# Patient Record
Sex: MALE | Race: BLACK OR AFRICAN AMERICAN | NOT HISPANIC OR LATINO | URBAN - METROPOLITAN AREA
[De-identification: names, ages, dates, MRNs, and addresses within clinical notes are randomized per-mention and may not be internally consistent; named-entity substitution may affect disease eponyms.]

---

## 2021-07-18 ENCOUNTER — INPATIENT (INPATIENT)
Facility: HOSPITAL | Age: 86
LOS: 35 days | Discharge: HOME HEALTH | DRG: 56 | End: 2021-08-23
Attending: PSYCHIATRY & NEUROLOGY | Admitting: INTERNAL MEDICINE
Payer: COMMERCIAL

## 2021-07-18 VITALS
TEMPERATURE: 99 F | HEART RATE: 87 BPM | WEIGHT: 132.5 LBS | SYSTOLIC BLOOD PRESSURE: 178 MMHG | RESPIRATION RATE: 16 BRPM | OXYGEN SATURATION: 99 % | DIASTOLIC BLOOD PRESSURE: 83 MMHG

## 2021-07-18 LAB
A1C WITH ESTIMATED AVERAGE GLUCOSE RESULT: 5.6 % — SIGNIFICANT CHANGE UP (ref 4–5.6)
ALBUMIN SERPL ELPH-MCNC: 4.5 G/DL — SIGNIFICANT CHANGE UP (ref 3.3–5)
ALP SERPL-CCNC: 100 U/L — SIGNIFICANT CHANGE UP (ref 40–120)
ALT FLD-CCNC: 13 U/L — SIGNIFICANT CHANGE UP (ref 10–45)
ANION GAP SERPL CALC-SCNC: 10 MMOL/L — SIGNIFICANT CHANGE UP (ref 5–17)
ANISOCYTOSIS BLD QL: SLIGHT — SIGNIFICANT CHANGE UP
APTT BLD: 32.7 SEC — SIGNIFICANT CHANGE UP (ref 27.5–35.5)
AST SERPL-CCNC: 18 U/L — SIGNIFICANT CHANGE UP (ref 10–40)
BASOPHILS # BLD AUTO: 0.29 K/UL — HIGH (ref 0–0.2)
BASOPHILS NFR BLD AUTO: 0.9 % — SIGNIFICANT CHANGE UP (ref 0–2)
BILIRUB SERPL-MCNC: 0.2 MG/DL — SIGNIFICANT CHANGE UP (ref 0.2–1.2)
BUN SERPL-MCNC: 39 MG/DL — HIGH (ref 7–23)
BURR CELLS BLD QL SMEAR: SLIGHT — SIGNIFICANT CHANGE UP
CALCIUM SERPL-MCNC: 9.6 MG/DL — SIGNIFICANT CHANGE UP (ref 8.4–10.5)
CHLORIDE SERPL-SCNC: 108 MMOL/L — SIGNIFICANT CHANGE UP (ref 96–108)
CHOLEST SERPL-MCNC: 213 MG/DL — HIGH
CO2 SERPL-SCNC: 23 MMOL/L — SIGNIFICANT CHANGE UP (ref 22–31)
CREAT SERPL-MCNC: 1.76 MG/DL — HIGH (ref 0.5–1.3)
EOSINOPHIL # BLD AUTO: 5.15 K/UL — HIGH (ref 0–0.5)
EOSINOPHIL NFR BLD AUTO: 15.9 % — HIGH (ref 0–6)
ESTIMATED AVERAGE GLUCOSE: 114 MG/DL — SIGNIFICANT CHANGE UP (ref 68–114)
GIANT PLATELETS BLD QL SMEAR: PRESENT — SIGNIFICANT CHANGE UP
GLUCOSE SERPL-MCNC: 141 MG/DL — HIGH (ref 70–99)
HCT VFR BLD CALC: 32.6 % — LOW (ref 39–50)
HDLC SERPL-MCNC: 56 MG/DL — SIGNIFICANT CHANGE UP
HGB BLD-MCNC: 10.7 G/DL — LOW (ref 13–17)
INR BLD: 1.03 — SIGNIFICANT CHANGE UP (ref 0.88–1.16)
LACTATE SERPL-SCNC: 1.1 MMOL/L — SIGNIFICANT CHANGE UP (ref 0.5–2)
LIPID PNL WITH DIRECT LDL SERPL: 137 MG/DL — HIGH
LYMPHOCYTES # BLD AUTO: 2.59 K/UL — SIGNIFICANT CHANGE UP (ref 1–3.3)
LYMPHOCYTES # BLD AUTO: 8 % — LOW (ref 13–44)
MANUAL SMEAR VERIFICATION: SIGNIFICANT CHANGE UP
MCHC RBC-ENTMCNC: 30.3 PG — SIGNIFICANT CHANGE UP (ref 27–34)
MCHC RBC-ENTMCNC: 32.8 GM/DL — SIGNIFICANT CHANGE UP (ref 32–36)
MCV RBC AUTO: 92.4 FL — SIGNIFICANT CHANGE UP (ref 80–100)
MONOCYTES # BLD AUTO: 3.72 K/UL — HIGH (ref 0–0.9)
MONOCYTES NFR BLD AUTO: 11.5 % — SIGNIFICANT CHANGE UP (ref 2–14)
MYELOCYTES NFR BLD: 0.9 % — HIGH (ref 0–0)
NEUTROPHILS # BLD AUTO: 20.34 K/UL — HIGH (ref 1.8–7.4)
NEUTROPHILS NFR BLD AUTO: 61.9 % — SIGNIFICANT CHANGE UP (ref 43–77)
NEUTS BAND # BLD: 0.9 % — SIGNIFICANT CHANGE UP (ref 0–8)
NON HDL CHOLESTEROL: 157 MG/DL — HIGH
NRBC # BLD: 1 /100 — HIGH (ref 0–0)
NRBC # BLD: SIGNIFICANT CHANGE UP /100 WBCS (ref 0–0)
PHENYTOIN FREE SERPL-MCNC: 4.1 UG/ML — LOW (ref 10–20)
PLAT MORPH BLD: NORMAL — SIGNIFICANT CHANGE UP
PLATELET # BLD AUTO: 413 K/UL — HIGH (ref 150–400)
POIKILOCYTOSIS BLD QL AUTO: SLIGHT — SIGNIFICANT CHANGE UP
POTASSIUM SERPL-MCNC: 4.7 MMOL/L — SIGNIFICANT CHANGE UP (ref 3.5–5.3)
POTASSIUM SERPL-SCNC: 4.7 MMOL/L — SIGNIFICANT CHANGE UP (ref 3.5–5.3)
PROT SERPL-MCNC: 7.6 G/DL — SIGNIFICANT CHANGE UP (ref 6–8.3)
PROTHROM AB SERPL-ACNC: 12.3 SEC — SIGNIFICANT CHANGE UP (ref 10.6–13.6)
RBC # BLD: 3.53 M/UL — LOW (ref 4.2–5.8)
RBC # FLD: 14 % — SIGNIFICANT CHANGE UP (ref 10.3–14.5)
RBC BLD AUTO: ABNORMAL
SARS-COV-2 RNA SPEC QL NAA+PROBE: SIGNIFICANT CHANGE UP
SODIUM SERPL-SCNC: 141 MMOL/L — SIGNIFICANT CHANGE UP (ref 135–145)
SPHEROCYTES BLD QL SMEAR: SLIGHT — SIGNIFICANT CHANGE UP
TRIGL SERPL-MCNC: 101 MG/DL — SIGNIFICANT CHANGE UP
TSH SERPL-MCNC: 5.56 UIU/ML — HIGH (ref 0.27–4.2)
WBC # BLD: 32.39 K/UL — HIGH (ref 3.8–10.5)
WBC # FLD AUTO: 32.39 K/UL — HIGH (ref 3.8–10.5)

## 2021-07-18 PROCEDURE — 70498 CT ANGIOGRAPHY NECK: CPT | Mod: 26,MA

## 2021-07-18 PROCEDURE — 70496 CT ANGIOGRAPHY HEAD: CPT | Mod: 26,MA

## 2021-07-18 PROCEDURE — 62270 DX LMBR SPI PNXR: CPT

## 2021-07-18 PROCEDURE — 99291 CRITICAL CARE FIRST HOUR: CPT | Mod: 25

## 2021-07-18 PROCEDURE — 70450 CT HEAD/BRAIN W/O DYE: CPT | Mod: 26,MA,59

## 2021-07-18 PROCEDURE — 0042T: CPT

## 2021-07-18 PROCEDURE — 93010 ELECTROCARDIOGRAM REPORT: CPT | Mod: 59

## 2021-07-18 PROCEDURE — 71045 X-RAY EXAM CHEST 1 VIEW: CPT | Mod: 26,59

## 2021-07-18 RX ORDER — ASPIRIN/CALCIUM CARB/MAGNESIUM 324 MG
81 TABLET ORAL DAILY
Refills: 0 | Status: DISCONTINUED | OUTPATIENT
Start: 2021-07-19 | End: 2021-07-23

## 2021-07-18 RX ORDER — ALTEPLASE 100 MG
48.7 KIT INTRAVENOUS ONCE
Refills: 0 | Status: COMPLETED | OUTPATIENT
Start: 2021-07-18 | End: 2021-07-18

## 2021-07-18 RX ORDER — ATORVASTATIN CALCIUM 80 MG/1
80 TABLET, FILM COATED ORAL AT BEDTIME
Refills: 0 | Status: DISCONTINUED | OUTPATIENT
Start: 2021-07-18 | End: 2021-07-20

## 2021-07-18 RX ORDER — ENOXAPARIN SODIUM 100 MG/ML
40 INJECTION SUBCUTANEOUS DAILY
Refills: 0 | Status: DISCONTINUED | OUTPATIENT
Start: 2021-07-18 | End: 2021-07-18

## 2021-07-18 RX ORDER — VANCOMYCIN HCL 1 G
1000 VIAL (EA) INTRAVENOUS ONCE
Refills: 0 | Status: COMPLETED | OUTPATIENT
Start: 2021-07-18 | End: 2021-07-18

## 2021-07-18 RX ORDER — HEPARIN SODIUM 5000 [USP'U]/ML
5000 INJECTION INTRAVENOUS; SUBCUTANEOUS EVERY 8 HOURS
Refills: 0 | Status: DISCONTINUED | OUTPATIENT
Start: 2021-07-18 | End: 2021-07-27

## 2021-07-18 RX ORDER — ALTEPLASE 100 MG
5.4 KIT INTRAVENOUS ONCE
Refills: 0 | Status: COMPLETED | OUTPATIENT
Start: 2021-07-18 | End: 2021-07-18

## 2021-07-18 RX ORDER — CLOPIDOGREL BISULFATE 75 MG/1
75 TABLET, FILM COATED ORAL DAILY
Refills: 0 | Status: DISCONTINUED | OUTPATIENT
Start: 2021-07-19 | End: 2021-07-20

## 2021-07-18 RX ORDER — CLOPIDOGREL BISULFATE 75 MG/1
300 TABLET, FILM COATED ORAL ONCE
Refills: 0 | Status: COMPLETED | OUTPATIENT
Start: 2021-07-18 | End: 2021-07-18

## 2021-07-18 RX ORDER — ASPIRIN/CALCIUM CARB/MAGNESIUM 324 MG
300 TABLET ORAL ONCE
Refills: 0 | Status: COMPLETED | OUTPATIENT
Start: 2021-07-18 | End: 2021-07-18

## 2021-07-18 RX ORDER — PIPERACILLIN AND TAZOBACTAM 4; .5 G/20ML; G/20ML
3.38 INJECTION, POWDER, LYOPHILIZED, FOR SOLUTION INTRAVENOUS ONCE
Refills: 0 | Status: COMPLETED | OUTPATIENT
Start: 2021-07-18 | End: 2021-07-18

## 2021-07-18 RX ADMIN — ATORVASTATIN CALCIUM 80 MILLIGRAM(S): 80 TABLET, FILM COATED ORAL at 23:21

## 2021-07-18 RX ADMIN — PIPERACILLIN AND TAZOBACTAM 200 GRAM(S): 4; .5 INJECTION, POWDER, LYOPHILIZED, FOR SOLUTION INTRAVENOUS at 20:33

## 2021-07-18 RX ADMIN — Medication 300 MILLIGRAM(S): at 21:25

## 2021-07-18 RX ADMIN — Medication 100 MILLIGRAM(S): at 23:24

## 2021-07-18 RX ADMIN — PIPERACILLIN AND TAZOBACTAM 3.38 GRAM(S): 4; .5 INJECTION, POWDER, LYOPHILIZED, FOR SOLUTION INTRAVENOUS at 21:03

## 2021-07-18 RX ADMIN — CLOPIDOGREL BISULFATE 300 MILLIGRAM(S): 75 TABLET, FILM COATED ORAL at 23:21

## 2021-07-18 RX ADMIN — Medication 250 MILLIGRAM(S): at 21:05

## 2021-07-18 NOTE — ED PROVIDER NOTE - CARE PLAN
Principal Discharge DX:	Facial droop  Secondary Diagnosis:	Slurred speech  Secondary Diagnosis:	Leukocytosis, unspecified type

## 2021-07-18 NOTE — ED ADULT NURSE NOTE - OBJECTIVE STATEMENT
Stroke Code activated by Triage RN.  As per pt's nephew Pt CO slurred speech, left sided facial droop, and drooling since 1700 today.  PT directed to CT.  #18 PIV placed to Left AC.  Stroke Team at bedside.  Plan for TPA at this time, will continue to monitor.

## 2021-07-18 NOTE — CONSULT NOTE ADULT - ASSESSMENT
89y Male with PMHx of HTN and seizure on phenytoin presents with left facial droop and slurred speech, LKW 5pm 7/18/21, NIHSS 3. CTH negative for acute hemorrhage. CTP without any perfusion defect. CTA without LVO.  Patient was within the window for tpa, but pt and family (wife and great-nephew) declined as they felt his current deficits are non-disabling to his daily life vs risk of bleeding. Pt to be admitted to stroke service for stroke w/u.    - patient failed dysphagia screening, will load with  VT  - start Atorvastatin 80mg PO daily once cleared for PO  - stroke risk factors: A1c, , HTN  - obtain MRI brain without short stroke protocol  - obtain TTE with bubble   - recommend SBP goal <180  - recommend q4hr stroke neuro checks  - recommend oral care QID  - provide stroke education    DVT prophylaxis   -Lovenox SQ and SCDs    Discussed with Neurology Attending 89y Male with PMHx of HTN and seizure on phenytoin presents with left facial droop and slurred speech, LKW 5pm 7/18/21, NIHSS 3. CTH negative for acute hemorrhage. CTP without any perfusion defect. CTA without LVO.  Patient was within the window for tpa, but pt and family (wife and great-nephew) declined as they felt his current deficits are non-disabling to his daily life vs risk of bleeding. Pt to be admitted to stroke service for stroke w/u.    - patient failed dysphagia screening, loaded with ASA 325mg MI. Pt passed second dysphagia screening and loaded plavix 300mg PO. Then, start ASA 81 mg daily and plavix 75mg daily tomorrow  - start Atorvastatin 80mg PO daily  - stroke risk factors: A1c, , HTN  - obtain MRI brain without short stroke protocol  - obtain TTE with bubble   - recommend SBP goal <180  - recommend q4hr stroke neuro checks  - recommend oral care QID  - provide stroke education    DVT prophylaxis   -Lovenox SQ and SCDs    Discussed with Neurology Attending 89y Male with PMHx of HTN and seizure on phenytoin presents with left facial droop and slurred speech, LKW 5pm 7/18/21, NIHSS 3. CTH negative for acute hemorrhage. CTP without any perfusion defect. CTA without LVO.  Patient was within the window for tpa, but pt and family (wife and great-nephew) declined as they felt his current deficits are non-disabling to his daily life vs risk of bleeding. Pt to be admitted to stroke service for stroke w/u.    - patient failed dysphagia screening, loaded with ASA 325mg NV. Pt passed second dysphagia screening and loaded plavix 300mg PO. Then, start ASA 81 mg daily and plavix 75mg daily tomorrow  - start Atorvastatin 80mg PO daily  - stroke risk factors: A1c, , HTN  - obtain MRI brain without short stroke protocol  - obtain TTE with bubble   - recommend SBP goal <180  - recommend q4hr stroke neuro checks  - recommend oral care QID  - provide stroke education    DVT prophylaxis   -Heparin SQ and SCDs    Discussed with Neurology Attending no

## 2021-07-18 NOTE — ED PROVIDER NOTE - WR INTERPRETATION 1
CXR negative - No pneumothorax, No opacities, No free airCXR negative - No infiltrates, No consolidation, No atelectasis seenCXR negative - No CHF, No cardiomegaly, No pleural effusions

## 2021-07-18 NOTE — CONSULT NOTE ADULT - SUBJECTIVE AND OBJECTIVE BOX
**STROKE CODE CONSULT NOTE**    Last known well time/Time of onset of symptoms: 5pm 7/18/2021    HPI: 89y Male with PMHx of HTN and seizure on phenytoin presents with left facial droop and slurred speech. Pt was with family member (great-nephew Mesh [phone]) who noticed at 5pm, pt had acute onset of slurred speech, increased saliva production and a left facial droop. Pt was driven to the ED immediately. On presentation, /94, NIHSS 3. Great-nephew at bedside denies hx of stroke, use of blood thinners, recent trauma/bleeding event. CTH negative for acute hemorrhage. CTP without any perfusion defect. CTA without LVO. In conjunction with patient, family and stroke attending, decision was made to not give tpa since family felt his current deficits are non-disabling to his daily life vs risk of bleeding. Pt and family was made aware of the possibility of worsening symptoms and the opportunity to give tpa was time limited. They expressed understanding and declined tpa administration.     Pt denies CP, SOB, extremity weakness, changes in vision, HA. He endorses a cough that has been occurring for the past few days with sputum production, but has been afebrile.     At baseline, pt is fully independent of all ADLs and iADLs, ambulates without assistance. It is unclear if pt sees a neurologist for seizure management. Per wife (Ross [phone], [phone]), his most recent seizure was in April 2020 where he had full body shaking lasting for 5 minutes. He has been on phenytoin 100mg TID and has not had another seizure since.     T(C): 36.9 (07-18-21 @ 21:00), Max: 37.1 (07-18-21 @ 19:32)  HR: 91 (07-18-21 @ 21:00) (84 - 91)  BP: 166/79 (07-18-21 @ 21:00) (160/79 - 184/75)  RR: 18 (07-18-21 @ 21:00) (16 - 18)  SpO2: 96% (07-18-21 @ 21:00) (96% - 99%)    PAST MEDICAL & SURGICAL HISTORY:      FAMILY HISTORY:      SOCIAL HISTORY:   Patient lives with wife in apartment  Smoking status: denies  Drinking: denies  Drug Use: denies     ROS:   Constitutional: No fever, weight loss or fatigue  Eyes: No eye pain, visual disturbances, or discharge  ENMT:  No difficulty hearing, tinnitus; No sinus or throat pain  Neck: No pain or stiffness  Respiratory: cough with sputum production  Cardiovascular: No chest pain, palpitations, shortness of breath, or leg swelling  Gastrointestinal: No abdominal pain. No nausea, vomiting or hematemesis; No diarrhea or constipation. Nohematochezia.  Neurological: As per HPI    MEDICATIONS  (STANDING):    MEDICATIONS  (PRN):    Allergies    hay fever (Unknown)  No Known Drug Allergies    Intolerances      Vital Signs Last 24 Hrs  T(C): 36.9 (18 Jul 2021 21:00), Max: 37.1 (18 Jul 2021 19:32)  T(F): 98.5 (18 Jul 2021 21:00), Max: 98.8 (18 Jul 2021 19:51)  HR: 91 (18 Jul 2021 21:00) (84 - 91)  BP: 166/79 (18 Jul 2021 21:00) (160/79 - 184/75)  BP(mean): --  RR: 18 (18 Jul 2021 21:00) (16 - 18)  SpO2: 96% (18 Jul 2021 21:00) (96% - 99%)    Physical exam:  Constitutional: No acute distress, conversant  Eyes: Anicteric sclerae, moist conjunctivae, see below for CNs  Neck: trachea midline, FROM, supple, no thyromegaly or lymphadenopathy  Cardiovascular: Regular rate and rhythm  Pulmonary: Transmitted sounds from upper airway. No use of accessory muscles  GI: Abdomen soft, non-distended, non-tender  Extremities: no edema    Neurologic:  -Mental status: Awake, alert, oriented to person, place, and time. Speech is fluent with intact naming (able to name hammock, chair, key, pen, finger phone, scissor) , repetition, and comprehension, mild dysarthria. Recent and remote memory intact. Follows commands. Attention/concentration intact.   -Cranial nerves:   II: Visual fields are full to confrontation.  III, IV, VI: Extraocular movements are intact without nystagmus. Pupils equally round and reactive to light  V:  Facial sensation V1-V3 equal and intact   VII: Left facial droop on activation   VIII: Hearing is grossly intact bilaterally  Motor: Able to maintain AG along all extremities without pronator drift. Strength RUE 3/5, LUE 5/5, RLE 3/5, LUE 5/5.   Sensation: Intact to light touch bilaterally. Extinguishes on RLE double simultaneous testing.  Coordination: No dysmetria on finger-to-nose and heel-to-shin bilaterally  Gait: Narrow gait, slow, with leaning to the left    NIHSS: 3 ASPECT Score: 9    Fingerstick Blood Glucose: CAPILLARY BLOOD GLUCOSE  161 (18 Jul 2021 20:36)      POCT Blood Glucose.: 161 mg/dL (18 Jul 2021 19:27)    LABS:                        10.7   32.39 )-----------( 413      ( 18 Jul 2021 20:01 )             32.6     07-18    141  |  108  |  39<H>  ----------------------------<  141<H>  4.7   |  23  |  1.76<H>    Ca    9.6      18 Jul 2021 20:01    TPro  7.6  /  Alb  4.5  /  TBili  0.2  /  DBili  x   /  AST  18  /  ALT  13  /  AlkPhos  100  07-18    PT/INR - ( 18 Jul 2021 20:01 )   PT: 12.3 sec;   INR: 1.03          PTT - ( 18 Jul 2021 20:01 )  PTT:32.7 sec          RADIOLOGY & ADDITIONAL STUDIES:  < from: CT Brain Stroke Protocol (07.18.21 @ 19:44) >  IMPRESSION:  No acute intracranial hemorrhage or transcortical infarct.      < from: CT Perfusion w/ Maps w/ IV Cont (07.18.21 @ 19:45) >  IMPRESSION:  Normal CT perfusion study.      < from: CT Angio Neck w/ IV Cont (07.18.21 @ 19:45) >  IMPRESSION:  1.  No large vessel occlusion or high-grade stenosis within the head and neck.  2.  Incidentally noted 1.8 cm right thyroid nodule. Consider nonemergent dedicated thyroid ultrasound is for further characterization.          -----------------------------------------------------------------------------------------------------------------  IV-tPA (Y/N):    N  Reason IV-tPA not given: pt and family declines tpa as deficits judged to be non-disabling in pt's daily life    **STROKE CODE CONSULT NOTE**    Last known well time/Time of onset of symptoms: 5pm 7/18/2021    HPI: 89y Male with PMHx of HTN and seizure on phenytoin presents with left facial droop and slurred speech. Pt was with family member (great-nephew Mesh [phone]) who noticed at 5pm, pt had acute onset of slurred speech, increased saliva production and a left facial droop. Pt was driven to the ED immediately. On presentation, /94, NIHSS 3. Great-nephew at bedside denies hx of stroke, use of blood thinners, recent trauma/bleeding event. CTH negative for acute hemorrhage. CTP without any perfusion defect. CTA without LVO. In conjunction with patient, family and stroke attending, decision was made to not give tpa since family felt his current deficits are non-disabling to his daily life vs risk of bleeding. Pt and family was made aware of the possibility of worsening symptoms and the opportunity to give tpa was time limited. They expressed understanding and declined tpa administration.     Pt denies CP, SOB, extremity weakness, changes in vision, HA. He endorses a cough that has been occurring for the past few days with sputum production, but has been afebrile.     At baseline, pt is fully independent of all ADLs and iADLs, ambulates without assistance. It is unclear if pt sees a neurologist for seizure management. Per wife (Ross [phone], [phone]), his most recent seizure was in April 2020 where he had full body shaking lasting for 5 minutes. He has been on phenytoin 100mg TID and has not had another seizure since.     T(C): 36.9 (07-18-21 @ 21:00), Max: 37.1 (07-18-21 @ 19:32)  HR: 91 (07-18-21 @ 21:00) (84 - 91)  BP: 166/79 (07-18-21 @ 21:00) (160/79 - 184/75)  RR: 18 (07-18-21 @ 21:00) (16 - 18)  SpO2: 96% (07-18-21 @ 21:00) (96% - 99%)    PAST MEDICAL & SURGICAL HISTORY:      FAMILY HISTORY:      SOCIAL HISTORY:   Patient lives with wife in apartment  Smoking status: denies  Drinking: denies  Drug Use: denies     ROS:   Constitutional: No fever, weight loss or fatigue  Eyes: No eye pain, visual disturbances, or discharge  ENMT:  bilateral hearing loss  Neck: No pain or stiffness  Respiratory: cough with sputum production  Cardiovascular: No chest pain, palpitations, shortness of breath, or leg swelling  Gastrointestinal: No abdominal pain. No nausea, vomiting or hematemesis; No diarrhea or constipation. Nohematochezia.  Neurological: As per HPI    MEDICATIONS  (STANDING):    MEDICATIONS  (PRN):    Allergies    hay fever (Unknown)  No Known Drug Allergies    Intolerances      Vital Signs Last 24 Hrs  T(C): 36.9 (18 Jul 2021 21:00), Max: 37.1 (18 Jul 2021 19:32)  T(F): 98.5 (18 Jul 2021 21:00), Max: 98.8 (18 Jul 2021 19:51)  HR: 91 (18 Jul 2021 21:00) (84 - 91)  BP: 166/79 (18 Jul 2021 21:00) (160/79 - 184/75)  BP(mean): --  RR: 18 (18 Jul 2021 21:00) (16 - 18)  SpO2: 96% (18 Jul 2021 21:00) (96% - 99%)    Physical exam:  Constitutional: No acute distress, conversant  Eyes: Anicteric sclerae, moist conjunctivae, see below for CNs  Neck: trachea midline, FROM, supple, no thyromegaly or lymphadenopathy  Cardiovascular: Regular rate and rhythm, ?murmur  Pulmonary: Transmitted sounds from upper airway. No use of accessory muscles  GI: Abdomen soft, non-distended, non-tender  Extremities: no edema    Neurologic:  -Mental status: Awake, alert, oriented to person, place, and time. Speech is fluent with intact naming (able to name hammock, chair, key, pen, finger phone, scissor) , repetition, and comprehension, mild dysarthria. Recent and remote memory intact. Follows commands. Attention/concentration intact.   -Cranial nerves:   II: Visual fields are full to confrontation.  III, IV, VI: Extraocular movements are intact without nystagmus. Pupils equally round and reactive to light  V:  Facial sensation V1-V3 equal and intact   VII: Left facial droop on activation   VIII: Hearing is grossly intact bilaterally  Motor: Able to maintain AG along all extremities without pronator drift. Strength RUE 3/5, LUE 5/5, RLE 3/5, LUE 5/5.   Sensation: Intact to light touch bilaterally. Extinguishes on RLE double simultaneous testing.  Coordination: No dysmetria on finger-to-nose and heel-to-shin bilaterally  Gait: Narrow gait, slow, with leaning to the left    NIHSS: 3 ASPECT Score: 9    Fingerstick Blood Glucose: CAPILLARY BLOOD GLUCOSE  161 (18 Jul 2021 20:36)      POCT Blood Glucose.: 161 mg/dL (18 Jul 2021 19:27)    LABS:                        10.7   32.39 )-----------( 413      ( 18 Jul 2021 20:01 )             32.6     07-18    141  |  108  |  39<H>  ----------------------------<  141<H>  4.7   |  23  |  1.76<H>    Ca    9.6      18 Jul 2021 20:01    TPro  7.6  /  Alb  4.5  /  TBili  0.2  /  DBili  x   /  AST  18  /  ALT  13  /  AlkPhos  100  07-18    PT/INR - ( 18 Jul 2021 20:01 )   PT: 12.3 sec;   INR: 1.03          PTT - ( 18 Jul 2021 20:01 )  PTT:32.7 sec          RADIOLOGY & ADDITIONAL STUDIES:  < from: CT Brain Stroke Protocol (07.18.21 @ 19:44) >  IMPRESSION:  No acute intracranial hemorrhage or transcortical infarct.      < from: CT Perfusion w/ Maps w/ IV Cont (07.18.21 @ 19:45) >  IMPRESSION:  Normal CT perfusion study.      < from: CT Angio Neck w/ IV Cont (07.18.21 @ 19:45) >  IMPRESSION:  1.  No large vessel occlusion or high-grade stenosis within the head and neck.  2.  Incidentally noted 1.8 cm right thyroid nodule. Consider nonemergent dedicated thyroid ultrasound is for further characterization.          -----------------------------------------------------------------------------------------------------------------  IV-tPA (Y/N):    N  Reason IV-tPA not given: pt and family declines tpa as deficits judged to be non-disabling in pt's daily life

## 2021-07-18 NOTE — ED PROVIDER NOTE - PROGRESS NOTE DETAILS
NIHSS 2. Risk and benefits of TPA d/w pt by stroke team and w/ family. Opted for no TPA. Admit to Neuro, tele. WBC 32 of unclear etiology. No hx malignancy. Afebrile rectally. Will add bx / ucx / broad spectrum abx for now NIHSS 3. Risk and benefits of TPA d/w pt by stroke team and w/ family. Opted for no TPA. Admit to Neuro, tele. WBC 32 of unclear etiology. No hx malignancy. Afebrile rectally. Will add bx / ucx / broad spectrum abx for now

## 2021-07-18 NOTE — ED ADULT NURSE NOTE - NSIMPLEMENTINTERV_GEN_ALL_ED
Implemented All Fall with Harm Risk Interventions:  Kenneth to call system. Call bell, personal items and telephone within reach. Instruct patient to call for assistance. Room bathroom lighting operational. Non-slip footwear when patient is off stretcher. Physically safe environment: no spills, clutter or unnecessary equipment. Stretcher in lowest position, wheels locked, appropriate side rails in place. Provide visual cue, wrist band, yellow gown, etc. Monitor gait and stability. Monitor for mental status changes and reorient to person, place, and time. Review medications for side effects contributing to fall risk. Reinforce activity limits and safety measures with patient and family. Provide visual clues: red socks.

## 2021-07-18 NOTE — ED PROVIDER NOTE - PHYSICAL EXAMINATION
Constitutional: Awake, alert, oriented to person, place, time/situation and in no apparent distress.  ENMT: Airway patent. Normal MM  Eyes: Clear bilaterally, PERRL, EOMI. NO nystagmus  Cardiac: Normal rate, regular rhythm.  Heart sounds S1, S2.  No murmurs, rubs or gallops.  Respiratory: + mild rhonchi at bases that clears w/ coughing. No wheezing or rales. No increased WOB, tachypnea, hypoxia, or accessory mm use.   Gastrointestinal: Abd soft, NT, ND, NABS. No guarding, rebound, or rigidity. No pulsatile abdominal masses. No organomegaly appreciated.   Musculoskeletal: Range of motion is not limited  Neuro: + L facial droop, some drooling from L side of mouth, mildly slurred speech, see NIHSS  Skin: Skin normal color for race, warm, dry and intact. No evidence of rash.  Psych: Alert and oriented to person, place, time/situation. normal mood and affect. no apparent risk to self or others.

## 2021-07-18 NOTE — ED PROVIDER NOTE - NS ED ROS FT
Constitutional: No fever or chills.   Eyes: No pain, blurry vision, or discharge.  ENMT: No hearing changes, pain, discharge or infections. No neck pain or stiffness.  Cardiac: No chest pain, SOB or edema. No chest pain with exertion.  Respiratory: No respiratory distress. No hemoptysis. No history of asthma or RAD.  GI: No nausea, vomiting, diarrhea or abdominal pain.  : No dysuria, frequency or burning.  MS: No myalgia, muscle weakness, joint pain or back pain.  Neuro: See HPI  Skin: No skin rash.   Endocrine: No history of thyroid disease or diabetes.  Except as documented in the HPI, all other systems are negative.

## 2021-07-18 NOTE — ED PROVIDER NOTE - CLINICAL SUMMARY MEDICAL DECISION MAKING FREE TEXT BOX
Pt p/w new onset slurred speech, L facial droop. Stroke code activated from triage. DDx includes CVA, seizure, less likely other pathology. Stroke imaging and stroke w/u in progress. Dispo pending w/u, clinical status, stroke recommendations. Anticipate admission

## 2021-07-18 NOTE — ED ADULT TRIAGE NOTE - CHIEF COMPLAINT QUOTE
As per pt's nephew pt has slurred speech starting around 5pm with left sided facial droop. Pt ambulating with assistance.

## 2021-07-19 LAB
ANION GAP SERPL CALC-SCNC: 14 MMOL/L — SIGNIFICANT CHANGE UP (ref 5–17)
ANISOCYTOSIS BLD QL: SLIGHT — SIGNIFICANT CHANGE UP
BASOPHILS # BLD AUTO: 0 K/UL — SIGNIFICANT CHANGE UP (ref 0–0.2)
BASOPHILS NFR BLD AUTO: 0 % — SIGNIFICANT CHANGE UP (ref 0–2)
BUN SERPL-MCNC: 35 MG/DL — HIGH (ref 7–23)
BURR CELLS BLD QL SMEAR: PRESENT — SIGNIFICANT CHANGE UP
CALCIUM SERPL-MCNC: 9.2 MG/DL — SIGNIFICANT CHANGE UP (ref 8.4–10.5)
CHLORIDE SERPL-SCNC: 108 MMOL/L — SIGNIFICANT CHANGE UP (ref 96–108)
CO2 SERPL-SCNC: 19 MMOL/L — LOW (ref 22–31)
COVID-19 SPIKE DOMAIN AB INTERP: NEGATIVE — SIGNIFICANT CHANGE UP
COVID-19 SPIKE DOMAIN ANTIBODY RESULT: 0.4 U/ML — SIGNIFICANT CHANGE UP
CREAT SERPL-MCNC: 1.62 MG/DL — HIGH (ref 0.5–1.3)
CULTURE RESULTS: SIGNIFICANT CHANGE UP
DACRYOCYTES BLD QL SMEAR: SLIGHT — SIGNIFICANT CHANGE UP
EOSINOPHIL # BLD AUTO: 1.57 K/UL — HIGH (ref 0–0.5)
EOSINOPHIL NFR BLD AUTO: 5.4 % — SIGNIFICANT CHANGE UP (ref 0–6)
GLUCOSE BLDC GLUCOMTR-MCNC: 143 MG/DL — HIGH (ref 70–99)
GLUCOSE SERPL-MCNC: 132 MG/DL — HIGH (ref 70–99)
GRAM STN FLD: SIGNIFICANT CHANGE UP
HCT VFR BLD CALC: 32.8 % — LOW (ref 39–50)
HGB BLD-MCNC: 10.7 G/DL — LOW (ref 13–17)
LYMPHOCYTES # BLD AUTO: 1.57 K/UL — SIGNIFICANT CHANGE UP (ref 1–3.3)
LYMPHOCYTES # BLD AUTO: 5.4 % — LOW (ref 13–44)
MACROCYTES BLD QL: SLIGHT — SIGNIFICANT CHANGE UP
MAGNESIUM SERPL-MCNC: 2.5 MG/DL — SIGNIFICANT CHANGE UP (ref 1.6–2.6)
MANUAL SMEAR VERIFICATION: SIGNIFICANT CHANGE UP
MCHC RBC-ENTMCNC: 30.4 PG — SIGNIFICANT CHANGE UP (ref 27–34)
MCHC RBC-ENTMCNC: 32.6 GM/DL — SIGNIFICANT CHANGE UP (ref 32–36)
MCV RBC AUTO: 93.2 FL — SIGNIFICANT CHANGE UP (ref 80–100)
METAMYELOCYTES # FLD: 0.9 % — HIGH (ref 0–0)
MONOCYTES # BLD AUTO: 3.94 K/UL — HIGH (ref 0–0.9)
MONOCYTES NFR BLD AUTO: 13.5 % — SIGNIFICANT CHANGE UP (ref 2–14)
MYELOCYTES NFR BLD: 1.8 % — HIGH (ref 0–0)
NEUTROPHILS # BLD AUTO: 21.29 K/UL — HIGH (ref 1.8–7.4)
NEUTROPHILS NFR BLD AUTO: 73 % — SIGNIFICANT CHANGE UP (ref 43–77)
PHOSPHATE SERPL-MCNC: 3.6 MG/DL — SIGNIFICANT CHANGE UP (ref 2.5–4.5)
PLAT MORPH BLD: NORMAL — SIGNIFICANT CHANGE UP
PLATELET # BLD AUTO: 384 K/UL — SIGNIFICANT CHANGE UP (ref 150–400)
POIKILOCYTOSIS BLD QL AUTO: SLIGHT — SIGNIFICANT CHANGE UP
POTASSIUM SERPL-MCNC: 4 MMOL/L — SIGNIFICANT CHANGE UP (ref 3.5–5.3)
POTASSIUM SERPL-SCNC: 4 MMOL/L — SIGNIFICANT CHANGE UP (ref 3.5–5.3)
RBC # BLD: 3.52 M/UL — LOW (ref 4.2–5.8)
RBC # FLD: 13.9 % — SIGNIFICANT CHANGE UP (ref 10.3–14.5)
RBC BLD AUTO: ABNORMAL
SARS-COV-2 IGG+IGM SERPL QL IA: 0.4 U/ML — SIGNIFICANT CHANGE UP
SARS-COV-2 IGG+IGM SERPL QL IA: NEGATIVE — SIGNIFICANT CHANGE UP
SODIUM SERPL-SCNC: 141 MMOL/L — SIGNIFICANT CHANGE UP (ref 135–145)
SPECIMEN SOURCE: SIGNIFICANT CHANGE UP
T4 FREE SERPL-MCNC: 1.02 NG/DL — SIGNIFICANT CHANGE UP (ref 0.93–1.7)
WBC # BLD: 29.16 K/UL — HIGH (ref 3.8–10.5)
WBC # FLD AUTO: 29.16 K/UL — HIGH (ref 3.8–10.5)

## 2021-07-19 PROCEDURE — 70551 MRI BRAIN STEM W/O DYE: CPT | Mod: 26

## 2021-07-19 PROCEDURE — 93306 TTE W/DOPPLER COMPLETE: CPT | Mod: 26

## 2021-07-19 PROCEDURE — 99233 SBSQ HOSP IP/OBS HIGH 50: CPT

## 2021-07-19 PROCEDURE — 99223 1ST HOSP IP/OBS HIGH 75: CPT

## 2021-07-19 RX ORDER — LEVETIRACETAM 250 MG/1
500 TABLET, FILM COATED ORAL
Refills: 0 | Status: DISCONTINUED | OUTPATIENT
Start: 2021-07-19 | End: 2021-07-20

## 2021-07-19 RX ORDER — SODIUM CHLORIDE 9 MG/ML
4 INJECTION INTRAMUSCULAR; INTRAVENOUS; SUBCUTANEOUS EVERY 6 HOURS
Refills: 0 | Status: DISCONTINUED | OUTPATIENT
Start: 2021-07-19 | End: 2021-07-28

## 2021-07-19 RX ADMIN — ATORVASTATIN CALCIUM 80 MILLIGRAM(S): 80 TABLET, FILM COATED ORAL at 21:14

## 2021-07-19 RX ADMIN — Medication 200 MILLIGRAM(S): at 17:29

## 2021-07-19 RX ADMIN — HEPARIN SODIUM 5000 UNIT(S): 5000 INJECTION INTRAVENOUS; SUBCUTANEOUS at 05:06

## 2021-07-19 RX ADMIN — Medication 200 MILLIGRAM(S): at 11:49

## 2021-07-19 RX ADMIN — Medication 200 MILLIGRAM(S): at 06:14

## 2021-07-19 RX ADMIN — HEPARIN SODIUM 5000 UNIT(S): 5000 INJECTION INTRAVENOUS; SUBCUTANEOUS at 21:14

## 2021-07-19 RX ADMIN — CLOPIDOGREL BISULFATE 75 MILLIGRAM(S): 75 TABLET, FILM COATED ORAL at 11:49

## 2021-07-19 RX ADMIN — Medication 100 MILLIGRAM(S): at 06:14

## 2021-07-19 RX ADMIN — Medication 81 MILLIGRAM(S): at 11:49

## 2021-07-19 RX ADMIN — LEVETIRACETAM 500 MILLIGRAM(S): 250 TABLET, FILM COATED ORAL at 17:28

## 2021-07-19 NOTE — PHYSICAL THERAPY INITIAL EVALUATION ADULT - IMPAIRMENTS CONTRIBUTING TO GAIT DEVIATIONS, PT EVAL
ataxic/impaired balance/impaired coordination/impaired motor control/narrow base of support/decreased strength

## 2021-07-19 NOTE — OCCUPATIONAL THERAPY INITIAL EVALUATION ADULT - VISUAL ASSESSMENT: VISUAL NEGLECT
primarily observed during ambulation as pt walking on L side of RW, pt was able to correctly turn head to L and read clock/left

## 2021-07-19 NOTE — H&P ADULT - HISTORY OF PRESENT ILLNESS
**STROKE HPI***    HPI: 89y Male with PMHx of HTN and seizure on phenytoin presents with left facial droop and slurred speech. Pt was with family member (great-nephew Mesh [phone]) who noticed at 5pm, pt had acute onset of slurred speech, increased saliva production and a left facial droop. Pt was driven to the ED immediately. On presentation, /94, NIHSS 3. Great-nephew at bedside denies hx of stroke, use of blood thinners, recent trauma/bleeding event. CTH negative for acute hemorrhage. CTP without any perfusion defect. CTA without LVO. In conjunction with patient, family and stroke attending, decision was made to not give tpa since family felt his current deficits are non-disabling to his daily life vs risk of bleeding. Pt and family was made aware of the possibility of worsening symptoms and the opportunity to give tpa was time limited. They expressed understanding and declined tpa administration.     Pt denies CP, SOB, extremity weakness, changes in vision, HA. He endorses a cough that has been occurring for the past few days with sputum production, but has been afebrile.     At baseline, pt is fully independent of all ADLs and iADLs, ambulates without assistance, with hearing loss b/l. It is unclear if pt sees a neurologist for seizure management. Per wife (Ross [phone], [phone]), his most recent seizure was in April 2020 where he had full body shaking lasting for 5 minutes. He has been on phenytoin 100mg TID and has not had another seizure since.       PAST MEDICAL & SURGICAL HISTORY:      FAMILY HISTORY:      SOCIAL HISTORY:   Patient lives with wife in apartment.  Smoking status: denies  Drinking: denies  Drug Use: denies    ROS:   Constitutional: No fever, weight loss or fatigue  Eyes: No eye pain, visual disturbances, or discharge  ENMT:  hearing loss bilaterally  Neck: No pain or stiffness  Respiratory: cough with sputum production x days  Cardiovascular: No chest pain, palpitations, shortness of breath, dizziness or leg swelling  Gastrointestinal: No abdominal pain. No nausea, vomiting or hematemesis; No diarrhea or constipation. Nohematochezia.  Neurological: As per HPI    T(C): 36.9 (07-18-21 @ 23:53), Max: 37.1 (07-18-21 @ 19:32)  HR: 92 (07-18-21 @ 23:53) (84 - 92)  BP: 142/78 (07-18-21 @ 23:53) (123/63 - 184/75)  RR: 18 (07-18-21 @ 23:53) (16 - 18)  SpO2: 97% (07-18-21 @ 23:53) (96% - 99%)    MEDICATION RECONCILIATION   MEDICATIONS  (STANDING):  aspirin enteric coated 81 milliGRAM(s) Oral daily  atorvastatin 80 milliGRAM(s) Oral at bedtime  clopidogrel Tablet 75 milliGRAM(s) Oral daily  heparin   Injectable 5000 Unit(s) SubCutaneous every 8 hours  phenytoin   Capsule 100 milliGRAM(s) Oral three times a day    MEDICATIONS  (PRN):    Allergies    hay fever (Unknown)  No Known Drug Allergies    Intolerances      Vital Signs Last 24 Hrs  T(C): 36.9 (18 Jul 2021 23:53), Max: 37.1 (18 Jul 2021 19:32)  T(F): 98.4 (18 Jul 2021 23:53), Max: 98.8 (18 Jul 2021 19:51)  HR: 92 (18 Jul 2021 23:53) (84 - 92)  BP: 142/78 (18 Jul 2021 23:53) (123/63 - 184/75)  BP(mean): --  RR: 18 (18 Jul 2021 23:53) (16 - 18)  SpO2: 97% (18 Jul 2021 23:53) (96% - 99%)    Physical exam:  General: No acute distress, awake and alert  Cardiovascular: Regular rate and rhythm, ?murmur  Pulmonary: Transmitted sounds from upper airway. No use of accessory muscles  GI: Abdomen soft, non-tender, non-distended    Neurologic:  -Mental status: Awake, alert, oriented to person, place, and time. Speech is fluent with intact naming, repetition, and comprehension, no dysarthria. Recent and remote memory intact. Follows commands. Attention/concentration intact. Fund of knowledge appropriate.  -Cranial nerves:   II: Visual fields are full to confrontation.  III, IV, VI: Extraocular movements are intact without nystagmus. Pupils equally round and reactive to light  V:  Facial sensation V1-V3 equal and intact   VII: Face is symmetric with normal eye closure and smile  VIII: Hearing is bilaterally intact to finger rub  IX, X: Uvula is midline and soft palate rises symmetrically  XI: Head turning and shoulder shrug are intact.  XII: Tongue protrudes midline  Motor: Normal bulk and tone. No pronator drift. Strength bilateral upper extremity 5/5, bilateral lower extremities 5/5.  Rapid alternating movements intact and symmetric  Sensation: Intact to light touch bilaterally. No neglect or extinction on double simultaneous testing.  Coordination: No dysmetria on finger-to-nose and heel-to-shin bilaterally  Reflexes: Downgoing toes bilaterally   Gait: Narrow gait and steady    NIHSS: **** ASPECT Score: *** ICH Score: *** (GCS)    Fingerstick Blood Glucose: CAPILLARY BLOOD GLUCOSE  161 (18 Jul 2021 20:36)      POCT Blood Glucose.: 161 mg/dL (18 Jul 2021 19:27)    LABS:                        10.7   32.39 )-----------( 413      ( 18 Jul 2021 20:01 )             32.6     07-18    141  |  108  |  39<H>  ----------------------------<  141<H>  4.7   |  23  |  1.76<H>    Ca    9.6      18 Jul 2021 20:01    TPro  7.6  /  Alb  4.5  /  TBili  0.2  /  DBili  x   /  AST  18  /  ALT  13  /  AlkPhos  100  07-18    PT/INR - ( 18 Jul 2021 20:01 )   PT: 12.3 sec;   INR: 1.03          PTT - ( 18 Jul 2021 20:01 )  PTT:32.7 sec          RADIOLOGY & ADDITIONAL STUDIES:    HCT:     CTA:    -----------------------------------------------------------------------------------------    ASSESSMENT/PLAN:    89y Male w/ PMH ***. HCT showed ***. CTA showed ***. Given *** tPA was ***. Patient was admitted to **** for further workup    **STROKE HPI***    HPI: 89y Male with PMHx of HTN and seizure on phenytoin presents with left facial droop and slurred speech. Pt was with family member (great-nephew Mesh [phone]) who noticed at 5pm, pt had acute onset of slurred speech, increased saliva production and a left facial droop. Pt was driven to the ED immediately. On presentation, /94, NIHSS 3. Great-nephew at bedside denies hx of stroke, use of blood thinners, recent trauma/bleeding event. CTH negative for acute hemorrhage. CTP without any perfusion defect. CTA without LVO. In conjunction with patient, family and stroke attending, decision was made to not give tpa since family felt his current deficits are non-disabling to his daily life vs risk of bleeding. Pt and family was made aware of the possibility of worsening symptoms and the opportunity to give tpa was time limited. They expressed understanding and declined tpa administration.     Pt denies CP, SOB, extremity weakness, changes in vision, HA. He endorses a cough that has been occurring for the past few days with sputum production, but has been afebrile. Pt given vanc and zosyn x1 in the ED. CXR pending official read. Patient initially failed dysphagia screen and was given  [place], but passed second dysphagia screen so was able to to loaded with plavix 300 PO.     At baseline, pt is fully independent of all ADLs and iADLs, ambulates without assistance, with hearing loss b/l. It is unclear if pt sees a neurologist for seizure management. Per wife (Madeot [phone], [phone]), his most recent seizure was in April 2020 where he had full body shaking lasting for 5 minutes. He has been on phenytoin 100mg TID and has not had another seizure since.       PAST MEDICAL & SURGICAL HISTORY:      FAMILY HISTORY:      SOCIAL HISTORY:   Patient lives with wife in apartment.  Smoking status: denies  Drinking: denies  Drug Use: denies    ROS:   Constitutional: No fever, weight loss or fatigue  Eyes: No eye pain, visual disturbances, or discharge  ENMT:  hearing loss bilaterally  Neck: No pain or stiffness  Respiratory: cough with sputum production x days  Cardiovascular: No chest pain, palpitations, shortness of breath, dizziness or leg swelling  Gastrointestinal: No abdominal pain. No nausea, vomiting or hematemesis; No diarrhea or constipation. Nohematochezia.  Neurological: As per HPI    T(C): 36.9 (07-18-21 @ 23:53), Max: 37.1 (07-18-21 @ 19:32)  HR: 92 (07-18-21 @ 23:53) (84 - 92)  BP: 142/78 (07-18-21 @ 23:53) (123/63 - 184/75)  RR: 18 (07-18-21 @ 23:53) (16 - 18)  SpO2: 97% (07-18-21 @ 23:53) (96% - 99%)    MEDICATION RECONCILIATION   MEDICATIONS  (STANDING):  aspirin enteric coated 81 milliGRAM(s) Oral daily  atorvastatin 80 milliGRAM(s) Oral at bedtime  clopidogrel Tablet 75 milliGRAM(s) Oral daily  heparin   Injectable 5000 Unit(s) SubCutaneous every 8 hours  phenytoin   Capsule 100 milliGRAM(s) Oral three times a day    MEDICATIONS  (PRN):    Allergies    hay fever (Unknown)  No Known Drug Allergies    Intolerances      Vital Signs Last 24 Hrs  T(C): 36.9 (18 Jul 2021 23:53), Max: 37.1 (18 Jul 2021 19:32)  T(F): 98.4 (18 Jul 2021 23:53), Max: 98.8 (18 Jul 2021 19:51)  HR: 92 (18 Jul 2021 23:53) (84 - 92)  BP: 142/78 (18 Jul 2021 23:53) (123/63 - 184/75)  BP(mean): --  RR: 18 (18 Jul 2021 23:53) (16 - 18)  SpO2: 97% (18 Jul 2021 23:53) (96% - 99%)    Physical exam:  General: No acute distress, awake and alert  Cardiovascular: Regular rate and rhythm, ?murmur  Pulmonary: Transmitted sounds from upper airway. No use of accessory muscles  GI: Abdomen soft, non-tender, non-distended    Neurologic:  -Mental status: Awake, alert, oriented to person, place, and time. Speech is fluent with intact naming (able to name hammock, chair, key, pen, finger phone, scissor) , repetition, and comprehension, mild dysarthria. Recent and remote memory intact. Follows commands. Attention/concentration intact.   -Cranial nerves:   II: Visual fields are full to confrontation.  III, IV, VI: Extraocular movements are intact without nystagmus. Pupils equally round and reactive to light  V:  Facial sensation V1-V3 equal and intact   VII: Left facial droop on activation   VIII: Hearing is grossly intact bilaterally  Motor: Able to maintain AG along all extremities without pronator drift. Strength RUE 3/5, LUE 5/5, RLE 3/5, LUE 5/5.   Sensation: Intact to light touch bilaterally. Extinguishes on RLE double simultaneous testing.  Coordination: No dysmetria on finger-to-nose and heel-to-shin bilaterally  Gait: Narrow gait, slow, with leaning to the left    NIHSS: 3 ASPECT Score: 9      Fingerstick Blood Glucose: CAPILLARY BLOOD GLUCOSE  161 (18 Jul 2021 20:36)      POCT Blood Glucose.: 161 mg/dL (18 Jul 2021 19:27)    LABS:                        10.7   32.39 )-----------( 413      ( 18 Jul 2021 20:01 )             32.6     07-18    141  |  108  |  39<H>  ----------------------------<  141<H>  4.7   |  23  |  1.76<H>    Ca    9.6      18 Jul 2021 20:01    TPro  7.6  /  Alb  4.5  /  TBili  0.2  /  DBili  x   /  AST  18  /  ALT  13  /  AlkPhos  100  07-18    PT/INR - ( 18 Jul 2021 20:01 )   PT: 12.3 sec;   INR: 1.03          PTT - ( 18 Jul 2021 20:01 )  PTT:32.7 sec          RADIOLOGY & ADDITIONAL STUDIES:    HCT: No acute intracranial hemorrhage or transcortical infarct.    CTA:1.  No large vessel occlusion or high-grade stenosis within the head and neck.  2.  Incidentally noted 1.8 cm right thyroid nodule. Consider nonemergent dedicated thyroid ultrasound is for further characterization.      **STROKE HPI***    HPI: 89y Male with PMHx of HTN and seizure on phenytoin presents with left facial droop and slurred speech. Pt was with family member (great-nephew Mesh [phone]) who noticed at 5pm, pt had acute onset of slurred speech, increased saliva production and a left facial droop. Pt was driven to the ED immediately. On presentation, /94, NIHSS 3. Great-nephew at bedside denies hx of stroke, use of blood thinners, recent trauma/bleeding event. CTH negative for acute hemorrhage. CTP without any perfusion defect. CTA without LVO. In conjunction with patient, family and stroke attending, decision was made to not give tpa since family felt his current deficits are non-disabling to his daily life vs risk of bleeding. Pt and family was made aware of the possibility of worsening symptoms and the opportunity to give tpa was time limited. They expressed understanding and declined tpa administration.     Pt denies CP, SOB, extremity weakness, changes in vision, HA. He endorses a cough that has been occurring for the past few days with sputum production, but has been afebrile. Pt given vanc and zosyn x1 in the ED. CXR pending official read. Patient initially failed dysphagia screen and was given  [place], but passed second dysphagia screen so was able to to loaded with plavix 300 PO.     At baseline, pt is fully independent of all ADLs and iADLs, ambulates without assistance, with hearing loss b/l, baseline right arm tremor. It is unclear if pt sees a neurologist for seizure management. Per wife (Ross [phone], [phone]), his most recent seizure was in April 2020 where he had full body shaking lasting for 5 minutes. He has been on phenytoin 100mg TID and has not had another seizure since.       PAST MEDICAL & SURGICAL HISTORY:      FAMILY HISTORY:      SOCIAL HISTORY:   Patient lives with wife in apartment.  Smoking status: denies  Drinking: denies  Drug Use: denies    ROS:   Constitutional: No fever, weight loss or fatigue  Eyes: No eye pain, visual disturbances, or discharge  ENMT:  hearing loss bilaterally  Neck: No pain or stiffness  Respiratory: cough with sputum production x days  Cardiovascular: No chest pain, palpitations, shortness of breath, dizziness or leg swelling  Gastrointestinal: No abdominal pain. No nausea, vomiting or hematemesis; No diarrhea or constipation. Nohematochezia.  Neurological: As per HPI    T(C): 36.9 (07-18-21 @ 23:53), Max: 37.1 (07-18-21 @ 19:32)  HR: 92 (07-18-21 @ 23:53) (84 - 92)  BP: 142/78 (07-18-21 @ 23:53) (123/63 - 184/75)  RR: 18 (07-18-21 @ 23:53) (16 - 18)  SpO2: 97% (07-18-21 @ 23:53) (96% - 99%)    MEDICATION RECONCILIATION   MEDICATIONS  (STANDING):  aspirin enteric coated 81 milliGRAM(s) Oral daily  atorvastatin 80 milliGRAM(s) Oral at bedtime  clopidogrel Tablet 75 milliGRAM(s) Oral daily  heparin   Injectable 5000 Unit(s) SubCutaneous every 8 hours  phenytoin   Capsule 100 milliGRAM(s) Oral three times a day    MEDICATIONS  (PRN):    Allergies    hay fever (Unknown)  No Known Drug Allergies    Intolerances      Vital Signs Last 24 Hrs  T(C): 36.9 (18 Jul 2021 23:53), Max: 37.1 (18 Jul 2021 19:32)  T(F): 98.4 (18 Jul 2021 23:53), Max: 98.8 (18 Jul 2021 19:51)  HR: 92 (18 Jul 2021 23:53) (84 - 92)  BP: 142/78 (18 Jul 2021 23:53) (123/63 - 184/75)  BP(mean): --  RR: 18 (18 Jul 2021 23:53) (16 - 18)  SpO2: 97% (18 Jul 2021 23:53) (96% - 99%)    Physical exam:  General: No acute distress, awake and alert  Cardiovascular: Regular rate and rhythm, ?murmur  Pulmonary: Transmitted sounds from upper airway. No use of accessory muscles  GI: Abdomen soft, non-tender, non-distended    Neurologic:  -Mental status: Awake, alert, oriented to person, place, and time. Speech is fluent with intact naming (able to name hammock, chair, key, pen, finger phone, scissor) , repetition, and comprehension, mild dysarthria. Recent and remote memory intact. Follows commands. Attention/concentration intact.   -Cranial nerves:   II: Visual fields are full to confrontation.  III, IV, VI: Extraocular movements are intact without nystagmus. Pupils equally round and reactive to light  V:  Facial sensation V1-V3 equal and intact   VII: Left facial droop on activation   VIII: Hearing is grossly intact bilaterally  Motor: Able to maintain AG along all extremities without pronator drift. Strength RUE 3/5, LUE 5/5, RLE 3/5, LUE 5/5. Baseline right arm tremor.  Sensation: Intact to light touch bilaterally. Extinguishes on RLE double simultaneous testing.  Coordination: No dysmetria on finger-to-nose and heel-to-shin bilaterally  Gait: Narrow gait, slow, with leaning to the left    NIHSS: 3 ASPECT Score: 9      Fingerstick Blood Glucose: CAPILLARY BLOOD GLUCOSE  161 (18 Jul 2021 20:36)      POCT Blood Glucose.: 161 mg/dL (18 Jul 2021 19:27)    LABS:                        10.7   32.39 )-----------( 413      ( 18 Jul 2021 20:01 )             32.6     07-18    141  |  108  |  39<H>  ----------------------------<  141<H>  4.7   |  23  |  1.76<H>    Ca    9.6      18 Jul 2021 20:01    TPro  7.6  /  Alb  4.5  /  TBili  0.2  /  DBili  x   /  AST  18  /  ALT  13  /  AlkPhos  100  07-18    PT/INR - ( 18 Jul 2021 20:01 )   PT: 12.3 sec;   INR: 1.03          PTT - ( 18 Jul 2021 20:01 )  PTT:32.7 sec          RADIOLOGY & ADDITIONAL STUDIES:    HCT: No acute intracranial hemorrhage or transcortical infarct.    CTA:1.  No large vessel occlusion or high-grade stenosis within the head and neck.  2.  Incidentally noted 1.8 cm right thyroid nodule. Consider nonemergent dedicated thyroid ultrasound is for further characterization.

## 2021-07-19 NOTE — OCCUPATIONAL THERAPY INITIAL EVALUATION ADULT - MODALITIES TREATMENT COMMENTS
Mod-Min A with RW to amb  ~75'x2, cues to stay midline within RW and to widen ERNA, mild LOB during turning. Occasional assist to re-position RW. Cranial Nerves II - XII: II: PERRLA; visual fields are full to confrontation. III, IV, VI: EOMI, no nystagmus appreciated V: facial sensation intact to light touch V1-V3 b/l VII: L facial droop noted, symmetric eyebrow raise and closure. VIII: hearing intact to finger rub b/l. XI: head turning and shoulder shrug intact b/l. XII: tongue protrusion midline

## 2021-07-19 NOTE — PROGRESS NOTE ADULT - ATTENDING COMMENTS
Juan Antonio Varma: 89 yr h/o HTN, seizures-diagnosed at age 70-last break through seizure was last year, chronic sinusitis; admitted 7/18 with left facial weakness/dysarthria.   Possible small right hemispheric stroke Blood & urine cultures for elevated white count/cough, MRI, TTE/BECKY, asprin/Plavix, pancultures & PCP notes for elevated WBC-medicine co-mgt following, CT chest, keppra 500 bid

## 2021-07-19 NOTE — PROGRESS NOTE ADULT - SUBJECTIVE AND OBJECTIVE BOX
Neurology Stroke Progress Note    INTERVAL HPI/OVERNIGHT EVENTS:  Patient seen and examined,   MEDICATIONS  (STANDING):  aspirin enteric coated 81 milliGRAM(s) Oral daily  atorvastatin 80 milliGRAM(s) Oral at bedtime  clopidogrel Tablet 75 milliGRAM(s) Oral daily  guaiFENesin Oral Liquid (Sugar-Free) 200 milliGRAM(s) Oral every 6 hours  heparin   Injectable 5000 Unit(s) SubCutaneous every 8 hours  phenytoin   Capsule 100 milliGRAM(s) Oral three times a day    MEDICATIONS  (PRN):      Allergies    hay fever (Unknown)  No Known Drug Allergies    Intolerances        Vital Signs Last 24 Hrs  T(C): 36.6 (19 Jul 2021 05:37), Max: 37.1 (18 Jul 2021 19:32)  T(F): 97.8 (19 Jul 2021 05:37), Max: 98.8 (18 Jul 2021 19:51)  HR: 86 (19 Jul 2021 04:04) (84 - 106)  BP: 142/67 (19 Jul 2021 04:04) (123/63 - 184/75)  BP(mean): 96 (19 Jul 2021 04:04) (96 - 118)  RR: 16 (19 Jul 2021 04:04) (16 - 18)  SpO2: 96% (19 Jul 2021 04:04) (96% - 99%)    Physical exam:  General: No acute distress, awake and alert  Eyes: Anicteric sclerae, moist conjunctivae, see below for CNs  Neck: trachea midline, FROM, supple, no thyromegaly or lymphadenopathy  Cardiovascular: Regular rate and rhythm, no murmurs, rubs, or gallops. No carotid bruits.   Pulmonary: Anterior breath sounds clear bilaterally, no crackles or wheezing. No use of accessory muscles  GI: Abdomen soft, non-distended, non-tender  Extremities: Radial and DP pulses +2, no edema    Neurologic:  -Mental status: Awake, alert, oriented to person, place, and time. Speech is fluent with intact naming, repetition, and comprehension, no dysarthria. Recent and remote memory intact. Follows commands. Attention/concentration intact. Fund of knowledge appropriate.  -Cranial nerves:   II: Visual fields are full to confrontation.  III, IV, VI: Extraocular movements are intact without nystagmus. Pupils equally round and reactive to light  V:  Facial sensation V1-V3 equal and intact   VII: Face is symmetric with normal eye closure and smile  VIII: Hearing is bilaterally intact to finger rub  IX, X: Uvula is midline and soft palate rises symmetrically  XI: Head turning and shoulder shrug are intact.  XII: Tongue protrudes midline  Motor: Normal bulk and tone. No pronator drift. Strength bilateral upper extremity 5/5, bilateral lower extremities 5/5.  Rapid alternating movements intact and symmetric  Sensation: Intact to light touch bilaterally. No neglect or extinction on double simultaneous testing.  Coordination: No dysmetria on finger-to-nose and heel-to-shin bilaterally  Reflexes: Downgoing toes bilaterally   Gait: Narrow gait and steady    LABS:                        10.7   29.16 )-----------( 384      ( 19 Jul 2021 07:11 )             32.8     07-19    141  |  108  |  35<H>  ----------------------------<  132<H>  4.0   |  19<L>  |  1.62<H>    Ca    9.2      19 Jul 2021 07:11  Phos  3.6     07-19  Mg     2.5     07-19    TPro  7.6  /  Alb  4.5  /  TBili  0.2  /  DBili  x   /  AST  18  /  ALT  13  /  AlkPhos  100  07-18    PT/INR - ( 18 Jul 2021 20:01 )   PT: 12.3 sec;   INR: 1.03          PTT - ( 18 Jul 2021 20:01 )  PTT:32.7 sec      RADIOLOGY & ADDITIONAL TESTS:     Neurology Stroke Progress Note    INTERVAL HPI/OVERNIGHT EVENTS:  Patient seen and examined today, had an episode of dropped beat suspicious of 2nd degree block on telemetry. EKG showing 1st degree block. He still complains of cough, with productive sputum.  His neuro exam remains stable,  with dysarthria, left facial droop, and RLE extinguishes    MEDICATIONS  (STANDING):  aspirin enteric coated 81 milliGRAM(s) Oral daily  atorvastatin 80 milliGRAM(s) Oral at bedtime  clopidogrel Tablet 75 milliGRAM(s) Oral daily  guaiFENesin Oral Liquid (Sugar-Free) 200 milliGRAM(s) Oral every 6 hours  heparin   Injectable 5000 Unit(s) SubCutaneous every 8 hours  phenytoin   Capsule 100 milliGRAM(s) Oral three times a day    MEDICATIONS  (PRN):      Allergies    hay fever (Unknown)  No Known Drug Allergies    Intolerances        Vital Signs Last 24 Hrs  T(C): 36.6 (19 Jul 2021 05:37), Max: 37.1 (18 Jul 2021 19:32)  T(F): 97.8 (19 Jul 2021 05:37), Max: 98.8 (18 Jul 2021 19:51)  HR: 86 (19 Jul 2021 04:04) (84 - 106)  BP: 142/67 (19 Jul 2021 04:04) (123/63 - 184/75)  BP(mean): 96 (19 Jul 2021 04:04) (96 - 118)  RR: 16 (19 Jul 2021 04:04) (16 - 18)  SpO2: 96% (19 Jul 2021 04:04) (96% - 99%)    Physical exam:  -Mental status: Awake, alert, oriented to person, place, and time. Speech is fluent with intact naming (able to name hammock, chair, key, pen, finger phone, scissor) , repetition, and comprehension, mild dysarthria. Recent and remote memory intact. Follows commands. Attention/concentration intact.   -Cranial nerves:   II: Visual fields are full to confrontation.  III, IV, VI: Extraocular movements are intact without nystagmus. Pupils equally round and reactive to light  V:  Facial sensation V1-V3 equal and intact   VII: Left facial droop on activation   VIII: Hearing is grossly intact bilaterally  Motor: Able to maintain AG along all extremities without pronator drift. Strength RUE 3/5, LUE 5/5, RLE 3/5, LUE 5/5. Baseline right arm tremor.  Sensation: Intact to light touch bilaterally. Extinguishes on RLE double simultaneous testing.  Coordination: No dysmetria on finger-to-nose and heel-to-shin bilaterally  Gait: Narrow gait, slow, with leaning to the left    NIHSS: 3    General: No acute distress, awake and alert, hard hearing   Eyes: Anicteric sclerae, moist conjunctivae, see below for CNs  Neck: trachea midline, FROM, supple, no thyromegaly or lymphadenopathy  Cardiovascular: Significant systolic murmur  Pulmonary: Anterior breath sounds clear bilaterally, no crackles or wheezing. No use of accessory muscles  GI: Abdomen soft, non-distended, non-tender  Extremities: Radial and DP pulses +2, no edema    Neurologic:    LABS:                        10.7   29.16 )-----------( 384      ( 19 Jul 2021 07:11 )             32.8     07-19    141  |  108  |  35<H>  ----------------------------<  132<H>  4.0   |  19<L>  |  1.62<H>    Ca    9.2      19 Jul 2021 07:11  Phos  3.6     07-19  Mg     2.5     07-19    TPro  7.6  /  Alb  4.5  /  TBili  0.2  /  DBili  x   /  AST  18  /  ALT  13  /  AlkPhos  100  07-18    PT/INR - ( 18 Jul 2021 20:01 )   PT: 12.3 sec;   INR: 1.03          PTT - ( 18 Jul 2021 20:01 )  PTT:32.7 sec      RADIOLOGY & ADDITIONAL TESTS:     Neurology Stroke Progress Note    INTERVAL HPI/OVERNIGHT EVENTS:  Patient seen and examined today, had an episode of dropped beat suspicious of 2nd degree block on telemetry. EKG showing 1st degree block. He still complains of cough, with productive sputum.  His neuro exam remains stable,  with dysarthria, left facial droop.    MEDICATIONS  (STANDING):  aspirin enteric coated 81 milliGRAM(s) Oral daily  atorvastatin 80 milliGRAM(s) Oral at bedtime  clopidogrel Tablet 75 milliGRAM(s) Oral daily  guaiFENesin Oral Liquid (Sugar-Free) 200 milliGRAM(s) Oral every 6 hours  heparin   Injectable 5000 Unit(s) SubCutaneous every 8 hours  phenytoin   Capsule 100 milliGRAM(s) Oral three times a day    MEDICATIONS  (PRN):      Allergies    hay fever (Unknown)  No Known Drug Allergies    Intolerances        Vital Signs Last 24 Hrs  T(C): 36.6 (19 Jul 2021 05:37), Max: 37.1 (18 Jul 2021 19:32)  T(F): 97.8 (19 Jul 2021 05:37), Max: 98.8 (18 Jul 2021 19:51)  HR: 86 (19 Jul 2021 04:04) (84 - 106)  BP: 142/67 (19 Jul 2021 04:04) (123/63 - 184/75)  BP(mean): 96 (19 Jul 2021 04:04) (96 - 118)  RR: 16 (19 Jul 2021 04:04) (16 - 18)  SpO2: 96% (19 Jul 2021 04:04) (96% - 99%)    Physical exam:  -Mental status: Awake, alert, oriented to person, place, and time. Speech is fluent with intact naming (able to name hammock, chair, key, pen, finger phone, scissor) , repetition, and comprehension, mild dysarthria. Recent and remote memory intact. Follows commands. Attention/concentration intact.   -Cranial nerves:   II: Visual fields are full to confrontation.  III, IV, VI: Extraocular movements are intact without nystagmus. Pupils equally round and reactive to light  V:  Facial sensation V1-V3 equal and intact   VII: Left facial droop on activation   VIII: Hearing is grossly intact bilaterally  Motor: Able to maintain AG along all extremities without pronator drift. Strength RUE 4/5, LUE 5/5, RLE 4/5, LUE 5/5. Baseline right arm tremor.  Sensation: Intact to light touch bilaterally. Extinguishes on RLE double simultaneous testing.  Coordination: No dysmetria on finger-to-nose and heel-to-shin bilaterally  Gait: Narrow gait, slow, with leaning to the left    NIHSS: 3    General: No acute distress, awake and alert, hard hearing   Eyes: Anicteric sclerae, moist conjunctivae, see below for CNs  Neck: trachea midline, FROM, supple, no thyromegaly or lymphadenopathy  Cardiovascular: Significant systolic murmur  Pulmonary: Anterior breath sounds clear bilaterally, no crackles or wheezing. No use of accessory muscles  GI: Abdomen soft, non-distended, non-tender  Extremities: Radial and DP pulses +2, no edema    Neurologic:    LABS:                        10.7   29.16 )-----------( 384      ( 19 Jul 2021 07:11 )             32.8     07-19    141  |  108  |  35<H>  ----------------------------<  132<H>  4.0   |  19<L>  |  1.62<H>    Ca    9.2      19 Jul 2021 07:11  Phos  3.6     07-19  Mg     2.5     07-19    TPro  7.6  /  Alb  4.5  /  TBili  0.2  /  DBili  x   /  AST  18  /  ALT  13  /  AlkPhos  100  07-18    PT/INR - ( 18 Jul 2021 20:01 )   PT: 12.3 sec;   INR: 1.03          PTT - ( 18 Jul 2021 20:01 )  PTT:32.7 sec      RADIOLOGY & ADDITIONAL TESTS:

## 2021-07-19 NOTE — OCCUPATIONAL THERAPY INITIAL EVALUATION ADULT - ADDITIONAL COMMENTS
Pt lives with spouse and son in an apartment building with elevator access, ~2STE. Prior to admit, pt was independent in ADLs and mobility without AD. Pt is [place] and has RUE tremor at baseline.

## 2021-07-19 NOTE — OCCUPATIONAL THERAPY INITIAL EVALUATION ADULT - GENERAL OBSERVATIONS, REHAB EVAL
Cleared by MD Awada and RN Rachel to see. Pt received semi-supine with wife at bedside, +tele, +heplock, +bed alarm, agreeable and in NAD. Pt left seated in chair with call bell within reach and RN Rachel aware that chair alarm not activated as unable to locate battery pack on unit.

## 2021-07-19 NOTE — OCCUPATIONAL THERAPY INITIAL EVALUATION ADULT - TRANSFER SAFETY CONCERNS NOTED: BED/CHAIR, REHAB EVAL
L side neglect, cues to re-position self/decreased balance during turns/decreased safety awareness/losing balance/decreased proprioception/decreased step length

## 2021-07-19 NOTE — CONSULT NOTE ADULT - SUBJECTIVE AND OBJECTIVE BOX
Patient is a 89y old  Male who presents with a chief complaint of slurred speech, L facial droop (19 Jul 2021 08:20)      INTERVAL HPI/OVERNIGHT EVENTS:    HPI: 89y Male with PMHx of HTN and seizure on phenytoin presents with left facial droop and slurred speech, onset yeterday around 5pm. CTH negative for acute hemorrhage. CTP without any perfusion defect. Decision was made to not give tpa since family felt his current deficits are non-disabling to his daily life vs risk of bleeding. Pt and family was made aware of the possibility of worsening symptoms and the opportunity to give tpa was time limited. They expressed understanding and declined tpa administration.     Patient given vancomycin and zosyn x 1 in ED.    At baseline, pt is fully independent of all ADLs and iADLs, ambulates without assistance, with hearing loss b/l, baseline right arm tremor.     Patient examined at bedside today - endorses cough with slight mucous but no fever/chills. Also endorses that he has been having urinary frequency increase - no burning - but has been going more often lately.     No chest pain/sob or any other concerns.    Review of Systems: 12 point review of systems otherwise negative    MEDICATIONS  (STANDING):  aspirin enteric coated 81 milliGRAM(s) Oral daily  atorvastatin 80 milliGRAM(s) Oral at bedtime  clopidogrel Tablet 75 milliGRAM(s) Oral daily  guaiFENesin Oral Liquid (Sugar-Free) 200 milliGRAM(s) Oral every 6 hours  heparin   Injectable 5000 Unit(s) SubCutaneous every 8 hours  levETIRAcetam 500 milliGRAM(s) Oral two times a day    MEDICATIONS  (PRN):      Allergies    hay fever (Unknown)  No Known Drug Allergies    Intolerances          Vital Signs Last 24 Hrs  T(C): 36.7 (19 Jul 2021 10:26), Max: 37.1 (18 Jul 2021 19:32)  T(F): 98 (19 Jul 2021 10:26), Max: 98.8 (18 Jul 2021 19:51)  HR: 70 (19 Jul 2021 12:10) (66 - 106)  BP: 123/58 (19 Jul 2021 12:10) (123/58 - 184/75)  BP(mean): 84 (19 Jul 2021 12:10) (84 - 118)  RR: 18 (19 Jul 2021 12:10) (16 - 18)  SpO2: 98% (19 Jul 2021 12:10) (96% - 99%)  CAPILLARY BLOOD GLUCOSE  161 (18 Jul 2021 20:36)      POCT Blood Glucose.: 143 mg/dL (19 Jul 2021 11:45)  POCT Blood Glucose.: 161 mg/dL (18 Jul 2021 19:27)      07-18 @ 07:01  -  07-19 @ 07:00  --------------------------------------------------------  IN: 150 mL / OUT: 325 mL / NET: -175 mL        Physical Exam:    Daily     Daily   General:  Well appearing, NAD, not cachetic  HEENT:  Nonicteric, PERRLA  CV:  RRR, no murmur, no JVD  Lungs:  CTA B/L, no wheezes, rales, rhonchi  Abdomen:  Soft, non-tender, no distended, positive BS, no hepatosplenomegaly  Extremities:  2+ pulses, no c/c, no edema; RLE 4/5; LLE 5/5; +mild dysarthria  Skin:  Warm and dry, no rashes  :  No lin  Neuro:  AAOx3, non-focal, CN II-XII grossly intact  No Restraints    LABS:                        10.7   29.16 )-----------( 384      ( 19 Jul 2021 07:11 )             32.8     07-19    141  |  108  |  35<H>  ----------------------------<  132<H>  4.0   |  19<L>  |  1.62<H>    Ca    9.2      19 Jul 2021 07:11  Phos  3.6     07-19  Mg     2.5     07-19    TPro  7.6  /  Alb  4.5  /  TBili  0.2  /  DBili  x   /  AST  18  /  ALT  13  /  AlkPhos  100  07-18    PT/INR - ( 18 Jul 2021 20:01 )   PT: 12.3 sec;   INR: 1.03          PTT - ( 18 Jul 2021 20:01 )  PTT:32.7 sec        RADIOLOGY & ADDITIONAL TESTS:    CTH:   IMPRESSION: No acute intracranial hemorrhage or transcortical infarct.      CXR:   Findings/  impression: Heart size upper normal limits, thoracic aortic calcification. No acute focal opacity. Right diaphragmatic hernia. Bilateral shoulder degenerative changes

## 2021-07-19 NOTE — OCCUPATIONAL THERAPY INITIAL EVALUATION ADULT - DIAGNOSIS, OT EVAL
MRS 4. Pt is AxOx3, [place] and has RUE tremor at baseline. Pt presents with kyphotic posture, L facial droop, decreased activity tolerance and generalized weakness impacting performance in ADLs and fx mobility. Pt also with L side neglect primarily when ambulating as pt required mod VC to center self in RW.

## 2021-07-19 NOTE — PHYSICAL THERAPY INITIAL EVALUATION ADULT - PERTINENT HX OF CURRENT PROBLEM, REHAB EVAL
89M who noticed at 5pm, pt had acute onset of slurred speech, increased saliva production and a left facial droop. Pt was driven to the ED immediately. On presentation, /94, NIHSS 3. Great-nephew at bedside denies hx of stroke, use of blood thinners, recent trauma/bleeding event. CTH negative for acute hemorrhage. CTP without any perfusion defect. CTA without LVO.

## 2021-07-19 NOTE — PHYSICAL THERAPY INITIAL EVALUATION ADULT - GAIT DEVIATIONS NOTED, PT EVAL
stays to L within rolling walker, +slight L neglect, dec L step length, narrow ERNA, shuffling gait, leans to L/decreased cirilo/increased time in double stance/decreased step length/decreased weight-shifting ability

## 2021-07-19 NOTE — H&P ADULT - ASSESSMENT
89y Male with PMHx of HTN and seizure on phenytoin presents with left facial droop and slurred speech, LKW 5pm 7/18/21, NIHSS 3. CTH negative for acute hemorrhage. CTP without any perfusion defect. CTA without LVO.  Patient was within the window for tpa, but pt and family (wife and great-nephew) declined as they felt his current deficits are non-disabling to his daily life vs risk of bleeding. Pt to be admitted to stroke service for stroke w/u.    Neuro  #CVA workup  - s/p ASA and plavix load in ED  - continue aspirin 81mg and plavix 75mg daily  - continue atorvastatin 80mg daily  - q4hr stroke neuro checks and vitals  - obtain MRI Brain without contrast  - Stroke Code HCT Results: No acute intracranial hemorrhage or transcortical infarct.  - Stroke Code CTA Results: No LVO nor high grade stenosis. Incidental 1.8cm thyroid nodule.  - Stroke education    #Seizure  - phenytoin level 4.1 (7/18/21)  - c/w home phenytoin 100mg TID.    Cards  #HTN  - permissive hypertension, Goal -180  - hold home blood pressure medication for now (amlodipine 10mg daily)  - obtain TTE with bubble  - Stroke Code EKG Results: NSR, left deviation, 1st degree AV block    #HLD  - high dose statin as above in CVA  - LDL results: 137    Pulm  - call provider if SPO2 < 94%    GI  #Nutrition/Fluids/Electrolytes   - replete K<4 and Mg <2  - Diet: DASH  - IVF: none    Renal  #?EVARISTO vs new dx of CKD  - elevated BUN/Cre with GFR 33, will trend    Infectious Disease  - Stroke Code CXR results: pending official read  - s/p vanc and zosyn x1 in ED  - WBC 32.39, lactate 1.1 (7/18/21)    Endocrine  - A1C results: 5.6    #hypothyroidism   - TSH results: 5.56  - f/u FT4, T3    DVT Prophylaxis  - heparin sq for DVT prophylaxis   - SCDs for DVT prophylaxis     IDR Goals: Goals reviewed at interdisciplinary rounds with case management, social work, physical therapy, occupational therapy, and speech language pathology.   Please see specific therapy  notes for in depth goals.  Dispo: pending PT/OT assessment      Discussed daily hospital plans and goals with patient and family at bedside. (Called and updated family.)    Discussed with Neurology Attending Dr. Darnell 89y Male with PMHx of HTN and seizure on phenytoin presents with left facial droop and slurred speech, LKW 5pm 7/18/21, NIHSS 3. CTH negative for acute hemorrhage. CTP without any perfusion defect. CTA without LVO.  Patient was within the window for tpa, but pt and family (wife and great-nephew) declined as they felt his current deficits are non-disabling to his daily life vs risk of bleeding. Pt to be admitted to stroke service for stroke w/u.    Neuro  #CVA workup  - s/p ASA and plavix load in ED  - continue aspirin 81mg and plavix 75mg daily  - continue atorvastatin 80mg daily  - q4hr stroke neuro checks and vitals  - obtain MRI Brain without contrast  - Stroke Code HCT Results: No acute intracranial hemorrhage or transcortical infarct.  - Stroke Code CTA Results: No LVO nor high grade stenosis. Incidental 1.8cm thyroid nodule.  - Stroke education    #Seizure  - phenytoin level 4.1 (7/18/21)  - c/w home phenytoin 100mg TID.    Cards  #HTN  - permissive hypertension, Goal -180  - hold home blood pressure medication for now (amlodipine 10mg daily)  - obtain TTE with bubble  - Stroke Code EKG Results: NSR, left deviation, 1st degree AV block    #HLD  - high dose statin as above in CVA  - LDL results: 137    Pulm  - call provider if SPO2 < 94%    GI  #Nutrition/Fluids/Electrolytes   - replete K<4 and Mg <2  - Diet: DASH  - IVF: none    Renal  #?EVARISTO vs new dx of CKD  - elevated BUN/Cre with GFR 33  - trend    Infectious Disease  - Stroke Code CXR results: pending official read  - s/p vanc and zosyn x1 in ED  - WBC 32.39, lactate 1.1 (7/18/21)  - f/u BCx, UCx    Endocrine  - A1C results: 5.6    #hypothyroidism   - TSH results: 5.56  - f/u FT4, T3    DVT Prophylaxis  - heparin sq for DVT prophylaxis   - SCDs for DVT prophylaxis     IDR Goals: Goals reviewed at interdisciplinary rounds with case management, social work, physical therapy, occupational therapy, and speech language pathology.   Please see specific therapy  notes for in depth goals.  Dispo: pending PT/OT assessment      Discussed daily hospital plans and goals with patient and family at bedside. (Called and updated family.)    Discussed with Neurology Attending Dr. Darnell 89y Male with PMHx of HTN and seizure on phenytoin presents with left facial droop and slurred speech, LKW 5pm 7/18/21, NIHSS 3. CTH negative for acute hemorrhage. CTP without any perfusion defect. CTA without LVO.  Patient was within the window for tpa, but pt and family (wife and great-nephew) declined as they felt his current deficits are non-disabling to his daily life vs risk of bleeding. Pt to be admitted to stroke service for stroke w/u.    Neuro  #CVA workup  - s/p ASA and plavix load in ED  - continue aspirin 81mg and plavix 75mg daily  - continue atorvastatin 80mg daily  - q4hr stroke neuro checks and vitals  - obtain MRI Brain without contrast  - Stroke Code HCT Results: No acute intracranial hemorrhage or transcortical infarct.  - Stroke Code CTA Results: No LVO nor high grade stenosis. Incidental 1.8cm thyroid nodule.  - Stroke education    #Seizure  - phenytoin level 4.1 (7/18/21)  - c/w home phenytoin 100mg TID.    Cards  #HTN  - permissive hypertension, Goal -180  - hold home blood pressure medication for now (amlodipine 10mg daily)  - obtain TTE with bubble  - Stroke Code EKG Results: NSR, left deviation, 1st degree AV block    #HLD  - high dose statin as above in CVA  - LDL results: 137    Pulm  - call provider if SPO2 < 94%    GI  #Nutrition/Fluids/Electrolytes   - replete K<4 and Mg <2  - Diet: DASH  - IVF: none    Renal  #?EVARISTO vs new dx of CKD  - elevated BUN/Cre with GFR 33  - trend    Infectious Disease  - Stroke Code CXR results: pending official read  - s/p vanc and zosyn x1 in ED  - WBC 32.39, lactate 1.1 (7/18/21)  - f/u BCx, UCx    Endocrine  - A1C results: 5.6    #hypothyroidism   - TSH results: 5.56  - f/u FT4, T3    Heme  #anemia  -f/u iron panel     DVT Prophylaxis  - heparin sq for DVT prophylaxis   - SCDs for DVT prophylaxis     IDR Goals: Goals reviewed at interdisciplinary rounds with case management, social work, physical therapy, occupational therapy, and speech language pathology.   Please see specific therapy  notes for in depth goals.  Dispo: pending PT/OT assessment      Discussed daily hospital plans and goals with patient and family at bedside. (Called and updated family.)    Discussed with Neurology Attending Dr. Darnell

## 2021-07-19 NOTE — PHYSICAL THERAPY INITIAL EVALUATION ADULT - IMPAIRMENTS FOUND, PT EVAL
aerobic capacity/endurance/decreased midline orientation/gait, locomotion, and balance/muscle strength/poor safety awareness

## 2021-07-19 NOTE — CONSULT NOTE ADULT - ASSESSMENT
per Neurology    89 yo Male with PMHx of HTN and seizure on phenytoin presents with left facial droop and slurred speech, LKW 5pm 7/18/21, NIHSS 3. CTH negative for acute hemorrhage. CTP without any perfusion defect. CTA without LVO.  Patient was within the window for tpa, but pt and family (wife and great-nephew) declined as they felt his current deficits are non-disabling to his daily life vs risk of bleeding. Pt to be admitted to stroke service for stroke w/u.    - patient failed dysphagia screening, loaded with ASA 325mg MA. Pt passed second dysphagia screening and loaded plavix 300mg PO. Then, start ASA 81 mg daily and plavix 75mg daily tomorrow  - start Atorvastatin 80mg PO daily  - stroke risk factors: A1c, , HTN  - obtain MRI brain without short stroke protocol  - obtain TTE with bubble   - recommend SBP goal <180  - recommend q4hr stroke neuro checks  - recommend oral care QID  - provide stroke education    DVT prophylaxis   -Heparin SQ and SCDs

## 2021-07-19 NOTE — CONSULT NOTE ADULT - ASSESSMENT
89y Male with PMHx of HTN and seizure on phenytoin presents with left facial droop and slurred speech, LKW 5pm 7/18/21, NIHSS 3. CTH negative for acute hemorrhage. CTP without any perfusion defect. CTA without LVO.  Patient was within the window for tpa, but pt and family (wife and great-nephew) declined as they felt his current deficits are non-disabling to his daily life vs risk of bleeding, now c/b leukocytosis and pending further stroke work up.    #Leukocytosis  -patient s/p v/z   -blood cx sent  -f/u manual diff  -pleaser order UA as patient with urinary sx   -f/u BECKY to r/o any vegetations  -if both negative, consider CT chest with sx of cough - although CXR not showing any acute infiltrates   -f/u blood cx     #Stroke R/O  -pending MRI brain, TTE, BECKY   -ASA, plavix per primary team  -Statin    #HTN  -holding home lisinopril for permissive htn   -resume as needed    #CKD  -appears to have baseline 1.3  -monitor I&O  -avoid nephrotoxins    #Seizure hx  -continue home phenytoin 89y Male with PMHx of HTN and seizure on phenytoin presents with left facial droop and slurred speech, LKW 5pm 7/18/21, NIHSS 3. CTH negative for acute hemorrhage. CTP without any perfusion defect. CTA without LVO.  Patient was within the window for tpa, but pt and family (wife and great-nephew) declined as they felt his current deficits are non-disabling to his daily life vs risk of bleeding, now c/b leukocytosis and pending further stroke work up.    #Leukocytosis  -patient s/p v/z   -blood cx sent  -f/u manual diff  -pleaser order UA as patient with urinary sx   -f/u BECKY to r/o any vegetations  -if both negative, consider CT chest with sx of cough - although CXR not showing any acute infiltrates   -f/u blood cx     #Stroke R/O  -pending MRI brain, TTE, BECKY   -ASA, plavix per primary team  -Statin    #HTN  -holding home lisinopril for permissive htn   -resume as needed    #EVARISTO on ?CKD  -creatinine improving  -monitor I&O  -avoid nephrotoxins    #Seizure hx  -continue home phenytoin 89y Male with PMHx of HTN and seizure on phenytoin presents with left facial droop and slurred speech, LKW 5pm 7/18/21, NIHSS 3. CTH negative for acute hemorrhage. CTP without any perfusion defect. CTA without LVO.  Patient was within the window for tpa, but pt and family (wife and great-nephew) declined as they felt his current deficits are non-disabling to his daily life vs risk of bleeding, now c/b leukocytosis and pending further stroke work up.    #Leukocytosis  -patient s/p v/z   -blood cx sent  -f/u manual diff  -pleaser order UA as patient with urinary sx   -f/u BECKY to r/o any vegetations  -if both negative, consider CT chest with sx of cough - although CXR not showing any acute infiltrates   -f/u blood cx     #Stroke R/O  -pending MRI brain, TTE, BECKY   -ASA, plavix per primary team  -Statin    #Anemia  -h/h stable  -f/u iron studies     #HTN  -holding home lisinopril for permissive htn   -resume as needed    #EVARISTO on ?CKD  -creatinine improving  -monitor I&O  -avoid nephrotoxins    #Seizure hx  -continue home phenytoin

## 2021-07-19 NOTE — OCCUPATIONAL THERAPY INITIAL EVALUATION ADULT - PERTINENT HX OF CURRENT PROBLEM, REHAB EVAL
89y Male with PMHx of HTN and seizure on phenytoin presents with left facial droop and slurred speech, LKW 5pm 7/18/21, NIHSS 3. CTH negative for acute hemorrhage. CTP without any perfusion defect. CTA without LVO.  Patient was within the window for tpa, but pt and family (wife and great-nephew) declined as they felt his current deficits are non-disabling to his daily life vs risk of bleeding. Pt to be admitted to stroke service for stroke w/u.

## 2021-07-19 NOTE — PROGRESS NOTE ADULT - ASSESSMENT
89y Male with PMHx of HTN and seizure on phenytoin presents with left facial droop and slurred speech, LKW 5pm 7/18/21, NIHSS 3. CTH negative for acute hemorrhage. CTP without any perfusion defect. CTA without LVO.  Patient was within the window for tpa, but pt and family (wife and great-nephew) declined as they felt his current deficits are non-disabling to his daily life vs risk of bleeding. Pt to be admitted to stroke service for stroke w/u.    Neuro  #CVA workup  - s/p ASA and plavix load in ED  - continue aspirin 81mg and plavix 75mg daily  - continue atorvastatin 80mg daily  - q4hr stroke neuro checks and vitals  - obtain MRI Brain without contrast  - Stroke Code HCT Results: No acute intracranial hemorrhage or transcortical infarct.  - Stroke Code CTA Results: No LVO nor high grade stenosis. Incidental 1.8cm thyroid nodule.  - Stroke education  - BECKY    #Seizure  - phenytoin level 4.1 (7/18/21)  - D/c home phenytoin 100mg TID  - Start Keppra 500 mg q 12h    Cards  #HTN  - permissive hypertension, Goal -180  - Keep amlodipine on hold  - obtain TTE with bubble  - EKG 1st degree AVB  - Systolic murmur on ausculation BECKY result    #HLD  - high dose statin as above in CVA  - LDL results: 137    Renal  #EVARISTO on top of CKD  - elevated BUN/Cre with GFR 33  - trend creatinine  - aVOID NEPHROTOXIC DRUGS    Infectious Disease  - Leukocytosis on admission 32k-->29k, lactate 1.1 (7/18/21)  - Focus unclear UTI vs PNA   - s/p vanc and zosyn x1 in ED  - f/u BCx, UCx, Sputum cx, BECKY for possible vegitation  - Keep Off antibiotics for now  - If no clear source then might need CT chest given cough    Endocrine  - A1C results: 5.6    #hypothyroidism   - TSH results: 5.56  - f/u FT4, T3    Pulm  - call provider if SPO2 < 94%    GI  #Nutrition/Fluids/Electrolytes   - replete K<4 and Mg <2  - Diet: DASH  - IVF: none      Heme  #anemia  -f/u iron panel     DVT Prophylaxis  - heparin sq for DVT prophylaxis   - SCDs for DVT prophylaxis     IDR Goals: Goals reviewed at interdisciplinary rounds with case management, social work, physical therapy, occupational therapy, and speech language pathology.   Please see specific therapy  notes for in depth goals.  Dispo: pending PT/OT assessment      Discussed daily hospital plans and goals with patient and wife at bedside

## 2021-07-19 NOTE — CONSULT NOTE ADULT - SUBJECTIVE AND OBJECTIVE BOX
Patient is a 89y old  Male who presents with a chief complaint of slurred speech, L facial droop (19 Jul 2021 00:25)       HPI:   **STROKE HPI***    HPI: 89y Male with PMHx of HTN and seizure on phenytoin presents with left facial droop and slurred speech. Pt was with family member (great-nephew Mesh [phone]) who noticed at 5pm, pt had acute onset of slurred speech, increased saliva production and a left facial droop. Pt was driven to the ED immediately. On presentation, /94, NIHSS 3. Great-nephew at bedside denies hx of stroke, use of blood thinners, recent trauma/bleeding event. CTH negative for acute hemorrhage. CTP without any perfusion defect. CTA without LVO. In conjunction with patient, family and stroke attending, decision was made to not give tpa since family felt his current deficits are non-disabling to his daily life vs risk of bleeding. Pt and family was made aware of the possibility of worsening symptoms and the opportunity to give tpa was time limited. They expressed understanding and declined tpa administration.     Pt denies CP, SOB, extremity weakness, changes in vision, HA. He endorses a cough that has been occurring for the past few days with sputum production, but has been afebrile. Pt given vanc and zosyn x1 in the ED. CXR pending official read. Patient initially failed dysphagia screen and was given  [place], but passed second dysphagia screen so was able to to loaded with plavix 300 PO.     At baseline, pt is fully independent of all ADLs and iADLs, ambulates without assistance, with hearing loss b/l, baseline right arm tremor. It is unclear if pt sees a neurologist for seizure management. Per wife (Ross [phone], [phone]), his most recent seizure was in April 2020 where he had full body shaking lasting for 5 minutes. He has been on phenytoin 100mg TID and has not had another seizure since.       PAST MEDICAL & SURGICAL HISTORY:      FAMILY HISTORY:      SOCIAL HISTORY:   Patient lives with wife in apartment.  Smoking status: denies  Drinking: denies  Drug Use: denies    ROS:   Constitutional: No fever, weight loss or fatigue  Eyes: No eye pain, visual disturbances, or discharge  ENMT:  hearing loss bilaterally  Neck: No pain or stiffness  Respiratory: cough with sputum production x days  Cardiovascular: No chest pain, palpitations, shortness of breath, dizziness or leg swelling  Gastrointestinal: No abdominal pain. No nausea, vomiting or hematemesis; No diarrhea or constipation. Nohematochezia.  Neurological: As per HPI    T(C): 36.9 (07-18-21 @ 23:53), Max: 37.1 (07-18-21 @ 19:32)  HR: 92 (07-18-21 @ 23:53) (84 - 92)  BP: 142/78 (07-18-21 @ 23:53) (123/63 - 184/75)  RR: 18 (07-18-21 @ 23:53) (16 - 18)  SpO2: 97% (07-18-21 @ 23:53) (96% - 99%)    MEDICATION RECONCILIATION   MEDICATIONS  (STANDING):  aspirin enteric coated 81 milliGRAM(s) Oral daily  atorvastatin 80 milliGRAM(s) Oral at bedtime  clopidogrel Tablet 75 milliGRAM(s) Oral daily  heparin   Injectable 5000 Unit(s) SubCutaneous every 8 hours  phenytoin   Capsule 100 milliGRAM(s) Oral three times a day    MEDICATIONS  (PRN):    Allergies    hay fever (Unknown)  No Known Drug Allergies    Intolerances      Vital Signs Last 24 Hrs  T(C): 36.9 (18 Jul 2021 23:53), Max: 37.1 (18 Jul 2021 19:32)  T(F): 98.4 (18 Jul 2021 23:53), Max: 98.8 (18 Jul 2021 19:51)  HR: 92 (18 Jul 2021 23:53) (84 - 92)  BP: 142/78 (18 Jul 2021 23:53) (123/63 - 184/75)  BP(mean): --  RR: 18 (18 Jul 2021 23:53) (16 - 18)  SpO2: 97% (18 Jul 2021 23:53) (96% - 99%)    Fingerstick Blood Glucose: CAPILLARY BLOOD GLUCOSE  161 (18 Jul 2021 20:36)      POCT Blood Glucose.: 161 mg/dL (18 Jul 2021 19:27)    LABS:                        10.7   32.39 )-----------( 413      ( 18 Jul 2021 20:01 )             32.6     07-18    141  |  108  |  39<H>  ----------------------------<  141<H>  4.7   |  23  |  1.76<H>    Ca    9.6      18 Jul 2021 20:01    TPro  7.6  /  Alb  4.5  /  TBili  0.2  /  DBili  x   /  AST  18  /  ALT  13  /  AlkPhos  100  07-18    PT/INR - ( 18 Jul 2021 20:01 )   PT: 12.3 sec;   INR: 1.03          PTT - ( 18 Jul 2021 20:01 )  PTT:32.7 sec    PAST MEDICAL & SURGICAL HISTORY:  Hypertension    Seizure        MEDICATIONS  (STANDING):  aspirin enteric coated 81 milliGRAM(s) Oral daily  atorvastatin 80 milliGRAM(s) Oral at bedtime  clopidogrel Tablet 75 milliGRAM(s) Oral daily  guaiFENesin Oral Liquid (Sugar-Free) 200 milliGRAM(s) Oral every 6 hours  heparin   Injectable 5000 Unit(s) SubCutaneous every 8 hours  phenytoin   Capsule 100 milliGRAM(s) Oral three times a day    MEDICATIONS  (PRN):      FAMILY HISTORY:      CBC Full  -  ( 19 Jul 2021 07:11 )  WBC Count : 29.16 K/uL  RBC Count : 3.52 M/uL  Hemoglobin : 10.7 g/dL  Hematocrit : 32.8 %  Platelet Count - Automated : 384 K/uL  Mean Cell Volume : 93.2 fl  Mean Cell Hemoglobin : 30.4 pg  Mean Cell Hemoglobin Concentration : 32.6 gm/dL  Auto Neutrophil # : x  Auto Lymphocyte # : x  Auto Monocyte # : x  Auto Eosinophil # : x  Auto Basophil # : x  Auto Neutrophil % : x  Auto Lymphocyte % : x  Auto Monocyte % : x  Auto Eosinophil % : x  Auto Basophil % : x      07-19    141  |  108  |  35<H>  ----------------------------<  132<H>  4.0   |  19<L>  |  1.62<H>    Ca    9.2      19 Jul 2021 07:11  Phos  3.6     07-19  Mg     2.5     07-19    TPro  7.6  /  Alb  4.5  /  TBili  0.2  /  DBili  x   /  AST  18  /  ALT  13  /  AlkPhos  100  07-18            Radiology:    < from: CT Brain Stroke Protocol (07.18.21 @ 19:44) >  EXAM:  CT BRAIN STROKE PROTOCOL                          PROCEDURE DATE:  07/18/2021          INTERPRETATION:  PROCEDURE: CT head without intravenous contrast    INDICATIONS: Slurred speech, left facial droop. Stroke Code.    TECHNIQUE:  Serial axial images were obtained from the skull base to the vertex without the use of intravenous contrast. Coronal and sagittal reformatted images were obtained. Rapid AI software was used for the detection of intracranial hemorrhage.    COMPARISON EXAMINATION: None.    FINDINGS:  VENTRICLES AND SULCI:  Parenchymal volume and sulci are appropriate for the patient's age.  INTRA-AXIAL: No acute intracranial hemorrhage or midline shift is present. Chronic left thalamic lacunar infarct. Mild patchy hypodensity within the periventricular white matter, consistent with sequelae of chronic small vessel ischemic disease. The gray-white matter differentiation is preserved.  EXTRA-AXIAL: No extra-axial fluid collection is present.  VISUALIZED SINUSES: The visualized paranasal sinuses are clear.  VISUALIZED MASTOIDS:  Clear.  CALVARIUM:  Normal.      < from: CT Perfusion w/ Maps w/ IV Cont (07.18.21 @ 19:45) >    EXAM:  CT PERFUSION W MAPS IC                          PROCEDURE DATE:  07/18/2021          INTERPRETATION:  CT Perfusion    INDICATION: Stroke code. Slurred speech and left facial droop.    TECHNIQUE: After the bolus administration of 40 mL of Optiray-350, serial axial images were obtained through the brain. The CT perfusion data set was post processed per [facility] protocol generating color maps of CBF, CBV, MTT, and Tmax.    COMPARISON: None.    Following measurements were obtained onperfusion maps:  CBF <  30%: 0 mL  Tmax >6s: 0 mL  Mismatched volume: 0 mL  Mismatched ratio: None.    IMPRESSION:  Normal CT perfusion study.      < from: CT Angio Head w/ IV Cont (07.18.21 @ 19:45) >    EXAM:  CT ANGIO NECK (W)AW IC                          EXAM:  CT ANGIO BRAIN (W)AW IC                          PROCEDURE DATE:  07/18/2021          INTERPRETATION:  CT ANGIOGRAM OF THE HEAD AND NECK WITH CONTRAST    INDICATION: Stroke code. Slurredspeech and left facial droop.    TECHNIQUE: Axial contrast enhanced CT angiogram of the head and neck was performed during timed bolus infusion of 80 cc of Optiray 350. Multiple 3-D reformatted images and MIP series were generated from the axial images.    COMPARISON: None.    FINDINGS:    The internal carotid arteries are patent at the skull base and intracranial compartment without occlusion or high grade stenosis. Moderate calcification of the bilateral distal internal carotid arteries is noted. The anterior and middle cerebral arteries are patent at their 1st and 2nd order segments, and appear symmetric caliber. The posterior circulation shows no high grade stenosis or occlusion. The intracranial vertebral arteries, the basilar artery andboth posterior cerebral arteries are patent. There is no site of aneurysm within the resolution limitations of CTA.    The aortic arch is normal size and shows patency, with normal 3 vessel configuration.    The bilateral common carotid and internal carotid arteries are tortuous but normal caliber. There is no hemodynamically significant stenosis. The external carotid arteries are also patent.    The cervical vertebral arteries are patent throughout, without hemodynamically significant stenosis or occlusion. There is however moderate stenosis at the right vertebral ostium due to atherosclerotic plaque.    Multiple hypodense thyroid nodules, largest in the right lobe measuring 1.8 cm. Degenerative changes of the spine. Complete opacification of the left maxillary sinus, anterior left ethmoid sinus, and frontal sinus with associated wall thickening of the left maxillary and anterior ethmoid sinuses is consistent with chronic disease.    IMPRESSION:  No large vessel occlusion or high-grade stenosis within the head and neck.                  Vital Signs Last 24 Hrs  T(C): 36.6 (19 Jul 2021 05:37), Max: 37.1 (18 Jul 2021 19:32)  T(F): 97.8 (19 Jul 2021 05:37), Max: 98.8 (18 Jul 2021 19:51)  HR: 86 (19 Jul 2021 04:04) (84 - 106)  BP: 142/67 (19 Jul 2021 04:04) (123/63 - 184/75)  BP(mean): 96 (19 Jul 2021 04:04) (96 - 118)  RR: 16 (19 Jul 2021 04:04) (16 - 18)  SpO2: 96% (19 Jul 2021 04:04) (96% - 99%)        REVIEW OF SYSTEMS:    CONSTITUTIONAL: No fever, weight loss, or fatigue  EYES: No eye pain, visual disturbances, or discharge  ENMT:  No difficulty hearing, tinnitus, vertigo; No sinus or throat pain  NECK: No pain or stiffness  BREASTS: No pain, masses, or nipple discharge  RESPIRATORY: No cough, wheezing, chills or hemoptysis; No shortness of breath  CARDIOVASCULAR: No chest pain, palpitations, dizziness, or leg swelling  GASTROINTESTINAL: No abdominal or epigastric pain. No nausea, vomiting, or hematemesis; No diarrhea or constipation. No melena or hematochezia.  GENITOURINARY: No dysuria, frequency, hematuria, or incontinence  NEUROLOGICAL:  slurred speech, Left facial droop  SKIN: No itching, burning, rashes, or lesions   LYMPH NODES: No enlarged glands  ENDOCRINE: No heat or cold intolerance; No hair loss  MUSCULOSKELETAL: No joint pain or swelling; No muscle, back, or extremity pain  PSYCHIATRIC: No depression, anxiety, mood swings, or difficulty sleeping  HEME/LYMPH: No easy bruising, or bleeding gums  ALLERGY AND IMMUNOLOGIC: No hives or eczema  VASCULAR: no swelling, erythema,   : no dysuria, hematuria, frequency        Physical Exam: frail 89 yo AA gentleman lying in semi Jasmine's position, awake/alert, c/o r arm weakness    Head: normocephalic, atraumatic    Eyes: PERRLA, EOMI, no nystagmus, sclera anicteric    ENT: nasal discharge, uvula midline, no oropharyngeal erythema/exudate    Neck: supple, negative JVD, negative carotid bruits, no thyromegaly    Chest: bilateral rhonchi    Cardiovascular: regular rate and rhythm, neg murmurs/rubs/gallops    Abdomen: soft, non distended, non tender to palpation in all 4 quadrants, negative rebound/guarding, normal bowel sounds    Extremities: WWP, neg cyanosis/clubbing/edema, negative calf tenderness to palpation, negative King's sign    Musculoskeletal:    Skin:      :     Neurologic Exam:    Alert and oriented to person, place, date/year, speech fluent w/ mild dysarthria, follows commands, recent and remote memory intact, repetition intact, comprehension intact,  attention/concentration intact, fund of knowledge appropriate    Cranial Nerves:     II:                         pupils equal, round and reactive to light, visual fields intact   III/ IV/VI:             extraocular movements intact, neg nystagmus, neg ptosis  V:                        facial sensation intact, V1-3 normal  VII:                      left droop, normal eye closure   VIII:                     hard of hearing bilaterally  IX and X:             no hoarseness, gag intact, palate/ uvula rise symmetrically  XI:                       SCM/ trapezius strength intact bilateral  XII:                      no tongue deviation    Motor Exam:    Right UE:            3+/5    Left UE:              4+/5      Right LE:            3 to 3+/5    Left LE:              4/5               Sensation:         LLE extinguishes to DSS                     DTR:                  biceps/brachioradialis: equal bilaterally                                                      patella/ankle: equal bilaterally                                                     neg clonus                           neg Babinski                        Coordination:                            Finger to Nose:  RUE tremor                      Rapid Alternating movements:  neg dysdiadochokinesia bilaterally        Joint Position Sense: wnl bilaterally           Romberg:  not tested    Tandem Walking:  not tested    Gait:  not tested        PM&R Impression:    1) r/o acute stroke/ CT brain imaging neg for acute pathology  2) MRI brain pending    Recommendations/ Plan :    1) Physical therapy focusing on therapeutic exercises, bed mobility/transfer out of bed evaluation, progressive ambulation with assistive devices prn.    2) Anticipated Disposition Plan/Recs:    subacute rehab placement

## 2021-07-20 LAB
ANION GAP SERPL CALC-SCNC: 14 MMOL/L — SIGNIFICANT CHANGE UP (ref 5–17)
ANISOCYTOSIS BLD QL: SLIGHT — SIGNIFICANT CHANGE UP
APPEARANCE CSF: CLEAR — SIGNIFICANT CHANGE UP
APPEARANCE SPUN FLD: COLORLESS — SIGNIFICANT CHANGE UP
APPEARANCE UR: CLEAR — SIGNIFICANT CHANGE UP
BASOPHILS # BLD AUTO: 0.3 K/UL — HIGH (ref 0–0.2)
BASOPHILS NFR BLD AUTO: 0.9 % — SIGNIFICANT CHANGE UP (ref 0–2)
BILIRUB UR-MCNC: NEGATIVE — SIGNIFICANT CHANGE UP
BUN SERPL-MCNC: 33 MG/DL — HIGH (ref 7–23)
CALCIUM SERPL-MCNC: 9 MG/DL — SIGNIFICANT CHANGE UP (ref 8.4–10.5)
CHLORIDE SERPL-SCNC: 109 MMOL/L — HIGH (ref 96–108)
CO2 SERPL-SCNC: 21 MMOL/L — LOW (ref 22–31)
COLOR CSF: SIGNIFICANT CHANGE UP
COLOR SPEC: YELLOW — SIGNIFICANT CHANGE UP
CREAT SERPL-MCNC: 1.65 MG/DL — HIGH (ref 0.5–1.3)
CSF COMMENTS: SIGNIFICANT CHANGE UP
CSF PCR RESULT: SIGNIFICANT CHANGE UP
CULTURE RESULTS: SIGNIFICANT CHANGE UP
DIFF PNL FLD: NEGATIVE — SIGNIFICANT CHANGE UP
EOSINOPHIL # BLD AUTO: 1.48 K/UL — HIGH (ref 0–0.5)
EOSINOPHIL NFR BLD AUTO: 4.4 % — SIGNIFICANT CHANGE UP (ref 0–6)
FERRITIN SERPL-MCNC: 170 NG/ML — SIGNIFICANT CHANGE UP (ref 30–400)
GLUCOSE CSF-MCNC: 61 MG/DL — SIGNIFICANT CHANGE UP (ref 40–70)
GLUCOSE SERPL-MCNC: 85 MG/DL — SIGNIFICANT CHANGE UP (ref 70–99)
GLUCOSE UR QL: NEGATIVE — SIGNIFICANT CHANGE UP
GRAM STN FLD: SIGNIFICANT CHANGE UP
HCT VFR BLD CALC: 32.3 % — LOW (ref 39–50)
HGB BLD-MCNC: 10.4 G/DL — LOW (ref 13–17)
HIV 1+2 AB+HIV1 P24 AG SERPL QL IA: SIGNIFICANT CHANGE UP
HYPOCHROMIA BLD QL: SLIGHT — SIGNIFICANT CHANGE UP
IRON SATN MFR SERPL: 13 % — LOW (ref 16–55)
IRON SATN MFR SERPL: 20 UG/DL — LOW (ref 45–165)
KETONES UR-MCNC: NEGATIVE — SIGNIFICANT CHANGE UP
LDH SERPL L TO P-CCNC: 224 U/L — SIGNIFICANT CHANGE UP (ref 50–242)
LEUKOCYTE ESTERASE UR-ACNC: NEGATIVE — SIGNIFICANT CHANGE UP
LYMPHOCYTES # BLD AUTO: 1.48 K/UL — SIGNIFICANT CHANGE UP (ref 1–3.3)
LYMPHOCYTES # BLD AUTO: 4.4 % — LOW (ref 13–44)
LYMPHOCYTES # CSF: 1 % — LOW (ref 40–80)
MAGNESIUM SERPL-MCNC: 2.4 MG/DL — SIGNIFICANT CHANGE UP (ref 1.6–2.6)
MANUAL SMEAR VERIFICATION: SIGNIFICANT CHANGE UP
MCHC RBC-ENTMCNC: 30.5 PG — SIGNIFICANT CHANGE UP (ref 27–34)
MCHC RBC-ENTMCNC: 32.2 GM/DL — SIGNIFICANT CHANGE UP (ref 32–36)
MCV RBC AUTO: 94.7 FL — SIGNIFICANT CHANGE UP (ref 80–100)
METAMYELOCYTES # FLD: 0.9 % — HIGH (ref 0–0)
MONOCYTES # BLD AUTO: 2.06 K/UL — HIGH (ref 0–0.9)
MONOCYTES NFR BLD AUTO: 6.1 % — SIGNIFICANT CHANGE UP (ref 2–14)
MYELOCYTES NFR BLD: 0.9 % — HIGH (ref 0–0)
NEUTROPHILS # BLD AUTO: 27.78 K/UL — HIGH (ref 1.8–7.4)
NEUTROPHILS # CSF: 0 % — SIGNIFICANT CHANGE UP (ref 0–6)
NEUTROPHILS NFR BLD AUTO: 78 % — HIGH (ref 43–77)
NEUTS BAND # BLD: 4.4 % — SIGNIFICANT CHANGE UP (ref 0–8)
NITRITE UR-MCNC: NEGATIVE — SIGNIFICANT CHANGE UP
NRBC # BLD: 0 /100 WBCS — SIGNIFICANT CHANGE UP (ref 0–0)
NRBC NFR CSF: 1 /UL — SIGNIFICANT CHANGE UP (ref 0–5)
OVALOCYTES BLD QL SMEAR: SLIGHT — SIGNIFICANT CHANGE UP
PH UR: 5.5 — SIGNIFICANT CHANGE UP (ref 5–8)
PHOSPHATE SERPL-MCNC: 4.6 MG/DL — HIGH (ref 2.5–4.5)
PLAT MORPH BLD: ABNORMAL
PLATELET # BLD AUTO: 397 K/UL — SIGNIFICANT CHANGE UP (ref 150–400)
POIKILOCYTOSIS BLD QL AUTO: SIGNIFICANT CHANGE UP
POTASSIUM SERPL-MCNC: 4.5 MMOL/L — SIGNIFICANT CHANGE UP (ref 3.5–5.3)
POTASSIUM SERPL-SCNC: 4.5 MMOL/L — SIGNIFICANT CHANGE UP (ref 3.5–5.3)
PROCALCITONIN SERPL-MCNC: 0.14 NG/ML — HIGH (ref 0.02–0.1)
PROT CSF-MCNC: 52 MG/DL — HIGH (ref 15–45)
PROT UR-MCNC: ABNORMAL MG/DL
RBC # BLD: 3.41 M/UL — LOW (ref 4.2–5.8)
RBC # CSF: 0 /UL — SIGNIFICANT CHANGE UP (ref 0–0)
RBC # FLD: 14.2 % — SIGNIFICANT CHANGE UP (ref 10.3–14.5)
RBC BLD AUTO: ABNORMAL
SCHISTOCYTES BLD QL AUTO: SLIGHT — SIGNIFICANT CHANGE UP
SODIUM SERPL-SCNC: 144 MMOL/L — SIGNIFICANT CHANGE UP (ref 135–145)
SP GR SPEC: 1.02 — SIGNIFICANT CHANGE UP (ref 1–1.03)
SPECIMEN SOURCE: SIGNIFICANT CHANGE UP
SPECIMEN SOURCE: SIGNIFICANT CHANGE UP
T3FREE SERPL-MCNC: 2.55 PG/ML — SIGNIFICANT CHANGE UP (ref 1.8–4.6)
TIBC SERPL-MCNC: 151 UG/DL — LOW (ref 220–430)
TUBE TYPE: SIGNIFICANT CHANGE UP
UIBC SERPL-MCNC: 131 UG/DL — SIGNIFICANT CHANGE UP (ref 110–370)
UROBILINOGEN FLD QL: 0.2 E.U./DL — SIGNIFICANT CHANGE UP
WBC # BLD: 33.71 K/UL — HIGH (ref 3.8–10.5)
WBC # FLD AUTO: 33.71 K/UL — HIGH (ref 3.8–10.5)

## 2021-07-20 PROCEDURE — 99233 SBSQ HOSP IP/OBS HIGH 50: CPT | Mod: GC

## 2021-07-20 PROCEDURE — 99221 1ST HOSP IP/OBS SF/LOW 40: CPT

## 2021-07-20 PROCEDURE — 71045 X-RAY EXAM CHEST 1 VIEW: CPT | Mod: 26

## 2021-07-20 PROCEDURE — 99223 1ST HOSP IP/OBS HIGH 75: CPT

## 2021-07-20 PROCEDURE — 99232 SBSQ HOSP IP/OBS MODERATE 35: CPT

## 2021-07-20 RX ORDER — ATORVASTATIN CALCIUM 80 MG/1
40 TABLET, FILM COATED ORAL AT BEDTIME
Refills: 0 | Status: DISCONTINUED | OUTPATIENT
Start: 2021-07-20 | End: 2021-07-23

## 2021-07-20 RX ORDER — LEVETIRACETAM 250 MG/1
500 TABLET, FILM COATED ORAL EVERY 12 HOURS
Refills: 0 | Status: DISCONTINUED | OUTPATIENT
Start: 2021-07-20 | End: 2021-07-26

## 2021-07-20 RX ORDER — AMPICILLIN SODIUM AND SULBACTAM SODIUM 250; 125 MG/ML; MG/ML
1.5 INJECTION, POWDER, FOR SUSPENSION INTRAMUSCULAR; INTRAVENOUS EVERY 12 HOURS
Refills: 0 | Status: DISCONTINUED | OUTPATIENT
Start: 2021-07-20 | End: 2021-07-23

## 2021-07-20 RX ORDER — SODIUM CHLORIDE 9 MG/ML
1000 INJECTION, SOLUTION INTRAVENOUS
Refills: 0 | Status: DISCONTINUED | OUTPATIENT
Start: 2021-07-20 | End: 2021-07-21

## 2021-07-20 RX ADMIN — HEPARIN SODIUM 5000 UNIT(S): 5000 INJECTION INTRAVENOUS; SUBCUTANEOUS at 15:41

## 2021-07-20 RX ADMIN — LEVETIRACETAM 500 MILLIGRAM(S): 250 TABLET, FILM COATED ORAL at 06:42

## 2021-07-20 RX ADMIN — LEVETIRACETAM 400 MILLIGRAM(S): 250 TABLET, FILM COATED ORAL at 17:45

## 2021-07-20 RX ADMIN — AMPICILLIN SODIUM AND SULBACTAM SODIUM 100 GRAM(S): 250; 125 INJECTION, POWDER, FOR SUSPENSION INTRAMUSCULAR; INTRAVENOUS at 15:41

## 2021-07-20 RX ADMIN — SODIUM CHLORIDE 30 MILLILITER(S): 9 INJECTION, SOLUTION INTRAVENOUS at 16:17

## 2021-07-20 RX ADMIN — HEPARIN SODIUM 5000 UNIT(S): 5000 INJECTION INTRAVENOUS; SUBCUTANEOUS at 06:42

## 2021-07-20 RX ADMIN — ATORVASTATIN CALCIUM 40 MILLIGRAM(S): 80 TABLET, FILM COATED ORAL at 21:07

## 2021-07-20 RX ADMIN — Medication 200 MILLIGRAM(S): at 06:42

## 2021-07-20 RX ADMIN — HEPARIN SODIUM 5000 UNIT(S): 5000 INJECTION INTRAVENOUS; SUBCUTANEOUS at 21:07

## 2021-07-20 RX ADMIN — SODIUM CHLORIDE 4 MILLILITER(S): 9 INJECTION INTRAMUSCULAR; INTRAVENOUS; SUBCUTANEOUS at 01:05

## 2021-07-20 NOTE — CONSULT NOTE ADULT - ASSESSMENT
89y Male with PMHx of HTN and seizure on phenytoin presents with left facial droop and slurred speech. Pt was with family member and noted to have acute onset slurred speech, increased saliva production and a left facial droop. CTH negative for acute hemorrhage. CTP without any perfusion defect. CTA without LVO. Decision was made to not give tpa since family felt his current deficits are non-disabling to his daily life vs risk of bleeding. as part of stroke workup the patient underwent an echocardiogram which showed mod-sev AS/AR prompting a cardiology consult.    #mod-sev AS/AR  - consult structural heart disease team in AM to evaluate for candidacy for TAVR vs BAV  - cont ASA, plavix, lipitor 89y Male with PMHx of HTN and seizure on phenytoin presents with left facial droop and slurred speech. Pt was with family member and noted to have acute onset slurred speech, increased saliva production and a left facial droop. CTH negative for acute hemorrhage. CTP without any perfusion defect. CTA without LVO. Decision was made to not give tpa since family felt his current deficits are non-disabling to his daily life vs risk of bleeding. as part of stroke workup the patient underwent an echocardiogram which showed mod-sev AS/AR prompting a cardiology consult.    #mod-sev AS/AR  - consult structural heart disease team in AM to evaluate for candidacy for TAVR vs BAV  - cont ASA, plavix, lipitor      Addendum:   Pt has asymptomatic mod-severe AS with normal EF  Family refusing any intervention  Cardiology to sign off. Please reconsult with questions  89y Male with PMHx of HTN and seizure on phenytoin presents with left facial droop and slurred speech. Pt was with family member and noted to have acute onset slurred speech, increased saliva production and a left facial droop. CTH negative for acute hemorrhage. CTP without any perfusion defect. CTA without LVO. Decision was made to not give tpa since family felt his current deficits are non-disabling to his daily life vs risk of bleeding. as part of stroke workup the patient underwent an echocardiogram which showed mod-sev AS/AR prompting a cardiology consult.    #mod-sev AS/AR  - consult structural heart disease team in AM to evaluate for candidacy for TAVR vs BAV  - cont ASA, plavix, lipitor  - Pt can followup at [facility]       Addendum:   Pt has asymptomatic mod-severe AS with normal EF  Family refusing any intervention  Cardiology to sign off. Please reconsult with questions

## 2021-07-20 NOTE — SWALLOW BEDSIDE ASSESSMENT ADULT - SLP PERTINENT HISTORY OF CURRENT PROBLEM
PMHx of HTN and seizure on phenytoin presents with left facial droop and slurred speech, head imaging was unremarkable. Echocardiogram showed mod-sev AS/AR

## 2021-07-20 NOTE — SWALLOW BEDSIDE ASSESSMENT ADULT - COMMENTS
Pt's wife assisted in providing pertinent case hx information. According to her, pt has experienced intermittent episodes of dysarthria and dysphagia over the last 2 months. Dysphagia symptoms include excessive secretions and coughing with liquids and solids. Pt went on to further describe liquid as "coming back up" occasionally.

## 2021-07-20 NOTE — CONSULT NOTE ADULT - SUBJECTIVE AND OBJECTIVE BOX
**Incomplete Note**    Majority of HPI obtained from Wife Ross d/t pt's confusion     HPI: 89y Male with PMHx of HTN and Epilepsy on phenytoin (last seizure April 2020) presents with left facial droop and slurred speech. Pt was with family member (great-nephew Mesh [phone]) who noticed at 5pm, pt had acute onset of slurred speech, increased saliva production and a left facial droop. Pt was driven to the ED immediately, CTH/CT perfusion unremarkable with family opting not to give tpa given risk/benefit. Subsquent MR of the head revealed no     At baseline, pt is fully independent of all ADLs and iADLs, ambulates without assistance, with hearing loss b/l, baseline right arm tremor.       PAST MEDICAL & SURGICAL HISTORY: HTN, Epilepsy       FAMILY HISTORY: no significant cardiac family hx       SOCIAL HISTORY:   Patient lives with wife in apartment.  Smoking status: denies  Drinking: denies  Drug Use: denies    ROS: Pt hard of hearing and confused, cannot obtain ROS for said reason       MEDICATIONS:  amoxicillin  500 milliGRAM(s)/clavulanate 1 Tablet(s) Oral two times a day  aspirin enteric coated 81 milliGRAM(s) Oral daily  atorvastatin 40 milliGRAM(s) Oral at bedtime  guaiFENesin Oral Liquid (Sugar-Free) 200 milliGRAM(s) Oral every 6 hours  heparin   Injectable 5000 Unit(s) SubCutaneous every 8 hours  levETIRAcetam 500 milliGRAM(s) Oral two times a day  sodium chloride 3%  Inhalation 4 milliLiter(s) Inhalation every 6 hours PRN      PHYSICAL EXAM:  T(C): 36.6 (07-20-21 @ 09:54), Max: 37.2 (07-20-21 @ 05:01)  HR: 74 (07-20-21 @ 08:17) (66 - 86)  BP: 131/62 (07-20-21 @ 08:17) (123/58 - 176/74)  RR: 16 (07-20-21 @ 08:17) (16 - 20)  SpO2: 95% (07-20-21 @ 08:17) (94% - 98%)  Wt(kg): --    GEN: Elderly gentleman, Awake, comfortable. NAD.   HEENT: NCAT, PERRL, EOMI. Mucosa moist. No JVD.   RESP: CTA b/l  CV: RRR, normal s1/s2. No m/r/g.  ABD: Soft, NTND. BS+  EXT: Warm. No edema, clubbing, or cyanosis.   NEURO: AAOx2 (self and place), confused and fixated on xray results,  left facial droop No focal deficits.    I&O's Summary    19 Jul 2021 07:01  -  20 Jul 2021 07:00  --------------------------------------------------------  IN: 0 mL / OUT: 600 mL / NET: -600 mL        LABS:	 	                        10.4   33.71 )-----------( 397      ( 20 Jul 2021 05:50 )             32.3     07-20    144  |  109<H>  |  33<H>  ----------------------------<  85  4.5   |  21<L>  |  1.65<H>    Ca    9.0      20 Jul 2021 05:50  Phos  4.6     07-20  Mg     2.4     07-20    TPro  7.6  /  Alb  4.5  /  TBili  0.2  /  DBili  x   /  AST  18  /  ALT  13  /  AlkPhos  100  07-18        proBNP:   Lipid Profile:   HgA1c:   TSH:     TELEMETRY: 	    ECG:  	  RADIOLOGY:   ECHO:  STRESS:  CATH:   **Incomplete Note**    Majority of HPI obtained from Wife Madeot d/t pt's confusion     HPI: 89y Male with PMHx of HTN and Epilepsy on phenytoin (last seizure April 2020) presents with left facial droop and slurred speech. Pt was with family member (great-nephew Mesh [phone]) who noticed at 5pm, pt had acute onset of slurred speech, increased saliva production and a left facial droop. Pt was driven to the ED immediately, CTH/CT perfusion unremarkable with family opting not to give tpa given risk/benefit. Subsquent MR of the head revealed no evidence of stroke. Per the wife, the pt has not had any cardiac history nor familial cardiac hx and denies historical/recent chest pain/discomfort, orthopnea, PND, or extremity swelling. Pt is fully independent of all ADLs and iADLs, ambulates without assistance, with hearing loss b/l, baseline right arm tremor.     Interval History/Events: Pt seen and examined at bedside, hard of hearing without hearing aid, appears confused and fixated on xray results, A&Ox2 (perosn, place). Cannot obtain ROS d/t confusion       PAST MEDICAL & SURGICAL HISTORY: HTN, Epilepsy       FAMILY HISTORY: no significant cardiac family hx       SOCIAL HISTORY:   Patient lives with wife in apartment.  Smoking status: denies  Drinking: denies  Drug Use: denies    ROS: Pt hard of hearing and confused, cannot obtain ROS for said reason       MEDICATIONS:  amoxicillin  500 milliGRAM(s)/clavulanate 1 Tablet(s) Oral two times a day  aspirin enteric coated 81 milliGRAM(s) Oral daily  atorvastatin 40 milliGRAM(s) Oral at bedtime  guaiFENesin Oral Liquid (Sugar-Free) 200 milliGRAM(s) Oral every 6 hours  heparin   Injectable 5000 Unit(s) SubCutaneous every 8 hours  levETIRAcetam 500 milliGRAM(s) Oral two times a day  sodium chloride 3%  Inhalation 4 milliLiter(s) Inhalation every 6 hours PRN      PHYSICAL EXAM:  T(C): 36.6 (07-20-21 @ 09:54), Max: 37.2 (07-20-21 @ 05:01)  HR: 74 (07-20-21 @ 08:17) (66 - 86)  BP: 131/62 (07-20-21 @ 08:17) (123/58 - 176/74)  RR: 16 (07-20-21 @ 08:17) (16 - 20)  SpO2: 95% (07-20-21 @ 08:17) (94% - 98%)  Wt(kg): --    GEN: Elderly gentleman, Awake, comfortable. NAD.   HEENT: NCAT, PERRL, EOMI. Mucosa moist. No JVD.   RESP: no WOB, right basilar coarse crackles, left lower lung field rhonchi   CV: RRR, Normal s1/s2, 4/6 systolic ejection murmur w/thrill heard loudest @right 2nd ICS aortic site, no ventricular heave or rubs   ABD: Soft, NTND. BS+  EXT: Warm. No edema, clubbing, or cyanosis.   NEURO: AAOx2 (self and place), confused and fixated on xray results,  left facial droop and dysarthria, No motor focal deficits.    I&O's Summary    19 Jul 2021 07:01  -  20 Jul 2021 07:00  --------------------------------------------------------  IN: 0 mL / OUT: 600 mL / NET: -600 mL        LABS:	 	                        10.4   33.71 )-----------( 397      ( 20 Jul 2021 05:50 )             32.3     07-20    144  |  109<H>  |  33<H>  ----------------------------<  85  4.5   |  21<L>  |  1.65<H>    Ca    9.0      20 Jul 2021 05:50  Phos  4.6     07-20  Mg     2.4     07-20    TPro  7.6  /  Alb  4.5  /  TBili  0.2  /  DBili  x   /  AST  18  /  ALT  13  /  AlkPhos  100  07-18      TELEMETRY: NSR w/PACs	    ECG: NSR w/PAC  RADIOLOGY: CXR unremarkable, CTH/CTP/MR head unremarkable for ischemic/hemorrhagic stroke  ECHO: Normal LV fx w/EF 75%, LVH, mild MR  Aortic studies: PV 3.5 m/s, Peak transaortic gradient 49mmhg, MARCUS 0.9cm^2, **Incomplete Note**    Majority of HPI obtained from Wife Madeot d/t pt's confusion     HPI: 89y Male with PMHx of HTN and Epilepsy on phenytoin (last seizure April 2020) presents with left facial droop and slurred speech. Pt was with family member (great-nephew Mesh [phone]) who noticed at 5pm, pt had acute onset of slurred speech, increased saliva production and a left facial droop. Pt was driven to the ED immediately, CTH/CT perfusion unremarkable with family opting not to give tpa given risk/benefit. Subsquent MR of the head revealed no evidence of stroke. Per the wife, the pt has not had any cardiac history nor familial cardiac hx and denies historical/recent chest pain/discomfort, SOB, CHAVEZ, orthopnea, PND, or extremity swelling. Pt is fully independent of all ADLs and iADLs, ambulates without assistance, with hearing loss b/l, baseline right arm tremor. He is able to walk several blocks and multiple flights of stairs without CHAVEZ. On ROS per wife, patient denies: fevers, chills, myalgias, dizziness, weakness, HA, dysphagia, dysarthria, changes in vision, CP/chest discomfort, palpitations, SOB, cough, PND, orthopnea, N/V/D/C, abdominal pain, dysuria, urinary urgency/increased frequency, changes in bowel movements, melena, hematochezia, LE edema, joint pain, or unintentional weightloss. ROS otherwise negative.    Interval History/Events: Pt seen and examined at bedside, hard of hearing without hearing aid, appears confused and fixated on xray results, A&Ox2 (perosn, place). Cannot obtain ROS d/t confusion       PAST MEDICAL & SURGICAL HISTORY: HTN, Epilepsy       FAMILY HISTORY: no significant cardiac family hx       SOCIAL HISTORY:   Patient lives with wife in apartment.  Smoking status: denies  Drinking: denies  Drug Use: denies    ROS: Pt hard of hearing and confused, cannot obtain ROS for said reason       MEDICATIONS:  amoxicillin  500 milliGRAM(s)/clavulanate 1 Tablet(s) Oral two times a day  aspirin enteric coated 81 milliGRAM(s) Oral daily  atorvastatin 40 milliGRAM(s) Oral at bedtime  guaiFENesin Oral Liquid (Sugar-Free) 200 milliGRAM(s) Oral every 6 hours  heparin   Injectable 5000 Unit(s) SubCutaneous every 8 hours  levETIRAcetam 500 milliGRAM(s) Oral two times a day  sodium chloride 3%  Inhalation 4 milliLiter(s) Inhalation every 6 hours PRN      PHYSICAL EXAM:  T(C): 36.6 (07-20-21 @ 09:54), Max: 37.2 (07-20-21 @ 05:01)  HR: 74 (07-20-21 @ 08:17) (66 - 86)  BP: 131/62 (07-20-21 @ 08:17) (123/58 - 176/74)  RR: 16 (07-20-21 @ 08:17) (16 - 20)  SpO2: 95% (07-20-21 @ 08:17) (94% - 98%)  Wt(kg): --    GEN: Elderly gentleman, Awake, comfortable. NAD.   HEENT: NCAT, PERRL, EOMI. Mucosa moist. No JVD.   RESP: no WOB, right basilar coarse crackles, left lower lung field rhonchi   CV: RRR, Normal s1/s2, 4/6 systolic ejection murmur w/thrill heard loudest @right 2nd ICS aortic site, no ventricular heave or rubs   ABD: Soft, NTND. BS+  EXT: Warm. No edema, clubbing, or cyanosis.   NEURO: AAOx2 (self and place), confused and fixated on xray results,  left facial droop and dysarthria, No motor focal deficits.    I&O's Summary    19 Jul 2021 07:01  -  20 Jul 2021 07:00  --------------------------------------------------------  IN: 0 mL / OUT: 600 mL / NET: -600 mL        LABS:	 	                        10.4   33.71 )-----------( 397      ( 20 Jul 2021 05:50 )             32.3     07-20    144  |  109<H>  |  33<H>  ----------------------------<  85  4.5   |  21<L>  |  1.65<H>    Ca    9.0      20 Jul 2021 05:50  Phos  4.6     07-20  Mg     2.4     07-20    TPro  7.6  /  Alb  4.5  /  TBili  0.2  /  DBili  x   /  AST  18  /  ALT  13  /  AlkPhos  100  07-18      TELEMETRY: NSR w/PACs	    ECG: NSR w/PAC  RADIOLOGY: CXR unremarkable, CTH/CTP/MR head unremarkable for ischemic/hemorrhagic stroke  ECHO: Normal LV fx w/EF 75%, LVH, mild MR  Aortic studies: PV 3.5 m/s, Peak transaortic gradient 49mmhg, MARCUS 0.9cm^2, History obtained from Wife Madeot d/t pt's confusion     HPI: 89y Male with PMHx of HTN and Epilepsy on phenytoin (last seizure April 2020) presents with left facial droop and slurred speech. Pt was with family member (great-nephew Mesh [phone]) who noticed at 5pm, pt had acute onset of slurred speech, increased saliva production and a left facial droop. Pt was driven to the ED immediately, CTH/CT perfusion unremarkable with family opting not to give tpa given risk/benefit. Subsquent MR of the head revealed no evidence of stroke. Per the wife, the pt has not had any cardiac history nor familial cardiac hx and denies historical/recent chest pain/discomfort, SOB, CHAVEZ, orthopnea, PND, or extremity swelling. Pt is fully independent of all ADLs and iADLs, ambulates without assistance, with hearing loss b/l, baseline right arm tremor. He is able to walk several blocks and multiple flights of stairs without CHAVEZ. On ROS per wife, patient denies: fevers, chills, myalgias, dizziness, weakness, HA, dysphagia, dysarthria, changes in vision, CP/chest discomfort, palpitations, SOB, cough, PND, orthopnea, N/V/D/C, abdominal pain, dysuria, urinary urgency/increased frequency, changes in bowel movements, melena, hematochezia, LE edema, joint pain, or unintentional weightloss. ROS otherwise negative.    Interval History/Events: Pt seen and examined at bedside, hard of hearing without hearing aid, appears confused and fixated on xray results, A&Ox2 (perosn, place). Cannot obtain ROS d/t confusion       PAST MEDICAL & SURGICAL HISTORY: HTN, Epilepsy       FAMILY HISTORY: no significant cardiac family hx       SOCIAL HISTORY:   Patient lives with wife in apartment.  Smoking status: denies  Drinking: denies  Drug Use: denies    PHYSICAL EXAM:  T(C): 36.6 (07-20-21 @ 09:54), Max: 37.2 (07-20-21 @ 05:01)  HR: 74 (07-20-21 @ 08:17) (66 - 86)  BP: 131/62 (07-20-21 @ 08:17) (123/58 - 176/74)  RR: 16 (07-20-21 @ 08:17) (16 - 20)  SpO2: 95% (07-20-21 @ 08:17) (94% - 98%)    GEN: Elderly gentleman, Awake, comfortable. NAD.   HEENT: NCAT, PERRL, EOMI. Mucosa moist. No JVD.   RESP: no WOB, right basilar coarse crackles, left lower lung field rhonchi   CV: RRR, Normal s1/s2, 4/6 systolic ejection murmur w/thrill heard loudest @right 2nd ICS aortic site, no ventricular heave or rubs   ABD: Soft, NTND. BS+  EXT: Warm. No edema, clubbing, or cyanosis.   NEURO: AAOx2 (self and place), confused and fixated on xray results,  left facial droop and dysarthria, No motor focal deficits.    I&O's Summary    19 Jul 2021 07:01  -  20 Jul 2021 07:00  --------------------------------------------------------  IN: 0 mL / OUT: 600 mL / NET: -600 mL        LABS:	 	                        10.4   33.71 )-----------( 397      ( 20 Jul 2021 05:50 )             32.3     07-20    144  |  109<H>  |  33<H>  ----------------------------<  85  4.5   |  21<L>  |  1.65<H>    Ca    9.0      20 Jul 2021 05:50  Phos  4.6     07-20  Mg     2.4     07-20    TPro  7.6  /  Alb  4.5  /  TBili  0.2  /  DBili  x   /  AST  18  /  ALT  13  /  AlkPhos  100  07-18      TELEMETRY: NSR w/PACs	    ECG: NSR w/PAC  RADIOLOGY: CXR unremarkable, CTH/CTP/MR head unremarkable for ischemic/hemorrhagic stroke  ECHO: Normal LV fx w/EF 75%, LVH, mild MR  Aortic studies: PV 3.5 m/s, Mean gradient 29mmHg MARCUS 0.9cm^2,

## 2021-07-20 NOTE — SWALLOW FEES ASSESSMENT ADULT - COMMENTS
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 Unable to clear as it was difficult to elicit a cough/throat clear/re-swallow. Risks, benefits, and alternatives to this study were reviewed with pt and his wife. Verbal consent provided. Flexible endoscope passed transnasally to further assess swallow anatomy and physiology. Pt tolerated the passing of the scope and its presence.

## 2021-07-20 NOTE — CONSULT NOTE ADULT - SUBJECTIVE AND OBJECTIVE BOX
HPI: 89y Male with PMHx of HTN and seizure on phenytoin presents with left facial droop and slurred speech. Pt was with family member (great-nephew Mesh [phone]) who noticed at 5pm, pt had acute onset of slurred speech, increased saliva production and a left facial droop. CTH negative for acute hemorrhage. CTP without any perfusion defect. CTA without LVO. Decision was made to not give tpa since family felt his current deficits are non-disabling to his daily life vs risk of bleeding. as part of stroke workup the patient underwent an echocardiogram which showed mod-sev AS/AR prompting a cardiology consult. At bedside patient and family report that he ambulates mostly without assistance and denies chest pain or syncope but does occasionally have dyspnea on exertion. we discussed the implications of AS/AR and potential options for improving symptoms.       PAST MEDICAL & SURGICAL HISTORY:      FAMILY HISTORY:      SOCIAL HISTORY:   Patient lives with wife in apartment.  Smoking status: denies  Drinking: denies  Drug Use: denies    ROS:   Constitutional: No fever, weight loss or fatigue  Eyes: No eye pain, visual disturbances, or discharge  ENMT:  hearing loss bilaterally  Neck: No pain or stiffness  Respiratory: cough with sputum production x days  Cardiovascular: No chest pain, palpitations, shortness of breath, dizziness or leg swelling  Gastrointestinal: No abdominal pain. No nausea, vomiting or hematemesis; No diarrhea or constipation. Nohematochezia.  Neurological: As per HPI    T(C): 36.9 (07-18-21 @ 23:53), Max: 37.1 (07-18-21 @ 19:32)  HR: 92 (07-18-21 @ 23:53) (84 - 92)  BP: 142/78 (07-18-21 @ 23:53) (123/63 - 184/75)  RR: 18 (07-18-21 @ 23:53) (16 - 18)  SpO2: 97% (07-18-21 @ 23:53) (96% - 99%)    MEDICATION RECONCILIATION   MEDICATIONS  (STANDING):  aspirin enteric coated 81 milliGRAM(s) Oral daily  atorvastatin 80 milliGRAM(s) Oral at bedtime  clopidogrel Tablet 75 milliGRAM(s) Oral daily  heparin   Injectable 5000 Unit(s) SubCutaneous every 8 hours  phenytoin   Capsule 100 milliGRAM(s) Oral three times a day    MEDICATIONS  (PRN):    Allergies    hay fever (Unknown)  No Known Drug Allergies    Intolerances      Vital Signs Last 24 Hrs  T(C): 36.9 (18 Jul 2021 23:53), Max: 37.1 (18 Jul 2021 19:32)  T(F): 98.4 (18 Jul 2021 23:53), Max: 98.8 (18 Jul 2021 19:51)  HR: 92 (18 Jul 2021 23:53) (84 - 92)  BP: 142/78 (18 Jul 2021 23:53) (123/63 - 184/75)  BP(mean): --  RR: 18 (18 Jul 2021 23:53) (16 - 18)  SpO2: 97% (18 Jul 2021 23:53) (96% - 99%)    Physical exam:  NAD, sitting upright  facial drpp noted  Neck supple, no JVD  heart s1+ 3/6 systolic cresc-decresc murmur with diminished S2  Lungs CTAB  ext wwp no edema          Fingerstick Blood Glucose: CAPILLARY BLOOD GLUCOSE  161 (18 Jul 2021 20:36)      POCT Blood Glucose.: 161 mg/dL (18 Jul 2021 19:27)    LABS:                        10.7   32.39 )-----------( 413      ( 18 Jul 2021 20:01 )             32.6     07-18    141  |  108  |  39<H>  ----------------------------<  141<H>  4.7   |  23  |  1.76<H>    Ca    9.6      18 Jul 2021 20:01    TPro  7.6  /  Alb  4.5  /  TBili  0.2  /  DBili  x   /  AST  18  /  ALT  13  /  AlkPhos  100  07-18    PT/INR - ( 18 Jul 2021 20:01 )   PT: 12.3 sec;   INR: 1.03          PTT - ( 18 Jul 2021 20:01 )  PTT:32.7 sec          RADIOLOGY & ADDITIONAL STUDIES:    HCT: No acute intracranial hemorrhage or transcortical infarct.    CTA:1.  No large vessel occlusion or high-grade stenosis within the head and neck.  2.  Incidentally noted 1.8 cm right thyroid nodule. Consider nonemergent dedicated thyroid ultrasound is for further characterization.     (19 Jul 2021 00:25)      ROS: A 10-point review of systems was otherwise negative.    PAST MEDICAL & SURGICAL HISTORY:  Hypertension    Seizure      SOCIAL HISTORY:  FAMILY HISTORY:    ALLERGIES: 	  hay fever (Unknown)  No Known Drug Allergies          MEDICATIONS:  aspirin enteric coated 81 milliGRAM(s) Oral daily  atorvastatin 80 milliGRAM(s) Oral at bedtime  clopidogrel Tablet 75 milliGRAM(s) Oral daily  guaiFENesin Oral Liquid (Sugar-Free) 200 milliGRAM(s) Oral every 6 hours  heparin   Injectable 5000 Unit(s) SubCutaneous every 8 hours  levETIRAcetam 500 milliGRAM(s) Oral two times a day  sodium chloride 3%  Inhalation 4 milliLiter(s) Inhalation every 6 hours PRN      PHYSICAL EXAM:  T(C): 36.8 (07-19-21 @ 21:30), Max: 37.1 (07-19-21 @ 13:45)  HR: 86 (07-19-21 @ 23:30) (66 - 106)  BP: 151/69 (07-19-21 @ 23:30) (123/58 - 176/74)  RR: 18 (07-19-21 @ 23:30) (16 - 20)  SpO2: 94% (07-19-21 @ 23:30) (94% - 98%)  Wt(kg): --    GEN: Awake, comfortable. NAD.   HEENT: NCAT, PERRL, EOMI. Mucosa moist. No JVD.   RESP: CTA b/l  CV: RRR, normal s1/s2. No m/r/g.  ABD: Soft, NTND. BS+  EXT: Warm. No edema, clubbing, or cyanosis.   NEURO: AAOx3. No focal deficits.    I&O's Summary    18 Jul 2021 07:01  -  19 Jul 2021 07:00  --------------------------------------------------------  IN: 150 mL / OUT: 325 mL / NET: -175 mL    19 Jul 2021 07:01  -  20 Jul 2021 00:09  --------------------------------------------------------  IN: 0 mL / OUT: 150 mL / NET: -150 mL        LABS:	 	                        10.7   29.16 )-----------( 384      ( 19 Jul 2021 07:11 )             32.8     07-19    141  |  108  |  35<H>  ----------------------------<  132<H>  4.0   |  19<L>  |  1.62<H>    Ca    9.2      19 Jul 2021 07:11  Phos  3.6     07-19  Mg     2.5     07-19    TPro  7.6  /  Alb  4.5  /  TBili  0.2  /  DBili  x   /  AST  18  /  ALT  13  /  AlkPhos  100  07-18    < from: Echocardiogram w/ Bubble and Doppler (07.19.21 @ 14:06) >   1. The aortic valve is tricuspid and moderately calcified. There is moderate-to-severe aortic stenosis. The peak transvalvular velocity is 3.50 m/s, the mean transvalvular gradient is 29.00 mmHg, and the LVOT/AV velocity integral ratio is 0.29. The peak transaortic gradient is 49.00 mmHg. The aortic valve area (estimated via the continuity method) is 0.90 cm². There is mild-to-moderate aortic regurgitation.   2. Mild mitral regurgitation.   3. No other significant valvular disease.   4. The right ventricle is normal in size. Right ventricular systolic function is normal.   5. Left ventricular hypertrophy present. The left ventricle is normal in size and systolic function with a calculated ejection fraction of 70-75%.   6. No pericardial effusion.    < end of copied text >      ECG - sinus rhythm 1st degree AVB (GA 300ms) HPI: 89y Male with PMHx of HTN and seizure on phenytoin presents with left facial droop and slurred speech. Pt was with family member (great-nephew Mesh [phone]) who noticed at 5pm, pt had acute onset of slurred speech, increased saliva production and a left facial droop. CTH negative for acute hemorrhage. CTP without any perfusion defect. CTA without LVO. Decision was made to not give tpa since family felt his current deficits are non-disabling to his daily life vs risk of bleeding. as part of stroke workup the patient underwent an echocardiogram which showed mod-sev AS/AR prompting a cardiology consult. At bedside patient and family report that he ambulates mostly without assistance and denies chest pain or syncope but does occasionally have dyspnea on exertion. we discussed the implications of AS/AR and potential options for improving symptoms.       SOCIAL HISTORY:   Patient lives with wife in apartment.  Smoking status: denies  Drinking: denies  Drug Use: denies    ROS:   Constitutional: No fever, weight loss or fatigue  Eyes: No eye pain, visual disturbances, or discharge  ENMT:  hearing loss bilaterally  Neck: No pain or stiffness  Respiratory: cough with sputum production x days  Cardiovascular: No chest pain, palpitations, shortness of breath, dizziness or leg swelling  Gastrointestinal: No abdominal pain. No nausea, vomiting or hematemesis; No diarrhea or constipation. Nohematochezia.  Neurological: As per HPI    T(C): 36.9 (07-18-21 @ 23:53), Max: 37.1 (07-18-21 @ 19:32)  HR: 92 (07-18-21 @ 23:53) (84 - 92)  BP: 142/78 (07-18-21 @ 23:53) (123/63 - 184/75)  RR: 18 (07-18-21 @ 23:53) (16 - 18)  SpO2: 97% (07-18-21 @ 23:53) (96% - 99%)    MEDICATION RECONCILIATION   MEDICATIONS  (STANDING):  aspirin enteric coated 81 milliGRAM(s) Oral daily  atorvastatin 80 milliGRAM(s) Oral at bedtime  clopidogrel Tablet 75 milliGRAM(s) Oral daily  heparin   Injectable 5000 Unit(s) SubCutaneous every 8 hours  phenytoin   Capsule 100 milliGRAM(s) Oral three times a day    MEDICATIONS  (PRN):    Allergies    hay fever (Unknown)  No Known Drug Allergies    Intolerances      Vital Signs Last 24 Hrs  T(C): 36.9 (18 Jul 2021 23:53), Max: 37.1 (18 Jul 2021 19:32)  T(F): 98.4 (18 Jul 2021 23:53), Max: 98.8 (18 Jul 2021 19:51)  HR: 92 (18 Jul 2021 23:53) (84 - 92)  BP: 142/78 (18 Jul 2021 23:53) (123/63 - 184/75)  BP(mean): --  RR: 18 (18 Jul 2021 23:53) (16 - 18)  SpO2: 97% (18 Jul 2021 23:53) (96% - 99%)    Physical exam:  NAD, sitting upright  facial drpp noted  Neck supple, no JVD  heart s1+ 3/6 systolic cresc-decresc murmur with diminished S2  Lungs CTAB  ext wwp no edema          Fingerstick Blood Glucose: CAPILLARY BLOOD GLUCOSE  161 (18 Jul 2021 20:36)      POCT Blood Glucose.: 161 mg/dL (18 Jul 2021 19:27)    LABS:                        10.7   32.39 )-----------( 413      ( 18 Jul 2021 20:01 )             32.6     07-18    141  |  108  |  39<H>  ----------------------------<  141<H>  4.7   |  23  |  1.76<H>    Ca    9.6      18 Jul 2021 20:01    TPro  7.6  /  Alb  4.5  /  TBili  0.2  /  DBili  x   /  AST  18  /  ALT  13  /  AlkPhos  100  07-18    PT/INR - ( 18 Jul 2021 20:01 )   PT: 12.3 sec;   INR: 1.03          PTT - ( 18 Jul 2021 20:01 )  PTT:32.7 sec          RADIOLOGY & ADDITIONAL STUDIES:    HCT: No acute intracranial hemorrhage or transcortical infarct.    CTA:1.  No large vessel occlusion or high-grade stenosis within the head and neck.  2.  Incidentally noted 1.8 cm right thyroid nodule. Consider nonemergent dedicated thyroid ultrasound is for further characterization.     (19 Jul 2021 00:25)      ROS: A 10-point review of systems was otherwise negative.    PAST MEDICAL & SURGICAL HISTORY:  Hypertension    Seizure      SOCIAL HISTORY:  FAMILY HISTORY:    ALLERGIES: 	  hay fever (Unknown)  No Known Drug Allergies          MEDICATIONS:  aspirin enteric coated 81 milliGRAM(s) Oral daily  atorvastatin 80 milliGRAM(s) Oral at bedtime  clopidogrel Tablet 75 milliGRAM(s) Oral daily  guaiFENesin Oral Liquid (Sugar-Free) 200 milliGRAM(s) Oral every 6 hours  heparin   Injectable 5000 Unit(s) SubCutaneous every 8 hours  levETIRAcetam 500 milliGRAM(s) Oral two times a day  sodium chloride 3%  Inhalation 4 milliLiter(s) Inhalation every 6 hours PRN      PHYSICAL EXAM:  T(C): 36.8 (07-19-21 @ 21:30), Max: 37.1 (07-19-21 @ 13:45)  HR: 86 (07-19-21 @ 23:30) (66 - 106)  BP: 151/69 (07-19-21 @ 23:30) (123/58 - 176/74)  RR: 18 (07-19-21 @ 23:30) (16 - 20)  SpO2: 94% (07-19-21 @ 23:30) (94% - 98%)  Wt(kg): --    GEN: Awake, comfortable. NAD.   HEENT: NCAT, PERRL, EOMI. Mucosa moist. No JVD.   RESP: CTA b/l  CV: RRR, normal s1/s2. No m/r/g.  ABD: Soft, NTND. BS+  EXT: Warm. No edema, clubbing, or cyanosis.   NEURO: AAOx3. No focal deficits.    I&O's Summary    18 Jul 2021 07:01  -  19 Jul 2021 07:00  --------------------------------------------------------  IN: 150 mL / OUT: 325 mL / NET: -175 mL    19 Jul 2021 07:01  -  20 Jul 2021 00:09  --------------------------------------------------------  IN: 0 mL / OUT: 150 mL / NET: -150 mL        LABS:	 	                        10.7   29.16 )-----------( 384      ( 19 Jul 2021 07:11 )             32.8     07-19    141  |  108  |  35<H>  ----------------------------<  132<H>  4.0   |  19<L>  |  1.62<H>    Ca    9.2      19 Jul 2021 07:11  Phos  3.6     07-19  Mg     2.5     07-19    TPro  7.6  /  Alb  4.5  /  TBili  0.2  /  DBili  x   /  AST  18  /  ALT  13  /  AlkPhos  100  07-18    < from: Echocardiogram w/ Bubble and Doppler (07.19.21 @ 14:06) >   1. The aortic valve is tricuspid and moderately calcified. There is moderate-to-severe aortic stenosis. The peak transvalvular velocity is 3.50 m/s, the mean transvalvular gradient is 29.00 mmHg, and the LVOT/AV velocity integral ratio is 0.29. The peak transaortic gradient is 49.00 mmHg. The aortic valve area (estimated via the continuity method) is 0.90 cm². There is mild-to-moderate aortic regurgitation.   2. Mild mitral regurgitation.   3. No other significant valvular disease.   4. The right ventricle is normal in size. Right ventricular systolic function is normal.   5. Left ventricular hypertrophy present. The left ventricle is normal in size and systolic function with a calculated ejection fraction of 70-75%.   6. No pericardial effusion.    < end of copied text >      ECG - sinus rhythm 1st degree AVB (PA 300ms)

## 2021-07-20 NOTE — PROGRESS NOTE ADULT - SUBJECTIVE AND OBJECTIVE BOX
Neurology Stroke Progress Note    INTERVAL HPI/OVERNIGHT EVENTS:  No events overnight. Patient with excessive secretion s/p inhaled saline. Remains neurologically stable. VS stable.   Patient seen and examine today at bedside He still complains of cough, with productive sputum.  His neuro exam remains stable,  with dysarthria, mild left facial droop.    MEDICATIONS  (STANDING):  aspirin enteric coated 81 milliGRAM(s) Oral daily  atorvastatin 80 milliGRAM(s) Oral at bedtime  clopidogrel Tablet 75 milliGRAM(s) Oral daily  guaiFENesin Oral Liquid (Sugar-Free) 200 milliGRAM(s) Oral every 6 hours  heparin   Injectable 5000 Unit(s) SubCutaneous every 8 hours  levETIRAcetam 500 milliGRAM(s) Oral two times a day    MEDICATIONS  (PRN):  sodium chloride 3%  Inhalation 4 milliLiter(s) Inhalation every 6 hours PRN productive cough      Allergies    hay fever (Unknown)  No Known Drug Allergies    Intolerances        Vital Signs Last 24 Hrs  T(C): 37.2 (2021 05:01), Max: 37.2 (2021 05:01)  T(F): 98.9 (2021 05:01), Max: 98.9 (2021 05:01)  HR: 76 (2021 04:02) (66 - 86)  BP: 134/61 (2021 04:02) (123/58 - 176/74)  BP(mean): 88 (2021 04:02) (84 - 117)  RR: 18 (2021 04:02) (16 - 20)  SpO2: 96% (2021 04:02) (94% - 98%)    Physical exam:    -Mental status: Awake, alert, oriented to person, place, and time. Speech is fluent with intact naming (able to name hammock, chair, key, pen, finger phone, scissor) , repetition, and comprehension, mild dysarthria. Recent and remote memory intact. Follows commands. Attention/concentration intact.   -Cranial nerves:   II: Visual fields are full to confrontation.  III, IV, VI: Extraocular movements are intact without nystagmus. Pupils equally round and reactive to light  V:  Facial sensation V1-V3 equal and intact   VII: Left facial droop on activation   VIII: Hearing is grossly intact bilaterally  Motor: Able to maintain AG along all extremities without pronator drift. Strength RUE 4/5, LUE 5/5, RLE 4/5, LUE 5/5. Baseline right arm tremor.  Sensation: Intact to light touch bilaterally.   Coordination: No dysmetria on finger-to-nose and heel-to-shin bilaterally  Gait: Narrow gait, slow, with leaning to the left  NIHSS 2    General: No acute distress, awake and alert, hard hearing   Eyes: Anicteric sclerae, moist conjunctivae, see below for CNs  Neck: trachea midline, FROM, supple, no thyromegaly or lymphadenopathy  Cardiovascular: Significant systolic murmur  Pulmonary: Anterior breath sounds clear bilaterally, no crackles or wheezing. No use of accessory muscles  GI: Abdomen soft, non-distended, non-tender  Extremities: Radial and DP pulses +2, no edema    LABS:                        10.4   33.71 )-----------( 397      ( 2021 05:50 )             32.3     07-20    144  |  109<H>  |  33<H>  ----------------------------<  85  4.5   |  21<L>  |  1.65<H>    Ca    9.0      2021 05:50  Phos  4.6     07-20  Mg     2.4     07-20    TPro  7.6  /  Alb  4.5  /  TBili  0.2  /  DBili  x   /  AST  18  /  ALT  13  /  AlkPhos  100  07-18    PT/INR - ( 2021 20:01 )   PT: 12.3 sec;   INR: 1.03          PTT - ( 2021 20:01 )  PTT:32.7 sec  Urinalysis Basic - ( 2021 01:05 )    Color: Yellow / Appearance: Clear / S.025 / pH: x  Gluc: x / Ketone: NEGATIVE  / Bili: Negative / Urobili: 0.2 E.U./dL   Blood: x / Protein: Trace mg/dL / Nitrite: NEGATIVE   Leuk Esterase: NEGATIVE / RBC: < 5 /HPF / WBC < 5 /HPF   Sq Epi: x / Non Sq Epi: 0-5 /HPF / Bacteria: Present /HPF        RADIOLOGY & ADDITIONAL TESTS:     Neurology Stroke Progress Note    INTERVAL HPI/OVERNIGHT EVENTS:  No events overnight. Patient with excessive secretion s/p inhaled saline. Remains neurologically stable. VS stable.   Patient seen and examine today at bedside He still complains of cough, with productive sputum.  His neuro exam remains stable,  with dysarthria, mild left facial droop.    MEDICATIONS  (STANDING):  aspirin enteric coated 81 milliGRAM(s) Oral daily  atorvastatin 80 milliGRAM(s) Oral at bedtime  clopidogrel Tablet 75 milliGRAM(s) Oral daily  guaiFENesin Oral Liquid (Sugar-Free) 200 milliGRAM(s) Oral every 6 hours  heparin   Injectable 5000 Unit(s) SubCutaneous every 8 hours  levETIRAcetam 500 milliGRAM(s) Oral two times a day    MEDICATIONS  (PRN):  sodium chloride 3%  Inhalation 4 milliLiter(s) Inhalation every 6 hours PRN productive cough      Allergies    hay fever (Unknown)  No Known Drug Allergies    Intolerances        Vital Signs Last 24 Hrs  T(C): 37.2 (2021 05:01), Max: 37.2 (2021 05:01)  T(F): 98.9 (2021 05:01), Max: 98.9 (2021 05:01)  HR: 76 (2021 04:02) (66 - 86)  BP: 134/61 (2021 04:02) (123/58 - 176/74)  BP(mean): 88 (2021 04:02) (84 - 117)  RR: 18 (2021 04:02) (16 - 20)  SpO2: 96% (2021 04:02) (94% - 98%)    Physical exam:    -Mental status: Awake, alert, oriented to person, place, and time. Speech is fluent with intact naming (able to name hammock, chair, key, pen, finger phone, scissor) , repetition, and comprehension, mild dysarthria. Recent and remote memory intact. Follows commands. Attention/concentration intact.   -Cranial nerves:   II: Visual fields are full to confrontation.  III, IV, VI: Extraocular movements are intact without nystagmus. Pupils equally round and reactive to light  V:  Facial sensation V1-V3 equal and intact   VII: Left facial droop on activation   VIII: Hearing is grossly intact bilaterally  Motor: Able to maintain AG along all extremities without pronator drift. Strength RUE 4/5, LUE 5/5, RLE 4/5, LUE 5/5. Baseline right arm tremor.  Sensation: Intact to light touch bilaterally.   Coordination: No dysmetria on finger-to-nose and heel-to-shin bilaterally  Gait: Narrow gait, slow, with leaning to the left  NIHSS 2    General: No acute distress, awake and alert, hard hearing   Eyes: Anicteric sclerae, moist conjunctivae, see below for CNs  Neck: trachea midline, FROM, supple, no thyromegaly or lymphadenopathy  Cardiovascular: Significant systolic murmur  Pulmonary: Anterior breath sounds clear bilaterally, no crackles or wheezing. No use of accessory muscles  GI: Abdomen soft, non-distended, non-tender  Extremities: Radial and DP pulses +2, no edema    LABS:                        10.4   33.71 )-----------( 397      ( 2021 05:50 )             32.3     07-20    144  |  109<H>  |  33<H>  ----------------------------<  85  4.5   |  21<L>  |  1.65<H>    Ca    9.0      2021 05:50  Phos  4.6     07-20  Mg     2.4     07-20    TPro  7.6  /  Alb  4.5  /  TBili  0.2  /  DBili  x   /  AST  18  /  ALT  13  /  AlkPhos  100  07-18    PT/INR - ( 2021 20:01 )   PT: 12.3 sec;   INR: 1.03          PTT - ( 2021 20:01 )  PTT:32.7 sec  Urinalysis Basic - ( 2021 01:05 )    Color: Yellow / Appearance: Clear / S.025 / pH: x  Gluc: x / Ketone: NEGATIVE  / Bili: Negative / Urobili: 0.2 E.U./dL   Blood: x / Protein: Trace mg/dL / Nitrite: NEGATIVE   Leuk Esterase: NEGATIVE / RBC: < 5 /HPF / WBC < 5 /HPF   Sq Epi: x / Non Sq Epi: 0-5 /HPF / Bacteria: Present /HPF        RADIOLOGY & ADDITIONAL TESTS:    MR Head No Cont (21 @ 23:05) >  No acute intracranial abnormality. No evidence of acute infarct.    MR Head No Cont (21 @ 23:05) >  Near complete opacification of the bilateral frontal sinuses, left anterior ethmoid air cells, and left maxillary sinus. Minimal opacification of the left mastoid air cells.

## 2021-07-20 NOTE — PROGRESS NOTE ADULT - ASSESSMENT
89y Male with PMHx of HTN and seizure on phenytoin presents with left facial droop and slurred speech, LKW 5pm 7/18/21, NIHSS 3. CTH negative for acute hemorrhage. CTP without any perfusion defect. CTA without LVO.  Patient was within the window for tpa, but pt and family (wife and great-nephew) declined as they felt his current deficits are non-disabling to his daily life vs risk of bleeding. Pt to be admitted to stroke service for stroke w/u.    Neuro  #CVA workup  - s/p ASA and plavix load in ED  - continue aspirin 81mg and plavix 75mg daily  - continue atorvastatin 80mg daily  - q4hr stroke neuro checks and vitals  - obtain MRI Brain without contrast pending read  - Stroke Code HCT Results: No acute intracranial hemorrhage or transcortical infarct.  - Stroke Code CTA Results: No LVO nor high grade stenosis. Incidental 1.8cm thyroid nodule.  - Stroke education  - BECKY withheld for now for high aspiration risk  - TTE mod-severe AS, EF 70%, grade 1 diastolic dysfunction      #Seizure  - phenytoin level 4.1 (7/18/21)  - D/c home phenytoin 100mg TID  - Start Keppra 500 mg q 12h    Cards  #HTN  - permissive hypertension, Goal -180  - Keep amlodipine on hold  - TTE  mod-severe AS, EF 70%, grade 1 diastolic dysfunction  - EKG 1st degree AVB  - Cardio c/s-->structural heart disease for AS management      #HLD  - high dose statin as above in CVA  - LDL results: 137    Renal  #EVARISTO on top of CKD  - elevated BUN/Cre with GFR 33  - trend creatinine  - avoid nephrotoxic drugs    Infectious Disease  - Leukocytosis on admission 32k-->29k-->33K lactate 1.1 (7/18/21)  - Focus unclear UTI vs PNA likely aspiration/ sinusitis  - s/p vanc and zosyn x1 in ED  - Blood cx, urine cx negative. u/s neg  - Might need to be started on antibiotics for sinusitis/ aspiration PNA  - If no clear source then might need CT chest given cough    Endocrine  - A1C results: 5.6    #hypothyroidism   - TSH results: 5.56  - f/u FT4 2    Pulm  - call provider if SPO2 < 94%    GI  #Nutrition/Fluids/Electrolytes   - replete K<4 and Mg <2  - Diet: DASH  - IVF: none      Heme  #anemia  -f/u iron panel     DVT Prophylaxis  - heparin sq for DVT prophylaxis   - SCDs for DVT prophylaxis     IDR Goals: Goals reviewed at interdisciplinary rounds with case management, social work, physical therapy, occupational therapy, and speech language pathology.   Please see specific therapy  notes for in depth goals.  Dispo: pending PT/OT assessment      Discussed daily hospital plans and goals with patient and wife at bedside 89y Male with PMHx of HTN and seizure on phenytoin presents with left facial droop and slurred speech, LKW 5pm 7/18/21, NIHSS 3. CTH negative for acute hemorrhage. CTP without any perfusion defect. CTA without LVO.  Patient was within the window for tpa, but pt and family (wife and great-nephew) declined as they felt his current deficits are non-disabling to his daily life vs risk of bleeding. Pt to be admitted to stroke service for stroke w/u.    Neuro  #CVA workup  - s/p ASA and plavix load in ED  - continue aspirin 81mg and plavix 75mg daily  - continue atorvastatin 80mg daily  - q4hr stroke neuro checks and vitals  - MRI negative for acute ischemic changes  - Stroke Code HCT Results: No acute intracranial hemorrhage or transcortical infarct.  - Stroke Code CTA Results: No LVO nor high grade stenosis. Incidental 1.8cm thyroid nodule.  - Stroke education  - BECKY withheld for now for high aspiration risk  - TTE mod-severe AS, EF 70%, grade 1 diastolic dysfunction      #Seizure  - phenytoin level 4.1 (7/18/21)  - D/c home phenytoin 100mg TID  - Start Keppra 500 mg q 12h    Cards  #HTN  - permissive hypertension, Goal -180  - Keep amlodipine on hold  - TTE  mod-severe AS, EF 70%, grade 1 diastolic dysfunction  - EKG 1st degree AVB  - Cardio c/s-->structural heart disease for AS management      #HLD  - high dose statin as above in CVA  - LDL results: 137    Renal  #EVARISTO on top of CKD  - elevated BUN/Cre with GFR 33  - trend creatinine  - avoid nephrotoxic drugs    Infectious Disease  - Leukocytosis on admission 32k-->29k-->33K lactate 1.1 (7/18/21)  - Focus unclear UTI vs PNA likely aspiration/ sinusitis  - s/p vanc and zosyn x1 in ED  - Blood cx, urine cx negative. u/s neg  - Might need to be started on antibiotics for sinusitis/ aspiration PNA  - MRI complete opacification  of frontal sinuses, left maxillary/ ethmoid  - If no clear source then might need CT chest given cough    Endocrine  - A1C results: 5.6    #hypothyroidism   - TSH results: 5.56  - f/u FT4 2    Pulm  - call provider if SPO2 < 94%    GI  #Nutrition/Fluids/Electrolytes   - replete K<4 and Mg <2  - Diet: DASH  - IVF: none      Heme  #anemia  -f/u iron panel     DVT Prophylaxis  - heparin sq for DVT prophylaxis   - SCDs for DVT prophylaxis     IDR Goals: Goals reviewed at interdisciplinary rounds with case management, social work, physical therapy, occupational therapy, and speech language pathology.   Please see specific therapy  notes for in depth goals.  Dispo: pending PT/OT assessment      Discussed daily hospital plans and goals with patient and wife at bedside 89y Male with PMHx of HTN and seizure on phenytoin presents with left facial droop and slurred speech, LKW 5pm 7/18/21, NIHSS 3. CTH negative for acute hemorrhage. CTP without any perfusion defect. CTA without LVO.  Patient was within the window for tpa, but pt and family (wife and great-nephew) declined as they felt his current deficits are non-disabling to his daily life vs risk of bleeding. Pt to be admitted to stroke service for stroke w/u.    Neuro  #CVA workup  - s/p ASA and plavix load in ED  - continue aspirin 81mg   - d/c plavix 75mg daily  - atorvastatin 40 mg daily  - d/c q4hr stroke neuro checks  - MRI negative for acute ischemic changes  - Stroke Code HCT Results: No acute intracranial hemorrhage or transcortical infarct.  - Stroke Code CTA Results: No LVO nor high grade stenosis. Incidental 1.8cm thyroid nodule.  - Stroke education  - BECKY withheld for now for high aspiration risk  - TTE mod-severe AS, EF 70%, grade 1 diastolic dysfunction      #Seizure  - phenytoin level 4.1 (7/18/21)  - D/c home phenytoin 100mg TID  - Start Keppra 500 mg q 12h    Cards  #HTN  - permissive hypertension, Goal -180  - Keep amlodipine on hold  - TTE  mod-severe AS, EF 70%, grade 1 diastolic dysfunction  - EKG 1st degree AVB  - Cardio c/s-->structural heart disease for AS management (informed)      #HLD  - high dose statin as above in CVA  - LDL results: 137    Renal  #EVARISTO on top of CKD  - elevated BUN/Cre with GFR 33  - trend creatinine  - avoid nephrotoxic drugs    Infectious Disease  - Leukocytosis on admission 32k-->29k-->33K lactate 1.1 (7/18/21)  - Focus unclear UTI vs PNA likely aspiration/ sinusitis  - s/p vanc and zosyn x1 in ED  - Blood cx, urine cx negative. u/s neg  - Might need to be started on antibiotics for sinusitis/ aspiration PNA  - MRI complete opacification  of frontal sinuses, left maxillary/ ethmoid  - If no clear source then might need CT chest given cough    Endocrine  - A1C results: 5.6    #hypothyroidism   - TSH results: 5.56  - f/u FT4 2    Pulm  - call provider if SPO2 < 94%    GI  #Nutrition/Fluids/Electrolytes   - replete K<4 and Mg <2  - Diet: DASH  - IVF: none      Heme  #anemia  -f/u iron panel     DVT Prophylaxis  - heparin sq for DVT prophylaxis   - SCDs for DVT prophylaxis     IDR Goals: Goals reviewed at interdisciplinary rounds with case management, social work, physical therapy, occupational therapy, and speech language pathology.   Please see specific therapy  notes for in depth goals.  Dispo: Acute rehab  Transfer patient to medicine service     Discussed daily hospital plans and goals with patient and wife at bedside 89y Male with PMHx of HTN and seizure on phenytoin presents with left facial droop and slurred speech, LKW 5pm 7/18/21, NIHSS 3. CTH negative for acute hemorrhage. CTP without any perfusion defect. CTA without LVO.  Patient was within the window for tpa, but pt and family (wife and great-nephew) declined as they felt his current deficits are non-disabling to his daily life vs risk of bleeding. Pt to be admitted to stroke service for stroke w/u.   CTH /CTA/ CTP negative, MRI negative for acute ischemic stroke, CSF done given his weakness, ruling out GBS or MF. Patient with high aspiration risk, persistent leukocytosis, oncology on board. Will be transferred to [facility] for further workup.    Neuro  #CVA workup-Negative for acute stroke  - s/p ASA and Plavix load in ED  - continue aspirin 81mg   - d/c plavix 75mg daily  - atorvastatin 40 mg daily  - d/c q4hr stroke neuro checks  - MRI negative for acute ischemic changes  - Stroke Code HCT Results: No acute intracranial hemorrhage or transcortical infarct.  - Stroke Code CTA Results: No LVO nor high grade stenosis. Incidental 1.8cm thyroid nodule.  - BECKY withheld for now for high aspiration risk  - TTE mod-severe AS, EF 70%, grade 1 diastolic dysfunction  - CSF done today ruling out GBS, MF    #Seizure  - phenytoin level 4.1 (7/18/21)  - D/c home phenytoin 100mg TID  - Start Keppra 500 mg q 12h    Cards  #HTN  - permissive hypertension, Goal -180  - Keep amlodipine on hold  - TTE  mod-severe AS, EF 70%, grade 1 diastolic dysfunction  - EKG 1st degree AVB  - Cardio c/s-->structural heart disease for AS management (informed)      #HLD  - high dose statin as above in CVA  - LDL results: 137    Renal  #EVARISTO on top of CKD  - elevated BUN/Cre with GFR 33  - trend creatinine  - avoid nephrotoxic drugs    Infectious Disease  - Leukocytosis on admission 32k-->29k-->33K lactate 1.1 (7/18/21)  - Focus unclear UTI vs PNA likely aspiration/ sinusitis  - s/p vanc and zosyn x1 in ED  - Blood cx, urine cx negative. u/s neg  - MRI complete opacification  of frontal sinuses, left maxillary/ ethmoid  - started on Unasyn 7/20    Hematology  -Persistent leukocytosis: reactive vs secondary to underlying malignancy  -Oncology c/s: iron studies, folic acid, b12, LDH, Peripheral smear, free light chain, SPEP, immunofixation, PCR BCR-ABL, JK 2 mutation    Endocrine  - A1C results: 5.6    #hypothyroidism   - TSH results: 5.56  - f/u FT4 2    Pulm  - call provider if SPO2 < 94%    GI  #Nutrition/Fluids/Electrolytes   - replete K<4 and Mg <2  - Diet: DASH  - IVF: none    DVT Prophylaxis  - heparin sq for DVT prophylaxis   - SCDs for DVT prophylaxis     IDR Goals: Goals reviewed at interdisciplinary rounds with case management, social work, physical therapy, occupational therapy, and speech language pathology.   Please see specific therapy  notes for in depth goals.  Dispo: Acute fo now  Transfer patient to medicine service     Discussed daily hospital plans and goals with patient and wife at bedside 89y Male with PMHx of HTN and seizure on phenytoin presents with left facial droop and slurred speech, LKW 5pm 7/18/21, NIHSS 3. CTH negative for acute hemorrhage. CTP without any perfusion defect. CTA without LVO.  Patient was within the window for tpa, but pt and family (wife and great-nephew) declined as they felt his current deficits are non-disabling to his daily life vs risk of bleeding. Pt to be admitted to stroke service for stroke w/u.   CTH /CTA/ CTP negative, MRI negative for acute ischemic stroke, CSF done given his weakness, ruling out GBS or MF. Patient with high aspiration risk, persistent leukocytosis, oncology on board. Will be transferred to [facility] for further workup.    Neuro  #CVA workup-Negative for acute stroke  - s/p ASA and Plavix load in ED  - continue aspirin 81mg   - d/c plavix 75mg daily  - atorvastatin 40 mg daily  - d/c q4hr stroke neuro checks  - MRI negative for acute ischemic changes  - Stroke Code HCT Results: No acute intracranial hemorrhage or transcortical infarct.  - Stroke Code CTA Results: No LVO nor high grade stenosis. Incidental 1.8cm thyroid nodule.  - BECKY withheld for now for high aspiration risk  - TTE mod-severe AS, EF 70%, grade 1 diastolic dysfunction  - CSF done today ruling out GBS, MF    #Seizure  - phenytoin level 4.1 (7/18/21)  - D/c home phenytoin 100mg TID  - Start Keppra 500 mg q 12h    Cards  #HTN  - permissive hypertension, Goal -180  - Keep amlodipine on hold  - TTE  mod-severe AS, EF 70%, grade 1 diastolic dysfunction  - EKG 1st degree AVB  - Cardio c/s-->structural heart disease for AS management (informed)      #HLD  - high dose statin as above in CVA  - LDL results: 137    Renal  #EVARISTO on top of CKD  - elevated BUN/Cre with GFR 33  - trend creatinine  - avoid nephrotoxic drugs    Infectious Disease  - Leukocytosis on admission 32k-->29k-->33K lactate 1.1 (7/18/21)  - Focus unclear UTI vs PNA likely aspiration/ sinusitis  - s/p vanc and zosyn x1 in ED  - Blood cx, urine cx negative. u/s neg  - MRI complete opacification  of frontal sinuses, left maxillary/ ethmoid  - started on Unasyn 7/20    Hematology  -Persistent leukocytosis: reactive vs secondary to underlying malignancy  -Oncology c/s: iron studies, folic acid, b12, LDH, Peripheral smear, free light chain, SPEP, immunofixation, PCR BCR-ABL, JK 2 mutation, HIV    Endocrine  - A1C results: 5.6    #hypothyroidism   - TSH results: 5.56  - f/u FT4 2    Pulm  - call provider if SPO2 < 94%    GI  #Nutrition/Fluids/Electrolytes   - replete K<4 and Mg <2  - Diet: DASH  - IVF: none    DVT Prophylaxis  - heparin sq for DVT prophylaxis   - SCDs for DVT prophylaxis     IDR Goals: Goals reviewed at interdisciplinary rounds with case management, social work, physical therapy, occupational therapy, and speech language pathology.   Please see specific therapy  notes for in depth goals.  Dispo: Acute fo now  Transfer patient to medicine service (General Neurology service will evaluate the patient for dysphagia-discussed with Dr. Beach)     Discussed daily hospital plans and goals with patient and wife at bedside 89y Male with PMHx of HTN and seizure on phenytoin presents with left facial droop and slurred speech, LKW 5pm 7/18/21, NIHSS 3. CTH negative for acute hemorrhage. CTP without any perfusion defect. CTA without LVO.  Patient was within the window for tpa, but pt and family (wife and great-nephew) declined as they felt his current deficits are non-disabling to his daily life vs risk of bleeding. Pt to be admitted to stroke service for stroke w/u.   CTH /CTA/ CTP negative, MRI negative for acute ischemic stroke, CSF done given his weakness, ruling out GBS or MF. Patient with high aspiration risk, persistent leukocytosis, oncology on board. Will be transferred to [facility] for further workup.    Neuro  #CVA workup-Negative for acute stroke  - s/p ASA and Plavix load in ED  - continue aspirin 81mg   - d/c plavix 75mg daily  - atorvastatin 40 mg daily  - d/c q4hr stroke neuro checks  - MRI negative for acute ischemic changes  - Stroke Code HCT Results: No acute intracranial hemorrhage or transcortical infarct.  - Stroke Code CTA Results: No LVO nor high grade stenosis. Incidental 1.8cm thyroid nodule.  - BECKY withheld for now for high aspiration risk  - TTE mod-severe AS, EF 70%, grade 1 diastolic dysfunction  - CSF done today ruling out GBS, MF  - Follow CSF studies  - Follow up Myasthenia panel    #Seizure  - phenytoin level 4.1 (7/18/21)  - D/c home phenytoin 100mg TID  - Start Keppra 500 mg q 12h    Cards  #HTN  -goal -140  - Start amlodipine 5 md if BP >140  - TTE  mod-severe AS, EF 70%, grade 1 diastolic dysfunction  - EKG 1st degree AVB  - Cardio c/s-->structural heart disease for AS management (informed)      #HLD  - high dose statin as above in CVA  - LDL results: 137    Renal  #EVARISTO on top of CKD  - elevated BUN/Cre with GFR 33  - trend creatinine  - avoid nephrotoxic drugs    Infectious Disease  - Leukocytosis on admission 32k-->29k-->33K lactate 1.1 (7/18/21)  - Focus unclear UTI vs PNA likely aspiration/ sinusitis  - s/p vanc and zosyn x1 in ED  - Blood cx, urine cx negative. u/s neg  - MRI complete opacification  of frontal sinuses, left maxillary/ ethmoid  - started on Unasyn 7/20    Hematology  -Persistent leukocytosis: reactive vs secondary to underlying malignancy  -Oncology c/s: iron studies, folic acid, b12, LDH, Peripheral smear, free light chain, SPEP, immunofixation, PCR BCR-ABL, JK 2 mutation, HIV    Endocrine  - A1C results: 5.6    #hypothyroidism   - TSH results: 5.56  - f/u FT4 2    Pulm  - call provider if SPO2 < 94%    GI  #Nutrition/Fluids/Electrolytes   - replete K<4 and Mg <2  - Diet: DASH  - IVF: none    DVT Prophylaxis  - heparin sq for DVT prophylaxis   - SCDs for DVT prophylaxis     IDR Goals: Goals reviewed at interdisciplinary rounds with case management, social work, physical therapy, occupational therapy, and speech language pathology.   Please see specific therapy  notes for in depth goals.  Dispo: Acute fo now  Transfer patient to medicine service (General Neurology service will evaluate the patient for dysphagia-discussed with Dr. Beach)     Discussed daily hospital plans and goals with patient and wife at bedside

## 2021-07-20 NOTE — CONSULT NOTE ADULT - ASSESSMENT
89y Male with PMHx of HTN and seizure on phenytoin presents with left facial droop and slurred speech. Pt was with family member and noted to have acute onset slurred speech, increased saliva production and a left facial droop. CTH negative for acute hemorrhage. CTP without any perfusion defect,CTA without LVO. Decision was made to not give tpa since family felt his current deficits are non-disabling to his daily life vs risk of bleeding as part of stroke workup the patient underwent an echocardiogram which showed mod-sev AS/AR prompting a cardiology consult      #Severe Aortic Stenosis-  -Stage C1 based on severe AS w/out sxs  -Wife would not like invasive procedures for him at this time  -Followup with:                  Pending discussion w/structural interventionalist Dr. Santoro   Thank you for the consultation, reconsult PRN  89y Male with PMHx of HTN and seizure on phenytoin presents with left facial droop and slurred speech. Pt was with family member and noted to have acute onset slurred speech, increased saliva production and a left facial droop. CTH negative for acute hemorrhage. CTP without any perfusion defect,CTA without LVO. Decision was made to not give tpa since family felt his current deficits are non-disabling to his daily life vs risk of bleeding as part of stroke workup the patient underwent an echocardiogram which showed severe AS with strcutural cardiology consulted. However, per HPI, the pt does not exhibit any symptoms from severe AS (class C1).      Recommendations:     #Severe Aortic Stenosis- No sxs from severe AS per wife, TTE s/f normal LV fx w/EF 75%, LVH, mild MR, Aortic studies: PV 3.5 m/s, Peak transaortic gradient 49mmhg, MARCUS 0.9cm^2,  -Stage C1 based on severe AS w/out sxs  -Wife would not like invasive procedures for him at this time  -Followup with:          Pending discussion w/structural interventionalist Dr. Santoro   Thank you for the consultation, reconsult PRN  89y Male with PMHx of HTN and seizure on phenytoin presents with left facial droop and slurred speech. Pt was with family member and noted to have acute onset slurred speech, increased saliva production and a left facial droop. CTH negative for acute hemorrhage. CTP without any perfusion defect,CTA without LVO. Decision was made to not give tpa since family felt his current deficits are non-disabling to his daily life vs risk of bleeding. As part of stroke workup, the patient underwent an echocardiogram which showed severe AS with structural cardiology consulted. However, per HPI, the pt does not exhibit any symptoms from severe AS  rendering him class C1.      Recommendations:     #Severe Aortic Stenosis- No sxs from severe AS per wife, TTE s/f normal LV fx w/EF 75%, LVH, mild MR, Aortic assessment: PV 3.5 m/s, Peak transaortic gradient 49mmhg, MARCUS 0.9cm^2,  -Stage C1 based on severe AS w/out sxs  -Wife would not like invasive procedures for pt at this time  -Followup with:        Pending discussion w/structural interventionalist Dr. Santoro   Thank you for the consultation, reconsult PRN  89y Male with PMHx of HTN and seizure on phenytoin presents with left facial droop and slurred speech. Pt was with family member and noted to have acute onset slurred speech, increased saliva production and a left facial droop. CTH negative for acute hemorrhage. CTP without any perfusion defect,CTA without LVO. Decision was made to not give tpa since family felt his current deficits are non-disabling to his daily life vs risk of bleeding. As part of stroke workup, the patient underwent an echocardiogram which showed severe AS with structural cardiology consulted. However, per HPI, the pt does not exhibit any symptoms from severe AS  rendering him class C1.      Recommendations:     #Severe Aortic Stenosis- No sxs from severe AS per wife, TTE s/f moderate to severe AS  -Stage C1 based on severe AS w/out sxs  -Wife would not like invasive procedures for pt at this time  -Continue with medical management   -Can follow up in our valve clinic in [address] [place] [address] with Dr Jesus Santoro

## 2021-07-20 NOTE — SWALLOW FEES ASSESSMENT ADULT - UNSUCCESSFUL STRATEGIES TRIALED DURING PROCEDURE
chin tuck/nonproductive volitional cough following clinician cue/productive volitional cough following clinician cue

## 2021-07-20 NOTE — PROGRESS NOTE ADULT - ASSESSMENT
89y Male with PMHx of HTN and seizure on phenytoin presents with left facial droop and slurred speech, LKW 5pm 7/18/21, NIHSS 3. CTH negative for acute hemorrhage. CTP without any perfusion defect. CTA without LVO.  Patient was within the window for tpa, but pt and family (wife and great-nephew) declined as they felt his current deficits are non-disabling to his daily life vs risk of bleeding, now c/b leukocytosis and pending further stroke work up.    #Leukocytosis  -Persistent leukocytosis: reactive vs secondary to underlying malignancy  -MRI complete opacification  of frontal sinuses, left maxillary/ ethmoid  - started on Unasyn 7/20  -Oncology c/s: iron studies, folic acid, b12, LDH, Peripheral smear, free light chain, SPEP, immunofixation, PCR BCR-ABL, JK 2 mutation, HIV    #dysphagia   Pt failed speech and swallow and FEES   Palliative consult to eval for GOC as patient may need long term nutriton   D5 gtt at 30 cc for 12 hours, cautious use in setting of AS  Pending GOC, will need GI eval for PEG  Gen neuro to continue to follow for further eval    #Stroke R/O  - s/p ASA and Plavix load in ED  - continue aspirin 81mg   - d/c plavix 75mg daily  - atorvastatin 40 mg daily  - MRI negative for acute ischemic changes  - Stroke Code HCT Results: No acute intracranial hemorrhage or transcortical infarct.  - Stroke Code CTA Results: No LVO nor high grade stenosis. Incidental 1.8cm thyroid nodule.  - BECKY withheld for now for high aspiration risk  - TTE mod-severe AS, EF 70%, grade 1 diastolic dysfunction  - CSF done today ruling out GBS, MF    #Severe Aortic Stenosis  No sxs from severe AS per wife, TTE s/f normal LV fx w/EF 75%, LVH, mild MR  - CTSx consulted, wife is not interested in invasive procedure at this time, will follow up     #Seizure  - phenytoin level 4.1 (7/18/21)  - D/c home phenytoin 100mg TID  - CW Keppra 500 mg q 12h    #Anemia  -h/h stable  -f/u iron studies     #HTN  -holding home norvasc- benazapril for permissive htn   -resume as needed    #EVARISTO on ?CKD  -creatinine improving  -monitor I&O  -avoid nephrotoxins    Dispo: Can transfer to [facility], neuro to follow   For acute rehab once DC summary 89y Male with PMHx of HTN and seizure on phenytoin presents with left facial droop and slurred speech, LKW 5pm 7/18/21, NIHSS 3. CTH negative for acute hemorrhage. CTP without any perfusion defect. CTA without LVO.  Patient was within the window for tpa, but pt and family (wife and great-nephew) declined as they felt his current deficits are non-disabling to his daily life vs risk of bleeding, now c/b leukocytosis and pending further stroke work up.    #Leukocytosis  -Persistent leukocytosis: reactive vs secondary to underlying malignancy  -MRI complete opacification  of frontal sinuses, left maxillary/ ethmoid, in setting of persistent leukocytosis, possible sinusitis and possible aspiration PNA will start Unasyn 7/20  -Oncology c/s: iron studies, folic acid, b12, LDH, Peripheral smear, free light chain, SPEP, immunofixation, PCR BCR-ABL, JK 2 mutation, HIV    #dysphagia   Pt failed speech and swallow and FEES   Palliative consult to eval for GOC as patient may need long term nutriton   D5 gtt at 30 cc for 12 hours, cautious use in setting of AS  Pending GOC, will need GI eval for PEG  Gen neuro to continue to follow for further eval    #Stroke R/O  - s/p ASA and Plavix load in ED  - continue aspirin 81mg   - d/c plavix 75mg daily  - atorvastatin 40 mg daily  - MRI negative for acute ischemic changes  - Stroke Code HCT Results: No acute intracranial hemorrhage or transcortical infarct.  - Stroke Code CTA Results: No LVO nor high grade stenosis. Incidental 1.8cm thyroid nodule.  - BECKY withheld for now for high aspiration risk  - TTE mod-severe AS, EF 70%, grade 1 diastolic dysfunction  - CSF done today ruling out GBS, MF    #Severe Aortic Stenosis  No sxs from severe AS per wife, TTE s/f normal LV fx w/EF 75%, LVH, mild MR  - CTSx consulted, wife is not interested in invasive procedure at this time, will follow up     #Seizure  - phenytoin level 4.1 (7/18/21)  - D/c home phenytoin 100mg TID  - CW Keppra 500 mg q 12h    #Anemia  -h/h stable  -f/u iron studies     #HTN  -holding home norvasc- benazapril for permissive htn   -resume as needed    #EVARISTO on ?CKD  -creatinine improving  -monitor I&O  -avoid nephrotoxins    Dispo: Can transfer to [facility], neuro to follow   For acute rehab once DC ready

## 2021-07-20 NOTE — PROGRESS NOTE ADULT - SUBJECTIVE AND OBJECTIVE BOX
Patient is a 89y old  Male who presents with a chief complaint of slurred speech, L facial droop (2021 10:14)      INTERVAL HPI/OVERNIGHT EVENTS:  Patient was seen and examined at bedside. As per nurse and patient, no o/n events, patient resting comfortably. No complaints at this time. Patient denies: fever, chills, dizziness, weakness, HA, Changes in vision, CP, palpitations, SOB, cough, N/V/D/C, dysuria, changes in bowel movements, LE edema.      PAST MEDICAL & SURGICAL HISTORY:  Hypertension    Seizure        SOCIAL HISTORY  Alcohol:  Tobacco:  Illicit substance use:      FAMILY HISTORY:    REVIEW OF SYSTEMS:  CONSTITUTIONAL: No fever, weight loss, or fatigue  EYES: No eye pain, visual disturbances, or discharge  ENMT:  No difficulty hearing, tinnitus, vertigo; No sinus or throat pain  NECK: No pain or stiffness  RESPIRATORY: No cough, wheezing, chills or hemoptysis; No shortness of breath  CARDIOVASCULAR: No chest pain, palpitations, dizziness, or leg swelling  GASTROINTESTINAL: No abdominal or epigastric pain. No nausea, vomiting, or hematemesis; No diarrhea or constipation. No melena or hematochezia.  GENITOURINARY: No dysuria, frequency, hematuria, or incontinence  NEUROLOGICAL: No headaches, memory loss, loss of strength, numbness, or tremors  SKIN: No itching, burning, rashes, or lesions   LYMPH NODES: No enlarged glands  ENDOCRINE: No heat or cold intolerance; No hair loss  MUSCULOSKELETAL: No joint pain or swelling; No muscle, back, or extremity pain  PSYCHIATRIC: No depression, anxiety, mood swings, or difficulty sleeping  HEME/LYMPH: No easy bruising, or bleeding gums  ALLERY AND IMMUNOLOGIC: No hives or eczema    T(C): 37.3 (21 @ 13:23), Max: 37.3 (21 @ 13:23)  HR: 64 (21 @ 16:18) (64 - 86)  BP: 103/51 (21 @ 16:18) (103/51 - 151/69)  RR: 16 (21 @ 16:18) (16 - 18)  SpO2: 96% (21 @ 16:18) (94% - 97%)  Wt(kg): --  I&O's Summary    2021 07:01  -  2021 07:00  --------------------------------------------------------  IN: 0 mL / OUT: 600 mL / NET: -600 mL        PHYSICAL EXAM:  GENERAL: NAD, laying comfortably in bed  HEAD:  Atraumatic, Normocephalic  EYES: EOMI, PERRLA, conjunctiva and sclera clear  ENMT: No tonsillar erythema, exudates, or enlargement; MMM  NECK: Supple, No JVD  NERVOUS SYSTEM:  Alert & Oriented X3,  RLE and RUE 4/5; LLE 5/5; +mild dysarthria, +left facial droop  CHEST/LUNG: Clear to percussion bilaterally; No rales, rhonchi, wheezing, or rubs  HEART: Regular rate and rhythm; +systolic murmur  ABDOMEN: Soft, Nontender, Nondistended; Bowel sounds present  EXTREMITIES:  2+ Peripheral Pulses, No clubbing, cyanosis, or edema  LYMPH: No lymphadenopathy noted  SKIN: No rashes or lesions        LABS:                        10.4   33.71 )-----------( 397      ( 2021 05:50 )             32.3     07-20    144  |  109<H>  |  33<H>  ----------------------------<  85  4.5   |  21<L>  |  1.65<H>    Ca    9.0      2021 05:50  Phos  4.6     07-20  Mg     2.4     07-20    TPro  7.6  /  Alb  4.5  /  TBili  0.2  /  DBili  x   /  AST  18  /  ALT  13  /  AlkPhos  100  07-18    PT/INR - ( 2021 20:01 )   PT: 12.3 sec;   INR: 1.03          PTT - ( 2021 20:01 )  PTT:32.7 sec  Urinalysis Basic - ( 2021 01:05 )    Color: Yellow / Appearance: Clear / S.025 / pH: x  Gluc: x / Ketone: NEGATIVE  / Bili: Negative / Urobili: 0.2 E.U./dL   Blood: x / Protein: Trace mg/dL / Nitrite: NEGATIVE   Leuk Esterase: NEGATIVE / RBC: < 5 /HPF / WBC < 5 /HPF   Sq Epi: x / Non Sq Epi: 0-5 /HPF / Bacteria: Present /HPF      CAPILLARY BLOOD GLUCOSE            Urinalysis Basic - ( 2021 01:05 )    Color: Yellow / Appearance: Clear / S.025 / pH: x  Gluc: x / Ketone: NEGATIVE  / Bili: Negative / Urobili: 0.2 E.U./dL   Blood: x / Protein: Trace mg/dL / Nitrite: NEGATIVE   Leuk Esterase: NEGATIVE / RBC: < 5 /HPF / WBC < 5 /HPF   Sq Epi: x / Non Sq Epi: 0-5 /HPF / Bacteria: Present /HPF        MEDICATIONS  (STANDING):  ampicillin/sulbactam  IVPB 1.5 Gram(s) IV Intermittent every 12 hours  aspirin enteric coated 81 milliGRAM(s) Oral daily  atorvastatin 40 milliGRAM(s) Oral at bedtime  dextrose 5%. 1000 milliLiter(s) (30 mL/Hr) IV Continuous <Continuous>  guaiFENesin Oral Liquid (Sugar-Free) 200 milliGRAM(s) Oral every 6 hours  heparin   Injectable 5000 Unit(s) SubCutaneous every 8 hours  levETIRAcetam  IVPB 500 milliGRAM(s) IV Intermittent every 12 hours    MEDICATIONS  (PRN):  sodium chloride 3%  Inhalation 4 milliLiter(s) Inhalation every 6 hours PRN productive cough      RADIOLOGY & ADDITIONAL TESTS:    Imaging Personally Reviewed:  [ ] YES  [ ] NO    Consultant(s) Notes Reviewed:  [ ] YES  [ ] NO    Care Discussed with Consultants/Other Providers [ ] YES  [ ] NO   Patient is a 89y old  Male who presents with a chief complaint of slurred speech, L facial droop (2021 10:14)      INTERVAL HPI/OVERNIGHT EVENTS:  Patient was seen and examined at bedside. As per nurse and patient, no o/n events, patient resting comfortably. Sleeping during time of interview, with wife at bedside. She notes that he wakens and answers questions appropriately. Is having increase secretions per her which is making him unable to eat. Patient denies: fever, chills, dizziness, weakness, HA, Changes in vision, CP, palpitations, SOB, cough, N/V/D/C, dysuria.      PAST MEDICAL & SURGICAL HISTORY:  Hypertension    Seizure    T(C): 37.3 (21 @ 13:23), Max: 37.3 (21 @ 13:23)  HR: 64 (21 @ 16:18) (64 - 86)  BP: 103/51 (21 @ 16:18) (103/51 - 151/69)  RR: 16 (21 @ 16:18) (16 - 18)  SpO2: 96% (21 @ 16:18) (94% - 97%)  Wt(kg): --  I&O's Summary    2021 07:01  -  2021 07:00  --------------------------------------------------------  IN: 0 mL / OUT: 600 mL / NET: -600 mL        PHYSICAL EXAM:  GENERAL: NAD, laying comfortably in bed  HEAD:  Atraumatic, Normocephalic  EYES: EOMI, PERRLA, conjunctiva and sclera clear  ENMT: No tonsillar erythema, exudates, or enlargement; MMM  NECK: Supple, No JVD  NERVOUS SYSTEM:  Alert & Oriented X3,  RLE and RUE 4/5; LLE 5/5; +mild dysarthria, +left facial droop  CHEST/LUNG: Clear to percussion bilaterally; No rales, rhonchi, wheezing, or rubs  HEART: Regular rate and rhythm; +systolic murmur  ABDOMEN: Soft, Nontender, Nondistended; Bowel sounds present  EXTREMITIES:  2+ Peripheral Pulses, No clubbing, cyanosis, or edema  LYMPH: No lymphadenopathy noted  SKIN: No rashes or lesions        LABS:                        10.4   33.71 )-----------( 397      ( 2021 05:50 )             32.3     07-20    144  |  109<H>  |  33<H>  ----------------------------<  85  4.5   |  21<L>  |  1.65<H>    Ca    9.0      2021 05:50  Phos  4.6     07-20  Mg     2.4     07-20    TPro  7.6  /  Alb  4.5  /  TBili  0.2  /  DBili  x   /  AST  18  /  ALT  13  /  AlkPhos  100  07-18    PT/INR - ( 2021 20:01 )   PT: 12.3 sec;   INR: 1.03          PTT - ( 2021 20:01 )  PTT:32.7 sec  Urinalysis Basic - ( 2021 01:05 )    Color: Yellow / Appearance: Clear / S.025 / pH: x  Gluc: x / Ketone: NEGATIVE  / Bili: Negative / Urobili: 0.2 E.U./dL   Blood: x / Protein: Trace mg/dL / Nitrite: NEGATIVE   Leuk Esterase: NEGATIVE / RBC: < 5 /HPF / WBC < 5 /HPF   Sq Epi: x / Non Sq Epi: 0-5 /HPF / Bacteria: Present /HPF      CAPILLARY BLOOD GLUCOSE            Urinalysis Basic - ( 2021 01:05 )    Color: Yellow / Appearance: Clear / S.025 / pH: x  Gluc: x / Ketone: NEGATIVE  / Bili: Negative / Urobili: 0.2 E.U./dL   Blood: x / Protein: Trace mg/dL / Nitrite: NEGATIVE   Leuk Esterase: NEGATIVE / RBC: < 5 /HPF / WBC < 5 /HPF   Sq Epi: x / Non Sq Epi: 0-5 /HPF / Bacteria: Present /HPF        MEDICATIONS  (STANDING):  ampicillin/sulbactam  IVPB 1.5 Gram(s) IV Intermittent every 12 hours  aspirin enteric coated 81 milliGRAM(s) Oral daily  atorvastatin 40 milliGRAM(s) Oral at bedtime  dextrose 5%. 1000 milliLiter(s) (30 mL/Hr) IV Continuous <Continuous>  guaiFENesin Oral Liquid (Sugar-Free) 200 milliGRAM(s) Oral every 6 hours  heparin   Injectable 5000 Unit(s) SubCutaneous every 8 hours  levETIRAcetam  IVPB 500 milliGRAM(s) IV Intermittent every 12 hours    MEDICATIONS  (PRN):  sodium chloride 3%  Inhalation 4 milliLiter(s) Inhalation every 6 hours PRN productive cough      RADIOLOGY & ADDITIONAL TESTS:    Imaging Personally Reviewed:  [ ] YES  [ ] NO    Consultant(s) Notes Reviewed:  [ ] YES  [ ] NO    Care Discussed with Consultants/Other Providers [ ] YES  [ ] NO

## 2021-07-20 NOTE — CONSULT NOTE ADULT - ATTENDING COMMENTS
Initial attending contact date  7/20/21    . See fellow note written above for details. I reviewed the fellow documentation. I have personally seen and examined this patient. I reviewed vitals, labs, medications, cardiac studies, and additional imaging. I agree with the above fellow's findings and plans as written above with the following additions/statements.      89y Male with PMHx of HTN and seizure on phenytoin presents with left facial droop and slurred speech. Pt was with family member and noted to have acute onset slurred speech, increased saliva production and a left facial droop. CTH negative for acute hemorrhage. CTP without any perfusion defect. CTA without LVO. Decision was made to not give tpa since family felt his current deficits are non-disabling to his daily life vs risk of bleeding. as part of stroke workup the patient underwent an echocardiogram which showed mod-sev AS/AR prompting a cardiology consult.  -ECHO with normal LVEF mod-severe AS by MARCUS/mean gradient and mild-mod AI  -Denies CP/sob/dizziness/hx syncope in past or recently  -CTS consulted per primary team  -On exam euvolemic without active complaints  -Cont DAPT, statin per neuro for possible TIA? imaging negative so far  -No need for further cardiac work up at this time  -Pls reconsult as needed

## 2021-07-21 ENCOUNTER — RESULT REVIEW (OUTPATIENT)
Age: 86
End: 2021-07-21

## 2021-07-21 DIAGNOSIS — R63.8 OTHER SYMPTOMS AND SIGNS CONCERNING FOOD AND FLUID INTAKE: ICD-10-CM

## 2021-07-21 DIAGNOSIS — R53.81 OTHER MALAISE: ICD-10-CM

## 2021-07-21 DIAGNOSIS — D72.829 ELEVATED WHITE BLOOD CELL COUNT, UNSPECIFIED: ICD-10-CM

## 2021-07-21 DIAGNOSIS — N17.9 ACUTE KIDNEY FAILURE, UNSPECIFIED: ICD-10-CM

## 2021-07-21 DIAGNOSIS — Z51.5 ENCOUNTER FOR PALLIATIVE CARE: ICD-10-CM

## 2021-07-21 DIAGNOSIS — D64.9 ANEMIA, UNSPECIFIED: ICD-10-CM

## 2021-07-21 DIAGNOSIS — R56.9 UNSPECIFIED CONVULSIONS: ICD-10-CM

## 2021-07-21 DIAGNOSIS — I10 ESSENTIAL (PRIMARY) HYPERTENSION: ICD-10-CM

## 2021-07-21 DIAGNOSIS — R13.10 DYSPHAGIA, UNSPECIFIED: ICD-10-CM

## 2021-07-21 DIAGNOSIS — I35.0 NONRHEUMATIC AORTIC (VALVE) STENOSIS: ICD-10-CM

## 2021-07-21 LAB
ALBUMIN SERPL ELPH-MCNC: 3.6 G/DL — SIGNIFICANT CHANGE UP (ref 3.3–5)
ALP SERPL-CCNC: 117 U/L — SIGNIFICANT CHANGE UP (ref 40–120)
ALT FLD-CCNC: 13 U/L — SIGNIFICANT CHANGE UP (ref 10–45)
ANION GAP SERPL CALC-SCNC: 14 MMOL/L — SIGNIFICANT CHANGE UP (ref 5–17)
AST SERPL-CCNC: 21 U/L — SIGNIFICANT CHANGE UP (ref 10–40)
BASOPHILS # BLD AUTO: 0 K/UL — SIGNIFICANT CHANGE UP (ref 0–0.2)
BASOPHILS NFR BLD AUTO: 0 % — SIGNIFICANT CHANGE UP (ref 0–2)
BILIRUB SERPL-MCNC: 0.3 MG/DL — SIGNIFICANT CHANGE UP (ref 0.2–1.2)
BUN SERPL-MCNC: 35 MG/DL — HIGH (ref 7–23)
CALCIUM SERPL-MCNC: 8.9 MG/DL — SIGNIFICANT CHANGE UP (ref 8.4–10.5)
CHLORIDE SERPL-SCNC: 109 MMOL/L — HIGH (ref 96–108)
CO2 SERPL-SCNC: 19 MMOL/L — LOW (ref 22–31)
CREAT SERPL-MCNC: 1.78 MG/DL — HIGH (ref 0.5–1.3)
EOSINOPHIL # BLD AUTO: 2.89 K/UL — HIGH (ref 0–0.5)
EOSINOPHIL NFR BLD AUTO: 8.9 % — HIGH (ref 0–6)
FOLATE SERPL-MCNC: >20 NG/ML — SIGNIFICANT CHANGE UP
GLUCOSE SERPL-MCNC: 81 MG/DL — SIGNIFICANT CHANGE UP (ref 70–99)
HAV IGM SER-ACNC: SIGNIFICANT CHANGE UP
HBV CORE AB SER-ACNC: REACTIVE
HBV CORE IGM SER-ACNC: SIGNIFICANT CHANGE UP
HBV SURFACE AB SER-ACNC: REACTIVE
HBV SURFACE AG SER-ACNC: SIGNIFICANT CHANGE UP
HCT VFR BLD CALC: 29.6 % — LOW (ref 39–50)
HCV AB S/CO SERPL IA: 0.04 S/CO — SIGNIFICANT CHANGE UP
HCV AB SERPL-IMP: SIGNIFICANT CHANGE UP
HGB BLD-MCNC: 9.3 G/DL — LOW (ref 13–17)
KAPPA LC SER QL IFE: 6.06 MG/DL — HIGH (ref 0.33–1.94)
KAPPA/LAMBDA FREE LIGHT CHAIN RATIO, SERUM: 1.59 RATIO — SIGNIFICANT CHANGE UP (ref 0.26–1.65)
LABORATORY COMMENT REPORT: SIGNIFICANT CHANGE UP
LAMBDA LC SER QL IFE: 3.81 MG/DL — HIGH (ref 0.57–2.63)
LDH CSF L TO P-CCNC: 22 U/L — SIGNIFICANT CHANGE UP
LDH FLD-CCNC: 22 U/L — SIGNIFICANT CHANGE UP
LYMPHOCYTES # BLD AUTO: 1.75 K/UL — SIGNIFICANT CHANGE UP (ref 1–3.3)
LYMPHOCYTES # BLD AUTO: 5.4 % — LOW (ref 13–44)
MAGNESIUM SERPL-MCNC: 2.6 MG/DL — SIGNIFICANT CHANGE UP (ref 1.6–2.6)
MCHC RBC-ENTMCNC: 29.7 PG — SIGNIFICANT CHANGE UP (ref 27–34)
MCHC RBC-ENTMCNC: 31.4 GM/DL — LOW (ref 32–36)
MCV RBC AUTO: 94.6 FL — SIGNIFICANT CHANGE UP (ref 80–100)
MONOCYTES # BLD AUTO: 2.01 K/UL — HIGH (ref 0–0.9)
MONOCYTES NFR BLD AUTO: 6.2 % — SIGNIFICANT CHANGE UP (ref 2–14)
NEUTROPHILS # BLD AUTO: 24.95 K/UL — HIGH (ref 1.8–7.4)
NEUTROPHILS NFR BLD AUTO: 76.8 % — SIGNIFICANT CHANGE UP (ref 43–77)
NIGHT BLUE STAIN TISS: SIGNIFICANT CHANGE UP
PHOSPHATE SERPL-MCNC: 5.2 MG/DL — HIGH (ref 2.5–4.5)
PLATELET # BLD AUTO: 379 K/UL — SIGNIFICANT CHANGE UP (ref 150–400)
POTASSIUM SERPL-MCNC: 4.4 MMOL/L — SIGNIFICANT CHANGE UP (ref 3.5–5.3)
POTASSIUM SERPL-SCNC: 4.4 MMOL/L — SIGNIFICANT CHANGE UP (ref 3.5–5.3)
PROT SERPL-MCNC: 6.3 G/DL — SIGNIFICANT CHANGE UP (ref 6–8.3)
PROT SERPL-MCNC: 6.3 G/DL — SIGNIFICANT CHANGE UP (ref 6–8.3)
PROT SERPL-MCNC: 6.4 G/DL — SIGNIFICANT CHANGE UP (ref 6–8.3)
RBC # BLD: 3.13 M/UL — LOW (ref 4.2–5.8)
RBC # FLD: 14.4 % — SIGNIFICANT CHANGE UP (ref 10.3–14.5)
SODIUM SERPL-SCNC: 142 MMOL/L — SIGNIFICANT CHANGE UP (ref 135–145)
SOURCE HSV 1/2: SIGNIFICANT CHANGE UP
SPECIMEN SOURCE: SIGNIFICANT CHANGE UP
VIT B12 SERPL-MCNC: 1691 PG/ML — HIGH (ref 232–1245)
WBC # BLD: 32.49 K/UL — HIGH (ref 3.8–10.5)
WBC # FLD AUTO: 32.49 K/UL — HIGH (ref 3.8–10.5)

## 2021-07-21 PROCEDURE — 99233 SBSQ HOSP IP/OBS HIGH 50: CPT

## 2021-07-21 PROCEDURE — 88108 CYTOPATH CONCENTRATE TECH: CPT | Mod: 26

## 2021-07-21 PROCEDURE — 71045 X-RAY EXAM CHEST 1 VIEW: CPT | Mod: 26

## 2021-07-21 PROCEDURE — 99233 SBSQ HOSP IP/OBS HIGH 50: CPT | Mod: GC

## 2021-07-21 PROCEDURE — 99358 PROLONG SERVICE W/O CONTACT: CPT

## 2021-07-21 PROCEDURE — 99223 1ST HOSP IP/OBS HIGH 75: CPT

## 2021-07-21 RX ORDER — ACETYLCYSTEINE 200 MG/ML
2 VIAL (ML) MISCELLANEOUS THREE TIMES A DAY
Refills: 0 | Status: COMPLETED | OUTPATIENT
Start: 2021-07-21 | End: 2021-07-22

## 2021-07-21 RX ORDER — IMMUNE GLOBULIN (HUMAN) 10 G/100ML
25 INJECTION INTRAVENOUS; SUBCUTANEOUS EVERY 24 HOURS
Refills: 0 | Status: COMPLETED | OUTPATIENT
Start: 2021-07-21 | End: 2021-07-25

## 2021-07-21 RX ORDER — DIPHENHYDRAMINE HCL 50 MG
25 CAPSULE ORAL ONCE
Refills: 0 | Status: COMPLETED | OUTPATIENT
Start: 2021-07-21 | End: 2021-07-21

## 2021-07-21 RX ORDER — ACETAMINOPHEN 500 MG
650 TABLET ORAL ONCE
Refills: 0 | Status: COMPLETED | OUTPATIENT
Start: 2021-07-21 | End: 2021-07-21

## 2021-07-21 RX ORDER — SODIUM CHLORIDE 9 MG/ML
1000 INJECTION, SOLUTION INTRAVENOUS
Refills: 0 | Status: DISCONTINUED | OUTPATIENT
Start: 2021-07-21 | End: 2021-07-22

## 2021-07-21 RX ADMIN — ATORVASTATIN CALCIUM 40 MILLIGRAM(S): 80 TABLET, FILM COATED ORAL at 23:00

## 2021-07-21 RX ADMIN — HEPARIN SODIUM 5000 UNIT(S): 5000 INJECTION INTRAVENOUS; SUBCUTANEOUS at 06:32

## 2021-07-21 RX ADMIN — AMPICILLIN SODIUM AND SULBACTAM SODIUM 100 GRAM(S): 250; 125 INJECTION, POWDER, FOR SUSPENSION INTRAMUSCULAR; INTRAVENOUS at 14:37

## 2021-07-21 RX ADMIN — HEPARIN SODIUM 5000 UNIT(S): 5000 INJECTION INTRAVENOUS; SUBCUTANEOUS at 13:34

## 2021-07-21 RX ADMIN — HEPARIN SODIUM 5000 UNIT(S): 5000 INJECTION INTRAVENOUS; SUBCUTANEOUS at 22:59

## 2021-07-21 RX ADMIN — LEVETIRACETAM 400 MILLIGRAM(S): 250 TABLET, FILM COATED ORAL at 16:51

## 2021-07-21 RX ADMIN — AMPICILLIN SODIUM AND SULBACTAM SODIUM 100 GRAM(S): 250; 125 INJECTION, POWDER, FOR SUSPENSION INTRAMUSCULAR; INTRAVENOUS at 03:41

## 2021-07-21 RX ADMIN — IMMUNE GLOBULIN (HUMAN) 31.25 GRAM(S): 10 INJECTION INTRAVENOUS; SUBCUTANEOUS at 18:13

## 2021-07-21 RX ADMIN — LEVETIRACETAM 400 MILLIGRAM(S): 250 TABLET, FILM COATED ORAL at 06:32

## 2021-07-21 RX ADMIN — Medication 81 MILLIGRAM(S): at 13:38

## 2021-07-21 RX ADMIN — SODIUM CHLORIDE 30 MILLILITER(S): 9 INJECTION, SOLUTION INTRAVENOUS at 13:33

## 2021-07-21 RX ADMIN — Medication 260 MILLIGRAM(S): at 17:22

## 2021-07-21 RX ADMIN — Medication 101 MILLIGRAM(S): at 17:35

## 2021-07-21 RX ADMIN — Medication 2 MILLILITER(S): at 22:59

## 2021-07-21 NOTE — CONSULT NOTE ADULT - ASSESSMENT
89M with PMHx of HTN and seizure on phenytoin presents with left facial droop and slurred speech. Stroke workup negative. Neurology consulted for persistent weakness.      Impression  - Pt with persistent weakness manifesting as dysarthria and decreased strength in proximal muscles. LP notable for mildly elevated protein at 52, no cells. 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 Differential also includes Myasthenia gravis.    Plan  - F/u Myasthenia Gravis antibodies  - Start IVIG empirically (400mg/kg/day) for now  - Possible repetitive nerve stimulation tomorrow 89M with PMHx of HTN and seizure on phenytoin presents with left facial droop and slurred speech. Stroke workup negative. Neurology consulted for persistent weakness.      Impression  - Pt with persistent weakness manifesting as dysarthria and decreased strength in proximal muscles. LP notable for mildly elevated protein at 52, no cells. 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 Differential also includes Myasthenia gravis.    Plan  - F/u Myasthenia Gravis antibodies  - Recommend starting IVIG empirically (400mg/kg/day) for now  - If no ability to swallow by tomorrow, recommend NGT for nutrition as patient has been without food for 4 days  - Possible repetitive nerve stimulation tomorrow 89M with PMHx of HTN and seizure on phenytoin presents with left facial droop and slurred speech. Stroke workup negative. Neurology consulted for persistent weakness.      Impression  - Pt with persistent weakness manifesting as dysarthria and decreased strength in proximal muscles. LP notable for mildly elevated protein at 52, no cells. 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 Differential also includes Myasthenia gravis.    Plan  - F/u Myasthenia Gravis antibodies  - Recommend starting IVIG empirically - (400mg/kg/day x 5 days   - If no ability to swallow by tomorrow, recommend NGT for nutrition as patient has been without food for 4 days  - Possible repetitive nerve stimulation tomorrow

## 2021-07-21 NOTE — SPEECH LANGUAGE PATHOLOGY EVALUATION - CONFRONTATIONAL NAMING
Pt accurately named 4/6 objects, presenting with difficulty naming low-frequency objects (e.g., band of watch, charm of bracelet)./impaired

## 2021-07-21 NOTE — CONSULT NOTE ADULT - SUBJECTIVE AND OBJECTIVE BOX
Hematology Consult Note    HPI: 89y Male with PMHx of HTN and seizure on phenytoin presents with left facial droop and slurred speech. Pt was with family member (great-nephew Mesh [phone]) who noticed at 5pm, pt had acute onset of slurred speech, increased saliva production and a left facial droop. Pt was driven to the ED immediately. On presentation, /94, NIHSS 3. Great-nephew at bedside denies hx of stroke, use of blood thinners, recent trauma/bleeding event. CTH negative for acute hemorrhage. CTP without any perfusion defect. CTA without LVO. In conjunction with patient, family and stroke attending, decision was made to not give tpa since family felt his current deficits are non-disabling to his daily life vs risk of bleeding. Pt and family was made aware of the possibility of worsening symptoms and the opportunity to give tpa was time limited. They expressed understanding and declined tpa administration.   Pt denies CP, SOB, extremity weakness, changes in vision, HA. He endorses a cough that has been occurring for the past few days with sputum production, but has been afebrile. Pt given vanc and zosyn x1 in the ED. CXR pending official read. Patient initially failed dysphagia screen and was given  CA, but passed second dysphagia screen so was able to to loaded with plavix 300 PO.   At baseline, pt is fully independent of all ADLs and iADLs, ambulates without assistance, with hearing loss b/l, baseline right arm tremor. It is unclear if pt sees a neurologist for seizure management. Per wife (Madeot [phone], [phone]), his most recent seizure was in April 2020 where he had full body shaking lasting for 5 minutes. He has been on phenytoin 100mg TID and has not had another seizure since.     89 yo M with history of seizures and HTN was admitted to neurology service for stroke symptoms. He was being managed for ischemic stroke and was transferred to Medicine. Hematology consulted for persistent leukocytosis. Patient denies any prior history of known leukocytosis. Denies any recent history of B symptoms (Fevers, fatigues, night sweats, unexplained weight loss, loss of appetite).       FAMILY HISTORY:  none    SOCIAL HISTORY:   Patient lives with wife in apartment.  Smoking status: denies  Drinking: denies  Drug Use: denies    Allergies  hay fever (Unknown)  No Known Drug Allergies    REVIEW OF SYSTEMS:    CONSTITUTIONAL: No fevers or chills  EYES/ENT: No visual changes;  No vertigo or throat pain   NECK: No pain or stiffness  RESPIRATORY: No cough, wheezing, hemoptysis; No shortness of breath  CARDIOVASCULAR: No chest pain or palpitations  GASTROINTESTINAL: No abdominal or epigastric pain. No nausea, vomiting, or hematemesis; No diarrhea or constipation. No melena or hematochezia.  GENITOURINARY: No dysuria, frequency or hematuria  NEUROLOGICAL: No numbness or weakness  SKIN: No itching, burning, rashes, or lesions   All other review of systems is negative unless indicated above.      T(F): 97.5 (07-21-21 @ 09:45), Max: 99.3 (07-20-21 @ 17:30)  HR: 66 (07-21-21 @ 09:45)  BP: 139/65 (07-21-21 @ 09:45)  RR: 16 (07-21-21 @ 09:45)  SpO2: 96% (07-21-21 @ 09:45)  Wt(kg): --    GENERAL: NAD, well-developed  HEAD:  Atraumatic, Normocephalic  EYES: EOMI,  conjunctiva and sclera clear  NECK: Supple, No JVD  CHEST/LUNG: Clear to auscultation bilaterally; No wheeze  HEART: Regular rate and rhythm; No murmurs, rubs, or gallops  ABDOMEN: Soft, Nontender, Nondistended; Bowel sounds present  EXTREMITIES:  2+ Peripheral Pulses, No clubbing, cyanosis, or edema  NEUROLOGY: facial droop +  SKIN: No rashes or lesions                          9.3    32.49 )-----------( 379      ( 21 Jul 2021 08:05 )             29.6       07-21    142  |  109<H>  |  35<H>  ----------------------------<  81  4.4   |  19<L>  |  1.78<H>    Ca    8.9      21 Jul 2021 08:05  Phos  5.2     07-21  Mg     2.6     07-21    TPro  6.4  /  Alb  3.6  /  TBili  0.3  /  DBili  x   /  AST  21  /  ALT  13  /  AlkPhos  117  07-21      Magnesium, Serum: 2.6 mg/dL (07-21 @ 08:05)  Phosphorus Level, Serum: 5.2 mg/dL (07-21 @ 08:05)  Lactate Dehydrogenase, Serum: 224 U/L (07-20 @ 18:33)

## 2021-07-21 NOTE — PROGRESS NOTE ADULT - PROBLEM SELECTOR PLAN 6
No sxs from severe AS per wife, TTE s/f normal LV fx w/EF 75%, LVH, mild MR  -per Dr. Ayala's note on 7/20, CTSx consulted, wife is not interested in invasive procedure at this time, will follow up

## 2021-07-21 NOTE — PROGRESS NOTE ADULT - PROBLEM SELECTOR PLAN 8
-h/h stable  -f/u iron studies -likely iron deficiency anemia  - H&H decreased this AM  -will continue to monitor daily CBCs

## 2021-07-21 NOTE — SPEECH LANGUAGE PATHOLOGY EVALUATION - COMMENTS
Per wife, pt's speech has been "slurred due to congestion" and at times is better than others. Additionally, wife denies any changes within the way pt produces and understands language. Pt presents with grossly functional cognitive-linguistic skills for his age. Pt presents with grossly functional auditory comprehension skills for his age. Pt presents with grossly functional verbal expression skills. Pt presents with mild dysphonia characterized by rough vocal quality. Per wife, pt's speech has been "slurred due to congestion", on-going for 3 months, and at times is better than others. Additionally, wife denies any acute changes within the way pt produces and understands language. Pt's OSME remarkable for mild L side facial droop, reduced mandibular strength, reduced labial ROM on protrusion and retraction and weakness on protrusion, reduced lingual ROM and weakness on protrusion and lateralization, reduced bilateral velum elevation on phonation. Pt additionally presents with mild dysarthria, characterized by imprecise articulation at the single word and simple conversational level. 89y Male with PMHx of HTN and seizure on phenytoin presented with left facial droop and slurred speech, LKW 5pm 7/18/21, NIHSS 3. CTH negative for acute hemorrhage. MRI negative for acute ischemic stroke. Pt presents with grossly functional cognitive-linguistic skills, c/w wife's reports Pt presents with grossly functional auditory comprehension skills, c/w family reports of baseline receptive language skills. Per wife, pt's first language was Persian. However, pt is fluent in English. 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 Wife stated that his hearing loss impacts communication efficiency, but his receptive/expressive language skills are at baseline in both languages. Wife and pt elected to have this evaluation completed in English. Pt's OSME remarkable for mild L side facial droop, reduced mandibular strength, reduced labial ROM, reduced lingual ROM and weakness on protrusion and lateralization. Per wife, pt's first language was Korean. However, pt is fluent in English. 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 Wife stated that his hearing loss impacts communication efficiency, but his receptive/expressive language skills are at baseline in both languages. Per wife, family was in the process if getting a hearing aid prior to this hospitalization. Wife and pt elected to have this evaluation completed in English. Per wife, pt's first language was Japanese. However, pt is fluent in English. 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 Wife stated that his hearing loss impacts communication efficiency, but his receptive/expressive language skills are at baseline in both languages. Per wife, family was in the process if getting a hearing aid prior to this hospitalization. Wife and pt elected to have this evaluation completed in English.    Per wife, pt's articulation is not currently at baseline, but intelligibility tends to fluctuate. She denied any changes in language/cognitive skills.

## 2021-07-21 NOTE — PROGRESS NOTE ADULT - PROBLEM SELECTOR PLAN 1
Persistent leukocytosis: reactive vs secondary to underlying malignancy. MRI complete opacification  of frontal sinuses, left maxillary/ ethmoid, in setting of persistent leukocytosis, possible sinusitis and possible aspiration PNA  -continue with unasyn 1.5 g IV for asp pna vs sinusitis    -if febrile broaden to zosyn  -Oncology c/s: iron studies, folic acid, b12, LDH, Peripheral smear, free light chain, SPEP, immunofixation, PCR BCR-ABL, JK 2 mutation, HIV Persistent leukocytosis: reactive vs secondary to underlying malignancy. MRI complete opacification  of frontal sinuses, left maxillary/ ethmoid, in setting of persistent leukocytosis, possible sinusitis and possible aspiration PNA. Urine culture and blood culture negative.   -continue with unasyn 1.5 g IV for asp pna vs sinusitis    -if febrile broaden to zosyn  -Oncology c/s: iron studies, folic acid, b12, LDH, Peripheral smear, free light chain, SPEP, immunofixation, PCR BCR-ABL, JK 2 mutation, HIV Persistent leukocytosis on admission 32k-->29k-->33K lactate 1.1 (7/18/21). Focus unclear UTI vs PNA likely aspiration vs sinusitis. Could also be reactive vs secondary to underlying malignancy. MRI complete opacification  of frontal sinuses, left maxillary/ ethmoid, in setting of persistent leukocytosis, possible sinusitis and possible aspiration PNA. Urine culture and blood culture negative.   -continue with unasyn 1.5 g IV for asp pna vs sinusitis    -if febrile broaden to zosyn  -Oncology c/s: iron studies, folic acid, b12, LDH, Peripheral smear, free light chain, SPEP, immunofixation, PCR BCR-ABL, JK 2 mutation, HIV

## 2021-07-21 NOTE — PROGRESS NOTE ADULT - PROBLEM SELECTOR PLAN 5
-creatinine improving  -monitor I&O  -avoid nephrotoxins -creatinine improving  -monitor I&O  -avoid nephrotoxins  -d5 30cc/hr for 12hrs

## 2021-07-21 NOTE — SPEECH LANGUAGE PATHOLOGY EVALUATION - SLP COMPREHENSION
Pt presented with [facility] comprehension for written single words, phrases, and sentences./intact/single words/phrase length/sentence length intact/single words/phrase length/sentence length

## 2021-07-21 NOTE — CHART NOTE - NSCHARTNOTEFT_GEN_A_CORE
PALLIATIVE MEDICINE COORDINATION OF CARE NOTE FOR JAMA CANTRELL  [  ] ED Trigger   [  ] MICU Trigger     [  X] Consult    [  ] AI Comanagement    Patient never seen by palliative medicine.     _30_____ Minutes; Start: __0700am___  End: _0730am___, of non-face-to-face prolonged service provided that relates to (face-to-face) care that has or will occur and ongoing patient management, including one or more of the following:   - Reviewed records from other physicians or other health care professional services, including one or more of the following: other medical records and diagnostic / radiology study results     HPI:   **STROKE HPI***    HPI: 89y Male with PMHx of HTN and seizure on phenytoin presents with left facial droop and slurred speech. Pt was with family member (great-nephew Mesh [phone]) who noticed at 5pm, pt had acute onset of slurred speech, increased saliva production and a left facial droop. Pt was driven to the ED immediately. On presentation, /94, NIHSS 3. Great-nephew at bedside denies hx of stroke, use of blood thinners, recent trauma/bleeding event. CTH negative for acute hemorrhage. CTP without any perfusion defect. CTA without LVO. In conjunction with patient, family and stroke attending, decision was made to not give tpa since family felt his current deficits are non-disabling to his daily life vs risk of bleeding. Pt and family was made aware of the possibility of worsening symptoms and the opportunity to give tpa was time limited. They expressed understanding and declined tpa administration.     Pt denies CP, SOB, extremity weakness, changes in vision, HA. He endorses a cough that has been occurring for the past few days with sputum production, but has been afebrile. Pt given vanc and zosyn x1 in the ED. CXR pending official read. Patient initially failed dysphagia screen and was given  NE, but passed second dysphagia screen so was able to to loaded with plavix 300 PO.     At baseline, pt is fully independent of all ADLs and iADLs, ambulates without assistance, with hearing loss b/l, baseline right arm tremor. It is unclear if pt sees a neurologist for seizure management. Per wife (Madeot [phone], [phone]), his most recent seizure was in April 2020 where he had full body shaking lasting for 5 minutes. He has been on phenytoin 100mg TID and has not had another seizure since.       PAST MEDICAL & SURGICAL HISTORY:      FAMILY HISTORY:      SOCIAL HISTORY:   Patient lives with wife in apartment.  Smoking status: denies  Drinking: denies  Drug Use: denies    ROS:   Constitutional: No fever, weight loss or fatigue  Eyes: No eye pain, visual disturbances, or discharge  ENMT:  hearing loss bilaterally  Neck: No pain or stiffness  Respiratory: cough with sputum production x days  Cardiovascular: No chest pain, palpitations, shortness of breath, dizziness or leg swelling  Gastrointestinal: No abdominal pain. No nausea, vomiting or hematemesis; No diarrhea or constipation. Nohematochezia.  Neurological: As per HPI    T(C): 36.9 (07-18-21 @ 23:53), Max: 37.1 (07-18-21 @ 19:32)  HR: 92 (07-18-21 @ 23:53) (84 - 92)  BP: 142/78 (07-18-21 @ 23:53) (123/63 - 184/75)  RR: 18 (07-18-21 @ 23:53) (16 - 18)  SpO2: 97% (07-18-21 @ 23:53) (96% - 99%)    MEDICATION RECONCILIATION   MEDICATIONS  (STANDING):  aspirin enteric coated 81 milliGRAM(s) Oral daily  atorvastatin 80 milliGRAM(s) Oral at bedtime  clopidogrel Tablet 75 milliGRAM(s) Oral daily  heparin   Injectable 5000 Unit(s) SubCutaneous every 8 hours  phenytoin   Capsule 100 milliGRAM(s) Oral three times a day    MEDICATIONS  (PRN):    Allergies    hay fever (Unknown)  No Known Drug Allergies    Intolerances      Vital Signs Last 24 Hrs  T(C): 36.9 (18 Jul 2021 23:53), Max: 37.1 (18 Jul 2021 19:32)  T(F): 98.4 (18 Jul 2021 23:53), Max: 98.8 (18 Jul 2021 19:51)  HR: 92 (18 Jul 2021 23:53) (84 - 92)  BP: 142/78 (18 Jul 2021 23:53) (123/63 - 184/75)  BP(mean): --  RR: 18 (18 Jul 2021 23:53) (16 - 18)  SpO2: 97% (18 Jul 2021 23:53) (96% - 99%)    Physical exam:  General: No acute distress, awake and alert  Cardiovascular: Regular rate and rhythm, ?murmur  Pulmonary: Transmitted sounds from upper airway. No use of accessory muscles  GI: Abdomen soft, non-tender, non-distended    Neurologic:  -Mental status: Awake, alert, oriented to person, place, and time. Speech is fluent with intact naming (able to name hammock, chair, key, pen, finger phone, scissor) , repetition, and comprehension, mild dysarthria. Recent and remote memory intact. Follows commands. Attention/concentration intact.   -Cranial nerves:   II: Visual fields are full to confrontation.  III, IV, VI: Extraocular movements are intact without nystagmus. Pupils equally round and reactive to light  V:  Facial sensation V1-V3 equal and intact   VII: Left facial droop on activation   VIII: Hearing is grossly intact bilaterally  Motor: Able to maintain AG along all extremities without pronator drift. Strength RUE 3/5, LUE 5/5, RLE 3/5, LUE 5/5. Baseline right arm tremor.  Sensation: Intact to light touch bilaterally. Extinguishes on RLE double simultaneous testing.  Coordination: No dysmetria on finger-to-nose and heel-to-shin bilaterally  Gait: Narrow gait, slow, with leaning to the left    NIHSS: 3 ASPECT Score: 9      Fingerstick Blood Glucose: CAPILLARY BLOOD GLUCOSE  161 (18 Jul 2021 20:36)      POCT Blood Glucose.: 161 mg/dL (18 Jul 2021 19:27)    LABS:                        10.7   32.39 )-----------( 413      ( 18 Jul 2021 20:01 )             32.6     07-18    141  |  108  |  39<H>  ----------------------------<  141<H>  4.7   |  23  |  1.76<H>    Ca    9.6      18 Jul 2021 20:01    TPro  7.6  /  Alb  4.5  /  TBili  0.2  /  DBili  x   /  AST  18  /  ALT  13  /  AlkPhos  100  07-18    PT/INR - ( 18 Jul 2021 20:01 )   PT: 12.3 sec;   INR: 1.03          PTT - ( 18 Jul 2021 20:01 )  PTT:32.7 sec          RADIOLOGY & ADDITIONAL STUDIES:    HCT: No acute intracranial hemorrhage or transcortical infarct.    CTA:1.  No large vessel occlusion or high-grade stenosis within the head and neck.  2.  Incidentally noted 1.8 cm right thyroid nodule. Consider nonemergent dedicated thyroid ultrasound is for further characterization.     (19 Jul 2021 00:25)    - Other: [facility] reviewed.    Rx found on [facility] review. Ref #: [id number]    - Other: Medication reviewed.    The patient HAS NOT used PRN's in the last 24h.    MEDICATIONS  (STANDING):  ampicillin/sulbactam  IVPB 1.5 Gram(s) IV Intermittent every 12 hours  aspirin enteric coated 81 milliGRAM(s) Oral daily  atorvastatin 40 milliGRAM(s) Oral at bedtime  dextrose 5%. 1000 milliLiter(s) (30 mL/Hr) IV Continuous <Continuous>  guaiFENesin Oral Liquid (Sugar-Free) 200 milliGRAM(s) Oral every 6 hours  heparin   Injectable 5000 Unit(s) SubCutaneous every 8 hours  levETIRAcetam  IVPB 500 milliGRAM(s) IV Intermittent every 12 hours    MEDICATIONS  (PRN):  sodium chloride 3%  Inhalation 4 milliLiter(s) Inhalation every 6 hours PRN productive cough      - Other: Advanced directives     Full Code     No documented MOLST form found on [facility]     No documented HCP form found on [facility]     No Living will / POA / Advance directives found on [place] / [facility].     No documented GOC notes on [facility]    - Other: Coordination/Plan of care     ___1_ admissions in 1 year     Current admission LOS: _3__ days     LACE score: _7___ NOT AN ADVANCE ILLNESS PATIENT.     Patient NOT previously seen by palliative medicine consult service.      Consult request for: "  Patient failed speech and swallow and with FEES, 89 yo here for stroke rule out, work up negative for neurological finding of dysphagia. Consult for further discussion and support for supplemental feeding. Wife wants to discuss with her family- if contiues to fail may need PEG tube  "    Full consult to follow within 24h.

## 2021-07-21 NOTE — PROGRESS NOTE ADULT - SUBJECTIVE AND OBJECTIVE BOX
**INCOMPLETE NOTE    OVERNIGHT EVENTS: NAEO    SUBJECTIVE:  Patient seen and examined at bedside. Complains of phlegm in his throat. Denies cough, SOB, chest pain.    Vital Signs Last 12 Hrs  T(F): 97.3 (21 @ 13:41), Max: 98 (21 @ 05:55)  HR: 75 (21 @ 13:41) (66 - 75)  BP: 148/78 (21 @ 13:41) (136/65 - 148/78)  BP(mean): --  RR: 18 (21 @ 13:41) (16 - 18)  SpO2: 97% (21 @ 13:41) (94% - 97%)  I&O's Summary    2021 07:01  -  2021 07:00  --------------------------------------------------------  IN: 500 mL / OUT: 200 mL / NET: 300 mL    2021 07:01  -  2021 13:50  --------------------------------------------------------  IN: 0 mL / OUT: 275 mL / NET: -275 mL        PHYSICAL EXAM:  General: thin elderly gentleman lying in bed in no acute distress, hard of hearing but able to carry out a conversation  HEENT: (+) hearing loss, NC/AT; EOMI, PERRLA, anicteric sclera; moist mucosal membranes. sinuses non tender to palpation   Neck: supple, trachea midline  Cardiovascular: (+) holostyolic murmur loudest at right 2nd ICS , RRR, +S1/S2  Respiratory: Mild transmitted upper airway sounds with good air movement, some ronchi in lower bases  Gastrointestinal: soft, NT/ND; +BSx4  Extremities: WWP; no edema or cyanosis  Vascular: 2+ radial, DP/PT pulses B/L  Neurological: AAOx3 (alert to name, place, and year); mild dysarthria; some left facial droop when smiling; no other focal deficits, sensation intact in all 4 extremities, strength 4/5 b/l AGNIESZKA, [facility] 2        LABS:                        9.3    32.49 )-----------( 379      ( 2021 08:05 )             29.6     07-21    142  |  109<H>  |  35<H>  ----------------------------<  81  4.4   |  19<L>  |  1.78<H>    Ca    8.9      2021 08:05  Phos  5.2     07-  Mg     2.6     -    TPro  6.4  /  Alb  3.6  /  TBili  0.3  /  DBili  x   /  AST  21  /  ALT  13  /  AlkPhos  117  -      Urinalysis Basic - ( 2021 01:05 )    Color: Yellow / Appearance: Clear / S.025 / pH: x  Gluc: x / Ketone: NEGATIVE  / Bili: Negative / Urobili: 0.2 E.U./dL   Blood: x / Protein: Trace mg/dL / Nitrite: NEGATIVE   Leuk Esterase: NEGATIVE / RBC: < 5 /HPF / WBC < 5 /HPF   Sq Epi: x / Non Sq Epi: 0-5 /HPF / Bacteria: Present /HPF          RADIOLOGY & ADDITIONAL TESTS:    MEDICATIONS  (STANDING):  acetylcysteine 10%  Inhalation 2 milliLiter(s) Inhalation three times a day  ampicillin/sulbactam  IVPB 1.5 Gram(s) IV Intermittent every 12 hours  aspirin enteric coated 81 milliGRAM(s) Oral daily  atorvastatin 40 milliGRAM(s) Oral at bedtime  dextrose 5%. 1000 milliLiter(s) (30 mL/Hr) IV Continuous <Continuous>  guaiFENesin Oral Liquid (Sugar-Free) 200 milliGRAM(s) Oral every 6 hours  heparin   Injectable 5000 Unit(s) SubCutaneous every 8 hours  levETIRAcetam  IVPB 500 milliGRAM(s) IV Intermittent every 12 hours    MEDICATIONS  (PRN):  sodium chloride 3%  Inhalation 4 milliLiter(s) Inhalation every 6 hours PRN productive cough

## 2021-07-21 NOTE — PROGRESS NOTE ADULT - PROBLEM SELECTOR PLAN 3
Failed FEES and speech and swallow   -strict NPO   -pending GOC with palliative as patient may need long term nutrition  -Gen neuro to continue to follow for further eval

## 2021-07-21 NOTE — PROGRESS NOTE ADULT - PROBLEM SELECTOR PLAN 1
Persistent leukocytosis on admission 32k-->29k-->33K lactate 1.1 (7/18/21). Afebrile throughout admission. Focus unclear UTI vs PNA likely aspiration vs sinusitis. Could also be reactive vs secondary to underlying malignancy. MRI complete opacification  of frontal sinuses, left maxillary/ ethmoid, in setting of persistent leukocytosis, possible sinusitis and possible aspiration PNA. Urine culture and blood culture negative.   -continue with unasyn 1.5 g IV for asp pna vs sinusitis    -if febrile broaden to zosyn  -Oncology c/s: iron studies, folic acid, b12, LDH, Peripheral smear, free light chain, SPEP, immunofixation, PCR BCR-ABL, JK 2 mutation, HIV Persistent leukocytosis on admission 32k-->29k-->33K lactate 1.1 (7/18/21). Afebrile throughout admission.   - Per heme: DD include reactive vs clonal causes infection/ stressed marrow from recent stroke/ CML/ CMML  - f/u Peripheral flow cytometry, BCR-ABL PCR, JAK2.   - Can set up outpatient hematology follow up with Dr. Nguyen on a Thursday (after discharge)     - On unasyn 1.5g IV for possible infectious etiology  -Unlikely this is due to infection as WBC count continues to rise in spite of abx treatment  - Will complete course of Unasyn to cover infectious etiology although low-likelihood  -    - DD include reactive vs clonal causes infection/ stressed marrow from recent stroke/ CML/ CMML  - f/u Peripheral flow cytometry, BCR-ABL PCR, JAK2.   - Can set up outpatient hematology follow up with Dr. Nguyen on a Thursday (after discharge)     -continue with unasyn 1.5 g IV for asp pna vs sinusitis    -if febrile broaden to zosyn  -Oncology c/s: iron studies, folic acid, b12, LDH, Peripheral smear, free light chain, SPEP, immunofixation, PCR BCR-ABL, JK 2 mutation, HIV Persistent leukocytosis on admission 32k-->29k-->33K lactate 1.1 (7/18/21). Afebrile throughout admission.   - Per heme: DD include reactive vs clonal causes infection/ stressed marrow from recent stroke/ CML/ CMML  - f/u Peripheral flow cytometry, BCR-ABL PCR, JAK2.   - Can set up outpatient hematology follow up with Dr. Nguyen on a Thursday (after discharge)     - On unasyn 1.5g IV for possible infectious etiology  -Unlikely this is due to infection as WBC count continues to rise in spite of abx treatment  - Will complete course of Unasyn to cover infectious etiology although low-likelihood

## 2021-07-21 NOTE — SPEECH LANGUAGE PATHOLOGY EVALUATION - SLP CONVERSATIONAL SPEECH
Pt presented with WFL discourse at the simple conversational level. Able to engage in conversation appropriately at the simple conversational level

## 2021-07-21 NOTE — CONSULT NOTE ADULT - SUBJECTIVE AND OBJECTIVE BOX
Neurology Consult    89M with PMHx of HTN and seizure on phenytoin presents with left facial droop and slurred speech. Pt was with family member (great-nephew Mesh [phone]) who noticed that pt had acute onset of slurred speech, increased saliva production and a left facial droop. Pt was driven to the ED immediately. On presentation, /94, NIHSS 3. Great-nephew at bedside denied hx of stroke, use of blood thinners, recent trauma/bleeding event. CTH negative for acute hemorrhage. CTP without any perfusion defect. CTA without LVO. In conjunction with patient, family and stroke attending, decision was made to not give tpa since family felt his current deficits are non-disabling to his daily life vs risk of bleeding.     At baseline, pt is fully independent of all ADLs and iADLs, ambulates without assistance, with hearing loss b/l, baseline right arm tremor. Neurology consulted for persistent weakness. Stroke workup negative. LP performed given persistent weakness to rule out GBS vs Gatica-Lindsey.        PAST MEDICAL & SURGICAL HISTORY:  Hypertension    Seizure        FAMILY HISTORY:      Social History: (-) x 3    Allergies    hay fever (Unknown)  No Known Drug Allergies    Intolerances        MEDICATIONS  (STANDING):  acetylcysteine 10%  Inhalation 2 milliLiter(s) Inhalation three times a day  ampicillin/sulbactam  IVPB 1.5 Gram(s) IV Intermittent every 12 hours  aspirin enteric coated 81 milliGRAM(s) Oral daily  atorvastatin 40 milliGRAM(s) Oral at bedtime  dextrose 5%. 1000 milliLiter(s) (30 mL/Hr) IV Continuous <Continuous>  guaiFENesin Oral Liquid (Sugar-Free) 200 milliGRAM(s) Oral every 6 hours  heparin   Injectable 5000 Unit(s) SubCutaneous every 8 hours  levETIRAcetam  IVPB 500 milliGRAM(s) IV Intermittent every 12 hours    MEDICATIONS  (PRN):  sodium chloride 3%  Inhalation 4 milliLiter(s) Inhalation every 6 hours PRN productive cough      Review of systems:    Constitutional: as per HPI  Eyes: No eye pain or discharge  ENMT:  No difficulty hearing; No sinus or throat pain  Neck: No pain or stiffness  Respiratory: No cough, wheezing, chills or hemoptysis  Cardiovascular: No chest pain, palpitations, shortness of breath, dyspnea on exertion  Gastrointestinal: No abdominal pain, nausea, vomiting or hematemesis; No diarrhea or constipation.   Genitourinary: No dysuria, frequency, hematuria or incontinence  Neurological: As per HPI  Skin: No rashes or lesions   Endocrine: No heat or cold intolerance; No hair loss  Musculoskeletal: No joint pain or swelling  Psychiatric: No depression, anxiety, mood swings  Heme/Lymph: No easy bruising or bleeding gums    Vital Signs Last 24 Hrs  T(C): 36.4 (2021 09:45), Max: 37.4 (2021 17:30)  T(F): 97.5 (2021 09:45), Max: 99.3 (2021 17:30)  HR: 66 (2021 09:45) (64 - 74)  BP: 139/65 (2021 09:45) (103/51 - 141/65)  BP(mean): 94 (2021 22:00) (74 - 94)  RR: 16 (2021 09:45) (15 - 18)  SpO2: 96% (2021 09:45) (93% - 97%)      Neurological Examination:  General:  Appearance is consistent with chronologic age.  No abnormal facies.  Gross skin survey within normal limits.    Cognitive/Language:  Awake, alert, and oriented to person, place, time and date.  Recent and remote memory intact.  Fund of knowledge is appropriate.  Mild dysarthria characterized by imprecise articulation secondary oral motor weakness. Mild dysphonia, characterized by rough vocal quality.  Cranial Nerves  - Eyes: Visual acuity intact, Visual fields full.  EOMI w/o nystagmus, skew or reported double vision.  PERRL.  No ptosis/weakness of eyelid closure.    - Face:  Facial sensation normal V1 - 3, mild facial droop  - Ears/Nose/Throat:  Hearing grossly intact b/l to finger rub.  Palate elevates midline.  Tongue and uvula midline.   Motor examination:  4-/5 proximal muscle weakness (deltoids, hip flexors) Otherwise 5/5 elsewhere.  No observable drift. Normal tone and bulk. No tenderness, twitching, tremors or involuntary movements.  Sensory examination:   Intact to light touch and pinprick, pain, temperature and proprioception and vibration in all extremities.  Cerebellum:   FTN/HKS intact.  No dysmetria or dysdiadokinesia.        Labs:   CBC Full  -  ( 2021 08:05 )  WBC Count : 32.49 K/uL  RBC Count : 3.13 M/uL  Hemoglobin : 9.3 g/dL  Hematocrit : 29.6 %  Platelet Count - Automated : 379 K/uL  Mean Cell Volume : 94.6 fl  Mean Cell Hemoglobin : 29.7 pg  Mean Cell Hemoglobin Concentration : 31.4 gm/dL  Auto Neutrophil # : 24.95 K/uL  Auto Lymphocyte # : 1.75 K/uL  Auto Monocyte # : 2.01 K/uL  Auto Eosinophil # : 2.89 K/uL  Auto Basophil # : 0.00 K/uL  Auto Neutrophil % : 76.8 %  Auto Lymphocyte % : 5.4 %  Auto Monocyte % : 6.2 %  Auto Eosinophil % : 8.9 %  Auto Basophil % : 0.0 %        142  |  109<H>  |  35<H>  ----------------------------<  81  4.4   |  19<L>  |  1.78<H>    Ca    8.9      2021 08:05  Phos  5.2       Mg     2.6         TPro  6.4  /  Alb  3.6  /  TBili  0.3  /  DBili  x   /  AST  21  /  ALT  13  /  AlkPhos  117      LIVER FUNCTIONS - ( 2021 08:05 )  Alb: 3.6 g/dL / Pro: 6.4 g/dL / ALK PHOS: 117 U/L / ALT: 13 U/L / AST: 21 U/L / GGT: x             Urinalysis Basic - ( 2021 01:05 )    Color: Yellow / Appearance: Clear / S.025 / pH: x  Gluc: x / Ketone: NEGATIVE  / Bili: Negative / Urobili: 0.2 E.U./dL   Blood: x / Protein: Trace mg/dL / Nitrite: NEGATIVE   Leuk Esterase: NEGATIVE / RBC: < 5 /HPF / WBC < 5 /HPF   Sq Epi: x / Non Sq Epi: 0-5 /HPF / Bacteria: Present /HPF      Lactate Dehydrogenase, CSF: 22 U/L (21 @ 02:33)  Glucose, CSF: 61 mg/dL (21 @ 13:43)  Protein, CSF: 52 mg/dL (21 @ 13:43)  CSF Appearance: Clear (21 @ 13:43)  CSF Color: No Color (21 @ 13:43)  CSF Segmented Neutrophils: 0 % (21 @ 13:43)  CSF Lymphocytes: 1 % (21 @ 13:43)  CSF Comments: Manual differential is based on 1 wbc counted after cytocentrifugation. (21 @ 13:43)  CSF PCR Result: NotDetec (21 @ 13:38)      Neuroimaging:  NCHCT:     21 @ 13:31       Neurology Consult    89M with PMHx of HTN and seizure on phenytoin presents with left facial droop and slurred speech. Pt was with family member (great-nephew Mesh [phone]) who noticed that pt had slurred speech, increased saliva production and a left facial droop. Pt was driven to the ED immediately. On presentation, /94, NIHSS 3. Great-nephew at bedside denied hx of stroke, use of blood thinners, recent trauma/bleeding event. CTH negative for acute hemorrhage. CTP without any perfusion defect. CTA without LVO. In conjunction with patient, family and stroke attending, decision was made to not give tpa since family felt his current deficits are non-disabling to his daily life vs risk of bleeding.     At baseline, pt is fully independent of all ADLs and iADLs, ambulates without assistance, with hearing loss b/l, baseline right arm tremor. Neurology consulted for persistent weakness. Stroke workup negative. LP performed given persistent weakness to rule out GBS vs Gatica-Lindsey.        PAST MEDICAL & SURGICAL HISTORY:  Hypertension    Seizure        FAMILY HISTORY:      Social History: (-) x 3    Allergies    hay fever (Unknown)  No Known Drug Allergies    Intolerances        MEDICATIONS  (STANDING):  acetylcysteine 10%  Inhalation 2 milliLiter(s) Inhalation three times a day  ampicillin/sulbactam  IVPB 1.5 Gram(s) IV Intermittent every 12 hours  aspirin enteric coated 81 milliGRAM(s) Oral daily  atorvastatin 40 milliGRAM(s) Oral at bedtime  dextrose 5%. 1000 milliLiter(s) (30 mL/Hr) IV Continuous <Continuous>  guaiFENesin Oral Liquid (Sugar-Free) 200 milliGRAM(s) Oral every 6 hours  heparin   Injectable 5000 Unit(s) SubCutaneous every 8 hours  levETIRAcetam  IVPB 500 milliGRAM(s) IV Intermittent every 12 hours    MEDICATIONS  (PRN):  sodium chloride 3%  Inhalation 4 milliLiter(s) Inhalation every 6 hours PRN productive cough      Review of systems:    Constitutional: as per HPI  Eyes: No eye pain or discharge  ENMT:  No difficulty hearing; No sinus or throat pain  Neck: No pain or stiffness  Respiratory: No cough, wheezing, chills or hemoptysis  Cardiovascular: No chest pain, palpitations, shortness of breath, dyspnea on exertion  Gastrointestinal: No abdominal pain, nausea, vomiting or hematemesis; No diarrhea or constipation.   Genitourinary: No dysuria, frequency, hematuria or incontinence  Neurological: As per HPI  Skin: No rashes or lesions   Endocrine: No heat or cold intolerance; No hair loss  Musculoskeletal: No joint pain or swelling  Psychiatric: No depression, anxiety, mood swings  Heme/Lymph: No easy bruising or bleeding gums    Vital Signs Last 24 Hrs  T(C): 36.4 (2021 09:45), Max: 37.4 (2021 17:30)  T(F): 97.5 (2021 09:45), Max: 99.3 (2021 17:30)  HR: 66 (2021 09:45) (64 - 74)  BP: 139/65 (2021 09:45) (103/51 - 141/65)  BP(mean): 94 (2021 22:00) (74 - 94)  RR: 16 (2021 09:45) (15 - 18)  SpO2: 96% (2021 09:45) (93% - 97%)      Neurological Examination:  General:  Appearance is consistent with chronologic age.  No abnormal facies.  Gross skin survey within normal limits.    Cognitive/Language:  Awake, alert, and oriented to person, place, time and date.  Recent and remote memory intact.  Fund of knowledge is appropriate.  Mild dysarthria characterized by imprecise articulation secondary oral motor weakness. Mild dysphonia, characterized by rough vocal quality.  Cranial Nerves  - Eyes: Visual acuity intact, Visual fields full.  EOMI w/o nystagmus, skew or reported double vision.  PERRL.  Mild b/l ptosis/weakness of eyelid closure.    - Face:  Facial sensation normal V1 - 3, mild facial droop  - Ears/Nose/Throat:  Hearing grossly intact b/l to finger rub.  Palate elevates midline.  Tongue and uvula midline.   Motor examination:  4-/5 proximal muscle weakness (deltoids, hip flexors), 5/5 distally.  No observable drift. Normal tone and bulk. No tenderness, twitching, tremors or involuntary movements.  Sensory examination:   Intact to light touch and pinprick, pain, temperature and proprioception and vibration in all extremities.  Cerebellum:   FTN/HKS intact.  No dysmetria or dysdiadokinesia.        Labs:   CBC Full  -  ( 2021 08:05 )  WBC Count : 32.49 K/uL  RBC Count : 3.13 M/uL  Hemoglobin : 9.3 g/dL  Hematocrit : 29.6 %  Platelet Count - Automated : 379 K/uL  Mean Cell Volume : 94.6 fl  Mean Cell Hemoglobin : 29.7 pg  Mean Cell Hemoglobin Concentration : 31.4 gm/dL  Auto Neutrophil # : 24.95 K/uL  Auto Lymphocyte # : 1.75 K/uL  Auto Monocyte # : 2.01 K/uL  Auto Eosinophil # : 2.89 K/uL  Auto Basophil # : 0.00 K/uL  Auto Neutrophil % : 76.8 %  Auto Lymphocyte % : 5.4 %  Auto Monocyte % : 6.2 %  Auto Eosinophil % : 8.9 %  Auto Basophil % : 0.0 %        142  |  109<H>  |  35<H>  ----------------------------<  81  4.4   |  19<L>  |  1.78<H>    Ca    8.9      2021 08:05  Phos  5.2       Mg     2.6         TPro  6.4  /  Alb  3.6  /  TBili  0.3  /  DBili  x   /  AST  21  /  ALT  13  /  AlkPhos  117      LIVER FUNCTIONS - ( 2021 08:05 )  Alb: 3.6 g/dL / Pro: 6.4 g/dL / ALK PHOS: 117 U/L / ALT: 13 U/L / AST: 21 U/L / GGT: x             Urinalysis Basic - ( 2021 01:05 )    Color: Yellow / Appearance: Clear / S.025 / pH: x  Gluc: x / Ketone: NEGATIVE  / Bili: Negative / Urobili: 0.2 E.U./dL   Blood: x / Protein: Trace mg/dL / Nitrite: NEGATIVE   Leuk Esterase: NEGATIVE / RBC: < 5 /HPF / WBC < 5 /HPF   Sq Epi: x / Non Sq Epi: 0-5 /HPF / Bacteria: Present /HPF      Lactate Dehydrogenase, CSF: 22 U/L (21 @ 02:33)  Glucose, CSF: 61 mg/dL (21 @ 13:43)  Protein, CSF: 52 mg/dL (21 @ 13:43)  CSF Appearance: Clear (21 @ 13:43)  CSF Color: No Color (21 @ 13:43)  CSF Segmented Neutrophils: 0 % (21 @ 13:43)  CSF Lymphocytes: 1 % (21 @ 13:43)  CSF Comments: Manual differential is based on 1 wbc counted after cytocentrifugation. (21 @ 13:43)  CSF PCR Result: NotDetec (21 @ 13:38)      Neuroimaging:  NCHCT:     21 @ 13:31

## 2021-07-21 NOTE — CONSULT NOTE ADULT - PROBLEM SELECTOR RECOMMENDATION 3
Patient has had persistent Leukocytosis, can possibly be from his Aspiration PNA, but Hematology is working up for reactive vs clonal causes infection/ stressed marrow from recent stroke/ CML/ CMML. They will await results from  Peripheral flow cytometry, BCR-ABL PCR, JAK2.   Patient is currently on Unasyn 1.5Grams q12h. Appreciate Heme onc involvment. Patient has had persistent Leukocytosis, can possibly be from his Aspiration PNA, but Hematology is working up for reactive vs clonal causes infection/ stressed marrow from recent stroke/ CML/ CMML. They will await results from  Peripheral flow cytometry, BCR-ABL PCR, JAK2.   Patient is currently on Unasyn 1.5Grams q12h. Appreciate Heme onc involvement.

## 2021-07-21 NOTE — PROGRESS NOTE ADULT - PROBLEM SELECTOR PLAN 4
- phenytoin level 4.1 (7/18/21)  - D/c home phenytoin 100mg TID  - CW Keppra 500 mg q 12h CTH negative for acute hemorrhage. CTP without any perfusion defect. CTA without LVO. MRI negative for acute ischemic stroke. Pt s/p ASA and Plavix load in ED  - continue aspirin 81mg   - hold plavix 75mg daily  - atorvastatin 40 mg daily  - BECKY withheld for now for high aspiration risk  - TTE mod-severe AS, EF 70%, grade 1 diastolic dysfunction

## 2021-07-21 NOTE — SPEECH LANGUAGE PATHOLOGY EVALUATION - SLP DIAGNOSIS
Pt presents with mild dysarthria characterized by imprecise articulation 2/2 oral motor weakness. Pt additionally presents with mild dysphonia, characterized by rough vocal quality, additionally confounding speech intelligibility. Pt presents with mild dysarthria characterized by imprecise articulation 2/2 oral motor weakness. Pt also with mild dysphonia, characterized by rough vocal quality, which further impacted speech intelligibility. Pt presents with mild dysarthria characterized by imprecise articulation 2/2 oral motor weakness. Pt also with mild dysphonia, characterized by rough vocal quality, which further impacted speech intelligibility. Will continue to follow for swallowing and speech/voice as pt continues to undergo work-up/tx, and devise goals based on presentation.

## 2021-07-21 NOTE — PROGRESS NOTE ADULT - PROBLEM SELECTOR PLAN 2
CTH negative for acute hemorrhage. CTP without any perfusion defect. CTA without LVO. MRI negative for acute ischemic stroke. Pt s/p ASA and Plavix load in ED  - continue aspirin 81mg   - d/c plavix 75mg daily  - atorvastatin 40 mg daily  - BECKY withheld for now for high aspiration risk  - TTE mod-severe AS, EF 70%, grade 1 diastolic dysfunction  - CSF done today ruling out GBS, MF

## 2021-07-21 NOTE — CONSULT NOTE ADULT - PROBLEM SELECTOR RECOMMENDATION 4
Patient currently full code. Palliative care was introduced to the wife and patient. Will continue to follow for GOC. Emotional support was provided.   As discussed during the palliative IDT meeting, the patients PSSA screening did not identify any current psychosocial need or spiritual support deficits.

## 2021-07-21 NOTE — SPEECH LANGUAGE PATHOLOGY EVALUATION - SLP PERTINENT HISTORY OF CURRENT PROBLEM
89y Male with PMHx of HTN and seizure on phenytoin presented with left facial droop and slurred speech, LKW 5pm 7/18/21, NIHSS 3. CTH negative for acute hemorrhage. CTP without any perfusion defect. CTA without LVO. MRI negative for acute ischemic stroke, CSF done given his weakness, ruling out GBS or MF.

## 2021-07-21 NOTE — PROGRESS NOTE ADULT - SUBJECTIVE AND OBJECTIVE BOX
Transfer from neurology to [address] [place] [address] course:   89y Male with PMHx of HTN and seizure on phenytoin presented with left facial droop and slurred speech, LKW 5pm 21, NIHSS 3. CTH negative for acute hemorrhage. CTP without any perfusion defect. CTA without LVO. MRI negative for acute ischemic stroke, CSF done given his weakness, ruling out GBS or MF. Patient was within the window for tpa, but pt and family (wife and great-nephew) declined as they felt his current deficits are non-disabling to his daily life vs risk of bleeding, now c/b leukocytosis. Oncology on board. Transferred to [facility] for further workup.      JAMA CANTRELL, 89y, Male  [id number]  Patient is a 89y old  Male who presents with a chief complaint of slurred speech, L facial droop (2021 17:23)      OVERNIGHT EVENTS: naeo    SUBJECTIVE: This morning the patient reported productive cough but was unable to describe the sputum color or quality. Denies fevers, chills, HA, chest pain, sob, nausea, vomiting, abdominal pain, diarrhea, constipation, dysuria.      12 Point ROS Negative unless noted otherwise above.  -------------------------------------------------------------------------------  VITAL SIGNS:  Vital Signs Last 24 Hrs  T(C): 36.8 (2021 22:26), Max: 37.4 (2021 17:30)  T(F): 98.3 (2021 22:26), Max: 99.3 (2021 17:30)  HR: 68 (:) (64 - 84)  BP: 129/64 (:26) (103/51 - 141/65)  BP(mean): 94 (2021 22:00) (74 - 94)  RR: 18 (:) (15 - 18)  SpO2: 93% (:26) (93% - 97%)  I&O's Summary    2021 07:01  -  2021 07:00  --------------------------------------------------------  IN: 0 mL / OUT: 600 mL / NET: -600 mL    2021 07:01  -  2021 05:48  --------------------------------------------------------  IN: 310 mL / OUT: 200 mL / NET: 110 mL        PHYSICAL EXAM:    General: thin elderly gentleman lying in bed in no acute distress   HEENT: (+) hearing loss, NC/AT; EOMI, PERRLA, anicteric sclera; moist mucosal membranes.  Neck: supple, trachea midline  Cardiovascular: (+) holostyolic murmur loudest at right 2nd ICS , RRR, +S1/S2  Respiratory: some ronchi in lower bases  Gastrointestinal: soft, NT/ND; +BSx4  Extremities: WWP; no edema or cyanosis  Vascular: 2+ radial, DP/PT pulses B/L  Neurological: AAOx3 (alert to name, place, and year); mild dysarthria; some left facial droop when smiling; no other focal deficits     ALLERGIES:  Allergies    hay fever (Unknown)  No Known Drug Allergies    Intolerances        MEDICATIONS:  MEDICATIONS  (STANDING):  ampicillin/sulbactam  IVPB 1.5 Gram(s) IV Intermittent every 12 hours  aspirin enteric coated 81 milliGRAM(s) Oral daily  atorvastatin 40 milliGRAM(s) Oral at bedtime  dextrose 5%. 1000 milliLiter(s) (30 mL/Hr) IV Continuous <Continuous>  guaiFENesin Oral Liquid (Sugar-Free) 200 milliGRAM(s) Oral every 6 hours  heparin   Injectable 5000 Unit(s) SubCutaneous every 8 hours  levETIRAcetam  IVPB 500 milliGRAM(s) IV Intermittent every 12 hours    MEDICATIONS  (PRN):  sodium chloride 3%  Inhalation 4 milliLiter(s) Inhalation every 6 hours PRN productive cough      -------------------------------------------------------------------------------  LABS:                        10.4   33.71 )-----------( 397      ( 2021 05:50 )             32.3     07-20    144  |  109<H>  |  33<H>  ----------------------------<  85  4.5   |  21<L>  |  1.65<H>    Ca    9.0      2021 05:50  Phos  4.6     07-20  Mg     2.4     07-20          Urinalysis Basic - ( 2021 01:05 )    Color: Yellow / Appearance: Clear / S.025 / pH: x  Gluc: x / Ketone: NEGATIVE  / Bili: Negative / Urobili: 0.2 E.U./dL   Blood: x / Protein: Trace mg/dL / Nitrite: NEGATIVE   Leuk Esterase: NEGATIVE / RBC: < 5 /HPF / WBC < 5 /HPF   Sq Epi: x / Non Sq Epi: 0-5 /HPF / Bacteria: Present /HPF      CAPILLARY BLOOD GLUCOSE      POCT Blood Glucose.: 143 mg/dL (2021 11:45)      Culture - CSF with Gram Stain (collected 2021 13:46)  Source: .CSF CSF  Gram Stain (2021 14:51):    No organisms seen    No WBC's seen.    Culture - Sputum (collected 2021 11:51)  Source: .Sputum Sputum  Gram Stain (2021 17:04):    Few epithelial cells    Rare White blood cells    Few Gram positive cocci in pairs, chains and clusters    Few Gram Negative Rods    Rare Gram Negative Diplococci  Final Report (2021 17:04):    Sputum specimen rejected.  Microscopic examination indicates    oropharyngeal contamination.  Please repeat.    Culture - Urine (collected 2021 21:53)  Source: .Urine Clean Catch (Midstream)  Final Report (2021 13:02):    Normal Urogenital guille present    Culture - Blood (collected 2021 20:52)  Source: .Blood Blood-Peripheral  Preliminary Report (2021 21:01):    No growth at 2 days.    Culture - Blood (collected 2021 20:52)  Source: .Blood Blood-Peripheral  Preliminary Report (2021 21:01):    No growth at 2 days.      COVID-19 PCR: NotDetec (2021 20:09)      RADIOLOGY & ADDITIONAL TESTS: Reviewed.     Transfer from stroke service to [address] [place] [address] course:   89y Male with PMHx of HTN and seizure on phenytoin presented with left facial droop and slurred speech, LKW 5pm 21, NIHSS 3. CTH negative for acute hemorrhage. CTP without any perfusion defect. CTA without LVO. MRI negative for acute ischemic stroke, CSF done given his weakness, ruling out GBS or MF. Patient was within the window for tpa, but pt and family (wife and great-nephew) declined as they felt his current deficits are non-disabling to his daily life vs risk of bleeding, now c/b leukocytosis. Oncology on board. Transferred to [facility] for further workup.      JAMA CANTRELL, 89y, Male  [id number]  Patient is a 89y old  Male who presents with a chief complaint of slurred speech, L facial droop (2021 17:23)      OVERNIGHT EVENTS: naeo    SUBJECTIVE: This morning the patient reported productive cough but was unable to describe the sputum color or quality. Denies fevers, chills, HA, chest pain, sob, nausea, vomiting, abdominal pain, diarrhea, constipation, dysuria.      12 Point ROS Negative unless noted otherwise above.  -------------------------------------------------------------------------------  VITAL SIGNS:  Vital Signs Last 24 Hrs  T(C): 36.8 (2021 22:26), Max: 37.4 (2021 17:30)  T(F): 98.3 (2021 22:26), Max: 99.3 (2021 17:30)  HR: 68 (:) (64 - 84)  BP: 129/64 (:26) (103/51 - 141/65)  BP(mean): 94 (2021 22:00) (74 - 94)  RR: 18 (:) (15 - 18)  SpO2: 93% (:26) (93% - 97%)  I&O's Summary    2021 07:01  -  2021 07:00  --------------------------------------------------------  IN: 0 mL / OUT: 600 mL / NET: -600 mL    2021 07:01  -  2021 05:48  --------------------------------------------------------  IN: 310 mL / OUT: 200 mL / NET: 110 mL        PHYSICAL EXAM:    General: thin elderly gentleman lying in bed in no acute distress   HEENT: (+) hearing loss, NC/AT; EOMI, PERRLA, anicteric sclera; moist mucosal membranes.  Neck: supple, trachea midline  Cardiovascular: (+) holostyolic murmur loudest at right 2nd ICS , RRR, +S1/S2  Respiratory: some ronchi in lower bases  Gastrointestinal: soft, NT/ND; +BSx4  Extremities: WWP; no edema or cyanosis  Vascular: 2+ radial, DP/PT pulses B/L  Neurological: AAOx3 (alert to name, place, and year); mild dysarthria; some left facial droop when smiling; no other focal deficits     ALLERGIES:  Allergies    hay fever (Unknown)  No Known Drug Allergies    Intolerances        MEDICATIONS:  MEDICATIONS  (STANDING):  ampicillin/sulbactam  IVPB 1.5 Gram(s) IV Intermittent every 12 hours  aspirin enteric coated 81 milliGRAM(s) Oral daily  atorvastatin 40 milliGRAM(s) Oral at bedtime  dextrose 5%. 1000 milliLiter(s) (30 mL/Hr) IV Continuous <Continuous>  guaiFENesin Oral Liquid (Sugar-Free) 200 milliGRAM(s) Oral every 6 hours  heparin   Injectable 5000 Unit(s) SubCutaneous every 8 hours  levETIRAcetam  IVPB 500 milliGRAM(s) IV Intermittent every 12 hours    MEDICATIONS  (PRN):  sodium chloride 3%  Inhalation 4 milliLiter(s) Inhalation every 6 hours PRN productive cough      -------------------------------------------------------------------------------  LABS:                        10.4   33.71 )-----------( 397      ( 2021 05:50 )             32.3     07-20    144  |  109<H>  |  33<H>  ----------------------------<  85  4.5   |  21<L>  |  1.65<H>    Ca    9.0      2021 05:50  Phos  4.6     07-20  Mg     2.4     07-20          Urinalysis Basic - ( 2021 01:05 )    Color: Yellow / Appearance: Clear / S.025 / pH: x  Gluc: x / Ketone: NEGATIVE  / Bili: Negative / Urobili: 0.2 E.U./dL   Blood: x / Protein: Trace mg/dL / Nitrite: NEGATIVE   Leuk Esterase: NEGATIVE / RBC: < 5 /HPF / WBC < 5 /HPF   Sq Epi: x / Non Sq Epi: 0-5 /HPF / Bacteria: Present /HPF      CAPILLARY BLOOD GLUCOSE      POCT Blood Glucose.: 143 mg/dL (2021 11:45)      Culture - CSF with Gram Stain (collected 2021 13:46)  Source: .CSF CSF  Gram Stain (2021 14:51):    No organisms seen    No WBC's seen.    Culture - Sputum (collected 2021 11:51)  Source: .Sputum Sputum  Gram Stain (2021 17:04):    Few epithelial cells    Rare White blood cells    Few Gram positive cocci in pairs, chains and clusters    Few Gram Negative Rods    Rare Gram Negative Diplococci  Final Report (2021 17:04):    Sputum specimen rejected.  Microscopic examination indicates    oropharyngeal contamination.  Please repeat.    Culture - Urine (collected 2021 21:53)  Source: .Urine Clean Catch (Midstream)  Final Report (2021 13:02):    Normal Urogenital guille present    Culture - Blood (collected 2021 20:52)  Source: .Blood Blood-Peripheral  Preliminary Report (2021 21:01):    No growth at 2 days.    Culture - Blood (collected 2021 20:52)  Source: .Blood Blood-Peripheral  Preliminary Report (2021 21:01):    No growth at 2 days.      COVID-19 PCR: NotDetec (2021 20:09)      RADIOLOGY & ADDITIONAL TESTS: Reviewed.

## 2021-07-21 NOTE — PROGRESS NOTE ADULT - PROBLEM SELECTOR PLAN 2
CTH negative for acute hemorrhage. CTP without any perfusion defect. CTA without LVO. MRI negative for acute ischemic stroke. Pt s/p ASA and Plavix load in ED  - continue aspirin 81mg   - d/c plavix 75mg daily  - atorvastatin 40 mg daily  - BECKY withheld for now for high aspiration risk  - TTE mod-severe AS, EF 70%, grade 1 diastolic dysfunction  - CSF done today ruling out GBS, MF -BUN, Cr increased today  -D5% 1L 30cc/hr started  for 12hrs  -monitor I&O  -avoid nephrotoxins

## 2021-07-21 NOTE — SPEECH LANGUAGE PATHOLOGY EVALUATION - SLP GENERAL OBSERVATIONS
Pt received awake, alert, in bed accompanied by wife. Pt received awake, alert, in bed, w wife at bedside

## 2021-07-21 NOTE — PROGRESS NOTE ADULT - ASSESSMENT
89 M with a PMHx of seizures (on phenytoin at home) and HTN presented with new facial droop and slurring of speech. Admitted to stroke service where stroke work up was found to be negative. Transferred to [facility] for work up of leukocytosis and possible sinusitis vs aspiration pneumonia.

## 2021-07-21 NOTE — PROGRESS NOTE ADULT - PROBLEM SELECTOR PLAN 7
-holding home norvasc- benazapril for permissive htn   -resume as needed -holding home norvasc- benazapril for permissive htn   -resume as needed  -per neuro goal -140

## 2021-07-21 NOTE — PROGRESS NOTE ADULT - PROBLEM SELECTOR PLAN 9
F: d5 30cc/hr for 12hrs  E: replete K<4 and Mg <2  N: DASH  D: heparin sq and SCDs for DVT prophylaxis

## 2021-07-21 NOTE — PROGRESS NOTE ADULT - PROBLEM SELECTOR PLAN 3
Failed FEES and speech and swallow   -strict NPO   -pending GOC with palliative as patient may need long term nutrition  -Gen neuro to continue to follow for further eval -Possibly 2/2 myasthenia gravis vs GBS  -Will begin IVIG 400mg/kg/day per neuro  -Failed FEES and speech and swallow   -strict NPO   -pending GOC with palliative as patient may need long term nutrition  -Gen neuro to continue to follow for further eval

## 2021-07-21 NOTE — CONSULT NOTE ADULT - PROBLEM SELECTOR RECOMMENDATION 2
Per patients FEES, he appears to have had Silent aspiration. Appreciate Speech and swallow involvement. Patients wife does not want feeding tube, when asked about it.

## 2021-07-21 NOTE — SPEECH LANGUAGE PATHOLOGY EVALUATION - SLP BEHAVIORAL OBSERVATIONS
Monitor medication use for appropriate amount. Check blood sugars 4 times a day. Eat snacks throughout the day. Hypoglycemia: Care Instructions  Your Care Instructions  Hypoglycemia means that your blood sugar is low and your body is not getting enough fuel. Some people get low blood sugar from not eating often enough. Some medicines to treat diabetes can cause low blood sugar. People who have had surgery on their stomachs or intestines may get hypoglycemia. Problems with the pancreas, kidneys, or liver also can cause low blood sugar. A snack or drink with sugar in it will raise your blood sugar and should ease your symptoms right away. Your doctor may recommend that you change or stop your medicines until you can get your blood sugar levels under control. In the long run, you may need to change your diet and eating habits so that you get enough fuel for your body throughout the day. Follow-up care is a key part of your treatment and safety. Be sure to make and go to all appointments, and call your doctor if you are having problems. It's also a good idea to know your test results and keep a list of the medicines you take. How can you care for yourself at home? · Learn to recognize the early signs of low blood sugar. Signs include:  ¨ Nausea. ¨ Hunger. ¨ Feeling nervous, irritable, or shaky. ¨ Cold, clammy, wet skin. ¨ Sweating (when you are not exercising). ¨ A fast heartbeat. ¨ Numbness or tingling of the fingertips or lips. · If you feel an episode of low blood sugar coming on, drink fruit juice or sugared (not diet) soda, or eat sugar in the form of candy, cubes, or tablets. [employer] are another [employer]. · Eat small, frequent meals so that you do not get too hungry between meals. · Balance extra exercise with eating more.   · Keep a written record of your low blood sugar episodes, including when you last ate and what you ate, so that you can learn what causes your blood sugar to drop.  · Make sure your family, friends, and coworkers know the symptoms of low blood sugar and know what to do to get your sugar level up. · Wear medical alert jewelry that lists your condition. You can buy this at most drugstores. When should you call for help? Call 911 anytime you think you may need emergency care. For example, call if:  · You have a seizure. · You passed out (lost consciousness), or you suddenly become very sleepy or confused. (You may have very low blood sugar.)  Call your doctor now or seek immediate medical care if:  · You have symptoms of low blood sugar, such as:  ¨ Sweating. ¨ Feeling nervous, shaky, and weak. ¨ Extreme hunger and slight nausea. ¨ Dizziness and headache. ¨ Blurred vision. ¨ Confusion. Watch closely for changes in your health, and be sure to contact your doctor if:  · Your symptoms continue or return. Where can you learn more? Go to http://jethro-enzo.info/. Enter G830 in the search box to learn more about \"Hypoglycemia: Care Instructions. \"  Current as of: May 23, 2016  Content Version: 11.1  © 1812-5791 [employer]. Care instructions adapted under license by [employer] (which disclaims liability or warranty for this information). If you have questions about a medical condition or this instruction, always ask your healthcare professional. [address] any warranty or liability for your use of this information. WFL with moments of lethargy WFL

## 2021-07-21 NOTE — CONSULT NOTE ADULT - ATTENDING COMMENTS
Although he was initially said to have acute facial droop and dysarthria, family at bedside describe a more gradually progressive dysarthria and dysphagia, as well as generalized weakness, worse over the past 3 months  On exam he has mild b/l ptosis and facial weakness, flaccid dysarthria, and proximal muscle weakness - concerning for myasthenia gravis  f/u AchR antibodies  Possible rep stim tomorrow  If no medical contraindication, start IVIG at 0.4gm/kg/day x 5 days   Will follow Although he was initially said to have acute facial droop and dysarthria, family at bedside describe a more gradually progressive dysarthria and dysphagia, as well as generalized weakness, worse over the past 3 months  On exam he has mild b/l ptosis and facial weakness, flaccid dysarthria, and proximal muscle weakness - concerning for myasthenia gravis  MRI brain - no acute stroke    Plan:  f/u AchR antibodies  Possible rep stim tomorrow  If no medical contraindication, start IVIG at 0.4gm/kg/day x 5 days   Will follow

## 2021-07-21 NOTE — CONSULT NOTE ADULT - SUBJECTIVE AND OBJECTIVE BOX
JAMA CANTRELL          MRN-[id number]            ([id number])    HPI:   **STROKE HPI***    HPI: 89y Male with PMHx of HTN and seizure on phenytoin presents with left facial droop and slurred speech. Pt was with family member (great-nephew Mesh [phone]) who noticed at 5pm, pt had acute onset of slurred speech, increased saliva production and a left facial droop. Pt was driven to the ED immediately. On presentation, /94, NIHSS 3. Great-nephew at bedside denies hx of stroke, use of blood thinners, recent trauma/bleeding event. CTH negative for acute hemorrhage. CTP without any perfusion defect. CTA without LVO. In conjunction with patient, family and stroke attending, decision was made to not give tpa since family felt his current deficits are non-disabling to his daily life vs risk of bleeding. Pt and family was made aware of the possibility of worsening symptoms and the opportunity to give tpa was time limited. They expressed understanding and declined tpa administration.     Pt denies CP, SOB, extremity weakness, changes in vision, HA. He endorses a cough that has been occurring for the past few days with sputum production, but has been afebrile. Pt given vanc and zosyn x1 in the ED. CXR pending official read. Patient initially failed dysphagia screen and was given  SD, but passed second dysphagia screen so was able to to loaded with plavix 300 PO.     At baseline, pt is fully independent of all ADLs and iADLs, ambulates without assistance, with hearing loss b/l, baseline right arm tremor. It is unclear if pt sees a neurologist for seizure management. Per wife (Ross [phone], [phone]), his most recent seizure was in 2020 where he had full body shaking lasting for 5 minutes. He has been on phenytoin 100mg TID and has not had another seizure since.       FAMILY HISTORY:   Reviewed and found non contributory in mother or father    SOCIAL HISTORY: Patient lives with wife in apartment.  Smoking status: denies  Drinking: denies  Drug Use: denies    ROS:    Unable to attain due to:  n/a                     Dyspnea ([place] 0-10):  0                      N/V (Y/N):            N                  Secretions (Y/N) :N                Agitation(Y/N): N  Pain (Y/N):     N  -Provocation/Palliation: n/a   -Quality/Quantity: n/a   -Radiating: n/a   -Severity: n/a   -Timing/Frequency: n/a   -Impact on ADLs: n/a     General:  + weakness   HEENT:    Denied  Neck:  Denied  CVS:  Denied  Resp:  Denied  GI:  Denied  :  Denied  Musc:  Denied  Neuro:  Denied  Psych:  Denied  Skin:  Denied  Lymph:  Denied    Allergies    hay fever (Unknown)  No Known Drug Allergies    Intolerances      Opiate Naive (Y/N): Y  -iStop reviewed (Y/N): Y  (Ref#:  : [id number]         )    Medications:      MEDICATIONS  (STANDING):  ampicillin/sulbactam  IVPB 1.5 Gram(s) IV Intermittent every 12 hours  aspirin enteric coated 81 milliGRAM(s) Oral daily  atorvastatin 40 milliGRAM(s) Oral at bedtime  dextrose 5%. 1000 milliLiter(s) (30 mL/Hr) IV Continuous <Continuous>  guaiFENesin Oral Liquid (Sugar-Free) 200 milliGRAM(s) Oral every 6 hours  heparin   Injectable 5000 Unit(s) SubCutaneous every 8 hours  levETIRAcetam  IVPB 500 milliGRAM(s) IV Intermittent every 12 hours    MEDICATIONS  (PRN):  sodium chloride 3%  Inhalation 4 milliLiter(s) Inhalation every 6 hours PRN productive cough      Labs:    CBC:                        9.3    32.49 )-----------( 379      ( 2021 08:05 )             29.6     CMP:        142  |  109<H>  |  35<H>  ----------------------------<  81  4.4   |  19<L>  |  1.78<H>    Ca    8.9      2021 08:05  Phos  5.2       Mg     2.6         TPro  6.4  /  Alb  3.6  /  TBili  0.3  /  DBili  x   /  AST  21  /  ALT  13  /  AlkPhos  117        Urinalysis Basic - ( 2021 01:05 )    Color: Yellow / Appearance: Clear / S.025 / pH: x  Gluc: x / Ketone: NEGATIVE  / Bili: Negative / Urobili: 0.2 E.U./dL   Blood: x / Protein: Trace mg/dL / Nitrite: NEGATIVE   Leuk Esterase: NEGATIVE / RBC: < 5 /HPF / WBC < 5 /HPF   Sq Epi: x / Non Sq Epi: 0-5 /HPF / Bacteria: Present /HPF    Imaging:   < from: Xray Chest 1 View- PORTABLE-Routine (Xray Chest 1 View- PORTABLE-Routine in AM.) (21 @ 06:37) >  EXAM:  XR CHEST PORTABLE ROUTINE 1V                          PROCEDURE DATE:  2021    Findings/  impression: Heart size within normal limits, thoracic aortic calcification. No acute focal opacity. Right diaphragmatic hernia. Stable bony structures.    < end of copied text >    < from: MR Head No Cont (21 @ 23:05) >  EXAM:  MR BRAIN                          PROCEDURE DATE:  2021  IMPRESSION:  No acute intracranial abnormality. No evidence of acute infarct.    < end of copied text >    < from: CT Brain Stroke Protocol (21 @ 19:44) >    EXAM:  CT BRAIN STROKE PROTOCOL                          PROCEDURE DATE:  2021    IMPRESSION:  No acute intracranial hemorrhage or transcortical infarct.    < end of copied text >    < from: CT Angio Head w/ IV Cont (21 @ 19:45) >  EXAM:  CT ANGIO NECK (W)AW IC                          EXAM:  CT ANGIO BRAIN (W)AW IC                          PROCEDURE DATE:  2021  IMPRESSION:  No large vessel occlusion or high-grade stenosis within the head and neck.    < end of copied text >      PEx:  T(C): 36.4 (21 @ 09:45), Max: 37.4 (21 @ 17:30)  HR: 66 (21 @ 09:45) (64 - 84)  BP: 139/65 (21 @ 09:45) (103/51 - 141/65)  RR: 16 (21 @ 09:45) (15 - 18)  SpO2: 60.1kg  Daily     Daily   CAPILLARY BLOOD GLUCOSE    POCT Blood Glucose.: 143 mg/dL (2021 11:45)    I&O's Summary    2021 07:01  -  2021 07:00  --------------------------------------------------------  IN: 500 mL / OUT: 200 mL / NET: 300 mL    2021 07:01  -  2021 11:34  --------------------------------------------------------  IN: 0 mL / OUT: 275 mL / NET: -275 mL    GENERAL:  [ x]Alert  [x ]Oriented x  4 [ ]Lethargic  [ ]Cachexia  [ ]Unarousable  [ ]Verbal  [ ]Non-Verbal  Behavioral:   [ ] Anxiety  [ ] Delirium [ ] Agitation [x ] Other - calm   HEENT:  [x ]Normal   [ ]Dry mouth   [ ]ET Tube/Trach  [ ]Oral lesions  PULMONARY:   [ ]Clear  [ ]Tachypnea  [ ]Audible excessive secretions   [x ]Rhonchi      [ ]Right [ ]Left [ ]Bilateral  [ ]Crackles        [ ]Right [ ]Left [ ]Bilateral  [ ]Wheezing     [ ]Right [ ]Left [ ]Bilateral  CARDIOVASCULAR:    [x ]Regular [ ]Irregular [ ]Tachy  [ ]Anatoliy [ x]Murmur [ ]Other  GASTROINTESTINAL:  [x ]Soft  [ ]Distended   [ ]+BS  [ x]Non tender [ ]Tender  [ ]PEG [ ]OGT/ NGT  Last BM:   GENITOURINARY:  [ x]Normal [ ] Incontinent   [ ]Oliguria/Anuria   [ ]Cruz  MUSCULOSKELETAL:   [ x]Normal   [ ]Weakness  [ ]Bed/Wheelchair bound [ ]Edema  NEUROLOGIC:   [x ]No focal deficits  [ ] Cognitive impairment  [ ] Dysphagia [ ]Dysarthria [ ] Paresis [ ]Other   SKIN:   [x ]Normal   [ ]Pressure ulcer(s)  [ ]Rash      Preadmit Karnofsky:  80%           Current Karnofsky:     70 %  Cachexia (Y/N): N  BMI: 21.4kg    Advanced Directives:     Full Code     DNR/DNI     MOLST     HCP     DPOA     Living Will     DECISION MAKER: Patient is able to make his own decisions   LEGAL SURROGATE:  Ross (wife): [phone], [phone]    GOALS OF CARE DISCUSSION       Palliative care info/counseling provided	           Documentation of GOC: 	Full code           REFERRALS	        Palliative Med        Unit SW/Case Mgmt       Speech/Swallow       PT/OT

## 2021-07-21 NOTE — PROGRESS NOTE ADULT - PROBLEM SELECTOR PLAN 9
F: none  E: replete K<4 and Mg <2  N: DASH  D: heparin sq and SCDs for DVT prophylaxis F: d5 30cc/hr for 12hrs  E: replete K<4 and Mg <2  N: DASH  D: heparin sq and SCDs for DVT prophylaxis

## 2021-07-21 NOTE — PROGRESS NOTE ADULT - PROBLEM SELECTOR PLAN 5
-creatinine improving  -monitor I&O  -avoid nephrotoxins  -d5 30cc/hr for 12hrs - phenytoin level 4.1 (7/18/21)  - continue to hold home phenytoin 100mg TID  -  Keppra 500 mg q 12h

## 2021-07-21 NOTE — CONSULT NOTE ADULT - ASSESSMENT
89 yo M with history of seizures and HTN was admitted to neurology service for stroke symptoms. He was being managed for ischemic stroke and was transferred to Medicine. Hematology consulted for persistent leukocytosis. Patient denies any prior history of known leukocytosis. Denies any recent history of B symptoms (Fevers, fatigues, night sweats, unexplained weight loss, loss of appetite).     #Leukocytosis  - WBC count of 33k with Neutrophilia (24k), Monocytosis (2k) , eosinophilia (2.89k) and with promyelocytes and myelocytes noted on smear  - DD include reactive vs clonal causes infection/ stressed marrow from recent stroke/ CML/ CMML  - Will check Peripheral flow cytometry, BCR-ABL PCR, JAK2. In addition infectious workup per primary team  - Will follow up with patient with results  - Can set up outpatient hematology follow up with Dr. Nguyen on a Thursday (after discharge)     #Anemia  - Normocytic anemia, with iron panel consistent with anemia of chronic disease/ renal disease  - B12/folate wnl. No evidence of hemolysis  - Given CKD and Anemia, a monoclonal gammopathy workup was ordered with SPEP, IFX, FLC ratio  - Will follow up results    D/w Dr. Soni

## 2021-07-21 NOTE — SPEECH LANGUAGE PATHOLOGY EVALUATION - SLP ORAL READING ABILITY
Pt presented with [facility] oral reading ability with words and sentences./intact/words/sentences Pt presented with [facility] oral reading skills with words and sentences./intact/words/sentences intact/words/sentences

## 2021-07-22 DIAGNOSIS — E87.2 ACIDOSIS: ICD-10-CM

## 2021-07-22 DIAGNOSIS — E83.39 OTHER DISORDERS OF PHOSPHORUS METABOLISM: ICD-10-CM

## 2021-07-22 DIAGNOSIS — Z71.89 OTHER SPECIFIED COUNSELING: ICD-10-CM

## 2021-07-22 LAB
ANION GAP SERPL CALC-SCNC: 19 MMOL/L — HIGH (ref 5–17)
ANISOCYTOSIS BLD QL: SLIGHT — SIGNIFICANT CHANGE UP
BASOPHILS # BLD AUTO: 0.23 K/UL — HIGH (ref 0–0.2)
BASOPHILS NFR BLD AUTO: 0.9 % — SIGNIFICANT CHANGE UP (ref 0–2)
BUN SERPL-MCNC: 42 MG/DL — HIGH (ref 7–23)
BURR CELLS BLD QL SMEAR: PRESENT — SIGNIFICANT CHANGE UP
CALCIUM SERPL-MCNC: 8.8 MG/DL — SIGNIFICANT CHANGE UP (ref 8.4–10.5)
CHLORIDE SERPL-SCNC: 108 MMOL/L — SIGNIFICANT CHANGE UP (ref 96–108)
CO2 SERPL-SCNC: 15 MMOL/L — LOW (ref 22–31)
CREAT SERPL-MCNC: 1.9 MG/DL — HIGH (ref 0.5–1.3)
EOSINOPHIL # BLD AUTO: 3.39 K/UL — HIGH (ref 0–0.5)
EOSINOPHIL NFR BLD AUTO: 13.3 % — HIGH (ref 0–6)
GLUCOSE BLDC GLUCOMTR-MCNC: 71 MG/DL — SIGNIFICANT CHANGE UP (ref 70–99)
GLUCOSE BLDC GLUCOMTR-MCNC: 75 MG/DL — SIGNIFICANT CHANGE UP (ref 70–99)
GLUCOSE BLDC GLUCOMTR-MCNC: 78 MG/DL — SIGNIFICANT CHANGE UP (ref 70–99)
GLUCOSE BLDC GLUCOMTR-MCNC: 82 MG/DL — SIGNIFICANT CHANGE UP (ref 70–99)
GLUCOSE SERPL-MCNC: 65 MG/DL — LOW (ref 70–99)
HCT VFR BLD CALC: 30.6 % — LOW (ref 39–50)
HGB BLD-MCNC: 9.6 G/DL — LOW (ref 13–17)
IGG SERPL-MCNC: 1319 MG/DL — SIGNIFICANT CHANGE UP (ref 603–1613)
IGG1 SER-MCNC: 864 MG/DL — HIGH (ref 248–810)
IGG2 SER-MCNC: 282 MG/DL — SIGNIFICANT CHANGE UP (ref 130–555)
IGG3 SER-MCNC: 74 MG/DL — SIGNIFICANT CHANGE UP (ref 15–102)
IGG4 SER-MCNC: 10 MG/DL — SIGNIFICANT CHANGE UP (ref 2–96)
LYMPHOCYTES # BLD AUTO: 2.01 K/UL — SIGNIFICANT CHANGE UP (ref 1–3.3)
LYMPHOCYTES # BLD AUTO: 7.9 % — LOW (ref 13–44)
MACROCYTES BLD QL: SLIGHT — SIGNIFICANT CHANGE UP
MAGNESIUM SERPL-MCNC: 2.5 MG/DL — SIGNIFICANT CHANGE UP (ref 1.6–2.6)
MANUAL SMEAR VERIFICATION: SIGNIFICANT CHANGE UP
MCHC RBC-ENTMCNC: 30.4 PG — SIGNIFICANT CHANGE UP (ref 27–34)
MCHC RBC-ENTMCNC: 31.4 GM/DL — LOW (ref 32–36)
MCV RBC AUTO: 96.8 FL — SIGNIFICANT CHANGE UP (ref 80–100)
METAMYELOCYTES # FLD: 0.9 % — HIGH (ref 0–0)
MICROCYTES BLD QL: SLIGHT — SIGNIFICANT CHANGE UP
MONOCYTES # BLD AUTO: 1.12 K/UL — HIGH (ref 0–0.9)
MONOCYTES NFR BLD AUTO: 4.4 % — SIGNIFICANT CHANGE UP (ref 2–14)
MYELOCYTES NFR BLD: 6.2 % — HIGH (ref 0–0)
NEUTROPHILS # BLD AUTO: 16.91 K/UL — HIGH (ref 1.8–7.4)
NEUTROPHILS NFR BLD AUTO: 66.4 % — SIGNIFICANT CHANGE UP (ref 43–77)
OVALOCYTES BLD QL SMEAR: SLIGHT — SIGNIFICANT CHANGE UP
PHOSPHATE SERPL-MCNC: 6 MG/DL — HIGH (ref 2.5–4.5)
PLAT MORPH BLD: ABNORMAL
PLATELET # BLD AUTO: 373 K/UL — SIGNIFICANT CHANGE UP (ref 150–400)
POLYCHROMASIA BLD QL SMEAR: SLIGHT — SIGNIFICANT CHANGE UP
POTASSIUM SERPL-MCNC: 4.2 MMOL/L — SIGNIFICANT CHANGE UP (ref 3.5–5.3)
POTASSIUM SERPL-SCNC: 4.2 MMOL/L — SIGNIFICANT CHANGE UP (ref 3.5–5.3)
RBC # BLD: 3.16 M/UL — LOW (ref 4.2–5.8)
RBC # FLD: 14 % — SIGNIFICANT CHANGE UP (ref 10.3–14.5)
RBC BLD AUTO: ABNORMAL
SODIUM SERPL-SCNC: 142 MMOL/L — SIGNIFICANT CHANGE UP (ref 135–145)
SPHEROCYTES BLD QL SMEAR: SLIGHT — SIGNIFICANT CHANGE UP
WBC # BLD: 25.47 K/UL — HIGH (ref 3.8–10.5)
WBC # FLD AUTO: 25.47 K/UL — HIGH (ref 3.8–10.5)

## 2021-07-22 PROCEDURE — 99233 SBSQ HOSP IP/OBS HIGH 50: CPT | Mod: 25

## 2021-07-22 PROCEDURE — 99233 SBSQ HOSP IP/OBS HIGH 50: CPT | Mod: GC

## 2021-07-22 PROCEDURE — 99233 SBSQ HOSP IP/OBS HIGH 50: CPT

## 2021-07-22 PROCEDURE — 95907 NVR CNDJ TST 1-2 STUDIES: CPT | Mod: 26

## 2021-07-22 PROCEDURE — 99358 PROLONG SERVICE W/O CONTACT: CPT

## 2021-07-22 PROCEDURE — 95937 NEUROMUSCULAR JUNCTION TEST: CPT | Mod: 26

## 2021-07-22 RX ORDER — ACETAMINOPHEN 500 MG
650 TABLET ORAL ONCE
Refills: 0 | Status: COMPLETED | OUTPATIENT
Start: 2021-07-22 | End: 2021-07-22

## 2021-07-22 RX ORDER — SODIUM CHLORIDE 9 MG/ML
1000 INJECTION, SOLUTION INTRAVENOUS
Refills: 0 | Status: DISCONTINUED | OUTPATIENT
Start: 2021-07-22 | End: 2021-07-22

## 2021-07-22 RX ORDER — DIPHENHYDRAMINE HCL 50 MG
25 CAPSULE ORAL ONCE
Refills: 0 | Status: COMPLETED | OUTPATIENT
Start: 2021-07-22 | End: 2021-07-22

## 2021-07-22 RX ORDER — SODIUM CHLORIDE 9 MG/ML
1000 INJECTION, SOLUTION INTRAVENOUS
Refills: 0 | Status: DISCONTINUED | OUTPATIENT
Start: 2021-07-22 | End: 2021-07-23

## 2021-07-22 RX ORDER — AMLODIPINE BESYLATE 2.5 MG/1
1 TABLET ORAL
Qty: 0 | Refills: 0 | DISCHARGE

## 2021-07-22 RX ADMIN — LEVETIRACETAM 400 MILLIGRAM(S): 250 TABLET, FILM COATED ORAL at 17:08

## 2021-07-22 RX ADMIN — AMPICILLIN SODIUM AND SULBACTAM SODIUM 100 GRAM(S): 250; 125 INJECTION, POWDER, FOR SUSPENSION INTRAMUSCULAR; INTRAVENOUS at 16:03

## 2021-07-22 RX ADMIN — SODIUM CHLORIDE 30 MILLILITER(S): 9 INJECTION, SOLUTION INTRAVENOUS at 09:30

## 2021-07-22 RX ADMIN — IMMUNE GLOBULIN (HUMAN) 31.25 GRAM(S): 10 INJECTION INTRAVENOUS; SUBCUTANEOUS at 19:07

## 2021-07-22 RX ADMIN — HEPARIN SODIUM 5000 UNIT(S): 5000 INJECTION INTRAVENOUS; SUBCUTANEOUS at 21:05

## 2021-07-22 RX ADMIN — LEVETIRACETAM 400 MILLIGRAM(S): 250 TABLET, FILM COATED ORAL at 06:32

## 2021-07-22 RX ADMIN — AMPICILLIN SODIUM AND SULBACTAM SODIUM 100 GRAM(S): 250; 125 INJECTION, POWDER, FOR SUSPENSION INTRAMUSCULAR; INTRAVENOUS at 03:48

## 2021-07-22 RX ADMIN — Medication 260 MILLIGRAM(S): at 17:36

## 2021-07-22 RX ADMIN — Medication 101 MILLIGRAM(S): at 18:02

## 2021-07-22 RX ADMIN — HEPARIN SODIUM 5000 UNIT(S): 5000 INJECTION INTRAVENOUS; SUBCUTANEOUS at 06:32

## 2021-07-22 RX ADMIN — HEPARIN SODIUM 5000 UNIT(S): 5000 INJECTION INTRAVENOUS; SUBCUTANEOUS at 15:51

## 2021-07-22 RX ADMIN — Medication 2 MILLILITER(S): at 16:05

## 2021-07-22 RX ADMIN — Medication 2 MILLILITER(S): at 06:32

## 2021-07-22 NOTE — PROGRESS NOTE ADULT - PROBLEM SELECTOR PLAN 3
Patient has had persistent Leukocytosis, can possibly be from his Aspiration PNA, but Hematology is working up for reactive vs clonal causes infection/ stressed marrow from recent stroke/ CML/ CMML. They will await results from  Peripheral flow cytometry, BCR-ABL PCR, JAK2.

## 2021-07-22 NOTE — PROGRESS NOTE ADULT - PROBLEM SELECTOR PLAN 2
- f/u work up for CML/CMML: Peripheral flow cytometry, BCR-ABL PCR, JAK2.   - Continue Unasyn 1.5g IV (day 2/7)  for possible infectious etiology. Stop if patient begins to have diarrhea

## 2021-07-22 NOTE — PROGRESS NOTE ADULT - SUBJECTIVE AND OBJECTIVE BOX
JAMA CANTRELL             [id number]    CC: slurred speech, L facial droop    HPI:   **STROKE HPI***    HPI: 89y Male with PMHx of HTN and seizure on phenytoin presents with left facial droop and slurred speech. Pt was with family member (great-nephew Mesh [phone]) who noticed at 5pm, pt had acute onset of slurred speech, increased saliva production and a left facial droop. Pt was driven to the ED immediately. On presentation, /94, NIHSS 3. Great-nephew at bedside denies hx of stroke, use of blood thinners, recent trauma/bleeding event. CTH negative for acute hemorrhage. CTP without any perfusion defect. CTA without LVO. In conjunction with patient, family and stroke attending, decision was made to not give tpa since family felt his current deficits are non-disabling to his daily life vs risk of bleeding. Pt and family was made aware of the possibility of worsening symptoms and the opportunity to give tpa was time limited. They expressed understanding and declined tpa administration.     Pt denies CP, SOB, extremity weakness, changes in vision, HA. He endorses a cough that has been occurring for the past few days with sputum production, but has been afebrile. Pt given vanc and zosyn x1 in the ED. CXR pending official read. Patient initially failed dysphagia screen and was given  ME, but passed second dysphagia screen so was able to to loaded with plavix 300 PO.     At baseline, pt is fully independent of all ADLs and iADLs, ambulates without assistance, with hearing loss b/l, baseline right arm tremor. It is unclear if pt sees a neurologist for seizure management. Per wife (Ross [phone], [phone]), his most recent seizure was in April 2020 where he had full body shaking lasting for 5 minutes. He has been on phenytoin 100mg TID and has not had another seizure since.       SUBJECTIVE: Patient resting in bed, continues to not be able to swallow.     ROS:  DYSPNEA ([place]): 0	  NAUS/VOM: N  SECRETIONS: Y	  AGITATION: N  Pain (Y/N):      n/a   -Provocation/Palliation:  n/a   -Quality/Quantity:  n/a   -Radiating:  n/a   -Severity:  n/a   -Timing/Frequency:  n/a   -Impact on ADLs: n/a     OTHER REVIEW OF SYSTEMS: + weakness  UNABLE TO OBTAIN  due to: n/a     PEx:  T(C): 36.7 (07-22-21 @ 10:41), Max: 37.1 (07-21-21 @ 16:54)  HR: 85 (07-22-21 @ 10:41) (67 - 85)  BP: 119/66 (07-22-21 @ 10:41) (119/66 - 149/70)  RR: 18 (07-22-21 @ 10:41) (16 - 18)  SpO2: 98% (07-22-21 @ 10:41) (95% - 99%)  Wt(kg): 60.1kg      GENERAL:  [ x]Alert  [x ]Oriented x  3 [ ]Lethargic  [ ]Cachexia  [ ]Unarousable  [X ]Verbal  [ ]Non-Verbal  Behavioral:   [ ] Anxiety  [ ] Delirium [ ] Agitation [x ] Other - calm   HEENT:  [x ]Normal   [ ]Dry mouth   [ ]ET Tube/Trach  [ ]Oral lesions  PULMONARY:   [ ]Clear  [ ]Tachypnea  [ ]Audible excessive secretions   [x ]Rhonchi      [ ]Right [ ]Left [ ]Bilateral  [ ]Crackles        [ ]Right [ ]Left [ ]Bilateral  [ ]Wheezing     [ ]Right [ ]Left [ ]Bilateral  CARDIOVASCULAR:    [x ]Regular [ ]Irregular [ ]Tachy  [ ]Anatoliy [ x]Murmur [ ]Other  GASTROINTESTINAL:  [x ]Soft  [ ]Distended   [ ]+BS  [ x]Non tender [ ]Tender  [ ]PEG [ ]OGT/ NGT  Last BM: 7/19  GENITOURINARY:  [ ]Normal [x ] Incontinent   [ ]Oliguria/Anuria   [ ]Cruz  MUSCULOSKELETAL:   [ x]Normal   [ ]Weakness  [ ]Bed/Wheelchair bound [ ]Edema  NEUROLOGIC:   [x ]No focal deficits  [ ] Cognitive impairment  [ ] Dysphagia [ ]Dysarthria [ ] Paresis [ ]Other   SKIN:   [x ]Normal   [ ]Pressure ulcer(s)  [ ]Rash    ALLERGIES: hay fever (Unknown)  No Known Drug Allergies      OPIATE NAÏVE (Y/N): Y    MEDICATIONS: REVIEWED  MEDICATIONS  (STANDING):  acetaminophen  IVPB .. 650 milliGRAM(s) IV Intermittent once  acetylcysteine 10%  Inhalation 2 milliLiter(s) Inhalation three times a day  ampicillin/sulbactam  IVPB 1.5 Gram(s) IV Intermittent every 12 hours  aspirin enteric coated 81 milliGRAM(s) Oral daily  atorvastatin 40 milliGRAM(s) Oral at bedtime  dextrose 5%. 1000 milliLiter(s) (30 mL/Hr) IV Continuous <Continuous>  diphenhydrAMINE  IVPB 25 milliGRAM(s) IV Intermittent once  guaiFENesin Oral Liquid (Sugar-Free) 200 milliGRAM(s) Oral every 6 hours  heparin   Injectable 5000 Unit(s) SubCutaneous every 8 hours  immune   globulin 10% (GAMMAGARD) IVPB 25 Gram(s) IV Intermittent every 24 hours  levETIRAcetam  IVPB 500 milliGRAM(s) IV Intermittent every 12 hours    MEDICATIONS  (PRN):  sodium chloride 3%  Inhalation 4 milliLiter(s) Inhalation every 6 hours PRN productive cough      LABS: REVIEWED  CBC:                        9.6    25.47 )-----------( 373      ( 22 Jul 2021 08:04 )             30.6     CMP:    07-22    142  |  108  |  42<H>  ----------------------------<  65<L>  4.2   |  15<L>  |  1.90<H>    Ca    8.8      22 Jul 2021 08:04  Phos  6.0     07-22  Mg     2.5     07-22    TPro  x   /  Alb  3129<L>  /  TBili  x   /  DBili  x   /  AST  x   /  ALT  x   /  AlkPhos  x   07-21    IMAGING: REVIEWED    ADVANCED DIRECTIVES:            FULL CODE         DECISION MAKER: Patient is able to make his own decisions   LEGAL SURROGATE:  Ross (wife): [phone], [phone]    PSYCHOSOCIAL-SPIRITUAL ASSESSMENT:       Reviewed       Care plan unchanged    GOALS OF CARE DISCUSSION       Palliative care info/counseling provided	            Documentation of GOC: Full code   	      AGENCY CHOICE DISCUSSED:         None    REFERRALS	        Palliative Med        Unit SW/Case Mgmt      PT/OT

## 2021-07-22 NOTE — PROGRESS NOTE ADULT - ASSESSMENT
89 M with a PMHx of seizures (on phenytoin at home) and HTN presented with new facial droop and slurring of speech. Admitted to stroke service where stroke work up was found to be negative. Transferred to [facility] for work up of dysphagia 2/2 to myasthenia gravis vs Guillan-Skamokawa syndrome and leukocytosis as well.

## 2021-07-22 NOTE — PROGRESS NOTE ADULT - PROBLEM SELECTOR PLAN 4
Patient remains full code. MOLST was reviewed with the wife, but she will take the blank one to review with her family. Emotional support was provided.

## 2021-07-22 NOTE — PROGRESS NOTE ADULT - ASSESSMENT
per Neurology    89 yo M with PMHx of HTN and seizure on phenytoin presents with left facial droop and slurred speech. Stroke workup negative. Neurology consulted for persistent weakness.      Impression  - Pt with persistent weakness manifesting as dysarthria and decreased strength in proximal muscles. LP notable for mildly elevated protein at 52, no cells. 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 Differential also includes Myasthenia gravis.    Plan  - F/u Myasthenia Gravis antibodies  - Recommend starting IVIG empirically - (400mg/kg/day x 5 days   - If no ability to swallow by tomorrow, recommend NGT for nutrition as patient has been without food for 4 days  - Possible repetitive nerve stimulation tomorrow

## 2021-07-22 NOTE — PROGRESS NOTE ADULT - SUBJECTIVE AND OBJECTIVE BOX
Physical Medicine and Rehabilitation Progress Note:    Patient is a 89y old  Male who presents with a chief complaint of slurred speech, L facial droop (22 Jul 2021 12:41)      HPI:   **STROKE HPI***    HPI: 89y Male with PMHx of HTN and seizure on phenytoin presents with left facial droop and slurred speech. Pt was with family member (great-nephew Mesh [phone]) who noticed at 5pm, pt had acute onset of slurred speech, increased saliva production and a left facial droop. Pt was driven to the ED immediately. On presentation, /94, NIHSS 3. Great-nephew at bedside denies hx of stroke, use of blood thinners, recent trauma/bleeding event. CTH negative for acute hemorrhage. CTP without any perfusion defect. CTA without LVO. In conjunction with patient, family and stroke attending, decision was made to not give tpa since family felt his current deficits are non-disabling to his daily life vs risk of bleeding. Pt and family was made aware of the possibility of worsening symptoms and the opportunity to give tpa was time limited. They expressed understanding and declined tpa administration.     Pt denies CP, SOB, extremity weakness, changes in vision, HA. He endorses a cough that has been occurring for the past few days with sputum production, but has been afebrile. Pt given vanc and zosyn x1 in the ED. CXR pending official read. Patient initially failed dysphagia screen and was given  OH, but passed second dysphagia screen so was able to to loaded with plavix 300 PO.     At baseline, pt is fully independent of all ADLs and iADLs, ambulates without assistance, with hearing loss b/l, baseline right arm tremor. It is unclear if pt sees a neurologist for seizure management. Per wife (Ross [phone], [phone]), his most recent seizure was in April 2020 where he had full body shaking lasting for 5 minutes. He has been on phenytoin 100mg TID and has not had another seizure since.       PAST MEDICAL & SURGICAL HISTORY:      FAMILY HISTORY:      SOCIAL HISTORY:   Patient lives with wife in apartment.  Smoking status: denies  Drinking: denies  Drug Use: denies    ROS:   Constitutional: No fever, weight loss or fatigue  Eyes: No eye pain, visual disturbances, or discharge  ENMT:  hearing loss bilaterally  Neck: No pain or stiffness  Respiratory: cough with sputum production x days  Cardiovascular: No chest pain, palpitations, shortness of breath, dizziness or leg swelling  Gastrointestinal: No abdominal pain. No nausea, vomiting or hematemesis; No diarrhea or constipation. Nohematochezia.  Neurological: As per HPI    T(C): 36.9 (07-18-21 @ 23:53), Max: 37.1 (07-18-21 @ 19:32)  HR: 92 (07-18-21 @ 23:53) (84 - 92)  BP: 142/78 (07-18-21 @ 23:53) (123/63 - 184/75)  RR: 18 (07-18-21 @ 23:53) (16 - 18)  SpO2: 97% (07-18-21 @ 23:53) (96% - 99%)    MEDICATION RECONCILIATION   MEDICATIONS  (STANDING):  aspirin enteric coated 81 milliGRAM(s) Oral daily  atorvastatin 80 milliGRAM(s) Oral at bedtime  clopidogrel Tablet 75 milliGRAM(s) Oral daily  heparin   Injectable 5000 Unit(s) SubCutaneous every 8 hours  phenytoin   Capsule 100 milliGRAM(s) Oral three times a day    MEDICATIONS  (PRN):    Allergies    hay fever (Unknown)  No Known Drug Allergies    Intolerances      Vital Signs Last 24 Hrs  T(C): 36.9 (18 Jul 2021 23:53), Max: 37.1 (18 Jul 2021 19:32)  T(F): 98.4 (18 Jul 2021 23:53), Max: 98.8 (18 Jul 2021 19:51)  HR: 92 (18 Jul 2021 23:53) (84 - 92)  BP: 142/78 (18 Jul 2021 23:53) (123/63 - 184/75)  BP(mean): --  RR: 18 (18 Jul 2021 23:53) (16 - 18)  SpO2: 97% (18 Jul 2021 23:53) (96% - 99%)    Physical exam:  General: No acute distress, awake and alert  Cardiovascular: Regular rate and rhythm, ?murmur  Pulmonary: Transmitted sounds from upper airway. No use of accessory muscles  GI: Abdomen soft, non-tender, non-distended    Neurologic:  -Mental status: Awake, alert, oriented to person, place, and time. Speech is fluent with intact naming (able to name hammock, chair, key, pen, finger phone, scissor) , repetition, and comprehension, mild dysarthria. Recent and remote memory intact. Follows commands. Attention/concentration intact.   -Cranial nerves:   II: Visual fields are full to confrontation.  III, IV, VI: Extraocular movements are intact without nystagmus. Pupils equally round and reactive to light  V:  Facial sensation V1-V3 equal and intact   VII: Left facial droop on activation   VIII: Hearing is grossly intact bilaterally  Motor: Able to maintain AG along all extremities without pronator drift. Strength RUE 3/5, LUE 5/5, RLE 3/5, LUE 5/5. Baseline right arm tremor.  Sensation: Intact to light touch bilaterally. Extinguishes on RLE double simultaneous testing.  Coordination: No dysmetria on finger-to-nose and heel-to-shin bilaterally  Gait: Narrow gait, slow, with leaning to the left    NIHSS: 3 ASPECT Score: 9      Fingerstick Blood Glucose: CAPILLARY BLOOD GLUCOSE  161 (18 Jul 2021 20:36)      POCT Blood Glucose.: 161 mg/dL (18 Jul 2021 19:27)    LABS:                        10.7   32.39 )-----------( 413      ( 18 Jul 2021 20:01 )             32.6     07-18    141  |  108  |  39<H>  ----------------------------<  141<H>  4.7   |  23  |  1.76<H>    Ca    9.6      18 Jul 2021 20:01    TPro  7.6  /  Alb  4.5  /  TBili  0.2  /  DBili  x   /  AST  18  /  ALT  13  /  AlkPhos  100  07-18    PT/INR - ( 18 Jul 2021 20:01 )   PT: 12.3 sec;   INR: 1.03          PTT - ( 18 Jul 2021 20:01 )  PTT:32.7 sec          RADIOLOGY & ADDITIONAL STUDIES:    HCT: No acute intracranial hemorrhage or transcortical infarct.    CTA:1.  No large vessel occlusion or high-grade stenosis within the head and neck.  2.  Incidentally noted 1.8 cm right thyroid nodule. Consider nonemergent dedicated thyroid ultrasound is for further characterization.     (19 Jul 2021 00:25)                            9.6    25.47 )-----------( 373      ( 22 Jul 2021 08:04 )             30.6       07-22    142  |  108  |  42<H>  ----------------------------<  65<L>  4.2   |  15<L>  |  1.90<H>    Ca    8.8      22 Jul 2021 08:04  Phos  6.0     07-22  Mg     2.5     07-22    TPro  x   /  Alb  3129<L>  /  TBili  x   /  DBili  x   /  AST  x   /  ALT  x   /  AlkPhos  x   07-21    Vital Signs Last 24 Hrs  T(C): 36.7 (22 Jul 2021 10:41), Max: 37.1 (21 Jul 2021 16:54)  T(F): 98 (22 Jul 2021 10:41), Max: 98.7 (21 Jul 2021 16:54)  HR: 85 (22 Jul 2021 10:41) (67 - 85)  BP: 119/66 (22 Jul 2021 10:41) (119/66 - 149/70)  BP(mean): --  RR: 18 (22 Jul 2021 10:41) (16 - 18)  SpO2: 98% (22 Jul 2021 10:41) (95% - 99%)    MEDICATIONS  (STANDING):  acetaminophen  IVPB .. 650 milliGRAM(s) IV Intermittent once  acetylcysteine 10%  Inhalation 2 milliLiter(s) Inhalation three times a day  ampicillin/sulbactam  IVPB 1.5 Gram(s) IV Intermittent every 12 hours  aspirin enteric coated 81 milliGRAM(s) Oral daily  atorvastatin 40 milliGRAM(s) Oral at bedtime  dextrose 5%. 1000 milliLiter(s) (30 mL/Hr) IV Continuous <Continuous>  diphenhydrAMINE  IVPB 25 milliGRAM(s) IV Intermittent once  guaiFENesin Oral Liquid (Sugar-Free) 200 milliGRAM(s) Oral every 6 hours  heparin   Injectable 5000 Unit(s) SubCutaneous every 8 hours  immune   globulin 10% (GAMMAGARD) IVPB 25 Gram(s) IV Intermittent every 24 hours  levETIRAcetam  IVPB 500 milliGRAM(s) IV Intermittent every 12 hours    MEDICATIONS  (PRN):  sodium chloride 3%  Inhalation 4 milliLiter(s) Inhalation every 6 hours PRN productive cough    Currently Undergoing Physical/ Occupational Therapy at bedside.    Functional Status Assessment:   7/21/2021      Cognitive/Perceptual/Neuro  Level of Consciousness: alert;  pt is [place]  Arousal Level: arouses to voice;  arouses to touch/gentle shaking  Orientation: oriented x 4  Speech: clear;  well paced;  spontaneous;  logical  Mood/Behavior: calm;  cooperative;  behavior appropriate to situation    Language Assistance  Preferred Language to Address Healthcare Preferred Language to Address Healthcare: English    Therapeutic Interventions      Bed Mobility  Bed Mobility Training Scooting: supervsion;  minimum assist (75% patient effort);  set-up required;  verbal cues;  1 person assist;  Pt able to scoot with supervision initially, but required Pierre to assist with scooting in order to perform STS  Bed Mobility Training Bridging: stand-by assist  Bed Mobility Training Sit-to-Supine: minimum assist (75% patient effort);  1 person assist;  set-up required;  verbal cues  Bed Mobility Training Supine-to-Sit: contact guard;  1 person assist;  set-up required;  verbal cues  Bed Mobility Training Limitations: impaired coordination;  decreased strength;  impaired balance    Sit-Stand Transfer Training  Transfer Training Sit-to-Stand Transfer: tyron walker;  set-up required;  verbal cues;  nonverbal cues (demo/gestures);  1 person assist;  contact guard  Transfer Training Stand-to-Sit Transfer: contact guard;  tyron walker;  verbal cues;  1 person assist;  set-up required  Sit-to-Stand Transfer Training Transfer Safety Analysis: decreased balance;  decreased sequencing ability;  pt unsafe with reaching for hemiwalker in sitting;  decreased strength;  impaired balance;  impaired coordination;  impaired postural control;  tyron walker          PM&R Impression: as above    Current Disposition Plan Recommendations:    acute rehab placement

## 2021-07-22 NOTE — PROGRESS NOTE ADULT - SUBJECTIVE AND OBJECTIVE BOX
**INCOMPLETE NOTE    OVERNIGHT EVENTS: NAEO    SUBJECTIVE:  Patient seen and examined at bedside. States the secretions have improved.    Vital Signs Last 12 Hrs  T(F): 98 (07-22-21 @ 10:41), Max: 98.4 (07-22-21 @ 05:42)  HR: 85 (07-22-21 @ 10:41) (79 - 85)  BP: 119/66 (07-22-21 @ 10:41) (119/66 - 126/65)  BP(mean): --  RR: 18 (07-22-21 @ 10:41) (16 - 18)  SpO2: 98% (07-22-21 @ 10:41) (98% - 98%)  I&O's Summary    21 Jul 2021 07:01  -  22 Jul 2021 07:00  --------------------------------------------------------  IN: 270 mL / OUT: 575 mL / NET: -305 mL    22 Jul 2021 07:01  -  22 Jul 2021 13:45  --------------------------------------------------------  IN: 60 mL / OUT: 0 mL / NET: 60 mL      PHYSICAL EXAM:  General: thin elderly gentleman lying in bed in no acute distress, hard of hearing but able to carry out a conversation  HEENT: (+) hearing loss, EOMI, PERRLA, anicteric sclera; moist mucosal membranes. sinuses non tender to palpation   Neck: supple, trachea midline  Cardiovascular: (+) holostyolic murmur loudest at right 2nd ICS , RRR, +S1/S2  Respiratory: no upper airway sounds, good air movement, some ronchi in lower bases  Gastrointestinal: soft, NT/ND; +BSx4  : condom catheter in place draining yellow urine  Extremities: WWP; no edema or cyanosis, sensation intact in all 4 extremities, strength 5/5 upper and lower b/l  Vascular: 2+ radial, DP/PT pulses B/L  Neurological: AAOx3 (alert to name, place, and year); mild dysarthria; some left facial droop when smiling which has improved; no other focal deficits.        LABS:                        9.6    25.47 )-----------( 373      ( 22 Jul 2021 08:04 )             30.6     07-22    142  |  108  |  42<H>  ----------------------------<  65<L>  4.2   |  15<L>  |  1.90<H>    Ca    8.8      22 Jul 2021 08:04  Phos  6.0     07-22  Mg     2.5     07-22    TPro  x   /  Alb  3129<L>  /  TBili  x   /  DBili  x   /  AST  x   /  ALT  x   /  AlkPhos  x   07-21            RADIOLOGY & ADDITIONAL TESTS:    MEDICATIONS  (STANDING):  acetaminophen  IVPB .. 650 milliGRAM(s) IV Intermittent once  acetylcysteine 10%  Inhalation 2 milliLiter(s) Inhalation three times a day  ampicillin/sulbactam  IVPB 1.5 Gram(s) IV Intermittent every 12 hours  aspirin enteric coated 81 milliGRAM(s) Oral daily  atorvastatin 40 milliGRAM(s) Oral at bedtime  dextrose 5%. 1000 milliLiter(s) (30 mL/Hr) IV Continuous <Continuous>  diphenhydrAMINE  IVPB 25 milliGRAM(s) IV Intermittent once  guaiFENesin Oral Liquid (Sugar-Free) 200 milliGRAM(s) Oral every 6 hours  heparin   Injectable 5000 Unit(s) SubCutaneous every 8 hours  immune   globulin 10% (GAMMAGARD) IVPB 25 Gram(s) IV Intermittent every 24 hours  levETIRAcetam  IVPB 500 milliGRAM(s) IV Intermittent every 12 hours    MEDICATIONS  (PRN):  sodium chloride 3%  Inhalation 4 milliLiter(s) Inhalation every 6 hours PRN productive cough

## 2021-07-22 NOTE — PROGRESS NOTE ADULT - PROBLEM SELECTOR PLAN 5
Patient last assessed: 7/21/2021  to manage: GOC/AD, symptoms, and support.  30 Minutes; Start: 0700am End: 0730am , of non-face-to-face prolonged service provided that relates to (face-to-face) care that has or will occur and ongoing patient management, including one or more of the following:   - Reviewed records from other physicians or other health care professional services, including one or more of the following: other medical records and diagnostic / radiology study results

## 2021-07-22 NOTE — PROGRESS NOTE ADULT - ATTENDING COMMENTS
89 year old [place] M p/w facial droop, initially admitted to neuro for stroke r/o w/ negative w/u, then transferred to medicine service for severe AS and leukocytosis.  - leukocytosis- improvement in WBC today, unclear if infx(however nonfocal exam)- will monitor and likely complete 7 day course for possible sinusitis vs aspiration PNA. Onc consulted, appreciate further recs. Will F/U flow cytometry   - Dysphagia- neuro performed Stim test, with possible MG. Awaiting antibody results. Neuro consulted, started on IVIG, continue at this time. Need further GOC conversation as unsure if family wants aggressive measures. Failed repeat speech and swallow today. Currently CW d5 gtt will need NGT with possible PEG. Will continue to discuss with family. Pall consulted as well.   - starvation ketosis- restart d5 gtt, trend bmp, cw d5 gtt until NGT placed and can start tube feeds   - mod-severe AS- cautious use with fluids, will need frequent lung checks

## 2021-07-22 NOTE — PROGRESS NOTE ADULT - PROBLEM SELECTOR PLAN 1
-Myasthenia Gravis vs GBS  -Continue IVIG 400mg/kg/day per neuro (premedication with 1g Tylenol IV and 25mg Benadryl IV)  -Failed swallow eval this AM  -NPO except Meds  -Nerve stimulation test suggestive of myasthenia gravis  -Wife amenable to NGT. Further discussion in PM about long term feeds with PEG tube.  -Gen neuro to continue to follow for further eval

## 2021-07-22 NOTE — PROGRESS NOTE ADULT - PROBLEM SELECTOR PLAN 10
F: d5 30cc/hr for 12hrs  E: replete K<4 and Mg <2  N: NPO except meds  D: heparin sq and SCDs for DVT prophylaxis

## 2021-07-22 NOTE — PROGRESS NOTE ADULT - PROBLEM SELECTOR PLAN 2
Per patients FEES, he appears to have had Silent aspiration. FEES was done again today, still pending note, but patient was unable to swallow. Appreciate Speech and swallow involvement.

## 2021-07-22 NOTE — PROGRESS NOTE ADULT - ATTENDING COMMENTS
Repetitive nerve stimulation consistent with myasthenia gravis  continue IVIG  monitor creatinine and hydrate as necessary  consider NG tube for nutrition  hold mestinon due to secretions  will d/w heme/onc whether rituximab may be helpful for CLL as well as myasthenia

## 2021-07-22 NOTE — PROGRESS NOTE ADULT - ASSESSMENT
Assessment   89M with PMHx of HTN and seizure on phenytoin presents with left facial droop and slurred speech. Stroke workup negative. Neurology consulted for persistent weakness.      Impression  - Pt with persistent weakness manifesting as dysarthria and decreased strength in proximal muscles. LP notable for mildly elevated protein at 52, no cells. 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 Differential also includes Myasthenia gravis.    Plan  - F/u Myasthenia Gravis antibodies - bedside repetitive nerve stimulation indicative of MG  - C/w IVIG - (400mg/kg/day x 5 days - started 7/21)  - Patient failed swallow study this morning 7/22 - recommend NGT for nutrition as patient has been without food for 5 days  - Recommend D5NS for nutrition management  - f/u with oncology for outpatient medication management of MG and ?CLL   Assessment   89M with PMHx of HTN and seizure on phenytoin presents with left facial droop and slurred speech. Stroke workup negative. Neurology consulted for persistent weakness.      Impression  - Pt with persistent weakness manifesting as dysarthria and decreased strength in proximal muscles. LP notable for mildly elevated protein at 52, no cells. 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 Differential also includes Myasthenia gravis.    Plan  - F/u Myasthenia Gravis antibodies - bedside repetitive nerve stimulation indicative of MG  - C/w IVIG - (400mg/kg/day x 5 days - started 7/21)  - Patient failed swallow study this morning 7/22 - recommend NGT for nutrition as patient has been without food for 5 days  - Recommend D5NS for nutrition management  - F/u with oncology for outpatient medication management of MG and ?CLL   Assessment   89M with PMHx of HTN and seizure on phenytoin presents with left facial droop and slurred speech. Stroke workup negative. Neurology consulted for persistent weakness.      Impression  - Pt with persistent weakness manifesting as dysarthria and decreased strength in proximal muscles. LP notable for mildly elevated protein at 52, no cells. 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 Bedside repetitive nerve stimulation performed 7/22 indicative of myasthenia gravis      Plan  - F/u Myasthenia Gravis antibodies - bedside repetitive nerve stimulation indicative of MG  - C/w IVIG - (400mg/kg/day x 5 days - started 7/21)  - Patient failed swallow study this morning 7/22 - recommend NGT for nutrition as patient has been without food for 5 days  - Recommend switching fluids to D5NS  - If truly Myasthenia Gravis, patient may benefit from Rituximab. Recommend consulting Heme-Onc to see if this may also be beneficial for patient's CLL.      Plan discussed with Attending Dr. Beach  General Neurology Consult team will continue to follow

## 2021-07-22 NOTE — PROGRESS NOTE ADULT - SUBJECTIVE AND OBJECTIVE BOX
Neurology Progress Note    Interval History:  No acute overnight events or complaints since starting IVIG yesterday. Notes that his cough has improved, but now endorses increased secretions and dry mouth. Wife reports mild improvement in strength and decreased left facial droop. Bedside repetitive nerve stimulation performed and indicative of myasthenia gravis    HPI:  89M with PMHx of HTN and seizure on phenytoin presents with left facial droop and slurred speech. Pt was with family member (great-nephew Mesh [phone]) who noticed that pt had slurred speech, increased saliva production and a left facial droop. Pt was driven to the ED immediately. On presentation, /94, NIHSS 3. Great-nephew at bedside denied hx of stroke, use of blood thinners, recent trauma/bleeding event. CTH negative for acute hemorrhage. CTP without any perfusion defect. CTA without LVO. In conjunction with patient, family and stroke attending, decision was made to not give tpa since family felt his current deficits are non-disabling to his daily life vs risk of bleeding.     At baseline, pt is fully independent of all ADLs and iADLs, ambulates without assistance, with hearing loss b/l, baseline right arm tremor. Neurology consulted for persistent weakness. Stroke workup negative. LP performed given persistent weakness to rule out GBS vs Gatica-Lindsey.    PAST MEDICAL & SURGICAL HISTORY:  Hypertension    Seizure      FAMILY HISTORY:      SOCIAL HISTORY:   Patient lives with wife in apartment.  Smoking status: denies  Drinking: denies  Drug Use: denies    ROS:   Constitutional: as per HPI  Eyes: No eye pain or discharge  ENMT:  No sinus or throat pain  Neck: No pain or stiffness  Respiratory: No cough, wheezing, chills or hemoptysis  Cardiovascular: No chest pain, palpitations, shortness of breath, dyspnea on exertion  Gastrointestinal: No abdominal pain, nausea, vomiting or hematemesis; No diarrhea or constipation.   Genitourinary: No dysuria, frequency, hematuria or incontinence  Neurological: As per HPI  Skin: No rashes or lesions   Endocrine: No heat or cold intolerance; No hair loss  Musculoskeletal: No joint pain or swelling  Psychiatric: No depression, anxiety, mood swings  Heme/Lymph: No easy bruising or bleeding gums      MEDICATIONS  (STANDING):  acetaminophen  IVPB .. 650 milliGRAM(s) IV Intermittent once  acetylcysteine 10%  Inhalation 2 milliLiter(s) Inhalation three times a day  ampicillin/sulbactam  IVPB 1.5 Gram(s) IV Intermittent every 12 hours  aspirin enteric coated 81 milliGRAM(s) Oral daily  atorvastatin 40 milliGRAM(s) Oral at bedtime  dextrose 5%. 1000 milliLiter(s) (30 mL/Hr) IV Continuous <Continuous>  diphenhydrAMINE  IVPB 25 milliGRAM(s) IV Intermittent once  guaiFENesin Oral Liquid (Sugar-Free) 200 milliGRAM(s) Oral every 6 hours  heparin   Injectable 5000 Unit(s) SubCutaneous every 8 hours  immune   globulin 10% (GAMMAGARD) IVPB 25 Gram(s) IV Intermittent every 24 hours  levETIRAcetam  IVPB 500 milliGRAM(s) IV Intermittent every 12 hours    MEDICATIONS  (PRN):  sodium chloride 3%  Inhalation 4 milliLiter(s) Inhalation every 6 hours PRN productive cough    Allergies    hay fever (Unknown)  No Known Drug Allergies    Intolerances    Vital Signs Last 24 Hrs  T(C): 36.7 (22 Jul 2021 10:41), Max: 37.1 (21 Jul 2021 16:54)  T(F): 98 (22 Jul 2021 10:41), Max: 98.7 (21 Jul 2021 16:54)  HR: 85 (22 Jul 2021 10:41) (67 - 85)  BP: 119/66 (22 Jul 2021 10:41) (119/66 - 149/70)  BP(mean): --  RR: 18 (22 Jul 2021 10:41) (16 - 18)  SpO2: 98% (22 Jul 2021 10:41) (95% - 99%)    Physical exam:  General: No acute distress, awake and alert  Cardiovascular: Regular rate and rhythm, ?murmur  Pulmonary: Transmitted sounds from upper airway. No use of accessory muscles  GI: Abdomen soft, non-tender, non-distended    Neurologic Examination:  General:  Appearance is consistent with chronologic age.  No abnormal facies.  Gross skin survey within normal limits.    Cognitive/Language:  Awake, alert, and oriented to person, place, time and date.  Recent and remote memory intact.  Fund of knowledge is appropriate.  Mild dysarthria characterized by imprecise articulation secondary oral motor weakness, but improving compared to prior. Mild dysphonia, characterized by rough vocal quality.  Cranial Nerves  - Eyes: Visual acuity intact, Visual fields full.  EOMI w/o nystagmus, skew or reported double vision.  PERRL.  No ptosis b/l.  - Face:  Facial sensation normal V1 - 3, no facial droop  - Ears/Nose/Throat:  Hearing grossly intact b/l to finger rub.  Palate elevates midline.  Tongue and uvula midline.   Motor examination:  4-/5 proximal muscle weakness (deltoids, hip flexors), 5/5 distally.  No observable drift. Normal tone and bulk. No tenderness, twitching, tremors or involuntary movements.  Sensory examination:   Intact to light touch and pinprick, pain, temperature and proprioception and vibration in all extremities.  Cerebellum:   FTN/HKS intact.  No dysmetria or dysdiadokinesia.     NIHSS: 3 ASPECT Score: 9    POCT Blood Glucose.: 71 mg/dL (22 Jul 2021 10:24)    LABS:  CBC Full  -  ( 22 Jul 2021 08:04 )  WBC Count : 25.47 K/uL  RBC Count : 3.16 M/uL  Hemoglobin : 9.6 g/dL  Hematocrit : 30.6 %  Platelet Count - Automated : 373 K/uL  Mean Cell Volume : 96.8 fl  Mean Cell Hemoglobin : 30.4 pg  Mean Cell Hemoglobin Concentration : 31.4 gm/dL  Auto Neutrophil # : 16.91 K/uL  Auto Lymphocyte # : 2.01 K/uL  Auto Monocyte # : 1.12 K/uL  Auto Eosinophil # : 3.39 K/uL  Auto Basophil # : 0.23 K/uL  Auto Neutrophil % : 66.4 %  Auto Lymphocyte % : 7.9 %  Auto Monocyte % : 4.4 %  Auto Eosinophil % : 13.3 %  Auto Basophil % : 0.9 %    07-22    142  |  108  |  42<H>  ----------------------------<  65<L>  4.2   |  15<L>  |  1.90<H>    Ca    8.8      22 Jul 2021 08:04  Phos  6.0     07-22  Mg     2.5     07-22    TPro  x   /  Alb  3129<L>  /  TBili  x   /  DBili  x   /  AST  x   /  ALT  x   /  AlkPhos  x   07-21    LIVER FUNCTIONS - ( 21 Jul 2021 12:55 )  Alb: 3129 mg/dL / Pro: x     / ALK PHOS: x     / ALT: x     / AST: x     / GGT: x           PT/INR - ( 18 Jul 2021 20:01 )   PT: 12.3 sec;   INR: 1.03     PTT - ( 18 Jul 2021 20:01 )  PTT:32.7 sec    Lactate Dehydrogenase, CSF: 22 U/L (07-21-21 @ 02:33)  Glucose, CSF: 61 mg/dL (07-20-21 @ 13:43)  Protein, CSF: 52 mg/dL (07-20-21 @ 13:43)  CSF Appearance: Clear (07-20-21 @ 13:43)  CSF Color: No Color (07-20-21 @ 13:43)  CSF Segmented Neutrophils: 0 % (07-20-21 @ 13:43)  CSF Lymphocytes: 1 % (07-20-21 @ 13:43)  CSF Comments: Manual differential is based on 1 wbc counted after cytocentrifugation. (07-20-21 @ 13:43)  CSF PCR Result: NotDetec (07-20-21 @ 13:38)    RADIOLOGY & ADDITIONAL STUDIES:      HCT: No acute intracranial hemorrhage or transcortical infarct.    CTA:1.  No large vessel occlusion or high-grade stenosis within the head and neck.  2.  Incidentally noted 1.8 cm right thyroid nodule. Consider nonemergent dedicated thyroid ultrasound is for further characterization.     (19 Jul 2021 00:25)           Neurology Progress Note    Interval History:  No acute overnight events or complaints since starting IVIG yesterday. Notes that his cough has improved, but now endorses increased secretions and dry mouth. Wife reports mild improvement in strength and decreased left facial droop. Bedside repetitive nerve stimulation performed and indicative of myasthenia gravis.    HPI:  89M with PMHx of HTN and seizure on phenytoin presents with left facial droop and slurred speech. Pt was with family member (great-nephew Mesh [phone]) who noticed that pt had slurred speech, increased saliva production and a left facial droop. Pt was driven to the ED immediately. On presentation, /94, NIHSS 3. Great-nephew at bedside denied hx of stroke, use of blood thinners, recent trauma/bleeding event. CTH negative for acute hemorrhage. CTP without any perfusion defect. CTA without LVO. In conjunction with patient, family and stroke attending, decision was made to not give tpa since family felt his current deficits are non-disabling to his daily life vs risk of bleeding.     At baseline, pt is fully independent of all ADLs and iADLs, ambulates without assistance, with hearing loss b/l, baseline right arm tremor. Neurology consulted for persistent weakness. Stroke workup negative. LP performed given persistent weakness to rule out GBS vs Gatica-Lindsey.    PAST MEDICAL & SURGICAL HISTORY:  Hypertension    Seizure      FAMILY HISTORY:      SOCIAL HISTORY:   Patient lives with wife in apartment.  Smoking status: denies  Drinking: denies  Drug Use: denies    ROS:   Constitutional: as per HPI  Eyes: No eye pain or discharge  ENMT:  No sinus or throat pain  Neck: No pain or stiffness  Respiratory: No cough, wheezing, chills or hemoptysis  Cardiovascular: No chest pain, palpitations, shortness of breath, dyspnea on exertion  Gastrointestinal: No abdominal pain, nausea, vomiting or hematemesis; No diarrhea or constipation.   Genitourinary: No dysuria, frequency, hematuria or incontinence  Neurological: As per HPI  Skin: No rashes or lesions   Endocrine: No heat or cold intolerance; No hair loss  Musculoskeletal: No joint pain or swelling  Psychiatric: No depression, anxiety, mood swings  Heme/Lymph: No easy bruising or bleeding gums      MEDICATIONS  (STANDING):  acetaminophen  IVPB .. 650 milliGRAM(s) IV Intermittent once  acetylcysteine 10%  Inhalation 2 milliLiter(s) Inhalation three times a day  ampicillin/sulbactam  IVPB 1.5 Gram(s) IV Intermittent every 12 hours  aspirin enteric coated 81 milliGRAM(s) Oral daily  atorvastatin 40 milliGRAM(s) Oral at bedtime  dextrose 5%. 1000 milliLiter(s) (30 mL/Hr) IV Continuous <Continuous>  diphenhydrAMINE  IVPB 25 milliGRAM(s) IV Intermittent once  guaiFENesin Oral Liquid (Sugar-Free) 200 milliGRAM(s) Oral every 6 hours  heparin   Injectable 5000 Unit(s) SubCutaneous every 8 hours  immune   globulin 10% (GAMMAGARD) IVPB 25 Gram(s) IV Intermittent every 24 hours  levETIRAcetam  IVPB 500 milliGRAM(s) IV Intermittent every 12 hours    MEDICATIONS  (PRN):  sodium chloride 3%  Inhalation 4 milliLiter(s) Inhalation every 6 hours PRN productive cough    Allergies    hay fever (Unknown)  No Known Drug Allergies    Intolerances    Vital Signs Last 24 Hrs  T(C): 36.7 (22 Jul 2021 10:41), Max: 37.1 (21 Jul 2021 16:54)  T(F): 98 (22 Jul 2021 10:41), Max: 98.7 (21 Jul 2021 16:54)  HR: 85 (22 Jul 2021 10:41) (67 - 85)  BP: 119/66 (22 Jul 2021 10:41) (119/66 - 149/70)  BP(mean): --  RR: 18 (22 Jul 2021 10:41) (16 - 18)  SpO2: 98% (22 Jul 2021 10:41) (95% - 99%)    Physical exam:  General: No acute distress, awake and alert  Cardiovascular: Regular rate and rhythm, ?murmur  Pulmonary: Transmitted sounds from upper airway. No use of accessory muscles  GI: Abdomen soft, non-tender, non-distended    Neurologic Examination:  General:  Appearance is consistent with chronologic age.  No abnormal facies.  Gross skin survey within normal limits.    Cognitive/Language:  Awake, alert, and oriented to person, place, time and date.  Recent and remote memory intact.  Fund of knowledge is appropriate.  Mild dysarthria characterized by imprecise articulation secondary oral motor weakness, but improving compared to prior. Mild dysphonia, characterized by rough vocal quality.  Cranial Nerves  - Eyes: Visual acuity intact, Visual fields full.  EOMI w/o nystagmus, skew or reported double vision.  PERRL.  No ptosis.  - Face:  Facial sensation normal V1 - 3. No facial droop  - Ears/Nose/Throat:  Hearing grossly intact b/l to finger rub.  Palate elevates midline.  Tongue and uvula midline.   Motor examination:  4-/5 proximal muscle weakness (deltoids, hip flexors), 5/5 distally.  No observable drift. Normal tone and bulk. No tenderness, twitching, tremors or involuntary movements.  Sensory examination:   Intact to light touch and pinprick, pain, temperature and proprioception and vibration in all extremities.  Cerebellum:   FTN/HKS intact.  No dysmetria or dysdiadokinesia.     NIHSS: 3 ASPECT Score: 9    POCT Blood Glucose.: 71 mg/dL (22 Jul 2021 10:24)    LABS:  CBC Full  -  ( 22 Jul 2021 08:04 )  WBC Count : 25.47 K/uL  RBC Count : 3.16 M/uL  Hemoglobin : 9.6 g/dL  Hematocrit : 30.6 %  Platelet Count - Automated : 373 K/uL  Mean Cell Volume : 96.8 fl  Mean Cell Hemoglobin : 30.4 pg  Mean Cell Hemoglobin Concentration : 31.4 gm/dL  Auto Neutrophil # : 16.91 K/uL  Auto Lymphocyte # : 2.01 K/uL  Auto Monocyte # : 1.12 K/uL  Auto Eosinophil # : 3.39 K/uL  Auto Basophil # : 0.23 K/uL  Auto Neutrophil % : 66.4 %  Auto Lymphocyte % : 7.9 %  Auto Monocyte % : 4.4 %  Auto Eosinophil % : 13.3 %  Auto Basophil % : 0.9 %    07-22    142  |  108  |  42<H>  ----------------------------<  65<L>  4.2   |  15<L>  |  1.90<H>    Ca    8.8      22 Jul 2021 08:04  Phos  6.0     07-22  Mg     2.5     07-22    TPro  x   /  Alb  3129<L>  /  TBili  x   /  DBili  x   /  AST  x   /  ALT  x   /  AlkPhos  x   07-21    LIVER FUNCTIONS - ( 21 Jul 2021 12:55 )  Alb: 3129 mg/dL / Pro: x     / ALK PHOS: x     / ALT: x     / AST: x     / GGT: x           PT/INR - ( 18 Jul 2021 20:01 )   PT: 12.3 sec;   INR: 1.03     PTT - ( 18 Jul 2021 20:01 )  PTT:32.7 sec    Lactate Dehydrogenase, CSF: 22 U/L (07-21-21 @ 02:33)  Glucose, CSF: 61 mg/dL (07-20-21 @ 13:43)  Protein, CSF: 52 mg/dL (07-20-21 @ 13:43)  CSF Appearance: Clear (07-20-21 @ 13:43)  CSF Color: No Color (07-20-21 @ 13:43)  CSF Segmented Neutrophils: 0 % (07-20-21 @ 13:43)  CSF Lymphocytes: 1 % (07-20-21 @ 13:43)  CSF Comments: Manual differential is based on 1 wbc counted after cytocentrifugation. (07-20-21 @ 13:43)  CSF PCR Result: NotDetec (07-20-21 @ 13:38)    RADIOLOGY & ADDITIONAL STUDIES:      HCT: No acute intracranial hemorrhage or transcortical infarct.    CTA:1.  No large vessel occlusion or high-grade stenosis within the head and neck.  2.  Incidentally noted 1.8 cm right thyroid nodule. Consider nonemergent dedicated thyroid ultrasound is for further characterization.     (19 Jul 2021 00:25)           Neurology Progress Note    Interval History:  No acute overnight events or complaints since starting IVIG yesterday. Notes that his cough and speech have improved, but now endorses increased secretions and dry mouth. Wife reports mild improvement in strength and decreased left facial droop. Bedside repetitive nerve stimulation performed and indicative of myasthenia gravis.    HPI:  89M with PMHx of HTN and seizure on phenytoin presents with left facial droop and slurred speech. Pt was with family member (great-nephew Mesh [phone]) who noticed that pt had slurred speech, increased saliva production and a left facial droop. Pt was driven to the ED immediately. On presentation, /94, NIHSS 3. Great-nephew at bedside denied hx of stroke, use of blood thinners, recent trauma/bleeding event. CTH negative for acute hemorrhage. CTP without any perfusion defect. CTA without LVO. In conjunction with patient, family and stroke attending, decision was made to not give tpa since family felt his current deficits are non-disabling to his daily life vs risk of bleeding.     At baseline, pt is fully independent of all ADLs and iADLs, ambulates without assistance, with hearing loss b/l, baseline right arm tremor. Neurology consulted for persistent weakness. Stroke workup negative. LP performed given persistent weakness to rule out GBS vs Gatica-Lindsey.    PAST MEDICAL & SURGICAL HISTORY:  Hypertension    Seizure      FAMILY HISTORY:      SOCIAL HISTORY:   Patient lives with wife in apartment.  Smoking status: denies  Drinking: denies  Drug Use: denies    ROS:   Constitutional: as per HPI  Eyes: No eye pain or discharge  ENMT:  No sinus or throat pain  Neck: No pain or stiffness  Respiratory: No cough, wheezing, chills or hemoptysis  Cardiovascular: No chest pain, palpitations, shortness of breath, dyspnea on exertion  Gastrointestinal: No abdominal pain, nausea, vomiting or hematemesis; No diarrhea or constipation.   Genitourinary: No dysuria, frequency, hematuria or incontinence  Neurological: As per HPI  Skin: No rashes or lesions   Endocrine: No heat or cold intolerance; No hair loss  Musculoskeletal: No joint pain or swelling  Psychiatric: No depression, anxiety, mood swings  Heme/Lymph: No easy bruising or bleeding gums      MEDICATIONS  (STANDING):  acetaminophen  IVPB .. 650 milliGRAM(s) IV Intermittent once  acetylcysteine 10%  Inhalation 2 milliLiter(s) Inhalation three times a day  ampicillin/sulbactam  IVPB 1.5 Gram(s) IV Intermittent every 12 hours  aspirin enteric coated 81 milliGRAM(s) Oral daily  atorvastatin 40 milliGRAM(s) Oral at bedtime  dextrose 5%. 1000 milliLiter(s) (30 mL/Hr) IV Continuous <Continuous>  diphenhydrAMINE  IVPB 25 milliGRAM(s) IV Intermittent once  guaiFENesin Oral Liquid (Sugar-Free) 200 milliGRAM(s) Oral every 6 hours  heparin   Injectable 5000 Unit(s) SubCutaneous every 8 hours  immune   globulin 10% (GAMMAGARD) IVPB 25 Gram(s) IV Intermittent every 24 hours  levETIRAcetam  IVPB 500 milliGRAM(s) IV Intermittent every 12 hours    MEDICATIONS  (PRN):  sodium chloride 3%  Inhalation 4 milliLiter(s) Inhalation every 6 hours PRN productive cough    Allergies    hay fever (Unknown)  No Known Drug Allergies    Intolerances    Vital Signs Last 24 Hrs  T(C): 36.7 (22 Jul 2021 10:41), Max: 37.1 (21 Jul 2021 16:54)  T(F): 98 (22 Jul 2021 10:41), Max: 98.7 (21 Jul 2021 16:54)  HR: 85 (22 Jul 2021 10:41) (67 - 85)  BP: 119/66 (22 Jul 2021 10:41) (119/66 - 149/70)  BP(mean): --  RR: 18 (22 Jul 2021 10:41) (16 - 18)  SpO2: 98% (22 Jul 2021 10:41) (95% - 99%)    Physical exam:  General: No acute distress, awake and alert  Cardiovascular: Regular rate and rhythm, ?murmur  Pulmonary: Transmitted sounds from upper airway. No use of accessory muscles  GI: Abdomen soft, non-tender, non-distended    Neurologic Examination:  General:  Appearance is consistent with chronologic age.  No abnormal facies.  Gross skin survey within normal limits.    Cognitive/Language:  Awake, alert, and oriented to person, place, time and date.  Recent and remote memory intact.  Fund of knowledge is appropriate.  Mild dysarthria characterized by imprecise articulation secondary oral motor weakness, but improving compared to prior. Mild dysphonia, characterized by rough vocal quality.  Cranial Nerves  - Eyes: Visual acuity intact, Visual fields full.  EOMI w/o nystagmus, skew or reported double vision.  PERRL.  No ptosis.  - Face:  Facial sensation normal V1 - 3. No facial droop  - Ears/Nose/Throat:  Hearing grossly intact b/l to finger rub.  Palate elevates midline.  Tongue and uvula midline.   Motor examination:  4-/5 proximal muscle weakness (deltoids, hip flexors), 5/5 distally.  No observable drift. Normal tone and bulk. No tenderness, twitching, tremors or involuntary movements.  Sensory examination:   Intact to light touch and pinprick, pain, temperature and proprioception and vibration in all extremities.  Cerebellum:   FTN/HKS intact.  No dysmetria or dysdiadokinesia.     NIHSS: 3 ASPECT Score: 9    POCT Blood Glucose.: 71 mg/dL (22 Jul 2021 10:24)    LABS:  CBC Full  -  ( 22 Jul 2021 08:04 )  WBC Count : 25.47 K/uL  RBC Count : 3.16 M/uL  Hemoglobin : 9.6 g/dL  Hematocrit : 30.6 %  Platelet Count - Automated : 373 K/uL  Mean Cell Volume : 96.8 fl  Mean Cell Hemoglobin : 30.4 pg  Mean Cell Hemoglobin Concentration : 31.4 gm/dL  Auto Neutrophil # : 16.91 K/uL  Auto Lymphocyte # : 2.01 K/uL  Auto Monocyte # : 1.12 K/uL  Auto Eosinophil # : 3.39 K/uL  Auto Basophil # : 0.23 K/uL  Auto Neutrophil % : 66.4 %  Auto Lymphocyte % : 7.9 %  Auto Monocyte % : 4.4 %  Auto Eosinophil % : 13.3 %  Auto Basophil % : 0.9 %    07-22    142  |  108  |  42<H>  ----------------------------<  65<L>  4.2   |  15<L>  |  1.90<H>    Ca    8.8      22 Jul 2021 08:04  Phos  6.0     07-22  Mg     2.5     07-22    TPro  x   /  Alb  3129<L>  /  TBili  x   /  DBili  x   /  AST  x   /  ALT  x   /  AlkPhos  x   07-21    LIVER FUNCTIONS - ( 21 Jul 2021 12:55 )  Alb: 3129 mg/dL / Pro: x     / ALK PHOS: x     / ALT: x     / AST: x     / GGT: x           PT/INR - ( 18 Jul 2021 20:01 )   PT: 12.3 sec;   INR: 1.03     PTT - ( 18 Jul 2021 20:01 )  PTT:32.7 sec    Lactate Dehydrogenase, CSF: 22 U/L (07-21-21 @ 02:33)  Glucose, CSF: 61 mg/dL (07-20-21 @ 13:43)  Protein, CSF: 52 mg/dL (07-20-21 @ 13:43)  CSF Appearance: Clear (07-20-21 @ 13:43)  CSF Color: No Color (07-20-21 @ 13:43)  CSF Segmented Neutrophils: 0 % (07-20-21 @ 13:43)  CSF Lymphocytes: 1 % (07-20-21 @ 13:43)  CSF Comments: Manual differential is based on 1 wbc counted after cytocentrifugation. (07-20-21 @ 13:43)  CSF PCR Result: NotDetec (07-20-21 @ 13:38)    RADIOLOGY & ADDITIONAL STUDIES:      HCT: No acute intracranial hemorrhage or transcortical infarct.    CTA:1.  No large vessel occlusion or high-grade stenosis within the head and neck.  2.  Incidentally noted 1.8 cm right thyroid nodule. Consider nonemergent dedicated thyroid ultrasound is for further characterization.     (19 Jul 2021 00:25)           Neurology Progress Note    Interval History:  No acute overnight events or complaints since starting IVIG yesterday. Notes that his cough and speech have improved, but now endorses increased secretions. Wife reports mild improvement in strength and decreased left facial droop.    HPI:  89M with PMHx of HTN and seizure on phenytoin presents with left facial droop and slurred speech. Pt was with family member (great-nephew Mesh [phone]) who noticed that pt had slurred speech, increased saliva production and a left facial droop. Pt was driven to the ED immediately. On presentation, /94, NIHSS 3. Great-nephew at bedside denied hx of stroke, use of blood thinners, recent trauma/bleeding event. CTH negative for acute hemorrhage. CTP without any perfusion defect. CTA without LVO. In conjunction with patient, family and stroke attending, decision was made to not give tpa since family felt his current deficits are non-disabling to his daily life vs risk of bleeding.     At baseline, pt is fully independent of all ADLs and iADLs, ambulates without assistance, with hearing loss b/l, baseline right arm tremor. Neurology consulted for persistent weakness. Stroke workup negative. LP performed given persistent weakness to rule out GBS vs Gatica-Lindsey.    PAST MEDICAL & SURGICAL HISTORY:  Hypertension    Seizure      FAMILY HISTORY:      SOCIAL HISTORY:   Patient lives with wife in apartment.  Smoking status: denies  Drinking: denies  Drug Use: denies    ROS:   Constitutional: as per HPI  Eyes: No eye pain or discharge  ENMT:  No sinus or throat pain  Neck: No pain or stiffness  Respiratory: No cough, wheezing, chills or hemoptysis  Cardiovascular: No chest pain, palpitations, shortness of breath, dyspnea on exertion  Gastrointestinal: No abdominal pain, nausea, vomiting or hematemesis; No diarrhea or constipation.   Genitourinary: No dysuria, frequency, hematuria or incontinence  Neurological: As per HPI  Skin: No rashes or lesions   Endocrine: No heat or cold intolerance; No hair loss  Musculoskeletal: No joint pain or swelling  Psychiatric: No depression, anxiety, mood swings  Heme/Lymph: No easy bruising or bleeding gums      MEDICATIONS  (STANDING):  acetaminophen  IVPB .. 650 milliGRAM(s) IV Intermittent once  acetylcysteine 10%  Inhalation 2 milliLiter(s) Inhalation three times a day  ampicillin/sulbactam  IVPB 1.5 Gram(s) IV Intermittent every 12 hours  aspirin enteric coated 81 milliGRAM(s) Oral daily  atorvastatin 40 milliGRAM(s) Oral at bedtime  dextrose 5%. 1000 milliLiter(s) (30 mL/Hr) IV Continuous <Continuous>  diphenhydrAMINE  IVPB 25 milliGRAM(s) IV Intermittent once  guaiFENesin Oral Liquid (Sugar-Free) 200 milliGRAM(s) Oral every 6 hours  heparin   Injectable 5000 Unit(s) SubCutaneous every 8 hours  immune   globulin 10% (GAMMAGARD) IVPB 25 Gram(s) IV Intermittent every 24 hours  levETIRAcetam  IVPB 500 milliGRAM(s) IV Intermittent every 12 hours    MEDICATIONS  (PRN):  sodium chloride 3%  Inhalation 4 milliLiter(s) Inhalation every 6 hours PRN productive cough    Allergies    hay fever (Unknown)  No Known Drug Allergies    Intolerances    Vital Signs Last 24 Hrs  T(C): 36.7 (22 Jul 2021 10:41), Max: 37.1 (21 Jul 2021 16:54)  T(F): 98 (22 Jul 2021 10:41), Max: 98.7 (21 Jul 2021 16:54)  HR: 85 (22 Jul 2021 10:41) (67 - 85)  BP: 119/66 (22 Jul 2021 10:41) (119/66 - 149/70)  BP(mean): --  RR: 18 (22 Jul 2021 10:41) (16 - 18)  SpO2: 98% (22 Jul 2021 10:41) (95% - 99%)    Physical exam:  General: No acute distress, awake and alert  Cardiovascular: Regular rate and rhythm, ?murmur  Pulmonary: Transmitted sounds from upper airway. No use of accessory muscles  GI: Abdomen soft, non-tender, non-distended    Neurologic Examination:  General:  Appearance is consistent with chronologic age.  No abnormal facies.  Gross skin survey within normal limits.    Cognitive/Language:  Awake, alert, and oriented to person, place, time and date.  Recent and remote memory intact.  Fund of knowledge is appropriate.  Mild dysarthria characterized by imprecise articulation secondary oral motor weakness, but improving compared to prior. Mild dysphonia, characterized by rough vocal quality.  Cranial Nerves  - Eyes: Visual acuity intact, Visual fields full.  EOMI w/o nystagmus, skew or reported double vision.  PERRL.  No ptosis.  - Face:  Facial sensation normal V1 - 3. No facial droop  - Ears/Nose/Throat:  Hearing grossly intact b/l to finger rub.  Palate elevates midline.  Tongue and uvula midline.   Motor examination:  4-/5 proximal muscle weakness (deltoids, hip flexors), 5/5 distally.  No observable drift. Normal tone and bulk. No tenderness, twitching, tremors or involuntary movements.  Sensory examination:   Intact to light touch and pinprick, pain, temperature and proprioception and vibration in all extremities.  Cerebellum:   FTN/HKS intact.  No dysmetria or dysdiadokinesia.     NIHSS: 3 ASPECT Score: 9    POCT Blood Glucose.: 71 mg/dL (22 Jul 2021 10:24)    LABS:  CBC Full  -  ( 22 Jul 2021 08:04 )  WBC Count : 25.47 K/uL  RBC Count : 3.16 M/uL  Hemoglobin : 9.6 g/dL  Hematocrit : 30.6 %  Platelet Count - Automated : 373 K/uL  Mean Cell Volume : 96.8 fl  Mean Cell Hemoglobin : 30.4 pg  Mean Cell Hemoglobin Concentration : 31.4 gm/dL  Auto Neutrophil # : 16.91 K/uL  Auto Lymphocyte # : 2.01 K/uL  Auto Monocyte # : 1.12 K/uL  Auto Eosinophil # : 3.39 K/uL  Auto Basophil # : 0.23 K/uL  Auto Neutrophil % : 66.4 %  Auto Lymphocyte % : 7.9 %  Auto Monocyte % : 4.4 %  Auto Eosinophil % : 13.3 %  Auto Basophil % : 0.9 %    07-22    142  |  108  |  42<H>  ----------------------------<  65<L>  4.2   |  15<L>  |  1.90<H>    Ca    8.8      22 Jul 2021 08:04  Phos  6.0     07-22  Mg     2.5     07-22    TPro  x   /  Alb  3129<L>  /  TBili  x   /  DBili  x   /  AST  x   /  ALT  x   /  AlkPhos  x   07-21    LIVER FUNCTIONS - ( 21 Jul 2021 12:55 )  Alb: 3129 mg/dL / Pro: x     / ALK PHOS: x     / ALT: x     / AST: x     / GGT: x           PT/INR - ( 18 Jul 2021 20:01 )   PT: 12.3 sec;   INR: 1.03     PTT - ( 18 Jul 2021 20:01 )  PTT:32.7 sec    Lactate Dehydrogenase, CSF: 22 U/L (07-21-21 @ 02:33)  Glucose, CSF: 61 mg/dL (07-20-21 @ 13:43)  Protein, CSF: 52 mg/dL (07-20-21 @ 13:43)  CSF Appearance: Clear (07-20-21 @ 13:43)  CSF Color: No Color (07-20-21 @ 13:43)  CSF Segmented Neutrophils: 0 % (07-20-21 @ 13:43)  CSF Lymphocytes: 1 % (07-20-21 @ 13:43)  CSF Comments: Manual differential is based on 1 wbc counted after cytocentrifugation. (07-20-21 @ 13:43)  CSF PCR Result: NotDetec (07-20-21 @ 13:38)    RADIOLOGY & ADDITIONAL STUDIES:      HCT: No acute intracranial hemorrhage or transcortical infarct.    CTA:1.  No large vessel occlusion or high-grade stenosis within the head and neck.  2.  Incidentally noted 1.8 cm right thyroid nodule. Consider nonemergent dedicated thyroid ultrasound is for further characterization.     (19 Jul 2021 00:25)

## 2021-07-22 NOTE — PROGRESS NOTE ADULT - PROBLEM SELECTOR PLAN 4
-likely 2/2 to poor PO intake and declining renal function (BUN and Cr increasing this AM)  - Started d5% 1L 30cc/hr over 12 hours  -Monitor BMPs daily

## 2021-07-22 NOTE — PROGRESS NOTE ADULT - PROBLEM SELECTOR PLAN 3
-likely 2/2 to poor PO intake and declining renal function (BUN and Cr continuing to increase this AM)  - Started d5% 1L 30cc/hr over 12 hours  - Monitor I&Os  - Avoid nephrotoxic drugs

## 2021-07-22 NOTE — PROGRESS NOTE ADULT - PROBLEM SELECTOR PLAN 6
- phenytoin level 4.1 (7/18/21)  - continue to hold home phenytoin 100mg TID  -  Keppra 500 mg q 12h

## 2021-07-23 DIAGNOSIS — G70.00 MYASTHENIA GRAVIS WITHOUT (ACUTE) EXACERBATION: ICD-10-CM

## 2021-07-23 LAB
ACANTHOCYTES BLD QL SMEAR: SLIGHT — SIGNIFICANT CHANGE UP
ANION GAP SERPL CALC-SCNC: 17 MMOL/L — SIGNIFICANT CHANGE UP (ref 5–17)
ANISOCYTOSIS BLD QL: SLIGHT — SIGNIFICANT CHANGE UP
BASOPHILS # BLD AUTO: 0.4 K/UL — HIGH (ref 0–0.2)
BASOPHILS NFR BLD AUTO: 1.8 % — SIGNIFICANT CHANGE UP (ref 0–2)
BUN SERPL-MCNC: 40 MG/DL — HIGH (ref 7–23)
BURR CELLS BLD QL SMEAR: PRESENT — SIGNIFICANT CHANGE UP
CALCIUM SERPL-MCNC: 8.4 MG/DL — SIGNIFICANT CHANGE UP (ref 8.4–10.5)
CHLORIDE SERPL-SCNC: 107 MMOL/L — SIGNIFICANT CHANGE UP (ref 96–108)
CO2 SERPL-SCNC: 17 MMOL/L — LOW (ref 22–31)
CREAT SERPL-MCNC: 1.77 MG/DL — HIGH (ref 0.5–1.3)
CULTURE RESULTS: SIGNIFICANT CHANGE UP
CULTURE RESULTS: SIGNIFICANT CHANGE UP
EOSINOPHIL # BLD AUTO: 1.97 K/UL — HIGH (ref 0–0.5)
EOSINOPHIL NFR BLD AUTO: 8.9 % — HIGH (ref 0–6)
GLUCOSE BLDC GLUCOMTR-MCNC: 108 MG/DL — HIGH (ref 70–99)
GLUCOSE BLDC GLUCOMTR-MCNC: 73 MG/DL — SIGNIFICANT CHANGE UP (ref 70–99)
GLUCOSE BLDC GLUCOMTR-MCNC: 79 MG/DL — SIGNIFICANT CHANGE UP (ref 70–99)
GLUCOSE BLDC GLUCOMTR-MCNC: 83 MG/DL — SIGNIFICANT CHANGE UP (ref 70–99)
GLUCOSE BLDC GLUCOMTR-MCNC: 84 MG/DL — SIGNIFICANT CHANGE UP (ref 70–99)
GLUCOSE SERPL-MCNC: 70 MG/DL — SIGNIFICANT CHANGE UP (ref 70–99)
HCT VFR BLD CALC: 29.1 % — LOW (ref 39–50)
HGB BLD-MCNC: 8.9 G/DL — LOW (ref 13–17)
LYMPHOCYTES # BLD AUTO: 1.39 K/UL — SIGNIFICANT CHANGE UP (ref 1–3.3)
LYMPHOCYTES # BLD AUTO: 6.3 % — LOW (ref 13–44)
MACROCYTES BLD QL: SLIGHT — SIGNIFICANT CHANGE UP
MAGNESIUM SERPL-MCNC: 2.4 MG/DL — SIGNIFICANT CHANGE UP (ref 1.6–2.6)
MANUAL SMEAR VERIFICATION: SIGNIFICANT CHANGE UP
MCHC RBC-ENTMCNC: 29.8 PG — SIGNIFICANT CHANGE UP (ref 27–34)
MCHC RBC-ENTMCNC: 30.6 GM/DL — LOW (ref 32–36)
MCV RBC AUTO: 97.3 FL — SIGNIFICANT CHANGE UP (ref 80–100)
MONOCYTES # BLD AUTO: 1.57 K/UL — HIGH (ref 0–0.9)
MONOCYTES NFR BLD AUTO: 7.1 % — SIGNIFICANT CHANGE UP (ref 2–14)
MYELOCYTES NFR BLD: 3.6 % — HIGH (ref 0–0)
NEUTROPHILS # BLD AUTO: 16 K/UL — HIGH (ref 1.8–7.4)
NEUTROPHILS NFR BLD AUTO: 72.3 % — SIGNIFICANT CHANGE UP (ref 43–77)
OVALOCYTES BLD QL SMEAR: SLIGHT — SIGNIFICANT CHANGE UP
PHOSPHATE SERPL-MCNC: 5.2 MG/DL — HIGH (ref 2.5–4.5)
PLAT MORPH BLD: NORMAL — SIGNIFICANT CHANGE UP
PLATELET # BLD AUTO: 354 K/UL — SIGNIFICANT CHANGE UP (ref 150–400)
POIKILOCYTOSIS BLD QL AUTO: SIGNIFICANT CHANGE UP
POLYCHROMASIA BLD QL SMEAR: SLIGHT — SIGNIFICANT CHANGE UP
POTASSIUM SERPL-MCNC: 4.3 MMOL/L — SIGNIFICANT CHANGE UP (ref 3.5–5.3)
POTASSIUM SERPL-SCNC: 4.3 MMOL/L — SIGNIFICANT CHANGE UP (ref 3.5–5.3)
RBC # BLD: 2.99 M/UL — LOW (ref 4.2–5.8)
RBC # FLD: 14.2 % — SIGNIFICANT CHANGE UP (ref 10.3–14.5)
RBC BLD AUTO: ABNORMAL
SCHISTOCYTES BLD QL AUTO: SLIGHT — SIGNIFICANT CHANGE UP
SODIUM SERPL-SCNC: 141 MMOL/L — SIGNIFICANT CHANGE UP (ref 135–145)
SPECIMEN SOURCE: SIGNIFICANT CHANGE UP
SPECIMEN SOURCE: SIGNIFICANT CHANGE UP
WBC # BLD: 22.13 K/UL — HIGH (ref 3.8–10.5)
WBC # FLD AUTO: 22.13 K/UL — HIGH (ref 3.8–10.5)

## 2021-07-23 PROCEDURE — 71045 X-RAY EXAM CHEST 1 VIEW: CPT | Mod: 26

## 2021-07-23 PROCEDURE — 99233 SBSQ HOSP IP/OBS HIGH 50: CPT

## 2021-07-23 PROCEDURE — 99233 SBSQ HOSP IP/OBS HIGH 50: CPT | Mod: GC

## 2021-07-23 PROCEDURE — 99232 SBSQ HOSP IP/OBS MODERATE 35: CPT

## 2021-07-23 PROCEDURE — 99358 PROLONG SERVICE W/O CONTACT: CPT

## 2021-07-23 RX ORDER — ATORVASTATIN CALCIUM 80 MG/1
40 TABLET, FILM COATED ORAL AT BEDTIME
Refills: 0 | Status: DISCONTINUED | OUTPATIENT
Start: 2021-07-23 | End: 2021-07-26

## 2021-07-23 RX ORDER — ACETAMINOPHEN 500 MG
650 TABLET ORAL
Refills: 0 | Status: COMPLETED | OUTPATIENT
Start: 2021-07-23 | End: 2021-07-25

## 2021-07-23 RX ORDER — AMPICILLIN SODIUM AND SULBACTAM SODIUM 250; 125 MG/ML; MG/ML
1.5 INJECTION, POWDER, FOR SUSPENSION INTRAMUSCULAR; INTRAVENOUS EVERY 12 HOURS
Refills: 0 | Status: COMPLETED | OUTPATIENT
Start: 2021-07-24 | End: 2021-07-27

## 2021-07-23 RX ORDER — DIPHENHYDRAMINE HCL 50 MG
25 CAPSULE ORAL EVERY 24 HOURS
Refills: 0 | Status: COMPLETED | OUTPATIENT
Start: 2021-07-23 | End: 2021-07-25

## 2021-07-23 RX ORDER — ASPIRIN/CALCIUM CARB/MAGNESIUM 324 MG
81 TABLET ORAL DAILY
Refills: 0 | Status: DISCONTINUED | OUTPATIENT
Start: 2021-07-23 | End: 2021-07-26

## 2021-07-23 RX ORDER — SODIUM CHLORIDE 9 MG/ML
1000 INJECTION, SOLUTION INTRAVENOUS
Refills: 0 | Status: DISCONTINUED | OUTPATIENT
Start: 2021-07-23 | End: 2021-07-24

## 2021-07-23 RX ADMIN — AMPICILLIN SODIUM AND SULBACTAM SODIUM 100 GRAM(S): 250; 125 INJECTION, POWDER, FOR SUSPENSION INTRAMUSCULAR; INTRAVENOUS at 03:50

## 2021-07-23 RX ADMIN — IMMUNE GLOBULIN (HUMAN) 31.25 GRAM(S): 10 INJECTION INTRAVENOUS; SUBCUTANEOUS at 18:44

## 2021-07-23 RX ADMIN — Medication 260 MILLIGRAM(S): at 18:45

## 2021-07-23 RX ADMIN — Medication 81 MILLIGRAM(S): at 18:44

## 2021-07-23 RX ADMIN — HEPARIN SODIUM 5000 UNIT(S): 5000 INJECTION INTRAVENOUS; SUBCUTANEOUS at 06:21

## 2021-07-23 RX ADMIN — HEPARIN SODIUM 5000 UNIT(S): 5000 INJECTION INTRAVENOUS; SUBCUTANEOUS at 15:53

## 2021-07-23 RX ADMIN — ATORVASTATIN CALCIUM 40 MILLIGRAM(S): 80 TABLET, FILM COATED ORAL at 22:41

## 2021-07-23 RX ADMIN — HEPARIN SODIUM 5000 UNIT(S): 5000 INJECTION INTRAVENOUS; SUBCUTANEOUS at 22:41

## 2021-07-23 RX ADMIN — Medication 650 MILLIGRAM(S): at 19:48

## 2021-07-23 RX ADMIN — SODIUM CHLORIDE 30 MILLILITER(S): 9 INJECTION, SOLUTION INTRAVENOUS at 17:16

## 2021-07-23 RX ADMIN — LEVETIRACETAM 400 MILLIGRAM(S): 250 TABLET, FILM COATED ORAL at 18:45

## 2021-07-23 RX ADMIN — AMPICILLIN SODIUM AND SULBACTAM SODIUM 100 GRAM(S): 250; 125 INJECTION, POWDER, FOR SUSPENSION INTRAMUSCULAR; INTRAVENOUS at 15:54

## 2021-07-23 RX ADMIN — Medication 101 MILLIGRAM(S): at 18:44

## 2021-07-23 RX ADMIN — LEVETIRACETAM 400 MILLIGRAM(S): 250 TABLET, FILM COATED ORAL at 06:21

## 2021-07-23 NOTE — PROGRESS NOTE ADULT - PROBLEM SELECTOR PLAN 5
- continue aspirin 81mg (via NGT)  - Continue atorvastatin 40 mg daily (via NGT)  - Continue to hold plavix 75mg daily

## 2021-07-23 NOTE — PROGRESS NOTE ADULT - ASSESSMENT
89 M with a PMHx of seizures (on phenytoin at home) and HTN presented with new facial droop and slurring of speech. Admitted to stroke service where stroke work up was found to be negative. Transferred to [facility] for treatment of myasthenia gravis and work up of leukocytosis

## 2021-07-23 NOTE — PROGRESS NOTE ADULT - PROBLEM SELECTOR PLAN 4
Patient remains full code. MOLST was reviewed with the wife, again attempted to address with wife but she stated that she would like him to eat first before she discusses this with him. Will continue to follow for GOC.

## 2021-07-23 NOTE — SWALLOW BEDSIDE ASSESSMENT ADULT - COMMENTS
Received awake & alert. Voice noted to be breathy/hoarse/hypophonic impacting speech intelligibility. Pt is very [place].

## 2021-07-23 NOTE — PROGRESS NOTE ADULT - SUBJECTIVE AND OBJECTIVE BOX
**INCOMPLETE NOTE    OVERNIGHT EVENTS: NAEO    SUBJECTIVE:  Patient seen and examined at bedside. Denies any complaints this morning.    Vital Signs Last 12 Hrs  T(F): 98.3 (07-23-21 @ 09:57), Max: 98.3 (07-23-21 @ 09:57)  HR: 65 (07-23-21 @ 09:57) (65 - 68)  BP: 129/55 (07-23-21 @ 09:57) (129/55 - 135/53)  BP(mean): --  RR: 18 (07-23-21 @ 09:57) (18 - 18)  SpO2: 97% (07-23-21 @ 09:57) (97% - 97%)  I&O's Summary    22 Jul 2021 07:01  -  23 Jul 2021 07:00  --------------------------------------------------------  IN: 540 mL / OUT: 250 mL / NET: 290 mL    23 Jul 2021 07:01  -  23 Jul 2021 15:45  --------------------------------------------------------  IN: 0 mL / OUT: 400 mL / NET: -400 mL        PHYSICAL EXAM:  General: thin elderly gentleman lying in bed in no acute distress, hard of hearing but able to carry out a conversation  HEENT: (+) hearing loss, EOMI, PERRLA, anicteric sclera; moist mucosal membranes. sinuses non tender to palpation   Neck: supple, trachea midline  Cardiovascular: (+) holostyolic murmur loudest at right 2nd ICS , RRR, +S1/S2  Respiratory: no upper airway sounds, good air movement, some ronchi in lower bases  Gastrointestinal: soft, NT/ND; +BSx4  : condom catheter in place draining yellow urine  Extremities: WWP; no edema or cyanosis, sensation intact in all 4 extremities, strength 5/5 upper and lower b/l  Vascular: 2+ radial, DP/PT pulses B/L  Neurological: AAOx3 (alert to name, place, and year); mild dysarthria; left facial droop and eyelid droop appear to be improving; no other focal deficits.        LABS:                        8.9    22.13 )-----------( 354      ( 23 Jul 2021 08:01 )             29.1     07-23    141  |  107  |  40<H>  ----------------------------<  70  4.3   |  17<L>  |  1.77<H>    Ca    8.4      23 Jul 2021 08:01  Phos  5.2     07-23  Mg     2.4     07-23              RADIOLOGY & ADDITIONAL TESTS:    MEDICATIONS  (STANDING):  acetaminophen  IVPB .. 650 milliGRAM(s) IV Intermittent <User Schedule>  ampicillin/sulbactam  IVPB 1.5 Gram(s) IV Intermittent every 12 hours  aspirin enteric coated 81 milliGRAM(s) Oral daily  atorvastatin 40 milliGRAM(s) Oral at bedtime  dextrose 5%. 1000 milliLiter(s) (30 mL/Hr) IV Continuous <Continuous>  diphenhydrAMINE  IVPB 25 milliGRAM(s) IV Intermittent every 24 hours  guaiFENesin Oral Liquid (Sugar-Free) 200 milliGRAM(s) Oral every 6 hours  heparin   Injectable 5000 Unit(s) SubCutaneous every 8 hours  immune   globulin 10% (GAMMAGARD) IVPB 25 Gram(s) IV Intermittent every 24 hours  levETIRAcetam  IVPB 500 milliGRAM(s) IV Intermittent every 12 hours    MEDICATIONS  (PRN):  sodium chloride 3%  Inhalation 4 milliLiter(s) Inhalation every 6 hours PRN productive cough

## 2021-07-23 NOTE — PROGRESS NOTE ADULT - PROBLEM SELECTOR PLAN 2
Per patients FEES, he appears to have had Silent aspiration.  Appreciate Speech and swallow involvement. Patients wife does not want feeding tube for patient.

## 2021-07-23 NOTE — PROGRESS NOTE ADULT - ATTENDING COMMENTS
Limb strength and voice improved today  Will get NG tube for nutrition   Continue IVIG  consider rituximab after that  per hematology, elevated WBC not likely CML or CLL   can transfer to neurology service

## 2021-07-23 NOTE — PROGRESS NOTE ADULT - PROBLEM SELECTOR PLAN 1
-Continue IVIG 400mg/kg/day per neuro (premedication with 1g Tylenol IV and 25mg Benadryl IV) (day 3/5)  -Failed swallow eval again this AM  -NGT placed and feeds started with jevity 1.2  -Transfer to Dr. Beach/Neurology service  -F/U myasthenia gravis antibodies

## 2021-07-23 NOTE — DIETITIAN INITIAL EVALUATION ADULT. - ENTERAL
Initiate Jevity 1.2 @15ml/hr x24hrs and increase 15ml/hr q4hrs to goal rate of 55ml/hr x24hrs (provides 1320ml TV, 73gprotein, 1065ml free water)

## 2021-07-23 NOTE — PROGRESS NOTE ADULT - PROBLEM SELECTOR PLAN 10
F: d5 30cc/hr for 12hrs  E: replete K<4 and Mg <2  N: NPO Tubefeeds with Jevity 1.2  D: heparin sq and SCDs for DVT prophylaxis

## 2021-07-23 NOTE — DIETITIAN INITIAL EVALUATION ADULT. - OTHER INFO
89 M with a PMHx of seizures (on phenytoin at home) and HTN presented with new facial droop and slurring of speech. Admitted to stroke service where stroke work up was found to be negative. Transferred to [facility] for work up of dysphagia 2/2 to myasthenia gravis vs Guillan-Hanover syndrome and leukocytosis as well.    Pt seen in room, resting in bed. Pt is s/p FEES 7/20 with recs for "NPO + temporary alternate means of nutrition, hydration, medication (if c/w GOC)" Per IDRs, NGT within GOC. Plan for S+S re-eval today. Pending SLP recs-place NGT and initiate TF. Initiate Jevity 1.2 @15ml/hr x24hrs and increase 15ml/hr q4hrs to goal rate of 55ml/hr x24hrs (provides 1320ml TV, 73gprotein, 1065ml free water). Monitor tolerance closely. Please see full recs below. Will continue to align nutrition with GOC at all times.

## 2021-07-23 NOTE — DIETITIAN INITIAL EVALUATION ADULT. - ADD RECOMMEND
1. Pending S+S re-eval, place NGT and start TF regimen above. 2. Monitor tolerance closely 3. Trend wts 4. Monitor lytes and replete 5. Keep nutrition within GOC at all times

## 2021-07-23 NOTE — PROGRESS NOTE ADULT - ASSESSMENT
Assessment   89M with PMHx of HTN and seizure on phenytoin presents with left facial droop and slurred speech. Stroke workup negative. Neurology consulted for persistent weakness.      Impression  - Pt with persistent weakness manifesting as dysarthria and decreased strength in proximal muscles. LP notable for mildly elevated protein at 52, no cells. 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 Bedside repetitive nerve stimulation performed 7/22 indicative of myasthenia gravis      Plan  - F/u Myasthenia Gravis antibodies - bedside repetitive nerve stimulation on 7/22 indicative of MG  - C/w IVIG - (400mg/kg/day x 5 days - started 7/21)  - Patient failed swallow study 7/22 - awaiting NGT for nutrition as patient has been without food for 5 days  - C/w switching fluids to D5NS  - In light of patient's persistent leukocytosis, recommend tuberculosis screen in anticipation of starting Rituximab as outpatient     Plan discussed with Attending Dr. Beach  General Neurology Consult team will continue to follow   Assessment   89M with PMHx of HTN and seizure on phenytoin presents with left facial droop and slurred speech. Stroke workup negative. Neurology consulted for persistent weakness.      Impression  - Pt with persistent weakness manifesting as dysarthria and decreased strength in proximal muscles. LP notable for mildly elevated protein at 52, no cells. 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 Bedside repetitive nerve stimulation performed 7/22 indicative of myasthenia gravis      Plan  - F/u Myasthenia Gravis antibodies - bedside repetitive nerve stimulation on 7/22 indicative of MG  - C/w IVIG - (400mg/kg/day x 5 days - started 7/21)  - Patient failed swallow study 7/22 - awaiting NGT for nutrition today as patient has been without food for 5 days  - C/w D5NS fluids  - In light of patient's persistent leukocytosis, recommend tuberculosis screen in anticipation of starting Rituximab as outpatient     Plan discussed with Attending Dr. Beach  General Neurology Consult team will continue to follow   Assessment   89M with PMHx of HTN and seizure on phenytoin presents with left facial droop and slurred speech. Stroke workup negative. Neurology consulted for persistent weakness.      Impression  - Pt with persistent weakness manifesting as dysarthria and decreased strength in proximal muscles. LP notable for mildly elevated protein at 52, no cells. 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 Bedside repetitive nerve stimulation performed 7/22 indicative of myasthenia gravis      Plan  - F/u Myasthenia Gravis antibodies - bedside repetitive nerve stimulation on 7/22 indicative of MG  - C/w IVIG - (400mg/kg/day x 5 days - started 7/21)  - Patient failed swallow study 7/22 - recommend NGT for nutrition today as patient has been without food for 5 days  - C/w D5NS fluids  - In light of patient's persistent leukocytosis, recommend tuberculosis screen in anticipation of starting Rituximab as outpatient     Plan discussed with Attending Dr. Beach  General Neurology Consult team will continue to follow   Assessment   89M with PMHx of HTN and seizure on phenytoin presents with left facial droop and slurred speech. Stroke workup negative. Neurology consulted for persistent weakness.      Impression  - Pt with persistent weakness manifesting as dysarthria and decreased strength in proximal muscles. LP notable for mildly elevated protein at 52, no cells. 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 Bedside repetitive nerve stimulation performed 7/22 indicative of myasthenia gravis      Plan  - F/u Myasthenia Gravis antibodies - bedside repetitive nerve stimulation on 7/22 indicative of MG  - C/w IVIG - (400mg/kg/day x 5 days - started 7/21)  - Patient failed swallow study 7/22 - recommend NGT for nutrition today as patient has been without food for 5 days  - C/w D5NS fluids  - In light of patient's persistent leukocytosis, recommend tuberculosis screen in anticipation of starting Rituximab as outpatient.    Plan discussed with Attending Dr. Beach  General Neurology Consult team will continue to follow   Assessment   89M with PMHx of HTN and seizure on phenytoin presents with left facial droop and slurred speech. Stroke workup negative. Neurology consulted for persistent weakness.      Impression  Myasthenia gravis, new onset       Plan  - F/u Myasthenia Gravis antibodies - bedside repetitive nerve stimulation on 7/22 indicative of MG  - C/w IVIG - (400mg/kg/day x 5 days - started 7/21)  - Patient failed swallow study 7/22 - recommend NGT for nutrition   - C/w D5NS fluids  - recommend tuberculosis screen in anticipation of starting Rituximab (hepatitis negative)    Plan discussed with Attending Dr. Beach  General Neurology Consult team will continue to follow

## 2021-07-23 NOTE — PROGRESS NOTE ADULT - PROBLEM SELECTOR PLAN 2
- WBC count trending down  - f/u work up for CML/CMML: Peripheral flow cytometry, BCR-ABL PCR, JAK2.   - Continue Unasyn 1.5g IV (day 3/7)  for possible infectious etiology. Stop if patient begins to have diarrhea

## 2021-07-23 NOTE — PROGRESS NOTE ADULT - PROBLEM SELECTOR PLAN 5
Patient last assessed: 7/22/2021  to manage: GOC/AD, symptoms, and support.  30 Minutes; Start: 0700am End: 0730am , of non-face-to-face prolonged service provided that relates to (face-to-face) care that has or will occur and ongoing patient management, including one or more of the following:   - Reviewed records from other physicians or other health care professional services, including one or more of the following: other medical records and diagnostic / radiology study results

## 2021-07-23 NOTE — PROGRESS NOTE ADULT - PROBLEM SELECTOR PLAN 3
-likely 2/2 to poor PO intake and declining renal function  - BUN and Cr improved this AM  - Monitor I&Os, BNPs  - Avoid nephrotoxic drugs  - Started feeds today

## 2021-07-23 NOTE — PROGRESS NOTE ADULT - SUBJECTIVE AND OBJECTIVE BOX
JAMA CANTRELL             MRN-[id number]    CC: slurred speech, L facial droop    HPI:   **STROKE HPI***    HPI: 89y Male with PMHx of HTN and seizure on phenytoin presents with left facial droop and slurred speech. Pt was with family member (great-nephew Mesh [phone]) who noticed at 5pm, pt had acute onset of slurred speech, increased saliva production and a left facial droop. Pt was driven to the ED immediately. On presentation, /94, NIHSS 3. Great-nephew at bedside denies hx of stroke, use of blood thinners, recent trauma/bleeding event. CTH negative for acute hemorrhage. CTP without any perfusion defect. CTA without LVO. In conjunction with patient, family and stroke attending, decision was made to not give tpa since family felt his current deficits are non-disabling to his daily life vs risk of bleeding. Pt and family was made aware of the possibility of worsening symptoms and the opportunity to give tpa was time limited. They expressed understanding and declined tpa administration.     Pt denies CP, SOB, extremity weakness, changes in vision, HA. He endorses a cough that has been occurring for the past few days with sputum production, but has been afebrile. Pt given vanc and zosyn x1 in the ED. CXR pending official read. Patient initially failed dysphagia screen and was given  AR, but passed second dysphagia screen so was able to to loaded with plavix 300 PO.     At baseline, pt is fully independent of all ADLs and iADLs, ambulates without assistance, with hearing loss b/l, baseline right arm tremor. It is unclear if pt sees a neurologist for seizure management. Per wife (Ross [phone], [phone]), his most recent seizure was in April 2020 where he had full body shaking lasting for 5 minutes. He has been on phenytoin 100mg TID and has not had another seizure since.    (19 Jul 2021 00:25)    SUBJECTIVE: Patient laying in bed, stating he feels a bit better, but is still hungry. patient now has hearing aides and can hear slightly better     ROS:  DYSPNEA ([place]): 0  NAUS/VOM: N	  SECRETIONS: N	  AGITATION: N  Pain (Y/N):   N    -Provocation/Palliation: n/a   -Quality/Quantity: n/a   -Radiating: n/a   -Severity: n/a   -Timing/Frequency: n/a   -Impact on ADLs: n/a     OTHER REVIEW OF SYSTEMS: + waekness  UNABLE TO OBTAIN  due to: n/a     PEx:  T(C): 36.8 (07-23-21 @ 09:57), Max: 36.8 (07-22-21 @ 16:30)  HR: 65 (07-23-21 @ 09:57) (65 - 85)  BP: 129/55 (07-23-21 @ 09:57) (119/66 - 146/69)  RR: 18 (07-23-21 @ 09:57) (17 - 18)  SpO2: 97% (07-23-21 @ 09:57) (96% - 98%)  Wt(kg): 60.1kg      GENERAL:  [ x]Alert  [x ]Oriented x  3 [ ]Lethargic  [ ]Cachexia  [ ]Unarousable  [X ]Verbal  [ ]Non-Verbal  Behavioral:   [ ] Anxiety  [ ] Delirium [ ] Agitation [x ] Other - calm   HEENT:  [x ]Normal   [ ]Dry mouth   [ ]ET Tube/Trach  [ ]Oral lesions  PULMONARY:   [ ]Clear  [ ]Tachypnea  [ ]Audible excessive secretions   [x ]Rhonchi      [ ]Right [ ]Left [ ]Bilateral  [ ]Crackles        [ ]Right [ ]Left [ ]Bilateral  [ ]Wheezing     [ ]Right [ ]Left [ ]Bilateral  CARDIOVASCULAR:    [x ]Regular [ ]Irregular [ ]Tachy  [ ]Anatoliy [ x]Murmur [ ]Other  GASTROINTESTINAL:  [x ]Soft  [ ]Distended   [ ]+BS  [ x]Non tender [ ]Tender  [ ]PEG [ ]OGT/ NGT  Last BM:  GENITOURINARY:  [ ]Normal [x ] Incontinent   [ ]Oliguria/Anuria   [ ]Cruz  MUSCULOSKELETAL:   [ x]Normal   [ ]Weakness  [ ]Bed/Wheelchair bound [ ]Edema  NEUROLOGIC:   [x ]No focal deficits  [ ] Cognitive impairment  [ ] Dysphagia [ ]Dysarthria [ ] Paresis [ ]Other   SKIN:   [x ]Normal   [ ]Pressure ulcer(s)  [ ]Rash      ALLERGIES: hay fever (Unknown)  No Known Drug Allergies      OPIATE NAÏVE (Y/N): Y    MEDICATIONS: REVIEWED  MEDICATIONS  (STANDING):  acetaminophen  IVPB .. 650 milliGRAM(s) IV Intermittent <User Schedule>  ampicillin/sulbactam  IVPB 1.5 Gram(s) IV Intermittent every 12 hours  aspirin enteric coated 81 milliGRAM(s) Oral daily  atorvastatin 40 milliGRAM(s) Oral at bedtime  dextrose 5%. 1000 milliLiter(s) (30 mL/Hr) IV Continuous <Continuous>  diphenhydrAMINE  IVPB 25 milliGRAM(s) IV Intermittent every 24 hours  guaiFENesin Oral Liquid (Sugar-Free) 200 milliGRAM(s) Oral every 6 hours  heparin   Injectable 5000 Unit(s) SubCutaneous every 8 hours  immune   globulin 10% (GAMMAGARD) IVPB 25 Gram(s) IV Intermittent every 24 hours  levETIRAcetam  IVPB 500 milliGRAM(s) IV Intermittent every 12 hours    MEDICATIONS  (PRN):  sodium chloride 3%  Inhalation 4 milliLiter(s) Inhalation every 6 hours PRN productive cough      LABS: REVIEWED  CBC:                        8.9    22.13 )-----------( 354      ( 23 Jul 2021 08:01 )             29.1     CMP:    07-23    141  |  107  |  40<H>  ----------------------------<  70  4.3   |  17<L>  |  1.77<H>    Ca    8.4      23 Jul 2021 08:01  Phos  5.2     07-23  Mg     2.4     07-23    TPro  x   /  Alb  3129<L>  /  TBili  x   /  DBili  x   /  AST  x   /  ALT  x   /  AlkPhos  x   07-21      IMAGING: REVIEWED    ADVANCED DIRECTIVES:            FULL CODE         DECISION MAKER: Patient is able to make his own decisions   LEGAL SURROGATE:  Ross (wife): [phone], [phone]    PSYCHOSOCIAL-SPIRITUAL ASSESSMENT:       Reviewed       Care plan unchanged    GOALS OF CARE DISCUSSION       Palliative care info/counseling provided	            Documentation of GOC: Full code   	      AGENCY CHOICE DISCUSSED:         None    REFERRALS	        Palliative Med        Unit SW/Case Mgmt      PT/OT

## 2021-07-23 NOTE — PROGRESS NOTE ADULT - SUBJECTIVE AND OBJECTIVE BOX
Neurology Progress Note    Interval History:  No acute overnight events. Awaiting NG tube placement this AM - endorses some weakness 2/2 decreased nutrition since admission. Speech and strength have continued to improve. Denies increased secretions and cough.      HPI:  89M with PMHx of HTN and seizure on phenytoin presents with left facial droop and slurred speech. Pt was with family member (great-nephew Mesh [phone]) who noticed that pt had slurred speech, increased saliva production and a left facial droop. Pt was driven to the ED immediately. On presentation, /94, NIHSS 3. Great-nephew at bedside denied hx of stroke, use of blood thinners, recent trauma/bleeding event. CTH negative for acute hemorrhage. CTP without any perfusion defect. CTA without LVO. In conjunction with patient, family and stroke attending, decision was made to not give tpa since family felt his current deficits are non-disabling to his daily life vs risk of bleeding.     At baseline, pt is fully independent of all ADLs and iADLs, ambulates without assistance, with hearing loss b/l, baseline right arm tremor. Neurology consulted for persistent weakness. Stroke workup negative. LP performed given persistent weakness to rule out GBS vs Gatica-Lindsey.    PAST MEDICAL & SURGICAL HISTORY:  Hypertension    Seizure      FAMILY HISTORY:      SOCIAL HISTORY:   Patient lives with wife in apartment.  Smoking status: denies  Drinking: denies  Drug Use: denies    ROS:   Constitutional: as per HPI  Eyes: No eye pain or discharge  ENMT:  No sinus or throat pain  Neck: No pain or stiffness  Respiratory: No cough, wheezing, chills or hemoptysis  Cardiovascular: No chest pain, palpitations, shortness of breath, dyspnea on exertion  Gastrointestinal: No abdominal pain, nausea, vomiting or hematemesis; No diarrhea or constipation.   Genitourinary: No dysuria, frequency, hematuria or incontinence  Neurological: As per HPI  Skin: No rashes or lesions   Endocrine: No heat or cold intolerance; No hair loss  Musculoskeletal: No joint pain or swelling  Psychiatric: No depression, anxiety, mood swings  Heme/Lymph: No easy bruising or bleeding gums      MEDICATIONS  (STANDING):  acetaminophen  IVPB .. 650 milliGRAM(s) IV Intermittent <User Schedule>  ampicillin/sulbactam  IVPB 1.5 Gram(s) IV Intermittent every 12 hours  aspirin enteric coated 81 milliGRAM(s) Oral daily  atorvastatin 40 milliGRAM(s) Oral at bedtime  dextrose 5%. 1000 milliLiter(s) (30 mL/Hr) IV Continuous <Continuous>  diphenhydrAMINE  IVPB 25 milliGRAM(s) IV Intermittent every 24 hours  guaiFENesin Oral Liquid (Sugar-Free) 200 milliGRAM(s) Oral every 6 hours  heparin   Injectable 5000 Unit(s) SubCutaneous every 8 hours  immune   globulin 10% (GAMMAGARD) IVPB 25 Gram(s) IV Intermittent every 24 hours  levETIRAcetam  IVPB 500 milliGRAM(s) IV Intermittent every 12 hours    MEDICATIONS  (PRN):  sodium chloride 3%  Inhalation 4 milliLiter(s) Inhalation every 6 hours PRN productive cough    Allergies    hay fever (Unknown)  No Known Drug Allergies    Intolerances    Vital Signs Last 24 Hrs  T(C): 36.8 (23 Jul 2021 09:57), Max: 36.8 (22 Jul 2021 16:30)  T(F): 98.3 (23 Jul 2021 09:57), Max: 98.3 (23 Jul 2021 09:57)  HR: 65 (23 Jul 2021 09:57) (65 - 72)  BP: 129/55 (23 Jul 2021 09:57) (124/60 - 146/69)  BP(mean): --  RR: 18 (23 Jul 2021 09:57) (17 - 18)  SpO2: 97% (23 Jul 2021 09:57) (96% - 97%)    Physical exam:  General: No acute distress, awake and alert  Cardiovascular: Regular rate and rhythm, ?murmur  Pulmonary: Transmitted sounds from upper airway. No use of accessory muscles  GI: Abdomen soft, non-tender, non-distended    Neurologic Examination:  General:  Appearance is consistent with chronologic age.  No abnormal facies.  Gross skin survey within normal limits.    Cognitive/Language:  Awake, alert, and oriented to person, place, time and date.  Recent and remote memory intact.  Fund of knowledge is appropriate.  Mild dysarthria characterized by imprecise articulation secondary oral motor weakness, but improving compared to prior. Mild dysphonia, characterized by rough vocal quality.  Cranial Nerves  - Eyes: Visual acuity intact, Visual fields full.  EOMI w/o nystagmus, skew or reported double vision.  PERRL.  Mild L eye ptosis.  - Face:  Facial sensation normal V1 - 3. No facial droop  - Ears/Nose/Throat:  Hearing grossly intact b/l to finger rub.  Palate elevates midline.  Tongue and uvula midline.   Motor examination:  4-/5 proximal muscle weakness (deltoids, hip flexors), 5/5 distally.  No observable drift. Normal tone and bulk. No tenderness, twitching, tremors or involuntary movements.  Sensory examination:   Intact to light touch and pinprick, pain, temperature and proprioception and vibration in all extremities.  Cerebellum:   FTN/HKS intact.  No dysmetria or dysdiadokinesia.     NIHSS: 3 ASPECT Score: 9    POCT Blood Glucose.: 71 mg/dL (22 Jul 2021 10:24)    LABS:  CBC Full  -  ( 23 Jul 2021 08:01 )  WBC Count : 22.13 K/uL  RBC Count : 2.99 M/uL  Hemoglobin : 8.9 g/dL  Hematocrit : 29.1 %  Platelet Count - Automated : 354 K/uL  Mean Cell Volume : 97.3 fl  Mean Cell Hemoglobin : 29.8 pg  Mean Cell Hemoglobin Concentration : 30.6 gm/dL  Auto Neutrophil # : 16.00 K/uL  Auto Lymphocyte # : 1.39 K/uL  Auto Monocyte # : 1.57 K/uL  Auto Eosinophil # : 1.97 K/uL  Auto Basophil # : 0.40 K/uL  Auto Neutrophil % : 72.3 %  Auto Lymphocyte % : 6.3 %  Auto Monocyte % : 7.1 %  Auto Eosinophil % : 8.9 %  Auto Basophil % : 1.8 %                          8.9    22.13 )-----------( 354      ( 23 Jul 2021 08:01 )             29.1       LIVER FUNCTIONS - ( 21 Jul 2021 12:55 )  Alb: 3129 mg/dL / Pro: x     / ALK PHOS: x     / ALT: x     / AST: x     / GGT: x             PT/INR - ( 18 Jul 2021 20:01 )   PT: 12.3 sec;   INR: 1.03     PTT - ( 18 Jul 2021 20:01 )  PTT:32.7 sec    Lactate Dehydrogenase, CSF: 22 U/L (07-21-21 @ 02:33)  Glucose, CSF: 61 mg/dL (07-20-21 @ 13:43)  Protein, CSF: 52 mg/dL (07-20-21 @ 13:43)  CSF Appearance: Clear (07-20-21 @ 13:43)  CSF Color: No Color (07-20-21 @ 13:43)  CSF Segmented Neutrophils: 0 % (07-20-21 @ 13:43)  CSF Lymphocytes: 1 % (07-20-21 @ 13:43)  CSF Comments: Manual differential is based on 1 wbc counted after cytocentrifugation. (07-20-21 @ 13:43)  CSF PCR Result: NotDetec (07-20-21 @ 13:38)    RADIOLOGY & ADDITIONAL STUDIES:      HCT: No acute intracranial hemorrhage or transcortical infarct.    CTA:1.  No large vessel occlusion or high-grade stenosis within the head and neck.  2.  Incidentally noted 1.8 cm right thyroid nodule. Consider nonemergent dedicated thyroid ultrasound is for further characterization.     (19 Jul 2021 00:25)           Neurology Progress Note    Interval History:  No acute overnight events. Awaiting NG tube placement this AM - endorses some weakness 2/2 decreased nutrition since admission, but wife continues to endorse improvement in overall strength and speech intelligibility. Denies increased secretions and cough.      HPI:  89M with PMHx of HTN and seizure on phenytoin presents with left facial droop and slurred speech. Pt was with family member (great-nephew Mesh [phone]) who noticed that pt had slurred speech, increased saliva production and a left facial droop. Pt was driven to the ED immediately. On presentation, /94, NIHSS 3. Great-nephew at bedside denied hx of stroke, use of blood thinners, recent trauma/bleeding event. CTH negative for acute hemorrhage. CTP without any perfusion defect. CTA without LVO. In conjunction with patient, family and stroke attending, decision was made to not give tpa since family felt his current deficits are non-disabling to his daily life vs risk of bleeding.     At baseline, pt is fully independent of all ADLs and iADLs, ambulates without assistance, with hearing loss b/l, baseline right arm tremor. Neurology consulted for persistent weakness. Stroke workup negative. LP performed given persistent weakness to rule out GBS vs Gatica-Lindsey.    PAST MEDICAL & SURGICAL HISTORY:  Hypertension    Seizure      FAMILY HISTORY:      SOCIAL HISTORY:   Patient lives with wife in apartment.  Smoking status: denies  Drinking: denies  Drug Use: denies    ROS:   Constitutional: as per HPI  Eyes: No eye pain or discharge  ENMT:  No sinus or throat pain  Neck: No pain or stiffness  Respiratory: No cough, wheezing, chills or hemoptysis  Cardiovascular: No chest pain, palpitations, shortness of breath, dyspnea on exertion  Gastrointestinal: No abdominal pain, nausea, vomiting or hematemesis; No diarrhea or constipation.   Genitourinary: No dysuria, frequency, hematuria or incontinence  Neurological: As per HPI  Skin: No rashes or lesions   Endocrine: No heat or cold intolerance; No hair loss  Musculoskeletal: No joint pain or swelling  Psychiatric: No depression, anxiety, mood swings  Heme/Lymph: No easy bruising or bleeding gums      MEDICATIONS  (STANDING):  acetaminophen  IVPB .. 650 milliGRAM(s) IV Intermittent <User Schedule>  ampicillin/sulbactam  IVPB 1.5 Gram(s) IV Intermittent every 12 hours  aspirin enteric coated 81 milliGRAM(s) Oral daily  atorvastatin 40 milliGRAM(s) Oral at bedtime  dextrose 5%. 1000 milliLiter(s) (30 mL/Hr) IV Continuous <Continuous>  diphenhydrAMINE  IVPB 25 milliGRAM(s) IV Intermittent every 24 hours  guaiFENesin Oral Liquid (Sugar-Free) 200 milliGRAM(s) Oral every 6 hours  heparin   Injectable 5000 Unit(s) SubCutaneous every 8 hours  immune   globulin 10% (GAMMAGARD) IVPB 25 Gram(s) IV Intermittent every 24 hours  levETIRAcetam  IVPB 500 milliGRAM(s) IV Intermittent every 12 hours    MEDICATIONS  (PRN):  sodium chloride 3%  Inhalation 4 milliLiter(s) Inhalation every 6 hours PRN productive cough    Allergies    hay fever (Unknown)  No Known Drug Allergies    Intolerances    Vital Signs Last 24 Hrs  T(C): 36.8 (23 Jul 2021 09:57), Max: 36.8 (22 Jul 2021 16:30)  T(F): 98.3 (23 Jul 2021 09:57), Max: 98.3 (23 Jul 2021 09:57)  HR: 65 (23 Jul 2021 09:57) (65 - 72)  BP: 129/55 (23 Jul 2021 09:57) (124/60 - 146/69)  BP(mean): --  RR: 18 (23 Jul 2021 09:57) (17 - 18)  SpO2: 97% (23 Jul 2021 09:57) (96% - 97%)    Physical exam:  General: No acute distress, awake and alert  Cardiovascular: Regular rate and rhythm, ?murmur  Pulmonary: Transmitted sounds from upper airway. No use of accessory muscles  GI: Abdomen soft, non-tender, non-distended    Neurologic Examination:  General:  Appearance is consistent with chronologic age.  No abnormal facies.  Gross skin survey within normal limits.    Cognitive/Language:  Awake, alert, and oriented to person, place, time and date.  Recent and remote memory intact.  Fund of knowledge is appropriate.  Mild dysarthria characterized by imprecise articulation secondary oral motor weakness, but improving compared to prior. Mild dysphonia, characterized by rough vocal quality.  Cranial Nerves  - Eyes: Visual acuity intact, Visual fields full.  EOMI w/o nystagmus, skew or reported double vision.  PERRL.  Mild L eye ptosis.  - Face:  Facial sensation normal V1 - 3. No facial droop  - Ears/Nose/Throat:  Hearing grossly intact b/l to finger rub.  Palate elevates midline.  Tongue and uvula midline.   Motor examination:  4-/5 proximal muscle weakness (deltoids, hip flexors), 5/5 distally.  No observable drift. Normal tone and bulk. No tenderness, twitching, tremors or involuntary movements.  Sensory examination:   Intact to light touch and pinprick, pain, temperature and proprioception and vibration in all extremities.  Cerebellum:   FTN/HKS intact.  No dysmetria or dysdiadokinesia.     NIHSS: 3 ASPECT Score: 9    POCT Blood Glucose.: 71 mg/dL (22 Jul 2021 10:24)    LABS:  CBC Full  -  ( 23 Jul 2021 08:01 )  WBC Count : 22.13 K/uL  RBC Count : 2.99 M/uL  Hemoglobin : 8.9 g/dL  Hematocrit : 29.1 %  Platelet Count - Automated : 354 K/uL  Mean Cell Volume : 97.3 fl  Mean Cell Hemoglobin : 29.8 pg  Mean Cell Hemoglobin Concentration : 30.6 gm/dL  Auto Neutrophil # : 16.00 K/uL  Auto Lymphocyte # : 1.39 K/uL  Auto Monocyte # : 1.57 K/uL  Auto Eosinophil # : 1.97 K/uL  Auto Basophil # : 0.40 K/uL  Auto Neutrophil % : 72.3 %  Auto Lymphocyte % : 6.3 %  Auto Monocyte % : 7.1 %  Auto Eosinophil % : 8.9 %  Auto Basophil % : 1.8 %                          8.9    22.13 )-----------( 354      ( 23 Jul 2021 08:01 )             29.1       LIVER FUNCTIONS - ( 21 Jul 2021 12:55 )  Alb: 3129 mg/dL / Pro: x     / ALK PHOS: x     / ALT: x     / AST: x     / GGT: x             PT/INR - ( 18 Jul 2021 20:01 )   PT: 12.3 sec;   INR: 1.03     PTT - ( 18 Jul 2021 20:01 )  PTT:32.7 sec    Lactate Dehydrogenase, CSF: 22 U/L (07-21-21 @ 02:33)  Glucose, CSF: 61 mg/dL (07-20-21 @ 13:43)  Protein, CSF: 52 mg/dL (07-20-21 @ 13:43)  CSF Appearance: Clear (07-20-21 @ 13:43)  CSF Color: No Color (07-20-21 @ 13:43)  CSF Segmented Neutrophils: 0 % (07-20-21 @ 13:43)  CSF Lymphocytes: 1 % (07-20-21 @ 13:43)  CSF Comments: Manual differential is based on 1 wbc counted after cytocentrifugation. (07-20-21 @ 13:43)  CSF PCR Result: NotDetec (07-20-21 @ 13:38)    RADIOLOGY & ADDITIONAL STUDIES:      HCT: No acute intracranial hemorrhage or transcortical infarct.    CTA:1.  No large vessel occlusion or high-grade stenosis within the head and neck.  2.  Incidentally noted 1.8 cm right thyroid nodule. Consider nonemergent dedicated thyroid ultrasound is for further characterization.     (19 Jul 2021 00:25)           Neurology Progress Note    Interval History:  No acute overnight events. Possible NG tube placement today after speech and swallow evaluation - endorses some weakness 2/2 decreased nutrition since admission, but wife continues to endorse improvement in overall strength and speech intelligibility. Denies increased secretions and cough.      HPI:  89M with PMHx of HTN and seizure on phenytoin presents with left facial droop and slurred speech. Pt was with family member (great-nephew Mesh [phone]) who noticed that pt had slurred speech, increased saliva production and a left facial droop. Pt was driven to the ED immediately. On presentation, /94, NIHSS 3. Great-nephew at bedside denied hx of stroke, use of blood thinners, recent trauma/bleeding event. CTH negative for acute hemorrhage. CTP without any perfusion defect. CTA without LVO. In conjunction with patient, family and stroke attending, decision was made to not give tpa since family felt his current deficits are non-disabling to his daily life vs risk of bleeding.     At baseline, pt is fully independent of all ADLs and iADLs, ambulates without assistance, with hearing loss b/l, baseline right arm tremor. Neurology consulted for persistent weakness. Stroke workup negative. LP performed given persistent weakness to rule out GBS vs Gatica-Lindsey.    PAST MEDICAL & SURGICAL HISTORY:  Hypertension    Seizure      FAMILY HISTORY:      SOCIAL HISTORY:   Patient lives with wife in apartment.  Smoking status: denies  Drinking: denies  Drug Use: denies    ROS:   Constitutional: as per HPI  Eyes: No eye pain or discharge  ENMT:  No sinus or throat pain  Neck: No pain or stiffness  Respiratory: No cough, wheezing, chills or hemoptysis  Cardiovascular: No chest pain, palpitations, shortness of breath, dyspnea on exertion  Gastrointestinal: No abdominal pain, nausea, vomiting or hematemesis; No diarrhea or constipation.   Genitourinary: No dysuria, frequency, hematuria or incontinence  Neurological: As per HPI  Skin: No rashes or lesions   Endocrine: No heat or cold intolerance; No hair loss  Musculoskeletal: No joint pain or swelling  Psychiatric: No depression, anxiety, mood swings  Heme/Lymph: No easy bruising or bleeding gums      MEDICATIONS  (STANDING):  acetaminophen  IVPB .. 650 milliGRAM(s) IV Intermittent <User Schedule>  ampicillin/sulbactam  IVPB 1.5 Gram(s) IV Intermittent every 12 hours  aspirin enteric coated 81 milliGRAM(s) Oral daily  atorvastatin 40 milliGRAM(s) Oral at bedtime  dextrose 5%. 1000 milliLiter(s) (30 mL/Hr) IV Continuous <Continuous>  diphenhydrAMINE  IVPB 25 milliGRAM(s) IV Intermittent every 24 hours  guaiFENesin Oral Liquid (Sugar-Free) 200 milliGRAM(s) Oral every 6 hours  heparin   Injectable 5000 Unit(s) SubCutaneous every 8 hours  immune   globulin 10% (GAMMAGARD) IVPB 25 Gram(s) IV Intermittent every 24 hours  levETIRAcetam  IVPB 500 milliGRAM(s) IV Intermittent every 12 hours    MEDICATIONS  (PRN):  sodium chloride 3%  Inhalation 4 milliLiter(s) Inhalation every 6 hours PRN productive cough    Allergies    hay fever (Unknown)  No Known Drug Allergies    Intolerances    Vital Signs Last 24 Hrs  T(C): 36.8 (23 Jul 2021 09:57), Max: 36.8 (22 Jul 2021 16:30)  T(F): 98.3 (23 Jul 2021 09:57), Max: 98.3 (23 Jul 2021 09:57)  HR: 65 (23 Jul 2021 09:57) (65 - 72)  BP: 129/55 (23 Jul 2021 09:57) (124/60 - 146/69)  BP(mean): --  RR: 18 (23 Jul 2021 09:57) (17 - 18)  SpO2: 97% (23 Jul 2021 09:57) (96% - 97%)    Physical exam:  General: No acute distress, awake and alert  Cardiovascular: Regular rate and rhythm, ?murmur  Pulmonary: Transmitted sounds from upper airway. No use of accessory muscles  GI: Abdomen soft, non-tender, non-distended    Neurologic Examination:  General:  Appearance is consistent with chronologic age.  No abnormal facies.  Gross skin survey within normal limits.    Cognitive/Language:  Awake, alert, and oriented to person, place, time and date.  Recent and remote memory intact.  Fund of knowledge is appropriate.  Mild dysarthria characterized by imprecise articulation secondary oral motor weakness, but improving compared to prior. Mild dysphonia, characterized by rough vocal quality.  Cranial Nerves  - Eyes: Visual acuity intact, Visual fields full.  EOMI w/o nystagmus, skew or reported double vision.  PERRL.  Mild L eye ptosis.  - Face:  Facial sensation normal V1 - 3. No facial droop  - Ears/Nose/Throat:  Hearing grossly intact b/l to finger rub.  Palate elevates midline.  Tongue and uvula midline.   Motor examination:  4-/5 proximal muscle weakness (deltoids, hip flexors), 5/5 distally.  No observable drift. Normal tone and bulk. No tenderness, twitching, tremors or involuntary movements.  Sensory examination:   Intact to light touch and pinprick, pain, temperature and proprioception and vibration in all extremities.  Cerebellum:   FTN/HKS intact.  No dysmetria or dysdiadokinesia.     NIHSS: 3 ASPECT Score: 9    POCT Blood Glucose.: 71 mg/dL (22 Jul 2021 10:24)    LABS:  CBC Full  -  ( 23 Jul 2021 08:01 )  WBC Count : 22.13 K/uL  RBC Count : 2.99 M/uL  Hemoglobin : 8.9 g/dL  Hematocrit : 29.1 %  Platelet Count - Automated : 354 K/uL  Mean Cell Volume : 97.3 fl  Mean Cell Hemoglobin : 29.8 pg  Mean Cell Hemoglobin Concentration : 30.6 gm/dL  Auto Neutrophil # : 16.00 K/uL  Auto Lymphocyte # : 1.39 K/uL  Auto Monocyte # : 1.57 K/uL  Auto Eosinophil # : 1.97 K/uL  Auto Basophil # : 0.40 K/uL  Auto Neutrophil % : 72.3 %  Auto Lymphocyte % : 6.3 %  Auto Monocyte % : 7.1 %  Auto Eosinophil % : 8.9 %  Auto Basophil % : 1.8 %                          8.9    22.13 )-----------( 354      ( 23 Jul 2021 08:01 )             29.1       LIVER FUNCTIONS - ( 21 Jul 2021 12:55 )  Alb: 3129 mg/dL / Pro: x     / ALK PHOS: x     / ALT: x     / AST: x     / GGT: x             PT/INR - ( 18 Jul 2021 20:01 )   PT: 12.3 sec;   INR: 1.03     PTT - ( 18 Jul 2021 20:01 )  PTT:32.7 sec    Lactate Dehydrogenase, CSF: 22 U/L (07-21-21 @ 02:33)  Glucose, CSF: 61 mg/dL (07-20-21 @ 13:43)  Protein, CSF: 52 mg/dL (07-20-21 @ 13:43)  CSF Appearance: Clear (07-20-21 @ 13:43)  CSF Color: No Color (07-20-21 @ 13:43)  CSF Segmented Neutrophils: 0 % (07-20-21 @ 13:43)  CSF Lymphocytes: 1 % (07-20-21 @ 13:43)  CSF Comments: Manual differential is based on 1 wbc counted after cytocentrifugation. (07-20-21 @ 13:43)  CSF PCR Result: NotDetec (07-20-21 @ 13:38)    RADIOLOGY & ADDITIONAL STUDIES:      HCT: No acute intracranial hemorrhage or transcortical infarct.    CTA:1.  No large vessel occlusion or high-grade stenosis within the head and neck.  2.  Incidentally noted 1.8 cm right thyroid nodule. Consider nonemergent dedicated thyroid ultrasound is for further characterization.     (19 Jul 2021 00:25)           Neurology Progress Note    Interval History:  No acute overnight events. Possible NG tube placement today after speech and swallow evaluation - endorses some weakness 2/2 decreased nutrition since admission, but wife continues to endorse improvement in overall strength and speech intelligibility. Denies increased secretions and cough.      PAST MEDICAL & SURGICAL HISTORY:  Hypertension    Seizure      FAMILY HISTORY:      SOCIAL HISTORY:   Patient lives with wife in apartment.  Smoking status: denies  Drinking: denies  Drug Use: denies    ROS:   Constitutional: as per HPI  Eyes: No eye pain or discharge  ENMT:  No sinus or throat pain  Neck: No pain or stiffness  Respiratory: No cough, wheezing, chills or hemoptysis  Cardiovascular: No chest pain, palpitations, shortness of breath, dyspnea on exertion  Gastrointestinal: No abdominal pain, nausea, vomiting or hematemesis; No diarrhea or constipation.   Genitourinary: No dysuria, frequency, hematuria or incontinence  Neurological: As per HPI  Skin: No rashes or lesions   Endocrine: No heat or cold intolerance; No hair loss  Musculoskeletal: No joint pain or swelling  Psychiatric: No depression, anxiety, mood swings  Heme/Lymph: No easy bruising or bleeding gums      MEDICATIONS  (STANDING):  acetaminophen  IVPB .. 650 milliGRAM(s) IV Intermittent <User Schedule>  ampicillin/sulbactam  IVPB 1.5 Gram(s) IV Intermittent every 12 hours  aspirin enteric coated 81 milliGRAM(s) Oral daily  atorvastatin 40 milliGRAM(s) Oral at bedtime  dextrose 5%. 1000 milliLiter(s) (30 mL/Hr) IV Continuous <Continuous>  diphenhydrAMINE  IVPB 25 milliGRAM(s) IV Intermittent every 24 hours  guaiFENesin Oral Liquid (Sugar-Free) 200 milliGRAM(s) Oral every 6 hours  heparin   Injectable 5000 Unit(s) SubCutaneous every 8 hours  immune   globulin 10% (GAMMAGARD) IVPB 25 Gram(s) IV Intermittent every 24 hours  levETIRAcetam  IVPB 500 milliGRAM(s) IV Intermittent every 12 hours    MEDICATIONS  (PRN):  sodium chloride 3%  Inhalation 4 milliLiter(s) Inhalation every 6 hours PRN productive cough    Allergies    hay fever (Unknown)  No Known Drug Allergies    Intolerances    Vital Signs Last 24 Hrs  T(C): 36.8 (23 Jul 2021 09:57), Max: 36.8 (22 Jul 2021 16:30)  T(F): 98.3 (23 Jul 2021 09:57), Max: 98.3 (23 Jul 2021 09:57)  HR: 65 (23 Jul 2021 09:57) (65 - 72)  BP: 129/55 (23 Jul 2021 09:57) (124/60 - 146/69)  BP(mean): --  RR: 18 (23 Jul 2021 09:57) (17 - 18)  SpO2: 97% (23 Jul 2021 09:57) (96% - 97%)    Physical exam:  General: No acute distress, awake and alert  Cardiovascular: Regular rate and rhythm, ?murmur  Pulmonary: Transmitted sounds from upper airway. No use of accessory muscles  GI: Abdomen soft, non-tender, non-distended    Neurologic Examination:  General:  Appearance is consistent with chronologic age.  No abnormal facies.  Gross skin survey within normal limits.    Cognitive/Language:  Awake, alert, and oriented to person, place, time and date.  Recent and remote memory intact.  Fund of knowledge is appropriate.  Mild dysarthria characterized by imprecise articulation secondary oral motor weakness, but improving compared to prior. Mild dysphonia, characterized by rough vocal quality.  Cranial Nerves  - Eyes: Visual acuity intact, Visual fields full.  EOMI w/o nystagmus, skew or reported double vision.  PERRL.  Mild L eye ptosis.  - Face:  Facial sensation normal V1 - 3. No facial droop  - Ears/Nose/Throat:  Hearing grossly intact b/l to finger rub.  Palate elevates midline.  Tongue and uvula midline.   Motor examination:  4/5 proximal muscle weakness (deltoids, hip flexors), 5/5 distally.  No observable drift. Normal tone and bulk. No tenderness, twitching, tremors or involuntary movements.  Sensory examination:   Intact to light touch and pinprick, pain, temperature and proprioception and vibration in all extremities.  Cerebellum:   FTN/HKS intact.  No dysmetria or dysdiadokinesia.     NIHSS: 3 ASPECT Score: 9    POCT Blood Glucose.: 71 mg/dL (22 Jul 2021 10:24)    LABS:  CBC Full  -  ( 23 Jul 2021 08:01 )  WBC Count : 22.13 K/uL  RBC Count : 2.99 M/uL  Hemoglobin : 8.9 g/dL  Hematocrit : 29.1 %  Platelet Count - Automated : 354 K/uL  Mean Cell Volume : 97.3 fl  Mean Cell Hemoglobin : 29.8 pg  Mean Cell Hemoglobin Concentration : 30.6 gm/dL  Auto Neutrophil # : 16.00 K/uL  Auto Lymphocyte # : 1.39 K/uL  Auto Monocyte # : 1.57 K/uL  Auto Eosinophil # : 1.97 K/uL  Auto Basophil # : 0.40 K/uL  Auto Neutrophil % : 72.3 %  Auto Lymphocyte % : 6.3 %  Auto Monocyte % : 7.1 %  Auto Eosinophil % : 8.9 %  Auto Basophil % : 1.8 %                          8.9    22.13 )-----------( 354      ( 23 Jul 2021 08:01 )             29.1       LIVER FUNCTIONS - ( 21 Jul 2021 12:55 )  Alb: 3129 mg/dL / Pro: x     / ALK PHOS: x     / ALT: x     / AST: x     / GGT: x             PT/INR - ( 18 Jul 2021 20:01 )   PT: 12.3 sec;   INR: 1.03     PTT - ( 18 Jul 2021 20:01 )  PTT:32.7 sec    Lactate Dehydrogenase, CSF: 22 U/L (07-21-21 @ 02:33)  Glucose, CSF: 61 mg/dL (07-20-21 @ 13:43)  Protein, CSF: 52 mg/dL (07-20-21 @ 13:43)  CSF Appearance: Clear (07-20-21 @ 13:43)  CSF Color: No Color (07-20-21 @ 13:43)  CSF Segmented Neutrophils: 0 % (07-20-21 @ 13:43)  CSF Lymphocytes: 1 % (07-20-21 @ 13:43)  CSF Comments: Manual differential is based on 1 wbc counted after cytocentrifugation. (07-20-21 @ 13:43)  CSF PCR Result: NotDetec (07-20-21 @ 13:38)    RADIOLOGY & ADDITIONAL STUDIES:      HCT: No acute intracranial hemorrhage or transcortical infarct.    CTA:1.  No large vessel occlusion or high-grade stenosis within the head and neck.  2.  Incidentally noted 1.8 cm right thyroid nodule. Consider nonemergent dedicated thyroid ultrasound is for further characterization.     (19 Jul 2021 00:25)

## 2021-07-24 LAB
ANION GAP SERPL CALC-SCNC: 12 MMOL/L — SIGNIFICANT CHANGE UP (ref 5–17)
BASOPHILS # BLD AUTO: 0.17 K/UL — SIGNIFICANT CHANGE UP (ref 0–0.2)
BASOPHILS NFR BLD AUTO: 0.9 % — SIGNIFICANT CHANGE UP (ref 0–2)
BUN SERPL-MCNC: 36 MG/DL — HIGH (ref 7–23)
CALCIUM SERPL-MCNC: 8.8 MG/DL — SIGNIFICANT CHANGE UP (ref 8.4–10.5)
CHLORIDE SERPL-SCNC: 109 MMOL/L — HIGH (ref 96–108)
CO2 SERPL-SCNC: 20 MMOL/L — LOW (ref 22–31)
CREAT SERPL-MCNC: 1.59 MG/DL — HIGH (ref 0.5–1.3)
EOSINOPHIL # BLD AUTO: 2.6 K/UL — HIGH (ref 0–0.5)
EOSINOPHIL NFR BLD AUTO: 13.8 % — HIGH (ref 0–6)
GIANT PLATELETS BLD QL SMEAR: PRESENT — SIGNIFICANT CHANGE UP
GLUCOSE BLDC GLUCOMTR-MCNC: 111 MG/DL — HIGH (ref 70–99)
GLUCOSE BLDC GLUCOMTR-MCNC: 127 MG/DL — HIGH (ref 70–99)
GLUCOSE BLDC GLUCOMTR-MCNC: 82 MG/DL — SIGNIFICANT CHANGE UP (ref 70–99)
GLUCOSE BLDC GLUCOMTR-MCNC: 88 MG/DL — SIGNIFICANT CHANGE UP (ref 70–99)
GLUCOSE BLDC GLUCOMTR-MCNC: 92 MG/DL — SIGNIFICANT CHANGE UP (ref 70–99)
GLUCOSE BLDC GLUCOMTR-MCNC: 99 MG/DL — SIGNIFICANT CHANGE UP (ref 70–99)
GLUCOSE SERPL-MCNC: 90 MG/DL — SIGNIFICANT CHANGE UP (ref 70–99)
HCT VFR BLD CALC: 28.1 % — LOW (ref 39–50)
HGB BLD-MCNC: 8.6 G/DL — LOW (ref 13–17)
HYPOCHROMIA BLD QL: SLIGHT — SIGNIFICANT CHANGE UP
LYMPHOCYTES # BLD AUTO: 1.45 K/UL — SIGNIFICANT CHANGE UP (ref 1–3.3)
LYMPHOCYTES # BLD AUTO: 7.7 % — LOW (ref 13–44)
MAGNESIUM SERPL-MCNC: 2.3 MG/DL — SIGNIFICANT CHANGE UP (ref 1.6–2.6)
MANUAL SMEAR VERIFICATION: SIGNIFICANT CHANGE UP
MCHC RBC-ENTMCNC: 29.1 PG — SIGNIFICANT CHANGE UP (ref 27–34)
MCHC RBC-ENTMCNC: 30.6 GM/DL — LOW (ref 32–36)
MCV RBC AUTO: 94.9 FL — SIGNIFICANT CHANGE UP (ref 80–100)
MONOCYTES # BLD AUTO: 1.3 K/UL — HIGH (ref 0–0.9)
MONOCYTES NFR BLD AUTO: 6.9 % — SIGNIFICANT CHANGE UP (ref 2–14)
MYELOCYTES NFR BLD: 7.8 % — HIGH (ref 0–0)
NEUTROPHILS # BLD AUTO: 11.83 K/UL — HIGH (ref 1.8–7.4)
NEUTROPHILS NFR BLD AUTO: 62.9 % — SIGNIFICANT CHANGE UP (ref 43–77)
PHOSPHATE SERPL-MCNC: 4.7 MG/DL — HIGH (ref 2.5–4.5)
PLAT MORPH BLD: NORMAL — SIGNIFICANT CHANGE UP
PLATELET # BLD AUTO: 329 K/UL — SIGNIFICANT CHANGE UP (ref 150–400)
POLYCHROMASIA BLD QL SMEAR: SLIGHT — SIGNIFICANT CHANGE UP
POTASSIUM SERPL-MCNC: 4.3 MMOL/L — SIGNIFICANT CHANGE UP (ref 3.5–5.3)
POTASSIUM SERPL-SCNC: 4.3 MMOL/L — SIGNIFICANT CHANGE UP (ref 3.5–5.3)
RBC # BLD: 2.96 M/UL — LOW (ref 4.2–5.8)
RBC # FLD: 13.9 % — SIGNIFICANT CHANGE UP (ref 10.3–14.5)
RBC BLD AUTO: ABNORMAL
SCHISTOCYTES BLD QL AUTO: SLIGHT — SIGNIFICANT CHANGE UP
SMUDGE CELLS # BLD: PRESENT — SIGNIFICANT CHANGE UP
SODIUM SERPL-SCNC: 141 MMOL/L — SIGNIFICANT CHANGE UP (ref 135–145)
WBC # BLD: 18.81 K/UL — HIGH (ref 3.8–10.5)
WBC # FLD AUTO: 18.81 K/UL — HIGH (ref 3.8–10.5)

## 2021-07-24 PROCEDURE — 99233 SBSQ HOSP IP/OBS HIGH 50: CPT | Mod: GC

## 2021-07-24 PROCEDURE — 71045 X-RAY EXAM CHEST 1 VIEW: CPT | Mod: 26,76

## 2021-07-24 RX ORDER — SODIUM CHLORIDE 9 MG/ML
1000 INJECTION, SOLUTION INTRAVENOUS
Refills: 0 | Status: DISCONTINUED | OUTPATIENT
Start: 2021-07-24 | End: 2021-07-26

## 2021-07-24 RX ADMIN — SODIUM CHLORIDE 30 MILLILITER(S): 9 INJECTION, SOLUTION INTRAVENOUS at 09:42

## 2021-07-24 RX ADMIN — AMPICILLIN SODIUM AND SULBACTAM SODIUM 100 GRAM(S): 250; 125 INJECTION, POWDER, FOR SUSPENSION INTRAMUSCULAR; INTRAVENOUS at 14:56

## 2021-07-24 RX ADMIN — AMPICILLIN SODIUM AND SULBACTAM SODIUM 100 GRAM(S): 250; 125 INJECTION, POWDER, FOR SUSPENSION INTRAMUSCULAR; INTRAVENOUS at 03:00

## 2021-07-24 RX ADMIN — SODIUM CHLORIDE 4 MILLILITER(S): 9 INJECTION INTRAMUSCULAR; INTRAVENOUS; SUBCUTANEOUS at 23:43

## 2021-07-24 RX ADMIN — HEPARIN SODIUM 5000 UNIT(S): 5000 INJECTION INTRAVENOUS; SUBCUTANEOUS at 06:36

## 2021-07-24 RX ADMIN — LEVETIRACETAM 400 MILLIGRAM(S): 250 TABLET, FILM COATED ORAL at 06:36

## 2021-07-24 RX ADMIN — ATORVASTATIN CALCIUM 40 MILLIGRAM(S): 80 TABLET, FILM COATED ORAL at 22:19

## 2021-07-24 RX ADMIN — Medication 101 MILLIGRAM(S): at 18:04

## 2021-07-24 RX ADMIN — Medication 81 MILLIGRAM(S): at 13:48

## 2021-07-24 RX ADMIN — SODIUM CHLORIDE 4 MILLILITER(S): 9 INJECTION INTRAMUSCULAR; INTRAVENOUS; SUBCUTANEOUS at 16:42

## 2021-07-24 RX ADMIN — SODIUM CHLORIDE 30 MILLILITER(S): 9 INJECTION, SOLUTION INTRAVENOUS at 22:19

## 2021-07-24 RX ADMIN — Medication 650 MILLIGRAM(S): at 18:58

## 2021-07-24 RX ADMIN — Medication 260 MILLIGRAM(S): at 18:04

## 2021-07-24 RX ADMIN — IMMUNE GLOBULIN (HUMAN) 31.25 GRAM(S): 10 INJECTION INTRAVENOUS; SUBCUTANEOUS at 18:50

## 2021-07-24 RX ADMIN — HEPARIN SODIUM 5000 UNIT(S): 5000 INJECTION INTRAVENOUS; SUBCUTANEOUS at 13:48

## 2021-07-24 RX ADMIN — LEVETIRACETAM 400 MILLIGRAM(S): 250 TABLET, FILM COATED ORAL at 17:07

## 2021-07-24 RX ADMIN — HEPARIN SODIUM 5000 UNIT(S): 5000 INJECTION INTRAVENOUS; SUBCUTANEOUS at 22:19

## 2021-07-24 NOTE — PROGRESS NOTE ADULT - SUBJECTIVE AND OBJECTIVE BOX
CC: Patient is a 89y old  Male who presents with a chief complaint of slurred speech, L facial droop (24 Jul 2021 09:22)      INTERVAL EVENTS: NG tube clogged overnight. Replaced this AM.    SUBJECTIVE / INTERVAL HPI: Patient seen and examined at bedside.     ROS: negative unless otherwise stated above.    VITAL SIGNS:  Vital Signs Last 24 Hrs  T(C): 36.4 (24 Jul 2021 08:55), Max: 36.8 (24 Jul 2021 05:19)  T(F): 97.5 (24 Jul 2021 08:55), Max: 98.3 (24 Jul 2021 05:19)  HR: 65 (24 Jul 2021 08:55) (64 - 75)  BP: 150/72 (24 Jul 2021 08:55) (124/62 - 150/72)  BP(mean): --  RR: 18 (24 Jul 2021 08:55) (18 - 18)  SpO2: 99% (24 Jul 2021 08:55) (96% - 99%)      07-23-21 @ 07:01  -  07-24-21 @ 07:00  --------------------------------------------------------  IN: 250 mL / OUT: 875 mL / NET: -625 mL        PHYSICAL EXAM:    General: NAD, lying comfortably in bed  HEENT: MMM  Neck: supple  Cardiovascular: +S1/S2; RRR  Respiratory: CTA B/L; no W/R/R  Gastrointestinal: soft, NT/ND  Extremities: WWP; no edema, clubbing or cyanosis  Vascular: 2+ radial, DP/PT pulses B/L  Neurological: AAOx3; no focal deficits, dysarthria now improved    MEDICATIONS:  MEDICATIONS  (STANDING):  acetaminophen  IVPB .. 650 milliGRAM(s) IV Intermittent <User Schedule>  ampicillin/sulbactam  IVPB 1.5 Gram(s) IV Intermittent every 12 hours  aspirin  chewable 81 milliGRAM(s) Oral daily  atorvastatin 40 milliGRAM(s) Oral at bedtime  dextrose 5%. 1000 milliLiter(s) (30 mL/Hr) IV Continuous <Continuous>  diphenhydrAMINE  IVPB 25 milliGRAM(s) IV Intermittent every 24 hours  heparin   Injectable 5000 Unit(s) SubCutaneous every 8 hours  immune   globulin 10% (GAMMAGARD) IVPB 25 Gram(s) IV Intermittent every 24 hours  levETIRAcetam  IVPB 500 milliGRAM(s) IV Intermittent every 12 hours    MEDICATIONS  (PRN):  sodium chloride 3%  Inhalation 4 milliLiter(s) Inhalation every 6 hours PRN productive cough      ALLERGIES:  Allergies    hay fever (Unknown)  No Known Drug Allergies    Intolerances        LABS:                        8.6    18.81 )-----------( 329      ( 24 Jul 2021 07:43 )             28.1     07-24    141  |  109<H>  |  36<H>  ----------------------------<  90  4.3   |  20<L>  |  1.59<H>    Ca    8.8      24 Jul 2021 07:43  Phos  4.7     07-24  Mg     2.3     07-24          CAPILLARY BLOOD GLUCOSE      POCT Blood Glucose.: 88 mg/dL (24 Jul 2021 06:22)      RADIOLOGY & ADDITIONAL TESTS: Reviewed.

## 2021-07-24 NOTE — PROGRESS NOTE ADULT - PROBLEM SELECTOR PLAN 2
WBC count trending down. WBC 18 today  - f/u work up for CML/CMML: Peripheral flow cytometry, BCR-ABL PCR, JAK2.   - Continue Unasyn 1.5g IV (day 3/7)  for possible infectious etiology. Stop if patient begins to have diarrhea

## 2021-07-24 NOTE — PROGRESS NOTE ADULT - ASSESSMENT
89 M with a PMHx of seizures (on phenytoin at home) and HTN presented with new facial droop and slurring of speech. Admitted to stroke service where stroke work up was found to be negative. Transferred to [facility] for treatment of myasthenia gravis and work up of leukocytosis. Now transferred to neurology service for management of myasthenia gravis.

## 2021-07-24 NOTE — PROGRESS NOTE ADULT - PROBLEM SELECTOR PLAN 7
holding home norvasc- benazapril for permissive htn   -per neuro goal -140  -if pressure persistently >140 would initiate norvasc first given renal fx

## 2021-07-24 NOTE — PROGRESS NOTE ADULT - ATTENDING COMMENTS
89M w/hx of seizures (on phenytoin at home) and HTN adm on 7/18 as a stroke code. His workup throughout his admission has led to a diagnosis of new onset Myasthenia Gravis. Medicine is following as a consult service while the patient is under the care of Neurology.  - c/w tube feeds until tolerating PO.   - monitor FS; c/w D5 as above  - c/w Unasyn to complete 7 day course for possible aspiration PNA.   - monitor BP. Primary team allowing for permissive HTN. Would initiate antihypertensive therapy when ok with primary team with home amlodipine initially. can reintroduce home ACE once Cr stabilizes.    Rest of plan as above.

## 2021-07-24 NOTE — PROGRESS NOTE ADULT - PROBLEM SELECTOR PLAN 9
F: d5 30cc/hr for 10 hrs  E: replete K<4 and Mg <2  N: NPO Tubefeeds with Jevity 1.2  D: heparin sq and SCDs for DVT prophylaxis

## 2021-07-24 NOTE — PROGRESS NOTE ADULT - PROBLEM SELECTOR PLAN 1
-Continue IVIG 400mg/kg/day per neuro (premedication with 1g Tylenol IV and 25mg Benadryl IV) (day 3/5)  -pending repeat speech and swallow   -may require rituximab after IVIG (plan per neuro)  -NGT placed and feeds started with jevity 1.2. Patient with low sugars while on feeds will give d5 at 30cc for 10 hrs   -F/U myasthenia gravis antibodies  -c/w hypertonic saline inhalation with suctioning

## 2021-07-24 NOTE — PROGRESS NOTE ADULT - ASSESSMENT
89 M with a PMHx of seizures (on phenytoin at home) and HTN presented with new facial droop and slurring of speech. Admitted to stroke service where stroke work up was found to be negative. Transferred to [facility] for treatment of myasthenia gravis and work up of leukocytosis

## 2021-07-24 NOTE — PROGRESS NOTE ADULT - PROBLEM SELECTOR PLAN 1
-Continue IVIG 400mg/kg/day per neuro (premedication with 1g Tylenol IV and 25mg Benadryl IV) (day 3/5)  -Failed swallow eval again this AM  -NGT replaced today, CXR ordered, will restart feeds and meds when CXR confirmed and feeds w/ jevity 1.2  -F/U myasthenia gravis antibodies

## 2021-07-24 NOTE — PROGRESS NOTE ADULT - SUBJECTIVE AND OBJECTIVE BOX
Patient seen and examined at the bedside in no acute distress. Patient states his cough has improved, and denies any other ROS.    T(C): 36.4 (07-24-21 @ 08:55), Max: 36.8 (07-23-21 @ 09:57)  HR: 65 (07-24-21 @ 08:55) (64 - 75)  BP: 150/72 (07-24-21 @ 08:55) (124/62 - 150/72)  RR: 18 (07-24-21 @ 08:55) (18 - 18)  SpO2: 99% (07-24-21 @ 08:55) (96% - 99%)  Wt(kg): --Vital Signs Last 24 Hrs    -All other ROS negative, except those noted in HPI    PHYSICAL EXAM:  GENERAL: NAD, laying in bed  HEAD:  Atraumatic, Normocephalic  EYES: EOMI, PERRLA, conjunctiva and sclera clear  ENMT: dry mucous membranes, poor denition, NG tube in place   NECK: Supple, No JVD  NERVOUS SYSTEM:  Alert & Oriented X3, Good concentration; no slurred speech  CHEST/LUNG: rhonchourous B/L  HEART: RUSB murmur appreciated, RRR  ABDOMEN: Soft, Nontender, Nondistended; Bowel sounds present  EXTREMITIES:  2+ Peripheral Pulses, No clubbing, cyanosis, or edema  LYMPH: No lymphadenopathy noted  SKIN: No rashes or lesions    acetaminophen  IVPB .. 650 milliGRAM(s) IV Intermittent <User Schedule>  ampicillin/sulbactam  IVPB 1.5 Gram(s) IV Intermittent every 12 hours  aspirin  chewable 81 milliGRAM(s) Oral daily  atorvastatin 40 milliGRAM(s) Oral at bedtime  diphenhydrAMINE  IVPB 25 milliGRAM(s) IV Intermittent every 24 hours  heparin   Injectable 5000 Unit(s) SubCutaneous every 8 hours  immune   globulin 10% (GAMMAGARD) IVPB 25 Gram(s) IV Intermittent every 24 hours  levETIRAcetam  IVPB 500 milliGRAM(s) IV Intermittent every 12 hours  sodium chloride 3%  Inhalation 4 milliLiter(s) Inhalation every 6 hours PRN      LABS:                        8.6    18.81 )-----------( 329      ( 24 Jul 2021 07:43 )             28.1     07-24    141  |  109<H>  |  36<H>  ----------------------------<  90  4.3   |  20<L>  |  1.59<H>    Ca    8.8      24 Jul 2021 07:43  Phos  4.7     07-24  Mg     2.3     07-24          CAPILLARY BLOOD GLUCOSE      POCT Blood Glucose.: 88 mg/dL (24 Jul 2021 06:22)  POCT Blood Glucose.: 82 mg/dL (24 Jul 2021 02:17)  POCT Blood Glucose.: 108 mg/dL (23 Jul 2021 22:49)  POCT Blood Glucose.: 79 mg/dL (23 Jul 2021 18:54)  POCT Blood Glucose.: 83 mg/dL (23 Jul 2021 14:56)  POCT Blood Glucose.: 84 mg/dL (23 Jul 2021 10:50)

## 2021-07-25 LAB
ALBUMIN SERPL ELPH-MCNC: 2.7 G/DL — LOW (ref 3.3–5)
ALP SERPL-CCNC: 66 U/L — SIGNIFICANT CHANGE UP (ref 40–120)
ALT FLD-CCNC: 30 U/L — SIGNIFICANT CHANGE UP (ref 10–45)
ANION GAP SERPL CALC-SCNC: 5 MMOL/L — SIGNIFICANT CHANGE UP (ref 5–17)
AST SERPL-CCNC: 41 U/L — HIGH (ref 10–40)
BASOPHILS # BLD AUTO: 0.18 K/UL — SIGNIFICANT CHANGE UP (ref 0–0.2)
BASOPHILS NFR BLD AUTO: 0.9 % — SIGNIFICANT CHANGE UP (ref 0–2)
BILIRUB SERPL-MCNC: 0.2 MG/DL — SIGNIFICANT CHANGE UP (ref 0.2–1.2)
BUN SERPL-MCNC: 33 MG/DL — HIGH (ref 7–23)
CALCIUM SERPL-MCNC: 8.6 MG/DL — SIGNIFICANT CHANGE UP (ref 8.4–10.5)
CHLORIDE SERPL-SCNC: 107 MMOL/L — SIGNIFICANT CHANGE UP (ref 96–108)
CO2 SERPL-SCNC: 24 MMOL/L — SIGNIFICANT CHANGE UP (ref 22–31)
CREAT SERPL-MCNC: 1.54 MG/DL — HIGH (ref 0.5–1.3)
DACRYOCYTES BLD QL SMEAR: SLIGHT — SIGNIFICANT CHANGE UP
EOSINOPHIL # BLD AUTO: 4.2 K/UL — HIGH (ref 0–0.5)
EOSINOPHIL NFR BLD AUTO: 20.9 % — HIGH (ref 0–6)
GAMMA INTERFERON BACKGROUND BLD IA-ACNC: 0.02 IU/ML — SIGNIFICANT CHANGE UP
GIANT PLATELETS BLD QL SMEAR: PRESENT — SIGNIFICANT CHANGE UP
GLUCOSE BLDC GLUCOMTR-MCNC: 158 MG/DL — HIGH (ref 70–99)
GLUCOSE BLDC GLUCOMTR-MCNC: 175 MG/DL — HIGH (ref 70–99)
GLUCOSE BLDC GLUCOMTR-MCNC: 184 MG/DL — HIGH (ref 70–99)
GLUCOSE BLDC GLUCOMTR-MCNC: 99 MG/DL — SIGNIFICANT CHANGE UP (ref 70–99)
GLUCOSE SERPL-MCNC: 159 MG/DL — HIGH (ref 70–99)
HCT VFR BLD CALC: 25.1 % — LOW (ref 39–50)
HCT VFR BLD CALC: 26.9 % — LOW (ref 39–50)
HGB BLD-MCNC: 7.8 G/DL — LOW (ref 13–17)
HGB BLD-MCNC: 8.9 G/DL — LOW (ref 13–17)
HYPOCHROMIA BLD QL: SLIGHT — SIGNIFICANT CHANGE UP
LYMPHOCYTES # BLD AUTO: 1.04 K/UL — SIGNIFICANT CHANGE UP (ref 1–3.3)
LYMPHOCYTES # BLD AUTO: 5.2 % — LOW (ref 13–44)
M TB IFN-G BLD-IMP: NEGATIVE — SIGNIFICANT CHANGE UP
M TB IFN-G CD4+ BCKGRND COR BLD-ACNC: 0 IU/ML — SIGNIFICANT CHANGE UP
M TB IFN-G CD4+CD8+ BCKGRND COR BLD-ACNC: 0 IU/ML — SIGNIFICANT CHANGE UP
MAGNESIUM SERPL-MCNC: 2.1 MG/DL — SIGNIFICANT CHANGE UP (ref 1.6–2.6)
MANUAL SMEAR VERIFICATION: SIGNIFICANT CHANGE UP
MCHC RBC-ENTMCNC: 29 PG — SIGNIFICANT CHANGE UP (ref 27–34)
MCHC RBC-ENTMCNC: 30.5 PG — SIGNIFICANT CHANGE UP (ref 27–34)
MCHC RBC-ENTMCNC: 31.1 GM/DL — LOW (ref 32–36)
MCHC RBC-ENTMCNC: 33.1 GM/DL — SIGNIFICANT CHANGE UP (ref 32–36)
MCV RBC AUTO: 92.1 FL — SIGNIFICANT CHANGE UP (ref 80–100)
MCV RBC AUTO: 93.3 FL — SIGNIFICANT CHANGE UP (ref 80–100)
METAMYELOCYTES # FLD: 2.6 % — HIGH (ref 0–0)
MONOCYTES # BLD AUTO: 1.75 K/UL — HIGH (ref 0–0.9)
MONOCYTES NFR BLD AUTO: 8.7 % — SIGNIFICANT CHANGE UP (ref 2–14)
MYELOCYTES NFR BLD: 7.8 % — HIGH (ref 0–0)
NEUTROPHILS # BLD AUTO: 10.83 K/UL — HIGH (ref 1.8–7.4)
NEUTROPHILS NFR BLD AUTO: 53 % — SIGNIFICANT CHANGE UP (ref 43–77)
NEUTS BAND # BLD: 0.9 % — SIGNIFICANT CHANGE UP (ref 0–8)
NRBC # BLD: 0 /100 WBCS — SIGNIFICANT CHANGE UP (ref 0–0)
PHOSPHATE SERPL-MCNC: 4.1 MG/DL — SIGNIFICANT CHANGE UP (ref 2.5–4.5)
PLAT MORPH BLD: NORMAL — SIGNIFICANT CHANGE UP
PLATELET # BLD AUTO: 284 K/UL — SIGNIFICANT CHANGE UP (ref 150–400)
PLATELET # BLD AUTO: 314 K/UL — SIGNIFICANT CHANGE UP (ref 150–400)
POLYCHROMASIA BLD QL SMEAR: SLIGHT — SIGNIFICANT CHANGE UP
POTASSIUM SERPL-MCNC: 4.2 MMOL/L — SIGNIFICANT CHANGE UP (ref 3.5–5.3)
POTASSIUM SERPL-SCNC: 4.2 MMOL/L — SIGNIFICANT CHANGE UP (ref 3.5–5.3)
PROT SERPL-MCNC: 7.2 G/DL — SIGNIFICANT CHANGE UP (ref 6–8.3)
QUANT TB PLUS MITOGEN MINUS NIL: 1.1 IU/ML — SIGNIFICANT CHANGE UP
RBC # BLD: 2.69 M/UL — LOW (ref 4.2–5.8)
RBC # BLD: 2.92 M/UL — LOW (ref 4.2–5.8)
RBC # FLD: 13.7 % — SIGNIFICANT CHANGE UP (ref 10.3–14.5)
RBC # FLD: 14 % — SIGNIFICANT CHANGE UP (ref 10.3–14.5)
RBC BLD AUTO: ABNORMAL
SCHISTOCYTES BLD QL AUTO: SLIGHT — SIGNIFICANT CHANGE UP
SODIUM SERPL-SCNC: 136 MMOL/L — SIGNIFICANT CHANGE UP (ref 135–145)
WBC # BLD: 20.09 K/UL — HIGH (ref 3.8–10.5)
WBC # BLD: 25.42 K/UL — HIGH (ref 3.8–10.5)
WBC # FLD AUTO: 20.09 K/UL — HIGH (ref 3.8–10.5)
WBC # FLD AUTO: 25.42 K/UL — HIGH (ref 3.8–10.5)

## 2021-07-25 PROCEDURE — 99233 SBSQ HOSP IP/OBS HIGH 50: CPT

## 2021-07-25 RX ADMIN — SODIUM CHLORIDE 30 MILLILITER(S): 9 INJECTION, SOLUTION INTRAVENOUS at 05:49

## 2021-07-25 RX ADMIN — HEPARIN SODIUM 5000 UNIT(S): 5000 INJECTION INTRAVENOUS; SUBCUTANEOUS at 05:48

## 2021-07-25 RX ADMIN — Medication 81 MILLIGRAM(S): at 11:26

## 2021-07-25 RX ADMIN — LEVETIRACETAM 400 MILLIGRAM(S): 250 TABLET, FILM COATED ORAL at 05:48

## 2021-07-25 RX ADMIN — AMPICILLIN SODIUM AND SULBACTAM SODIUM 100 GRAM(S): 250; 125 INJECTION, POWDER, FOR SUSPENSION INTRAMUSCULAR; INTRAVENOUS at 14:02

## 2021-07-25 RX ADMIN — Medication 101 MILLIGRAM(S): at 18:27

## 2021-07-25 RX ADMIN — LEVETIRACETAM 400 MILLIGRAM(S): 250 TABLET, FILM COATED ORAL at 18:45

## 2021-07-25 RX ADMIN — IMMUNE GLOBULIN (HUMAN) 31.25 GRAM(S): 10 INJECTION INTRAVENOUS; SUBCUTANEOUS at 19:01

## 2021-07-25 RX ADMIN — HEPARIN SODIUM 5000 UNIT(S): 5000 INJECTION INTRAVENOUS; SUBCUTANEOUS at 14:02

## 2021-07-25 RX ADMIN — Medication 260 MILLIGRAM(S): at 18:26

## 2021-07-25 RX ADMIN — AMPICILLIN SODIUM AND SULBACTAM SODIUM 100 GRAM(S): 250; 125 INJECTION, POWDER, FOR SUSPENSION INTRAMUSCULAR; INTRAVENOUS at 03:40

## 2021-07-25 RX ADMIN — SODIUM CHLORIDE 4 MILLILITER(S): 9 INJECTION INTRAMUSCULAR; INTRAVENOUS; SUBCUTANEOUS at 15:12

## 2021-07-25 NOTE — PROGRESS NOTE ADULT - PROBLEM SELECTOR PLAN 2
WBC count trending down. WBC 18 today  - f/u work up for CML/CMML: Peripheral flow cytometry, BCR-ABL PCR, JAK2.   - Continue Unasyn 1.5g IV (day 4/7)  for possible infectious etiology. Stop if patient begins to have diarrhea

## 2021-07-25 NOTE — PROGRESS NOTE ADULT - ASSESSMENT
89 M with a PMHx of seizures (on phenytoin at home) and HTN presented with new facial droop and slurring of speech. Admitted to stroke service where stroke work up was found to be negative. Transferred to [facility] for treatment of myasthenia gravis and work up of leukocytosis

## 2021-07-25 NOTE — PROGRESS NOTE ADULT - SUBJECTIVE AND OBJECTIVE BOX
INTERVAL HPI/OVERNIGHT EVENTS: Patient seen and examined at bedside. No acute events overnight. No headache, dizziness, nausea, vomiting, sore throat, chest pain, palpitations, SOB, diarrhea, lower extremity swelling. Enquiring about when his next session with physical therapy will be.    VITAL SIGNS:  T(F): 98 (07-25-21 @ 08:56)  HR: 56 (07-25-21 @ 08:56)  BP: 120/58 (07-25-21 @ 08:56)  RR: 18 (07-25-21 @ 08:56)  SpO2: 98% (07-25-21 @ 08:56)  Wt(kg): --      PHYSICAL EXAM:    GEN: Awake, comfortable. NAD.   HEENT: NCAT, PERRL, EOMI. Mucosa dry. (+) NGT  NECK: Supple, no JVD.   RESP: b/l rhonchi  CV: RRR, normal s1/s2. No m/r/g.  ABD: Soft, NTND. BS+  EXT: Warm. No edema, clubbing, or cyanosis.   NEURO: AAOx3. No focal deficits.    MEDICATIONS  (STANDING):  acetaminophen  IVPB .. 650 milliGRAM(s) IV Intermittent <User Schedule>  ampicillin/sulbactam  IVPB 1.5 Gram(s) IV Intermittent every 12 hours  aspirin  chewable 81 milliGRAM(s) Oral daily  atorvastatin 40 milliGRAM(s) Oral at bedtime  dextrose 5%. 1000 milliLiter(s) (30 mL/Hr) IV Continuous <Continuous>  diphenhydrAMINE  IVPB 25 milliGRAM(s) IV Intermittent every 24 hours  heparin   Injectable 5000 Unit(s) SubCutaneous every 8 hours  immune   globulin 10% (GAMMAGARD) IVPB 25 Gram(s) IV Intermittent every 24 hours  levETIRAcetam  IVPB 500 milliGRAM(s) IV Intermittent every 12 hours    MEDICATIONS  (PRN):  sodium chloride 3%  Inhalation 4 milliLiter(s) Inhalation every 6 hours PRN productive cough      Allergies    hay fever (Unknown)  No Known Drug Allergies    Intolerances        LABS:                        7.8    20.09 )-----------( 284      ( 25 Jul 2021 06:50 )             25.1     07-25    136  |  107  |  33<H>  ----------------------------<  159<H>  4.2   |  24  |  1.54<H>    Ca    8.6      25 Jul 2021 06:50  Phos  4.1     07-25  Mg     2.1     07-25    TPro  7.2  /  Alb  2.7<L>  /  TBili  0.2  /  DBili  x   /  AST  41<H>  /  ALT  30  /  AlkPhos  66  07-25              EKG:    Echo:    Cath:    NST:    RADIOLOGY & ADDITIONAL TESTS:

## 2021-07-25 NOTE — PROGRESS NOTE ADULT - PROBLEM SELECTOR PLAN 1
-Continue IVIG 400mg/kg/day per neuro (premedication with 1g Tylenol IV and 25mg Benadryl IV) (day 4/5)  -pending repeat speech and swallow   -may require rituximab after IVIG (plan per neuro)  -NGT placed and feeds started with jevity 1.2. Patient with low sugars while on feeds will give d5 at 30cc for 10 hrs   -F/U myasthenia gravis antibodies  -c/w hypertonic saline inhalation with suctioning

## 2021-07-25 NOTE — PROGRESS NOTE ADULT - ASSESSMENT
89 M with a PMHx of seizures (on phenytoin at home) and HTN presented with new facial droop and slurring of speech. Admitted to stroke service where stroke work up was found to be negative. Transferred to [facility] for treatment of myasthenia gravis and work up of leukocytosis

## 2021-07-25 NOTE — PROGRESS NOTE ADULT - PROBLEM SELECTOR PLAN 9
F: stop D5  E: replete K<4 and Mg <2  N: NPO Tubefeeds with Jevity 1.2  D: heparin sq and SCDs for DVT prophylaxis

## 2021-07-25 NOTE — PROGRESS NOTE ADULT - PROBLEM SELECTOR PLAN 8
-likely iron deficiency anemia  - Hgb decreased by 1 unit today (7.8)  - Repeat CBC  -will continue to monitor daily CBCs

## 2021-07-25 NOTE — PROGRESS NOTE ADULT - PROBLEM SELECTOR PLAN 2
WBC count 20.09 today  - f/u work up for CML/CMML: Peripheral flow cytometry, BCR-ABL PCR, JAK2.   - Continue Unasyn 1.5g IV (day 4/7)  for possible infectious etiology. Stop if patient begins to have diarrhea

## 2021-07-25 NOTE — PROGRESS NOTE ADULT - PROBLEM SELECTOR PLAN 1
-Continue IVIG 400mg/kg/day per neuro (premedication with 1g Tylenol IV and 25mg Benadryl IV) (day 4/5)  -pending repeat speech and swallow   -may require rituximab after IVIG (plan per neuro)  -NGT placed and feeds started with jevity 1.2.   - would stop D5 today and monitor FS  -F/U myasthenia gravis antibodies  -c/w hypertonic saline inhalation with suctioning

## 2021-07-25 NOTE — PROGRESS NOTE ADULT - SUBJECTIVE AND OBJECTIVE BOX
**INCOMPLETE NOTE    OVERNIGHT EVENTS: NAEO    SUBJECTIVE:  Patient seen and examined at bedside. Denies any complaints    Vital Signs Last 12 Hrs  T(F): 98 (07-25-21 @ 08:56), Max: 98 (07-25-21 @ 08:56)  HR: 56 (07-25-21 @ 08:56) (54 - 56)  BP: 120/58 (07-25-21 @ 08:56) (120/58 - 131/58)  BP(mean): --  RR: 18 (07-25-21 @ 08:56) (18 - 18)  SpO2: 98% (07-25-21 @ 08:56) (96% - 98%)  I&O's Summary        PHYSICAL EXAM:  GENERAL: NAD, laying in bed  HEAD:  Atraumatic, Normocephalic  EYES: EOMI, PERRLA, conjunctiva and sclera clear  ENMT: dry mucous membranes, poor denition, NG tube in place   NECK: Supple, No JVD  NERVOUS SYSTEM:  Alert & Oriented X3, Good concentration; no slurred speech, facial droop improving  CHEST/LUNG: rhonchourous B/L  Abdomen: soft, NT,ND, 4+BS  Ext: WWP, 2+ DPs b/l        LABS:                        7.8    20.09 )-----------( 284      ( 25 Jul 2021 06:50 )             25.1     07-25    136  |  107  |  33<H>  ----------------------------<  159<H>  4.2   |  24  |  1.54<H>    Ca    8.6      25 Jul 2021 06:50  Phos  4.1     07-25  Mg     2.1     07-25    TPro  7.2  /  Alb  2.7<L>  /  TBili  0.2  /  DBili  x   /  AST  41<H>  /  ALT  30  /  AlkPhos  66  07-25            RADIOLOGY & ADDITIONAL TESTS:    MEDICATIONS  (STANDING):  acetaminophen  IVPB .. 650 milliGRAM(s) IV Intermittent <User Schedule>  ampicillin/sulbactam  IVPB 1.5 Gram(s) IV Intermittent every 12 hours  aspirin  chewable 81 milliGRAM(s) Oral daily  atorvastatin 40 milliGRAM(s) Oral at bedtime  dextrose 5%. 1000 milliLiter(s) (30 mL/Hr) IV Continuous <Continuous>  diphenhydrAMINE  IVPB 25 milliGRAM(s) IV Intermittent every 24 hours  heparin   Injectable 5000 Unit(s) SubCutaneous every 8 hours  immune   globulin 10% (GAMMAGARD) IVPB 25 Gram(s) IV Intermittent every 24 hours  levETIRAcetam  IVPB 500 milliGRAM(s) IV Intermittent every 12 hours    MEDICATIONS  (PRN):  sodium chloride 3%  Inhalation 4 milliLiter(s) Inhalation every 6 hours PRN productive cough

## 2021-07-26 DIAGNOSIS — I63.9 CEREBRAL INFARCTION, UNSPECIFIED: ICD-10-CM

## 2021-07-26 LAB
ACRM MODULATING ANTIBODY: 13.4 NMOL/L — HIGH (ref 0–0.24)
AMPA-R AB CBA, CSF: NEGATIVE — SIGNIFICANT CHANGE UP
AMPHIPHYSIN AB TITR CSF: NEGATIVE TITER — SIGNIFICANT CHANGE UP
ANION GAP SERPL CALC-SCNC: 8 MMOL/L — SIGNIFICANT CHANGE UP (ref 5–17)
ANISOCYTOSIS BLD QL: SLIGHT — SIGNIFICANT CHANGE UP
BASOPHILS # BLD AUTO: 0.22 K/UL — HIGH (ref 0–0.2)
BASOPHILS NFR BLD AUTO: 0.9 % — SIGNIFICANT CHANGE UP (ref 0–2)
BUN SERPL-MCNC: 31 MG/DL — HIGH (ref 7–23)
CALCIUM SERPL-MCNC: 8.5 MG/DL — SIGNIFICANT CHANGE UP (ref 8.4–10.5)
CASPR2-IGG CBA, CSF: NEGATIVE — SIGNIFICANT CHANGE UP
CHLORIDE SERPL-SCNC: 108 MMOL/L — SIGNIFICANT CHANGE UP (ref 96–108)
CO2 SERPL-SCNC: 23 MMOL/L — SIGNIFICANT CHANGE UP (ref 22–31)
CREAT SERPL-MCNC: 1.31 MG/DL — HIGH (ref 0.5–1.3)
CV2 IGG TITR CSF: NEGATIVE TITER — SIGNIFICANT CHANGE UP
DACRYOCYTES BLD QL SMEAR: SLIGHT — SIGNIFICANT CHANGE UP
DPPX ANTIBODY IFA, CSF: NEGATIVE — SIGNIFICANT CHANGE UP
EOSINOPHIL # BLD AUTO: 4.71 K/UL — HIGH (ref 0–0.5)
EOSINOPHIL NFR BLD AUTO: 19.5 % — HIGH (ref 0–6)
GABA-B-R AB CBA, CSF: NEGATIVE — SIGNIFICANT CHANGE UP
GAD65 AB CSF-SCNC: 0 NMOL/L — SIGNIFICANT CHANGE UP
GFAP IFA, CSF: NEGATIVE — SIGNIFICANT CHANGE UP
GLIAL NUC TYPE 1 AB TITR CSF: NEGATIVE TITER — SIGNIFICANT CHANGE UP
GLUCOSE BLDC GLUCOMTR-MCNC: 105 MG/DL — HIGH (ref 70–99)
GLUCOSE BLDC GLUCOMTR-MCNC: 110 MG/DL — HIGH (ref 70–99)
GLUCOSE BLDC GLUCOMTR-MCNC: 122 MG/DL — HIGH (ref 70–99)
GLUCOSE SERPL-MCNC: 102 MG/DL — HIGH (ref 70–99)
HCT VFR BLD CALC: 26.2 % — LOW (ref 39–50)
HGB BLD-MCNC: 8.6 G/DL — LOW (ref 13–17)
HU1 AB TITR CSF IF: NEGATIVE TITER — SIGNIFICANT CHANGE UP
HU2 AB TITR CSF IF: NEGATIVE TITER — SIGNIFICANT CHANGE UP
HU3 AB TITR CSF: NEGATIVE TITER — SIGNIFICANT CHANGE UP
HYPOCHROMIA BLD QL: SLIGHT — SIGNIFICANT CHANGE UP
IGLON5 IFA, CSF: NEGATIVE — SIGNIFICANT CHANGE UP
IMMUNOLOGIST REVIEW: SIGNIFICANT CHANGE UP
LGI1-IGG CBA, CSF: NEGATIVE — SIGNIFICANT CHANGE UP
LYMPHOCYTES # BLD AUTO: 0.84 K/UL — LOW (ref 1–3.3)
LYMPHOCYTES # BLD AUTO: 3.5 % — LOW (ref 13–44)
MACROCYTES BLD QL: SLIGHT — SIGNIFICANT CHANGE UP
MAGNESIUM SERPL-MCNC: 1.9 MG/DL — SIGNIFICANT CHANGE UP (ref 1.6–2.6)
MANUAL SMEAR VERIFICATION: SIGNIFICANT CHANGE UP
MCHC RBC-ENTMCNC: 30.3 PG — SIGNIFICANT CHANGE UP (ref 27–34)
MCHC RBC-ENTMCNC: 32.8 GM/DL — SIGNIFICANT CHANGE UP (ref 32–36)
MCV RBC AUTO: 92.3 FL — SIGNIFICANT CHANGE UP (ref 80–100)
METAMYELOCYTES # FLD: 1.8 % — HIGH (ref 0–0)
MGLUR1 AB IFA, CSF: NEGATIVE — SIGNIFICANT CHANGE UP
MONOCYTES # BLD AUTO: 1.28 K/UL — HIGH (ref 0–0.9)
MONOCYTES NFR BLD AUTO: 5.3 % — SIGNIFICANT CHANGE UP (ref 2–14)
MYELOCYTES NFR BLD: 7.1 % — HIGH (ref 0–0)
NEUTROPHILS # BLD AUTO: 14.94 K/UL — HIGH (ref 1.8–7.4)
NEUTROPHILS NFR BLD AUTO: 61.9 % — SIGNIFICANT CHANGE UP (ref 43–77)
NIF IFA, CSF: NEGATIVE — SIGNIFICANT CHANGE UP
NMDA-R AB CBA, CSF: NEGATIVE — SIGNIFICANT CHANGE UP
NON-GYNECOLOGICAL CYTOLOGY STUDY: SIGNIFICANT CHANGE UP
OVALOCYTES BLD QL SMEAR: SLIGHT — SIGNIFICANT CHANGE UP
PCA-TR AB TITR CSF: NEGATIVE TITER — SIGNIFICANT CHANGE UP
PHOSPHATE SERPL-MCNC: 3.5 MG/DL — SIGNIFICANT CHANGE UP (ref 2.5–4.5)
PLAT MORPH BLD: NORMAL — SIGNIFICANT CHANGE UP
PLATELET # BLD AUTO: 281 K/UL — SIGNIFICANT CHANGE UP (ref 150–400)
POIKILOCYTOSIS BLD QL AUTO: SLIGHT — SIGNIFICANT CHANGE UP
POLYCHROMASIA BLD QL SMEAR: SLIGHT — SIGNIFICANT CHANGE UP
POTASSIUM SERPL-MCNC: 4.1 MMOL/L — SIGNIFICANT CHANGE UP (ref 3.5–5.3)
POTASSIUM SERPL-SCNC: 4.1 MMOL/L — SIGNIFICANT CHANGE UP (ref 3.5–5.3)
PURKINJE CELL CYTOPLASMIC AB TYPE 2: NEGATIVE TITER — SIGNIFICANT CHANGE UP
PURKINJE CELLS AB TITR CSF IF: NEGATIVE TITER — SIGNIFICANT CHANGE UP
RBC # BLD: 2.84 M/UL — LOW (ref 4.2–5.8)
RBC # FLD: 13.7 % — SIGNIFICANT CHANGE UP (ref 10.3–14.5)
RBC BLD AUTO: ABNORMAL
REFLEX ADDED: SIGNIFICANT CHANGE UP
SCHISTOCYTES BLD QL AUTO: SLIGHT — SIGNIFICANT CHANGE UP
SODIUM SERPL-SCNC: 139 MMOL/L — SIGNIFICANT CHANGE UP (ref 135–145)
WBC # BLD: 24.14 K/UL — HIGH (ref 3.8–10.5)
WBC # FLD AUTO: 24.14 K/UL — HIGH (ref 3.8–10.5)

## 2021-07-26 PROCEDURE — 99233 SBSQ HOSP IP/OBS HIGH 50: CPT | Mod: GC

## 2021-07-26 PROCEDURE — 99233 SBSQ HOSP IP/OBS HIGH 50: CPT

## 2021-07-26 RX ORDER — LEVETIRACETAM 250 MG/1
500 TABLET, FILM COATED ORAL EVERY 12 HOURS
Refills: 0 | Status: DISCONTINUED | OUTPATIENT
Start: 2021-07-26 | End: 2021-07-28

## 2021-07-26 RX ORDER — ASPIRIN/CALCIUM CARB/MAGNESIUM 324 MG
81 TABLET ORAL EVERY 24 HOURS
Refills: 0 | Status: DISCONTINUED | OUTPATIENT
Start: 2021-07-26 | End: 2021-07-27

## 2021-07-26 RX ORDER — ATORVASTATIN CALCIUM 80 MG/1
40 TABLET, FILM COATED ORAL AT BEDTIME
Refills: 0 | Status: DISCONTINUED | OUTPATIENT
Start: 2021-07-26 | End: 2021-08-23

## 2021-07-26 RX ADMIN — Medication 81 MILLIGRAM(S): at 12:44

## 2021-07-26 RX ADMIN — AMPICILLIN SODIUM AND SULBACTAM SODIUM 100 GRAM(S): 250; 125 INJECTION, POWDER, FOR SUSPENSION INTRAMUSCULAR; INTRAVENOUS at 03:16

## 2021-07-26 RX ADMIN — AMPICILLIN SODIUM AND SULBACTAM SODIUM 100 GRAM(S): 250; 125 INJECTION, POWDER, FOR SUSPENSION INTRAMUSCULAR; INTRAVENOUS at 15:28

## 2021-07-26 RX ADMIN — HEPARIN SODIUM 5000 UNIT(S): 5000 INJECTION INTRAVENOUS; SUBCUTANEOUS at 15:28

## 2021-07-26 RX ADMIN — HEPARIN SODIUM 5000 UNIT(S): 5000 INJECTION INTRAVENOUS; SUBCUTANEOUS at 22:47

## 2021-07-26 RX ADMIN — LEVETIRACETAM 400 MILLIGRAM(S): 250 TABLET, FILM COATED ORAL at 06:31

## 2021-07-26 RX ADMIN — SODIUM CHLORIDE 4 MILLILITER(S): 9 INJECTION INTRAMUSCULAR; INTRAVENOUS; SUBCUTANEOUS at 12:43

## 2021-07-26 RX ADMIN — LEVETIRACETAM 500 MILLIGRAM(S): 250 TABLET, FILM COATED ORAL at 18:51

## 2021-07-26 RX ADMIN — SODIUM CHLORIDE 4 MILLILITER(S): 9 INJECTION INTRAMUSCULAR; INTRAVENOUS; SUBCUTANEOUS at 18:56

## 2021-07-26 RX ADMIN — ATORVASTATIN CALCIUM 40 MILLIGRAM(S): 80 TABLET, FILM COATED ORAL at 22:46

## 2021-07-26 NOTE — PROGRESS NOTE ADULT - PROBLEM SELECTOR PLAN 3
- Likely pre-renal in setting of poor PO intake  -BUN and Cr improved this AM  -Expect continued improvement as pt no longer NPO  -continue to monitor BMPs

## 2021-07-26 NOTE — PROGRESS NOTE ADULT - ASSESSMENT
89M with PMHx of HTN and seizure on phenytoin presents with left facial droop and slurred speech. Stroke workup negative. Neurology consulted for persistent weakness.      Impression  - Myasthenia gravis, new onset. Bedside repetitive nerve stimulation on 7/22 notable for decremental response indicative of MG, Strength improved after receiving full course of IVIG      Plan  - F/u Myasthenia Gravis antibodies - bedside repetitive nerve stimulation on 7/22 indicative of MG  - S/p IVIG x5 days  - Per Speech and swallow, patient passed swallow test. Ok to start mechanical soft diet. NGT removed  - Will consider starting Rituximab q6 months for long-term management of myasthenia (hepatitis negative)      Plan discussed with Attending Dr. Evie Mcclellan MD  Neurology, PGY-2  Pager: [phone]  [phone]   89M with PMHx of HTN and seizure on phenytoin presents with left facial droop and slurred speech. Stroke workup negative. Neurology consulted for persistent weakness.      Impression  - Myasthenia gravis, new onset. Bedside repetitive nerve stimulation on 7/22 notable for decremental response indicative of MG, Strength improved after receiving full course of IVIG      Plan  - F/u Myasthenia Gravis antibodies - bedside repetitive nerve stimulation on 7/22 indicative of MG  - S/p IVIG x5 days  - Per Speech and swallow, patient passed swallow test. Ok to start mechanical soft diet. NGT removed  - PT/OT  - Will consider starting Rituximab q6 months for long-term management of myasthenia (hepatitis negative)      Plan discussed with Attending Dr. Evie Mcclellan MD  Neurology, PGY-2  Pager: [phone]  [phone]

## 2021-07-26 NOTE — PROGRESS NOTE ADULT - PROBLEM SELECTOR PLAN 4
- phenytoin level 4.1 (7/18/21)  - continue to hold home phenytoin 100mg TID  - Keppra 500 mg q 12h changed to PO

## 2021-07-26 NOTE — PROGRESS NOTE ADULT - PROBLEM SELECTOR PLAN 9
F: None  E: replete K<4 and Mg <2  N: Mechanical soft and thin liquid  D: heparin sq and SCDs for DVT prophylaxis

## 2021-07-26 NOTE — PROGRESS NOTE ADULT - SUBJECTIVE AND OBJECTIVE BOX
**INCOMPLETE NOTE    OVERNIGHT EVENTS: NAEO    SUBJECTIVE:  Patient seen and examined at bedside. Denies any complaints    Vital Signs Last 12 Hrs  T(F): 97.8 (07-26-21 @ 15:55), Max: 98 (07-26-21 @ 09:02)  HR: 93 (07-26-21 @ 15:55) (65 - 93)  BP: 120/77 (07-26-21 @ 15:55) (120/77 - 126/72)  BP(mean): 91 (07-26-21 @ 15:55) (91 - 91)  RR: 18 (07-26-21 @ 15:55) (18 - 18)  SpO2: 96% (07-26-21 @ 15:55) (96% - 97%)  I&O's Summary      PHYSICAL EXAM:  GENERAL: NAD, laying in bed  HEAD:  Atraumatic, Normocephalic  EYES: EOMI, PERRLA, conjunctiva and sclera clear  ENMT: dry mucous membranes, poor denition  NECK: Supple, No JVD  NERVOUS SYSTEM:  Alert & Oriented X3, Good concentration; no slurred speech, facial droop improved  CHEST/LUNG: rhonchourous B/L  Abdomen: soft, NT,ND, 4+BS  Ext: WWP, 2+ DPs b/l        LABS:                        8.6    24.14 )-----------( 281      ( 26 Jul 2021 07:44 )             26.2     07-26    139  |  108  |  31<H>  ----------------------------<  102<H>  4.1   |  23  |  1.31<H>    Ca    8.5      26 Jul 2021 07:44  Phos  3.5     07-26  Mg     1.9     07-26    TPro  7.2  /  Alb  2.7<L>  /  TBili  0.2  /  DBili  x   /  AST  41<H>  /  ALT  30  /  AlkPhos  66  07-25            RADIOLOGY & ADDITIONAL TESTS:    MEDICATIONS  (STANDING):  ampicillin/sulbactam  IVPB 1.5 Gram(s) IV Intermittent every 12 hours  aspirin  chewable 81 milliGRAM(s) Oral every 24 hours  atorvastatin 40 milliGRAM(s) Oral at bedtime  heparin   Injectable 5000 Unit(s) SubCutaneous every 8 hours  levETIRAcetam 500 milliGRAM(s) Oral every 12 hours    MEDICATIONS  (PRN):  sodium chloride 3%  Inhalation 4 milliLiter(s) Inhalation every 6 hours PRN productive cough

## 2021-07-26 NOTE — SWALLOW BEDSIDE ASSESSMENT ADULT - ORAL PREPARATORY PHASE
although requires verbal prompt to strip bolus from spoon/Within functional limits
Within functional limits

## 2021-07-26 NOTE — PROGRESS NOTE ADULT - ATTENDING COMMENTS
Seen and examined by me this afternoon  Family at bedside, having lunch at time of visit  Complete 5-7 days of Abx for aspiration pneumonia (Unasyn or augmetin upon dc)  Leukocytosis is trending down  EVARISTO continues to improve  If SBP remains above 160 can start low dose amlodipine while Cr continues to improve  Moderate protein calorie malnutrition  Plan for acute rehab upon discharge  Rest as above

## 2021-07-26 NOTE — PROGRESS NOTE ADULT - PROBLEM SELECTOR PLAN 1
-Completed IVIG on 7/25  -Passed speech and swallow  -Restarted mechanical soft and thin liquid diet  -Meds converted to PO  -may require rituximab after IVIG for long term for MG  (plan per neuro)  -F/U myasthenia gravis antibodies

## 2021-07-26 NOTE — PROGRESS NOTE ADULT - ASSESSMENT
89 M with a PMHx of seizures (on phenytoin at home) and HTN presented with new facial droop and slurring of speech. Admitted to stroke service where stroke work up was found to be negative. Transferred to [facility] for treatment of myasthenia gravis and work up of leukocytosis    7/26: Passed speech and swallow. Restarted mech soft and thin liquids diet. wbc still high. EVARISTO improving. Converted all meds to PO

## 2021-07-26 NOTE — SWALLOW BEDSIDE ASSESSMENT ADULT - COMMENTS
Received awake & alert, sitting up in bed, no complaints. Wife at bedside. Language skills are intact at the conversational level, speech intelligibility is 100%, +accent but no dysarthria noted. Voice quality is hoarse/dry.

## 2021-07-26 NOTE — SWALLOW BEDSIDE ASSESSMENT ADULT - SLP PERTINENT HISTORY OF CURRENT PROBLEM
89 yo M being treated for MG now s/p 5/5 d of IVIG, well-known to this svc for multiple swallow evals including FEES on 7/20 and f/u.

## 2021-07-26 NOTE — PROGRESS NOTE ADULT - PROBLEM SELECTOR PLAN 1
-Continue IVIG 400mg/kg/day per neuro (premedication with 1g Tylenol IV and 25mg Benadryl IV) (day 4/5)  -passed speech and swallow today-->advance to mechanical soft per S&S  -may require rituximab after IVIG (plan per neuro)  -F/U myasthenia gravis antibodies  -c/w hypertonic saline inhalation with suctioning

## 2021-07-26 NOTE — PROGRESS NOTE ADULT - PROBLEM SELECTOR PLAN 2
WBC count continues to be elevated  - f/u work up for CML/CMML: Peripheral flow cytometry, BCR-ABL PCR, JAK2.   - Continue Unasyn 1.5g IV till 7/27  for possible infectious etiology. Stop if patient begins to have diarrhea

## 2021-07-26 NOTE — PROGRESS NOTE ADULT - SUBJECTIVE AND OBJECTIVE BOX
Patient seen and examined at the bedside in no acute distress. States his breathing is fine and he has no other complaints denies any other ROS.      T(C): 36.4 (07-26-21 @ 05:41), Max: 36.8 (07-25-21 @ 21:09)  HR: 59 (07-26-21 @ 05:41) (56 - 59)  BP: 134/63 (07-26-21 @ 05:41) (134/63 - 154/59)  RR: 19 (07-26-21 @ 05:41) (17 - 19)  SpO2: 98% (07-26-21 @ 05:41) (98% - 99%)  Wt(kg): --Vital Signs Last 24 Hrs    Review of Systems:  -All other ROS negative, except those noted in HPI    PHYSICAL EXAM:  GEN: Awake, comfortable. NAD.   HEENT: NCAT, PERRL, EOMI. Mucosa dry.  NECK: Supple, no JVD.   RESP: b/l rhonchi  CV: RRR, normal s1/s2. No m/r/g.  ABD: Soft, NTND. BS+  EXT: Warm. No edema, clubbing, or cyanosis.   NEURO: AAOx3. No focal deficits.    ampicillin/sulbactam  IVPB 1.5 Gram(s) IV Intermittent every 12 hours  aspirin  chewable 81 milliGRAM(s) Oral every 24 hours  atorvastatin 40 milliGRAM(s) Oral at bedtime  dextrose 5%. 1000 milliLiter(s) IV Continuous <Continuous>  heparin   Injectable 5000 Unit(s) SubCutaneous every 8 hours  levETIRAcetam 500 milliGRAM(s) Oral every 12 hours  sodium chloride 3%  Inhalation 4 milliLiter(s) Inhalation every 6 hours PRN      LABS:                        8.6    24.14 )-----------( 281      ( 26 Jul 2021 07:44 )             26.2     07-26    139  |  108  |  31<H>  ----------------------------<  102<H>  4.1   |  23  |  1.31<H>    Ca    8.5      26 Jul 2021 07:44  Phos  3.5     07-26  Mg     1.9     07-26    TPro  7.2  /  Alb  2.7<L>  /  TBili  0.2  /  DBili  x   /  AST  41<H>  /  ALT  30  /  AlkPhos  66  07-25        CAPILLARY BLOOD GLUCOSE      POCT Blood Glucose.: 110 mg/dL (26 Jul 2021 12:34)  POCT Blood Glucose.: 105 mg/dL (26 Jul 2021 06:48)  POCT Blood Glucose.: 99 mg/dL (25 Jul 2021 22:17)  POCT Blood Glucose.: 158 mg/dL (25 Jul 2021 17:54)

## 2021-07-26 NOTE — SWALLOW BEDSIDE ASSESSMENT ADULT - PHARYNGEAL PHASE
4-5 swallows per spoon sip with thin & NTL, subtle throat clear after 1st trial thin, increasing throat clear/cough with remainder of PO trials/Throat clear post oral intake/Delayed cough post oral intake/Multiple swallows
Hyolaryngeal excursion palpated during swallow trigger, appears brisk & timely and with normal excursion. Single swallow per bite/sip (improved from prior evals) & voice remains clear across trials. No throat clear or cough noted throughout.

## 2021-07-26 NOTE — PROGRESS NOTE ADULT - SUBJECTIVE AND OBJECTIVE BOX
Neurology Progress Note    Interval History:    No acute events overnight. Pt resting comfortably in bed this morning      HPI:  89y Male with PMHx of HTN and seizure on phenytoin presents with left facial droop and slurred speech. Pt was with family member (great-nephew Mesh [phone]) who noticed at 5pm, pt had acute onset of slurred speech, increased saliva production and a left facial droop. Pt was driven to the ED immediately. On presentation, /94, NIHSS 3. Great-nephew at bedside denies hx of stroke, use of blood thinners, recent trauma/bleeding event. CTH negative for acute hemorrhage. CTP without any perfusion defect. CTA without LVO. In conjunction with patient, family and stroke attending, decision was made to not give tpa since family felt his current deficits are non-disabling to his daily life vs risk of bleeding. Pt and family was made aware of the possibility of worsening symptoms and the opportunity to give tpa was time limited. They expressed understanding and declined tpa administration.     Pt denies CP, SOB, extremity weakness, changes in vision, HA. He endorses a cough that has been occurring for the past few days with sputum production, but has been afebrile. Pt given vanc and zosyn x1 in the ED. CXR pending official read. Patient initially failed dysphagia screen and was given  MI, but passed second dysphagia screen so was able to to loaded with plavix 300 PO.     At baseline, pt is fully independent of all ADLs and iADLs, ambulates without assistance, with hearing loss b/l, baseline right arm tremor. It is unclear if pt sees a neurologist for seizure management. Per wife (Madeot [phone], [phone]), his most recent seizure was in April 2020 where he had full body shaking lasting for 5 minutes. He has been on phenytoin 100mg TID and has not had another seizure since.       PAST MEDICAL & SURGICAL HISTORY:      FAMILY HISTORY:      SOCIAL HISTORY:   Patient lives with wife in apartment.  Smoking status: denies  Drinking: denies  Drug Use: denies      Medications:  ampicillin/sulbactam  IVPB 1.5 Gram(s) IV Intermittent every 12 hours  aspirin  chewable 81 milliGRAM(s) Oral every 24 hours  atorvastatin 40 milliGRAM(s) Oral at bedtime  dextrose 5%. 1000 milliLiter(s) IV Continuous <Continuous>  heparin   Injectable 5000 Unit(s) SubCutaneous every 8 hours  levETIRAcetam 500 milliGRAM(s) Oral every 12 hours  sodium chloride 3%  Inhalation 4 milliLiter(s) Inhalation every 6 hours PRN      Vital Signs Last 24 Hrs  T(C): 36.4 (26 Jul 2021 05:41), Max: 36.8 (25 Jul 2021 21:09)  T(F): 97.6 (26 Jul 2021 05:41), Max: 98.2 (25 Jul 2021 21:09)  HR: 59 (26 Jul 2021 05:41) (56 - 59)  BP: 134/63 (26 Jul 2021 05:41) (134/63 - 154/59)  BP(mean): --  RR: 19 (26 Jul 2021 05:41) (17 - 19)  SpO2: 98% (26 Jul 2021 05:41) (98% - 99%)    Neurological Examination:  General:  Appearance is consistent with chronologic age.  No abnormal facies.  Gross skin survey within normal limits.    Cognitive/Language:  Awake, alert, and oriented to person, place, time and date.  Recent and remote memory intact.  Fund of knowledge is appropriate.  Naming, repetition and comprehension intact.   Nondysarthric.    Cranial Nerves  - Eyes: Visual acuity intact, Visual fields full.  EOMI w/o nystagmus, skew or reported double vision.  PERRL.  No ptosis/weakness of eyelid closure.    - Face:  Facial sensation normal V1 - 3, no facial asymmetry.    - Ears/Nose/Throat:  Hearing grossly intact b/l to finger rub.  Palate elevates midline.  Tongue and uvula midline.   Motor examination:  4/5 proximal muscle weakness (deltoids, hip flexors), 5/5 distally.  No observable drift. Normal tone and bulk. No tenderness, twitching, tremors or involuntary movements.  Sensory examination:   Intact to light touch and pinprick, pain, temperature and proprioception and vibration in all extremities.  Reflexes:   2+ b/l biceps, triceps, brachioradialis, patella and achilles.  Plantar response downgoing b/l.  Jaw jerk, Howard, clonus absent.  Cerebellum:   FTN/HKS intact.  No dysmetria or dysdiadokinesia.     Labs:  CBC Full  -  ( 26 Jul 2021 07:44 )  WBC Count : 24.14 K/uL  RBC Count : 2.84 M/uL  Hemoglobin : 8.6 g/dL  Hematocrit : 26.2 %  Platelet Count - Automated : 281 K/uL  Mean Cell Volume : 92.3 fl  Mean Cell Hemoglobin : 30.3 pg  Mean Cell Hemoglobin Concentration : 32.8 gm/dL  Auto Neutrophil # : 14.94 K/uL  Auto Lymphocyte # : 0.84 K/uL  Auto Monocyte # : 1.28 K/uL  Auto Eosinophil # : 4.71 K/uL  Auto Basophil # : 0.22 K/uL  Auto Neutrophil % : 61.9 %  Auto Lymphocyte % : 3.5 %  Auto Monocyte % : 5.3 %  Auto Eosinophil % : 19.5 %  Auto Basophil % : 0.9 %    07-26    139  |  108  |  31<H>  ----------------------------<  102<H>  4.1   |  23  |  1.31<H>    Ca    8.5      26 Jul 2021 07:44  Phos  3.5     07-26  Mg     1.9     07-26    TPro  7.2  /  Alb  2.7<L>  /  TBili  0.2  /  DBili  x   /  AST  41<H>  /  ALT  30  /  AlkPhos  66  07-25    LIVER FUNCTIONS - ( 25 Jul 2021 06:50 )  Alb: 2.7 g/dL / Pro: 7.2 g/dL / ALK PHOS: 66 U/L / ALT: 30 U/L / AST: 41 U/L / GGT: x

## 2021-07-26 NOTE — PROGRESS NOTE ADULT - ATTENDING COMMENTS
I was physically present for the key portions of the evaluation and managemnent (E/M) service provided.  I agree with the above history, physical, and plan which I have reviewed and edited where appropriate, with the exceptions as per my note.    Pt reports much improvement after IVIG.    exam demonstrates at least 4+ throughout with at least 5- in neck flexion/extension.    AP: likely MG based on EMG findings.    - await mg antibodies  - passed S+S eval  - hemeonc and gen med recs  - dispo planning - need to discuss with Dr. Beach regarding rituximab prior to dc.

## 2021-07-26 NOTE — PROGRESS NOTE ADULT - ASSESSMENT
89 M with a PMHx of seizures (on phenytoin at home) and HTN presented with new facial droop and slurring of speech. Admitted to stroke service where stroke work up was found to be negative. Transferred to [facility] for treatment of myasthenia gravis and work up of leukocytosis

## 2021-07-27 ENCOUNTER — RESULT REVIEW (OUTPATIENT)
Age: 86
End: 2021-07-27

## 2021-07-27 DIAGNOSIS — R25.2 CRAMP AND SPASM: ICD-10-CM

## 2021-07-27 DIAGNOSIS — A41.9 SEPSIS, UNSPECIFIED ORGANISM: ICD-10-CM

## 2021-07-27 LAB
% ALBUMIN: 55.4 % — SIGNIFICANT CHANGE UP
% ALPHA 1: 4.8 % — SIGNIFICANT CHANGE UP
% ALPHA 2: 10.6 % — SIGNIFICANT CHANGE UP
% BETA: 9.8 % — SIGNIFICANT CHANGE UP
% GAMMA: 19.4 % — SIGNIFICANT CHANGE UP
ACHR BLOCK AB SER-ACNC: 48 % — HIGH (ref 0–25)
ACHR MOD AB SER-ACNC: 50 % — HIGH (ref 0–20)
ALBUMIN SERPL ELPH-MCNC: 3.1 G/DL — LOW (ref 3.3–5)
ALBUMIN SERPL ELPH-MCNC: 3.5 G/DL — LOW (ref 3.6–5.5)
ALBUMIN SERPL ELPH-MCNC: 3.5 G/DL — SIGNIFICANT CHANGE UP (ref 3.3–5)
ALBUMIN/GLOB SERPL ELPH: 1.2 RATIO — SIGNIFICANT CHANGE UP
ALP SERPL-CCNC: 61 U/L — SIGNIFICANT CHANGE UP (ref 40–120)
ALP SERPL-CCNC: 69 U/L — SIGNIFICANT CHANGE UP (ref 40–120)
ALPHA1 GLOB SERPL ELPH-MCNC: 0.3 G/DL — SIGNIFICANT CHANGE UP (ref 0.1–0.4)
ALPHA2 GLOB SERPL ELPH-MCNC: 0.7 G/DL — SIGNIFICANT CHANGE UP (ref 0.5–1)
ALT FLD-CCNC: 51 U/L — HIGH (ref 10–45)
ALT FLD-CCNC: 54 U/L — HIGH (ref 10–45)
ANION GAP SERPL CALC-SCNC: 11 MMOL/L — SIGNIFICANT CHANGE UP (ref 5–17)
ANION GAP SERPL CALC-SCNC: 12 MMOL/L — SIGNIFICANT CHANGE UP (ref 5–17)
ANION GAP SERPL CALC-SCNC: 18 MMOL/L — HIGH (ref 5–17)
ANISOCYTOSIS BLD QL: SLIGHT — SIGNIFICANT CHANGE UP
ANISOCYTOSIS BLD QL: SLIGHT — SIGNIFICANT CHANGE UP
APTT BLD: 52.4 SEC — HIGH (ref 27.5–35.5)
AST SERPL-CCNC: 71 U/L — HIGH (ref 10–40)
AST SERPL-CCNC: 85 U/L — HIGH (ref 10–40)
B-GLOBULIN SERPL ELPH-MCNC: 0.6 G/DL — SIGNIFICANT CHANGE UP (ref 0.5–1)
BASOPHILS # BLD AUTO: 0 K/UL — SIGNIFICANT CHANGE UP (ref 0–0.2)
BASOPHILS # BLD AUTO: 0.7 K/UL — HIGH (ref 0–0.2)
BASOPHILS NFR BLD AUTO: 0 % — SIGNIFICANT CHANGE UP (ref 0–2)
BASOPHILS NFR BLD AUTO: 1.7 % — SIGNIFICANT CHANGE UP (ref 0–2)
BILIRUB SERPL-MCNC: 0.3 MG/DL — SIGNIFICANT CHANGE UP (ref 0.2–1.2)
BILIRUB SERPL-MCNC: 0.3 MG/DL — SIGNIFICANT CHANGE UP (ref 0.2–1.2)
BLD GP AB SCN SERPL QL: NEGATIVE — SIGNIFICANT CHANGE UP
BLD GP AB SCN SERPL QL: NEGATIVE — SIGNIFICANT CHANGE UP
BUN SERPL-MCNC: 39 MG/DL — HIGH (ref 7–23)
BUN SERPL-MCNC: 43 MG/DL — HIGH (ref 7–23)
BUN SERPL-MCNC: 44 MG/DL — HIGH (ref 7–23)
BURR CELLS BLD QL SMEAR: PRESENT — SIGNIFICANT CHANGE UP
CALCIUM SERPL-MCNC: 8.6 MG/DL — SIGNIFICANT CHANGE UP (ref 8.4–10.5)
CALCIUM SERPL-MCNC: 8.7 MG/DL — SIGNIFICANT CHANGE UP (ref 8.4–10.5)
CALCIUM SERPL-MCNC: 9.2 MG/DL — SIGNIFICANT CHANGE UP (ref 8.4–10.5)
CHLORIDE SERPL-SCNC: 101 MMOL/L — SIGNIFICANT CHANGE UP (ref 96–108)
CHLORIDE SERPL-SCNC: 106 MMOL/L — SIGNIFICANT CHANGE UP (ref 96–108)
CHLORIDE SERPL-SCNC: 107 MMOL/L — SIGNIFICANT CHANGE UP (ref 96–108)
CO2 SERPL-SCNC: 16 MMOL/L — LOW (ref 22–31)
CO2 SERPL-SCNC: 18 MMOL/L — LOW (ref 22–31)
CO2 SERPL-SCNC: 21 MMOL/L — LOW (ref 22–31)
CREAT SERPL-MCNC: 1.82 MG/DL — HIGH (ref 0.5–1.3)
CREAT SERPL-MCNC: 1.99 MG/DL — HIGH (ref 0.5–1.3)
CREAT SERPL-MCNC: 2.28 MG/DL — HIGH (ref 0.5–1.3)
EOSINOPHIL # BLD AUTO: 0 K/UL — SIGNIFICANT CHANGE UP (ref 0–0.5)
EOSINOPHIL # BLD AUTO: 1.82 K/UL — HIGH (ref 0–0.5)
EOSINOPHIL NFR BLD AUTO: 0 % — SIGNIFICANT CHANGE UP (ref 0–6)
EOSINOPHIL NFR BLD AUTO: 4.4 % — SIGNIFICANT CHANGE UP (ref 0–6)
GAMMA GLOBULIN: 1.2 G/DL — SIGNIFICANT CHANGE UP (ref 0.6–1.6)
GLUCOSE BLDC GLUCOMTR-MCNC: 123 MG/DL — HIGH (ref 70–99)
GLUCOSE BLDC GLUCOMTR-MCNC: 223 MG/DL — HIGH (ref 70–99)
GLUCOSE BLDC GLUCOMTR-MCNC: 249 MG/DL — HIGH (ref 70–99)
GLUCOSE BLDC GLUCOMTR-MCNC: 309 MG/DL — HIGH (ref 70–99)
GLUCOSE SERPL-MCNC: 129 MG/DL — HIGH (ref 70–99)
GLUCOSE SERPL-MCNC: 251 MG/DL — HIGH (ref 70–99)
GLUCOSE SERPL-MCNC: 267 MG/DL — HIGH (ref 70–99)
HCT VFR BLD CALC: 16.6 % — CRITICAL LOW (ref 39–50)
HCT VFR BLD CALC: 17.1 % — CRITICAL LOW (ref 39–50)
HCT VFR BLD CALC: 27.1 % — LOW (ref 39–50)
HGB BLD-MCNC: 5.4 G/DL — CRITICAL LOW (ref 13–17)
HGB BLD-MCNC: 5.7 G/DL — CRITICAL LOW (ref 13–17)
HGB BLD-MCNC: 8.6 G/DL — LOW (ref 13–17)
INR BLD: 1.26 — HIGH (ref 0.88–1.16)
INTERPRETATION SERPL IFE-IMP: SIGNIFICANT CHANGE UP
LACTATE SERPL-SCNC: 10.6 MMOL/L — CRITICAL HIGH (ref 0.5–2)
LACTATE SERPL-SCNC: 6.4 MMOL/L — CRITICAL HIGH (ref 0.5–2)
LACTATE SERPL-SCNC: 7.5 MMOL/L — CRITICAL HIGH (ref 0.5–2)
LYMPHOCYTES # BLD AUTO: 1.07 K/UL — SIGNIFICANT CHANGE UP (ref 1–3.3)
LYMPHOCYTES # BLD AUTO: 2.6 % — LOW (ref 13–44)
LYMPHOCYTES # BLD AUTO: 4.34 K/UL — HIGH (ref 1–3.3)
LYMPHOCYTES # BLD AUTO: 5.3 % — LOW (ref 13–44)
MACROCYTES BLD QL: SLIGHT — SIGNIFICANT CHANGE UP
MAGNESIUM SERPL-MCNC: 2 MG/DL — SIGNIFICANT CHANGE UP (ref 1.6–2.6)
MAGNESIUM SERPL-MCNC: 2 MG/DL — SIGNIFICANT CHANGE UP (ref 1.6–2.6)
MAGNESIUM SERPL-MCNC: 2.2 MG/DL — SIGNIFICANT CHANGE UP (ref 1.6–2.6)
MANUAL SMEAR VERIFICATION: SIGNIFICANT CHANGE UP
MANUAL SMEAR VERIFICATION: SIGNIFICANT CHANGE UP
MCHC RBC-ENTMCNC: 30 PG — SIGNIFICANT CHANGE UP (ref 27–34)
MCHC RBC-ENTMCNC: 30.7 PG — SIGNIFICANT CHANGE UP (ref 27–34)
MCHC RBC-ENTMCNC: 31.7 GM/DL — LOW (ref 32–36)
MCHC RBC-ENTMCNC: 32 PG — SIGNIFICANT CHANGE UP (ref 27–34)
MCHC RBC-ENTMCNC: 32.5 GM/DL — SIGNIFICANT CHANGE UP (ref 32–36)
MCHC RBC-ENTMCNC: 33.3 GM/DL — SIGNIFICANT CHANGE UP (ref 32–36)
MCV RBC AUTO: 94.3 FL — SIGNIFICANT CHANGE UP (ref 80–100)
MCV RBC AUTO: 94.4 FL — SIGNIFICANT CHANGE UP (ref 80–100)
MCV RBC AUTO: 96.1 FL — SIGNIFICANT CHANGE UP (ref 80–100)
METAMYELOCYTES # FLD: 7.8 % — HIGH (ref 0–0)
MICROCYTES BLD QL: SLIGHT — SIGNIFICANT CHANGE UP
MONOCYTES # BLD AUTO: 1.82 K/UL — HIGH (ref 0–0.9)
MONOCYTES # BLD AUTO: 6.47 K/UL — HIGH (ref 0–0.9)
MONOCYTES NFR BLD AUTO: 4.4 % — SIGNIFICANT CHANGE UP (ref 2–14)
MONOCYTES NFR BLD AUTO: 7.9 % — SIGNIFICANT CHANGE UP (ref 2–14)
MYELOCYTES NFR BLD: 10.5 % — HIGH (ref 0–0)
NEUTROPHILS # BLD AUTO: 32.69 K/UL — HIGH (ref 1.8–7.4)
NEUTROPHILS # BLD AUTO: 62.47 K/UL — HIGH (ref 1.8–7.4)
NEUTROPHILS NFR BLD AUTO: 71.9 % — SIGNIFICANT CHANGE UP (ref 43–77)
NEUTROPHILS NFR BLD AUTO: 76.5 % — SIGNIFICANT CHANGE UP (ref 43–77)
NEUTS BAND # BLD: 2.6 % — SIGNIFICANT CHANGE UP (ref 0–8)
NEUTS BAND # BLD: 4.4 % — SIGNIFICANT CHANGE UP (ref 0–8)
NRBC # BLD: 0 /100 WBCS — SIGNIFICANT CHANGE UP (ref 0–0)
NRBC # BLD: 2 /100 — HIGH (ref 0–0)
NRBC # BLD: SIGNIFICANT CHANGE UP /100 WBCS (ref 0–0)
OVALOCYTES BLD QL SMEAR: SLIGHT — SIGNIFICANT CHANGE UP
PHOSPHATE SERPL-MCNC: 3.2 MG/DL — SIGNIFICANT CHANGE UP (ref 2.5–4.5)
PHOSPHATE SERPL-MCNC: 5.2 MG/DL — HIGH (ref 2.5–4.5)
PLAT MORPH BLD: ABNORMAL
PLAT MORPH BLD: ABNORMAL
PLATELET # BLD AUTO: 308 K/UL — SIGNIFICANT CHANGE UP (ref 150–400)
PLATELET # BLD AUTO: 341 K/UL — SIGNIFICANT CHANGE UP (ref 150–400)
PLATELET # BLD AUTO: 363 K/UL — SIGNIFICANT CHANGE UP (ref 150–400)
POIKILOCYTOSIS BLD QL AUTO: SLIGHT — SIGNIFICANT CHANGE UP
POLYCHROMASIA BLD QL SMEAR: SLIGHT — SIGNIFICANT CHANGE UP
POLYCHROMASIA BLD QL SMEAR: SLIGHT — SIGNIFICANT CHANGE UP
POTASSIUM SERPL-MCNC: 4.8 MMOL/L — SIGNIFICANT CHANGE UP (ref 3.5–5.3)
POTASSIUM SERPL-MCNC: 4.9 MMOL/L — SIGNIFICANT CHANGE UP (ref 3.5–5.3)
POTASSIUM SERPL-MCNC: 5.3 MMOL/L — SIGNIFICANT CHANGE UP (ref 3.5–5.3)
POTASSIUM SERPL-SCNC: 4.8 MMOL/L — SIGNIFICANT CHANGE UP (ref 3.5–5.3)
POTASSIUM SERPL-SCNC: 4.9 MMOL/L — SIGNIFICANT CHANGE UP (ref 3.5–5.3)
POTASSIUM SERPL-SCNC: 5.3 MMOL/L — SIGNIFICANT CHANGE UP (ref 3.5–5.3)
PROCALCITONIN SERPL-MCNC: 0.4 NG/ML — HIGH (ref 0.02–0.1)
PROT PATTERN SERPL ELPH-IMP: SIGNIFICANT CHANGE UP
PROT SERPL-MCNC: 7 G/DL — SIGNIFICANT CHANGE UP (ref 6–8.3)
PROT SERPL-MCNC: 8.5 G/DL — HIGH (ref 6–8.3)
PROTHROM AB SERPL-ACNC: 15 SEC — HIGH (ref 10.6–13.6)
RBC # BLD: 1.76 M/UL — LOW (ref 4.2–5.8)
RBC # BLD: 1.78 M/UL — LOW (ref 4.2–5.8)
RBC # BLD: 2.87 M/UL — LOW (ref 4.2–5.8)
RBC # FLD: 13.9 % — SIGNIFICANT CHANGE UP (ref 10.3–14.5)
RBC # FLD: 14 % — SIGNIFICANT CHANGE UP (ref 10.3–14.5)
RBC # FLD: 14 % — SIGNIFICANT CHANGE UP (ref 10.3–14.5)
RBC BLD AUTO: ABNORMAL
RBC BLD AUTO: SIGNIFICANT CHANGE UP
RH IG SCN BLD-IMP: POSITIVE — SIGNIFICANT CHANGE UP
RH IG SCN BLD-IMP: POSITIVE — SIGNIFICANT CHANGE UP
SCHISTOCYTES BLD QL AUTO: SLIGHT — SIGNIFICANT CHANGE UP
SODIUM SERPL-SCNC: 135 MMOL/L — SIGNIFICANT CHANGE UP (ref 135–145)
SODIUM SERPL-SCNC: 136 MMOL/L — SIGNIFICANT CHANGE UP (ref 135–145)
SODIUM SERPL-SCNC: 139 MMOL/L — SIGNIFICANT CHANGE UP (ref 135–145)
WBC # BLD: 41.33 K/UL — CRITICAL HIGH (ref 3.8–10.5)
WBC # BLD: 50.49 K/UL — CRITICAL HIGH (ref 3.8–10.5)
WBC # BLD: 81.87 K/UL — CRITICAL HIGH (ref 3.8–10.5)
WBC # FLD AUTO: 41.33 K/UL — CRITICAL HIGH (ref 3.8–10.5)
WBC # FLD AUTO: 50.49 K/UL — CRITICAL HIGH (ref 3.8–10.5)
WBC # FLD AUTO: 81.87 K/UL — CRITICAL HIGH (ref 3.8–10.5)

## 2021-07-27 PROCEDURE — 71045 X-RAY EXAM CHEST 1 VIEW: CPT | Mod: 26,77

## 2021-07-27 PROCEDURE — 71045 X-RAY EXAM CHEST 1 VIEW: CPT | Mod: 26

## 2021-07-27 PROCEDURE — 99233 SBSQ HOSP IP/OBS HIGH 50: CPT | Mod: GC

## 2021-07-27 PROCEDURE — 76775 US EXAM ABDO BACK WALL LIM: CPT | Mod: 26

## 2021-07-27 PROCEDURE — 88189 FLOWCYTOMETRY/READ 16 & >: CPT

## 2021-07-27 PROCEDURE — 99233 SBSQ HOSP IP/OBS HIGH 50: CPT

## 2021-07-27 PROCEDURE — 99291 CRITICAL CARE FIRST HOUR: CPT

## 2021-07-27 PROCEDURE — 99358 PROLONG SERVICE W/O CONTACT: CPT

## 2021-07-27 RX ORDER — PIPERACILLIN AND TAZOBACTAM 4; .5 G/20ML; G/20ML
4.5 INJECTION, POWDER, LYOPHILIZED, FOR SOLUTION INTRAVENOUS ONCE
Refills: 0 | Status: DISCONTINUED | OUTPATIENT
Start: 2021-07-27 | End: 2021-07-27

## 2021-07-27 RX ORDER — PIPERACILLIN AND TAZOBACTAM 4; .5 G/20ML; G/20ML
3.38 INJECTION, POWDER, LYOPHILIZED, FOR SOLUTION INTRAVENOUS ONCE
Refills: 0 | Status: COMPLETED | OUTPATIENT
Start: 2021-07-27 | End: 2021-07-27

## 2021-07-27 RX ORDER — DEXTROSE 50 % IN WATER 50 %
12.5 SYRINGE (ML) INTRAVENOUS ONCE
Refills: 0 | Status: DISCONTINUED | OUTPATIENT
Start: 2021-07-27 | End: 2021-08-23

## 2021-07-27 RX ORDER — PIPERACILLIN AND TAZOBACTAM 4; .5 G/20ML; G/20ML
3.38 INJECTION, POWDER, LYOPHILIZED, FOR SOLUTION INTRAVENOUS EVERY 8 HOURS
Refills: 0 | Status: DISCONTINUED | OUTPATIENT
Start: 2021-07-27 | End: 2021-07-27

## 2021-07-27 RX ORDER — DIAZEPAM 5 MG
1 TABLET ORAL ONCE
Refills: 0 | Status: DISCONTINUED | OUTPATIENT
Start: 2021-07-27 | End: 2021-07-27

## 2021-07-27 RX ORDER — SODIUM CHLORIDE 9 MG/ML
1000 INJECTION INTRAMUSCULAR; INTRAVENOUS; SUBCUTANEOUS ONCE
Refills: 0 | Status: DISCONTINUED | OUTPATIENT
Start: 2021-07-27 | End: 2021-07-27

## 2021-07-27 RX ORDER — DEXTROSE 50 % IN WATER 50 %
25 SYRINGE (ML) INTRAVENOUS ONCE
Refills: 0 | Status: DISCONTINUED | OUTPATIENT
Start: 2021-07-27 | End: 2021-08-23

## 2021-07-27 RX ORDER — PIPERACILLIN AND TAZOBACTAM 4; .5 G/20ML; G/20ML
4.5 INJECTION, POWDER, LYOPHILIZED, FOR SOLUTION INTRAVENOUS EVERY 12 HOURS
Refills: 0 | Status: DISCONTINUED | OUTPATIENT
Start: 2021-07-27 | End: 2021-07-27

## 2021-07-27 RX ORDER — SODIUM CHLORIDE 9 MG/ML
1000 INJECTION, SOLUTION INTRAVENOUS
Refills: 0 | Status: DISCONTINUED | OUTPATIENT
Start: 2021-07-27 | End: 2021-08-23

## 2021-07-27 RX ORDER — INSULIN LISPRO 100/ML
VIAL (ML) SUBCUTANEOUS EVERY 6 HOURS
Refills: 0 | Status: DISCONTINUED | OUTPATIENT
Start: 2021-07-27 | End: 2021-08-23

## 2021-07-27 RX ORDER — LIDOCAINE 4 G/100G
1 CREAM TOPICAL ONCE
Refills: 0 | Status: DISCONTINUED | OUTPATIENT
Start: 2021-07-27 | End: 2021-07-27

## 2021-07-27 RX ORDER — DEXTROSE 50 % IN WATER 50 %
15 SYRINGE (ML) INTRAVENOUS ONCE
Refills: 0 | Status: DISCONTINUED | OUTPATIENT
Start: 2021-07-27 | End: 2021-08-23

## 2021-07-27 RX ORDER — LIDOCAINE 4 G/100G
1 CREAM TOPICAL ONCE
Refills: 0 | Status: COMPLETED | OUTPATIENT
Start: 2021-07-27 | End: 2021-07-27

## 2021-07-27 RX ORDER — PIPERACILLIN AND TAZOBACTAM 4; .5 G/20ML; G/20ML
4.5 INJECTION, POWDER, LYOPHILIZED, FOR SOLUTION INTRAVENOUS EVERY 8 HOURS
Refills: 0 | Status: DISCONTINUED | OUTPATIENT
Start: 2021-07-27 | End: 2021-07-27

## 2021-07-27 RX ORDER — SODIUM CHLORIDE 9 MG/ML
1 INJECTION, SOLUTION INTRAVENOUS ONCE
Refills: 0 | Status: DISCONTINUED | OUTPATIENT
Start: 2021-07-27 | End: 2021-07-27

## 2021-07-27 RX ORDER — PIPERACILLIN AND TAZOBACTAM 4; .5 G/20ML; G/20ML
4.5 INJECTION, POWDER, LYOPHILIZED, FOR SOLUTION INTRAVENOUS EVERY 12 HOURS
Refills: 0 | Status: DISCONTINUED | OUTPATIENT
Start: 2021-07-28 | End: 2021-08-03

## 2021-07-27 RX ORDER — PANTOPRAZOLE SODIUM 20 MG/1
40 TABLET, DELAYED RELEASE ORAL EVERY 12 HOURS
Refills: 0 | Status: DISCONTINUED | OUTPATIENT
Start: 2021-07-27 | End: 2021-08-02

## 2021-07-27 RX ORDER — SODIUM CHLORIDE 9 MG/ML
500 INJECTION, SOLUTION INTRAVENOUS ONCE
Refills: 0 | Status: COMPLETED | OUTPATIENT
Start: 2021-07-27 | End: 2021-07-27

## 2021-07-27 RX ORDER — SODIUM CHLORIDE 9 MG/ML
1000 INJECTION, SOLUTION INTRAVENOUS ONCE
Refills: 0 | Status: COMPLETED | OUTPATIENT
Start: 2021-07-27 | End: 2021-07-27

## 2021-07-27 RX ORDER — GLUCAGON INJECTION, SOLUTION 0.5 MG/.1ML
1 INJECTION, SOLUTION SUBCUTANEOUS ONCE
Refills: 0 | Status: DISCONTINUED | OUTPATIENT
Start: 2021-07-27 | End: 2021-08-23

## 2021-07-27 RX ORDER — CYCLOBENZAPRINE HYDROCHLORIDE 10 MG/1
5 TABLET, FILM COATED ORAL
Refills: 0 | Status: DISCONTINUED | OUTPATIENT
Start: 2021-07-27 | End: 2021-07-27

## 2021-07-27 RX ORDER — VANCOMYCIN HCL 1 G
1000 VIAL (EA) INTRAVENOUS ONCE
Refills: 0 | Status: COMPLETED | OUTPATIENT
Start: 2021-07-27 | End: 2021-07-27

## 2021-07-27 RX ADMIN — SODIUM CHLORIDE 2000 MILLILITER(S): 9 INJECTION, SOLUTION INTRAVENOUS at 14:26

## 2021-07-27 RX ADMIN — SODIUM CHLORIDE 1000 MILLILITER(S): 9 INJECTION, SOLUTION INTRAVENOUS at 12:53

## 2021-07-27 RX ADMIN — LIDOCAINE 1 PATCH: 4 CREAM TOPICAL at 17:37

## 2021-07-27 RX ADMIN — Medication 250 MILLIGRAM(S): at 16:25

## 2021-07-27 RX ADMIN — HEPARIN SODIUM 5000 UNIT(S): 5000 INJECTION INTRAVENOUS; SUBCUTANEOUS at 15:43

## 2021-07-27 RX ADMIN — LEVETIRACETAM 500 MILLIGRAM(S): 250 TABLET, FILM COATED ORAL at 22:24

## 2021-07-27 RX ADMIN — LEVETIRACETAM 500 MILLIGRAM(S): 250 TABLET, FILM COATED ORAL at 06:19

## 2021-07-27 RX ADMIN — HEPARIN SODIUM 5000 UNIT(S): 5000 INJECTION INTRAVENOUS; SUBCUTANEOUS at 06:19

## 2021-07-27 RX ADMIN — Medication 4: at 18:48

## 2021-07-27 RX ADMIN — CYCLOBENZAPRINE HYDROCHLORIDE 5 MILLIGRAM(S): 10 TABLET, FILM COATED ORAL at 12:17

## 2021-07-27 RX ADMIN — LIDOCAINE 1 PATCH: 4 CREAM TOPICAL at 06:18

## 2021-07-27 RX ADMIN — Medication: at 22:00

## 2021-07-27 RX ADMIN — AMPICILLIN SODIUM AND SULBACTAM SODIUM 100 GRAM(S): 250; 125 INJECTION, POWDER, FOR SUSPENSION INTRAMUSCULAR; INTRAVENOUS at 03:43

## 2021-07-27 RX ADMIN — ATORVASTATIN CALCIUM 40 MILLIGRAM(S): 80 TABLET, FILM COATED ORAL at 22:24

## 2021-07-27 RX ADMIN — PANTOPRAZOLE SODIUM 40 MILLIGRAM(S): 20 TABLET, DELAYED RELEASE ORAL at 19:38

## 2021-07-27 RX ADMIN — PIPERACILLIN AND TAZOBACTAM 200 GRAM(S): 4; .5 INJECTION, POWDER, LYOPHILIZED, FOR SOLUTION INTRAVENOUS at 16:19

## 2021-07-27 RX ADMIN — SODIUM CHLORIDE 4 MILLILITER(S): 9 INJECTION INTRAMUSCULAR; INTRAVENOUS; SUBCUTANEOUS at 06:19

## 2021-07-27 RX ADMIN — SODIUM CHLORIDE 3000 MILLILITER(S): 9 INJECTION, SOLUTION INTRAVENOUS at 16:12

## 2021-07-27 NOTE — CONSULT NOTE ADULT - SUBJECTIVE AND OBJECTIVE BOX
Patient is a 89y old  Male who presents with a chief complaint of slurred speech, L facial droop (27 Jul 2021 16:45)    Consult reason: emesis, hypotension     HPI: " 89y Male with PMHx of HTN and seizure on phenytoin presents with left facial droop and slurred speech. Pt was with family member (great-nephew Mesh [phone]) who noticed at 5pm, pt had acute onset of slurred speech, increased saliva production and a left facial droop. Pt was driven to the ED immediately. On presentation, /94, NIHSS 3. Great-nephew at bedside denies hx of stroke, use of blood thinners, recent trauma/bleeding event. CTH negative for acute hemorrhage. CTP without any perfusion defect. CTA without LVO. In conjunction with patient, family and stroke attending, decision was made to not give tpa since family felt his current deficits are non-disabling to his daily life vs risk of bleeding. Pt and family was made aware of the possibility of worsening symptoms and the opportunity to give tpa was time limited. They expressed understanding and declined tpa administration.     Pt denies CP, SOB, extremity weakness, changes in vision, HA. He endorses a cough that has been occurring for the past few days with sputum production, but has been afebrile. Pt given vanc and zosyn x1 in the ED. CXR pending official read. Patient initially failed dysphagia screen and was given  MS, but passed second dysphagia screen so was able to to loaded with plavix 300 PO.     At baseline, pt is fully independent of all ADLs and iADLs, ambulates without assistance, with hearing loss b/l, baseline right arm tremor. It is unclear if pt sees a neurologist for seizure management. Per wife (Ross [phone], [phone]), his most recent seizure was in April 2020 where he had full body shaking lasting for 5 minutes. He has been on phenytoin 100mg TID and has not had another seizure since."    Today patient had episode of non bloody emesis and hypotension with BP 80s/50s with improvement after 2L NS. He denies chest pain, abdominal pain, shortness of breath, nausea, ha, visual changes. On interview pt was AAOx2 with previous reported baseline as AAOx3 per wife     T(C): 36.9 (07-18-21 @ 23:53), Max: 37.1 (07-18-21 @ 19:32)  HR: 92 (07-18-21 @ 23:53) (84 - 92)  BP: 142/78 (07-18-21 @ 23:53) (123/63 - 184/75)  RR: 18 (07-18-21 @ 23:53) (16 - 18)  SpO2: 97% (07-18-21 @ 23:53) (96% - 99%)    MEDICATION RECONCILIATION   MEDICATIONS  (STANDING):  aspirin enteric coated 81 milliGRAM(s) Oral daily  atorvastatin 80 milliGRAM(s) Oral at bedtime  clopidogrel Tablet 75 milliGRAM(s) Oral daily  heparin   Injectable 5000 Unit(s) SubCutaneous every 8 hours  phenytoin   Capsule 100 milliGRAM(s) Oral three times a day    MEDICATIONS  (PRN):    Allergies    hay fever (Unknown)  No Known Drug Allergies    Intolerances      Vital Signs Last 24 Hrs  T(C): 36.9 (18 Jul 2021 23:53), Max: 37.1 (18 Jul 2021 19:32)  T(F): 98.4 (18 Jul 2021 23:53), Max: 98.8 (18 Jul 2021 19:51)  HR: 92 (18 Jul 2021 23:53) (84 - 92)  BP: 142/78 (18 Jul 2021 23:53) (123/63 - 184/75)  BP(mean): --  RR: 18 (18 Jul 2021 23:53) (16 - 18)  SpO2: 97% (18 Jul 2021 23:53) (96% - 99%)      NIHSS: 3 ASPECT Score: 9      Fingerstick Blood Glucose: CAPILLARY BLOOD GLUCOSE  161 (18 Jul 2021 20:36)      POCT Blood Glucose.: 161 mg/dL (18 Jul 2021 19:27)    LABS:                        10.7   32.39 )-----------( 413      ( 18 Jul 2021 20:01 )             32.6     07-18    141  |  108  |  39<H>  ----------------------------<  141<H>  4.7   |  23  |  1.76<H>    Ca    9.6      18 Jul 2021 20:01    TPro  7.6  /  Alb  4.5  /  TBili  0.2  /  DBili  x   /  AST  18  /  ALT  13  /  AlkPhos  100  07-18    PT/INR - ( 18 Jul 2021 20:01 )   PT: 12.3 sec;   INR: 1.03          PTT - ( 18 Jul 2021 20:01 )  PTT:32.7 sec          RADIOLOGY & ADDITIONAL STUDIES:    HCT: No acute intracranial hemorrhage or transcortical infarct.    CTA:1.  No large vessel occlusion or high-grade stenosis within the head and neck.  2.  Incidentally noted 1.8 cm right thyroid nodule. Consider nonemergent dedicated thyroid ultrasound is for further characterization.     (19 Jul 2021 00:25)      Allergies    hay fever (Unknown)  No Known Drug Allergies    Intolerances      Home Medications:  amlodipine-benazepril 5 mg-10 mg oral capsule: 1 cap(s) orally once a day (22 Jul 2021 13:11)  phenytoin 100 mg oral capsule: 1 cap(s) orally 3 times a day (22 Jul 2021 13:11)      SOCIAL HX:     Smoking          ETOH/Illicit drugs          Occupation    PAST MEDICAL & SURGICAL HISTORY:  Hypertension    Seizure        FAMILY HISTORY:  :    No known cardiovascular or pulmonary family history     ROS:  See HPI     PHYSICAL EXAM    ICU Vital Signs Last 24 Hrs  T(C): 37.6 (27 Jul 2021 12:54), Max: 37.6 (27 Jul 2021 12:54)  T(F): 99.6 (27 Jul 2021 12:54), Max: 99.6 (27 Jul 2021 12:54)  HR: 108 (27 Jul 2021 17:20) (68 - 108)  BP: 118/58 (27 Jul 2021 15:05) (82/51 - 132/68)  BP(mean): --  ABP: --  ABP(mean): --  RR: 18 (27 Jul 2021 17:20) (15 - 18)  SpO2: 94% (27 Jul 2021 17:20) (94% - 100%)      General: Lethargic appearing, NAD  HEENT:  MMM, no JVD  Lungs: Crackles B/L, no tachypnea or increased wob   Cardiovascular: tachycardia, +murmur noted in all four auscultation sites and   Gastrointestinal: Soft, BS present, non tender, no rebound/guarding   Musculoskeletal: No clubbing.  Moves all extremities. strength 4/5 in b/l UE and LE    Skin: Cool, clammy  Neurological: AAOx2, No motor or sensory deficit   Vascular: Radial and DP pulses palpable B/L   POCUS: b/l lines     LABS:                          5.4    50.49 )-----------( 308      ( 27 Jul 2021 15:51 )             16.6                                               07-27    136  |  106  |  44<H>  ----------------------------<  251<H>  4.9   |  18<L>  |  1.99<H>    Ca    8.7      27 Jul 2021 15:51  Phos  3.2     07-27  Mg     2.0     07-27    TPro  8.5<H>  /  Alb  3.5  /  TBili  0.3  /  DBili  x   /  AST  71<H>  /  ALT  51<H>  /  AlkPhos  69  07-27                                                                                           LIVER FUNCTIONS - ( 27 Jul 2021 08:04 )  Alb: 3.5 g/dL / Pro: 8.5 g/dL / ALK PHOS: 69 U/L / ALT: 51 U/L / AST: 71 U/L / GGT: x                                                                           CXR: < from: Xray Chest 1 View- PORTABLE-Urgent (Xray Chest 1 View- PORTABLE-Urgent .) (07.24.21 @ 11:47) >  INTERPRETATION:  Clinical History: NG tube    Frontal examination of the chest demonstrates the heart to be within normal limits in transverse diameter. No acute infiltrates. Dobbhoff feeding tube noted with sidehole overlying esophagogastric junction and should be advanced visualized osseous structures are within normal limits.    IMPRESSION: No acute infiltrates. Nasoenteric tube noted as described    < end of copied text >      ECHO:< from: Echocardiogram w/ Bubble and Doppler (07.19.21 @ 14:06) >   1. The aortic valve is tricuspid and moderately calcified. There is moderate-to-severe aortic stenosis. The peak transvalvular velocity is 3.50 m/s, the mean transvalvular gradient is 29.00 mmHg, and the LVOT/AV velocity integral ratio is 0.29. The peak transaortic gradient is 49.00 mmHg. The aortic valve area (estimated via the continuity method) is 0.90 cm². There is mild-to-moderate aortic regurgitation.   2. Mild mitral regurgitation.   3. No other significant valvular disease.   4. The right ventricle is normal in size. Right ventricular systolic function is normal.   5. Left ventricular hypertrophy present. The left ventricle is normal in size and systolic function with a calculated ejection fraction of 70-75%.   6. No pericardial effusion.   7. No prior echo is available for comparison.    < end of copied text >      MEDICATIONS  (STANDING):  aspirin  chewable 81 milliGRAM(s) Oral every 24 hours  atorvastatin 40 milliGRAM(s) Oral at bedtime  heparin   Injectable 5000 Unit(s) SubCutaneous every 8 hours  levETIRAcetam 500 milliGRAM(s) Oral every 12 hours  piperacillin/tazobactam IVPB.. 4.5 Gram(s) IV Intermittent every 12 hours    MEDICATIONS  (PRN):  sodium chloride 3%  Inhalation 4 milliLiter(s) Inhalation every 6 hours PRN productive cough

## 2021-07-27 NOTE — PROGRESS NOTE ADULT - PROBLEM SELECTOR PLAN 2
WBC trending up to 41 today with increased neutrophils. Unclear etiology -- could be aspiration pneumonitis/pna in setting of recently starting diet vs inflammatory state from myasthenia gravis vs underlying myeloproliferative neoplasm.   - Would sent repeat CXR today (7/27) for work up of new aspiration pneumonitis/pna given recently restarted oral diet. Can send sputum culture as well if patient producing sputum.   - Please send peripheral flow cytometry to evaluate CML, BCR-ABL, JAK2  - If CXR without signs of aspiration PNA and no other signs of infection and WBC continues to climb, would consider heme/onc consult for underlying MPN WBC trending up to 41 today with increased neutrophils. Unclear etiology -- could be aspiration pneumonitis/pna in setting of recently starting diet vs inflammatory state from myasthenia gravis vs underlying myeloproliferative neoplasm.   - Would sent repeat CXR today (7/27) for work up of new aspiration pneumonitis/pna given recently restarted oral diet. Can send sputum culture as well if patient producing sputum.   - Please send peripheral flow cytometry to evaluate CML, BCR-ABL, JAK2  - If CXR without signs of aspiration PNA and no other signs of infection and WBC continues to climb, would highly consider LPN with heme/onc input appreciated

## 2021-07-27 NOTE — PROGRESS NOTE ADULT - ASSESSMENT
89 M with a PMHx of seizures (on phenytoin at home) and HTN presented with new facial droop and slurring of speech. Admitted to stroke service where stroke work up was found to be negative. Transferred to [facility] for treatment of myasthenia gravis and work up of leukocytosis. Hypotensive 80s/50s. Received 2.5L LR, started on zosyn 4.5mg IV q12hrs and transferred to ICU for monitoring.

## 2021-07-27 NOTE — PROGRESS NOTE ADULT - ATTENDING COMMENTS
Seen and examined by me this morning, family at bedside  Only c/o at that time was numbness on Left thigh-compressess provided to patient  IV unasyn completed yesterday for aspiration pneumonia  Later on found to be hypotensive not fully responsive to IVF's, unclear etiology  [facility] conversations held, Palliative is on board as well, now DNR/DNI but open to pressors/Abx/IVF's  MICU consultation called and patient was then transferred to  for further care  D/w primary team and wife at length  Rest as above Seen and examined by me this morning, family at bedside  Only c/o at that time was numbness on Left thigh-compressess provided to patient  IV unasyn completed yesterday for aspiration pneumonia  Later on found to be hypotensive not fully responsive to IVF's, unclear etiology  EVARISTO, also worsened today  [facility] conversations held, Palliative is on board as well, now DNR/DNI but open to pressors/Abx/IVF's  MICU consultation called and patient was then transferred to  for further care  D/w primary team and wife at length  Rest as above

## 2021-07-27 NOTE — CONSULT NOTE ADULT - SUBJECTIVE AND OBJECTIVE BOX
89M PMH HTN, seizures p/w FND, being treated for myasthenia gravis, found to be hypotensive and acute drop in hgb 8.6 to 5.4. BPs responsive to 3L IVF. Pt denies CP/SOB/lightheadedness.     Known mod-sev AS found on 7/19/21 TTE, evaluated by structural cardiology without plan for intervention per documentation.       HPI:   **STROKE HPI***    HPI: 89y Male with PMHx of HTN and seizure on phenytoin presents with left facial droop and slurred speech. Pt was with family member (great-nephew Mesh [phone]) who noticed at 5pm, pt had acute onset of slurred speech, increased saliva production and a left facial droop. Pt was driven to the ED immediately. On presentation, /94, NIHSS 3. Great-nephew at bedside denies hx of stroke, use of blood thinners, recent trauma/bleeding event. CTH negative for acute hemorrhage. CTP without any perfusion defect. CTA without LVO. In conjunction with patient, family and stroke attending, decision was made to not give tpa since family felt his current deficits are non-disabling to his daily life vs risk of bleeding. Pt and family was made aware of the possibility of worsening symptoms and the opportunity to give tpa was time limited. They expressed understanding and declined tpa administration.     Pt denies CP, SOB, extremity weakness, changes in vision, HA. He endorses a cough that has been occurring for the past few days with sputum production, but has been afebrile. Pt given vanc and zosyn x1 in the ED. CXR pending official read. Patient initially failed dysphagia screen and was given  NC, but passed second dysphagia screen so was able to to loaded with plavix 300 PO.     At baseline, pt is fully independent of all ADLs and iADLs, ambulates without assistance, with hearing loss b/l, baseline right arm tremor. It is unclear if pt sees a neurologist for seizure management. Per wife (Ross [phone], [phone]), his most recent seizure was in April 2020 where he had full body shaking lasting for 5 minutes. He has been on phenytoin 100mg TID and has not had another seizure since.       PAST MEDICAL & SURGICAL HISTORY:      FAMILY HISTORY:      SOCIAL HISTORY:   Patient lives with wife in apartment.  Smoking status: denies  Drinking: denies  Drug Use: denies    ROS:   Constitutional: No fever, weight loss or fatigue  Eyes: No eye pain, visual disturbances, or discharge  ENMT:  hearing loss bilaterally  Neck: No pain or stiffness  Respiratory: cough with sputum production x days  Cardiovascular: No chest pain, palpitations, shortness of breath, dizziness or leg swelling  Gastrointestinal: No abdominal pain. No nausea, vomiting or hematemesis; No diarrhea or constipation. Nohematochezia.  Neurological: As per HPI    T(C): 36.9 (07-18-21 @ 23:53), Max: 37.1 (07-18-21 @ 19:32)  HR: 92 (07-18-21 @ 23:53) (84 - 92)  BP: 142/78 (07-18-21 @ 23:53) (123/63 - 184/75)  RR: 18 (07-18-21 @ 23:53) (16 - 18)  SpO2: 97% (07-18-21 @ 23:53) (96% - 99%)    MEDICATION RECONCILIATION   MEDICATIONS  (STANDING):  aspirin enteric coated 81 milliGRAM(s) Oral daily  atorvastatin 80 milliGRAM(s) Oral at bedtime  clopidogrel Tablet 75 milliGRAM(s) Oral daily  heparin   Injectable 5000 Unit(s) SubCutaneous every 8 hours  phenytoin   Capsule 100 milliGRAM(s) Oral three times a day    MEDICATIONS  (PRN):    Allergies    hay fever (Unknown)  No Known Drug Allergies    Intolerances      Vital Signs Last 24 Hrs  T(C): 36.9 (18 Jul 2021 23:53), Max: 37.1 (18 Jul 2021 19:32)  T(F): 98.4 (18 Jul 2021 23:53), Max: 98.8 (18 Jul 2021 19:51)  HR: 92 (18 Jul 2021 23:53) (84 - 92)  BP: 142/78 (18 Jul 2021 23:53) (123/63 - 184/75)  BP(mean): --  RR: 18 (18 Jul 2021 23:53) (16 - 18)  SpO2: 97% (18 Jul 2021 23:53) (96% - 99%)      NIHSS: 3 ASPECT Score: 9      Fingerstick Blood Glucose: CAPILLARY BLOOD GLUCOSE  161 (18 Jul 2021 20:36)      POCT Blood Glucose.: 161 mg/dL (18 Jul 2021 19:27)    LABS:                        10.7   32.39 )-----------( 413      ( 18 Jul 2021 20:01 )             32.6     07-18    141  |  108  |  39<H>  ----------------------------<  141<H>  4.7   |  23  |  1.76<H>    Ca    9.6      18 Jul 2021 20:01    TPro  7.6  /  Alb  4.5  /  TBili  0.2  /  DBili  x   /  AST  18  /  ALT  13  /  AlkPhos  100  07-18    PT/INR - ( 18 Jul 2021 20:01 )   PT: 12.3 sec;   INR: 1.03          PTT - ( 18 Jul 2021 20:01 )  PTT:32.7 sec          RADIOLOGY & ADDITIONAL STUDIES:    HCT: No acute intracranial hemorrhage or transcortical infarct.    CTA:1.  No large vessel occlusion or high-grade stenosis within the head and neck.  2.  Incidentally noted 1.8 cm right thyroid nodule. Consider nonemergent dedicated thyroid ultrasound is for further characterization.     (19 Jul 2021 00:25)    PAST MEDICAL & SURGICAL HISTORY:  Hypertension    Seizure      [ ] Diabetes   [ ] Hypertension  [ ] Hyperlipidemia  [ ] CAD  [ ] PCI  [ ] CABG    PREVIOUS DIAGNOSTIC TESTING:    [ ] Echocardiogram:  [ ] Stress Test:  [ ] Catheterization: 	    FAMILY HISTORY:    SOCIAL HISTORY:    [ ] Non-smoker  [ ] Current Smoker  [ ] Former Smoker  [ ] Alcohol Use  [ ] Drug Use    ALLERGIES/INTOLERANCES:  hay fever (Unknown)  No Known Drug Allergies    HOME MEDICATIONS:    INPATIENT MEDICATIONS:    aspirin  chewable 81 milliGRAM(s) Oral every 24 hours  heparin   Injectable 5000 Unit(s) SubCutaneous every 8 hours    atorvastatin 40 milliGRAM(s) Oral at bedtime  dextrose 40% Gel 15 Gram(s) Oral once  dextrose 5%. 1000 milliLiter(s) IV Continuous <Continuous>  dextrose 5%. 1000 milliLiter(s) IV Continuous <Continuous>  dextrose 50% Injectable 25 Gram(s) IV Push once  dextrose 50% Injectable 25 Gram(s) IV Push once  dextrose 50% Injectable 12.5 Gram(s) IV Push once  glucagon  Injectable 1 milliGRAM(s) IntraMuscular once  insulin lispro (ADMELOG) corrective regimen sliding scale   SubCutaneous Before meals and at bedtime  levETIRAcetam 500 milliGRAM(s) Oral every 12 hours  pantoprazole  Injectable 40 milliGRAM(s) IV Push every 12 hours  sodium chloride 3%  Inhalation 4 milliLiter(s) Inhalation every 6 hours PRN      REVIEW OF SYSTEMS:    CONSTITUTIONAL: No weakness, F/C, wt loss/gain  EYES: No visual changes/disturbances  ENMT: No dry mouth, no vertigo  NECK: No pain or stiffness  RESPIRATORY: No cough, wheezing, hemoptysis; No shortness of breath  CARDIOVASCULAR: No chest pain, palpitations, lightheadedness/dizziness, LOC, or leg swelling  GASTROINTESTINAL: No abdominal or epigastric pain. No N/V/D/C. No melena, hematochezia, or hematemesis.  GENITOURINARY: No dysuria, increased frequency, hematuria, or incontinence  NEUROLOGICAL: No lightheadedness/dizziness, LOC, headaches, numbness or weakness  MUSCULOSKELETAL: No joint pain or swelling; No muscle, back, or extremity pain  SKIN: No itching, burning, rashes, or lesions   ENDOCRINE: No heat or cold intolerance; No hair loss  HEME/LYMPH: No easy bruising, or bleeding gums  PSYCHIATRIC: No depression, anxiety, mood swings, or difficulty sleeping    [ ] All other review of systems are negative unless indicated above.  [ ] Unable to obtain due to:    PHYSICAL EXAM:    T(C): 37.6 (07-27-21 @ 12:54), Max: 37.6 (07-27-21 @ 12:54)  HR: 108 (07-27-21 @ 17:20) (68 - 108)  BP: 118/58 (07-27-21 @ 15:05) (82/51 - 132/68)  RR: 18 (07-27-21 @ 17:20) (15 - 18)  SpO2: 94% (07-27-21 @ 17:20) (94% - 100%)  Wt(kg): --    NAD  Regular, tachycardic, 3/6 MICHELE  CTAB  WWP, No edema    TELEMETRY: 	      ECG:  	  	  LABS:                        5.4    50.49 )-----------( 308      ( 27 Jul 2021 15:51 )             16.6     07-27    136  |  106  |  44<H>  ----------------------------<  251<H>  4.9   |  18<L>  |  1.99<H>    Ca    8.7      27 Jul 2021 15:51  Phos  3.2     07-27  Mg     2.0     07-27    TPro  8.5<H>  /  Alb  3.5  /  TBili  0.3  /  DBili  x   /  AST  71<H>  /  ALT  51<H>  /  AlkPhos  69  07-27    TTE:   1. The aortic valve is tricuspid and moderately calcified. There is moderate-to-severe aortic stenosis. The peak transvalvular velocity is 3.50 m/s, the mean transvalvular gradient is 29.00 mmHg, and the LVOT/AV velocity integral ratio is 0.29. The peak transaortic gradient is 49.00 mmHg. The aortic valve area (estimated via the continuity method) is 0.90 cm². There is mild-to-moderate aortic regurgitation.   2. Mild mitral regurgitation.   3. No other significant valvular disease.   4. The right ventricle is normal in size. Right ventricular systolic function is normal.   5. Left ventricular hypertrophy present. The left ventricle is normal in size and systolic function with a calculated ejection fraction of 70-75%.   6. No pericardial effusion.   7. No prior echo is available for comparison.    ASSESSMENT/PLAN: 	  89M PMH HTN, seizures p/w FND, being treated for myasthenia gravis, found to be hypotensive (responsive to IVF) with acute drop in hgb 8.6 to 5.4.    #Hypotension: not c/w cardiogenic shock given robust LV on TTE and good BP responsive to fluid. Would consider hypovolemia given hgb drop and fluid response as etiology. Hemodynamic effects of hypovolemia can be exaggerated with AS (typically a feature of severe AS, although note it is possible for echo measurements to underestimate severity). AS can also be a risk factor for GIB (along with antiplatelet therapy).        89M PMH HTN, seizures p/w FND, being treated for myasthenia gravis, found to be hypotensive and acute drop in hgb 8.6 to 5.4. BPs responsive to 3L IVF. Pt denies CP/SOB/lightheadedness.     Known mod-sev AS found on 7/19/21 TTE, evaluated by structural cardiology without plan for intervention per documentation.       HPI:   **STROKE HPI***    HPI: 89y Male with PMHx of HTN and seizure on phenytoin presents with left facial droop and slurred speech. Pt was with family member (great-nephew Mesh [phone]) who noticed at 5pm, pt had acute onset of slurred speech, increased saliva production and a left facial droop. Pt was driven to the ED immediately. On presentation, /94, NIHSS 3. Great-nephew at bedside denies hx of stroke, use of blood thinners, recent trauma/bleeding event. CTH negative for acute hemorrhage. CTP without any perfusion defect. CTA without LVO. In conjunction with patient, family and stroke attending, decision was made to not give tpa since family felt his current deficits are non-disabling to his daily life vs risk of bleeding. Pt and family was made aware of the possibility of worsening symptoms and the opportunity to give tpa was time limited. They expressed understanding and declined tpa administration.     Pt denies CP, SOB, extremity weakness, changes in vision, HA. He endorses a cough that has been occurring for the past few days with sputum production, but has been afebrile. Pt given vanc and zosyn x1 in the ED. CXR pending official read. Patient initially failed dysphagia screen and was given  TX, but passed second dysphagia screen so was able to to loaded with plavix 300 PO.     At baseline, pt is fully independent of all ADLs and iADLs, ambulates without assistance, with hearing loss b/l, baseline right arm tremor. It is unclear if pt sees a neurologist for seizure management. Per wife (Ross [phone], [phone]), his most recent seizure was in April 2020 where he had full body shaking lasting for 5 minutes. He has been on phenytoin 100mg TID and has not had another seizure since.       PAST MEDICAL & SURGICAL HISTORY:      FAMILY HISTORY:      SOCIAL HISTORY:   Patient lives with wife in apartment.  Smoking status: denies  Drinking: denies  Drug Use: denies    ROS:   Constitutional: No fever, weight loss or fatigue  Eyes: No eye pain, visual disturbances, or discharge  ENMT:  hearing loss bilaterally  Neck: No pain or stiffness  Respiratory: cough with sputum production x days  Cardiovascular: No chest pain, palpitations, shortness of breath, dizziness or leg swelling  Gastrointestinal: No abdominal pain. No nausea, vomiting or hematemesis; No diarrhea or constipation. Nohematochezia.  Neurological: As per HPI    T(C): 36.9 (07-18-21 @ 23:53), Max: 37.1 (07-18-21 @ 19:32)  HR: 92 (07-18-21 @ 23:53) (84 - 92)  BP: 142/78 (07-18-21 @ 23:53) (123/63 - 184/75)  RR: 18 (07-18-21 @ 23:53) (16 - 18)  SpO2: 97% (07-18-21 @ 23:53) (96% - 99%)    MEDICATION RECONCILIATION   MEDICATIONS  (STANDING):  aspirin enteric coated 81 milliGRAM(s) Oral daily  atorvastatin 80 milliGRAM(s) Oral at bedtime  clopidogrel Tablet 75 milliGRAM(s) Oral daily  heparin   Injectable 5000 Unit(s) SubCutaneous every 8 hours  phenytoin   Capsule 100 milliGRAM(s) Oral three times a day    MEDICATIONS  (PRN):    Allergies    hay fever (Unknown)  No Known Drug Allergies    Intolerances      Vital Signs Last 24 Hrs  T(C): 36.9 (18 Jul 2021 23:53), Max: 37.1 (18 Jul 2021 19:32)  T(F): 98.4 (18 Jul 2021 23:53), Max: 98.8 (18 Jul 2021 19:51)  HR: 92 (18 Jul 2021 23:53) (84 - 92)  BP: 142/78 (18 Jul 2021 23:53) (123/63 - 184/75)  BP(mean): --  RR: 18 (18 Jul 2021 23:53) (16 - 18)  SpO2: 97% (18 Jul 2021 23:53) (96% - 99%)      NIHSS: 3 ASPECT Score: 9      Fingerstick Blood Glucose: CAPILLARY BLOOD GLUCOSE  161 (18 Jul 2021 20:36)      POCT Blood Glucose.: 161 mg/dL (18 Jul 2021 19:27)    LABS:                        10.7   32.39 )-----------( 413      ( 18 Jul 2021 20:01 )             32.6     07-18    141  |  108  |  39<H>  ----------------------------<  141<H>  4.7   |  23  |  1.76<H>    Ca    9.6      18 Jul 2021 20:01    TPro  7.6  /  Alb  4.5  /  TBili  0.2  /  DBili  x   /  AST  18  /  ALT  13  /  AlkPhos  100  07-18    PT/INR - ( 18 Jul 2021 20:01 )   PT: 12.3 sec;   INR: 1.03          PTT - ( 18 Jul 2021 20:01 )  PTT:32.7 sec          RADIOLOGY & ADDITIONAL STUDIES:    HCT: No acute intracranial hemorrhage or transcortical infarct.    CTA:1.  No large vessel occlusion or high-grade stenosis within the head and neck.  2.  Incidentally noted 1.8 cm right thyroid nodule. Consider nonemergent dedicated thyroid ultrasound is for further characterization.     (19 Jul 2021 00:25)    PAST MEDICAL & SURGICAL HISTORY:  Hypertension    Seizure      ALLERGIES/INTOLERANCES:  hay fever (Unknown)  No Known Drug Allergies    HOME MEDICATIONS:    INPATIENT MEDICATIONS:    aspirin  chewable 81 milliGRAM(s) Oral every 24 hours  heparin   Injectable 5000 Unit(s) SubCutaneous every 8 hours    atorvastatin 40 milliGRAM(s) Oral at bedtime  dextrose 40% Gel 15 Gram(s) Oral once  dextrose 5%. 1000 milliLiter(s) IV Continuous <Continuous>  dextrose 5%. 1000 milliLiter(s) IV Continuous <Continuous>  dextrose 50% Injectable 25 Gram(s) IV Push once  dextrose 50% Injectable 25 Gram(s) IV Push once  dextrose 50% Injectable 12.5 Gram(s) IV Push once  glucagon  Injectable 1 milliGRAM(s) IntraMuscular once  insulin lispro (ADMELOG) corrective regimen sliding scale   SubCutaneous Before meals and at bedtime  levETIRAcetam 500 milliGRAM(s) Oral every 12 hours  pantoprazole  Injectable 40 milliGRAM(s) IV Push every 12 hours  sodium chloride 3%  Inhalation 4 milliLiter(s) Inhalation every 6 hours PRN      REVIEW OF SYSTEMS:    CONSTITUTIONAL: No weakness, F/C, wt loss/gain  EYES: No visual changes/disturbances  ENMT: No dry mouth, no vertigo  NECK: No pain or stiffness  RESPIRATORY: No cough, wheezing, hemoptysis; No shortness of breath  CARDIOVASCULAR: No chest pain, palpitations, lightheadedness/dizziness, LOC, or leg swelling  GASTROINTESTINAL: No abdominal or epigastric pain. No N/V/D/C. No melena, hematochezia, or hematemesis.  GENITOURINARY: No dysuria, increased frequency, hematuria, or incontinence  NEUROLOGICAL: No lightheadedness/dizziness, LOC, headaches, numbness or weakness  MUSCULOSKELETAL: No joint pain or swelling; No muscle, back, or extremity pain  SKIN: No itching, burning, rashes, or lesions   ENDOCRINE: No heat or cold intolerance; No hair loss  HEME/LYMPH: No easy bruising, or bleeding gums  PSYCHIATRIC: No depression, anxiety, mood swings, or difficulty sleeping    [ ] All other review of systems are negative unless indicated above.  [ ] Unable to obtain due to:    PHYSICAL EXAM:    T(C): 37.6 (07-27-21 @ 12:54), Max: 37.6 (07-27-21 @ 12:54)  HR: 108 (07-27-21 @ 17:20) (68 - 108)  BP: 118/58 (07-27-21 @ 15:05) (82/51 - 132/68)  RR: 18 (07-27-21 @ 17:20) (15 - 18)  SpO2: 94% (07-27-21 @ 17:20) (94% - 100%)  Wt(kg): --    NAD  Regular, tachycardic, 3/6 MICHELE  CTAB  WWP, No edema    TELEMETRY: 	      ECG:  	  	  LABS:                        5.4    50.49 )-----------( 308      ( 27 Jul 2021 15:51 )             16.6     07-27    136  |  106  |  44<H>  ----------------------------<  251<H>  4.9   |  18<L>  |  1.99<H>    Ca    8.7      27 Jul 2021 15:51  Phos  3.2     07-27  Mg     2.0     07-27    TPro  8.5<H>  /  Alb  3.5  /  TBili  0.3  /  DBili  x   /  AST  71<H>  /  ALT  51<H>  /  AlkPhos  69  07-27    TTE:   1. The aortic valve is tricuspid and moderately calcified. There is moderate-to-severe aortic stenosis. The peak transvalvular velocity is 3.50 m/s, the mean transvalvular gradient is 29.00 mmHg, and the LVOT/AV velocity integral ratio is 0.29. The peak transaortic gradient is 49.00 mmHg. The aortic valve area (estimated via the continuity method) is 0.90 cm². There is mild-to-moderate aortic regurgitation.   2. Mild mitral regurgitation.   3. No other significant valvular disease.   4. The right ventricle is normal in size. Right ventricular systolic function is normal.   5. Left ventricular hypertrophy present. The left ventricle is normal in size and systolic function with a calculated ejection fraction of 70-75%.   6. No pericardial effusion.   7. No prior echo is available for comparison.    ASSESSMENT/PLAN: 	  89M PMH HTN, seizures p/w FND, being treated for myasthenia gravis, found to be hypotensive (responsive to IVF) with acute drop in hgb 8.6 to 5.4.    #Hypotension: not c/w cardiogenic shock given robust LV on TTE and good BP responsive to fluid. Would consider hypovolemia given hgb drop and fluid response as etiology. Hemodynamic effects of hypovolemia can be exaggerated with AS (typically a feature of severe AS, although note it is possible for echo measurements to underestimate severity). Not consistent with AS as primary etiology of hypotension given acute drop in BP and natural disease of AS is slowly progressive. AS can also be a risk factor for GIB (along with antiplatelet therapy).   - Fluid/prbc resuscitation  - Will inform structural cardiology of updates on pt status  - Workup of hypovolemic shock by primary team    Preliminary evaluation by on call cardiology fellow, final recs pending attending attestation  Primitivo Zapata MD PGY5

## 2021-07-27 NOTE — PROGRESS NOTE ADULT - PROBLEM SELECTOR PLAN 1
Recommended Valium 1mg IV x1 for muscle cramps, wife refused as she felt symptoms were improving  Recommend Flexeril 5mg PO BID PRN for muscle cramps

## 2021-07-27 NOTE — PROGRESS NOTE ADULT - PROBLEM SELECTOR PLAN 4
- continue aspirin 81mg PO  - Continue atorvastatin 40 mg daily PO  - Continue to hold plavix 75mg daily

## 2021-07-27 NOTE — PROGRESS NOTE ADULT - PROBLEM SELECTOR PLAN 2
-Completed 5 day course of IVIG  -may require rituximab after IVIG (plan per neuro)  -F/U myasthenia gravis antibodies

## 2021-07-27 NOTE — PROGRESS NOTE ADULT - PROBLEM SELECTOR PLAN 7
-likely iron deficiency anemia  - H&H Stable  -will continue to monitor daily CBCs holding home norvasc- benazapril for permissive htn   -per neuro goal -140  -if pressure persistently >160 would initiate norvasc first given renal fx

## 2021-07-27 NOTE — PROGRESS NOTE ADULT - ASSESSMENT
89M with PMHx of HTN and seizure on phenytoin presents with left facial droop and slurred speech. Stroke workup negative. Neurology consulted for persistent weakness.      Impression  - Myasthenia gravis, new onset. Bedside repetitive nerve stimulation on 7/22 notable for decremental response indicative of MG, Strength improved after receiving full course of IVIG      Plan  - F/u Myasthenia Gravis antibodies - bedside repetitive nerve stimulation on 7/22 indicative of MG  - S/p IVIG x5 days  - PT/OT  - Will consider starting Rituximab q6 months for long-term management of myasthenia, pending ID assessment of prior Hep B infection  - For now will start low dose Prednisone 10mg daily  - Heme-Onc following for persistent leukocytosis. Flow cytometry, BCR-ABL, JAK2 serologies sent. Can follow up outpatient with Dr. Nguyen      Plan discussed with Attending Dr. Evie Mcclellan MD  Neurology, PGY-2  Pager: [phone]  [phone]

## 2021-07-27 NOTE — PROGRESS NOTE ADULT - PROBLEM SELECTOR PLAN 3
- continue aspirin 81mg (via NGT)  - Continue atorvastatin 40 mg daily (via NGT)  - Continue to hold plavix 75mg daily Patient with Cr elevated to 1.8 from 1.3 yesterday, meeting criteria. Could be pre-renal given vomiting and decreased PO intake from NPO status.   - Can trial IVF 500cc-1L  - Please send urine Na and urine Cr  - Please obtain renal ultrasound to r/o hydronephrosis   - Continue to monitor renal function and avoid any nephrotoxic agents

## 2021-07-27 NOTE — PROGRESS NOTE ADULT - PROBLEM SELECTOR PLAN 4
- phenytoin level 4.1 (7/18/21)  - continue to hold home phenytoin 100mg TID  -  Keppra 500 mg q 12h - continue aspirin 81mg   - Continue atorvastatin 40 mg daily

## 2021-07-27 NOTE — PROGRESS NOTE ADULT - ASSESSMENT
89 yo M with history of seizures and HTN was admitted to neurology service for stroke symptoms. He was being managed for ischemic stroke and was transferred to Medicine. Hematology consulted for persistent leukocytosis. Patient denies any prior history of known leukocytosis. Denies any recent history of B symptoms (Fevers, fatigues, night sweats, unexplained weight loss, loss of appetite).     #Leukocytosis  - WBC count of 33k with Neutrophilia (24k), Monocytosis (2k) , eosinophilia (2.89k) and with promyelocytes and myelocytes noted on smear  - DD include reactive vs clonal causes infection/ stressed marrow from recent illness/ CML/ CMML  - Today WBC count trended up from 20s to 42k. No symptoms or signs suggestive of infectious process at this time  - Send Peripheral flow cytometry, BCR-ABL PCR, JAK2   - Will follow up with patient with results  - Can set up outpatient hematology follow up with Dr. Nguyen on a Thursday (after discharge)     #Anemia  - Normocytic anemia, with iron panel consistent with anemia of chronic disease/ renal disease  - B12/folate wnl. No evidence of hemolysis  - Given CKD and Anemia, a monoclonal gammopathy workup was and was negative      D/w Dr. Soni

## 2021-07-27 NOTE — PROGRESS NOTE ADULT - PROBLEM SELECTOR PLAN 5
.  Patient is Full Code at this time  -advised patient's wife that even though he has improved from MG standpoint, still recommend DNR/DNI given overall advanced illness .  Patient is Full Code at this time  -advised patient's wife that even though he has improved from MG standpoint, still recommend DNR/DNI given overall advanced illness    Patient assessed on 07-28-21 to manage: GOC/Advanced Directives, Symptoms, and Supportive Care.  31 Minutes; Start: 3:50PM,End: 4:21PM, Non-Face-to-Face prolonged service provided that relates to Face-to-Face care that has occurred and ongoing patient management, including one or more of the following: - Reviewed documentation from other physicians and other health care professional services - Reviewed other medical records and diagnostic / radiology study results - Coordination with patient's support system

## 2021-07-27 NOTE — PROGRESS NOTE ADULT - SUBJECTIVE AND OBJECTIVE BOX
Neurology Progress Note    Interval History:    No acute events overnight. Patient complains of left lower extremity weakness and numbness that began around midnight.      HPI:  89y Male with PMHx of HTN and seizure on phenytoin presents with left facial droop and slurred speech. Pt was with family member (great-nephew Mesh [phone]) who noticed at 5pm, pt had acute onset of slurred speech, increased saliva production and a left facial droop. Pt was driven to the ED immediately. On presentation, /94, NIHSS 3. Great-nephew at bedside denies hx of stroke, use of blood thinners, recent trauma/bleeding event. CTH negative for acute hemorrhage. CTP without any perfusion defect. CTA without LVO. In conjunction with patient, family and stroke attending, decision was made to not give tpa since family felt his current deficits are non-disabling to his daily life vs risk of bleeding. Pt and family was made aware of the possibility of worsening symptoms and the opportunity to give tpa was time limited. They expressed understanding and declined tpa administration.     Pt denies CP, SOB, extremity weakness, changes in vision, HA. He endorses a cough that has been occurring for the past few days with sputum production, but has been afebrile. Pt given vanc and zosyn x1 in the ED. CXR pending official read. Patient initially failed dysphagia screen and was given  MA, but passed second dysphagia screen so was able to to loaded with plavix 300 PO.     At baseline, pt is fully independent of all ADLs and iADLs, ambulates without assistance, with hearing loss b/l, baseline right arm tremor. It is unclear if pt sees a neurologist for seizure management. Per wife (oRss [phone], [phone]), his most recent seizure was in April 2020 where he had full body shaking lasting for 5 minutes. He has been on phenytoin 100mg TID and has not had another seizure since.             PAST MEDICAL & SURGICAL HISTORY:  Hypertension    Seizure            Medications:  aspirin  chewable 81 milliGRAM(s) Oral every 24 hours  atorvastatin 40 milliGRAM(s) Oral at bedtime  heparin   Injectable 5000 Unit(s) SubCutaneous every 8 hours  levETIRAcetam 500 milliGRAM(s) Oral every 12 hours  piperacillin/tazobactam IVPB.. 4.5 Gram(s) IV Intermittent every 12 hours  sodium chloride 3%  Inhalation 4 milliLiter(s) Inhalation every 6 hours PRN  vancomycin  IVPB 1000 milliGRAM(s) IV Intermittent once      Vital Signs Last 24 Hrs  T(C): 37.6 (27 Jul 2021 12:54), Max: 37.6 (27 Jul 2021 12:54)  T(F): 99.6 (27 Jul 2021 12:54), Max: 99.6 (27 Jul 2021 12:54)  HR: 100 (27 Jul 2021 15:05) (68 - 103)  BP: 118/58 (27 Jul 2021 15:05) (82/51 - 132/68)  BP(mean): 91 (26 Jul 2021 15:55) (91 - 91)  RR: 18 (27 Jul 2021 15:05) (15 - 18)  SpO2: 99% (27 Jul 2021 15:05) (95% - 100%)      Neurological Examination:  General:  Appearance is consistent with chronologic age.  No abnormal facies.  Gross skin survey within normal limits.    Cognitive/Language:  Awake, alert, and oriented to person, place, time and date.  Recent and remote memory intact.  Fund of knowledge is appropriate.  Naming, repetition and comprehension intact.   Nondysarthric.    Cranial Nerves  - Eyes: Visual acuity intact, Visual fields full.  EOMI w/o nystagmus, skew or reported double vision.  PERRL.  No ptosis/weakness of eyelid closure.    - Face:  Facial sensation normal V1 - 3, no facial asymmetry.    - Ears/Nose/Throat:  Hearing grossly intact b/l to finger rub.  Palate elevates midline.  Tongue and uvula midline.   Motor examination:  5/5 UE bilaterally, 4-/5 proximal left leg, 4-/5 distal left leg, 5/5 right lower extremity.  No observable drift. Normal tone and bulk. No tenderness, twitching, tremors or involuntary movements.  Sensory examination:   Decreased sensation to light touch, temperature, and vibration in left lower extremity compared to right.   Reflexes:   2+ b/l biceps, triceps, brachioradialis, patella and achilles.  Plantar response downgoing b/l.  clonus absent.  Cerebellum:   FTN/HKS intact.  No dysmetria or dysdiadokinesia.       Labs:  CBC Full  -  ( 27 Jul 2021 08:04 )  WBC Count : 41.33 K/uL  RBC Count : 2.87 M/uL  Hemoglobin : 8.6 g/dL  Hematocrit : 27.1 %  Platelet Count - Automated : 363 K/uL  Mean Cell Volume : 94.4 fl  Mean Cell Hemoglobin : 30.0 pg  Mean Cell Hemoglobin Concentration : 31.7 gm/dL  Auto Neutrophil # : 32.69 K/uL  Auto Lymphocyte # : 1.07 K/uL  Auto Monocyte # : 1.82 K/uL  Auto Eosinophil # : 1.82 K/uL  Auto Basophil # : 0.70 K/uL  Auto Neutrophil % : 76.5 %  Auto Lymphocyte % : 2.6 %  Auto Monocyte % : 4.4 %  Auto Eosinophil % : 4.4 %  Auto Basophil % : 1.7 %    07-27    139  |  107  |  39<H>  ----------------------------<  129<H>  4.8   |  21<L>  |  1.82<H>    Ca    9.2      27 Jul 2021 08:04  Phos  3.2     07-27  Mg     2.0     07-27    TPro  8.5<H>  /  Alb  3.5  /  TBili  0.3  /  DBili  x   /  AST  71<H>  /  ALT  51<H>  /  AlkPhos  69  07-27    LIVER FUNCTIONS - ( 27 Jul 2021 08:04 )  Alb: 3.5 g/dL / Pro: 8.5 g/dL / ALK PHOS: 69 U/L / ALT: 51 U/L / AST: 71 U/L / GGT: x

## 2021-07-27 NOTE — PROGRESS NOTE ADULT - SUBJECTIVE AND OBJECTIVE BOX
Patient is a 89y old  Male who presents with a chief complaint of slurred speech, L facial droop (27 Jul 2021 16:45)    Hospital Course  Pt is a 89 y/o M DNR/DNI with PMHx of HTN, seizures, severe AS (LVEF 75%) presented with left facial droop and slurring of speech. Admitted to stroke service where stroke work up found to be negative. Transferred to [facility] for work up of leukocytosis secondary to CML/CMLL vs infection vs myasthenia gravis. Received 5/7 days of 1.5g IV unasyn for suspected aspiration pneumonia and completed 5 day course of IVIG on 7/25/21 for myasthenia gravis. Pt was NPO after failing multiple swallow trials. On 7/26/21 pt passed swallow eval and was started on clear liquid and mechanical soft diet. On 7/27/21 at 230PM patient became hypotensive 80/50s in the setting of vomiting, possibly due to aspiration pneumonia with increasing WBC count, hyperglycemia and lactate of 6.4. ICU consulted, pt received total of 2.5L of LR and started on zosyn 4.5g IV Q12hrs. MAP>65 following fluid resuscitation. Hgb post fluid resuscitation 5.4. T&S ordered with plan for emergency transfusion of 1 unit pRBCs. Pt transported to ICU.    INTERVAL HPI/OVERNIGHT EVENTS:   No overnight events   Afebrile, hemodynamically stable     ICU Vital Signs Last 24 Hrs  T(C): 37.6 (27 Jul 2021 12:54), Max: 37.6 (27 Jul 2021 12:54)  T(F): 99.6 (27 Jul 2021 12:54), Max: 99.6 (27 Jul 2021 12:54)  HR: 108 (27 Jul 2021 17:20) (68 - 108)  BP: 118/58 (27 Jul 2021 15:05) (82/51 - 132/68)  BP(mean): --  ABP: --  ABP(mean): --  RR: 18 (27 Jul 2021 17:20) (15 - 18)  SpO2: 94% (27 Jul 2021 17:20) (94% - 100%)    I&O's Summary        LABS:                        5.4    50.49 )-----------( 308      ( 27 Jul 2021 15:51 )             16.6     07-27    136  |  106  |  44<H>  ----------------------------<  251<H>  4.9   |  18<L>  |  1.99<H>    Ca    8.7      27 Jul 2021 15:51  Phos  3.2     07-27  Mg     2.0     07-27    TPro  8.5<H>  /  Alb  3.5  /  TBili  0.3  /  DBili  x   /  AST  71<H>  /  ALT  51<H>  /  AlkPhos  69  07-27        CAPILLARY BLOOD GLUCOSE      POCT Blood Glucose.: 309 mg/dL (27 Jul 2021 17:52)  POCT Blood Glucose.: 249 mg/dL (27 Jul 2021 12:38)  POCT Blood Glucose.: 123 mg/dL (27 Jul 2021 06:40)        RADIOLOGY & ADDITIONAL TESTS:    Consultant(s) Notes Reviewed:  [x ] YES  [ ] NO    MEDICATIONS  (STANDING):  atorvastatin 40 milliGRAM(s) Oral at bedtime  dextrose 40% Gel 15 Gram(s) Oral once  dextrose 5%. 1000 milliLiter(s) (50 mL/Hr) IV Continuous <Continuous>  dextrose 5%. 1000 milliLiter(s) (100 mL/Hr) IV Continuous <Continuous>  dextrose 50% Injectable 12.5 Gram(s) IV Push once  dextrose 50% Injectable 25 Gram(s) IV Push once  dextrose 50% Injectable 25 Gram(s) IV Push once  glucagon  Injectable 1 milliGRAM(s) IntraMuscular once  insulin lispro (ADMELOG) corrective regimen sliding scale   SubCutaneous Before meals and at bedtime  levETIRAcetam 500 milliGRAM(s) Oral every 12 hours  pantoprazole  Injectable 40 milliGRAM(s) IV Push every 12 hours    MEDICATIONS  (PRN):  sodium chloride 3%  Inhalation 4 milliLiter(s) Inhalation every 6 hours PRN productive cough      PHYSICAL EXAM:  GENERAL:   HEAD:  Atraumatic, Normocephalic  EYES: EOMI, PERRLA, conjunctiva and sclera clear  NECK: Supple, No JVD, Normal thyroid, no enlarged nodes  NERVOUS SYSTEM:  Alert & Awake.   CHEST/LUNG: B/L good air entry; No rales, rhonchi, or wheezing  HEART: S1S2 normal, no S3, Regular rate and rhythm; No murmurs  ABDOMEN: Soft, Nontender, Nondistended; Bowel sounds present  EXTREMITIES:  2+ Peripheral Pulses, No clubbing, cyanosis, or edema  LYMPH: No lymphadenopathy noted  SKIN: No rashes or lesions    Care Discussed with Consultants/Other Providers [ x] YES  [ ] NO   Patient is a 89y old  Male who presents with a chief complaint of slurred speech, L facial droop (27 Jul 2021 16:45)    Hospital Course  Pt is a 89 y/o M DNR/DNI with PMHx of HTN, seizures, severe AS (LVEF 75%) presented with left facial droop and slurring of speech. Admitted to stroke service where stroke work up found to be negative. Transferred to [facility] for work up of leukocytosis secondary to CML/CMLL vs infection vs myasthenia gravis. Received 5/7 days of 1.5g IV unasyn for suspected aspiration pneumonia and completed 5 day course of IVIG on 7/25/21 for myasthenia gravis. Pt was NPO after failing multiple swallow trials. On 7/26/21 pt passed swallow eval and was started on clear liquid and mechanical soft diet. On 7/27/21 at 230PM patient became hypotensive 80/50s in the setting of vomiting, possibly due to aspiration pneumonia with increasing WBC count, hyperglycemia and lactate of 6.4. ICU consulted, pt received total of 2.5L of LR and started on zosyn 4.5g IV Q12hrs. MAP>65 following fluid resuscitation. Hgb post fluid resuscitation 5.4. T&S ordered with plan for emergency transfusion of 1 unit pRBCs. Pt transported to ICU.    INTERVAL HPI/OVERNIGHT EVENTS:   Patient seen at bedside, alert and awake but appears sleepy, oriented x2. Pt reports some shortness of breath and light headedness.  One episode of non-bloody vomit in the ICU.  At this time does not report fever, chills, chest pain, abdominal pain, dysuria.    ICU Vital Signs Last 24 Hrs  T(C): 37.6 (27 Jul 2021 12:54), Max: 37.6 (27 Jul 2021 12:54)  T(F): 99.6 (27 Jul 2021 12:54), Max: 99.6 (27 Jul 2021 12:54)  HR: 108 (27 Jul 2021 17:20) (68 - 108)  BP: 118/58 (27 Jul 2021 15:05) (82/51 - 132/68)  BP(mean): --  ABP: --  ABP(mean): --  RR: 18 (27 Jul 2021 17:20) (15 - 18)  SpO2: 94% (27 Jul 2021 17:20) (94% - 100%)    I&O's Summary        LABS:                        5.4    50.49 )-----------( 308      ( 27 Jul 2021 15:51 )             16.6     07-27    136  |  106  |  44<H>  ----------------------------<  251<H>  4.9   |  18<L>  |  1.99<H>    Ca    8.7      27 Jul 2021 15:51  Phos  3.2     07-27  Mg     2.0     07-27    TPro  8.5<H>  /  Alb  3.5  /  TBili  0.3  /  DBili  x   /  AST  71<H>  /  ALT  51<H>  /  AlkPhos  69  07-27        CAPILLARY BLOOD GLUCOSE      POCT Blood Glucose.: 309 mg/dL (27 Jul 2021 17:52)  POCT Blood Glucose.: 249 mg/dL (27 Jul 2021 12:38)  POCT Blood Glucose.: 123 mg/dL (27 Jul 2021 06:40)        RADIOLOGY & ADDITIONAL TESTS:    Consultant(s) Notes Reviewed:  [x ] YES  [ ] NO    MEDICATIONS  (STANDING):  atorvastatin 40 milliGRAM(s) Oral at bedtime  dextrose 40% Gel 15 Gram(s) Oral once  dextrose 5%. 1000 milliLiter(s) (50 mL/Hr) IV Continuous <Continuous>  dextrose 5%. 1000 milliLiter(s) (100 mL/Hr) IV Continuous <Continuous>  dextrose 50% Injectable 12.5 Gram(s) IV Push once  dextrose 50% Injectable 25 Gram(s) IV Push once  dextrose 50% Injectable 25 Gram(s) IV Push once  glucagon  Injectable 1 milliGRAM(s) IntraMuscular once  insulin lispro (ADMELOG) corrective regimen sliding scale   SubCutaneous Before meals and at bedtime  levETIRAcetam 500 milliGRAM(s) Oral every 12 hours  pantoprazole  Injectable 40 milliGRAM(s) IV Push every 12 hours    MEDICATIONS  (PRN):  sodium chloride 3%  Inhalation 4 milliLiter(s) Inhalation every 6 hours PRN productive cough      PHYSICAL EXAM:  GENERAL: lying in bed, elderly, cachectic, no acute distress  HEENT:  Atraumatic, Normocephalic, PERRL, conjunctiva and sclera clear, no JVD,   NERVOUS SYSTEM:  Alert & Awake, slight left facial droop, no other focal deficit  CHEST/LUNG: B/L good air entry; No rales, rhonchi, or wheezing  HEART: crescendo, decrescendo murmur, Regular rate and rhythm  ABDOMEN: Soft, Nontender, Nondistended; Bowel sounds present  EXTREMITIES:  2+ radial Pulses, no clubbing, cyanosis, or edema  LYMPH: No lymphadenopathy noted  SKIN: No rashes or lesions    Care Discussed with Consultants/Other Providers [ x] YES  [ ] NO

## 2021-07-27 NOTE — PROGRESS NOTE ADULT - SUBJECTIVE AND OBJECTIVE BOX
Hematology Progress Note    HPI: 89y Male with PMHx of HTN and seizure on phenytoin presents with left facial droop and slurred speech. Pt was with family member (great-nephew Mesh [phone]) who noticed at 5pm, pt had acute onset of slurred speech, increased saliva production and a left facial droop. Pt was driven to the ED immediately. On presentation, /94, NIHSS 3. Great-nephew at bedside denies hx of stroke, use of blood thinners, recent trauma/bleeding event. CTH negative for acute hemorrhage. CTP without any perfusion defect. CTA without LVO. In conjunction with patient, family and stroke attending, decision was made to not give tpa since family felt his current deficits are non-disabling to his daily life vs risk of bleeding. Pt and family was made aware of the possibility of worsening symptoms and the opportunity to give tpa was time limited. They expressed understanding and declined tpa administration.   Pt denies CP, SOB, extremity weakness, changes in vision, HA. He endorses a cough that has been occurring for the past few days with sputum production, but has been afebrile. Pt given vanc and zosyn x1 in the ED. CXR pending official read. Patient initially failed dysphagia screen and was given  ND, but passed second dysphagia screen so was able to to loaded with plavix 300 PO.   At baseline, pt is fully independent of all ADLs and iADLs, ambulates without assistance, with hearing loss b/l, baseline right arm tremor. It is unclear if pt sees a neurologist for seizure management. Per wife (Madeot [phone], [phone]), his most recent seizure was in April 2020 where he had full body shaking lasting for 5 minutes. He has been on phenytoin 100mg TID and has not had another seizure since.     89 yo M with history of seizures and HTN was admitted to neurology service for stroke symptoms. He was being managed for ischemic stroke and was transferred to Medicine. Hematology consulted for persistent leukocytosis. Patient denies any prior history of known leukocytosis. Denies any recent history of B symptoms (Fevers, fatigues, night sweats, unexplained weight loss, loss of appetite).     Interval Hx- Diagnosed with Myasthenia Gravis with improvement in swallowing and droop after IVIG.     FAMILY HISTORY:  none    SOCIAL HISTORY:   Patient lives with wife in apartment.  Smoking status: denies  Drinking: denies  Drug Use: denies    Allergies  hay fever (Unknown)  No Known Drug Allergies    REVIEW OF SYSTEMS:          GENERAL: NAD, well-developed  HEAD:  Atraumatic, Normocephalic  EYES: EOMI,  conjunctiva and sclera clear  NECK: Supple, No JVD  CHEST/LUNG: Clear to auscultation bilaterally; No wheeze  HEART: Regular rate and rhythm; No murmurs, rubs, or gallops  ABDOMEN: Soft, Nontender, Nondistended; Bowel sounds present  EXTREMITIES:  2+ Peripheral Pulses, No clubbing, cyanosis, or edema  NEUROLOGY: facial droop +  SKIN: No rashes or lesions                          8.6    41.33 )-----------( 363      ( 27 Jul 2021 08:04 )             27.1                           9.3    32.49 )-----------( 379      ( 21 Jul 2021 08:05 )             29.6       07-21    142  |  109<H>  |  35<H>  ----------------------------<  81  4.4   |  19<L>  |  1.78<H>    Ca    8.9      21 Jul 2021 08:05  Phos  5.2     07-21  Mg     2.6     07-21    TPro  6.4  /  Alb  3.6  /  TBili  0.3  /  DBili  x   /  AST  21  /  ALT  13  /  AlkPhos  117  07-21      Magnesium, Serum: 2.6 mg/dL (07-21 @ 08:05)  Phosphorus Level, Serum: 5.2 mg/dL (07-21 @ 08:05)  Lactate Dehydrogenase, Serum: 224 U/L (07-20 @ 18:33)

## 2021-07-27 NOTE — PROGRESS NOTE ADULT - ASSESSMENT
89 M with a PMHx of seizures (on phenytoin at home) and HTN presented with new facial droop and slurring of speech. Admitted to stroke service where stroke work up was found to be negative. Transferred to [facility] for treatment of myasthenia gravis and work up of leukocytosis. Medicine consulted leukocytosis work up.

## 2021-07-27 NOTE — CHART NOTE - NSCHARTNOTEFT_GEN_A_CORE
Patient hypotensive 80s/50s in the setting of vomiting, possible aspiration, status post 1L NS. Differential includes sepsis given worsening WBC and hyperglycemic state vs less likely hypovolemia given NPO status. Spoke with the wife at bedside with regards to further steps including ICU level care. She endorsed that patient would want to be DNR/DNI but would be open to pressors, abx, fluids. Wishes were communicated to primary team.

## 2021-07-27 NOTE — PROGRESS NOTE ADULT - ATTENDING COMMENTS
I was physically present for the key portions of the evaluation and managemnent (E/M) service provided.  I agree with the above history, physical, and plan which I have reviewed and edited where appropriate, with the exceptions as per my note.    Pt seen and examined.    LLE strength is at least 5- in all groups, rest of exam is 5/5. Sensory exam - complete loss of VBS, PP, and temp on LLE. normal elsewhere.    Pt had episode of hypotension of unclear etiology, refractory, now in ICU. Also had hgb drop, ?dilutional?      AP: management as per icu team, will follow. Unclear etiology of reported LLE weakness, sensory change. No further neurological work up at this time given acute hypotension. will follow.

## 2021-07-27 NOTE — PROGRESS NOTE ADULT - ASSESSMENT
89 year old man with Debility, Dysphagia, Leukocytosis and encounter for palliative care.    ·	recommended Valium 1mg IV x1 for muscle cramps, wife refused as she felt symptoms were improving  ·	recommend Flexeril 5mg PO BID PRN for muscle cramps  ·	began discussion re: code status with patient's wife, ongoing discussion

## 2021-07-27 NOTE — PROGRESS NOTE ADULT - SUBJECTIVE AND OBJECTIVE BOX
TRANSFER FROM [facility] TO ICU    Hospital Course  Pt is a 89 y/o M DNR/DNI with PMHx of HTN, seizures, severe AS (LVEF 75%) presented with left facial droop and slurring of speech. Admitted to stroke service where stroke work up found to be negative. Transferred to [facility] for work up of leukocytosis secondary to CML/CMLL vs infection vs myasthenia gravis. Received 5/7 days of 1.5g IV unasyn for suspected aspiration pneumonia and completed 5 day course of IVIG on 7/25/21 for myasthenia gravis. Pt was NPO after failing multiple swallow trials. On 7/26/21 pt passed swallow eval and was started on clear liquid and mechanical soft diet. On 7/27/21 at 230PM patient became hypotensive 80/50s in the setting of vomiting, possibly due to aspiration pneumonia with increasing WBC count, hyperglycemia and lactate of 6.4. ICU consulted, pt received total of 2.5L of LR and started on zosyn 4.5g IV Q12hrs. MAP>65 following fluid resuscitation. Hgb post fluid resuscitation 5.4. T&S ordered with plan for emergency transfusion of 1 unit pRBCs. Pt transported to ICU.     TRANSFER FROM [facility] TO ICU    Hospital Course  Pt is a 89 y/o M DNR/DNI with PMHx of HTN, seizures, severe AS (LVEF 75%) presented with left facial droop and slurring of speech. Admitted to stroke service where stroke work up found to be negative. Transferred to [facility] for work up of leukocytosis secondary to CML/CMLL vs infection vs myasthenia gravis. Received 5/7 days of 1.5g IV unasyn for suspected aspiration pneumonia and completed 5 day course of IVIG on 7/25/21 for myasthenia gravis. Pt was NPO after failing multiple swallow trials. On 7/26/21 pt passed swallow eval and was started on clear liquid and mechanical soft diet. On 7/27/21 at 230PM patient became hypotensive 80/50s in the setting of vomiting, possibly due to aspiration pneumonia with increasing WBC count, hyperglycemia and lactate of 6.4. ICU consulted, pt received total of 2.5L of LR and started on zosyn 4.5g IV Q12hrs. MAP>65 following fluid resuscitation. Hgb post fluid resuscitation 5.4. T&S ordered with plan for emergency transfusion of 1 unit pRBCs. Pt transported to ICU.    **INCOMPLETE NOTE    OVERNIGHT EVENTS: NAEO    SUBJECTIVE:  Patient seen and examined at bedside. Complains of left upper thigh pain. Pt states lidocaine patch that was placed is helping.    Vital Signs Last 12 Hrs  T(F): 99.6 (07-27-21 @ 12:54), Max: 99.6 (07-27-21 @ 12:54)  HR: 100 (07-27-21 @ 15:05) (82 - 103)  BP: 118/58 (07-27-21 @ 15:05) (82/51 - 132/68)  BP(mean): --  RR: 18 (07-27-21 @ 15:05) (15 - 18)  SpO2: 99% (07-27-21 @ 15:05) (97% - 100%)  I&O's Summary      PHYSICAL EXAM:  GEN: Awake, comfortable. NAD  HEENT: NCAT, PERRL, EOMI. Mucosa dry.  NECK: Supple, no JVD.   RESP: b/l rhonchi  CV: RRR, normal s1/s2. No m/r/g.  ABD: Soft, NTND. BS+  EXT: Warm. No edema, clubbing, or cyanosis.   NEURO: AAOx3. No focal deficits.        LABS:                        5.4    50.49 )-----------( 308      ( 27 Jul 2021 15:51 )             16.6     07-27    136  |  106  |  44<H>  ----------------------------<  251<H>  4.9   |  18<L>  |  1.99<H>    Ca    8.7      27 Jul 2021 15:51  Phos  3.2     07-27  Mg     2.0     07-27    TPro  8.5<H>  /  Alb  3.5  /  TBili  0.3  /  DBili  x   /  AST  71<H>  /  ALT  51<H>  /  AlkPhos  69  07-27            RADIOLOGY & ADDITIONAL TESTS:    MEDICATIONS  (STANDING):  aspirin  chewable 81 milliGRAM(s) Oral every 24 hours  atorvastatin 40 milliGRAM(s) Oral at bedtime  heparin   Injectable 5000 Unit(s) SubCutaneous every 8 hours  levETIRAcetam 500 milliGRAM(s) Oral every 12 hours  piperacillin/tazobactam IVPB.. 4.5 Gram(s) IV Intermittent every 12 hours    MEDICATIONS  (PRN):  sodium chloride 3%  Inhalation 4 milliLiter(s) Inhalation every 6 hours PRN productive cough   TRANSFER FROM [facility] TO ICU    Hospital Course  Pt is a 89 y/o M DNR/DNI with PMHx of HTN, seizures, severe AS (LVEF 75%) presented with left facial droop and slurring of speech. Admitted to stroke service where stroke work up found to be negative. Transferred to [facility] for work up of leukocytosis secondary to CML/CMLL vs infection vs myasthenia gravis. Received 5/7 days of 1.5g IV unasyn for suspected aspiration pneumonia and completed 5 day course of IVIG on 7/25/21 for myasthenia gravis. Pt was NPO after failing multiple swallow trials. On 7/26/21 pt passed swallow eval and was started on clear liquid and mechanical soft diet. On 7/27/21 at 230PM patient became hypotensive 80/50s in the setting of vomiting, possibly due to aspiration pneumonia with increasing WBC count, hyperglycemia and lactate of 6.4. ICU consulted, pt received total of 2.5L of LR and started on zosyn 4.5g IV Q12hrs. MAP>65 following fluid resuscitation. Hgb post fluid resuscitation 5.4. T&S ordered with plan for emergency transfusion of 1 unit pRBCs. Pt transported to ICU.    OVERNIGHT EVENTS: NAEO    SUBJECTIVE:  Patient seen and examined at bedside. Complains of left upper thigh pain. Pt states lidocaine patch that was placed is helping.    Vital Signs Last 12 Hrs  T(F): 99.6 (07-27-21 @ 12:54), Max: 99.6 (07-27-21 @ 12:54)  HR: 100 (07-27-21 @ 15:05) (82 - 103)  BP: 118/58 (07-27-21 @ 15:05) (82/51 - 132/68)  BP(mean): --  RR: 18 (07-27-21 @ 15:05) (15 - 18)  SpO2: 99% (07-27-21 @ 15:05) (97% - 100%)  I&O's Summary        PHYSICAL EXAM:  General: thin elderly gentleman lying in bed in no acute distress, hard of hearing but able to carry out a conversation  HEENT: (+) hearing loss, NC/AT; EOMI, PERRLA, anicteric sclera; moist mucosal membranes. sinuses non tender to palpation   Neck: supple, trachea midline  Cardiovascular: (+) holostyolic murmur loudest at right 2nd ICS , RRR, +S1/S2  Respiratory: Mild transmitted upper airway sounds with good air movement, some ronchi in lower bases  Gastrointestinal: soft, NT/ND; +BSx4  Extremities: WWP; no edema or cyanosis  Vascular: 2+ radial, DP/PT pulses B/L  Neurological: AAOx3 (alert to name, place, and year); mild dysarthria.      LABS:                        5.4    50.49 )-----------( 308      ( 27 Jul 2021 15:51 )             16.6     07-27    136  |  106  |  44<H>  ----------------------------<  251<H>  4.9   |  18<L>  |  1.99<H>    Ca    8.7      27 Jul 2021 15:51  Phos  3.2     07-27  Mg     2.0     07-27    TPro  8.5<H>  /  Alb  3.5  /  TBili  0.3  /  DBili  x   /  AST  71<H>  /  ALT  51<H>  /  AlkPhos  69  07-27            RADIOLOGY & ADDITIONAL TESTS:    MEDICATIONS  (STANDING):  aspirin  chewable 81 milliGRAM(s) Oral every 24 hours  atorvastatin 40 milliGRAM(s) Oral at bedtime  heparin   Injectable 5000 Unit(s) SubCutaneous every 8 hours  levETIRAcetam 500 milliGRAM(s) Oral every 12 hours  piperacillin/tazobactam IVPB.. 4.5 Gram(s) IV Intermittent every 12 hours    MEDICATIONS  (PRN):  sodium chloride 3%  Inhalation 4 milliLiter(s) Inhalation every 6 hours PRN productive cough

## 2021-07-27 NOTE — PROGRESS NOTE ADULT - PROBLEM SELECTOR PLAN 3
WBC count trending up  - f/u work up for CML/CMML: Peripheral flow cytometry, BCR-ABL PCR, JAK2.   - d/c'd Unasyn  -pt started on zosyn as above

## 2021-07-27 NOTE — PROGRESS NOTE ADULT - PROBLEM SELECTOR PLAN 1
-S/P 5 days of IVIG 400mg/kg/day per neuro (premedication with 1g Tylenol IV and 25mg Benadryl IV)   -passed speech and swallow yesterday, on diet   -may require rituximab after IVIG (plan per neuro)  -F/U myasthenia gravis antibodies  -c/w hypertonic saline inhalation with suctioning

## 2021-07-27 NOTE — PROGRESS NOTE ADULT - SUBJECTIVE AND OBJECTIVE BOX
SUBJECTIVE / INTERVAL HPI: Patient seen and examined at bedside. Patient was started on diet yesterday. Denies any subjective complaints of aspiration, SOB, dyspnea, abd pain, N/V/D, urinary symptoms, rash.     VITAL SIGNS:  Vital Signs Last 24 Hrs  T(C): 36.7 (27 Jul 2021 08:51), Max: 36.7 (27 Jul 2021 05:14)  T(F): 98.1 (27 Jul 2021 08:51), Max: 98.1 (27 Jul 2021 08:51)  HR: 82 (27 Jul 2021 08:51) (68 - 99)  BP: 132/68 (27 Jul 2021 08:51) (109/70 - 132/68)  BP(mean): 91 (26 Jul 2021 15:55) (91 - 91)  RR: 18 (27 Jul 2021 08:51) (18 - 18)  SpO2: 100% (27 Jul 2021 08:51) (95% - 100%)    PHYSICAL EXAM:    GEN: Awake, comfortable. NAD.   HEENT: NCAT, PERRL, EOMI. Mucosa dry.  NECK: Supple, no JVD.   RESP: b/l rhonchi  CV: RRR, normal s1/s2. No m/r/g.  ABD: Soft, NTND. BS+  EXT: Warm. No edema, clubbing, or cyanosis.   NEURO: AAOx3. No focal deficits. Mild numbness to sharp touch on right lateral thigh on RLE.     MEDICATIONS:  MEDICATIONS  (STANDING):  aspirin  chewable 81 milliGRAM(s) Oral every 24 hours  atorvastatin 40 milliGRAM(s) Oral at bedtime  heparin   Injectable 5000 Unit(s) SubCutaneous every 8 hours  levETIRAcetam 500 milliGRAM(s) Oral every 12 hours    MEDICATIONS  (PRN):  sodium chloride 3%  Inhalation 4 milliLiter(s) Inhalation every 6 hours PRN productive cough      ALLERGIES:  Allergies    hay fever (Unknown)  No Known Drug Allergies    Intolerances        LABS:                        8.6    41.33 )-----------( 363      ( 27 Jul 2021 08:04 )             27.1     07-27    139  |  107  |  39<H>  ----------------------------<  129<H>  4.8   |  21<L>  |  1.82<H>    Ca    9.2      27 Jul 2021 08:04  Phos  3.2     07-27  Mg     2.0     07-27    TPro  8.5<H>  /  Alb  3.5  /  TBili  0.3  /  DBili  x   /  AST  71<H>  /  ALT  51<H>  /  AlkPhos  69  07-27        CAPILLARY BLOOD GLUCOSE      POCT Blood Glucose.: 123 mg/dL (27 Jul 2021 06:40)      RADIOLOGY & ADDITIONAL TESTS: Reviewed.    ASSESSMENT:    PLAN:

## 2021-07-27 NOTE — PROGRESS NOTE ADULT - PROBLEM SELECTOR PLAN 1
Pt hypotensive to 80s/50s and tachycardic 100s in setting of possible aspiration pneumonia (pt recently passed speech and swallow eval) with elevated lactate of 6.4, and WBC count elevation  - Started on zosyn 4.5g IV Q12hrs  -MAP>65 s/p receiving 2.5L LR  -Transferred to ICU for further management

## 2021-07-27 NOTE — PROGRESS NOTE ADULT - SUBJECTIVE AND OBJECTIVE BOX
Full Note to Follow.    Complaining of L thigh muscle cramp for several hours.  Ordered Valium 1mg IV x1 and Flexeril 5mg PO BID PRN.   Will return this afternoon to address code status one more time with patient's wife in attendance. Full Note to Follow.    Complaining of L thigh muscle cramp for several hours.  Ordered Valium 1mg IV x1 and Flexeril 5mg PO BID PRN.   Will return this afternoon to address code status one more time with patient's wife in attendance.    [facility] Pall[facility]  Eliazar Tristan, Palliative Care Attending  Contact Info: Call [phone] (including Nights/Weekend) or message on Microsoft Teams (Eliazar Tristan)    SUBJECTIVE AND OBJECTIVE:  INTERVAL HPI/OVERNIGHT EVENTS: No acute events overnight. Interval events noted. Patient required PRNs of  in past 24hrs. No unexpected adverse effects of opiates noted: no sedation/dizziness/nausea.    ALLERGIES:  hay fever (Unknown)  No Known Drug Allergies    MEDICATIONS  (STANDING):  aspirin  chewable 81 milliGRAM(s) Oral every 24 hours  atorvastatin 40 milliGRAM(s) Oral at bedtime  heparin   Injectable 5000 Unit(s) SubCutaneous every 8 hours  levETIRAcetam 500 milliGRAM(s) Oral every 12 hours  vancomycin  IVPB 1000 milliGRAM(s) IV Intermittent once    MEDICATIONS  (PRN):  sodium chloride 3%  Inhalation 4 milliLiter(s) Inhalation every 6 hours PRN productive cough      ITEMS UNCHECKED ARE NOT PRESENT    PRESENT SYMPTOMS: []Unable to obtain due to poor mentation   Source if other than patient:  []Family   []Team         Vital Signs Last 24 Hrs  T(C): 37.6 (27 Jul 2021 12:54), Max: 37.6 (27 Jul 2021 12:54)  T(F): 99.6 (27 Jul 2021 12:54), Max: 99.6 (27 Jul 2021 12:54)  HR: 100 (27 Jul 2021 15:05) (68 - 103)  BP: 118/58 (27 Jul 2021 15:05) (82/51 - 132/68)  BP(mean): --  RR: 18 (27 Jul 2021 15:05) (15 - 18)  SpO2: 99% (27 Jul 2021 15:05) (95% - 100%) I&O's Summary      LABS:                         8.6    41.33 )-----------( 363      ( 27 Jul 2021 08:04 )             27.1   07-27    139  |  107  |  39<H>  ----------------------------<  129<H>  4.8   |  21<L>  |  1.82<H>    Ca    9.2      27 Jul 2021 08:04  Phos  3.2     07-27  Mg     2.0     07-27    TPro  8.5<H>  /  Alb  3.5  /  TBili  0.3  /  DBili  x   /  AST  71<H>  /  ALT  51<H>  /  AlkPhos  69  07-27      RADIOLOGY & ADDITIONAL STUDIES: None new    PROTEIN CALORIE MALNUTRITION PRESENT: [ ]mild [ ]moderate [ ]severe [ ]underweight [ ]morbid obesity  [] PPSV2 < or = 30% [] significant weight loss [] poor nutritional intake [] anasarca [] catabolic state    Artificial Nutrition []     REFERRALS:  [x]Social Work  []Case management []PT/OT []Chaplaincy  []Hospice  []Patient/Family Support    Goals of Care Document:   Care Coordination Document: Progress Notes - Care Coordination                                       Progress Notes    PROGRESS NOTE  Date & Time of Note   2021-07-27 01:02    Notes    Notes: As per medical team, patient anticipated to be stable for discharge this  week.  spoke with patient's wife Jamila (phone:[phone])  confirmed in agreement with [place] as first choice and then [facility]. Patient  medically accepted as per Rachael Amanda at [facility], pending auth.   in collaboarion  with [facility] to start authorization with [place].       Electronically signed by:  Rachel Luna  Electronically signed on:  2021-07-27  13:04       [facility] and Palliative [facility]  Eliazar Tristan, Palliative Care Attending  Contact Info: Call [phone] (including Nights/Weekend) or message on Microsoft Teams (Eliazar Tristan)    SUBJECTIVE AND OBJECTIVE:  INTERVAL HPI/OVERNIGHT EVENTS: Interval events noted. Complaining of L thigh muscle cramp for several hours. Will return this afternoon to address code status one more time with patient's wife in attendance. Patient required no additional PRNs in past 24hrs.    ALLERGIES:  hay fever (Unknown)  No Known Drug Allergies    MEDICATIONS  (STANDING):  aspirin  chewable 81 milliGRAM(s) Oral every 24 hours  atorvastatin 40 milliGRAM(s) Oral at bedtime  heparin   Injectable 5000 Unit(s) SubCutaneous every 8 hours  levETIRAcetam 500 milliGRAM(s) Oral every 12 hours  vancomycin  IVPB 1000 milliGRAM(s) IV Intermittent once    MEDICATIONS  (PRN):  sodium chloride 3%  Inhalation 4 milliLiter(s) Inhalation every 6 hours PRN productive cough      ITEMS UNCHECKED ARE NOT PRESENT    PRESENT SYMPTOMS: []Unable to obtain due to poor mentation   Source if other than patient:  []Family   []Team     Pain: [] yes [x] no  QOL impact -   Location -                    Aggravating factors -  Quality -  Radiation -  Timing -  Severity (0-10 scale) -  Minimal acceptable level (0-10 scale) -    PAIN AD Score:  http://[email or web address]/[email or web address] (press ctrl +  left click to view)    Dyspnea:                           []Mild  []Moderate []Severe  Anxiety:                             []Mild []Moderate []Severe  Fatigue:                             []Mild []Moderate []Severe  Nausea:                             []Mild []Moderate []Severe  Loss of appetite:              []Mild []Moderate []Severe  Constipation:                    []Mild []Moderate []Severe    Other Symptoms: +muscle cramps  [x]All other review of systems negative     Palliative Performance Status Version 2: 50%    Vital Signs Last 24 Hrs  T(C): 37.6 (27 Jul 2021 12:54), Max: 37.6 (27 Jul 2021 12:54)  T(F): 99.6 (27 Jul 2021 12:54), Max: 99.6 (27 Jul 2021 12:54)  HR: 100 (27 Jul 2021 15:05) (68 - 103)  BP: 118/58 (27 Jul 2021 15:05) (82/51 - 132/68)  BP(mean): --  RR: 18 (27 Jul 2021 15:05) (15 - 18)  SpO2: 99% (27 Jul 2021 15:05) (95% - 100%) I&O's Summary    GENERAL:  [x]Alert  [x]Oriented x3   []Lethargic  []Cachexia  []Unarousable  [x]Verbal  []Non-Verbal  Behavioral:   [] Anxiety  [] Delirium [] Agitation [] Other  HEENT:  [x]Normal   []Dry mouth   []ET Tube/Trach  []Oral lesions  PULMONARY:   [x]Clear []Tachypnea  []Audible excessive secretions   []Rhonchi        []Right []Left []Bilateral  []Crackles        []Right []Left []Bilateral  []Wheezing     []Right []Left []Bilateral  CARDIOVASCULAR:    [x]Regular []Irregular []Tachy  []Anatoliy []Murmur []Other  GASTROINTESTINAL:  [x]Soft  [x]Distended   []+BS  []Non tender []Tender  []PEG []OGT/ NGT  Last BM:  GENITOURINARY:  [x]Normal [] Incontinent   []Oliguria/Anuria   []Cruz  MUSCULOSKELETAL:   []Normal   [x]Weakness  []Bed/Wheelchair bound []Edema  NEUROLOGIC:   []No focal deficits  [] Cognitive impairment  [x] Dysphagia []Dysarthria [] Paresis []Other   SKIN:   [x]Normal   []Pressure ulcer(s)  []Rash    CRITICAL CARE:  [ ] Shock Present  [ ]Septic [ ]Cardiogenic [ ]Neurologic [ ]Hypovolemic  [ ]  Vasopressors [ ]  Inotropes   [ ] Respiratory failure present [ ] Mechanical Ventilation [ ] Non-invasive ventilatory support [ ] High-Flow  [ ] Acute  [ ] Chronic [ ] Hypoxic  [ ] Hypercarbic [ ] Other  [ ] Other organ failure      LABS:                         8.6    41.33 )-----------( 363      ( 27 Jul 2021 08:04 )             27.1   07-27    139  |  107  |  39<H>  ----------------------------<  129<H>  4.8   |  21<L>  |  1.82<H>    Ca    9.2      27 Jul 2021 08:04  Phos  3.2     07-27  Mg     2.0     07-27    TPro  8.5<H>  /  Alb  3.5  /  TBili  0.3  /  DBili  x   /  AST  71<H>  /  ALT  51<H>  /  AlkPhos  69  07-27      RADIOLOGY & ADDITIONAL STUDIES: None new    PROTEIN CALORIE MALNUTRITION PRESENT: [ ]mild [ ]moderate [ ]severe [ ]underweight [ ]morbid obesity  [] PPSV2 < or = 30% [] significant weight loss [] poor nutritional intake [] anasarca [] catabolic state    Artificial Nutrition []     REFERRALS:  [x]Social Work  []Case management []PT/OT []Chaplaincy  []Hospice  []Patient/Family Support    Goals of Care Document:   Care Coordination Document: Progress Notes - Care Coordination                                       Progress Notes    PROGRESS NOTE  Date & Time of Note   2021-07-27 01:02    Notes    Notes: As per medical team, patient anticipated to be stable for discharge this  week.  spoke with patient's wife Jamila (phone:[phone])  confirmed in agreement with [facility] as first choice and then [facility]. Patient  medically accepted as per Rachael Amanda at [facility], pending auth.   in collaboarion  with [facility] to start authorization with [place].       Electronically signed by:  Rachel Luna  Electronically signed on:  2021-07-27  13:04

## 2021-07-27 NOTE — PROGRESS NOTE ADULT - PROBLEM SELECTOR PLAN 6
holding home norvasc- benazapril for permissive htn   -per neuro goal -140  -if pressure persistently >140 would initiate norvasc first given renal fx Asymptomatic. RUSB murmur on exam   - No interest in invasive procedure (per wife)  -LV EF 75%

## 2021-07-27 NOTE — PROGRESS NOTE ADULT - ASSESSMENT
89 M with a PMHx of seizures (on phenytoin at home) and HTN presented with new facial droop and slurring of speech. Admitted to stroke service where stroke work up was found to be negative. Transferred to [facility] for treatment of myasthenia gravis and work up of leukocytosis. Hypotensive 80s/50s. Received 2.5L LR, started on zosyn 4.5mg IV q12hrs and transferred to ICU for monitoring.      {[id number]}CARDIOVASCULAR  #Shock   -septic vs hypovolemic vs cardiogenic   - cool clammy extremities, poor UOP, increasing Cr likely pt volume depleted however eitology is unclear at this time. Pt has a white count which is being worked up by heme/onc for possible CML however does not have a rectal temperature or any consolidation seen on CXR. Blood pressure improved from 80/50 s/p fluid resuscitation however pocus showed b/l b lines suggestive of pulmonary edema and given patient echo showing mod-severe aortic stenosis also possible that cardiogenic shock/critical aortic stenosis is possible. His hemoglobin has been around 8-9 this hospital stay and he has not had any S/S of bleeding, his hemoglobin downtrended to 5.4 after fluid resuscitation.   - 2 type and screens sent, emergency 1u prbcs ordered and blood bank aware  - would hold off on additional fluid at this time given pocus showing pulmonary edema   - frequent lung checks during blood transfusion  - f/u echo  - f/u  Blood culture, UA  - trend lactate to clear   - s/p Vanc + Zosyn, c/w epimeric dosing      {[id number]}NEURO  # Myasthenia gravis.    - completed 5 day course of IVIG  - may require rituximab after IVIG (plan per neuro)  - F/U myasthenia gravis antibodies.     ·  Problem: R/O Stroke.  Plan: - continue aspirin 81mg PO  - Continue atorvastatin 40 mg daily PO  - Continue to hold plavix 75mg daily.     RESP    {[id number]}HEME  Leukocytosis, unspecified type  WBC count trending up to 50K today  - f/u work up for CML/CMML: Peripheral flow cytometry, BCR-ABL PCR, JAK2.   - d/c'd Unasyn  -pt started on zosyn as above.     {[id number]} Problem/Plan - 4:      {[id number]} Problem/Plan - 5:  ·  Problem: Seizure.  Plan: - phenytoin level 4.1 (7/18/21)  - continue to hold home phenytoin 100mg TID  - CW Keppra 500 mg q 12h.     {[id number]} Problem/Plan - 6:  Problem: Severe aortic stenosis. Plan: Asymptomatic. RUSB murmur on exam   - No interest in invasive procedure (per wife)  -LV EF 75%.    {[id number]} Problem/Plan - 7:  ·  Problem: Hypertension.  Plan: holding home norvasc- benazapril for permissive htn   -per neuro goal -140  -if pressure persistently >140 would initiate norvasc first given renal fx.     {[id number]} Problem/Plan - 8:  ·  Problem: Anemia.  Plan: -Hgb of 5.4 s/p 2.5L LR  - transferred to ICU with plan to transfuse 1Unit PRBC    #iron deficieny anemia  -monitor daily CBCs.     {[id number]} Problem/Plan - 9:  ·  Problem: Nutrition, metabolism, and development symptoms.  Plan: F: none  E: replete K<4 and Mg <2  N: Dysphagia Diet  D: heparin sq and SCDs for DVT prophylaxis.      89 M with a PMHx of seizures (on phenytoin at home) and HTN presented with new facial droop and slurring of speech. Admitted to stroke service where stroke work up was found to be negative. Transferred to [facility] for treatment of myasthenia gravis and work up of leukocytosis. Hypotensive 80s/50s. Received 2.5L LR, started on zosyn 4.5mg IV q12hrs and transferred to ICU for monitoring.      NEURO  # Myasthenia gravis.    - completed 5 day course of IVIG  - may require rituximab after IVIG (plan per neuro)  - F/U myasthenia gravis antibodies.     #R/O Stroke  - continue aspirin 81mg PO  - Continue atorvastatin 40 mg daily PO  - Continue to hold plavix 75mg daily.     #Seizure.    - phenytoin level 4.1 (7/18/21)  - continue to hold home phenytoin 100mg TID  - CW Keppra 500 mg q 12h.     RESP      {[id number]}CARDIOVASCULAR  #Shock   -septic vs hypovolemic vs cardiogenic   - cool clammy extremities, poor UOP, increasing Cr likely pt volume depleted however eitology is unclear at this time. Pt has a white count which is being worked up by heme/onc for possible CML however does not have a rectal temperature or any consolidation seen on CXR. Blood pressure improved from 80/50 s/p fluid resuscitation however pocus showed b/l b lines suggestive of pulmonary edema and given patient echo showing mod-severe aortic stenosis also possible that cardiogenic shock/critical aortic stenosis is possible. His hemoglobin has been around 8-9 this hospital stay and he has not had any S/S of bleeding, his hemoglobin downtrended to 5.4 after fluid resuscitation.   - 2 type and screens sent, emergency 1u prbcs ordered and blood bank aware  - would hold off on additional fluid at this time given pocus showing pulmonary edema   - frequent lung checks during blood transfusion  - f/u echo  - f/u  Blood culture, UA  - trend lactate to clear   - s/p Vanc + Zosyn, c/w epimeric dosing  {[id number]}    # Severe aortic stenosis  Asymptomatic. RUSB murmur on exam   - No interest in invasive procedure (per wife)  -LV EF 75%.    {[id number]}HEME  Leukocytosis, unspecified type  WBC count trending up to 50K today  - f/u work up for CML/CMML: Peripheral flow cytometry, BCR-ABL PCR, JAK2.   - d/c'd Unasyn  -pt started on zosyn as above.     {[id number]}  {[id number]} Problem/Plan - 6:  Problem: Severe aortic stenosis. Plan: Asymptomatic. RUSB murmur on exam   - No interest in invasive procedure (per wife)  -LV EF 75%.    {[id number]} Problem/Plan - 7:  ·  Problem: Hypertension.  Plan: holding home norvasc- benazapril for permissive htn   -per neuro goal -140  -if pressure persistently >140 would initiate norvasc first given renal fx.     {[id number]} Problem/Plan - 8:  ·  Problem: Anemia.  Plan: -Hgb of 5.4 s/p 2.5L LR  - transferred to ICU with plan to transfuse 1Unit PRBC    #iron deficieny anemia  -monitor daily CBCs.     {[id number]} Problem/Plan - 9:  ·  Problem: Nutrition, metabolism, and development symptoms.  Plan: F: none  E: replete K<4 and Mg <2  N: Dysphagia Diet  D: heparin sq and SCDs for DVT prophylaxis.      89 M with a PMHx of seizures (on phenytoin at home) and HTN presented with new facial droop and slurring of speech. Admitted to stroke service where stroke work up was found to be negative. Transferred to [facility] for treatment of myasthenia gravis and work up of leukocytosis. Hypotensive 80s/50s. Received 2.5L LR, started on zosyn 4.5mg IV q12hrs and transferred to ICU for monitoring.      NEURO  # Myasthenia gravis.    - completed 5 day course of IVIG  - may require rituximab after IVIG (plan per neuro)  - F/U myasthenia gravis antibodies.     #R/O Stroke  - continue aspirin 81mg PO  - Continue atorvastatin 40 mg daily PO  - Continue to hold plavix 75mg daily.     #Seizure.    - phenytoin level 4.1 (7/18/21)  - continue to hold home phenytoin 100mg TID  - CW Keppra 500 mg q 12h.     RESP      {[id number]}CARDIOVASCULAR  #Shock   -septic vs hypovolemic vs cardiogenic   - cool clammy extremities, poor UOP, increasing Cr likely pt volume depleted however eitology is unclear at this time. Pt has a white count which is being worked up by heme/onc for possible CML however does not have a rectal temperature or any consolidation seen on CXR. Blood pressure improved from 80/50 s/p fluid resuscitation however pocus showed b/l b lines suggestive of pulmonary edema and given patient echo showing mod-severe aortic stenosis also possible that cardiogenic shock/critical aortic stenosis is possible. His hemoglobin has been around 8-9 this hospital stay and he has not had any S/S of bleeding, his hemoglobin downtrended to 5.4 after fluid resuscitation.   - 2 type and screens sent, emergency 1u prbcs ordered and blood bank aware  - would hold off on additional fluid at this time given pocus showing pulmonary edema   - frequent lung checks during blood transfusion  - f/u echo  - f/u  Blood culture, UA  - trend lactate to clear   - s/p Vanc + Zosyn, c/w epimeric dosing  {[id number]}    # Severe aortic stenosis  Asymptomatic. RUSB murmur on exam   - No interest in invasive procedure (per wife)  -LV EF 75%.     Problem/Plan - 7:  ·  Problem: Hypertension.  Plan: holding home norvasc- benazapril for permissive htn   -per neuro goal -140  -if pressure persistently >140 would initiate norvasc first given renal fx.     {[id number]}HEME  Leukocytosis, unspecified type  WBC count trending up to 50K today  - f/u work up for CML/CMML: Peripheral flow cytometry, BCR-ABL PCR, JAK2.   - d/c'd Unasyn  -pt started on zosyn as above. {[id number]}  {[id number]}    {[id number]}    {[id number]} Problem/Plan - 8:  ·  Problem: Anemia.  Plan: -Hgb of 5.4 s/p 2.5L LR  - transferred to ICU with plan to transfuse 1Unit PRBC    #iron deficieny anemia  -monitor daily CBCs.     {[id number]} Problem/Plan - 9:  ·  Problem: Nutrition, metabolism, and development symptoms.  Plan: F: none  E: replete K<4 and Mg <2  N: Dysphagia Diet  D: heparin sq and SCDs for DVT prophylaxis.      89 M with a PMHx of seizures (on phenytoin at home) and HTN presented with new facial droop and slurring of speech. Admitted to stroke service where stroke work up was found to be negative. Transferred to [facility] for treatment of myasthenia gravis and work up of leukocytosis. Hypotensive 80s/50s. Received 2.5L LR, started on zosyn 4.5mg IV q12hrs and transferred to ICU for monitoring.      NEURO  #Mental status  patient alert and oriented but appears sleepy    # Myasthenia gravis.    - completed 5 day course of IVIG  - may require rituximab after IVIG (plan per neuro)  - F/U myasthenia gravis antibodies.     #R/O Stroke  - continue aspirin 81mg PO  - Continue atorvastatin 40 mg daily PO  - Continue to hold plavix 75mg daily.     #Seizure.    - phenytoin level 4.1 (7/18/21)  - continue to hold home phenytoin 100mg TID  - CW Keppra 500 mg q 12h.     RESP  Saturating >90% on 2LNC, no increased work of breathing    {[id number]}CARDIOVASCULAR  #Shock   -septic vs hypovolemic vs cardiogenic   - cool clammy extremities, poor UOP, increasing Cr likely pt volume depleted however eitology is unclear at this time. Pt has a white count which is being worked up by heme/onc for possible CML however does not have a rectal temperature or any consolidation seen on CXR. Blood pressure improved from 80/50 s/p fluid resuscitation however pocus showed b/l b lines suggestive of pulmonary edema and given patient echo showing mod-severe aortic stenosis also possible that cardiogenic shock/critical aortic stenosis is possible. His hemoglobin has been around 8-9 this hospital stay and he has not had any S/S of bleeding, his hemoglobin downtrended to 5.4 after fluid resuscitation.   - 2 type and screens sent, emergency 1u prbcs ordered and blood bank aware  - would hold off on additional fluid at this time given pocus showing pulmonary edema   - frequent lung checks during blood transfusion  - f/u echo  - f/u  Blood culture, UA  - trend lactate to clear   - s/p Vanc + Zosyn, c/w epimeric dosing  {[id number]}    # Severe aortic stenosis  Asymptomatic. RUSB murmur on exam   - No interest in invasive procedure (per wife)  -LV EF 75%.    #Hypertension  Patient with hx of hypertension, holding home norvasc- benazapril in setting of shock and permissive hypertension  -per neuro goal -140  -if pressure persistently >140 would initiate norvasc first given renal fx.     {[id number]}HEME  #Leukocytosis, unspecified type  WBC count trending up to 50K today  - f/u work up for CML/CMML: Peripheral flow cytometry, BCR-ABL PCR, JAK2.   - d/c'd Unasyn  -pt started on zosyn as above. {[id number]}  {[id number]}  #Anemia.  Plan: -Hgb of 5.4 s/p 2.5L LR  - transferred to ICU with plan to transfuse 1Unit PRBC    #Iron deficieny anemia  -monitor daily CBCs.   {[id number]}  Plan: F: none  E: replete K<4 and Mg <2  N: NPO  D: heparin sq and SCDs for DVT prophylaxis.

## 2021-07-27 NOTE — PROGRESS NOTE ADULT - PROBLEM SELECTOR PLAN 6
Complex medical decision making / symptom management in the setting of advanced illness.    Will continue to follow for ongoing GOC discussion / titration of palliative regimen.   Emotional support provided, questions answered.  Active Psychosocial Referrals: SW    For new or uncontrolled symptoms, please call Palliative Care at [phone]-[facility]. The service is available 24/7 (including nights & weekends) to provide symptom management recommendations over the phone as appropriate

## 2021-07-27 NOTE — CONSULT NOTE ADULT - ATTENDING COMMENTS
Patient with hypotension and lactic acidosis with increasing WBC c/w septic shock but multiple B lines after 2liters of fluid and now severe anemia. Concern is he may not be able to handle the fluid with severe AS and developing ATN from hypoperfusion. He is DNR/DNI but family would like to give trial of pressors if needed and see if he responds to antibiotics which have been broadened as above. To transfer to MICU

## 2021-07-27 NOTE — CONSULT NOTE ADULT - ASSESSMENT
89M DNR/DNI w PMH of HTN, seizures, severe AS (LVEF 75%) admitted on7/18 to neurology service with left facial droop and slurring of speech. Stroke work up found to be negative and transferred to [facility] for work up of leukocytosis secondary to ?CML/CML vs infection vs MG. Received 5/7 days of 1.5g IV unasyn for suspected aspiration pneumonia and completed 5 day course of IVIG on 7/25/21 for myasthenia gravis. Today patient became hypotensive 80/50s in the setting of vomiting, possible aspiration event and with increasing WBC count, lactate of 6.4. ICU consulted, pt received total of 2.5L of LR and started on Vanc + Zosyn. Hgb post fluid resuscitation 5.4 with no overt S/S of bleeding, emergency transfusion of 1 unit to be given. Patient transferred to ICU for further care.     #Shock   -septic vs hypovolemic vs cardiogenic   - cool clammy extremities, poor UOP, increasing Cr likely pt volume depleted however eitology is unclear at this time. Pt has a white count which is being worked up by heme/onc for possible CML however does not have a rectal temperature or any consolidation seen on CXR. Blood pressure improved from 80/50 s/p fluid resuscitation however pocus showed b/l b lines suggestive of pulmonary edema and given patient echo showing mod-severe aortic stenosis also possible that cardiogenic shock/critical aortic stenosis is possible. His hemoglobin has been around 8-9 this hospital stay and he has not had any S/S of bleeding, his hemoglobin downtrended to 5.4 after fluid resuscitation.   - 2 type and screens sent, emergency 1u prbcs ordered and blood bank aware  - would hold off on additional fluid at this time given pocus showing pulmonary edema   - frequent lung checks during blood transfusion  - consult cardiology, obtain echo   - f/u  Blood culture, UA  - s/p Vanc + Zosyn, c/w epimeric dosing    - transfer to ICU under Dr Domingo, primary team aware     Code status: DNR/DNI, pressors and antibiotics allowed  DVT ppx: hold in setting of hgb 5.4   IVF: s/p 2L NS, hold in setting of pulmonary edema   Dispo: 7 ea    89M DNR/DNI w PMH of HTN, seizures, severe AS (LVEF 75%) admitted on7/18 to neurology service with left facial droop and slurring of speech. Stroke work up found to be negative and transferred to [facility] for work up of leukocytosis secondary to ?CML/CML vs infection vs MG. Received 5/7 days of 1.5g IV unasyn for suspected aspiration pneumonia and completed 5 day course of IVIG on 7/25/21 for myasthenia gravis. Today patient became hypotensive 80/50s in the setting of vomiting, possible aspiration event and with increasing WBC count, lactate of 6.4. ICU consulted, pt received total of 2.5L of LR and started on Vanc + Zosyn. Hgb post fluid resuscitation 5.4 with no overt S/S of bleeding, emergency transfusion of 1 unit to be given. Patient transferred to ICU for further care.     #Shock   -septic vs hypovolemic vs cardiogenic   - cool clammy extremities, poor UOP, increasing Cr likely pt volume depleted however eitology is unclear at this time. Pt has a white count which is being worked up by heme/onc for possible CML however does not have a rectal temperature or any consolidation seen on CXR. Blood pressure improved from 80/50 s/p fluid resuscitation however pocus showed b/l b lines suggestive of pulmonary edema and given patient echo showing mod-severe aortic stenosis also possible that cardiogenic shock/critical aortic stenosis is possible. His hemoglobin has been around 8-9 this hospital stay and he has not had any S/S of bleeding, his hemoglobin downtrended to 5.4 after fluid resuscitation.   - 2 type and screens sent, emergency 1u prbcs ordered and blood bank aware  - would hold off on additional fluid at this time given pocus showing pulmonary edema   - frequent lung checks during blood transfusion  - consult cardiology, obtain echo   - f/u  Blood culture, UA  - trend lactate to clear   - s/p Vanc + Zosyn, c/w epimeric dosing    - accepted ICU under Dr Domingo, primary team aware     Code status: DNR/DNI, pressors and antibiotics allowed  DVT ppx: hold in setting of hgb 5.4   IVF: s/p 2L NS, hold in setting of pulmonary edema   Dispo: 7 ea

## 2021-07-27 NOTE — PROGRESS NOTE ADULT - PROBLEM SELECTOR PLAN 5
Asymptomatic. RUSB murmur on exam   - No interest in invasive procedure (per wife)  -LV EF 75% - phenytoin level 4.1 (7/18/21)  - continue to hold home phenytoin 100mg TID  -  Keppra 500 mg q 12h

## 2021-07-27 NOTE — PROGRESS NOTE ADULT - PROBLEM SELECTOR PLAN 8
F: stop D5  E: replete K<4 and Mg <2  N: NPO Tubefeeds with Jevity 1.2  D: heparin sq and SCDs for DVT prophylaxis -likely iron deficiency anemia  - H&H Stable  -will continue to monitor daily CBCs

## 2021-07-28 LAB
ACANTHOCYTES BLD QL SMEAR: SLIGHT — SIGNIFICANT CHANGE UP
ALBUMIN SERPL ELPH-MCNC: 2.9 G/DL — LOW (ref 3.3–5)
ALBUMIN SERPL ELPH-MCNC: 3 G/DL — LOW (ref 3.3–5)
ALBUMIN SERPL ELPH-MCNC: 3.1 G/DL — LOW (ref 3.3–5)
ALP SERPL-CCNC: 52 U/L — SIGNIFICANT CHANGE UP (ref 40–120)
ALP SERPL-CCNC: 52 U/L — SIGNIFICANT CHANGE UP (ref 40–120)
ALP SERPL-CCNC: 54 U/L — SIGNIFICANT CHANGE UP (ref 40–120)
ALT FLD-CCNC: 50 U/L — HIGH (ref 10–45)
ALT FLD-CCNC: 68 U/L — HIGH (ref 10–45)
ALT FLD-CCNC: 70 U/L — HIGH (ref 10–45)
ANION GAP SERPL CALC-SCNC: 11 MMOL/L — SIGNIFICANT CHANGE UP (ref 5–17)
ANION GAP SERPL CALC-SCNC: 14 MMOL/L — SIGNIFICANT CHANGE UP (ref 5–17)
ANION GAP SERPL CALC-SCNC: 16 MMOL/L — SIGNIFICANT CHANGE UP (ref 5–17)
ANISOCYTOSIS BLD QL: SIGNIFICANT CHANGE UP
ANISOCYTOSIS BLD QL: SIGNIFICANT CHANGE UP
ANISOCYTOSIS BLD QL: SLIGHT — SIGNIFICANT CHANGE UP
APTT BLD: 37.3 SEC — HIGH (ref 27.5–35.5)
AST SERPL-CCNC: 107 U/L — HIGH (ref 10–40)
AST SERPL-CCNC: 123 U/L — HIGH (ref 10–40)
AST SERPL-CCNC: 94 U/L — HIGH (ref 10–40)
BASOPHILS # BLD AUTO: 0 K/UL — SIGNIFICANT CHANGE UP (ref 0–0.2)
BASOPHILS # BLD AUTO: 0 K/UL — SIGNIFICANT CHANGE UP (ref 0–0.2)
BASOPHILS # BLD AUTO: 2.15 K/UL — HIGH (ref 0–0.2)
BASOPHILS NFR BLD AUTO: 0 % — SIGNIFICANT CHANGE UP (ref 0–2)
BASOPHILS NFR BLD AUTO: 0 % — SIGNIFICANT CHANGE UP (ref 0–2)
BASOPHILS NFR BLD AUTO: 2.6 % — HIGH (ref 0–2)
BILIRUB SERPL-MCNC: 0.4 MG/DL — SIGNIFICANT CHANGE UP (ref 0.2–1.2)
BILIRUB SERPL-MCNC: 0.5 MG/DL — SIGNIFICANT CHANGE UP (ref 0.2–1.2)
BILIRUB SERPL-MCNC: 0.5 MG/DL — SIGNIFICANT CHANGE UP (ref 0.2–1.2)
BUN SERPL-MCNC: 51 MG/DL — HIGH (ref 7–23)
BUN SERPL-MCNC: 59 MG/DL — HIGH (ref 7–23)
BUN SERPL-MCNC: 62 MG/DL — HIGH (ref 7–23)
BURR CELLS BLD QL SMEAR: PRESENT — SIGNIFICANT CHANGE UP
CALCIUM SERPL-MCNC: 8.8 MG/DL — SIGNIFICANT CHANGE UP (ref 8.4–10.5)
CALCIUM SERPL-MCNC: 8.8 MG/DL — SIGNIFICANT CHANGE UP (ref 8.4–10.5)
CALCIUM SERPL-MCNC: 9 MG/DL — SIGNIFICANT CHANGE UP (ref 8.4–10.5)
CHLORIDE SERPL-SCNC: 102 MMOL/L — SIGNIFICANT CHANGE UP (ref 96–108)
CHLORIDE SERPL-SCNC: 102 MMOL/L — SIGNIFICANT CHANGE UP (ref 96–108)
CHLORIDE SERPL-SCNC: 106 MMOL/L — SIGNIFICANT CHANGE UP (ref 96–108)
CO2 SERPL-SCNC: 17 MMOL/L — LOW (ref 22–31)
CO2 SERPL-SCNC: 19 MMOL/L — LOW (ref 22–31)
CO2 SERPL-SCNC: 21 MMOL/L — LOW (ref 22–31)
CORTIS AM PEAK SERPL-MCNC: 41.77 UG/DL — HIGH (ref 6.02–18.4)
CREAT SERPL-MCNC: 2.61 MG/DL — HIGH (ref 0.5–1.3)
CREAT SERPL-MCNC: 3.5 MG/DL — HIGH (ref 0.5–1.3)
CREAT SERPL-MCNC: 3.91 MG/DL — HIGH (ref 0.5–1.3)
EOSINOPHIL # BLD AUTO: 0 K/UL — SIGNIFICANT CHANGE UP (ref 0–0.5)
EOSINOPHIL NFR BLD AUTO: 0 % — SIGNIFICANT CHANGE UP (ref 0–6)
GIANT PLATELETS BLD QL SMEAR: PRESENT — SIGNIFICANT CHANGE UP
GLUCOSE BLDC GLUCOMTR-MCNC: 120 MG/DL — HIGH (ref 70–99)
GLUCOSE BLDC GLUCOMTR-MCNC: 172 MG/DL — HIGH (ref 70–99)
GLUCOSE SERPL-MCNC: 104 MG/DL — HIGH (ref 70–99)
GLUCOSE SERPL-MCNC: 131 MG/DL — HIGH (ref 70–99)
GLUCOSE SERPL-MCNC: 178 MG/DL — HIGH (ref 70–99)
HCT VFR BLD CALC: 21.1 % — LOW (ref 39–50)
HCT VFR BLD CALC: 23.8 % — LOW (ref 39–50)
HCT VFR BLD CALC: 28.5 % — LOW (ref 39–50)
HGB BLD-MCNC: 7.1 G/DL — LOW (ref 13–17)
HGB BLD-MCNC: 8 G/DL — LOW (ref 13–17)
HGB BLD-MCNC: 9.5 G/DL — LOW (ref 13–17)
HYPOCHROMIA BLD QL: SLIGHT — SIGNIFICANT CHANGE UP
HYPOCHROMIA BLD QL: SLIGHT — SIGNIFICANT CHANGE UP
INR BLD: 1.2 — HIGH (ref 0.88–1.16)
LACTATE SERPL-SCNC: 2.3 MMOL/L — HIGH (ref 0.5–2)
LACTATE SERPL-SCNC: 3.5 MMOL/L — HIGH (ref 0.5–2)
LACTATE SERPL-SCNC: 5.8 MMOL/L — CRITICAL HIGH (ref 0.5–2)
LDH SERPL L TO P-CCNC: 397 U/L — HIGH (ref 50–242)
LYMPHOCYTES # BLD AUTO: 0.74 K/UL — LOW (ref 1–3.3)
LYMPHOCYTES # BLD AUTO: 0.9 % — LOW (ref 13–44)
LYMPHOCYTES # BLD AUTO: 2.71 K/UL — SIGNIFICANT CHANGE UP (ref 1–3.3)
LYMPHOCYTES # BLD AUTO: 3.41 K/UL — HIGH (ref 1–3.3)
LYMPHOCYTES # BLD AUTO: 3.5 % — LOW (ref 13–44)
LYMPHOCYTES # BLD AUTO: 4.2 % — LOW (ref 13–44)
MACROCYTES BLD QL: SIGNIFICANT CHANGE UP
MACROCYTES BLD QL: SLIGHT — SIGNIFICANT CHANGE UP
MACROCYTES BLD QL: SLIGHT — SIGNIFICANT CHANGE UP
MAGNESIUM SERPL-MCNC: 2 MG/DL — SIGNIFICANT CHANGE UP (ref 1.6–2.6)
MAGNESIUM SERPL-MCNC: 2.1 MG/DL — SIGNIFICANT CHANGE UP (ref 1.6–2.6)
MAGNESIUM SERPL-MCNC: 2.2 MG/DL — SIGNIFICANT CHANGE UP (ref 1.6–2.6)
MANUAL SMEAR VERIFICATION: SIGNIFICANT CHANGE UP
MCHC RBC-ENTMCNC: 29.5 PG — SIGNIFICANT CHANGE UP (ref 27–34)
MCHC RBC-ENTMCNC: 29.6 PG — SIGNIFICANT CHANGE UP (ref 27–34)
MCHC RBC-ENTMCNC: 29.9 PG — SIGNIFICANT CHANGE UP (ref 27–34)
MCHC RBC-ENTMCNC: 33.3 GM/DL — SIGNIFICANT CHANGE UP (ref 32–36)
MCHC RBC-ENTMCNC: 33.6 GM/DL — SIGNIFICANT CHANGE UP (ref 32–36)
MCHC RBC-ENTMCNC: 33.6 GM/DL — SIGNIFICANT CHANGE UP (ref 32–36)
MCV RBC AUTO: 87.8 FL — SIGNIFICANT CHANGE UP (ref 80–100)
MCV RBC AUTO: 87.9 FL — SIGNIFICANT CHANGE UP (ref 80–100)
MCV RBC AUTO: 89.6 FL — SIGNIFICANT CHANGE UP (ref 80–100)
METAMYELOCYTES # FLD: 0.8 % — HIGH (ref 0–0)
METAMYELOCYTES # FLD: 1.7 % — HIGH (ref 0–0)
MICROCYTES BLD QL: SLIGHT — SIGNIFICANT CHANGE UP
MICROCYTES BLD QL: SLIGHT — SIGNIFICANT CHANGE UP
MONOCYTES # BLD AUTO: 5.52 K/UL — HIGH (ref 0–0.9)
MONOCYTES # BLD AUTO: 5.79 K/UL — HIGH (ref 0–0.9)
MONOCYTES # BLD AUTO: 6.81 K/UL — HIGH (ref 0–0.9)
MONOCYTES NFR BLD AUTO: 6.8 % — SIGNIFICANT CHANGE UP (ref 2–14)
MONOCYTES NFR BLD AUTO: 7 % — SIGNIFICANT CHANGE UP (ref 2–14)
MONOCYTES NFR BLD AUTO: 8.8 % — SIGNIFICANT CHANGE UP (ref 2–14)
MRSA PCR RESULT.: NEGATIVE — SIGNIFICANT CHANGE UP
MYELOCYTES NFR BLD: 6.2 % — HIGH (ref 0–0)
MYELOCYTES NFR BLD: 8.8 % — HIGH (ref 0–0)
MYELOCYTES NFR BLD: 9.3 % — HIGH (ref 0–0)
NEUTROPHILS # BLD AUTO: 59.78 K/UL — HIGH (ref 1.8–7.4)
NEUTROPHILS # BLD AUTO: 62.63 K/UL — HIGH (ref 1.8–7.4)
NEUTROPHILS # BLD AUTO: 68.86 K/UL — HIGH (ref 1.8–7.4)
NEUTROPHILS NFR BLD AUTO: 77.1 % — HIGH (ref 43–77)
NEUTROPHILS NFR BLD AUTO: 77.2 % — HIGH (ref 43–77)
NEUTROPHILS NFR BLD AUTO: 83.3 % — HIGH (ref 43–77)
OVALOCYTES BLD QL SMEAR: SLIGHT — SIGNIFICANT CHANGE UP
PHOSPHATE SERPL-MCNC: 5 MG/DL — HIGH (ref 2.5–4.5)
PHOSPHATE SERPL-MCNC: 5.2 MG/DL — HIGH (ref 2.5–4.5)
PHOSPHATE SERPL-MCNC: 5.4 MG/DL — HIGH (ref 2.5–4.5)
PLAT MORPH BLD: ABNORMAL
PLAT MORPH BLD: NORMAL — SIGNIFICANT CHANGE UP
PLAT MORPH BLD: NORMAL — SIGNIFICANT CHANGE UP
PLATELET # BLD AUTO: 241 K/UL — SIGNIFICANT CHANGE UP (ref 150–400)
PLATELET # BLD AUTO: 308 K/UL — SIGNIFICANT CHANGE UP (ref 150–400)
PLATELET # BLD AUTO: 345 K/UL — SIGNIFICANT CHANGE UP (ref 150–400)
POIKILOCYTOSIS BLD QL AUTO: SLIGHT — SIGNIFICANT CHANGE UP
POLYCHROMASIA BLD QL SMEAR: SLIGHT — SIGNIFICANT CHANGE UP
POLYCHROMASIA BLD QL SMEAR: SLIGHT — SIGNIFICANT CHANGE UP
POTASSIUM SERPL-MCNC: 5.6 MMOL/L — HIGH (ref 3.5–5.3)
POTASSIUM SERPL-MCNC: 5.7 MMOL/L — HIGH (ref 3.5–5.3)
POTASSIUM SERPL-MCNC: 6.5 MMOL/L — CRITICAL HIGH (ref 3.5–5.3)
POTASSIUM SERPL-SCNC: 5.6 MMOL/L — HIGH (ref 3.5–5.3)
POTASSIUM SERPL-SCNC: 5.7 MMOL/L — HIGH (ref 3.5–5.3)
POTASSIUM SERPL-SCNC: 6.5 MMOL/L — CRITICAL HIGH (ref 3.5–5.3)
PROMYELOCYTES # FLD: 0.9 % — HIGH (ref 0–0)
PROT SERPL-MCNC: 6.8 G/DL — SIGNIFICANT CHANGE UP (ref 6–8.3)
PROT SERPL-MCNC: 7 G/DL — SIGNIFICANT CHANGE UP (ref 6–8.3)
PROT SERPL-MCNC: 7 G/DL — SIGNIFICANT CHANGE UP (ref 6–8.3)
PROTHROM AB SERPL-ACNC: 14.3 SEC — HIGH (ref 10.6–13.6)
RBC # BLD: 2.4 M/UL — LOW (ref 4.2–5.8)
RBC # BLD: 2.71 M/UL — LOW (ref 4.2–5.8)
RBC # BLD: 3.18 M/UL — LOW (ref 4.2–5.8)
RBC # FLD: 19.1 % — HIGH (ref 10.3–14.5)
RBC # FLD: 19.2 % — HIGH (ref 10.3–14.5)
RBC # FLD: 19.3 % — HIGH (ref 10.3–14.5)
RBC BLD AUTO: ABNORMAL
S AUREUS DNA NOSE QL NAA+PROBE: NEGATIVE — SIGNIFICANT CHANGE UP
SCHISTOCYTES BLD QL AUTO: SLIGHT — SIGNIFICANT CHANGE UP
SCHISTOCYTES BLD QL AUTO: SLIGHT — SIGNIFICANT CHANGE UP
SODIUM SERPL-SCNC: 134 MMOL/L — LOW (ref 135–145)
SODIUM SERPL-SCNC: 135 MMOL/L — SIGNIFICANT CHANGE UP (ref 135–145)
SODIUM SERPL-SCNC: 139 MMOL/L — SIGNIFICANT CHANGE UP (ref 135–145)
SPHEROCYTES BLD QL SMEAR: SLIGHT — SIGNIFICANT CHANGE UP
URATE SERPL-MCNC: 10 MG/DL — HIGH (ref 3.4–8.8)
VANCOMYCIN TROUGH SERPL-MCNC: 13.9 UG/ML — SIGNIFICANT CHANGE UP (ref 10–20)
VARIANT LYMPHS # BLD: 0.9 % — SIGNIFICANT CHANGE UP (ref 0–6)
WBC # BLD: 77.43 K/UL — CRITICAL HIGH (ref 3.8–10.5)
WBC # BLD: 81.23 K/UL — CRITICAL HIGH (ref 3.8–10.5)
WBC # BLD: 82.67 K/UL — CRITICAL HIGH (ref 3.8–10.5)
WBC # FLD AUTO: 77.43 K/UL — CRITICAL HIGH (ref 3.8–10.5)
WBC # FLD AUTO: 81.23 K/UL — CRITICAL HIGH (ref 3.8–10.5)
WBC # FLD AUTO: 82.67 K/UL — CRITICAL HIGH (ref 3.8–10.5)

## 2021-07-28 PROCEDURE — 99291 CRITICAL CARE FIRST HOUR: CPT

## 2021-07-28 PROCEDURE — 93010 ELECTROCARDIOGRAM REPORT: CPT

## 2021-07-28 PROCEDURE — 99233 SBSQ HOSP IP/OBS HIGH 50: CPT

## 2021-07-28 PROCEDURE — 71045 X-RAY EXAM CHEST 1 VIEW: CPT | Mod: 26

## 2021-07-28 PROCEDURE — 99233 SBSQ HOSP IP/OBS HIGH 50: CPT | Mod: GC

## 2021-07-28 PROCEDURE — 99233 SBSQ HOSP IP/OBS HIGH 50: CPT | Mod: 25

## 2021-07-28 RX ORDER — ONDANSETRON 8 MG/1
4 TABLET, FILM COATED ORAL ONCE
Refills: 0 | Status: DISCONTINUED | OUTPATIENT
Start: 2021-07-28 | End: 2021-07-29

## 2021-07-28 RX ORDER — PHENYLEPHRINE HYDROCHLORIDE 10 MG/ML
0.1 INJECTION INTRAVENOUS
Qty: 40 | Refills: 0 | Status: DISCONTINUED | OUTPATIENT
Start: 2021-07-28 | End: 2021-07-28

## 2021-07-28 RX ORDER — FUROSEMIDE 40 MG
40 TABLET ORAL ONCE
Refills: 0 | Status: COMPLETED | OUTPATIENT
Start: 2021-07-28 | End: 2021-07-28

## 2021-07-28 RX ORDER — SODIUM ZIRCONIUM CYCLOSILICATE 10 G/10G
10 POWDER, FOR SUSPENSION ORAL ONCE
Refills: 0 | Status: COMPLETED | OUTPATIENT
Start: 2021-07-28 | End: 2021-07-28

## 2021-07-28 RX ORDER — DEXTROSE 50 % IN WATER 50 %
50 SYRINGE (ML) INTRAVENOUS ONCE
Refills: 0 | Status: COMPLETED | OUTPATIENT
Start: 2021-07-28 | End: 2021-07-28

## 2021-07-28 RX ORDER — FUROSEMIDE 40 MG
20 TABLET ORAL ONCE
Refills: 0 | Status: COMPLETED | OUTPATIENT
Start: 2021-07-28 | End: 2021-07-28

## 2021-07-28 RX ORDER — POLYETHYLENE GLYCOL 3350 17 G/17G
17 POWDER, FOR SOLUTION ORAL ONCE
Refills: 0 | Status: COMPLETED | OUTPATIENT
Start: 2021-07-28 | End: 2021-07-28

## 2021-07-28 RX ORDER — LEVETIRACETAM 250 MG/1
500 TABLET, FILM COATED ORAL EVERY 12 HOURS
Refills: 0 | Status: DISCONTINUED | OUTPATIENT
Start: 2021-07-28 | End: 2021-07-29

## 2021-07-28 RX ORDER — NOREPINEPHRINE BITARTRATE/D5W 8 MG/250ML
0.05 PLASTIC BAG, INJECTION (ML) INTRAVENOUS
Qty: 8 | Refills: 0 | Status: DISCONTINUED | OUTPATIENT
Start: 2021-07-28 | End: 2021-07-29

## 2021-07-28 RX ORDER — SODIUM ZIRCONIUM CYCLOSILICATE 10 G/10G
5 POWDER, FOR SUSPENSION ORAL ONCE
Refills: 0 | Status: COMPLETED | OUTPATIENT
Start: 2021-07-28 | End: 2021-07-28

## 2021-07-28 RX ORDER — INSULIN HUMAN 100 [IU]/ML
6 INJECTION, SOLUTION SUBCUTANEOUS ONCE
Refills: 0 | Status: COMPLETED | OUTPATIENT
Start: 2021-07-28 | End: 2021-07-28

## 2021-07-28 RX ORDER — PHENYLEPHRINE HYDROCHLORIDE 10 MG/ML
0.1 INJECTION INTRAVENOUS
Qty: 40 | Refills: 0 | Status: DISCONTINUED | OUTPATIENT
Start: 2021-07-28 | End: 2021-07-30

## 2021-07-28 RX ADMIN — PANTOPRAZOLE SODIUM 40 MILLIGRAM(S): 20 TABLET, DELAYED RELEASE ORAL at 17:22

## 2021-07-28 RX ADMIN — LEVETIRACETAM 400 MILLIGRAM(S): 250 TABLET, FILM COATED ORAL at 17:22

## 2021-07-28 RX ADMIN — INSULIN HUMAN 6 UNIT(S): 100 INJECTION, SOLUTION SUBCUTANEOUS at 14:41

## 2021-07-28 RX ADMIN — Medication 20 MILLIGRAM(S): at 06:53

## 2021-07-28 RX ADMIN — Medication 40 MILLIGRAM(S): at 11:45

## 2021-07-28 RX ADMIN — Medication 1: at 17:21

## 2021-07-28 RX ADMIN — PHENYLEPHRINE HYDROCHLORIDE 2.25 MICROGRAM(S)/KG/MIN: 10 INJECTION INTRAVENOUS at 19:59

## 2021-07-28 RX ADMIN — SODIUM ZIRCONIUM CYCLOSILICATE 10 GRAM(S): 10 POWDER, FOR SUSPENSION ORAL at 14:42

## 2021-07-28 RX ADMIN — SODIUM ZIRCONIUM CYCLOSILICATE 5 GRAM(S): 10 POWDER, FOR SUSPENSION ORAL at 19:31

## 2021-07-28 RX ADMIN — PANTOPRAZOLE SODIUM 40 MILLIGRAM(S): 20 TABLET, DELAYED RELEASE ORAL at 06:53

## 2021-07-28 RX ADMIN — Medication 5.63 MICROGRAM(S)/KG/MIN: at 14:38

## 2021-07-28 RX ADMIN — LEVETIRACETAM 400 MILLIGRAM(S): 250 TABLET, FILM COATED ORAL at 06:54

## 2021-07-28 RX ADMIN — PIPERACILLIN AND TAZOBACTAM 200 GRAM(S): 4; .5 INJECTION, POWDER, LYOPHILIZED, FOR SOLUTION INTRAVENOUS at 03:00

## 2021-07-28 RX ADMIN — Medication 50 MILLILITER(S): at 14:38

## 2021-07-28 RX ADMIN — PIPERACILLIN AND TAZOBACTAM 200 GRAM(S): 4; .5 INJECTION, POWDER, LYOPHILIZED, FOR SOLUTION INTRAVENOUS at 16:36

## 2021-07-28 RX ADMIN — POLYETHYLENE GLYCOL 3350 17 GRAM(S): 17 POWDER, FOR SOLUTION ORAL at 17:28

## 2021-07-28 NOTE — PROGRESS NOTE ADULT - ATTENDING COMMENTS
Initial attending contact date  7/28/21    . See fellow note written above for details. I reviewed the fellow documentation. I have personally seen and examined this patient. I reviewed vitals, labs, medications, cardiac studies, and additional imaging. I agree with the above fellow's findings and plans as written above with the following additions/statements.        89M PMH HTN, moderate- severe AS, seizures p/w dizziness, ruled out for stroke, now being treated for myasthenia gravis. He was found to be hypotensive on 7/28 with acute drop in hgb 8.6 to 5.4. Cardiology consulted for possible Cardiogenic shock given hx of AS.     #Hypotension:   - His hypotension is not consistent with cardiogenic shock given normal LV on TTE and good BP response to fluids. His extremities are warm. Pt does have mod- severe AS with good LV function but his acute drop in BP is not explained by underlying AS. AS is a slowly progressive disease.   - His hypotension with elevated lactate was from sepsis vs. from underlying bleed given drop in Hb from 8.6--> 5.4  - Would rule out GIB  - CXR is clear this morning. On exam, he is not overloaded. No need for diuresis at this point   - Given underlying mod- severe AS, would be cautious with fluids     Cardiology to follow

## 2021-07-28 NOTE — PROGRESS NOTE ADULT - ASSESSMENT
89 M with a PMHx of seizures (on phenytoin at home) and HTN presented with new facial droop and slurring of speech. Admitted to stroke service where stroke work up was found to be negative. Transferred to [facility] for treatment of myasthenia gravis and work up of leukocytosis. Hypotensive 80s/50s. Received 2.5L LR, started on zosyn 4.5mg IV q12hrs and transferred to ICU for monitoring.      NEURO  #Mental status  - Patinet A&O x2    # Myasthenia gravis.    - positive anti-ach antibodies  - completed 5 day course of IVIG  - may require rituximab after IVIG (plan per neuro)      #R/O Stroke  - continue aspirin 81mg PO  - Continue atorvastatin 40 mg daily PO  - Continue to hold plavix 75mg daily.     #Seizure.    - continue to hold home phenytoin 100mg TID  - CW Keppra 500 mg q 12h.     RESP  Saturating >90% on 2LNC, no increased work of breathing    {[id number]}CARDIOVASCULAR  #Shock   -septic vs hypovolemic vs cardiogenic   - cool clammy extremities, poor UOP, increasing Cr likely pt volume depleted however eitology is unclear at this time. Pt has a white count which is being worked up by heme/onc for possible CML however does not have a rectal temperature or any consolidation seen on CXR. Blood pressure improved from 80/50 s/p fluid resuscitation however pocus showed b/l b lines suggestive of pulmonary edema and given patient echo showing mod-severe aortic stenosis also possible that cardiogenic shock/critical aortic stenosis is possible. His hemoglobin has been around 8-9 this hospital stay and he has not had any S/S of bleeding, his hemoglobin downtrended to 5.4 after fluid resuscitation.   - 2 type and screens sent, emergency 1u prbcs ordered and blood bank aware  - would hold off on additional fluid at this time given pocus showing pulmonary edema   - frequent lung checks during blood transfusion  - f/u echo  - f/u  Blood culture, UA  - trend lactate to clear   - s/p Vanc + Zosyn, c/w epimeric dosing  {[id number]}  - On phenylephrine and Levo with MAP goal of 80    # Severe aortic stenosis  Asymptomatic. RUSB murmur on exam   - No interest in invasive procedure (per wife)  -LV EF 75%.    #Hypertension  Patient with hx of hypertension, holding home norvasc- benazapril in setting of shock and permissive hypertension  -per neuro goal -140  -if pressure persistently >140 would initiate norvasc first given renal fx.     {[id number]}HEME  #Leukocytosis, unspecified type  WBC count trending up to 77K today  - f/u work up for CML/CMML: Peripheral flow cytometry, BCR-ABL PCR, JAK2.   - d/c'd Unasyn  -pt started on zosyn as above. {[id number]}  {[id number]}  #Anemia.  Plan: -Hgb of 5.4 s/p 2.5L LR  - hgb 8.0  - Continue to trend. transfuse if hgb <7     #Iron deficieny anemia  -monitor daily CBCs.   {[id number]}  Plan: F: none  E: replete K<4 and Mg <2  N: NPO  D: SCDs for DVT prophylaxis.

## 2021-07-28 NOTE — PROGRESS NOTE ADULT - SUBJECTIVE AND OBJECTIVE BOX
Neurology Progress Note    Interval History:    No acute events overnight. Yesterday patient had an episode of vomiting followed by hypotension 80s/50s. Patient was given a total of 3L IVF with some improvement in BP. STAT labs showed hgb 5.4, lactate 6.4, and WBC 50.5. Patient was afebrile but was empirically started on Vanc/Zosyn. He was transferred to ICU were he received 2 units PRBCs. Vitals remained stable overnight. This morning patient still complains of left leg weakness and pain, with some decreased sensation.      HPI:  89y Male with PMHx of HTN and seizure on phenytoin presents with left facial droop and slurred speech. Pt was with family member (great-nephew Mesh [phone]) who noticed at 5pm, pt had acute onset of slurred speech, increased saliva production and a left facial droop. Pt was driven to the ED immediately. On presentation, /94, NIHSS 3. Great-nephew at bedside denies hx of stroke, use of blood thinners, recent trauma/bleeding event. CTH negative for acute hemorrhage. CTP without any perfusion defect. CTA without LVO. In conjunction with patient, family and stroke attending, decision was made to not give tpa since family felt his current deficits are non-disabling to his daily life vs risk of bleeding. Pt and family was made aware of the possibility of worsening symptoms and the opportunity to give tpa was time limited. They expressed understanding and declined tpa administration.     Pt denies CP, SOB, extremity weakness, changes in vision, HA. He endorses a cough that has been occurring for the past few days with sputum production, but has been afebrile. Pt given vanc and zosyn x1 in the ED. CXR pending official read. Patient initially failed dysphagia screen and was given  AK, but passed second dysphagia screen so was able to to loaded with plavix 300 PO.     At baseline, pt is fully independent of all ADLs and iADLs, ambulates without assistance, with hearing loss b/l, baseline right arm tremor. It is unclear if pt sees a neurologist for seizure management. Per wife (Madeot [phone], [phone]), his most recent seizure was in April 2020 where he had full body shaking lasting for 5 minutes. He has been on phenytoin 100mg TID and has not had another seizure since.     PAST MEDICAL & SURGICAL HISTORY:  Hypertension    Seizure            Medications:  atorvastatin 40 milliGRAM(s) Oral at bedtime  dextrose 40% Gel 15 Gram(s) Oral once  dextrose 5%. 1000 milliLiter(s) IV Continuous <Continuous>  dextrose 5%. 1000 milliLiter(s) IV Continuous <Continuous>  dextrose 50% Injectable 25 Gram(s) IV Push once  dextrose 50% Injectable 12.5 Gram(s) IV Push once  dextrose 50% Injectable 25 Gram(s) IV Push once  glucagon  Injectable 1 milliGRAM(s) IntraMuscular once  insulin lispro (ADMELOG) corrective regimen sliding scale   SubCutaneous every 6 hours  levETIRAcetam  IVPB 500 milliGRAM(s) IV Intermittent every 12 hours  ondansetron Injectable 4 milliGRAM(s) IV Push once  pantoprazole  Injectable 40 milliGRAM(s) IV Push every 12 hours  piperacillin/tazobactam IVPB.. 4.5 Gram(s) IV Intermittent every 12 hours      Vital Signs Last 24 Hrs  T(C): 36.8 (28 Jul 2021 10:00), Max: 36.8 (28 Jul 2021 10:00)  T(F): 98.3 (28 Jul 2021 10:00), Max: 98.3 (28 Jul 2021 10:00)  HR: 90 (28 Jul 2021 12:00) (73 - 112)  BP: 134/64 (28 Jul 2021 12:00) (86/59 - 134/74)  BP(mean): 88 (28 Jul 2021 12:00) (66 - 98)  RR: 22 (28 Jul 2021 12:00) (12 - 31)  SpO2: 98% (28 Jul 2021 12:00) (93% - 100%)    Neurological Examination:  General:  Appearance is consistent with chronologic age.  No abnormal facies.  Gross skin survey within normal limits.    Cognitive/Language:  Awake, alert, and oriented to person, place, time and date.  Recent and remote memory intact.  Fund of knowledge is appropriate.  Naming, repetition and comprehension intact.   Nondysarthric.    Cranial Nerves  - Eyes: Visual acuity intact, Visual fields full.  EOMI w/o nystagmus, skew or reported double vision.  PERRL.  No ptosis/weakness of eyelid closure.    - Face:  Facial sensation normal V1 - 3, no facial asymmetry.    - Ears/Nose/Throat:  Hearing grossly intact b/l to finger rub.  Palate elevates midline.  Tongue and uvula midline.   Motor examination:  5/5 UE bilaterally, 4-/5 proximal left leg, 4-/5 distal left leg, 5/5 right lower extremity.  No observable drift. Normal tone and bulk. No tenderness, twitching, tremors or involuntary movements.  Sensory examination:   Decreased sensation to light touch, temperature, and vibration in left lower extremity compared to right.   Reflexes:   2+ b/l biceps, triceps, brachioradialis, patella and achilles.  Plantar response downgoing b/l.  clonus absent.  Cerebellum:   FTN/HKS intact.  No dysmetria or dysdiadokinesia.     Labs:  CBC Full  -  ( 28 Jul 2021 04:10 )  WBC Count : 81.23 K/uL  RBC Count : 3.18 M/uL  Hemoglobin : 9.5 g/dL  Hematocrit : 28.5 %  Platelet Count - Automated : 241 K/uL  Mean Cell Volume : 89.6 fl  Mean Cell Hemoglobin : 29.9 pg  Mean Cell Hemoglobin Concentration : 33.3 gm/dL  Auto Neutrophil # : 62.63 K/uL  Auto Lymphocyte # : 3.41 K/uL  Auto Monocyte # : 5.52 K/uL  Auto Eosinophil # : 0.00 K/uL  Auto Basophil # : 0.00 K/uL  Auto Neutrophil % : 77.1 %  Auto Lymphocyte % : 4.2 %  Auto Monocyte % : 6.8 %  Auto Eosinophil % : 0.0 %  Auto Basophil % : 0.0 %    07-28    139  |  106  |  51<H>  ----------------------------<  104<H>  5.7<H>   |  17<L>  |  2.61<H>    Ca    8.8      28 Jul 2021 04:10  Phos  5.0     07-28  Mg     2.0     07-28    TPro  7.0  /  Alb  3.0<L>  /  TBili  0.4  /  DBili  x   /  AST  94<H>  /  ALT  50<H>  /  AlkPhos  52  07-28    LIVER FUNCTIONS - ( 28 Jul 2021 04:10 )  Alb: 3.0 g/dL / Pro: 7.0 g/dL / ALK PHOS: 52 U/L / ALT: 50 U/L / AST: 94 U/L / GGT: x           PT/INR - ( 28 Jul 2021 04:10 )   PT: 14.3 sec;   INR: 1.20          PTT - ( 28 Jul 2021 04:10 )  PTT:37.3 sec

## 2021-07-28 NOTE — PROGRESS NOTE ADULT - SUBJECTIVE AND OBJECTIVE BOX
INTERVAL EVENTS:    PAST MEDICAL & SURGICAL HISTORY:  Hypertension    Seizure        MEDICATIONS  (STANDING):  atorvastatin 40 milliGRAM(s) Oral at bedtime  dextrose 40% Gel 15 Gram(s) Oral once  dextrose 5%. 1000 milliLiter(s) (50 mL/Hr) IV Continuous <Continuous>  dextrose 5%. 1000 milliLiter(s) (100 mL/Hr) IV Continuous <Continuous>  dextrose 50% Injectable 25 Gram(s) IV Push once  dextrose 50% Injectable 12.5 Gram(s) IV Push once  dextrose 50% Injectable 25 Gram(s) IV Push once  glucagon  Injectable 1 milliGRAM(s) IntraMuscular once  insulin lispro (ADMELOG) corrective regimen sliding scale   SubCutaneous every 6 hours  levETIRAcetam  IVPB 500 milliGRAM(s) IV Intermittent every 12 hours  pantoprazole  Injectable 40 milliGRAM(s) IV Push every 12 hours  piperacillin/tazobactam IVPB.. 4.5 Gram(s) IV Intermittent every 12 hours    MEDICATIONS  (PRN):  sodium chloride 3%  Inhalation 4 milliLiter(s) Inhalation every 6 hours PRN productive cough    Vital Signs Last 24 Hrs  T(C): 35.8 (28 Jul 2021 05:49), Max: 37.6 (27 Jul 2021 12:54)  T(F): 96.5 (28 Jul 2021 05:49), Max: 99.6 (27 Jul 2021 12:54)  HR: 85 (28 Jul 2021 11:00) (73 - 112)  BP: 134/74 (28 Jul 2021 11:00) (82/51 - 134/74)  BP(mean): 98 (28 Jul 2021 11:00) (66 - 98)  RR: 25 (28 Jul 2021 11:00) (12 - 31)  SpO2: 99% (28 Jul 2021 11:00) (93% - 100%)    PHYSICAL EXAM:  GEN: Awake, alert. NAD.   HEENT: NCAT, PERRL, EOMI. Mucosa moist. No JVD.  RESP: CTA b/l  CV: RRR. Normal S1/S2. No m/r/g.  ABD: Soft. NT/ND. BS+  EXT: Warm. No edema, clubbing, or cyanosis.   NEURO: AAOx3. No focal deficits.     LABS:                        9.5    81.23 )-----------( 241      ( 28 Jul 2021 04:10 )             28.5     07-28    139  |  106  |  51<H>  ----------------------------<  104<H>  5.7<H>   |  17<L>  |  2.61<H>    Ca    8.8      28 Jul 2021 04:10  Phos  5.0     07-28  Mg     2.0     07-28    TPro  7.0  /  Alb  3.0<L>  /  TBili  0.4  /  DBili  x   /  AST  94<H>  /  ALT  50<H>  /  AlkPhos  52  07-28        PT/INR - ( 28 Jul 2021 04:10 )   PT: 14.3 sec;   INR: 1.20          PTT - ( 28 Jul 2021 04:10 )  PTT:37.3 sec    I&O's Summary    27 Jul 2021 07:01  -  28 Jul 2021 07:00  --------------------------------------------------------  IN: 700 mL / OUT: 0 mL / NET: 700 mL        TTE:   1. The aortic valve is tricuspid and moderately calcified. There is moderate-to-severe aortic stenosis. The peak transvalvular velocity is 3.50 m/s, the mean transvalvular gradient is 29.00 mmHg, and the LVOT/AV velocity integral ratio is 0.29. The peak transaortic gradient is 49.00 mmHg. The aortic valve area (estimated via the continuity method) is 0.90 cm². There is mild-to-moderate aortic regurgitation.   2. Mild mitral regurgitation.   3. No other significant valvular disease.   4. The right ventricle is normal in size. Right ventricular systolic function is normal.   5. Left ventricular hypertrophy present. The left ventricle is normal in size and systolic function with a calculated ejection fraction of 70-75%.   6. No pericardial effusion.   7. No prior echo is available for comparison.         INTERVAL EVENTS: Pt was given 2 units of blood last night with lasix. Currently, feeling okay. Is a poor historian. Denies any chest pain/ pressure.  Speaking in full sentences.     PAST MEDICAL & SURGICAL HISTORY:  Hypertension    Seizure        MEDICATIONS  (STANDING):  atorvastatin 40 milliGRAM(s) Oral at bedtime  dextrose 40% Gel 15 Gram(s) Oral once  dextrose 5%. 1000 milliLiter(s) (50 mL/Hr) IV Continuous <Continuous>  dextrose 5%. 1000 milliLiter(s) (100 mL/Hr) IV Continuous <Continuous>  dextrose 50% Injectable 25 Gram(s) IV Push once  dextrose 50% Injectable 12.5 Gram(s) IV Push once  dextrose 50% Injectable 25 Gram(s) IV Push once  glucagon  Injectable 1 milliGRAM(s) IntraMuscular once  insulin lispro (ADMELOG) corrective regimen sliding scale   SubCutaneous every 6 hours  levETIRAcetam  IVPB 500 milliGRAM(s) IV Intermittent every 12 hours  pantoprazole  Injectable 40 milliGRAM(s) IV Push every 12 hours  piperacillin/tazobactam IVPB.. 4.5 Gram(s) IV Intermittent every 12 hours    MEDICATIONS  (PRN):  sodium chloride 3%  Inhalation 4 milliLiter(s) Inhalation every 6 hours PRN productive cough    Vital Signs Last 24 Hrs  T(C): 35.8 (28 Jul 2021 05:49), Max: 37.6 (27 Jul 2021 12:54)  T(F): 96.5 (28 Jul 2021 05:49), Max: 99.6 (27 Jul 2021 12:54)  HR: 85 (28 Jul 2021 11:00) (73 - 112)  BP: 134/74 (28 Jul 2021 11:00) (82/51 - 134/74)  BP(mean): 98 (28 Jul 2021 11:00) (66 - 98)  RR: 25 (28 Jul 2021 11:00) (12 - 31)  SpO2: 99% (28 Jul 2021 11:00) (93% - 100%)    PHYSICAL EXAM:  NAD, sitting in bed   facial drpp noted  Neck supple, no JVD  heart s1+ 3/6 systolic cresc-decresc murmur with diminished S2  Lungs CTAB. No crackles noted   ext wwp no edema  LABS:                        9.5    81.23 )-----------( 241      ( 28 Jul 2021 04:10 )             28.5     07-28    139  |  106  |  51<H>  ----------------------------<  104<H>  5.7<H>   |  17<L>  |  2.61<H>    Ca    8.8      28 Jul 2021 04:10  Phos  5.0     07-28  Mg     2.0     07-28    TPro  7.0  /  Alb  3.0<L>  /  TBili  0.4  /  DBili  x   /  AST  94<H>  /  ALT  50<H>  /  AlkPhos  52  07-28        PT/INR - ( 28 Jul 2021 04:10 )   PT: 14.3 sec;   INR: 1.20          PTT - ( 28 Jul 2021 04:10 )  PTT:37.3 sec    I&O's Summary    27 Jul 2021 07:01  -  28 Jul 2021 07:00  --------------------------------------------------------  IN: 700 mL / OUT: 0 mL / NET: 700 mL        TTE:   1. The aortic valve is tricuspid and moderately calcified. There is moderate-to-severe aortic stenosis. The peak transvalvular velocity is 3.50 m/s, the mean transvalvular gradient is 29.00 mmHg, and the LVOT/AV velocity integral ratio is 0.29. The peak transaortic gradient is 49.00 mmHg. The aortic valve area (estimated via the continuity method) is 0.90 cm². There is mild-to-moderate aortic regurgitation.   2. Mild mitral regurgitation.   3. No other significant valvular disease.   4. The right ventricle is normal in size. Right ventricular systolic function is normal.   5. Left ventricular hypertrophy present. The left ventricle is normal in size and systolic function with a calculated ejection fraction of 70-75%.   6. No pericardial effusion.   7. No prior echo is available for comparison.

## 2021-07-28 NOTE — PROGRESS NOTE ADULT - SUBJECTIVE AND OBJECTIVE BOX
[facility]  Eliazar Tristan, Palliative Care Attending  Contact Info: Call [phone] (including Nights/Weekend) or message on Microsoft Teams (Eliazar Tristan)    SUBJECTIVE AND OBJECTIVE:  INTERVAL HPI/OVERNIGHT EVENTS: Interval events noted. Patient seems to be trending towards mental baseline, answering questions and appears calm. Complaining of L leg pain. Wife spoke with Palliative SW, inquiring about transferring patient to [facility]. Patient required PRNs of  in past 24hrs. No unexpected adverse effects of opiates noted: no sedation/dizziness/nausea.    ALLERGIES:  hay fever (Unknown)  No Known Drug Allergies    MEDICATIONS  (STANDING):  atorvastatin 40 milliGRAM(s) Oral at bedtime  chlorhexidine 2% Cloths 1 Application(s) Topical <User Schedule>  dextrose 40% Gel 15 Gram(s) Oral once  dextrose 5%. 1000 milliLiter(s) (50 mL/Hr) IV Continuous <Continuous>  dextrose 5%. 1000 milliLiter(s) (100 mL/Hr) IV Continuous <Continuous>  dextrose 50% Injectable 25 Gram(s) IV Push once  dextrose 50% Injectable 12.5 Gram(s) IV Push once  dextrose 50% Injectable 25 Gram(s) IV Push once  glucagon  Injectable 1 milliGRAM(s) IntraMuscular once  insulin lispro (ADMELOG) corrective regimen sliding scale   SubCutaneous every 6 hours  pantoprazole  Injectable 40 milliGRAM(s) IV Push every 12 hours  phenylephrine    Infusion 0.1 MICROgram(s)/kG/Min (2.25 mL/Hr) IV Continuous <Continuous>  piperacillin/tazobactam IVPB.. 4.5 Gram(s) IV Intermittent every 12 hours  sodium zirconium cyclosilicate 10 Gram(s) Oral every 8 hours    MEDICATIONS  (PRN):      ITEMS UNCHECKED ARE NOT PRESENT    PRESENT SYMPTOMS: []Unable to obtain due to poor mentation   Source if other than patient:  []Family   []Team     Pain: [x] yes [] no  QOL impact - bothersome  Location - L leg                   Aggravating factors -  Quality - aching  Radiation - none  Timing - constant  Severity (0-10 scale) -  Minimal acceptable level (0-10 scale) -    PAIN AD Score:  http://[email or web address].[email or web address]/painad.pdf (press ctrl +  left click to view)    Dyspnea:                           []Mild  []Moderate []Severe  Anxiety:                             []Mild []Moderate []Severe  Fatigue:                             []Mild []Moderate []Severe  Nausea:                             []Mild []Moderate []Severe  Loss of appetite:              []Mild []Moderate []Severe  Constipation:                    []Mild []Moderate []Severe    Other Symptoms:  [x]All other review of systems negative     Palliative Performance Status Version 2: 40%    Vital Signs Last 24 Hrs  T(C): 36.8 (29 Jul 2021 09:37), Max: 37.3 (29 Jul 2021 05:49)  T(F): 98.2 (29 Jul 2021 09:37), Max: 99.2 (29 Jul 2021 05:49)  HR: 55 (29 Jul 2021 10:00) (55 - 115)  BP: 133/59 (29 Jul 2021 10:00) (78/36 - 147/61)  BP(mean): 85 (29 Jul 2021 10:00) (52 - 91)  RR: 23 (29 Jul 2021 10:00) (6 - 30)  SpO2: 97% (29 Jul 2021 10:00) (91% - 100%) I&O's Summary    28 Jul 2021 07:01  -  29 Jul 2021 07:00  --------------------------------------------------------  IN: 1056 mL / OUT: 100 mL / NET: 956 mL    29 Jul 2021 07:01  -  29 Jul 2021 11:09  --------------------------------------------------------  IN: 190 mL / OUT: 0 mL / NET: 190 mL    GENERAL:  [x]Alert  [x]Oriented x3   []Lethargic  []Cachexia  []Unarousable  [x]Verbal  []Non-Verbal  Behavioral:   [] Anxiety  [] Delirium [] Agitation [] Other  HEENT:  [x]Normal   []Dry mouth   []ET Tube/Trach  []Oral lesions  PULMONARY:   [x]Clear []Tachypnea  []Audible excessive secretions   []Rhonchi        []Right []Left []Bilateral  []Crackles        []Right []Left []Bilateral  []Wheezing     []Right []Left []Bilateral  CARDIOVASCULAR:    [x]Regular []Irregular []Tachy  []Anatoliy []Murmur []Other  GASTROINTESTINAL:  [x]Soft  [x]Distended   []+BS  []Non tender []Tender  []PEG []OGT/ NGT  Last BM:  GENITOURINARY:  [x]Normal [] Incontinent   []Oliguria/Anuria   []Cruz  MUSCULOSKELETAL:   []Normal   [x]Weakness  []Bed/Wheelchair bound []Edema  NEUROLOGIC:   []No focal deficits  [] Cognitive impairment  [x] Dysphagia []Dysarthria [] Paresis []Other   SKIN:   [x]Normal   []Pressure ulcer(s)  []Rash    CRITICAL CARE:  [ ] Shock Present  [ ]Septic [ ]Cardiogenic [ ]Neurologic [ ]Hypovolemic  [ ]  Vasopressors [ ]  Inotropes   [ ] Respiratory failure present [ ] Mechanical Ventilation [ ] Non-invasive ventilatory support [ ] High-Flow  [ ] Acute  [ ] Chronic [ ] Hypoxic  [ ] Hypercarbic [ ] Other  [ ] Other organ failure  LABS:                         5.0    85.76 )-----------( 342      ( 29 Jul 2021 08:53 )             14.5   07-29    138  |  106  |  76<H>  ----------------------------<  143<H>  5.6<H>   |  19<L>  |  4.88<H>    Ca    8.3<L>      29 Jul 2021 08:53  Phos  6.2     07-29  Mg     2.2     07-29    TPro  6.0  /  Alb  2.6<L>  /  TBili  0.4  /  DBili  x   /  AST  80<H>  /  ALT  59<H>  /  AlkPhos  45  07-29      RADIOLOGY & ADDITIONAL STUDIES: None new    PROTEIN CALORIE MALNUTRITION PRESENT: [ ]mild [ ]moderate [ ]severe [ ]underweight [ ]morbid obesity  [] PPSV2 < or = 30% [] significant weight loss [] poor nutritional intake [] anasarca [] catabolic state    Artificial Nutrition []     REFERRALS:  [x]Social Work  []Case management []PT/OT []Chaplaincy  []Hospice  []Patient/Family Support    Goals of Care Document:   Care Coordination Document: Progress Notes - Care Coordination                                       Progress Notes    PROGRESS NOTE  Date & Time of Note   2021-07-28 15:28    Notes    Notes: Chart reviewed and notes appreciated. CM met and spoke with patients  wife Jamila (pronounced Ma-Dot) and lilo Earl regarding dispo. Jamila  states that she would like for patient to be transferred to [facility]. CM  explained that an accepting MD at [facility] would be needed for [id number]  transfer. Jamila still adamant that CM call.      CM called and spoke with Viniana rosalakesha at [facility] (PHONE: [phone]); CM  informed Viniana rosalakesha that patient does not have a [facility] physician. Grzegorz explained  to CM that hospital transfer could still occur, if [facility] Attending MD contacted  [facility] Transfer center directly. A [facility] doctor wound be assigned for MD to MD  conversation.     CM contacted and spoke with  Dr. Domingo; Per Dr. Domingo, patient is not stable,  however Dr. Domingo in agreement with possible plan for hospital transfer. [facility]  Transfer center provided to Dr. Domingo. CM remains available.       Electronically signed by:  Alysia Galvin  Electronically signed on:  2021-07-28  15:28

## 2021-07-28 NOTE — PROGRESS NOTE ADULT - SUBJECTIVE AND OBJECTIVE BOX
Hospital Course:     Pt is a 89 y/o male with PMHx of HTN, seizures, severe AS (LVEF 75%) who presented with left facial droop and slurring of speech. Initially admitted to stroke service where work up was negative. 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 Also noted to have leukocytosis. Transferred to [facility] for work up of leukocytosis secondary to CML/CMLL vs infection vs myasthenia gravis. Developed dysphagia on [facility] believed to be related to MG.  Treated with 5/7 days of 1.5g IV unasyn for suspected aspiration contributing to leukocytosis. Additionally completed a 5 day course of IVIG on 7/25/21 for myasthenia gravis. On 7/27/21 at 230PM patient became hypotensive 80/50s in the setting of vomiting, possibly due to aspiration pneumonia with increasing WBC count, hyperglycemia and lactate of 6.4. ICU consulted, pt received total of 2.5L of LR and started on zosyn 4.5g IV Q12hrs. MAP>65 following fluid resuscitation. Hgb post fluid resuscitation 5.4. T&S ordered with plan for emergency transfusion of 1 unit pRBCs. Pt transported to ICU.    INTERVAL HPI/OVERNIGHT EVENTS:   Patient seen at bedside, alert and awake but appears sleepy, oriented x2. Pt reports some shortness of breath and light headedness.  One episode of non-bloody vomit in the ICU.  At this time does not report fever, chills, chest pain, abdominal pain, dysuria. Hospital Course:     Pt is a 89 y/o male with PMHx of HTN, seizures, severe AS (LVEF 75%) who presented with left facial droop and slurring of speech. Initially admitted to stroke service where work up was negative. 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 Also noted to have leukocytosis. Transferred to [facility] for work up of leukocytosis secondary to CML/CMLL vs infection vs myasthenia gravis. Developed dysphagia on [facility] believed to be related to MG.  Treated with 5/7 days of 1.5g IV unasyn for suspected aspiration contributing to leukocytosis. Additionally completed a 5 day course of IVIG on 7/25/21 for myasthenia gravis. On 7/27/21 at 230PM patient became hypotensive 80/50s in the setting of vomiting, possibly due to aspiration pneumonia with increasing WBC count, hyperglycemia and lactate of 6.4. ICU consulted, pt received total of 2.5L of LR and started on zosyn 4.5g IV Q12hrs. MAP>65 following fluid resuscitation. Hgb post fluid resuscitation 5.4. T&S ordered with plan for emergency transfusion of 1 unit pRBCs. Pt transported to ICU. Started on pressors fro a MAP goal of 80.      Overnight: GHeparin switched to IV. Given 20 lasix in AM     Subjective: Patient examined at bedside. Awake. A&O x2.     ICU Vital Signs Last 24 Hrs  T(C): 37 (28 Jul 2021 17:27), Max: 37 (28 Jul 2021 17:27)  T(F): 98.6 (28 Jul 2021 17:27), Max: 98.6 (28 Jul 2021 17:27)  HR: 105 (28 Jul 2021 19:00) (73 - 115)  BP: 112/63 (28 Jul 2021 19:00) (78/36 - 141/58)  BP(mean): 84 (28 Jul 2021 19:00) (52 - 98)  ABP: --  ABP(mean): --  RR: 19 (28 Jul 2021 19:00) (12 - 31)  SpO2: 97% (28 Jul 2021 19:00) (93% - 100%)    I&O's Summary    27 Jul 2021 07:01  -  28 Jul 2021 07:00  --------------------------------------------------------  IN: 700 mL / OUT: 0 mL / NET: 700 mL    28 Jul 2021 07:01  -  28 Jul 2021 19:23  --------------------------------------------------------  IN: 260 mL / OUT: 100 mL / NET: 160 mL      CAPILLARY BLOOD GLUCOSE      POCT Blood Glucose.: 172 mg/dL (28 Jul 2021 17:11)  POCT Blood Glucose.: 120 mg/dL (28 Jul 2021 11:31)  POCT Blood Glucose.: 223 mg/dL (27 Jul 2021 22:31)    MEDICATIONS  (STANDING):  atorvastatin 40 milliGRAM(s) Oral at bedtime  dextrose 40% Gel 15 Gram(s) Oral once  dextrose 5%. 1000 milliLiter(s) (50 mL/Hr) IV Continuous <Continuous>  dextrose 5%. 1000 milliLiter(s) (100 mL/Hr) IV Continuous <Continuous>  dextrose 50% Injectable 25 Gram(s) IV Push once  dextrose 50% Injectable 12.5 Gram(s) IV Push once  dextrose 50% Injectable 25 Gram(s) IV Push once  glucagon  Injectable 1 milliGRAM(s) IntraMuscular once  insulin lispro (ADMELOG) corrective regimen sliding scale   SubCutaneous every 6 hours  levETIRAcetam  IVPB 500 milliGRAM(s) IV Intermittent every 12 hours  norepinephrine Infusion 0.05 MICROgram(s)/kG/Min (5.63 mL/Hr) IV Continuous <Continuous>  ondansetron Injectable 4 milliGRAM(s) IV Push once  pantoprazole  Injectable 40 milliGRAM(s) IV Push every 12 hours  phenylephrine    Infusion 0.1 MICROgram(s)/kG/Min (2.25 mL/Hr) IV Continuous <Continuous>  piperacillin/tazobactam IVPB.. 4.5 Gram(s) IV Intermittent every 12 hours      PHYSICAL EXAM:  GENERAL: lying in bed, elderly, cachectic, no acute distress  HEENT:  Atraumatic, Normocephalic, PERRL, conjunctiva and sclera clear, no JVD,   NERVOUS SYSTEM:  Alert & Awake, slight left facial droop, no other focal deficit  CHEST/LUNG: B/L good air entry; No rales, rhonchi, or wheezing  HEART: crescendo, decrescendo murmur, Regular rate and rhythm  ABDOMEN: Soft, Nontender, Nondistended; Bowel sounds present  EXTREMITIES:  2+ radial Pulses, no clubbing, cyanosis, or edema  LYMPH: No lymphadenopathy noted  SKIN: No rashes or lesions

## 2021-07-28 NOTE — PROGRESS NOTE ADULT - ASSESSMENT
89 year old man with Debility, Dysphagia, Leukocytosis and encounter for palliative care.    ·	wife wants to explore transfer to [facility]  ·	recommend Tylenol PRN for Mild/Moderate Pain

## 2021-07-28 NOTE — PROGRESS NOTE ADULT - ATTENDING COMMENTS
I was physically present for the key portions of the evaluation and managemnent (E/M) service provided.  I agree with the above history, physical, and plan which I have reviewed and edited where appropriate, with the exceptions as per my note.    Pt seen and examined.    In ICU for hypotension, pulm edema.    Reports LLE discomfort/numbness and weakness.    Exam demonstrates as least 5- in L HF and 5/5 distally, with some effort limitation. Sensory exam appears intact for the most part outside of dec to temp on L thigh.     AP: MG is now confirmed by both rep stim test and +antibodies. LLE sensorimotor disturbance is of unclear etiology.     - Check MRI L spine w and wo when stable. no need for brain MRI w and wo as slurred speech/facial droop have improved with IVIG and MG has been confirmed with antibodies.  - appreciate hemonc recs.  - management as per ICU team

## 2021-07-28 NOTE — PROGRESS NOTE ADULT - PROBLEM SELECTOR PLAN 1
Holding off on muscle relaxers given hypotensive episode  -recommend Tylenol PRN for Mild/Moderate Pain

## 2021-07-28 NOTE — PROGRESS NOTE ADULT - ASSESSMENT
89M PMH HTN, seizures p/w FND, being treated for myasthenia gravis, found to be hypotensive on 7/28 with acute drop in hgb 8.6 to 5.4.    #Hypotension: not c/w cardiogenic shock given robust LV on TTE and good BP responsive to fluid. Would consider hypovolemia given hgb drop and fluid response as etiology. Hemodynamic effects of hypovolemia can be exaggerated with AS (typically a feature of severe AS, although note it is possible for echo measurements to underestimate severity). Not consistent with AS as primary etiology of hypotension given acute drop in BP and natural disease of AS is slowly progressive. AS can also be a risk factor for GIB (along with antiplatelet therapy).       INCOMPLETE  89M PMH HTN, moderate- severe AS, seizures p/w dizziness, ruled out for stroke, now being treated for myasthenia gravis. He was found to be hypotensive on 7/28 with acute drop in hgb 8.6 to 5.4. Cardiology consulted for possible Cardiogenic shock given hx of AS.     #Hypotension:   - His hypotension is not consistent with cardiogenic shock given normal LV on TTE and good BP response to fluids. His extremities are warm. Pt does have mod- severe AS with good LV function but his acute drop in BP is not explained by underlying AS. AS is a slowly progressive disease.   - His hypotension with elevated lactate was from sepsis vs. from underlying bleed given drop in Hb from 8.6--> 5.4  - Would rule out GIB  - CXR is clear this morning. On exam, he is not overloaded. No need for diuresis at this point   - Given underlying mod- severe AS, would be cautious with fluids     Cardiology to follow

## 2021-07-28 NOTE — PROGRESS NOTE ADULT - PROBLEM SELECTOR PLAN 6
Complex medical decision making / symptom management in the setting of advanced illness.    Will continue to follow for ongoing GOC discussion / titration of palliative regimen.   Emotional support provided, questions answered.  Active Psychosocial Referrals: SW    For new or uncontrolled symptoms, please call Palliative Care at [phone]-[facility]. The service is available 24/7 (including nights & weekends) to provide symptom management recommendations over the phone as appropriate

## 2021-07-28 NOTE — PROGRESS NOTE ADULT - ASSESSMENT
89M with PMHx of HTN and seizure on phenytoin presents with left facial droop and slurred speech. Stroke workup negative. Neurology consulted for persistent weakness.      Impression  - Myasthenia gravis, new onset. Bedside repetitive nerve stimulation on 7/22 notable for decremental response indicative of MG, Strength improved after receiving full course of IVIG.  - Patient now with left leg weakness, pain, and decreased sensation to light touch, temperature, and vibration. Exam somewhat limited by language barrier although patient's family was at the bedside to translate. Weakness is subjective and does not complete correlate with degree of weakness on exam.  - Weakness may be somewhat explained by Myasthenia, but sensory deficits suggest an alternative etiology. Pt's pain is not reproducible, may suggest bone marrow involvement given hx of persistent leukocytosis.      Plan  - F/u Myasthenia Gravis antibodies - bedside repetitive nerve stimulation on 7/22 indicative of MG  - S/p IVIG x5 days  - PT/OT  - Will consider starting Rituximab q6 months for long-term management of myasthenia, pending ID assessment of prior Hep B infection  - Alternatively, can treat with Prednisone 10mg daily, pending rule out of GIB in setting of acute anemia  - Heme-Onc following for persistent leukocytosis. Flow cytometry, BCR-ABL, JAK2 serologies sent. Can follow up outpatient with Dr. Nguyen      Plan discussed with Attending Dr. Evie Mcclellan MD  Neurology, PGY-2  Pager: [phone]  [phone] 89M with PMHx of HTN and seizure on phenytoin presents with left facial droop and slurred speech. Stroke workup negative. Neurology consulted for persistent weakness.      Impression  - Myasthenia gravis, new onset. Bedside repetitive nerve stimulation on 7/22 notable for decremental response indicative of MG, Strength improved after receiving full course of IVIG.  - Patient now with left leg weakness, pain, and decreased sensation to light touch, temperature, and vibration. Exam somewhat limited by language barrier although patient's family was at the bedside to translate. Weakness is subjective and does not complete correlate with degree of weakness on exam.  - Weakness may be somewhat explained by Myasthenia, but sensory deficits suggest an alternative etiology. Pt's pain is not reproducible, may suggest bone marrow involvement given hx of persistent leukocytosis.      Plan  - F/u Myasthenia Gravis antibodies - bedside repetitive nerve stimulation on 7/22 indicative of MG  - S/p IVIG x5 days  - Recommend MR brain with/without contrast as well as Lumbar spine with/without contrast to further evaluate patient's complaints of weakness, sensory deficits. Can be done when patient more stable from ICU perspective.  - PT/OT  - Will consider starting Rituximab q6 months for long-term management of myasthenia, pending ID assessment of prior Hep B infection  - Alternatively, can treat with Prednisone 10mg daily, pending rule out of GIB in setting of acute anemia  - Heme-Onc following for persistent leukocytosis. Flow cytometry, BCR-ABL, JAK2 serologies sent. Can follow up outpatient with Dr. Nguyen      Plan discussed with Attending Dr. Evie Mcclellan MD  Neurology, PGY-2  Pager: [phone]  [phone] 89M with PMHx of HTN and seizure on phenytoin presents with left facial droop and slurred speech. Stroke workup negative. Neurology consulted for persistent weakness.      Impression  - Myasthenia gravis, new onset. Bedside repetitive nerve stimulation on 7/22 notable for decremental response indicative of MG, Strength improved after receiving full course of IVIG.  - Patient now with left leg weakness, pain, and decreased sensation to light touch, temperature, and vibration. Exam somewhat limited by language barrier although patient's family was at the bedside to translate. Weakness is subjective and does not complete correlate with degree of weakness on exam.  - Weakness may be somewhat explained by Myasthenia, but sensory deficits suggest an alternative etiology. Pt's pain is not reproducible, may suggest bone marrow involvement given hx of persistent leukocytosis.      Plan  - F/u Myasthenia Gravis antibodies - bedside repetitive nerve stimulation on 7/22 indicative of MG  - S/p IVIG x5 days  - Recommend Lumbar spine with/without contrast to further evaluate patient's complaints of weakness, sensory deficits. Can be done when patient more stable from ICU perspective. Given +MG antibodies, no further need for repeat MRI brain.  - PT/OT  - Will consider starting Rituximab q6 months for long-term management of myasthenia, pending ID assessment of prior Hep B infection and improvement of current hypotension/pulmonary edema.  - Alternatively, can treat with Prednisone 10mg daily, pending rule out of GIB in setting of acute anemia  - Heme-Onc following for persistent leukocytosis. Flow cytometry, BCR-ABL, JAK2 serologies sent. Can follow up outpatient with Dr. Patrick Dunn discussed with Attending Dr. Evie Mcclellan MD  Neurology, PGY-2  Pager: [phone]  [phone]

## 2021-07-28 NOTE — PROGRESS NOTE ADULT - ATTENDING COMMENTS
Hypotension - resolved  Acute pulmonary edema 2/2 aortic stenosis  Moderate to severe AS  EVARISTO on CKD with hyperkalemia and oligouria  Drop in Hgb  ?tompkins syndrome, hemoglobin responded well to 1 unit of PRBC, no further evidence of bleeding     - trial of lasix, if no urine output and MAP <80, can try levophed to increase renal perfusion  - f/u with cardiology  Patient is DNR/DNI  case discussed with patient's wide, niece and cousin.

## 2021-07-29 LAB
ALBUMIN SERPL ELPH-MCNC: 2.6 G/DL — LOW (ref 3.3–5)
ALBUMIN SERPL ELPH-MCNC: 2.6 G/DL — LOW (ref 3.3–5)
ALP SERPL-CCNC: 45 U/L — SIGNIFICANT CHANGE UP (ref 40–120)
ALP SERPL-CCNC: 47 U/L — SIGNIFICANT CHANGE UP (ref 40–120)
ALT FLD-CCNC: 59 U/L — HIGH (ref 10–45)
ALT FLD-CCNC: 61 U/L — HIGH (ref 10–45)
ANION GAP SERPL CALC-SCNC: 13 MMOL/L — SIGNIFICANT CHANGE UP (ref 5–17)
ANION GAP SERPL CALC-SCNC: 14 MMOL/L — SIGNIFICANT CHANGE UP (ref 5–17)
AST SERPL-CCNC: 74 U/L — HIGH (ref 10–40)
AST SERPL-CCNC: 80 U/L — HIGH (ref 10–40)
BILIRUB SERPL-MCNC: 0.4 MG/DL — SIGNIFICANT CHANGE UP (ref 0.2–1.2)
BILIRUB SERPL-MCNC: 0.7 MG/DL — SIGNIFICANT CHANGE UP (ref 0.2–1.2)
BUN SERPL-MCNC: 76 MG/DL — HIGH (ref 7–23)
BUN SERPL-MCNC: 78 MG/DL — HIGH (ref 7–23)
CALCIUM SERPL-MCNC: 8.3 MG/DL — LOW (ref 8.4–10.5)
CALCIUM SERPL-MCNC: 8.4 MG/DL — SIGNIFICANT CHANGE UP (ref 8.4–10.5)
CHLORIDE SERPL-SCNC: 106 MMOL/L — SIGNIFICANT CHANGE UP (ref 96–108)
CHLORIDE SERPL-SCNC: 107 MMOL/L — SIGNIFICANT CHANGE UP (ref 96–108)
CO2 SERPL-SCNC: 19 MMOL/L — LOW (ref 22–31)
CO2 SERPL-SCNC: 19 MMOL/L — LOW (ref 22–31)
CREAT SERPL-MCNC: 4.62 MG/DL — HIGH (ref 0.5–1.3)
CREAT SERPL-MCNC: 4.88 MG/DL — HIGH (ref 0.5–1.3)
GLUCOSE BLDC GLUCOMTR-MCNC: 135 MG/DL — HIGH (ref 70–99)
GLUCOSE BLDC GLUCOMTR-MCNC: 137 MG/DL — HIGH (ref 70–99)
GLUCOSE BLDC GLUCOMTR-MCNC: 147 MG/DL — HIGH (ref 70–99)
GLUCOSE SERPL-MCNC: 137 MG/DL — HIGH (ref 70–99)
GLUCOSE SERPL-MCNC: 143 MG/DL — HIGH (ref 70–99)
HAPTOGLOB SERPL-MCNC: 68 MG/DL — SIGNIFICANT CHANGE UP (ref 34–200)
HCT VFR BLD CALC: 14.5 % — CRITICAL LOW (ref 39–50)
HCT VFR BLD CALC: 22.6 % — LOW (ref 39–50)
HGB BLD-MCNC: 5 G/DL — CRITICAL LOW (ref 13–17)
HGB BLD-MCNC: 7.7 G/DL — LOW (ref 13–17)
LACTATE SERPL-SCNC: 1 MMOL/L — SIGNIFICANT CHANGE UP (ref 0.5–2)
LDH SERPL L TO P-CCNC: 433 U/L — HIGH (ref 50–242)
MAGNESIUM SERPL-MCNC: 2.2 MG/DL — SIGNIFICANT CHANGE UP (ref 1.6–2.6)
MAGNESIUM SERPL-MCNC: 2.3 MG/DL — SIGNIFICANT CHANGE UP (ref 1.6–2.6)
MCHC RBC-ENTMCNC: 30 PG — SIGNIFICANT CHANGE UP (ref 27–34)
MCHC RBC-ENTMCNC: 30.5 PG — SIGNIFICANT CHANGE UP (ref 27–34)
MCHC RBC-ENTMCNC: 34.1 GM/DL — SIGNIFICANT CHANGE UP (ref 32–36)
MCHC RBC-ENTMCNC: 34.5 GM/DL — SIGNIFICANT CHANGE UP (ref 32–36)
MCV RBC AUTO: 87.9 FL — SIGNIFICANT CHANGE UP (ref 80–100)
MCV RBC AUTO: 88.4 FL — SIGNIFICANT CHANGE UP (ref 80–100)
MUSK IGG SER IA-MCNC: <1 U/ML — SIGNIFICANT CHANGE UP
NRBC # BLD: 0 /100 WBCS — SIGNIFICANT CHANGE UP (ref 0–0)
PHOSPHATE SERPL-MCNC: 6.2 MG/DL — HIGH (ref 2.5–4.5)
PHOSPHATE SERPL-MCNC: 6.5 MG/DL — HIGH (ref 2.5–4.5)
PLATELET # BLD AUTO: 321 K/UL — SIGNIFICANT CHANGE UP (ref 150–400)
PLATELET # BLD AUTO: 342 K/UL — SIGNIFICANT CHANGE UP (ref 150–400)
POTASSIUM SERPL-MCNC: 4.4 MMOL/L — SIGNIFICANT CHANGE UP (ref 3.5–5.3)
POTASSIUM SERPL-MCNC: 5.6 MMOL/L — HIGH (ref 3.5–5.3)
POTASSIUM SERPL-SCNC: 4.4 MMOL/L — SIGNIFICANT CHANGE UP (ref 3.5–5.3)
POTASSIUM SERPL-SCNC: 5.6 MMOL/L — HIGH (ref 3.5–5.3)
PROT SERPL-MCNC: 6 G/DL — SIGNIFICANT CHANGE UP (ref 6–8.3)
PROT SERPL-MCNC: 6.1 G/DL — SIGNIFICANT CHANGE UP (ref 6–8.3)
RBC # BLD: 1.64 M/UL — LOW (ref 4.2–5.8)
RBC # BLD: 2.57 M/UL — LOW (ref 4.2–5.8)
RBC # FLD: 16.4 % — HIGH (ref 10.3–14.5)
RBC # FLD: 19.2 % — HIGH (ref 10.3–14.5)
RETICS #: 58.9 K/UL — SIGNIFICANT CHANGE UP (ref 25–125)
RETICS/RBC NFR: 3.6 % — HIGH (ref 0.5–2.5)
SODIUM SERPL-SCNC: 138 MMOL/L — SIGNIFICANT CHANGE UP (ref 135–145)
SODIUM SERPL-SCNC: 140 MMOL/L — SIGNIFICANT CHANGE UP (ref 135–145)
URATE SERPL-MCNC: 10.4 MG/DL — HIGH (ref 3.4–8.8)
WBC # BLD: 75.18 K/UL — CRITICAL HIGH (ref 3.8–10.5)
WBC # BLD: 85.76 K/UL — CRITICAL HIGH (ref 3.8–10.5)
WBC # FLD AUTO: 75.18 K/UL — CRITICAL HIGH (ref 3.8–10.5)
WBC # FLD AUTO: 85.76 K/UL — CRITICAL HIGH (ref 3.8–10.5)

## 2021-07-29 PROCEDURE — 71045 X-RAY EXAM CHEST 1 VIEW: CPT | Mod: 26

## 2021-07-29 PROCEDURE — 71045 X-RAY EXAM CHEST 1 VIEW: CPT | Mod: 26,77

## 2021-07-29 PROCEDURE — 99233 SBSQ HOSP IP/OBS HIGH 50: CPT | Mod: GC,25

## 2021-07-29 PROCEDURE — 93306 TTE W/DOPPLER COMPLETE: CPT | Mod: 26

## 2021-07-29 PROCEDURE — 99291 CRITICAL CARE FIRST HOUR: CPT

## 2021-07-29 PROCEDURE — 99233 SBSQ HOSP IP/OBS HIGH 50: CPT | Mod: GC

## 2021-07-29 RX ORDER — LEVETIRACETAM 250 MG/1
500 TABLET, FILM COATED ORAL EVERY 24 HOURS
Refills: 0 | Status: DISCONTINUED | OUTPATIENT
Start: 2021-07-30 | End: 2021-08-02

## 2021-07-29 RX ORDER — SODIUM ZIRCONIUM CYCLOSILICATE 10 G/10G
10 POWDER, FOR SUSPENSION ORAL EVERY 8 HOURS
Refills: 0 | Status: DISCONTINUED | OUTPATIENT
Start: 2021-07-29 | End: 2021-07-29

## 2021-07-29 RX ORDER — SODIUM ZIRCONIUM CYCLOSILICATE 10 G/10G
10 POWDER, FOR SUSPENSION ORAL EVERY 8 HOURS
Refills: 0 | Status: DISCONTINUED | OUTPATIENT
Start: 2021-07-29 | End: 2021-07-30

## 2021-07-29 RX ORDER — CHLORHEXIDINE GLUCONATE 213 G/1000ML
1 SOLUTION TOPICAL
Refills: 0 | Status: DISCONTINUED | OUTPATIENT
Start: 2021-07-29 | End: 2021-08-03

## 2021-07-29 RX ADMIN — ATORVASTATIN CALCIUM 40 MILLIGRAM(S): 80 TABLET, FILM COATED ORAL at 21:27

## 2021-07-29 RX ADMIN — PHENYLEPHRINE HYDROCHLORIDE 2.25 MICROGRAM(S)/KG/MIN: 10 INJECTION INTRAVENOUS at 14:32

## 2021-07-29 RX ADMIN — SODIUM ZIRCONIUM CYCLOSILICATE 10 GRAM(S): 10 POWDER, FOR SUSPENSION ORAL at 21:27

## 2021-07-29 RX ADMIN — PANTOPRAZOLE SODIUM 40 MILLIGRAM(S): 20 TABLET, DELAYED RELEASE ORAL at 18:58

## 2021-07-29 RX ADMIN — PHENYLEPHRINE HYDROCHLORIDE 2.25 MICROGRAM(S)/KG/MIN: 10 INJECTION INTRAVENOUS at 09:40

## 2021-07-29 RX ADMIN — LEVETIRACETAM 400 MILLIGRAM(S): 250 TABLET, FILM COATED ORAL at 06:53

## 2021-07-29 RX ADMIN — PIPERACILLIN AND TAZOBACTAM 200 GRAM(S): 4; .5 INJECTION, POWDER, LYOPHILIZED, FOR SOLUTION INTRAVENOUS at 04:13

## 2021-07-29 RX ADMIN — PIPERACILLIN AND TAZOBACTAM 200 GRAM(S): 4; .5 INJECTION, POWDER, LYOPHILIZED, FOR SOLUTION INTRAVENOUS at 15:15

## 2021-07-29 RX ADMIN — PANTOPRAZOLE SODIUM 40 MILLIGRAM(S): 20 TABLET, DELAYED RELEASE ORAL at 06:26

## 2021-07-29 RX ADMIN — PHENYLEPHRINE HYDROCHLORIDE 2.25 MICROGRAM(S)/KG/MIN: 10 INJECTION INTRAVENOUS at 05:44

## 2021-07-29 RX ADMIN — SODIUM ZIRCONIUM CYCLOSILICATE 10 GRAM(S): 10 POWDER, FOR SUSPENSION ORAL at 14:32

## 2021-07-29 NOTE — PROGRESS NOTE ADULT - ASSESSMENT
89M with PMHx of HTN and seizure on phenytoin presents with left facial droop and slurred speech. Stroke workup negative. Neurology consulted for persistent weakness.      Impression  - Myasthenia gravis, new onset. Bedside repetitive nerve stimulation on 7/22 notable for decremental response indicative of MG, Strength improved after receiving full course of IVIG.  - Patient now with left leg weakness, pain, and decreased sensation to temperature on left thigh. Exam somewhat limited by language barrier although patient's family was at the bedside to translate. Weakness is subjective and does not complete correlate with degree of weakness on exam.  - Weakness may be somewhat explained by Myasthenia, but sensory deficits suggest an alternative etiology. Pt's pain is not reproducible, may suggest bone marrow involvement given hx of persistent leukocytosis.      Plan  - F/u Myasthenia Gravis antibodies - bedside repetitive nerve stimulation on 7/22 indicative of MG  - S/p IVIG x5 days  - Recommend Lumbar spine with/without contrast to further evaluate patient's complaints of weakness, sensory deficits. Can be done when patient more stable from ICU perspective. Given +MG antibodies, no further need for repeat MRI brain.  - PT/OT  - Will consider starting Rituximab q6 months for long-term management of myasthenia, pending ID assessment of prior Hep B infection and improvement of current hypotension/pulmonary edema.  - Alternatively, can treat with Prednisone 10mg daily, pending rule out of GIB in setting of acute anemia  - Heme-Onc following for persistent leukocytosis. Flow cytometry, BCR-ABL, JAK2 serologies sent. Can follow up outpatient with Dr. Patrick Dunn discussed with Attending Dr. Evie Mcclellan MD  Neurology, PGY-2  Pager: [phone]  [phone]

## 2021-07-29 NOTE — PROGRESS NOTE ADULT - SUBJECTIVE AND OBJECTIVE BOX
INTERVAL EVENTS:    PAST MEDICAL & SURGICAL HISTORY:  Hypertension    Seizure        MEDICATIONS  (STANDING):  atorvastatin 40 milliGRAM(s) Oral at bedtime  chlorhexidine 2% Cloths 1 Application(s) Topical <User Schedule>  dextrose 40% Gel 15 Gram(s) Oral once  dextrose 5%. 1000 milliLiter(s) (50 mL/Hr) IV Continuous <Continuous>  dextrose 5%. 1000 milliLiter(s) (100 mL/Hr) IV Continuous <Continuous>  dextrose 50% Injectable 25 Gram(s) IV Push once  dextrose 50% Injectable 12.5 Gram(s) IV Push once  dextrose 50% Injectable 25 Gram(s) IV Push once  glucagon  Injectable 1 milliGRAM(s) IntraMuscular once  insulin lispro (ADMELOG) corrective regimen sliding scale   SubCutaneous every 6 hours  pantoprazole  Injectable 40 milliGRAM(s) IV Push every 12 hours  phenylephrine    Infusion 0.1 MICROgram(s)/kG/Min (2.25 mL/Hr) IV Continuous <Continuous>  piperacillin/tazobactam IVPB.. 4.5 Gram(s) IV Intermittent every 12 hours  sodium zirconium cyclosilicate 10 Gram(s) Oral every 8 hours    MEDICATIONS  (PRN):    Vital Signs Last 24 Hrs  T(C): 36.8 (29 Jul 2021 09:37), Max: 37.3 (29 Jul 2021 05:49)  T(F): 98.2 (29 Jul 2021 09:37), Max: 99.2 (29 Jul 2021 05:49)  HR: 62 (29 Jul 2021 14:00) (55 - 112)  BP: 162/70 (29 Jul 2021 13:00) (98/45 - 162/70)  BP(mean): 100 (29 Jul 2021 13:00) (65 - 100)  RR: 28 (29 Jul 2021 14:00) (6 - 30)  SpO2: 99% (29 Jul 2021 14:00) (91% - 100%)    PHYSICAL EXAM:  GEN: Awake, alert. NAD.   HEENT: NCAT, PERRL, EOMI. Mucosa moist. No JVD.  RESP: CTA b/l  CV: RRR. Normal S1/S2. No m/r/g.  ABD: Soft. NT/ND. BS+  EXT: Warm. No edema, clubbing, or cyanosis.   NEURO: AAOx3. No focal deficits.     LABS:                        5.0    85.76 )-----------( 342      ( 29 Jul 2021 08:53 )             14.5     07-29    138  |  106  |  76<H>  ----------------------------<  143<H>  5.6<H>   |  19<L>  |  4.88<H>    Ca    8.3<L>      29 Jul 2021 08:53  Phos  6.2     07-29  Mg     2.2     07-29    TPro  6.0  /  Alb  2.6<L>  /  TBili  0.4  /  DBili  x   /  AST  80<H>  /  ALT  59<H>  /  AlkPhos  45  07-29        PT/INR - ( 28 Jul 2021 04:10 )   PT: 14.3 sec;   INR: 1.20          PTT - ( 28 Jul 2021 04:10 )  PTT:37.3 sec    I&O's Summary    28 Jul 2021 07:01  -  29 Jul 2021 07:00  --------------------------------------------------------  IN: 1056 mL / OUT: 100 mL / NET: 956 mL    29 Jul 2021 07:01  -  29 Jul 2021 14:28  --------------------------------------------------------  IN: 739.6 mL / OUT: 0 mL / NET: 739.6 mL      RADIOLOGY & ADDITIONAL STUDIES:  TELEMETRY:  EKG:         INTERVAL EVENTS: Pt was started on phenylephrine. He is uncomfortable on exam. Has NGT in place.     PAST MEDICAL & SURGICAL HISTORY:  Hypertension    Seizure        MEDICATIONS  (STANDING):  atorvastatin 40 milliGRAM(s) Oral at bedtime  chlorhexidine 2% Cloths 1 Application(s) Topical <User Schedule>  dextrose 40% Gel 15 Gram(s) Oral once  dextrose 5%. 1000 milliLiter(s) (50 mL/Hr) IV Continuous <Continuous>  dextrose 5%. 1000 milliLiter(s) (100 mL/Hr) IV Continuous <Continuous>  dextrose 50% Injectable 25 Gram(s) IV Push once  dextrose 50% Injectable 12.5 Gram(s) IV Push once  dextrose 50% Injectable 25 Gram(s) IV Push once  glucagon  Injectable 1 milliGRAM(s) IntraMuscular once  insulin lispro (ADMELOG) corrective regimen sliding scale   SubCutaneous every 6 hours  pantoprazole  Injectable 40 milliGRAM(s) IV Push every 12 hours  phenylephrine    Infusion 0.1 MICROgram(s)/kG/Min (2.25 mL/Hr) IV Continuous <Continuous>  piperacillin/tazobactam IVPB.. 4.5 Gram(s) IV Intermittent every 12 hours  sodium zirconium cyclosilicate 10 Gram(s) Oral every 8 hours    MEDICATIONS  (PRN):    Vital Signs Last 24 Hrs  T(C): 36.8 (29 Jul 2021 09:37), Max: 37.3 (29 Jul 2021 05:49)  T(F): 98.2 (29 Jul 2021 09:37), Max: 99.2 (29 Jul 2021 05:49)  HR: 62 (29 Jul 2021 14:00) (55 - 112)  BP: 162/70 (29 Jul 2021 13:00) (98/45 - 162/70)  BP(mean): 100 (29 Jul 2021 13:00) (65 - 100)  RR: 28 (29 Jul 2021 14:00) (6 - 30)  SpO2: 99% (29 Jul 2021 14:00) (91% - 100%)    PHYSICAL EXAM:  GEN: Awake, alert. In mild distress. Has NGT in place   HEENT: NCAT, PERRL, EOMI. Mucosa moist. No JVD.  RESP: Bibasilar crackles +  CV: RRR. Normal S1/S2. No m/r/g.  ABD: Soft. NT/ND. BS+  EXT: Warm. No edema, clubbing, or cyanosis.   NEURO: AAOx3. No focal deficits.       LABS:                        5.0    85.76 )-----------( 342      ( 29 Jul 2021 08:53 )             14.5     07-29    138  |  106  |  76<H>  ----------------------------<  143<H>  5.6<H>   |  19<L>  |  4.88<H>    Ca    8.3<L>      29 Jul 2021 08:53  Phos  6.2     07-29  Mg     2.2     07-29    TPro  6.0  /  Alb  2.6<L>  /  TBili  0.4  /  DBili  x   /  AST  80<H>  /  ALT  59<H>  /  AlkPhos  45  07-29        PT/INR - ( 28 Jul 2021 04:10 )   PT: 14.3 sec;   INR: 1.20          PTT - ( 28 Jul 2021 04:10 )  PTT:37.3 sec    I&O's Summary    28 Jul 2021 07:01  -  29 Jul 2021 07:00  --------------------------------------------------------  IN: 1056 mL / OUT: 100 mL / NET: 956 mL    29 Jul 2021 07:01  -  29 Jul 2021 14:28  --------------------------------------------------------  IN: 739.6 mL / OUT: 0 mL / NET: 739.6 mL      RADIOLOGY & ADDITIONAL STUDIES:  TELEMETRY:  EKG:

## 2021-07-29 NOTE — CHART NOTE - NSCHARTNOTEFT_GEN_A_CORE
Admitting Diagnosis:   Patient is a 89y old  Male who presents with a chief complaint of slurred speech, L facial droop (29 Jul 2021 14:27)      PAST MEDICAL & SURGICAL HISTORY:  Hypertension    Seizure        Current Nutrition Order:  NPO    PO Intake: Good (%) [   ]  Fair (50-75%) [   ] Poor (<25%) [   ]- NA NPO    GI Issues: No N/V/C/D reported at this time.   BM 7/29    Pain: Some discomfort from NG but no severe pain reported     Skin Integrity: Sheng 14, no edema  Intact pressure-wise     Labs:   07-29    138  |  106  |  76<H>  ----------------------------<  143<H>  5.6<H>   |  19<L>  |  4.88<H>    Ca    8.3<L>      29 Jul 2021 08:53  Phos  6.2     07-29  Mg     2.2     07-29    TPro  6.0  /  Alb  2.6<L>  /  TBili  0.4  /  DBili  x   /  AST  80<H>  /  ALT  59<H>  /  AlkPhos  45  07-29    CAPILLARY BLOOD GLUCOSE      POCT Blood Glucose.: 147 mg/dL (29 Jul 2021 11:40)  POCT Blood Glucose.: 137 mg/dL (29 Jul 2021 06:37)  POCT Blood Glucose.: 172 mg/dL (28 Jul 2021 17:11)      Medications:  MEDICATIONS  (STANDING):  atorvastatin 40 milliGRAM(s) Oral at bedtime  chlorhexidine 2% Cloths 1 Application(s) Topical <User Schedule>  dextrose 40% Gel 15 Gram(s) Oral once  dextrose 5%. 1000 milliLiter(s) (50 mL/Hr) IV Continuous <Continuous>  dextrose 5%. 1000 milliLiter(s) (100 mL/Hr) IV Continuous <Continuous>  dextrose 50% Injectable 25 Gram(s) IV Push once  dextrose 50% Injectable 12.5 Gram(s) IV Push once  dextrose 50% Injectable 25 Gram(s) IV Push once  glucagon  Injectable 1 milliGRAM(s) IntraMuscular once  insulin lispro (ADMELOG) corrective regimen sliding scale   SubCutaneous every 6 hours  pantoprazole  Injectable 40 milliGRAM(s) IV Push every 12 hours  phenylephrine    Infusion 0.1 MICROgram(s)/kG/Min (2.25 mL/Hr) IV Continuous <Continuous>  piperacillin/tazobactam IVPB.. 4.5 Gram(s) IV Intermittent every 12 hours  sodium zirconium cyclosilicate 10 Gram(s) Oral every 8 hours    MEDICATIONS  (PRN):      Weight:  Weight Change:     Nutrition Focused Physical Exam: Completed [   ]  Not Pertinent [  X ]    Estimated energy needs:     Subjective:     Previous Nutrition Diagnosis:    Active [   ]  Resolved [   ]    If resolved, new PES:     Goal:    Recommendations:    Education:     Risk Level: High [ X  ] Moderate [   ] Low [   ] Admitting Diagnosis:   Patient is a 89y old  Male who presents with a chief complaint of slurred speech, L facial droop (29 Jul 2021 14:27)      PAST MEDICAL & SURGICAL HISTORY:  Hypertension    Seizure        Current Nutrition Order:  NPO    PO Intake: Good (%) [   ]  Fair (50-75%) [   ] Poor (<25%) [   ]- NA NPO    GI Issues: No N/V/C/D reported at this time.   BM 7/29    Pain: Some discomfort from NG but no severe pain reported     Skin Integrity: Sheng 14, no edema  Intact pressure-wise     Labs:   07-29    138  |  106  |  76<H>  ----------------------------<  143<H>  5.6<H>   |  19<L>  |  4.88<H>    Ca    8.3<L>      29 Jul 2021 08:53  Phos  6.2     07-29  Mg     2.2     07-29    TPro  6.0  /  Alb  2.6<L>  /  TBili  0.4  /  DBili  x   /  AST  80<H>  /  ALT  59<H>  /  AlkPhos  45  07-29    CAPILLARY BLOOD GLUCOSE      POCT Blood Glucose.: 147 mg/dL (29 Jul 2021 11:40)  POCT Blood Glucose.: 137 mg/dL (29 Jul 2021 06:37)  POCT Blood Glucose.: 172 mg/dL (28 Jul 2021 17:11)      Medications:  MEDICATIONS  (STANDING):  atorvastatin 40 milliGRAM(s) Oral at bedtime  chlorhexidine 2% Cloths 1 Application(s) Topical <User Schedule>  dextrose 40% Gel 15 Gram(s) Oral once  dextrose 5%. 1000 milliLiter(s) (50 mL/Hr) IV Continuous <Continuous>  dextrose 5%. 1000 milliLiter(s) (100 mL/Hr) IV Continuous <Continuous>  dextrose 50% Injectable 25 Gram(s) IV Push once  dextrose 50% Injectable 12.5 Gram(s) IV Push once  dextrose 50% Injectable 25 Gram(s) IV Push once  glucagon  Injectable 1 milliGRAM(s) IntraMuscular once  insulin lispro (ADMELOG) corrective regimen sliding scale   SubCutaneous every 6 hours  pantoprazole  Injectable 40 milliGRAM(s) IV Push every 12 hours  phenylephrine    Infusion 0.1 MICROgram(s)/kG/Min (2.25 mL/Hr) IV Continuous <Continuous>  piperacillin/tazobactam IVPB.. 4.5 Gram(s) IV Intermittent every 12 hours  sodium zirconium cyclosilicate 10 Gram(s) Oral every 8 hours    MEDICATIONS  (PRN):      Weight: 60.3kg   Weight Change: No new wts recorded since admit     Nutrition Focused Physical Exam: Completed [   ]  Not Pertinent [  X ]    Estimated energy needs: Height 66"; ABW 60.3kg; IBW 64.4kg; 94%IBW; BMI 21.4  ActualBW used for calculations as pt between % of IBW. Needs estimated for age and slightly increased for inflammatory process. Moderate protein 2/2 EVARISTO  Calories: 25-30 kcal/kg = 7424-4323 kcal/day  Protein: 1.1-1.3 g/kg = 66-78g pro/day  Fluids per team 2/2 EVARISTO       Subjective: 89y Male with PMHx of HTN and seizure on phenytoin presents with left facial droop and slurred speech, Stroke w/u negative. Found to have myasthenia gravis. S/p IVIG x5 days. Admitted to ICU for cardiogenic shock vs obstructive shock in the setting of severe aortic stenosis and IV fluid administration. At that time pt was hypotensive w/leukocytosis. Also found to have aspiration pneumonia. S/p FEES on 7/20, and multiple bedside f/u over past week. Pt deemed inappropriate for PO intake and NGT placed this AM. Pt seen in room and discussed during MICU rounds. On phenylephrine for BP support (mean BP cuff reading 85). NPO at time of assessment and planning to start EN today. Pt w/o complaints of N/V or pain. Last BM 7/29. Course c/b worsening EVARISTO on CKD. K 5.6 (H), Phos 6.2 (H). BUN 76/Cr 4.88 (H). Also noted w/drop in hemoglobin this AM- no e/o GIB (H/H 5.0/14.5%). WBC 85.76 (H). Will continue to follow per RD protocol.     Previous Nutrition Diagnosis: Inadequate Oral Intake RT dysphagia AEB need for EN to meet estimated needs.     Active [X  ]  Resolved [   ]    If resolved, new PES:     Goal: Nutrition support will be started w/in 24hrs so that pt is able to meet % of daily EER via tolerated route     Recommendations:  1. Recommend starting Nepro @ 40ml/hr x 24hrs via NGT. Provides: 960ml TV, 1728kcal, 78g protein, 698ml free H2O, 102% RDI, 1.3g/kg ABW pro. Recommend starting at 10mL/hr and advancing by [id number] to goal as tolerated. Additional free H2O per team. Monitor for s/s intolerance; maintain aspiration precautions at all times.   2. F/u with SLP recs for diet advancement   3. Monitor lytes and replete prn. POC BG q6hrs   4. Pain and bowel regimens per team   5. Keep nutrition aligned with GOC at all times  *d/w team     Education: d/w family purpose of EN    Risk Level: High [ X  ] Moderate [   ] Low [   ]

## 2021-07-29 NOTE — PROGRESS NOTE ADULT - SUBJECTIVE AND OBJECTIVE BOX
Neurology Progress Note    Interval History:    No acute events overnight. Patient noted more lethargic this morning compared to yesterday. Hgb acutely dropped back to 5 this morning.    HPI:   89y Male with PMHx of HTN and seizure on phenytoin presents with left facial droop and slurred speech. Pt was with family member (dalia-nephew Mesh [phone]) who noticed at 5pm, pt had acute onset of slurred speech, increased saliva production and a left facial droop. Pt was driven to the ED immediately. On presentation, /94, NIHSS 3. Great-nephew at bedside denies hx of stroke, use of blood thinners, recent trauma/bleeding event. CTH negative for acute hemorrhage. CTP without any perfusion defect. CTA without LVO. In conjunction with patient, family and stroke attending, decision was made to not give tpa since family felt his current deficits are non-disabling to his daily life vs risk of bleeding. Pt and family was made aware of the possibility of worsening symptoms and the opportunity to give tpa was time limited. They expressed understanding and declined tpa administration.     Pt denies CP, SOB, extremity weakness, changes in vision, HA. He endorses a cough that has been occurring for the past few days with sputum production, but has been afebrile. Pt given vanc and zosyn x1 in the ED. CXR pending official read. Patient initially failed dysphagia screen and was given  KY, but passed second dysphagia screen so was able to to loaded with plavix 300 PO.     At baseline, pt is fully independent of all ADLs and iADLs, ambulates without assistance, with hearing loss b/l, baseline right arm tremor. It is unclear if pt sees a neurologist for seizure management. Per wife (Ross [phone], [phone]), his most recent seizure was in April 2020 where he had full body shaking lasting for 5 minutes. He has been on phenytoin 100mg TID and has not had another seizure since.       PAST MEDICAL & SURGICAL HISTORY:  Hypertension    Seizure            Medications:  atorvastatin 40 milliGRAM(s) Oral at bedtime  chlorhexidine 2% Cloths 1 Application(s) Topical <User Schedule>  dextrose 40% Gel 15 Gram(s) Oral once  dextrose 5%. 1000 milliLiter(s) IV Continuous <Continuous>  dextrose 5%. 1000 milliLiter(s) IV Continuous <Continuous>  dextrose 50% Injectable 25 Gram(s) IV Push once  dextrose 50% Injectable 12.5 Gram(s) IV Push once  dextrose 50% Injectable 25 Gram(s) IV Push once  glucagon  Injectable 1 milliGRAM(s) IntraMuscular once  insulin lispro (ADMELOG) corrective regimen sliding scale   SubCutaneous every 6 hours  pantoprazole  Injectable 40 milliGRAM(s) IV Push every 12 hours  phenylephrine    Infusion 0.1 MICROgram(s)/kG/Min IV Continuous <Continuous>  piperacillin/tazobactam IVPB.. 4.5 Gram(s) IV Intermittent every 12 hours  sodium zirconium cyclosilicate 10 Gram(s) Oral every 8 hours      Vital Signs Last 24 Hrs  T(C): 36.8 (29 Jul 2021 09:37), Max: 37.3 (29 Jul 2021 05:49)  T(F): 98.2 (29 Jul 2021 09:37), Max: 99.2 (29 Jul 2021 05:49)  HR: 63 (29 Jul 2021 11:00) (55 - 115)  BP: 131/59 (29 Jul 2021 11:00) (78/36 - 147/61)  BP(mean): 85 (29 Jul 2021 11:00) (52 - 91)  RR: 19 (29 Jul 2021 11:00) (6 - 30)  SpO2: 97% (29 Jul 2021 11:00) (91% - 100%)    Neurological Examination:  General:  Appearance is consistent with chronologic age.  No abnormal facies.  Gross skin survey within normal limits.    Cognitive/Language:  Awake, alert, and oriented to person, place, time and date.  Recent and remote memory intact.  Fund of knowledge is appropriate.  Naming, repetition and comprehension intact.   Nondysarthric.    Cranial Nerves  - Eyes: Visual acuity intact, Visual fields full.  EOMI w/o nystagmus, skew or reported double vision.  PERRL.  No ptosis/weakness of eyelid closure.    - Face:  Facial sensation normal V1 - 3, no facial asymmetry.    - Ears/Nose/Throat:  Hearing grossly intact b/l to finger rub.  Palate elevates midline.  Tongue and uvula midline.   Motor examination:  5/5 UE bilaterally, 4-/5 proximal left leg, 4-/5 distal left leg, 5/5 right lower extremity.  No observable drift. Normal tone and bulk. No tenderness, twitching, tremors or involuntary movements.  Sensory examination:   Decreased sensation to temperature on left thigh. Sensation otherwise intact  Reflexes:   2+ b/l biceps, triceps, brachioradialis, patella and achilles.  Plantar response downgoing b/l.  clonus absent.  Cerebellum:   FTN/HKS intact.  No dysmetria or dysdiadokinesia.     Labs:  CBC Full  -  ( 29 Jul 2021 08:53 )  WBC Count : 85.76 K/uL  RBC Count : 1.64 M/uL  Hemoglobin : 5.0 g/dL  Hematocrit : 14.5 %  Platelet Count - Automated : 342 K/uL  Mean Cell Volume : 88.4 fl  Mean Cell Hemoglobin : 30.5 pg  Mean Cell Hemoglobin Concentration : 34.5 gm/dL  Auto Neutrophil # : x  Auto Lymphocyte # : x  Auto Monocyte # : x  Auto Eosinophil # : x  Auto Basophil # : x  Auto Neutrophil % : x  Auto Lymphocyte % : x  Auto Monocyte % : x  Auto Eosinophil % : x  Auto Basophil % : x    07-29    138  |  106  |  76<H>  ----------------------------<  143<H>  5.6<H>   |  19<L>  |  4.88<H>    Ca    8.3<L>      29 Jul 2021 08:53  Phos  6.2     07-29  Mg     2.2     07-29    TPro  6.0  /  Alb  2.6<L>  /  TBili  0.4  /  DBili  x   /  AST  80<H>  /  ALT  59<H>  /  AlkPhos  45  07-29    LIVER FUNCTIONS - ( 29 Jul 2021 08:53 )  Alb: 2.6 g/dL / Pro: 6.0 g/dL / ALK PHOS: 45 U/L / ALT: 59 U/L / AST: 80 U/L / GGT: x           PT/INR - ( 28 Jul 2021 04:10 )   PT: 14.3 sec;   INR: 1.20          PTT - ( 28 Jul 2021 04:10 )  PTT:37.3 sec

## 2021-07-29 NOTE — PROGRESS NOTE ADULT - ATTENDING COMMENTS
I was physically present for the key portions of the evaluation and managemnent (E/M) service provided.  I agree with the above history, physical, and plan which I have reviewed and edited where appropriate, with the exceptions as per my note.    Pt seen and examined.    Slow to respond. however, eventually responds appropriately and tells me his name. he does not know where he is. his face is symmetric and he moves all 4 well. His LLE is at least 4+/5 with a very limited exam due to poor cooperation.    AP: hypotension and severe anemia, unclear etiology. LLE weakness - unclear etiology - would consider RP hematoma given hgb drop and hypotension, though US was unrevealing.    - will follow. If no other etiology of subjective LLE weakness/discomfort is found, would get MRI L spine when pt is stabilized.  - consider RP hematoma work up.  - management as per ICU team.

## 2021-07-29 NOTE — PROGRESS NOTE ADULT - ASSESSMENT
89M PMH HTN, moderate- severe AS, seizures p/w dizziness, ruled out for stroke, now being treated for myasthenia gravis. He was found to be hypotensive on 7/28 with acute drop in hgb 8.6 to 5.4. Cardiology consulted for possible Cardiogenic shock given hx of AS.     #Hypotension:   - His hypotension is not consistent with cardiogenic shock given normal LV on TTE and good BP response to fluids. His extremities are warm. Pt does have mod- severe AS with good LV function but his acute drop in BP is not explained by underlying AS. AS is a slowly progressive disease.   - His hypotension with elevated lactate was from sepsis vs. from underlying bleed given drop in Hb from 8.6--> 5.4.   - This morning, his Hb dropped again. Would rule out GIB as as possible cause of hypotension   - CXR with possible effusions. He has some crackles on exam. Can give PRN lasix.   - Given underlying mod- severe AS, would be cautious with fluids     Cardiology to follow

## 2021-07-29 NOTE — PROCEDURE NOTE - NSINDICATIONS_GEN_A_CORE
CSF sampling
vascular access, pt. on pressor therapy/other
Dysphagia/feeding difficulties/other
arterial puncture to obtain ABG's

## 2021-07-29 NOTE — PROGRESS NOTE ADULT - ATTENDING COMMENTS
Initial attending contact date  7/28/21    . See fellow note written above for details. I reviewed the fellow documentation. I have personally seen and examined this patient. I reviewed vitals, labs, medications, cardiac studies, and additional imaging. I agree with the above fellow's findings and plans as written above with the following additions/statements.    -89M PMH HTN, moderate- severe AS, seizures p/w dizziness, ruled out for stroke, now being treated for myasthenia gravis. He was found to be hypotensive on 7/28 with acute drop in hgb 8.6 to 5.4. Cardiology consulted for possible Cardiogenic shock given hx of AS.      -today appears more lethargic, not very communicative   - His hypotension is not consistent with cardiogenic shock given normal LV on TTE and good BP response to fluids. His extremities are warm. Pt does have mod- severe AS with good LV function but his acute drop in BP is not explained by underlying AS. AS is a slowly progressive disease.   - His hypotension with elevated lactate was from sepsis vs. from underlying bleed given drop in Hb from 8.6--> 5.4.   - This morning, his Hb dropped again. Would rule out GIB as as possible cause of hypotension   - CXR with possible effusions. He has some crackles on exam. Can give PRN lasix.   - Given underlying mod- severe AS, would be cautious with fluids   -Prognosis guarded

## 2021-07-29 NOTE — PROCEDURE NOTE - NSPROCDETAILS_GEN_ALL_CORE
ultrasound-guided/location identified, draped/prepped, sterile technique used/blood seen on insertion/flushes easily/secured in place/sterile technique, catheter placed
location identified, draped/prepped, sterile technique used, needle inserted/introduced/procedure aborted/area cleaned in sterile fashion
location identified, draped/prepped, sterile technique used, needle inserted/introduced/positive blood return obtained via catheter/connected to a pressurized flush line/sutured in place/Seldinger technique/all materials/supplies accounted for at end of procedure

## 2021-07-29 NOTE — PROCEDURE NOTE - ESTIMATED BLOOD LOSS
Niyah from [facility] called to report a critical lab result for patient's Creatin level which is at 5.68 - thanks Debora  
None
None
Minimal
None

## 2021-07-29 NOTE — PROGRESS NOTE ADULT - ATTENDING COMMENTS
Patient seen and examined with house-staff during bedside rounds.  Resident note read, including vitals, physical findings, laboratory data, and radiological reports.   Revisions included below.  Direct personal management at bed side and extensive interpretation of the data.  Plan was outlined and discussed in details with the housestaff.  Decision making of high complexity  Action taken for acute disease activity to reflect the level of care provided:  - medication reconciliation  - review laboratory data  I discussed the case in details with the family several times.  Hb decreased and hemolysis workup was negative and no evidence of GI bleeding.  As per hem it is due to his leukemia.  renal function is worse and might need HD.  IV fluids, transfusion and monitor renal function.  NGT inserted and started tube feed renal formula.  I discussed with [facility] and denied for transfer.

## 2021-07-29 NOTE — PROGRESS NOTE ADULT - SUBJECTIVE AND OBJECTIVE BOX
Patient is a 89y old  Male who presents with a chief complaint of slurred speech, L facial droop (28 Jul 2021 17:26)      INTERVAL HPI/OVERNIGHT EVENTS: No overnight events     Afebrile, hemodynamically stable     ICU Vital Signs Last 24 Hrs  T(C): 37.3 (29 Jul 2021 05:49), Max: 37.3 (29 Jul 2021 05:49)  T(F): 99.2 (29 Jul 2021 05:49), Max: 99.2 (29 Jul 2021 05:49)  HR: 67 (29 Jul 2021 05:00) (67 - 115)  BP: 129/60 (29 Jul 2021 05:00) (78/36 - 141/58)  BP(mean): 86 (29 Jul 2021 05:00) (52 - 98)  ABP: --  ABP(mean): --  RR: 27 (29 Jul 2021 05:00) (6 - 30)  SpO2: 100% (29 Jul 2021 05:00) (91% - 100%)    I&O's Summary    27 Jul 2021 07:01  -  28 Jul 2021 07:00  --------------------------------------------------------  IN: 700 mL / OUT: 0 mL / NET: 700 mL    28 Jul 2021 07:01  -  29 Jul 2021 06:17  --------------------------------------------------------  IN: 761 mL / OUT: 100 mL / NET: 661 mL          LABS:                        7.1    82.67 )-----------( 345      ( 28 Jul 2021 18:27 )             21.1     07-28    135  |  102  |  62<H>  ----------------------------<  178<H>  5.6<H>   |  19<L>  |  3.91<H>    Ca    8.8      28 Jul 2021 18:27  Phos  5.4     07-28  Mg     2.2     07-28    TPro  6.8  /  Alb  2.9<L>  /  TBili  0.5  /  DBili  x   /  AST  107<H>  /  ALT  68<H>  /  AlkPhos  52  07-28    PT/INR - ( 28 Jul 2021 04:10 )   PT: 14.3 sec;   INR: 1.20          PTT - ( 28 Jul 2021 04:10 )  PTT:37.3 sec    CAPILLARY BLOOD GLUCOSE      POCT Blood Glucose.: 172 mg/dL (28 Jul 2021 17:11)  POCT Blood Glucose.: 120 mg/dL (28 Jul 2021 11:31)        RADIOLOGY & ADDITIONAL TESTS:    Consultant(s) Notes Reviewed:  [x ] YES  [ ] NO    MEDICATIONS  (STANDING):  atorvastatin 40 milliGRAM(s) Oral at bedtime  dextrose 40% Gel 15 Gram(s) Oral once  dextrose 5%. 1000 milliLiter(s) (50 mL/Hr) IV Continuous <Continuous>  dextrose 5%. 1000 milliLiter(s) (100 mL/Hr) IV Continuous <Continuous>  dextrose 50% Injectable 25 Gram(s) IV Push once  dextrose 50% Injectable 12.5 Gram(s) IV Push once  dextrose 50% Injectable 25 Gram(s) IV Push once  glucagon  Injectable 1 milliGRAM(s) IntraMuscular once  insulin lispro (ADMELOG) corrective regimen sliding scale   SubCutaneous every 6 hours  levETIRAcetam  IVPB 500 milliGRAM(s) IV Intermittent every 12 hours  norepinephrine Infusion 0.05 MICROgram(s)/kG/Min (5.63 mL/Hr) IV Continuous <Continuous>  ondansetron Injectable 4 milliGRAM(s) IV Push once  pantoprazole  Injectable 40 milliGRAM(s) IV Push every 12 hours  phenylephrine    Infusion 0.1 MICROgram(s)/kG/Min (2.25 mL/Hr) IV Continuous <Continuous>  piperacillin/tazobactam IVPB.. 4.5 Gram(s) IV Intermittent every 12 hours    MEDICATIONS  (PRN):      PHYSICAL EXAM:  GENERAL:   HEAD:  Atraumatic, Normocephalic  EYES: EOMI, PERRLA, conjunctiva and sclera clear  NECK: Supple, No JVD, Normal thyroid, no enlarged nodes  NERVOUS SYSTEM:  Alert & Awake.   CHEST/LUNG: B/L good air entry; No rales, rhonchi, or wheezing  HEART: S1S2 normal, no S3, Regular rate and rhythm; No murmurs  ABDOMEN: Soft, Nontender, Nondistended; Bowel sounds present  EXTREMITIES:  2+ Peripheral Pulses, No clubbing, cyanosis, or edema  LYMPH: No lymphadenopathy noted  SKIN: No rashes or lesions    Care Discussed with Consultants/Other Providers [ x] YES  [ ] NO Patient is a 89y old  Male who presents with a chief complaint of slurred speech, L facial droop (28 Jul 2021 17:26)      INTERVAL HPI/OVERNIGHT EVENTS:     ICU Vital Signs Last 24 Hrs  T(C): 37.3 (29 Jul 2021 05:49), Max: 37.3 (29 Jul 2021 05:49)  T(F): 99.2 (29 Jul 2021 05:49), Max: 99.2 (29 Jul 2021 05:49)  HR: 67 (29 Jul 2021 05:00) (67 - 115)  BP: 129/60 (29 Jul 2021 05:00) (78/36 - 141/58)  BP(mean): 86 (29 Jul 2021 05:00) (52 - 98)  ABP: --  ABP(mean): --  RR: 27 (29 Jul 2021 05:00) (6 - 30)  SpO2: 100% (29 Jul 2021 05:00) (91% - 100%)    I&O's Summary    27 Jul 2021 07:01  -  28 Jul 2021 07:00  --------------------------------------------------------  IN: 700 mL / OUT: 0 mL / NET: 700 mL    28 Jul 2021 07:01  -  29 Jul 2021 06:17  --------------------------------------------------------  IN: 761 mL / OUT: 100 mL / NET: 661 mL          LABS:                        7.1    82.67 )-----------( 345      ( 28 Jul 2021 18:27 )             21.1     07-28    135  |  102  |  62<H>  ----------------------------<  178<H>  5.6<H>   |  19<L>  |  3.91<H>    Ca    8.8      28 Jul 2021 18:27  Phos  5.4     07-28  Mg     2.2     07-28    TPro  6.8  /  Alb  2.9<L>  /  TBili  0.5  /  DBili  x   /  AST  107<H>  /  ALT  68<H>  /  AlkPhos  52  07-28    PT/INR - ( 28 Jul 2021 04:10 )   PT: 14.3 sec;   INR: 1.20          PTT - ( 28 Jul 2021 04:10 )  PTT:37.3 sec    CAPILLARY BLOOD GLUCOSE      POCT Blood Glucose.: 172 mg/dL (28 Jul 2021 17:11)  POCT Blood Glucose.: 120 mg/dL (28 Jul 2021 11:31)        RADIOLOGY & ADDITIONAL TESTS:    Consultant(s) Notes Reviewed:  [x ] YES  [ ] NO    MEDICATIONS  (STANDING):  atorvastatin 40 milliGRAM(s) Oral at bedtime  dextrose 40% Gel 15 Gram(s) Oral once  dextrose 5%. 1000 milliLiter(s) (50 mL/Hr) IV Continuous <Continuous>  dextrose 5%. 1000 milliLiter(s) (100 mL/Hr) IV Continuous <Continuous>  dextrose 50% Injectable 25 Gram(s) IV Push once  dextrose 50% Injectable 12.5 Gram(s) IV Push once  dextrose 50% Injectable 25 Gram(s) IV Push once  glucagon  Injectable 1 milliGRAM(s) IntraMuscular once  insulin lispro (ADMELOG) corrective regimen sliding scale   SubCutaneous every 6 hours  levETIRAcetam  IVPB 500 milliGRAM(s) IV Intermittent every 12 hours  norepinephrine Infusion 0.05 MICROgram(s)/kG/Min (5.63 mL/Hr) IV Continuous <Continuous>  ondansetron Injectable 4 milliGRAM(s) IV Push once  pantoprazole  Injectable 40 milliGRAM(s) IV Push every 12 hours  phenylephrine    Infusion 0.1 MICROgram(s)/kG/Min (2.25 mL/Hr) IV Continuous <Continuous>  piperacillin/tazobactam IVPB.. 4.5 Gram(s) IV Intermittent every 12 hours    MEDICATIONS  (PRN):      PHYSICAL EXAM:  GENERAL:   HEAD:  Atraumatic, Normocephalic  EYES: EOMI, PERRLA, conjunctiva and sclera clear  NECK: Supple, No JVD, Normal thyroid, no enlarged nodes  NERVOUS SYSTEM:  Alert & Awake.   CHEST/LUNG: B/L good air entry; No rales, rhonchi, or wheezing  HEART: S1S2 normal, no S3, Regular rate and rhythm; No murmurs  ABDOMEN: Soft, Nontender, Nondistended; Bowel sounds present  EXTREMITIES:  2+ Peripheral Pulses, No clubbing, cyanosis, or edema  LYMPH: No lymphadenopathy noted  SKIN: No rashes or lesions    Care Discussed with Consultants/Other Providers [ x] YES  [ ] NO

## 2021-07-29 NOTE — PROGRESS NOTE ADULT - ASSESSMENT
CARDIOVASCULAR  #  PULMONARY  #  GASTROINTESTINAL  #  RENAL  #  Neurology  #  INFECTIOUS DISEASE  #  ENDOCRINE  #  HEME  #  FLUIDS/ELECTROLYTES/NUTRITION  -IVF  -Monitor, Replete to K>4 and Mg>2  -Diet  PROPHYLAXIS  -DVT  -GI    DISPO  CODE STATUS NEURO  - A&Ox2  # Seizure  - Holding home phenytoin  - Keppra modified from 500 q12 to 500qd due to renal function     CARDIOVASCULAR  #  PULMONARY  #  GASTROINTESTINAL  #  RENAL    #  INFECTIOUS DISEASE  #  ENDOCRINE  #  HEME  #  FLUIDS/ELECTROLYTES/NUTRITION  -IVF  -Monitor, Replete to K>4 and Mg>2  -Diet  PROPHYLAXIS  -DVT  -GI    DISPO  CODE STATUS NEURO  - A&Ox2  # Seizure  - Holding home phenytoin  - Keppra modified from 500 q12 to 500qd due to renal function     CARDIOVASCULAR  #Severe aortic stenosis  - No surgical intervention recommended as per structural heart   - Admitted to ICU for cardiogenic/ obstructive shock in the setting of IV fluid overload     # Hypotension  - Levophed switched to Phenylphrine. Currently on . 28 with a map goal of > 80  - MAP within 80s     PULMONARY  - Saturating well on RA  -  Low threshold to consider pulmonary edema given aortic stenosis and transfusion requirement.      GASTROINTESTINAL  #R/O GI Bleed     #Dysphagia   RENAL    #  INFECTIOUS DISEASE  #  ENDOCRINE  #  HEME  #  FLUIDS/ELECTROLYTES/NUTRITION  -IVF  -Monitor, Replete to K>4 and Mg>2  -Diet  PROPHYLAXIS  -DVT  -GI    DISPO  CODE STATUS NEURO  - A&Ox2  # Seizure  - Holding home phenytoin  - Keppra modified from 500 q12 to 500qd due to renal function     CARDIOVASCULAR  #Severe aortic stenosis  - No surgical intervention recommended as per structural heart   - Admitted to ICU for cardiogenic/ obstructive shock in the setting of IV fluid overload     # Hypotension  - Levophed switched to Phenylphrine. Currently on . 28 with a map goal of > 80  - MAP within 80s     PULMONARY  -  Saturating well on RA  -  Low threshold to consider pulmonary edema given aortic stenosis and transfusion requirement.      GASTROINTESTINAL  #R/O GI Bleed   - Hgb dropped from 7 -> 1  - No evidence of melena or blood stools  - LDH elevated to 433.  - F/u hemolysis labs   - Consider GI scope if hgb continue to fall     #Dysphagia   - PO intake limited by dysphagia   - s/p NGT placement     RENAL  #  - Consider hemodialysis     #  INFECTIOUS DISEASE  #  ENDOCRINE  #  HEME  #  FLUIDS/ELECTROLYTES/NUTRITION  -IVF  -Monitor, Replete to K>4 and Mg>2  -Diet  PROPHYLAXIS  -DVT  -GI    DISPO  CODE STATUS NEURO  - A&Ox2  # Seizure  - Holding home phenytoin  - Keppra modified from 500 q12 to 500qd due to renal function     CARDIOVASCULAR  #Severe aortic stenosis  - No surgical intervention recommended as per structural heart   - Admitted to ICU for cardiogenic/ obstructive shock in the setting of IV fluid overload     # Hypotension  - Levophed switched to Phenylphrine. Currently on . 28 with a map goal of > 80  - MAP within 80s     PULMONARY  -  Saturating well on RA  -  Low threshold to consider pulmonary edema given aortic stenosis and transfusion requirement.      #Pneumonia  - On antibiotics as below     GASTROINTESTINAL  #R/O GI Bleed   - Hgb dropped from 7 -> 1  - No evidence of melena or blood stools  - LDH elevated to 433.  - F/u hemolysis labs   - Consider GI scope if hgb continue to fall     #Dysphagia   - PO intake limited by dysphagia   - s/p NGT placement     RENAL    #AKO on CKD  - Creatinine consistently increasing   - Consider hemodialysis   - Renally dose medications     #Urinary retention   - Low urine output   - Consider diaeresis trial   - Bladder scans and straight cath as needed     INFECTIOUS DISEASE  #Pneumonia  - On zosyn for pseudomonas coverage     ENDOCRINE  #DM  - Insulin sliding scale     HEME  #  FLUIDS/ELECTROLYTES/NUTRITION  -IVF  -Monitor, Replete to K>4 and Mg>2  -Diet  PROPHYLAXIS  -DVT  -GI    DISPO  CODE STATUS 89y Male with PMHx of HTN and seizure on phenytoin presents with left facial droop and slurred speech, Stroke w/u negative. Found to have myasthenia gravis. Admitted to ICU for cardiogenic shock vs obstructive shock in the setting of severe aortic stenosis and IV fluid administration. Also found to have aspiration pneumonia.     NEURO  - A&Ox2  # Seizure  - Holding home phenytoin  - Keppra modified from 500 q12 to 500qd due to renal function     CARDIOVASCULAR  #Severe aortic stenosis  - No surgical intervention recommended as per structural heart   - Admitted to ICU for cardiogenic/ obstructive shock in the setting of IV fluid overload     # Hypotension  - Levophed switched to Phenylphrine. Currently on . 28 with a map goal of > 80  - MAP within 80s   -     PULMONARY  -  Saturating well on RA  -  Low threshold to consider pulmonary edema given aortic stenosis and transfusion requirement.      #Pneumonia  - On antibiotics as below     GASTROINTESTINAL  #R/O GI Bleed   - Hgb dropped from 7 -> 1  - No evidence of melena or blood stools  - LDH elevated to 433.  - F/u hemolysis labs   - Consider GI scope if hgb continue to fall     #Dysphagia   - PO intake limited by dysphagia   - s/p NGT placement     RENAL    #AKO on CKD  - Creatinine consistently increasing   - Consider hemodialysis   - Renally dose medications     #Urinary retention   - Low urine output   - Consider diaeresis trial   - Bladder scans and straight cath as needed     INFECTIOUS DISEASE  #Pneumonia  - On zosyn for pseudomonas coverage     ENDOCRINE  #DM  - Insulin sliding scale     HEME  # Anemia   -Hgb 5.0 in AM  s/p 1 unit PRBCs   - Hemolysis labs pending   - Low suspicion for GI bleed   - Transfuse if hgb < 7       FLUIDS/ELECTROLYTES/NUTRITION      -Monitor, Replete to K>4 and Mg>2  -Diet - NPO  except meds.     PROPHYLAXIS  -DVT: Holding for GI bleed   -GI: Protonix BID     CODE STATUS: DNR

## 2021-07-29 NOTE — PROCEDURE NOTE - NSPOSTCAREGUIDE_GEN_A_CORE
Verbal/written post procedure instructions were given to patient/caregiver/Instructed patient/caregiver to follow-up with primary care physician/Instructed patient/caregiver regarding signs and symptoms of infection/Keep the cast/splint/dressing clean and dry/Care for catheter as per unit/ICU protocols
Verbal/written post procedure instructions were given to patient/caregiver
Verbal/written post procedure instructions were given to patient/caregiver/Instructed patient/caregiver to follow-up with primary care physician/Instructed patient/caregiver regarding signs and symptoms of infection/Keep the cast/splint/dressing clean and dry/Care for catheter as per unit/ICU protocols

## 2021-07-29 NOTE — PROCEDURE NOTE - NSICDXPROCEDURE_GEN_ALL_CORE_FT
PROCEDURES:  Insertion of intravenous catheter with ultrasound guidance 29-Jul-2021 16:43:12  Arianna Wiseman

## 2021-07-30 LAB
ALBUMIN SERPL ELPH-MCNC: 2.4 G/DL — LOW (ref 3.3–5)
ALBUMIN SERPL ELPH-MCNC: 2.7 G/DL — LOW (ref 3.3–5)
ALP SERPL-CCNC: 46 U/L — SIGNIFICANT CHANGE UP (ref 40–120)
ALP SERPL-CCNC: 51 U/L — SIGNIFICANT CHANGE UP (ref 40–120)
ALT FLD-CCNC: 59 U/L — HIGH (ref 10–45)
ALT FLD-CCNC: 60 U/L — HIGH (ref 10–45)
ANION GAP SERPL CALC-SCNC: 10 MMOL/L — SIGNIFICANT CHANGE UP (ref 5–17)
ANION GAP SERPL CALC-SCNC: 14 MMOL/L — SIGNIFICANT CHANGE UP (ref 5–17)
ANISOCYTOSIS BLD QL: SIGNIFICANT CHANGE UP
ANISOCYTOSIS BLD QL: SLIGHT — SIGNIFICANT CHANGE UP
AST SERPL-CCNC: 47 U/L — HIGH (ref 10–40)
AST SERPL-CCNC: 66 U/L — HIGH (ref 10–40)
BASOPHILS # BLD AUTO: 0 K/UL — SIGNIFICANT CHANGE UP (ref 0–0.2)
BASOPHILS # BLD AUTO: 0 K/UL — SIGNIFICANT CHANGE UP (ref 0–0.2)
BASOPHILS NFR BLD AUTO: 0 % — SIGNIFICANT CHANGE UP (ref 0–2)
BASOPHILS NFR BLD AUTO: 0 % — SIGNIFICANT CHANGE UP (ref 0–2)
BILIRUB SERPL-MCNC: 0.6 MG/DL — SIGNIFICANT CHANGE UP (ref 0.2–1.2)
BILIRUB SERPL-MCNC: 0.6 MG/DL — SIGNIFICANT CHANGE UP (ref 0.2–1.2)
BLD GP AB SCN SERPL QL: NEGATIVE — SIGNIFICANT CHANGE UP
BLD GP AB SCN SERPL QL: NEGATIVE — SIGNIFICANT CHANGE UP
BUN SERPL-MCNC: 78 MG/DL — HIGH (ref 7–23)
BUN SERPL-MCNC: 79 MG/DL — HIGH (ref 7–23)
BURR CELLS BLD QL SMEAR: PRESENT — SIGNIFICANT CHANGE UP
CALCIUM SERPL-MCNC: 8.3 MG/DL — LOW (ref 8.4–10.5)
CALCIUM SERPL-MCNC: 8.4 MG/DL — SIGNIFICANT CHANGE UP (ref 8.4–10.5)
CHLORIDE SERPL-SCNC: 108 MMOL/L — SIGNIFICANT CHANGE UP (ref 96–108)
CHLORIDE SERPL-SCNC: 114 MMOL/L — HIGH (ref 96–108)
CO2 SERPL-SCNC: 19 MMOL/L — LOW (ref 22–31)
CO2 SERPL-SCNC: 21 MMOL/L — LOW (ref 22–31)
CREAT SERPL-MCNC: 3.93 MG/DL — HIGH (ref 0.5–1.3)
CREAT SERPL-MCNC: 4.51 MG/DL — HIGH (ref 0.5–1.3)
EOSINOPHIL # BLD AUTO: 0.6 K/UL — HIGH (ref 0–0.5)
EOSINOPHIL # BLD AUTO: 1.95 K/UL — HIGH (ref 0–0.5)
EOSINOPHIL NFR BLD AUTO: 0.9 % — SIGNIFICANT CHANGE UP (ref 0–6)
EOSINOPHIL NFR BLD AUTO: 2.6 % — SIGNIFICANT CHANGE UP (ref 0–6)
GLUCOSE BLDC GLUCOMTR-MCNC: 137 MG/DL — HIGH (ref 70–99)
GLUCOSE BLDC GLUCOMTR-MCNC: 143 MG/DL — HIGH (ref 70–99)
GLUCOSE BLDC GLUCOMTR-MCNC: 159 MG/DL — HIGH (ref 70–99)
GLUCOSE BLDC GLUCOMTR-MCNC: 172 MG/DL — HIGH (ref 70–99)
GLUCOSE BLDC GLUCOMTR-MCNC: 187 MG/DL — HIGH (ref 70–99)
GLUCOSE SERPL-MCNC: 141 MG/DL — HIGH (ref 70–99)
GLUCOSE SERPL-MCNC: 157 MG/DL — HIGH (ref 70–99)
HCT VFR BLD CALC: 21.8 % — LOW (ref 39–50)
HCT VFR BLD CALC: 22.2 % — LOW (ref 39–50)
HGB BLD-MCNC: 7.3 G/DL — LOW (ref 13–17)
HGB BLD-MCNC: 7.4 G/DL — LOW (ref 13–17)
HYPOCHROMIA BLD QL: SLIGHT — SIGNIFICANT CHANGE UP
HYPOCHROMIA BLD QL: SLIGHT — SIGNIFICANT CHANGE UP
JAK2 P.V617F BLD/T QL: SIGNIFICANT CHANGE UP
LACTATE SERPL-SCNC: 1 MMOL/L — SIGNIFICANT CHANGE UP (ref 0.5–2)
LYMPHOCYTES # BLD AUTO: 3.31 K/UL — HIGH (ref 1–3.3)
LYMPHOCYTES # BLD AUTO: 3.41 K/UL — HIGH (ref 1–3.3)
LYMPHOCYTES # BLD AUTO: 4.4 % — LOW (ref 13–44)
LYMPHOCYTES # BLD AUTO: 5.1 % — LOW (ref 13–44)
MACROCYTES BLD QL: SLIGHT — SIGNIFICANT CHANGE UP
MACROCYTES BLD QL: SLIGHT — SIGNIFICANT CHANGE UP
MAGNESIUM SERPL-MCNC: 2.2 MG/DL — SIGNIFICANT CHANGE UP (ref 1.6–2.6)
MAGNESIUM SERPL-MCNC: 2.3 MG/DL — SIGNIFICANT CHANGE UP (ref 1.6–2.6)
MANUAL SMEAR VERIFICATION: SIGNIFICANT CHANGE UP
MANUAL SMEAR VERIFICATION: SIGNIFICANT CHANGE UP
MCHC RBC-ENTMCNC: 29.7 PG — SIGNIFICANT CHANGE UP (ref 27–34)
MCHC RBC-ENTMCNC: 30.2 PG — SIGNIFICANT CHANGE UP (ref 27–34)
MCHC RBC-ENTMCNC: 33.3 GM/DL — SIGNIFICANT CHANGE UP (ref 32–36)
MCHC RBC-ENTMCNC: 33.5 GM/DL — SIGNIFICANT CHANGE UP (ref 32–36)
MCV RBC AUTO: 88.6 FL — SIGNIFICANT CHANGE UP (ref 80–100)
MCV RBC AUTO: 90.6 FL — SIGNIFICANT CHANGE UP (ref 80–100)
METAMYELOCYTES # FLD: 0.9 % — HIGH (ref 0–0)
METAMYELOCYTES # FLD: 1.7 % — HIGH (ref 0–0)
MONOCYTES # BLD AUTO: 5.15 K/UL — HIGH (ref 0–0.9)
MONOCYTES # BLD AUTO: 7.89 K/UL — HIGH (ref 0–0.9)
MONOCYTES NFR BLD AUTO: 10.5 % — SIGNIFICANT CHANGE UP (ref 2–14)
MONOCYTES NFR BLD AUTO: 7.7 % — SIGNIFICANT CHANGE UP (ref 2–14)
MYELOCYTES NFR BLD: 11.1 % — HIGH (ref 0–0)
MYELOCYTES NFR BLD: 14 % — HIGH (ref 0–0)
NEUTROPHILS # BLD AUTO: 49.13 K/UL — HIGH (ref 1.8–7.4)
NEUTROPHILS # BLD AUTO: 50.82 K/UL — HIGH (ref 1.8–7.4)
NEUTROPHILS NFR BLD AUTO: 67.6 % — SIGNIFICANT CHANGE UP (ref 43–77)
NEUTROPHILS NFR BLD AUTO: 71.8 % — SIGNIFICANT CHANGE UP (ref 43–77)
NEUTS BAND # BLD: 1.7 % — SIGNIFICANT CHANGE UP (ref 0–8)
NRBC # BLD: 1 /100 WBCS — HIGH (ref 0–0)
NRBC # BLD: 1 /100 — HIGH (ref 0–0)
NRBC # BLD: 3 /100 — HIGH (ref 0–0)
NRBC # BLD: SIGNIFICANT CHANGE UP /100 WBCS (ref 0–0)
NRBC # BLD: SIGNIFICANT CHANGE UP /100 WBCS (ref 0–0)
OVALOCYTES BLD QL SMEAR: SLIGHT — SIGNIFICANT CHANGE UP
PHOSPHATE SERPL-MCNC: 4.7 MG/DL — HIGH (ref 2.5–4.5)
PHOSPHATE SERPL-MCNC: 6 MG/DL — HIGH (ref 2.5–4.5)
PLAT MORPH BLD: ABNORMAL
PLAT MORPH BLD: ABNORMAL
PLATELET # BLD AUTO: 312 K/UL — SIGNIFICANT CHANGE UP (ref 150–400)
PLATELET # BLD AUTO: 314 K/UL — SIGNIFICANT CHANGE UP (ref 150–400)
POIKILOCYTOSIS BLD QL AUTO: SLIGHT — SIGNIFICANT CHANGE UP
POIKILOCYTOSIS BLD QL AUTO: SLIGHT — SIGNIFICANT CHANGE UP
POLYCHROMASIA BLD QL SMEAR: SIGNIFICANT CHANGE UP
POLYCHROMASIA BLD QL SMEAR: SLIGHT — SIGNIFICANT CHANGE UP
POTASSIUM SERPL-MCNC: 3.5 MMOL/L — SIGNIFICANT CHANGE UP (ref 3.5–5.3)
POTASSIUM SERPL-MCNC: 4.1 MMOL/L — SIGNIFICANT CHANGE UP (ref 3.5–5.3)
POTASSIUM SERPL-SCNC: 3.5 MMOL/L — SIGNIFICANT CHANGE UP (ref 3.5–5.3)
POTASSIUM SERPL-SCNC: 4.1 MMOL/L — SIGNIFICANT CHANGE UP (ref 3.5–5.3)
PROT SERPL-MCNC: 5.9 G/DL — LOW (ref 6–8.3)
PROT SERPL-MCNC: 6 G/DL — SIGNIFICANT CHANGE UP (ref 6–8.3)
RBC # BLD: 2.45 M/UL — LOW (ref 4.2–5.8)
RBC # BLD: 2.46 M/UL — LOW (ref 4.2–5.8)
RBC # FLD: 16.8 % — HIGH (ref 10.3–14.5)
RBC # FLD: 17.5 % — HIGH (ref 10.3–14.5)
RBC BLD AUTO: ABNORMAL
RBC BLD AUTO: ABNORMAL
RH IG SCN BLD-IMP: POSITIVE — SIGNIFICANT CHANGE UP
RH IG SCN BLD-IMP: POSITIVE — SIGNIFICANT CHANGE UP
SODIUM SERPL-SCNC: 141 MMOL/L — SIGNIFICANT CHANGE UP (ref 135–145)
SODIUM SERPL-SCNC: 145 MMOL/L — SIGNIFICANT CHANGE UP (ref 135–145)
SPHEROCYTES BLD QL SMEAR: SLIGHT — SIGNIFICANT CHANGE UP
WBC # BLD: 61.26 K/UL — CRITICAL HIGH (ref 3.8–10.5)
WBC # BLD: 66.84 K/UL — CRITICAL HIGH (ref 3.8–10.5)
WBC # FLD AUTO: 61.26 K/UL — CRITICAL HIGH (ref 3.8–10.5)
WBC # FLD AUTO: 66.84 K/UL — CRITICAL HIGH (ref 3.8–10.5)

## 2021-07-30 PROCEDURE — 99233 SBSQ HOSP IP/OBS HIGH 50: CPT

## 2021-07-30 PROCEDURE — 99233 SBSQ HOSP IP/OBS HIGH 50: CPT | Mod: GC

## 2021-07-30 PROCEDURE — 99291 CRITICAL CARE FIRST HOUR: CPT

## 2021-07-30 RX ORDER — ACETAMINOPHEN 500 MG
600 TABLET ORAL EVERY 6 HOURS
Refills: 0 | Status: DISCONTINUED | OUTPATIENT
Start: 2021-07-30 | End: 2021-08-23

## 2021-07-30 RX ORDER — NOREPINEPHRINE BITARTRATE/D5W 8 MG/250ML
0.05 PLASTIC BAG, INJECTION (ML) INTRAVENOUS
Qty: 8 | Refills: 0 | Status: DISCONTINUED | OUTPATIENT
Start: 2021-07-30 | End: 2021-07-31

## 2021-07-30 RX ADMIN — Medication 600 MILLIGRAM(S): at 23:00

## 2021-07-30 RX ADMIN — PANTOPRAZOLE SODIUM 40 MILLIGRAM(S): 20 TABLET, DELAYED RELEASE ORAL at 18:15

## 2021-07-30 RX ADMIN — Medication 1: at 23:56

## 2021-07-30 RX ADMIN — PIPERACILLIN AND TAZOBACTAM 200 GRAM(S): 4; .5 INJECTION, POWDER, LYOPHILIZED, FOR SOLUTION INTRAVENOUS at 05:10

## 2021-07-30 RX ADMIN — Medication 1: at 12:27

## 2021-07-30 RX ADMIN — PIPERACILLIN AND TAZOBACTAM 200 GRAM(S): 4; .5 INJECTION, POWDER, LYOPHILIZED, FOR SOLUTION INTRAVENOUS at 17:19

## 2021-07-30 RX ADMIN — LEVETIRACETAM 400 MILLIGRAM(S): 250 TABLET, FILM COATED ORAL at 05:22

## 2021-07-30 RX ADMIN — SODIUM ZIRCONIUM CYCLOSILICATE 10 GRAM(S): 10 POWDER, FOR SUSPENSION ORAL at 05:22

## 2021-07-30 RX ADMIN — CHLORHEXIDINE GLUCONATE 1 APPLICATION(S): 213 SOLUTION TOPICAL at 05:24

## 2021-07-30 RX ADMIN — ATORVASTATIN CALCIUM 40 MILLIGRAM(S): 80 TABLET, FILM COATED ORAL at 22:10

## 2021-07-30 RX ADMIN — PANTOPRAZOLE SODIUM 40 MILLIGRAM(S): 20 TABLET, DELAYED RELEASE ORAL at 05:22

## 2021-07-30 RX ADMIN — Medication 1: at 18:15

## 2021-07-30 RX ADMIN — Medication 5.63 MICROGRAM(S)/KG/MIN: at 12:02

## 2021-07-30 RX ADMIN — Medication 600 MILLIGRAM(S): at 22:32

## 2021-07-30 NOTE — PROGRESS NOTE ADULT - SUBJECTIVE AND OBJECTIVE BOX
Neurology Progress Note    Interval History:    No acute events overnight. Pt endorses improvement in his left leg weakness.     HPI:  89y Male with PMHx of HTN and seizure on phenytoin presents with left facial droop and slurred speech. Pt was with family member (great-nephew Mesh [phone]) who noticed at 5pm, pt had acute onset of slurred speech, increased saliva production and a left facial droop. Pt was driven to the ED immediately. On presentation, /94, NIHSS 3. Great-nephew at bedside denies hx of stroke, use of blood thinners, recent trauma/bleeding event. CTH negative for acute hemorrhage. CTP without any perfusion defect. CTA without LVO. In conjunction with patient, family and stroke attending, decision was made to not give tpa since family felt his current deficits are non-disabling to his daily life vs risk of bleeding. Pt and family was made aware of the possibility of worsening symptoms and the opportunity to give tpa was time limited. They expressed understanding and declined tpa administration.     Pt denies CP, SOB, extremity weakness, changes in vision, HA. He endorses a cough that has been occurring for the past few days with sputum production, but has been afebrile. Pt given vanc and zosyn x1 in the ED. CXR pending official read. Patient initially failed dysphagia screen and was given  AR, but passed second dysphagia screen so was able to to loaded with plavix 300 PO.     At baseline, pt is fully independent of all ADLs and iADLs, ambulates without assistance, with hearing loss b/l, baseline right arm tremor. It is unclear if pt sees a neurologist for seizure management. Per wife (Ross [phone], [phone]), his most recent seizure was in April 2020 where he had full body shaking lasting for 5 minutes. He has been on phenytoin 100mg TID and has not had another seizure since.         PAST MEDICAL & SURGICAL HISTORY:  Hypertension    Seizure            Medications:  atorvastatin 40 milliGRAM(s) Oral at bedtime  chlorhexidine 2% Cloths 1 Application(s) Topical <User Schedule>  dextrose 40% Gel 15 Gram(s) Oral once  dextrose 5%. 1000 milliLiter(s) IV Continuous <Continuous>  dextrose 5%. 1000 milliLiter(s) IV Continuous <Continuous>  dextrose 50% Injectable 25 Gram(s) IV Push once  dextrose 50% Injectable 12.5 Gram(s) IV Push once  dextrose 50% Injectable 25 Gram(s) IV Push once  glucagon  Injectable 1 milliGRAM(s) IntraMuscular once  insulin lispro (ADMELOG) corrective regimen sliding scale   SubCutaneous every 6 hours  levETIRAcetam  IVPB 500 milliGRAM(s) IV Intermittent every 24 hours  norepinephrine Infusion 0.05 MICROgram(s)/kG/Min IV Continuous <Continuous>  pantoprazole  Injectable 40 milliGRAM(s) IV Push every 12 hours  piperacillin/tazobactam IVPB.. 4.5 Gram(s) IV Intermittent every 12 hours      Vital Signs Last 24 Hrs  T(C): 37.6 (30 Jul 2021 09:20), Max: 37.9 (30 Jul 2021 05:21)  T(F): 99.7 (30 Jul 2021 09:20), Max: 100.2 (30 Jul 2021 05:21)  HR: 54 (30 Jul 2021 11:00) (49 - 63)  BP: 124/58 (30 Jul 2021 11:00) (110/56 - 165/76)  BP(mean): 83 (30 Jul 2021 11:00) (78 - 109)  RR: 25 (30 Jul 2021 11:00) (21 - 50)  SpO2: 98% (30 Jul 2021 11:00) (94% - 100%)    Neurological Examination:  General:  Appearance is consistent with chronologic age.  No abnormal facies.  Gross skin survey within normal limits.    Cognitive/Language:  Awake, alert, and oriented to person, place, time and date.  Recent and remote memory intact.  Fund of knowledge is appropriate.  Naming, repetition and comprehension intact.   Nondysarthric.    Cranial Nerves  - Eyes: Visual acuity intact, Visual fields full.  EOMI w/o nystagmus, skew or reported double vision.  PERRL.  No ptosis/weakness of eyelid closure.    - Face:  Facial sensation normal V1 - 3, no facial asymmetry.    - Ears/Nose/Throat:  Hearing grossly intact b/l to finger rub.  Palate elevates midline.  Tongue and uvula midline.   Motor examination:  5/5 UE bilaterally, 4/5 proximal left leg, 4/5 distal left leg, 5/5 right lower extremity.  No observable drift. Normal tone and bulk. No tenderness, twitching, tremors or involuntary movements.  Sensory examination:   Decreased sensation to temperature on left thigh. Sensation otherwise intact  Reflexes:   2+ b/l biceps, triceps, brachioradialis, patella and achilles.  Plantar response downgoing b/l.  clonus absent.  Cerebellum:   FTN/HKS intact.  No dysmetria or dysdiadokinesia.     Labs:  CBC Full  -  ( 30 Jul 2021 05:58 )  WBC Count : 66.84 K/uL  RBC Count : 2.46 M/uL  Hemoglobin : 7.3 g/dL  Hematocrit : 21.8 %  Platelet Count - Automated : 314 K/uL  Mean Cell Volume : 88.6 fl  Mean Cell Hemoglobin : 29.7 pg  Mean Cell Hemoglobin Concentration : 33.5 gm/dL  Auto Neutrophil # : 49.13 K/uL  Auto Lymphocyte # : 3.41 K/uL  Auto Monocyte # : 5.15 K/uL  Auto Eosinophil # : 0.60 K/uL  Auto Basophil # : 0.00 K/uL  Auto Neutrophil % : 71.8 %  Auto Lymphocyte % : 5.1 %  Auto Monocyte % : 7.7 %  Auto Eosinophil % : 0.9 %  Auto Basophil % : 0.0 %    07-30    141  |  108  |  79<H>  ----------------------------<  141<H>  4.1   |  19<L>  |  4.51<H>    Ca    8.3<L>      30 Jul 2021 05:58  Phos  6.0     07-30  Mg     2.2     07-30    TPro  6.0  /  Alb  2.7<L>  /  TBili  0.6  /  DBili  x   /  AST  66<H>  /  ALT  59<H>  /  AlkPhos  46  07-30    LIVER FUNCTIONS - ( 30 Jul 2021 05:58 )  Alb: 2.7 g/dL / Pro: 6.0 g/dL / ALK PHOS: 46 U/L / ALT: 59 U/L / AST: 66 U/L / GGT: x

## 2021-07-30 NOTE — PROGRESS NOTE ADULT - ATTENDING COMMENTS
Patient seen and examined with house-staff during bedside rounds.  Resident note read, including vitals, physical findings, laboratory data, and radiological reports.   Revisions included below.  Direct personal management at bed side and extensive interpretation of the data.  Plan was outlined and discussed in details with the housestaff.  Decision making of high complexity  Action taken for acute disease activity to reflect the level of care provided:  - medication reconciliation  - review laboratory data  Patient is stable compared to yesterday.  He is more awake and conversing.  Patient has a poor cough.  Discussed the case with hematology.  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  The renal function plateaued.  Potassium is stable.  Patient improved with the transfusion.  Urine output increased.  Hemoglobin is stable.  Had a long discussion with the family.  Prognosis is poor

## 2021-07-30 NOTE — PROGRESS NOTE ADULT - ASSESSMENT
89M with PMHx of HTN and seizure on phenytoin presents with left facial droop and slurred speech. Stroke workup negative. Neurology consulted for persistent weakness.      Impression  - Myasthenia gravis, new onset. Bedside repetitive nerve stimulation on 7/22 notable for decremental response indicative of MG, Strength improved after receiving full course of IVIG.  - Patient now with left leg weakness, pain, and decreased sensation to temperature on left thigh. Exam somewhat limited by language barrier although patient's family was at the bedside to translate. Weakness is subjective and does not complete correlate with degree of weakness on exam.  - Weakness may be somewhat explained by Myasthenia, but sensory deficits suggest an alternative etiology. Symptoms may be secondary to femoral nerve compression. Pt's pain is not reproducible, may suggest bone marrow involvement given hx of persistent leukocytosis.      Plan  - F/u Myasthenia Gravis antibodies - bedside repetitive nerve stimulation on 7/22 indicative of MG  - S/p IVIG x5 days  - Recommend Lumbar spine with/without contrast to further evaluate patient's complaints of weakness, sensory deficits. Can be done when patient more stable from ICU perspective. Given +MG antibodies, no further need for repeat MRI brain.  - PT/OT  - Will consider starting Rituximab q6 months for long-term management of myasthenia, pending ID assessment of prior Hep B infection and improvement of current hypotension/pulmonary edema.  - Alternatively, can treat with Prednisone 10mg daily, pending rule out of GIB in setting of acute anemia  - Heme-Onc following for persistent leukocytosis. Flow cytometry, BCR-ABL, JAK2 serologies sent. Can follow up outpatient with Dr. Nguyen    Plan discussed with Attending Dr. Evie Mcclellan MD  Neurology, PGY-2  Pager: [phone]  [phone]

## 2021-07-30 NOTE — PROGRESS NOTE ADULT - SUBJECTIVE AND OBJECTIVE BOX
INTERVAL EVENTS:    PAST MEDICAL & SURGICAL HISTORY:  Hypertension    Seizure        MEDICATIONS  (STANDING):  atorvastatin 40 milliGRAM(s) Oral at bedtime  chlorhexidine 2% Cloths 1 Application(s) Topical <User Schedule>  dextrose 40% Gel 15 Gram(s) Oral once  dextrose 5%. 1000 milliLiter(s) (50 mL/Hr) IV Continuous <Continuous>  dextrose 5%. 1000 milliLiter(s) (100 mL/Hr) IV Continuous <Continuous>  dextrose 50% Injectable 25 Gram(s) IV Push once  dextrose 50% Injectable 12.5 Gram(s) IV Push once  dextrose 50% Injectable 25 Gram(s) IV Push once  glucagon  Injectable 1 milliGRAM(s) IntraMuscular once  insulin lispro (ADMELOG) corrective regimen sliding scale   SubCutaneous every 6 hours  levETIRAcetam  IVPB 500 milliGRAM(s) IV Intermittent every 24 hours  pantoprazole  Injectable 40 milliGRAM(s) IV Push every 12 hours  phenylephrine    Infusion 0.1 MICROgram(s)/kG/Min (2.25 mL/Hr) IV Continuous <Continuous>  piperacillin/tazobactam IVPB.. 4.5 Gram(s) IV Intermittent every 12 hours    MEDICATIONS  (PRN):    Vital Signs Last 24 Hrs  T(C): 37.6 (30 Jul 2021 09:20), Max: 37.9 (30 Jul 2021 05:21)  T(F): 99.7 (30 Jul 2021 09:20), Max: 100.2 (30 Jul 2021 05:21)  HR: 51 (30 Jul 2021 09:00) (49 - 66)  BP: 118/57 (30 Jul 2021 09:00) (114/54 - 165/76)  BP(mean): 82 (30 Jul 2021 09:00) (78 - 109)  RR: 21 (30 Jul 2021 09:00) (19 - 50)  SpO2: 97% (30 Jul 2021 09:00) (96% - 100%)    PHYSICAL EXAM:  GEN: Awake, alert. NAD.   HEENT: NCAT, PERRL, EOMI. Mucosa moist. No JVD.  RESP: CTA b/l  CV: RRR. Normal S1/S2. No m/r/g.  ABD: Soft. NT/ND. BS+  EXT: Warm. No edema, clubbing, or cyanosis.   NEURO: AAOx3. No focal deficits.     LABS:                        7.3    66.84 )-----------( 314      ( 30 Jul 2021 05:58 )             21.8     07-30    141  |  108  |  79<H>  ----------------------------<  141<H>  4.1   |  19<L>  |  4.51<H>    Ca    8.3<L>      30 Jul 2021 05:58  Phos  6.0     07-30  Mg     2.2     07-30    TPro  6.0  /  Alb  2.7<L>  /  TBili  0.6  /  DBili  x   /  AST  66<H>  /  ALT  59<H>  /  AlkPhos  46  07-30            I&O's Summary    29 Jul 2021 07:01  -  30 Jul 2021 07:00  --------------------------------------------------------  IN: 1995 mL / OUT: 890 mL / NET: 1105 mL    30 Jul 2021 07:01  -  30 Jul 2021 10:26  --------------------------------------------------------  IN: 120 mL / OUT: 200 mL / NET: -80 mL      RADIOLOGY & ADDITIONAL STUDIES:  TELEMETRY:  EKG:         INTERVAL EVENTS: Pt is now on levophed. He feels better today. Denies any SOB or chest pain.     PAST MEDICAL & SURGICAL HISTORY:  Hypertension    Seizure        MEDICATIONS  (STANDING):  atorvastatin 40 milliGRAM(s) Oral at bedtime  chlorhexidine 2% Cloths 1 Application(s) Topical <User Schedule>  dextrose 40% Gel 15 Gram(s) Oral once  dextrose 5%. 1000 milliLiter(s) (50 mL/Hr) IV Continuous <Continuous>  dextrose 5%. 1000 milliLiter(s) (100 mL/Hr) IV Continuous <Continuous>  dextrose 50% Injectable 25 Gram(s) IV Push once  dextrose 50% Injectable 12.5 Gram(s) IV Push once  dextrose 50% Injectable 25 Gram(s) IV Push once  glucagon  Injectable 1 milliGRAM(s) IntraMuscular once  insulin lispro (ADMELOG) corrective regimen sliding scale   SubCutaneous every 6 hours  levETIRAcetam  IVPB 500 milliGRAM(s) IV Intermittent every 24 hours  pantoprazole  Injectable 40 milliGRAM(s) IV Push every 12 hours  phenylephrine    Infusion 0.1 MICROgram(s)/kG/Min (2.25 mL/Hr) IV Continuous <Continuous>  piperacillin/tazobactam IVPB.. 4.5 Gram(s) IV Intermittent every 12 hours    MEDICATIONS  (PRN):    Vital Signs Last 24 Hrs  T(C): 37.6 (30 Jul 2021 09:20), Max: 37.9 (30 Jul 2021 05:21)  T(F): 99.7 (30 Jul 2021 09:20), Max: 100.2 (30 Jul 2021 05:21)  HR: 51 (30 Jul 2021 09:00) (49 - 66)  BP: 118/57 (30 Jul 2021 09:00) (114/54 - 165/76)  BP(mean): 82 (30 Jul 2021 09:00) (78 - 109)  RR: 21 (30 Jul 2021 09:00) (19 - 50)  SpO2: 97% (30 Jul 2021 09:00) (96% - 100%)    PHYSICAL EXAM:  GEN: Awake, alert. In mild distress.   HEENT: NCAT, PERRL, EOMI. Mucosa moist. No JVD.  RESP: CTA bilaterally   CV: RRR. Normal S1/S2. No m/r/g.  ABD: Soft. NT/ND. BS+  EXT: Warm. No edema, clubbing, or cyanosis.   NEURO: AAOx3. No focal deficits.       LABS:                        7.3    66.84 )-----------( 314      ( 30 Jul 2021 05:58 )             21.8     07-30    141  |  108  |  79<H>  ----------------------------<  141<H>  4.1   |  19<L>  |  4.51<H>    Ca    8.3<L>      30 Jul 2021 05:58  Phos  6.0     07-30  Mg     2.2     07-30    TPro  6.0  /  Alb  2.7<L>  /  TBili  0.6  /  DBili  x   /  AST  66<H>  /  ALT  59<H>  /  AlkPhos  46  07-30            I&O's Summary    29 Jul 2021 07:01 - 30 Jul 2021 07:00  --------------------------------------------------------  IN: 1995 mL / OUT: 890 mL / NET: 1105 mL    30 Jul 2021 07:01 - 30 Jul 2021 10:26  --------------------------------------------------------  IN: 120 mL / OUT: 200 mL / NET: -80 mL      RADIOLOGY & ADDITIONAL STUDIES:  TELEMETRY:  EKG:

## 2021-07-30 NOTE — PROGRESS NOTE ADULT - ATTENDING COMMENTS
I was physically present for the key portions of the evaluation and managemnent (E/M) service provided.  I agree with the above history, physical, and plan which I have reviewed and edited where appropriate, with the exceptions as per my note.    pt seen and examined.    Pt reports LLE is improved.    limited exam but awake, conversant, moves all 4, including LLE at least 5- at the hip and 5/5 distally.    hypotension has improved, hgb is downtrending, unclear etiology.    will follow

## 2021-07-30 NOTE — PROGRESS NOTE ADULT - SUBJECTIVE AND OBJECTIVE BOX
Patient is a 89y old  Male who presents with a chief complaint of slurred speech, L facial droop (30 Jul 2021 12:35)    Hospital Course     89y Male with PMHx of HTN, severe aortic sinensis and seizure on phenytoin who presented with left facial droop and slurred speech, Stroke w/u negative. Found to have myasthenia gravis with positive anti-ach antibodies. Also found to have leukocytosis suspicious for malignancy. Admitted to [facility] for treatment of MG and w/u of potential CML/CLL. S/p IVIG treatment. Course complicated initially by dysphagia followed hypotension concerning for sepsis in the setting of aspiration pneumonia. Patient given IVF, resulting in cardiogenic vs. obstructive shock due to severe aortic stenosis.  Also noted to have elevated creatinine concerning for EVARISTO. Patient admitted to ICU for further management. Urine output low initially low but improving. However, creatinine remains elevated. Phenylephrine started in an attempt to increase renal perfusion. Patient also with dysphagia and poor PO intake. S/p NGT placement.       INTERVAL HPI/OVERNIGHT EVENTS: Patient bradycardic. Phenylephrine discontinued and started on Levophed.       ICU Vital Signs Last 24 Hrs  T(C): 37.6 (30 Jul 2021 14:52), Max: 37.9 (30 Jul 2021 05:21)  T(F): 99.7 (30 Jul 2021 14:52), Max: 100.2 (30 Jul 2021 05:21)  HR: 63 (30 Jul 2021 15:00) (49 - 63)  BP: 134/63 (30 Jul 2021 15:00) (108/52 - 165/76)  BP(mean): 90 (30 Jul 2021 15:00) (75 - 109)  ABP: 95/48 (30 Jul 2021 15:00) (88/57 - 163/51)  ABP(mean): 69 (30 Jul 2021 15:00) (63 - 89)  RR: 20 (30 Jul 2021 15:00) (20 - 50)  SpO2: 98% (30 Jul 2021 15:00) (94% - 100%)    I&O's Summary    29 Jul 2021 07:01  -  30 Jul 2021 07:00  --------------------------------------------------------  IN: 1995 mL / OUT: 890 mL / NET: 1105 mL    30 Jul 2021 07:01  -  30 Jul 2021 15:54  --------------------------------------------------------  IN: 311.8 mL / OUT: 200 mL / NET: 111.8 mL        LABS:                        7.3    66.84 )-----------( 314      ( 30 Jul 2021 05:58 )             21.8     07-30    141  |  108  |  79<H>  ----------------------------<  141<H>  4.1   |  19<L>  |  4.51<H>    Ca    8.3<L>      30 Jul 2021 05:58  Phos  6.0     07-30  Mg     2.2     07-30    TPro  6.0  /  Alb  2.7<L>  /  TBili  0.6  /  DBili  x   /  AST  66<H>  /  ALT  59<H>  /  AlkPhos  46  07-30        CAPILLARY BLOOD GLUCOSE      POCT Blood Glucose.: 187 mg/dL (30 Jul 2021 12:17)  POCT Blood Glucose.: 137 mg/dL (30 Jul 2021 05:06)  POCT Blood Glucose.: 143 mg/dL (30 Jul 2021 01:04)  POCT Blood Glucose.: 135 mg/dL (29 Jul 2021 18:12)        RADIOLOGY & ADDITIONAL TESTS:    Consultant(s) Notes Reviewed:  [x ] YES  [ ] NO    MEDICATIONS  (STANDING):  atorvastatin 40 milliGRAM(s) Oral at bedtime  chlorhexidine 2% Cloths 1 Application(s) Topical <User Schedule>  dextrose 40% Gel 15 Gram(s) Oral once  dextrose 5%. 1000 milliLiter(s) (50 mL/Hr) IV Continuous <Continuous>  dextrose 5%. 1000 milliLiter(s) (100 mL/Hr) IV Continuous <Continuous>  dextrose 50% Injectable 25 Gram(s) IV Push once  dextrose 50% Injectable 12.5 Gram(s) IV Push once  dextrose 50% Injectable 25 Gram(s) IV Push once  glucagon  Injectable 1 milliGRAM(s) IntraMuscular once  insulin lispro (ADMELOG) corrective regimen sliding scale   SubCutaneous every 6 hours  levETIRAcetam  IVPB 500 milliGRAM(s) IV Intermittent every 24 hours  norepinephrine Infusion 0.05 MICROgram(s)/kG/Min (5.63 mL/Hr) IV Continuous <Continuous>  pantoprazole  Injectable 40 milliGRAM(s) IV Push every 12 hours  piperacillin/tazobactam IVPB.. 4.5 Gram(s) IV Intermittent every 12 hours    MEDICATIONS  (PRN):      PHYSICAL EXAM:  GENERAL: NAD. Lying in bed. Appears somewhat lethargic.   EYES: EOMI, PERRLA, conjunctiva and sclera clear, NGT in place   NECK: Supple, No JVD  NERVOUS SYSTEM:  Alert & Awake. Oriented to person and place   CHEST/LUNG: B/L good air entry; No rales, rhonchi, or wheezing  HEART: S1S2 normal, no S3, Regular rate and rhythm; No murmurs  ABDOMEN: Soft, Nontender, Nondistended; Bowel sounds present  EXTREMITIES:  2+ Peripheral Pulses, No clubbing, cyanosis, or edema  SKIN: No rashes or lesions    Care Discussed with Consultants/Other Providers [ x] YES  [ ] NO

## 2021-07-30 NOTE — PROGRESS NOTE ADULT - ASSESSMENT
89M PMH HTN, moderate- severe AS, seizures p/w dizziness, ruled out for stroke, now being treated for myasthenia gravis. He was found to be hypotensive on 7/28 with acute drop in hgb 8.6 to 5.4. Cardiology consulted for possible Cardiogenic shock given hx of AS.     #Hypotension:   - His hypotension is not consistent with cardiogenic shock given normal LV on TTE and good BP response to fluids. His extremities are warm. Pt does have mod- severe AS with good LV function but his acute drop in BP is not explained by underlying AS. AS is a slowly progressive disease.   - His hypotension with elevated lactate was from sepsis vs. from underlying bleed given drop in Hb from 8.6--> 5.4.   - CXR from yesterday with possible effusions. Today, on exam he does not appear to be overloaded. Can give lasix PRN   - Given underlying mod- severe AS, would be cautious with fluids   - Pt is not a surgical candidate for severe AS given poor prognosis as well as family wishes to not pursue aggressive measures    For follow up, pt can follow up with Dr. Patino. Please call [phone] to make an appt    Cardiology to sign off. Please reconsult as needed

## 2021-07-30 NOTE — PROGRESS NOTE ADULT - ATTENDING COMMENTS
Initial attending contact date  7/28/21    . See fellow note written above for details. I reviewed the fellow documentation. I have personally seen and examined this patient. I reviewed vitals, labs, medications, cardiac studies, and additional imaging. I agree with the above fellow's findings and plans as written above with the following additions/statements.    -89M PMH HTN, moderate- severe AS, seizures p/w dizziness, ruled out for stroke, now being treated for myasthenia gravis. He was found to be hypotensive on 7/28 with acute drop in hgb 8.6 to 5.4. Cardiology consulted for possible Cardiogenic shock given hx of AS.      -Clinically improved today , more alert  - His hypotension is not consistent with cardiogenic shock given normal LV on TTE and good BP response to fluids. His extremities are warm. Pt does have mod- severe AS with good LV function but his acute drop in BP is not explained by underlying AS. AS is a slowly progressive disease.   - His hypotension with elevated lactate was from sepsis vs. 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

## 2021-07-30 NOTE — PROGRESS NOTE ADULT - ASSESSMENT
89y Male with PMHx of HTN, severe aortic sinensis and seizure on phenytoin who presented with left facial droop and slurred speech, Stroke w/u negative. Found to have myasthenia gravis with positive anti-ach antibodies. Also found to have leukocytosis suspicious for malignancy. Admitted to [facility] for treatment of MG and w/u of potential CML/CLL. S/p IVIG treatment. Course complicated by dysphagia and aspiration pneumonia. Admitted to MICU after developing cardiogenic/obstructive following IVF administration in the setting of severe aortic stenosis. Now stable on pressors. Creatinine persistently increasing with poor urine output.     NEURO     # Seizures   - On keppra 500 q12    CARDIOVASCULAR  #Hypotension   - On levophed     #Bradycardia  - Bradycardia to 50s in AM   - levophed as above     PULMONARY  - Saturating well on RA    #Pneumonia   - Antibiotics as below     GASTROINTESTINAL  #Dysphagia   - s/p NGT placement  - On tube feeds   - Protonix prophylaxis     RENAL  #EVARISTO/CKD  - Creatinine trending upward   - Not considering HD at this time     INFECTIOUS DISEASE  #Aspiration Pneumonia   - Zosyn 4.5 q12 for pseudomonas coverage      ENDOCRINE  - ISS    HEME  #Yamilka    FLUIDS/ELECTROLYTES/NUTRITION  -IVF: N/A  -Monitor, Replete to K>4 and Mg>2  -Diet: tube feeds     PROPHYLAXIS  -DVT: N/A  -GI: Protonix     DISPO  CODE STATUS: DNR

## 2021-07-31 LAB
ALBUMIN SERPL ELPH-MCNC: 2.5 G/DL — LOW (ref 3.3–5)
ALP SERPL-CCNC: 50 U/L — SIGNIFICANT CHANGE UP (ref 40–120)
ALT FLD-CCNC: 55 U/L — HIGH (ref 10–45)
ANION GAP SERPL CALC-SCNC: 12 MMOL/L — SIGNIFICANT CHANGE UP (ref 5–17)
AST SERPL-CCNC: 39 U/L — SIGNIFICANT CHANGE UP (ref 10–40)
BILIRUB SERPL-MCNC: 0.7 MG/DL — SIGNIFICANT CHANGE UP (ref 0.2–1.2)
BUN SERPL-MCNC: 75 MG/DL — HIGH (ref 7–23)
CALCIUM SERPL-MCNC: 8.2 MG/DL — LOW (ref 8.4–10.5)
CHLORIDE SERPL-SCNC: 113 MMOL/L — HIGH (ref 96–108)
CO2 SERPL-SCNC: 21 MMOL/L — LOW (ref 22–31)
CREAT SERPL-MCNC: 3.6 MG/DL — HIGH (ref 0.5–1.3)
GLUCOSE BLDC GLUCOMTR-MCNC: 162 MG/DL — HIGH (ref 70–99)
GLUCOSE BLDC GLUCOMTR-MCNC: 166 MG/DL — HIGH (ref 70–99)
GLUCOSE BLDC GLUCOMTR-MCNC: 192 MG/DL — HIGH (ref 70–99)
GLUCOSE SERPL-MCNC: 162 MG/DL — HIGH (ref 70–99)
HCT VFR BLD CALC: 21.9 % — LOW (ref 39–50)
HGB BLD-MCNC: 7.3 G/DL — LOW (ref 13–17)
MAGNESIUM SERPL-MCNC: 2.3 MG/DL — SIGNIFICANT CHANGE UP (ref 1.6–2.6)
MCHC RBC-ENTMCNC: 30.4 PG — SIGNIFICANT CHANGE UP (ref 27–34)
MCHC RBC-ENTMCNC: 33.3 GM/DL — SIGNIFICANT CHANGE UP (ref 32–36)
MCV RBC AUTO: 91.3 FL — SIGNIFICANT CHANGE UP (ref 80–100)
NRBC # BLD: 1 /100 WBCS — HIGH (ref 0–0)
PHOSPHATE SERPL-MCNC: 4.2 MG/DL — SIGNIFICANT CHANGE UP (ref 2.5–4.5)
PLATELET # BLD AUTO: 317 K/UL — SIGNIFICANT CHANGE UP (ref 150–400)
POTASSIUM SERPL-MCNC: 3.5 MMOL/L — SIGNIFICANT CHANGE UP (ref 3.5–5.3)
POTASSIUM SERPL-SCNC: 3.5 MMOL/L — SIGNIFICANT CHANGE UP (ref 3.5–5.3)
PROT SERPL-MCNC: 5.8 G/DL — LOW (ref 6–8.3)
RBC # BLD: 2.4 M/UL — LOW (ref 4.2–5.8)
RBC # FLD: 17.6 % — HIGH (ref 10.3–14.5)
SODIUM SERPL-SCNC: 146 MMOL/L — HIGH (ref 135–145)
WBC # BLD: 56.56 K/UL — CRITICAL HIGH (ref 3.8–10.5)
WBC # FLD AUTO: 56.56 K/UL — CRITICAL HIGH (ref 3.8–10.5)

## 2021-07-31 PROCEDURE — 99233 SBSQ HOSP IP/OBS HIGH 50: CPT

## 2021-07-31 PROCEDURE — 99291 CRITICAL CARE FIRST HOUR: CPT

## 2021-07-31 PROCEDURE — 71045 X-RAY EXAM CHEST 1 VIEW: CPT | Mod: 26

## 2021-07-31 RX ORDER — MIDODRINE HYDROCHLORIDE 2.5 MG/1
10 TABLET ORAL EVERY 8 HOURS
Refills: 0 | Status: DISCONTINUED | OUTPATIENT
Start: 2021-07-31 | End: 2021-07-31

## 2021-07-31 RX ADMIN — PANTOPRAZOLE SODIUM 40 MILLIGRAM(S): 20 TABLET, DELAYED RELEASE ORAL at 06:43

## 2021-07-31 RX ADMIN — Medication 1: at 17:35

## 2021-07-31 RX ADMIN — PIPERACILLIN AND TAZOBACTAM 200 GRAM(S): 4; .5 INJECTION, POWDER, LYOPHILIZED, FOR SOLUTION INTRAVENOUS at 04:00

## 2021-07-31 RX ADMIN — PANTOPRAZOLE SODIUM 40 MILLIGRAM(S): 20 TABLET, DELAYED RELEASE ORAL at 17:10

## 2021-07-31 RX ADMIN — CHLORHEXIDINE GLUCONATE 1 APPLICATION(S): 213 SOLUTION TOPICAL at 06:00

## 2021-07-31 RX ADMIN — ATORVASTATIN CALCIUM 40 MILLIGRAM(S): 80 TABLET, FILM COATED ORAL at 22:35

## 2021-07-31 RX ADMIN — LEVETIRACETAM 400 MILLIGRAM(S): 250 TABLET, FILM COATED ORAL at 06:44

## 2021-07-31 RX ADMIN — Medication 10 MILLIGRAM(S): at 11:21

## 2021-07-31 RX ADMIN — Medication 1: at 06:42

## 2021-07-31 RX ADMIN — PIPERACILLIN AND TAZOBACTAM 200 GRAM(S): 4; .5 INJECTION, POWDER, LYOPHILIZED, FOR SOLUTION INTRAVENOUS at 17:10

## 2021-07-31 RX ADMIN — Medication 1: at 11:21

## 2021-07-31 NOTE — PROGRESS NOTE ADULT - SUBJECTIVE AND OBJECTIVE BOX
Neurology Progress Note    INTERVAL HPI/ OVERNIGHT EVENTS:  Patient seen and examined.  No acute events overnight     MEDICATIONS  (STANDING):  atorvastatin 40 milliGRAM(s) Oral at bedtime  chlorhexidine 2% Cloths 1 Application(s) Topical <User Schedule>  dextrose 40% Gel 15 Gram(s) Oral once  dextrose 5%. 1000 milliLiter(s) (50 mL/Hr) IV Continuous <Continuous>  dextrose 5%. 1000 milliLiter(s) (100 mL/Hr) IV Continuous <Continuous>  dextrose 50% Injectable 25 Gram(s) IV Push once  dextrose 50% Injectable 12.5 Gram(s) IV Push once  dextrose 50% Injectable 25 Gram(s) IV Push once  glucagon  Injectable 1 milliGRAM(s) IntraMuscular once  insulin lispro (ADMELOG) corrective regimen sliding scale   SubCutaneous every 6 hours  levETIRAcetam  IVPB 500 milliGRAM(s) IV Intermittent every 24 hours  pantoprazole  Injectable 40 milliGRAM(s) IV Push every 12 hours  piperacillin/tazobactam IVPB.. 4.5 Gram(s) IV Intermittent every 12 hours  predniSONE   Tablet 10 milliGRAM(s) Oral every 24 hours    MEDICATIONS  (PRN):  acetaminophen    Suspension .. 600 milliGRAM(s) Enteral Tube every 6 hours PRN Moderate Pain (4 - 6)      Allergies    hay fever (Unknown)  No Known Drug Allergies    Intolerances        Vital Signs Last 24 Hrs  T(C): 37.2 (31 Jul 2021 14:36), Max: 37.6 (30 Jul 2021 18:10)  T(F): 99 (31 Jul 2021 14:36), Max: 99.7 (30 Jul 2021 18:10)  HR: 63 (31 Jul 2021 15:00) (51 - 63)  BP: 146/63 (31 Jul 2021 15:00) (96/52 - 146/67)  BP(mean): 96 (31 Jul 2021 15:00) (71 - 100)  RR: 20 (31 Jul 2021 15:00) (16 - 24)  SpO2: 96% (31 Jul 2021 15:00) (93% - 98%)    Physical exam:  General: No acute distress, awake and alert  Eyes: Anicteric sclerae, moist conjunctivae, see below for CNs  Neck: trachea midline, FROM, supple, no thyromegaly or lymphadenopathy  Cardiovascular: Regular rate and rhythm, no murmurs, rubs, or gallops. No carotid bruits.   Pulmonary: Anterior breath sounds clear bilaterally, no crackles or wheezing. No use of accessory muscles  GI: Abdomen soft, non-distended, non-tender  Extremities: Radial and DP pulses +2, no edema      Neurological Examination:  General:  Appearance is consistent with chronologic age.  No abnormal facies.  Gross skin survey within normal limits.    Cognitive/Language:  Awake, alert, and oriented to person, place, time and date.  Recent and remote memory intact.  Fund of knowledge is appropriate.  Naming, repetition and comprehension intact.   Nondysarthric.    Cranial Nerves  - Eyes: Visual acuity intact, Visual fields full.  EOMI w/o nystagmus, skew or reported double vision.  PERRL.  No ptosis/weakness of eyelid closure.    - Face:  Facial sensation normal V1 - 3, no facial asymmetry.    - Ears/Nose/Throat:  Hearing grossly intact b/l to finger rub.  Palate elevates midline.  Tongue and uvula midline.   Motor examination:  5/5 UE bilaterally, 4/5 proximal left leg, 4/5 distal left leg, 5/5 right lower extremity.  No observable drift. Normal tone and bulk. No tenderness, twitching, tremors or involuntary movements.  Sensory examination:   Decreased sensation to temperature on left thigh. Sensation otherwise intact  Reflexes:   2+ b/l biceps, triceps, brachioradialis, patella and achilles.  Plantar response downgoing b/l.  clonus absent.  Cerebellum:   FTN/HKS intact.  No dysmetria or dysdiadokinesia.       LABS:                        7.3    56.56 )-----------( 317      ( 31 Jul 2021 05:50 )             21.9     07-31    146<H>  |  113<H>  |  75<H>  ----------------------------<  162<H>  3.5   |  21<L>  |  3.60<H>    Ca    8.2<L>      31 Jul 2021 05:50  Phos  4.2     07-31  Mg     2.3     07-31    TPro  5.8<L>  /  Alb  2.5<L>  /  TBili  0.7  /  DBili  x   /  AST  39  /  ALT  55<H>  /  AlkPhos  50  07-31          RADIOLOGY & ADDITIONAL TESTS:     Neurology Progress Note    INTERVAL HPI/ OVERNIGHT EVENTS:  Patient seen and examined.  No acute events overnight Excessive secretions noted by ICU team. Hemoglobin stable. Off pressors.    MEDICATIONS  (STANDING):  atorvastatin 40 milliGRAM(s) Oral at bedtime  chlorhexidine 2% Cloths 1 Application(s) Topical <User Schedule>  dextrose 40% Gel 15 Gram(s) Oral once  dextrose 5%. 1000 milliLiter(s) (50 mL/Hr) IV Continuous <Continuous>  dextrose 5%. 1000 milliLiter(s) (100 mL/Hr) IV Continuous <Continuous>  dextrose 50% Injectable 25 Gram(s) IV Push once  dextrose 50% Injectable 12.5 Gram(s) IV Push once  dextrose 50% Injectable 25 Gram(s) IV Push once  glucagon  Injectable 1 milliGRAM(s) IntraMuscular once  insulin lispro (ADMELOG) corrective regimen sliding scale   SubCutaneous every 6 hours  levETIRAcetam  IVPB 500 milliGRAM(s) IV Intermittent every 24 hours  pantoprazole  Injectable 40 milliGRAM(s) IV Push every 12 hours  piperacillin/tazobactam IVPB.. 4.5 Gram(s) IV Intermittent every 12 hours  predniSONE   Tablet 10 milliGRAM(s) Oral every 24 hours    MEDICATIONS  (PRN):  acetaminophen    Suspension .. 600 milliGRAM(s) Enteral Tube every 6 hours PRN Moderate Pain (4 - 6)      Allergies    hay fever (Unknown)  No Known Drug Allergies    Intolerances      Vital Signs Last 24 Hrs  T(C): 37.2 (31 Jul 2021 14:36), Max: 37.6 (30 Jul 2021 18:10)  T(F): 99 (31 Jul 2021 14:36), Max: 99.7 (30 Jul 2021 18:10)  HR: 63 (31 Jul 2021 15:00) (51 - 63)  BP: 146/63 (31 Jul 2021 15:00) (96/52 - 146/67)  BP(mean): 96 (31 Jul 2021 15:00) (71 - 100)  RR: 20 (31 Jul 2021 15:00) (16 - 24)  SpO2: 96% (31 Jul 2021 15:00) (93% - 98%)    Physical exam:  General: No acute distress, awake and alert  Eyes: Anicteric sclerae, moist conjunctivae, see below for CNs  Neck: trachea midline, FROM, supple, no thyromegaly or lymphadenopathy  Cardiovascular: Regular rate and rhythm, no murmurs, rubs, or gallops. No carotid bruits.   Pulmonary: Anterior breath sounds clear bilaterally, no crackles or wheezing. No use of accessory muscles  GI: Abdomen soft, non-distended, non-tender  Extremities: Radial and DP pulses +2, no edema      Neurological Examination:  General:  Appearance is consistent with chronologic age.  No abnormal facies.  Gross skin survey within normal limits.    Cognitive/Language:  Awake, alert, and oriented to person, place, time and date.  Recent and remote memory intact.  Fund of knowledge is appropriate.  Naming, repetition and comprehension intact.   Nondysarthric.    Cranial Nerves  - Eyes: Visual acuity intact, Visual fields full.  EOMI w/o nystagmus, skew or reported double vision.  PERRL.  No ptosis/weakness of eyelid closure.    - Face:  Facial sensation normal V1 - 3, no facial asymmetry.    - Ears/Nose/Throat:  Hearing grossly intact b/l to finger rub.  Palate elevates midline.  Tongue and uvula midline.   Motor examination:  5/5 UE bilaterally, 4/5 proximal left leg, 4/5 distal left leg, 5/5 right lower extremity.  No observable drift. Normal tone and bulk. No tenderness, twitching, tremors or involuntary movements.  Sensory examination:   Decreased sensation to temperature on left thigh. Sensation otherwise intact  Reflexes:   2+ b/l biceps, triceps, brachioradialis, patella and achilles.  Plantar response downgoing b/l.  clonus absent.  Cerebellum:   FTN/HKS intact.  No dysmetria or dysdiadokinesia.       LABS:                        7.3    56.56 )-----------( 317      ( 31 Jul 2021 05:50 )             21.9     07-31    146<H>  |  113<H>  |  75<H>  ----------------------------<  162<H>  3.5   |  21<L>  |  3.60<H>    Ca    8.2<L>      31 Jul 2021 05:50  Phos  4.2     07-31  Mg     2.3     07-31    TPro  5.8<L>  /  Alb  2.5<L>  /  TBili  0.7  /  DBili  x   /  AST  39  /  ALT  55<H>  /  AlkPhos  50  07-31          RADIOLOGY & ADDITIONAL TESTS:     Neurology Progress Note    INTERVAL HPI/ OVERNIGHT EVENTS:  Patient seen and examined.  No acute events overnight. Excessive secretions noted by ICU team. Hemoglobin stable. Off pressors.     MEDICATIONS  (STANDING):  atorvastatin 40 milliGRAM(s) Oral at bedtime  chlorhexidine 2% Cloths 1 Application(s) Topical <User Schedule>  dextrose 40% Gel 15 Gram(s) Oral once  dextrose 5%. 1000 milliLiter(s) (50 mL/Hr) IV Continuous <Continuous>  dextrose 5%. 1000 milliLiter(s) (100 mL/Hr) IV Continuous <Continuous>  dextrose 50% Injectable 25 Gram(s) IV Push once  dextrose 50% Injectable 12.5 Gram(s) IV Push once  dextrose 50% Injectable 25 Gram(s) IV Push once  glucagon  Injectable 1 milliGRAM(s) IntraMuscular once  insulin lispro (ADMELOG) corrective regimen sliding scale   SubCutaneous every 6 hours  levETIRAcetam  IVPB 500 milliGRAM(s) IV Intermittent every 24 hours  pantoprazole  Injectable 40 milliGRAM(s) IV Push every 12 hours  piperacillin/tazobactam IVPB.. 4.5 Gram(s) IV Intermittent every 12 hours  predniSONE   Tablet 10 milliGRAM(s) Oral every 24 hours    MEDICATIONS  (PRN):  acetaminophen    Suspension .. 600 milliGRAM(s) Enteral Tube every 6 hours PRN Moderate Pain (4 - 6)      Allergies    hay fever (Unknown)  No Known Drug Allergies    Intolerances      Vital Signs Last 24 Hrs  T(C): 37.2 (31 Jul 2021 14:36), Max: 37.6 (30 Jul 2021 18:10)  T(F): 99 (31 Jul 2021 14:36), Max: 99.7 (30 Jul 2021 18:10)  HR: 63 (31 Jul 2021 15:00) (51 - 63)  BP: 146/63 (31 Jul 2021 15:00) (96/52 - 146/67)  BP(mean): 96 (31 Jul 2021 15:00) (71 - 100)  RR: 20 (31 Jul 2021 15:00) (16 - 24)  SpO2: 96% (31 Jul 2021 15:00) (93% - 98%)    Physical exam:  General: No acute distress, awake and alert  Eyes: Anicteric sclerae, moist conjunctivae, see below for CNs  Neck: trachea midline, FROM, supple, no thyromegaly or lymphadenopathy  Cardiovascular: Regular rate and rhythm, no murmurs, rubs, or gallops. No carotid bruits.   Pulmonary: Anterior breath sounds clear bilaterally, no crackles or wheezing. No use of accessory muscles  GI: Abdomen soft, non-distended, non-tender  Extremities: Radial and DP pulses +2, no edema      Neurological Examination:  General:  Appearance is consistent with chronologic age.  No abnormal facies.  Gross skin survey within normal limits.    Cognitive/Language:  Awake, alert, and oriented to person, place, time and date.  Recent and remote memory intact.  Fund of knowledge is appropriate.  Naming, repetition and comprehension intact.   Nondysarthric.    Cranial Nerves  - Eyes: Visual acuity intact, Visual fields full.  EOMI w/o nystagmus, skew or reported double vision.  PERRL.  No ptosis/weakness of eyelid closure.    - Face:  Facial sensation normal V1 - 3, no facial asymmetry.    - Ears/Nose/Throat:  Hearing grossly intact b/l to finger rub.  Palate elevates midline.  Tongue and uvula midline.   Motor examination:  5/5 UE bilaterally, 4/5 proximal left leg, 4/5 distal left leg, 5/5 right lower extremity.  No observable drift. Normal tone and bulk. No tenderness, twitching, tremors or involuntary movements.  Sensory examination:   Decreased sensation to temperature on left thigh. Sensation otherwise intact        LABS:                        7.3    56.56 )-----------( 317      ( 31 Jul 2021 05:50 )             21.9     07-31    146<H>  |  113<H>  |  75<H>  ----------------------------<  162<H>  3.5   |  21<L>  |  3.60<H>    Ca    8.2<L>      31 Jul 2021 05:50  Phos  4.2     07-31  Mg     2.3     07-31    TPro  5.8<L>  /  Alb  2.5<L>  /  TBili  0.7  /  DBili  x   /  AST  39  /  ALT  55<H>  /  AlkPhos  50  07-31          RADIOLOGY & ADDITIONAL TESTS:

## 2021-07-31 NOTE — PROGRESS NOTE ADULT - SUBJECTIVE AND OBJECTIVE BOX
Patient is a 89y old  Male who presents with a chief complaint of slurred speech, L facial droop (30 Jul 2021 12:35)      INTERVAL HPI/OVERNIGHT EVENTS:   No overnight events   Afebrile, hemodynamically stable     ICU Vital Signs Last 24 Hrs  T(C): 36.4 (31 Jul 2021 05:40), Max: 37.6 (30 Jul 2021 09:20)  T(F): 97.5 (31 Jul 2021 05:40), Max: 99.7 (30 Jul 2021 09:20)  HR: 51 (31 Jul 2021 05:00) (49 - 63)  BP: 107/57 (31 Jul 2021 05:00) (96/52 - 165/76)  BP(mean): 78 (31 Jul 2021 05:00) (71 - 109)  ABP: 95/48 (30 Jul 2021 15:00) (88/57 - 163/51)  ABP(mean): 69 (30 Jul 2021 15:00) (65 - 89)  RR: 23 (30 Jul 2021 23:00) (18 - 31)  SpO2: 97% (31 Jul 2021 05:00) (93% - 98%)    I&O's Summary    29 Jul 2021 07:01  -  30 Jul 2021 07:00  --------------------------------------------------------  IN: 1995 mL / OUT: 890 mL / NET: 1105 mL    30 Jul 2021 07:01  -  31 Jul 2021 06:31  --------------------------------------------------------  IN: 1117.5 mL / OUT: 1000 mL / NET: 117.5 mL          LABS:                        7.3    56.56 )-----------( 317      ( 31 Jul 2021 05:50 )             21.9     07-30    145  |  114<H>  |  78<H>  ----------------------------<  157<H>  3.5   |  21<L>  |  3.93<H>    Ca    8.4      30 Jul 2021 19:49  Phos  4.2     07-31  Mg     2.3     07-31    TPro  5.9<L>  /  Alb  2.4<L>  /  TBili  0.6  /  DBili  x   /  AST  47<H>  /  ALT  60<H>  /  AlkPhos  51  07-30        CAPILLARY BLOOD GLUCOSE      POCT Blood Glucose.: 162 mg/dL (31 Jul 2021 06:11)  POCT Blood Glucose.: 159 mg/dL (30 Jul 2021 23:50)  POCT Blood Glucose.: 172 mg/dL (30 Jul 2021 17:46)  POCT Blood Glucose.: 187 mg/dL (30 Jul 2021 12:17)        RADIOLOGY & ADDITIONAL TESTS:    Consultant(s) Notes Reviewed:  [x ] YES  [ ] NO    MEDICATIONS  (STANDING):  atorvastatin 40 milliGRAM(s) Oral at bedtime  chlorhexidine 2% Cloths 1 Application(s) Topical <User Schedule>  dextrose 40% Gel 15 Gram(s) Oral once  dextrose 5%. 1000 milliLiter(s) (50 mL/Hr) IV Continuous <Continuous>  dextrose 5%. 1000 milliLiter(s) (100 mL/Hr) IV Continuous <Continuous>  dextrose 50% Injectable 25 Gram(s) IV Push once  dextrose 50% Injectable 12.5 Gram(s) IV Push once  dextrose 50% Injectable 25 Gram(s) IV Push once  glucagon  Injectable 1 milliGRAM(s) IntraMuscular once  insulin lispro (ADMELOG) corrective regimen sliding scale   SubCutaneous every 6 hours  levETIRAcetam  IVPB 500 milliGRAM(s) IV Intermittent every 24 hours  norepinephrine Infusion 0.05 MICROgram(s)/kG/Min (5.63 mL/Hr) IV Continuous <Continuous>  pantoprazole  Injectable 40 milliGRAM(s) IV Push every 12 hours  piperacillin/tazobactam IVPB.. 4.5 Gram(s) IV Intermittent every 12 hours    MEDICATIONS  (PRN):  acetaminophen    Suspension .. 600 milliGRAM(s) Enteral Tube every 6 hours PRN Moderate Pain (4 - 6)      PHYSICAL EXAM:  GENERAL:   HEAD:  Atraumatic, Normocephalic  EYES: EOMI, PERRLA, conjunctiva and sclera clear  NECK: Supple, No JVD, Normal thyroid, no enlarged nodes  NERVOUS SYSTEM:  Alert & Awake.   CHEST/LUNG: B/L good air entry; No rales, rhonchi, or wheezing  HEART: S1S2 normal, no S3, Regular rate and rhythm; No murmurs  ABDOMEN: Soft, Nontender, Nondistended; Bowel sounds present  EXTREMITIES:  2+ Peripheral Pulses, No clubbing, cyanosis, or edema  LYMPH: No lymphadenopathy noted  SKIN: No rashes or lesions    Care Discussed with Consultants/Other Providers [ x] YES  [ ] NO Patient is a 89y old  Male who presents with a chief complaint of slurred speech, L facial droop (30 Jul 2021 12:35)      INTERVAL HPI/OVERNIGHT EVENTS: No overnight events. Afebrile, hemodynamically stable     ICU Vital Signs Last 24 Hrs  T(C): 36.4 (31 Jul 2021 05:40), Max: 37.6 (30 Jul 2021 09:20)  T(F): 97.5 (31 Jul 2021 05:40), Max: 99.7 (30 Jul 2021 09:20)  HR: 51 (31 Jul 2021 05:00) (49 - 63)  BP: 107/57 (31 Jul 2021 05:00) (96/52 - 165/76)  BP(mean): 78 (31 Jul 2021 05:00) (71 - 109)  ABP: 95/48 (30 Jul 2021 15:00) (88/57 - 163/51)  ABP(mean): 69 (30 Jul 2021 15:00) (65 - 89)  RR: 23 (30 Jul 2021 23:00) (18 - 31)  SpO2: 97% (31 Jul 2021 05:00) (93% - 98%)    I&O's Summary    29 Jul 2021 07:01  -  30 Jul 2021 07:00  --------------------------------------------------------  IN: 1995 mL / OUT: 890 mL / NET: 1105 mL    30 Jul 2021 07:01  -  31 Jul 2021 06:31  --------------------------------------------------------  IN: 1117.5 mL / OUT: 1000 mL / NET: 117.5 mL          LABS:                        7.3    56.56 )-----------( 317      ( 31 Jul 2021 05:50 )             21.9     07-30    145  |  114<H>  |  78<H>  ----------------------------<  157<H>  3.5   |  21<L>  |  3.93<H>    Ca    8.4      30 Jul 2021 19:49  Phos  4.2     07-31  Mg     2.3     07-31    TPro  5.9<L>  /  Alb  2.4<L>  /  TBili  0.6  /  DBili  x   /  AST  47<H>  /  ALT  60<H>  /  AlkPhos  51  07-30        CAPILLARY BLOOD GLUCOSE      POCT Blood Glucose.: 162 mg/dL (31 Jul 2021 06:11)  POCT Blood Glucose.: 159 mg/dL (30 Jul 2021 23:50)  POCT Blood Glucose.: 172 mg/dL (30 Jul 2021 17:46)  POCT Blood Glucose.: 187 mg/dL (30 Jul 2021 12:17)        RADIOLOGY & ADDITIONAL TESTS:    Consultant(s) Notes Reviewed:  [x ] YES  [ ] NO    MEDICATIONS  (STANDING):  atorvastatin 40 milliGRAM(s) Oral at bedtime  chlorhexidine 2% Cloths 1 Application(s) Topical <User Schedule>  dextrose 40% Gel 15 Gram(s) Oral once  dextrose 5%. 1000 milliLiter(s) (50 mL/Hr) IV Continuous <Continuous>  dextrose 5%. 1000 milliLiter(s) (100 mL/Hr) IV Continuous <Continuous>  dextrose 50% Injectable 25 Gram(s) IV Push once  dextrose 50% Injectable 12.5 Gram(s) IV Push once  dextrose 50% Injectable 25 Gram(s) IV Push once  glucagon  Injectable 1 milliGRAM(s) IntraMuscular once  insulin lispro (ADMELOG) corrective regimen sliding scale   SubCutaneous every 6 hours  levETIRAcetam  IVPB 500 milliGRAM(s) IV Intermittent every 24 hours  norepinephrine Infusion 0.05 MICROgram(s)/kG/Min (5.63 mL/Hr) IV Continuous <Continuous>  pantoprazole  Injectable 40 milliGRAM(s) IV Push every 12 hours  piperacillin/tazobactam IVPB.. 4.5 Gram(s) IV Intermittent every 12 hours    MEDICATIONS  (PRN):  acetaminophen    Suspension .. 600 milliGRAM(s) Enteral Tube every 6 hours PRN Moderate Pain (4 - 6)      PHYSICAL EXAM:  GENERAL: NAD  HEAD:  Atraumatic, Normocephalic  EYES: EOMI, PERRLA, conjunctiva and sclera clear  NECK: Supple, No JVD, hoarse phonation   NERVOUS SYSTEM:  Alert & Awake. A&O x 2   CHEST/LUNG: Crackles in left upper lobe posteriorly   HEART: S1S2 normal, no S3, Regular rate and rhythm; No murmurs  ABDOMEN: Soft, Nontender, Nondistended; Bowel sounds present, NGT in place   EXTREMITIES:  2+ Peripheral Pulses, No clubbing, cyanosis, or edema  SKIN: No rashes or lesions    Care Discussed with Consultants/Other Providers [ x] YES  [ ] NO

## 2021-07-31 NOTE — PROGRESS NOTE ADULT - ATTENDING COMMENTS
Seems to be requiring additional pulm toilet for secretion management. Prednisone added as outlined by Neuro.

## 2021-07-31 NOTE — PROGRESS NOTE ADULT - ATTENDING COMMENTS
myasthenia symptoms stable  Hb had dropped to about 5 2 days ago, now s/p transfusion is stable at about 7.   that plus left leg numbness/weakness suggests possible retroperitoneal hematoma  discussed with ICU team , will hold off further evaluation for now as it won't , but consider getting CT A/P if Hb drops again  Agree with starting prednisone if not contraindicated   No need for lumbar spine imaging at this time  will follow

## 2021-07-31 NOTE — PROGRESS NOTE ADULT - ASSESSMENT
89y Male with PMHx of HTN, severe aortic sinensis and seizure on phenytoin who presented with left facial droop and slurred speech, Stroke w/u negative. Found to have myasthenia gravis with positive anti-ach antibodies. Also found to have leukocytosis suspicious for malignancy. Admitted to [facility] for treatment of MG and w/u of potential CML/CLL. S/p IVIG treatment. Course complicated by dysphagia and aspiration pneumonia. Admitted to MICU after developing cardiogenic/obstructive following IVF administration in the setting of severe aortic stenosis. Now stable on pressors. Creatinine persistently increasing with poor urine output.     NEURO     # Seizures   - On keppra 500 q12    CARDIOVASCULAR  #Hypotension   - On levophed     #Bradycardia  - Bradycardia to 50s in AM   - levophed as above     PULMONARY  - Saturating well on RA    #Pneumonia   - Antibiotics as below     GASTROINTESTINAL  #Dysphagia   - s/p NGT placement  - On tube feeds   - Protonix prophylaxis     RENAL  #EVARISTO/CKD  - Creatinine trending upward   - Not considering HD at this time     INFECTIOUS DISEASE  #Aspiration Pneumonia   - Zosyn 4.5 q12 for pseudomonas coverage      ENDOCRINE  - ISS    HEME  #Yamilka    FLUIDS/ELECTROLYTES/NUTRITION  -IVF: N/A  -Monitor, Replete to K>4 and Mg>2  -Diet: tube feeds     PROPHYLAXIS  -DVT: N/A  -GI: Protonix     DISPO  CODE STATUS: DNR 89y Male with PMHx of HTN, severe aortic sinensis and seizure on phenytoin who presented with left facial droop and slurred speech, Stroke w/u negative. Found to have myasthenia gravis with positive anti-ach antibodies. Also found to have leukocytosis suspicious for malignancy. Admitted to [facility] for treatment of MG and w/u of potential CML/CLL. S/p IVIG treatment. Course complicated by dysphagia and aspiration pneumonia. Admitted to MICU after developing cardiogenic/obstructive following IVF administration in the setting of severe aortic stenosis. Now stable on pressors. Creatinine persistently increasing with poor urine output.     NEURO     # Seizures   - On keppra 500 q12    #Myasthenia Gravis  - s/p IvIG  - On prednisone 10mg qd     CARDIOVASCULAR  #Hypotension   - On levophed     #Bradycardia  - Bradycardic to 50s in AM   - Continue to monitor  - DNR  - levophed discontinued     PULMONARY  - Suction as needed     #Pneumonia   - Antibiotics as below     GASTROINTESTINAL    #Dysphagia   - s/p NGT placement  - On tube feeds   - Protonix prophylaxis     RENAL  #EVARISTO/CKD  - Creatinine improving: 3.93 -> 3.6   - Not considering HD at this time     INFECTIOUS DISEASE  #Aspiration Pneumonia   - Zosyn 4.5 q12 for pseudomonas coverage      ENDOCRINE  - ISS    HEME  #No active issues     FLUIDS/ELECTROLYTES/NUTRITION  -IVF: N/A  -Monitor, Replete to K>4 and Mg>2  -Diet: tube feeds     PROPHYLAXIS  -DVT: N/A  -GI: Protonix     DISPO  CODE STATUS: DNR

## 2021-07-31 NOTE — PROGRESS NOTE ADULT - ASSESSMENT
89M with PMHx of HTN and seizure on phenytoin presents with left facial droop and slurred speech. Stroke workup negative. Neurology consulted for persistent weakness.      Impression  - Myasthenia gravis, new onset. Bedside repetitive nerve stimulation on 7/22 notable for decremental response indicative of MG, Strength improved after receiving full course of IVIG.  - Patient now with left leg weakness, pain, and decreased sensation to temperature on left thigh. Exam somewhat limited by language barrier although patient's family was at the bedside to translate. Weakness is subjective and does not complete correlate with degree of weakness on exam.  - Weakness may be somewhat explained by Myasthenia, but sensory deficits suggest an alternative etiology. Symptoms may be secondary to femoral nerve compression. Pt's pain is not reproducible, may suggest bone marrow involvement given hx of persistent leukocytosis.      Plan  - F/u Myasthenia Gravis antibodies - bedside repetitive nerve stimulation on 7/22 indicative of MG  - S/p IVIG x5 days  - Recommend Lumbar spine with/without contrast to further evaluate patient's complaints of weakness, sensory deficits. Can be done when patient more stable from ICU perspective. Given +MG antibodies, no further need for repeat MRI brain.  - PT/OT  - Will consider starting Rituximab q6 months for long-term management of myasthenia, pending ID assessment of prior Hep B infection and improvement of current hypotension/pulmonary edema.  - Alternatively, can treat with Prednisone 10mg daily, pending rule out of GIB in setting of acute anemia  - Heme-Onc following for persistent leukocytosis. Flow cytometry, BCR-ABL, JAK2 serologies sent. Can follow up outpatient with Dr. Nguyen     89M with PMHx of HTN and seizure on phenytoin presents with left facial droop and slurred speech. Stroke workup negative. Neurology consulted for persistent weakness.      Impression  - Myasthenia gravis, new onset. Bedside repetitive nerve stimulation on 7/22 notable for decremental response indicative of MG, Strength improved after receiving full course of IVIG.  - Patient now with left leg weakness, pain, and decreased sensation to temperature on left thigh. Exam somewhat limited by language barrier although patient's family was at the bedside to translate. Weakness is subjective and does not complete correlate with degree of weakness on exam.  - Weakness may be somewhat explained by Myasthenia, but sensory deficits may be secondary to femoral nerve compression.    Plan  - F/u Myasthenia Gravis antibodies   - S/p IVIG x5 days  - Started on Prednisone 10 mg daily 7/31  - If patient develops further drop in hemoglobin, then consider CT abdomen Pelvis o rul out retroperitoneal hematoma that might cause secondary to femoral nerve compression.  - Lumbar spine with/without contrast to further evaluate patient's complaints of weakness, sensory deficits. Can be done when patient more stable from ICU perspective. Given +MG antibodies, no further need for repeat MRI brain.  - Will consider starting Rituximab q6 months for long-term management of myasthenia, pending ID assessment of prior Hep B infection and improvement of current hypotension/pulmonary edema.  - Heme-Onc following for persistent leukocytosis. Flow cytometry, BCR-ABL, JAK2 serologies sent. Can follow up outpatient with Dr. Nguyen     89M with PMHx of HTN and seizure on phenytoin presents with left facial droop and slurred speech. Stroke workup negative. Neurology consulted for persistent weakness.      Impression  - Myasthenia gravis, new onset. Bedside repetitive nerve stimulation on 7/22 notable for decremental response indicative of MG, Strength improved after receiving full course of IVIG.  - Patient now with left leg weakness, pain, and decreased sensation to temperature on left thigh. Exam somewhat limited by language barrier although patient's family was at the bedside to translate. Weakness is subjective and does not complete correlate with degree of weakness on exam.  - Weakness may be somewhat explained by Myasthenia, but sensory deficits may be secondary to femoral nerve compression.    Plan  - F/u Myasthenia Gravis antibodies   - S/p IVIG x5 days  - Started on Prednisone 10 mg daily 7/31  - If patient develops further drop in hemoglobin, then consider CT abdomen Pelvis to rule out retroperitoneal hematoma that might cause femoral nerve compression.  - Lumbar spine with/without contrast to further evaluate patient's complaints of weakness, sensory deficits. Can be done when patient more stable from ICU perspective. Given +MG antibodies, no further need for repeat MRI brain.  - Will consider starting Rituximab q6 months for long-term management of myasthenia, pending ID assessment of prior Hep B infection and improvement of current hypotension/pulmonary edema.  - Heme-Onc following for persistent leukocytosis. Flow cytometry, BCR-ABL, JAK2 serologies sent. Can follow up outpatient with Dr. Nguyen

## 2021-08-01 DIAGNOSIS — J69.0 PNEUMONITIS DUE TO INHALATION OF FOOD AND VOMIT: ICD-10-CM

## 2021-08-01 DIAGNOSIS — A41.9 SEPSIS, UNSPECIFIED ORGANISM: ICD-10-CM

## 2021-08-01 DIAGNOSIS — I35.0 NONRHEUMATIC AORTIC (VALVE) STENOSIS: ICD-10-CM

## 2021-08-01 LAB
ALBUMIN SERPL ELPH-MCNC: 2.5 G/DL — LOW (ref 3.3–5)
ALP SERPL-CCNC: 54 U/L — SIGNIFICANT CHANGE UP (ref 40–120)
ALT FLD-CCNC: 44 U/L — SIGNIFICANT CHANGE UP (ref 10–45)
ANION GAP SERPL CALC-SCNC: 10 MMOL/L — SIGNIFICANT CHANGE UP (ref 5–17)
AST SERPL-CCNC: 38 U/L — SIGNIFICANT CHANGE UP (ref 10–40)
BILIRUB SERPL-MCNC: 0.9 MG/DL — SIGNIFICANT CHANGE UP (ref 0.2–1.2)
BUN SERPL-MCNC: 73 MG/DL — HIGH (ref 7–23)
CALCIUM SERPL-MCNC: 8.7 MG/DL — SIGNIFICANT CHANGE UP (ref 8.4–10.5)
CHLORIDE SERPL-SCNC: 117 MMOL/L — HIGH (ref 96–108)
CO2 SERPL-SCNC: 23 MMOL/L — SIGNIFICANT CHANGE UP (ref 22–31)
CREAT SERPL-MCNC: 2.75 MG/DL — HIGH (ref 0.5–1.3)
CULTURE RESULTS: SIGNIFICANT CHANGE UP
GLUCOSE BLDC GLUCOMTR-MCNC: 156 MG/DL — HIGH (ref 70–99)
GLUCOSE BLDC GLUCOMTR-MCNC: 159 MG/DL — HIGH (ref 70–99)
GLUCOSE BLDC GLUCOMTR-MCNC: 177 MG/DL — HIGH (ref 70–99)
GLUCOSE BLDC GLUCOMTR-MCNC: 207 MG/DL — HIGH (ref 70–99)
GLUCOSE SERPL-MCNC: 165 MG/DL — HIGH (ref 70–99)
HCT VFR BLD CALC: 24.7 % — LOW (ref 39–50)
HGB BLD-MCNC: 8 G/DL — LOW (ref 13–17)
MAGNESIUM SERPL-MCNC: 2.2 MG/DL — SIGNIFICANT CHANGE UP (ref 1.6–2.6)
MCHC RBC-ENTMCNC: 30.3 PG — SIGNIFICANT CHANGE UP (ref 27–34)
MCHC RBC-ENTMCNC: 32.4 GM/DL — SIGNIFICANT CHANGE UP (ref 32–36)
MCV RBC AUTO: 93.6 FL — SIGNIFICANT CHANGE UP (ref 80–100)
NRBC # BLD: 0 /100 WBCS — SIGNIFICANT CHANGE UP (ref 0–0)
PHOSPHATE SERPL-MCNC: 3.1 MG/DL — SIGNIFICANT CHANGE UP (ref 2.5–4.5)
PLATELET # BLD AUTO: 356 K/UL — SIGNIFICANT CHANGE UP (ref 150–400)
POTASSIUM SERPL-MCNC: 3.7 MMOL/L — SIGNIFICANT CHANGE UP (ref 3.5–5.3)
POTASSIUM SERPL-SCNC: 3.7 MMOL/L — SIGNIFICANT CHANGE UP (ref 3.5–5.3)
PROT SERPL-MCNC: 6.2 G/DL — SIGNIFICANT CHANGE UP (ref 6–8.3)
RBC # BLD: 2.64 M/UL — LOW (ref 4.2–5.8)
RBC # FLD: 19.1 % — HIGH (ref 10.3–14.5)
SODIUM SERPL-SCNC: 150 MMOL/L — HIGH (ref 135–145)
SPECIMEN SOURCE: SIGNIFICANT CHANGE UP
WBC # BLD: 64.51 K/UL — CRITICAL HIGH (ref 3.8–10.5)
WBC # FLD AUTO: 64.51 K/UL — CRITICAL HIGH (ref 3.8–10.5)

## 2021-08-01 PROCEDURE — 99233 SBSQ HOSP IP/OBS HIGH 50: CPT

## 2021-08-01 PROCEDURE — 99233 SBSQ HOSP IP/OBS HIGH 50: CPT | Mod: GC

## 2021-08-01 PROCEDURE — 71045 X-RAY EXAM CHEST 1 VIEW: CPT | Mod: 26

## 2021-08-01 RX ORDER — POLYETHYLENE GLYCOL 3350 17 G/17G
17 POWDER, FOR SOLUTION ORAL ONCE
Refills: 0 | Status: COMPLETED | OUTPATIENT
Start: 2021-08-01 | End: 2021-08-01

## 2021-08-01 RX ORDER — SENNA PLUS 8.6 MG/1
2 TABLET ORAL AT BEDTIME
Refills: 0 | Status: DISCONTINUED | OUTPATIENT
Start: 2021-08-01 | End: 2021-08-23

## 2021-08-01 RX ORDER — IPRATROPIUM/ALBUTEROL SULFATE 18-103MCG
3 AEROSOL WITH ADAPTER (GRAM) INHALATION EVERY 6 HOURS
Refills: 0 | Status: DISCONTINUED | OUTPATIENT
Start: 2021-08-01 | End: 2021-08-02

## 2021-08-01 RX ADMIN — Medication 3 MILLILITER(S): at 22:18

## 2021-08-01 RX ADMIN — PIPERACILLIN AND TAZOBACTAM 200 GRAM(S): 4; .5 INJECTION, POWDER, LYOPHILIZED, FOR SOLUTION INTRAVENOUS at 18:08

## 2021-08-01 RX ADMIN — SENNA PLUS 2 TABLET(S): 8.6 TABLET ORAL at 22:18

## 2021-08-01 RX ADMIN — Medication 1: at 00:26

## 2021-08-01 RX ADMIN — PIPERACILLIN AND TAZOBACTAM 200 GRAM(S): 4; .5 INJECTION, POWDER, LYOPHILIZED, FOR SOLUTION INTRAVENOUS at 03:34

## 2021-08-01 RX ADMIN — Medication 3 MILLILITER(S): at 18:07

## 2021-08-01 RX ADMIN — Medication 1: at 11:39

## 2021-08-01 RX ADMIN — ATORVASTATIN CALCIUM 40 MILLIGRAM(S): 80 TABLET, FILM COATED ORAL at 22:18

## 2021-08-01 RX ADMIN — Medication 10 MILLIGRAM(S): at 11:35

## 2021-08-01 RX ADMIN — PANTOPRAZOLE SODIUM 40 MILLIGRAM(S): 20 TABLET, DELAYED RELEASE ORAL at 18:07

## 2021-08-01 RX ADMIN — POLYETHYLENE GLYCOL 3350 17 GRAM(S): 17 POWDER, FOR SOLUTION ORAL at 14:00

## 2021-08-01 RX ADMIN — Medication 1: at 07:15

## 2021-08-01 RX ADMIN — Medication 2: at 18:08

## 2021-08-01 RX ADMIN — LEVETIRACETAM 400 MILLIGRAM(S): 250 TABLET, FILM COATED ORAL at 05:52

## 2021-08-01 RX ADMIN — CHLORHEXIDINE GLUCONATE 1 APPLICATION(S): 213 SOLUTION TOPICAL at 05:52

## 2021-08-01 RX ADMIN — PANTOPRAZOLE SODIUM 40 MILLIGRAM(S): 20 TABLET, DELAYED RELEASE ORAL at 05:52

## 2021-08-01 NOTE — PROGRESS NOTE ADULT - PROBLEM SELECTOR PLAN 10
IVF: N/A  Diet: NPO w tube feeds   DVT: N/A  GI: Protonix   CODE STATUS: DNR  Monitor, Replete to K>4 and Mg>2

## 2021-08-01 NOTE — PROGRESS NOTE ADULT - PROBLEM SELECTOR PLAN 9
IVF: N/A  Diet: NPO w tube feeds   DVT: N/A  GI: Protonix   CODE STATUS: DNR  Monitor, Replete to K>4 and Mg>2 Normocytic anemia, with iron panel consistent with anemia of chronic disease/ renal disease. B12/folate wnl. No evidence of hemolysi. Given CKD and Anemia, a monoclonal gammopathy workup performed and was negative  - If patient develops further drop in hemoglobin, then consider CT abdomen Pelvis to rule out retroperitoneal hematoma that might cause femoral nerve compression.  - Transfuse if <7  - Keep active T&S

## 2021-08-01 NOTE — PROGRESS NOTE ADULT - PROBLEM SELECTOR PLAN 7
- Cont keppra 500 q12 Given underlying mod- severe AS, would be cautious with fluids. Pt is not a surgical candidate for severe AS given poor prognosis as well as family wishes to not pursue aggressive measures.

## 2021-08-01 NOTE — PROGRESS NOTE ADULT - SUBJECTIVE AND OBJECTIVE BOX
**INCOMPLETE NOTE    OVERNIGHT EVENTS:    SUBJECTIVE:  Patient seen and examined at bedside.    Vital Signs Last 12 Hrs  T(F): 97.4 (08-01-21 @ 02:00), Max: 97.9 (07-31-21 @ 22:03)  HR: 64 (08-01-21 @ 04:00) (61 - 83)  BP: 149/67 (08-01-21 @ 04:00) (134/62 - 161/69)  BP(mean): 96 (08-01-21 @ 04:00) (89 - 107)  RR: 18 (08-01-21 @ 04:00) (18 - 20)  SpO2: 97% (08-01-21 @ 04:00) (96% - 97%)  I&O's Summary    30 Jul 2021 07:01  -  31 Jul 2021 07:00  --------------------------------------------------------  IN: 1257.5 mL / OUT: 1150 mL / NET: 107.5 mL    31 Jul 2021 07:01  -  01 Aug 2021 06:10  --------------------------------------------------------  IN: 930 mL / OUT: 1385 mL / NET: -455 mL        PHYSICAL EXAM:  Constitutional: NAD, comfortable in bed.  HEENT: NC/AT, PERRLA, EOMI, no conjunctival pallor or scleral icterus, MMM  Neck: Supple, no JVD  Respiratory: CTA B/L. No w/r/r.   Cardiovascular: RRR, normal S1 and S2, no m/r/g.   Gastrointestinal: +BS, soft NTND, no guarding or rebound tenderness, no palpable masses   Extremities: wwp; no cyanosis, clubbing or edema.   Vascular: Pulses equal and strong throughout.   Neurological: AAOx3, no CN deficits, strength and sensation intact throughout.   Skin: No gross skin abnormalities or rashes        LABS:                        8.0    64.51 )-----------( 356      ( 01 Aug 2021 05:29 )             24.7     08-01    150<H>  |  117<H>  |  73<H>  ----------------------------<  165<H>  3.7   |  23  |  2.75<H>    Ca    8.7      01 Aug 2021 05:27  Phos  3.1     08-01  Mg     2.2     08-01    TPro  6.2  /  Alb  2.5<L>  /  TBili  0.9  /  DBili  x   /  AST  38  /  ALT  44  /  AlkPhos  54  08-01            RADIOLOGY & ADDITIONAL TESTS:    MEDICATIONS  (STANDING):  atorvastatin 40 milliGRAM(s) Oral at bedtime  chlorhexidine 2% Cloths 1 Application(s) Topical <User Schedule>  dextrose 40% Gel 15 Gram(s) Oral once  dextrose 5%. 1000 milliLiter(s) (50 mL/Hr) IV Continuous <Continuous>  dextrose 5%. 1000 milliLiter(s) (100 mL/Hr) IV Continuous <Continuous>  dextrose 50% Injectable 25 Gram(s) IV Push once  dextrose 50% Injectable 12.5 Gram(s) IV Push once  dextrose 50% Injectable 25 Gram(s) IV Push once  glucagon  Injectable 1 milliGRAM(s) IntraMuscular once  insulin lispro (ADMELOG) corrective regimen sliding scale   SubCutaneous every 6 hours  levETIRAcetam  IVPB 500 milliGRAM(s) IV Intermittent every 24 hours  pantoprazole  Injectable 40 milliGRAM(s) IV Push every 12 hours  piperacillin/tazobactam IVPB.. 4.5 Gram(s) IV Intermittent every 12 hours  predniSONE   Tablet 10 milliGRAM(s) Oral every 24 hours    MEDICATIONS  (PRN):  acetaminophen    Suspension .. 600 milliGRAM(s) Enteral Tube every 6 hours PRN Moderate Pain (4 - 6)   Hospital Course:  89y Male with PMHx of HTN, severe aortic stenosis and seizure on phenytoin who presented with left facial droop and slurred speech, Stroke w/u negative. Found to have myasthenia gravis with positive anti-ach antibodies. Also found to have leukocytosis suspicious for malignancy. Admitted to [facility] for treatment of MG and w/u of potential CML/CLL. S/p IVIG treatment. Course complicated initially by dysphagia followed hypotension concerning for sepsis in the setting of aspiration pneumonia. Patient given IVF, resulting in cardiogenic vs. obstructive shock due to severe aortic stenosis.  Also noted to have elevated creatinine concerning for EVARISTO. Patient admitted to ICU for further management. Urine output low initially low but improving. However, creatinine remains elevated. Phenylephrine started in an attempt to increase renal perfusion. Discontinued due to bradycardia. Patient also with dysphagia and poor PO intake. S/p NGT placement.     OVERNIGHT EVENTS: UOP adequate    SUBJECTIVE:  Patient seen and examined at bedside.    Vital Signs Last 12 Hrs  T(F): 97.4 (08-01-21 @ 02:00), Max: 97.9 (07-31-21 @ 22:03)  HR: 64 (08-01-21 @ 04:00) (61 - 83)  BP: 149/67 (08-01-21 @ 04:00) (134/62 - 161/69)  BP(mean): 96 (08-01-21 @ 04:00) (89 - 107)  RR: 18 (08-01-21 @ 04:00) (18 - 20)  SpO2: 97% (08-01-21 @ 04:00) (96% - 97%)  I&O's Summary    30 Jul 2021 07:01  -  31 Jul 2021 07:00  --------------------------------------------------------  IN: 1257.5 mL / OUT: 1150 mL / NET: 107.5 mL    31 Jul 2021 07:01  -  01 Aug 2021 06:10  --------------------------------------------------------  IN: 930 mL / OUT: 1385 mL / NET: -455 mL        PHYSICAL EXAM:  Constitutional: NAD, comfortable in bed.  HEENT: NC/AT, PERRLA, EOMI, no conjunctival pallor or scleral icterus, MMM  Neck: Supple, no JVD  Respiratory: CTA B/L. No w/r/r.   Cardiovascular: RRR, normal S1 and S2, no m/r/g.   Gastrointestinal: +BS, soft NTND, no guarding or rebound tenderness, no palpable masses   Extremities: wwp; no cyanosis, clubbing or edema.   Vascular: Pulses equal and strong throughout.   Neurological: AAOx3, no CN deficits, strength and sensation intact throughout.   Skin: No gross skin abnormalities or rashes        LABS:                        8.0    64.51 )-----------( 356      ( 01 Aug 2021 05:29 )             24.7     08-01    150<H>  |  117<H>  |  73<H>  ----------------------------<  165<H>  3.7   |  23  |  2.75<H>    Ca    8.7      01 Aug 2021 05:27  Phos  3.1     08-01  Mg     2.2     08-01    TPro  6.2  /  Alb  2.5<L>  /  TBili  0.9  /  DBili  x   /  AST  38  /  ALT  44  /  AlkPhos  54  08-01            RADIOLOGY & ADDITIONAL TESTS:    MEDICATIONS  (STANDING):  atorvastatin 40 milliGRAM(s) Oral at bedtime  chlorhexidine 2% Cloths 1 Application(s) Topical <User Schedule>  dextrose 40% Gel 15 Gram(s) Oral once  dextrose 5%. 1000 milliLiter(s) (50 mL/Hr) IV Continuous <Continuous>  dextrose 5%. 1000 milliLiter(s) (100 mL/Hr) IV Continuous <Continuous>  dextrose 50% Injectable 25 Gram(s) IV Push once  dextrose 50% Injectable 12.5 Gram(s) IV Push once  dextrose 50% Injectable 25 Gram(s) IV Push once  glucagon  Injectable 1 milliGRAM(s) IntraMuscular once  insulin lispro (ADMELOG) corrective regimen sliding scale   SubCutaneous every 6 hours  levETIRAcetam  IVPB 500 milliGRAM(s) IV Intermittent every 24 hours  pantoprazole  Injectable 40 milliGRAM(s) IV Push every 12 hours  piperacillin/tazobactam IVPB.. 4.5 Gram(s) IV Intermittent every 12 hours  predniSONE   Tablet 10 milliGRAM(s) Oral every 24 hours    MEDICATIONS  (PRN):  acetaminophen    Suspension .. 600 milliGRAM(s) Enteral Tube every 6 hours PRN Moderate Pain (4 - 6)   Hospital Course:  89y Male with PMHx of HTN, severe aortic stenosis and seizure on phenytoin who presented with left facial droop and slurred speech, Stroke w/u negative. Found to have myasthenia gravis with positive anti-ach antibodies. Also found to have leukocytosis suspicious for malignancy. Admitted to [facility] for treatment of MG and w/u of potential CML/CLL. S/p IVIG treatment. Course complicated initially by dysphagia followed hypotension concerning for sepsis in the setting of aspiration pneumonia. Patient given IVF, resulting in cardiogenic vs. obstructive shock due to severe aortic stenosis.  Also noted to have elevated creatinine concerning for EVARISTO. Patient admitted to ICU for further management. Urine output low initially low but improving. However, creatinine remains elevated. Phenylephrine started in an attempt to increase renal perfusion. Discontinued due to bradycardia. Patient also with dysphagia and poor PO intake. S/p NGT placement.     OVERNIGHT EVENTS: UOP adequate    SUBJECTIVE:  Patient seen and examined at bedside.  No C/o. Denies HA, CP, SOB, N/V, Abd pain.    Vital Signs Last 12 Hrs  T(F): 97.4 (08-01-21 @ 02:00), Max: 97.9 (07-31-21 @ 22:03)  HR: 64 (08-01-21 @ 04:00) (61 - 83)  BP: 149/67 (08-01-21 @ 04:00) (134/62 - 161/69)  BP(mean): 96 (08-01-21 @ 04:00) (89 - 107)  RR: 18 (08-01-21 @ 04:00) (18 - 20)  SpO2: 97% (08-01-21 @ 04:00) (96% - 97%)  I&O's Summary    30 Jul 2021 07:01  -  31 Jul 2021 07:00  --------------------------------------------------------  IN: 1257.5 mL / OUT: 1150 mL / NET: 107.5 mL    31 Jul 2021 07:01  -  01 Aug 2021 06:10  --------------------------------------------------------  IN: 930 mL / OUT: 1385 mL / NET: -455 mL    PHYSICAL EXAM:  Constitutional: NAD, comfortable in bed.  HEENT: NC/AT, PERRLA, EOMI, no conjunctival pallor or scleral icterus, MMM  Neck: Supple, no JVD  Respiratory: Diffuse rales  Cardiovascular: RRR, normal S1 and S2, 3/6 Systolic murmur on R superior sternal border  Gastrointestinal: +BS, soft NTND, no guarding or rebound tenderness, no palpable masses   Extremities: wwp; no cyanosis, clubbing or edema.   Vascular: Pulses equal and strong throughout.   Neurological: AAOx3, no CN deficits, strength and sensation intact throughout.   Skin: No gross skin abnormalities or rashes    LABS:                        8.0    64.51 )-----------( 356      ( 01 Aug 2021 05:29 )             24.7     08-01    150<H>  |  117<H>  |  73<H>  ----------------------------<  165<H>  3.7   |  23  |  2.75<H>    Ca    8.7      01 Aug 2021 05:27  Phos  3.1     08-01  Mg     2.2     08-01    TPro  6.2  /  Alb  2.5<L>  /  TBili  0.9  /  DBili  x   /  AST  38  /  ALT  44  /  AlkPhos  54  08-01    RADIOLOGY & ADDITIONAL TESTS:    MEDICATIONS  (STANDING):  atorvastatin 40 milliGRAM(s) Oral at bedtime  chlorhexidine 2% Cloths 1 Application(s) Topical <User Schedule>  dextrose 40% Gel 15 Gram(s) Oral once  dextrose 5%. 1000 milliLiter(s) (50 mL/Hr) IV Continuous <Continuous>  dextrose 5%. 1000 milliLiter(s) (100 mL/Hr) IV Continuous <Continuous>  dextrose 50% Injectable 25 Gram(s) IV Push once  dextrose 50% Injectable 12.5 Gram(s) IV Push once  dextrose 50% Injectable 25 Gram(s) IV Push once  glucagon  Injectable 1 milliGRAM(s) IntraMuscular once  insulin lispro (ADMELOG) corrective regimen sliding scale   SubCutaneous every 6 hours  levETIRAcetam  IVPB 500 milliGRAM(s) IV Intermittent every 24 hours  pantoprazole  Injectable 40 milliGRAM(s) IV Push every 12 hours  piperacillin/tazobactam IVPB.. 4.5 Gram(s) IV Intermittent every 12 hours  predniSONE   Tablet 10 milliGRAM(s) Oral every 24 hours    MEDICATIONS  (PRN):  acetaminophen    Suspension .. 600 milliGRAM(s) Enteral Tube every 6 hours PRN Moderate Pain (4 - 6)   Hospital Course:  89y Male with PMHx of HTN, severe aortic stenosis and seizure on phenytoin who presented with left facial droop and slurred speech, Stroke w/u negative. Found to have myasthenia gravis with positive anti-ach antibodies. Also found to have leukocytosis suspicious for malignancy. Admitted to [facility] for treatment of MG and w/u of potential CML/CLL. S/p IVIG treatment. Course complicated initially by dysphagia followed hypotension concerning for sepsis in the setting of aspiration pneumonia.  Patient given IVF, resulting in cardiogenic vs. obstructive shock due to severe aortic stenosis. Started on broad spectrum Zosyn/Vanc (MRSA neg d/mitra) when transferred to ICU for aspiration pneumonia. Also noted to have elevated creatinine concerning for EVARISTO (sepsis vs hypoperfusion, with improvement in renal function and urine output. Briefly requiring pressors on transfer including phenylephrine and levophed, successfully weaned off of both with improvement in blood pressures with systolics in 130s-140s.  Patient also with dysphagia and poor PO intake, now S/p NGT placement and tube feeds. Per neurology now continuing with prednisone 10mg daily given EMG study and MG antibodies. Will continue with additional pulm toilet for secretion management. WBC downtrending from peak, c/f CML and will continue trending. Hemoglobin dropped to 5.0 s/p 2 units of PRBCs with adequate correction to 7.7 without further drops in hemolgobin. Hemodynamically stable for further monitoring on medical telemetry.     OVERNIGHT EVENTS: UOP adequate    SUBJECTIVE:  Patient seen and examined at bedside.  No C/o. Denies HA, CP, SOB, N/V, Abd pain.    Vital Signs Last 12 Hrs  T(F): 97.4 (08-01-21 @ 02:00), Max: 97.9 (07-31-21 @ 22:03)  HR: 64 (08-01-21 @ 04:00) (61 - 83)  BP: 149/67 (08-01-21 @ 04:00) (134/62 - 161/69)  BP(mean): 96 (08-01-21 @ 04:00) (89 - 107)  RR: 18 (08-01-21 @ 04:00) (18 - 20)  SpO2: 97% (08-01-21 @ 04:00) (96% - 97%)  I&O's Summary    30 Jul 2021 07:01  -  31 Jul 2021 07:00  --------------------------------------------------------  IN: 1257.5 mL / OUT: 1150 mL / NET: 107.5 mL    31 Jul 2021 07:01  -  01 Aug 2021 06:10  --------------------------------------------------------  IN: 930 mL / OUT: 1385 mL / NET: -455 mL    PHYSICAL EXAM:  Constitutional: NAD, comfortable in bed.  HEENT: NC/AT, PERRLA, EOMI, no conjunctival pallor or scleral icterus, MMM  Neck: Supple, no JVD  Respiratory: Diffuse rales  Cardiovascular: RRR, normal S1 and S2, 3/6 Systolic murmur on R superior sternal border  Gastrointestinal: +BS, soft NTND, no guarding or rebound tenderness, no palpable masses   Extremities: wwp; no cyanosis, clubbing or edema.   Vascular: Pulses equal and strong throughout.   Neurological: AAOx3, no CN deficits, strength and sensation intact throughout.   Skin: No gross skin abnormalities or rashes    LABS:                        8.0    64.51 )-----------( 356      ( 01 Aug 2021 05:29 )             24.7     08-01    150<H>  |  117<H>  |  73<H>  ----------------------------<  165<H>  3.7   |  23  |  2.75<H>    Ca    8.7      01 Aug 2021 05:27  Phos  3.1     08-01  Mg     2.2     08-01    TPro  6.2  /  Alb  2.5<L>  /  TBili  0.9  /  DBili  x   /  AST  38  /  ALT  44  /  AlkPhos  54  08-01    RADIOLOGY & ADDITIONAL TESTS:    MEDICATIONS  (STANDING):  atorvastatin 40 milliGRAM(s) Oral at bedtime  chlorhexidine 2% Cloths 1 Application(s) Topical <User Schedule>  dextrose 40% Gel 15 Gram(s) Oral once  dextrose 5%. 1000 milliLiter(s) (50 mL/Hr) IV Continuous <Continuous>  dextrose 5%. 1000 milliLiter(s) (100 mL/Hr) IV Continuous <Continuous>  dextrose 50% Injectable 25 Gram(s) IV Push once  dextrose 50% Injectable 12.5 Gram(s) IV Push once  dextrose 50% Injectable 25 Gram(s) IV Push once  glucagon  Injectable 1 milliGRAM(s) IntraMuscular once  insulin lispro (ADMELOG) corrective regimen sliding scale   SubCutaneous every 6 hours  levETIRAcetam  IVPB 500 milliGRAM(s) IV Intermittent every 24 hours  pantoprazole  Injectable 40 milliGRAM(s) IV Push every 12 hours  piperacillin/tazobactam IVPB.. 4.5 Gram(s) IV Intermittent every 12 hours  predniSONE   Tablet 10 milliGRAM(s) Oral every 24 hours    MEDICATIONS  (PRN):  acetaminophen    Suspension .. 600 milliGRAM(s) Enteral Tube every 6 hours PRN Moderate Pain (4 - 6)   Transfer Note (MICU to Medical Telemetry)    Hospital Course:  89y Male with PMHx of HTN, severe aortic stenosis and seizure on phenytoin who presented with left facial droop and slurred speech, Stroke w/u negative. Found to have myasthenia gravis with positive anti-ach antibodies. Also found to have leukocytosis suspicious for malignancy. Admitted to [facility] for treatment of MG and w/u of potential CML/CLL. S/p IVIG treatment. Course complicated initially by dysphagia followed hypotension concerning for sepsis in the setting of aspiration pneumonia.  Patient given IVF, resulting in cardiogenic vs. obstructive shock due to severe aortic stenosis. Started on broad spectrum Zosyn/Vanc (MRSA neg d/mitra) when transferred to ICU for aspiration pneumonia. Also noted to have elevated creatinine concerning for EVARISTO (sepsis vs hypoperfusion, with improvement in renal function and urine output. Briefly requiring pressors on transfer including phenylephrine and levophed, successfully weaned off of both with improvement in blood pressures with systolics in 130s-140s.  Patient also with dysphagia and poor PO intake, now S/p NGT placement and tube feeds. Per neurology now continuing with prednisone 10mg daily given EMG study and MG antibodies. Will continue with additional pulm toilet for secretion management. WBC downtrending from peak, c/f CML and will continue trending. Hemoglobin dropped to 5.0 possibly in setting of CML vs frequent blood draws, now s/p 2 units of PRBCs with adequate correction to 7.7 without further drops in hemoglobin. Hemodynamically stable for further monitoring on medical telemetry.     OVERNIGHT EVENTS: UOP adequate    SUBJECTIVE:  Patient seen and examined at bedside.  No C/o. Denies HA, CP, SOB, N/V, Abd pain.    Vital Signs Last 12 Hrs  T(F): 97.4 (08-01-21 @ 02:00), Max: 97.9 (07-31-21 @ 22:03)  HR: 64 (08-01-21 @ 04:00) (61 - 83)  BP: 149/67 (08-01-21 @ 04:00) (134/62 - 161/69)  BP(mean): 96 (08-01-21 @ 04:00) (89 - 107)  RR: 18 (08-01-21 @ 04:00) (18 - 20)  SpO2: 97% (08-01-21 @ 04:00) (96% - 97%)  I&O's Summary    30 Jul 2021 07:01  -  31 Jul 2021 07:00  --------------------------------------------------------  IN: 1257.5 mL / OUT: 1150 mL / NET: 107.5 mL    31 Jul 2021 07:01  -  01 Aug 2021 06:10  --------------------------------------------------------  IN: 930 mL / OUT: 1385 mL / NET: -455 mL    PHYSICAL EXAM:  Constitutional: NAD, comfortable in bed.  HEENT: NC/AT, PERRLA, EOMI, no conjunctival pallor or scleral icterus, MMM  Neck: Supple, no JVD  Respiratory: Diffuse rales  Cardiovascular: RRR, normal S1 and S2, 3/6 Systolic murmur on R superior sternal border  Gastrointestinal: +BS, soft NTND, no guarding or rebound tenderness, no palpable masses   Extremities: wwp; no cyanosis, clubbing or edema.   Vascular: Pulses equal and strong throughout.   Neurological: AAOx3, no CN deficits, strength and sensation intact throughout.   Skin: No gross skin abnormalities or rashes    LABS:                        8.0    64.51 )-----------( 356      ( 01 Aug 2021 05:29 )             24.7     08-01    150<H>  |  117<H>  |  73<H>  ----------------------------<  165<H>  3.7   |  23  |  2.75<H>    Ca    8.7      01 Aug 2021 05:27  Phos  3.1     08-01  Mg     2.2     08-01    TPro  6.2  /  Alb  2.5<L>  /  TBili  0.9  /  DBili  x   /  AST  38  /  ALT  44  /  AlkPhos  54  08-01    RADIOLOGY & ADDITIONAL TESTS:    MEDICATIONS  (STANDING):  atorvastatin 40 milliGRAM(s) Oral at bedtime  chlorhexidine 2% Cloths 1 Application(s) Topical <User Schedule>  dextrose 40% Gel 15 Gram(s) Oral once  dextrose 5%. 1000 milliLiter(s) (50 mL/Hr) IV Continuous <Continuous>  dextrose 5%. 1000 milliLiter(s) (100 mL/Hr) IV Continuous <Continuous>  dextrose 50% Injectable 25 Gram(s) IV Push once  dextrose 50% Injectable 12.5 Gram(s) IV Push once  dextrose 50% Injectable 25 Gram(s) IV Push once  glucagon  Injectable 1 milliGRAM(s) IntraMuscular once  insulin lispro (ADMELOG) corrective regimen sliding scale   SubCutaneous every 6 hours  levETIRAcetam  IVPB 500 milliGRAM(s) IV Intermittent every 24 hours  pantoprazole  Injectable 40 milliGRAM(s) IV Push every 12 hours  piperacillin/tazobactam IVPB.. 4.5 Gram(s) IV Intermittent every 12 hours  predniSONE   Tablet 10 milliGRAM(s) Oral every 24 hours    MEDICATIONS  (PRN):  acetaminophen    Suspension .. 600 milliGRAM(s) Enteral Tube every 6 hours PRN Moderate Pain (4 - 6)

## 2021-08-01 NOTE — PROGRESS NOTE ADULT - PROBLEM SELECTOR PLAN 3
CXR 8/1: Frontal examination of fhe chest demonstrates improvement left effusion in comparison to prior examination of the chest 7/31/2021. Cardiomegaly. No interval change position remaining support devices.  Pt NPO, tube feeds.   - Cont Zosyn 4.5 q12 for pseudomonas coverage CXR 8/1: Frontal examination of fhe chest demonstrates improvement left effusion in comparison to prior examination of the chest 7/31/2021. Cardiomegaly. No interval change position remaining support devices.  Pt NPO, tube feeds.   - Cont Zosyn 4.5 q12 for pseudomonas coverage x7days  (started 7/27)

## 2021-08-01 NOTE — PROGRESS NOTE ADULT - ASSESSMENT
89y Male with PMHx of HTN, severe aortic sinensis and seizure on phenytoin who presented with left facial droop and slurred speech, Stroke w/u negative. Found to have myasthenia gravis with positive anti-ach antibodies. Also found to have leukocytosis suspicious for malignancy. Admitted to [facility] for treatment of MG and w/u of potential CML/CLL. S/p IVIG treatment. Course complicated by dysphagia and aspiration pneumonia. Admitted to MICU after developing cardiogenic/obstructive following IVF administration in the setting of severe aortic stenosis. Now stable on pressors. Creatinine persistently increasing with poor urine output.     NEURO     # Seizures   - On keppra 500 q12    #Myasthenia Gravis  - s/p IvIG  - On prednisone 10mg qd     CARDIOVASCULAR  #Hypotension   - On levophed     #Bradycardia  - Bradycardic to 50s in AM   - Continue to monitor  - DNR  - levophed discontinued     PULMONARY  - Suction as needed     #Pneumonia   - Antibiotics as below     GASTROINTESTINAL    #Dysphagia   - s/p NGT placement  - On tube feeds   - Protonix prophylaxis     RENAL  #EVARISTO/CKD  - Creatinine improving: 3.93 -> 3.6   - Not considering HD at this time     INFECTIOUS DISEASE  #Aspiration Pneumonia   - Zosyn 4.5 q12 for pseudomonas coverage      ENDOCRINE  - ISS    HEME  #No active issues     FLUIDS/ELECTROLYTES/NUTRITION  -IVF: N/A  -Monitor, Replete to K>4 and Mg>2  -Diet: tube feeds     PROPHYLAXIS  -DVT: N/A  -GI: Protonix     DISPO  CODE STATUS: DNR 89y Male with PMHx of HTN, severe aortic sinensis and seizure on phenytoin who presented with left facial droop and slurred speech, Stroke w/u negative. Found to have myasthenia gravis with positive anti-ach antibodies. Also found to have leukocytosis suspicious for malignancy. Admitted to [facility] for treatment of MG and w/u of potential CML/CLL. S/p IVIG treatment. Course complicated by dysphagia and aspiration pneumonia. Admitted to MICU after developing cardiogenic/obstructive following IVF administration in the setting of severe aortic stenosis. Now stable on pressors. Creatinine persistently increasing with poor urine output.     NEURO     # Seizures   - On keppra 500 q12    #Myasthenia Gravis  - s/p IvIG  - On prednisone 10mg Qd     CARDIOVASCULAR  #Hypotension   - Off levophed     #Bradycardia  - Bradycardic to 50s in AM   - Continue to monitor  - DNR    PULMONARY  - Suction as needed     #Pneumonia   - Antibiotics as below     GASTROINTESTINAL    #Dysphagia   - s/p NGT placement  - On tube feeds   - Protonix prophylaxis     RENAL  #EVARISTO/CKD  - Creatinine improving: 3.93 -> 3.6   - Not considering HD at this time     INFECTIOUS DISEASE  #Aspiration Pneumonia   - Zosyn 4.5 q12 for pseudomonas coverage      ENDOCRINE  - ISS    HEME  #No active issues     FLUIDS/ELECTROLYTES/NUTRITION  -IVF: N/A  -Monitor, Replete to K>4 and Mg>2  -Diet: tube feeds     PROPHYLAXIS  -DVT: N/A  -GI: Protonix     DISPO  CODE STATUS: DNR 89y Male with PMHx of HTN, severe aortic sinensis and seizure on phenytoin who presented with left facial droop and slurred speech, Stroke w/u negative. Found to have myasthenia gravis with positive anti-ach antibodies. Also found to have leukocytosis suspicious for malignancy. Admitted to [facility] for treatment of MG and w/u of potential CML/CLL. S/p IVIG treatment. Course complicated by dysphagia and aspiration pneumonia. Admitted to MICU after developing cardiogenic/obstructive following IVF administration in the setting of severe aortic stenosis. Now stable on pressors. Creatinine persistently increasing with poor urine output.     NEURO     # Seizures   - On keppra 500 q12    #Myasthenia Gravis  - s/p IvIG  - On prednisone 10mg Qd     CARDIOVASCULAR  #Hypotension   - Off levophed     #Bradycardia  - Bradycardic to 50s in AM   - Continue to monitor  - DNR    PULMONARY  - Suction as needed     #Pneumonia   - Antibiotics as below     GASTROINTESTINAL  #Dysphagia   - s/p NGT placement  - On tube feeds   - Protonix prophylaxis     RENAL  #EVARISTO/CKD  - Creatinine improving: 3.93 > 3.6  > 2.75  - Not considering HD at this time     INFECTIOUS DISEASE  #Aspiration Pneumonia   - Zosyn 4.5g q12 for pseudomonas coverage    ENDOCRINE  - ISS    HEME  #Leukocytosis  - WBC count of 64.5k with Neutrophilia (49k), Monocytosis (5k) , eosinophilia (2.89k) and with promyelocytes and myelocytes noted on smear  - DD include reactive vs clonal causes infection/ stressed marrow from recent illness/ CML/ CMML  - No symptoms or signs suggestive of infectious process at this time  - Sent Peripheral flow cytometry, BCR-ABL PCR, JAK2    - JAK2 negative for the [id number] activating mutation  -Very low viability, hypocellular specimen showing no phenotypic evidence of non-Hodgkin lymphoma or increase of blasts, see comment.   - Can set up outpatient hematology follow up with Dr. Nguyen on a Thursday (after discharge)     #Anemia  - Normocytic anemia, with iron panel consistent with anemia of chronic disease/ renal disease  - B12/folate wnl. No evidence of hemolysis  - Given CKD and Anemia, a monoclonal gammopathy workup was and was negative    FLUIDS/ELECTROLYTES/NUTRITION  -IVF: N/A  -Monitor, Replete to K>4 and Mg>2  -Diet: NPO w tube feeds     PROPHYLAXIS  -DVT: N/A  -GI: Protonix     DISPO  CODE STATUS: DNR 89y Male with PMHx of HTN, severe aortic sinensis and seizure on phenytoin who presented with left facial droop and slurred speech, Stroke w/u negative. Found to have myasthenia gravis with positive anti-ach antibodies. Also found to have leukocytosis suspicious for malignancy. Admitted to [facility] for treatment of MG and w/u of potential CML/CLL. S/p IVIG treatment. Course complicated by dysphagia and aspiration pneumonia. Admitted to MICU after developing cardiogenic/obstructive following IVF administration in the setting of severe aortic stenosis. Now stable on pressors. Creatinine persistently increasing with poor urine output.     NEURO     # Seizures   - On keppra 500 q12    #Myasthenia Gravis  - s/p IvIG  - On prednisone 10mg Qd     CARDIOVASCULAR  #Hypotension   - Off levophed     #Bradycardia  - Bradycardic to 50s in AM   - Continue to monitor  - DNR    PULMONARY  - Suction as needed     #Pneumonia   - Antibiotics as below     GASTROINTESTINAL  #Dysphagia   - s/p NGT placement  - On tube feeds   - Protonix prophylaxis     RENAL  #EVARISTO/CKD  - Creatinine improving: 3.93 > 3.6  > 2.75  - Not considering HD at this time     INFECTIOUS DISEASE  #Aspiration Pneumonia   - Zosyn 4.5g q12 for pseudomonas coverage    ENDOCRINE  - ISS    HEME  #Leukocytosis  - WBC count of 64.5k with Neutrophilia (49k), Monocytosis (5k) , eosinophilia (2.89k) and with promyelocytes and myelocytes noted on smear  - DD include reactive vs clonal causes infection/ stressed marrow from recent illness/ CML/ CMML  - No symptoms or signs suggestive of infectious process at this time  - Sent Peripheral flow cytometry, BCR-ABL PCR, JAK2    - JAK2 negative for the V617F activating mutation  -Very low viability, hypocellular specimen showing no phenotypic evidence of non-Hodgkin lymphoma or increase of blasts, see comment.   - Can set up outpatient hematology follow up with Dr. Nguyen on a Thursday (after discharge)     #Anemia  - Normocytic anemia, with iron panel consistent with anemia of chronic disease/ renal disease  - B12/folate wnl. No evidence of hemolysis  - Given CKD and Anemia, a monoclonal gammopathy workup was and was negative    FLUIDS/ELECTROLYTES/NUTRITION  -IVF: N/A  -Monitor, Replete to K>4 and Mg>2  -Diet: NPO w tube feeds     PROPHYLAXIS  -DVT: N/A  -GI: Protonix     DISPO  CODE STATUS: DNR 89y Male with PMHx of HTN, severe aortic sinensis and seizure on phenytoin who presented with left facial droop and slurred speech, Stroke w/u negative. Found to have myasthenia gravis with positive anti-ach antibodies. Also found to have leukocytosis suspicious for malignancy. Admitted to [facility] for treatment of MG and w/u of potential CML/CLL. S/p IVIG treatment. Course complicated by dysphagia and aspiration pneumonia. Admitted to MICU after developing cardiogenic/obstructive following IVF administration in the setting of severe aortic stenosis. Now stable on pressors. Creatinine persistently increasing with poor urine output.     NEURO     # Seizures   - On keppra 500 q12    #Myasthenia Gravis  - s/p IvIG  - On prednisone 10mg Qd     CARDIOVASCULAR  #Hypotension   - Off levophed     #Bradycardia  - Bradycardic to 50s in AM   - Continue to monitor  - DNR    PULMONARY  - Suction as needed     #Pneumonia   - Antibiotics as below     GASTROINTESTINAL  #Dysphagia   - s/p NGT placement  - On tube feeds   - Protonix prophylaxis     RENAL  #EVARISTO/CKD  - Creatinine improving: 3.93 > 3.6  > 2.75  - Not considering HD at this time     INFECTIOUS DISEASE  #Aspiration Pneumonia   - Zosyn 4.5g q12 for pseudomonas coverage    ENDOCRINE  - ISS    HEME  #Leukocytosis  - WBC count of 64.5k with Neutrophilia (49k), Monocytosis (5k) , eosinophilia (2.89k) and with promyelocytes and myelocytes noted on smear  - DD include reactive vs clonal causes infection/ stressed marrow from recent illness/ CML/ CMML  - No symptoms or signs suggestive of infectious process at this time  - Sent Peripheral flow cytometry, BCR-ABL PCR, JAK2    - JAK2 negative for the V617F activating mutation  -Very low viability, hypocellular specimen showing no phenotypic evidence of non-Hodgkin lymphoma or increase of blasts, see comment.   - Can set up outpatient hematology follow up with Dr. Nguyen on a Thursday (after discharge)  - f/u CML w/u    #Anemia  - Normocytic anemia, with iron panel consistent with anemia of chronic disease/ renal disease  - B12/folate wnl. No evidence of hemolysis  - Given CKD and Anemia, a monoclonal gammopathy workup was and was negative    FLUIDS/ELECTROLYTES/NUTRITION  -IVF: N/A  -Monitor, Replete to K>4 and Mg>2  -Diet: NPO w tube feeds     PROPHYLAXIS  -DVT: N/A  -GI: Protonix     DISPO  CODE STATUS: DNR 89y Male with PMHx of HTN, severe aortic sinensis and seizure on phenytoin who presented with left facial droop and slurred speech, Stroke w/u negative. Found to have myasthenia gravis with positive anti-ach antibodies. Also found to have leukocytosis suspicious for malignancy. Admitted to [facility] for treatment of MG and w/u of potential CML/CLL. S/p IVIG treatment. Course complicated by dysphagia and aspiration pneumonia. Admitted to MICU after developing cardiogenic/obstructive following IVF administration in the setting of severe aortic stenosis. Now stable on pressors. Creatinine persistently increasing with poor urine output.     NEURO   # Seizures   - On keppra 500 q12    #Myasthenia Gravis  - s/p IvIG  - On prednisone 10mg Qd     CARDIOVASCULAR  #Hypotension   hypotension and lactic acidosis with increasing WBC c/f septic shock 2/2 aspiration, but IVF also possible with obstructive vs cardiogenic shock 2/2 severe AS  - Off levophed     #Bradycardia  - Bradycardic to 50s in AM   - Continue to monitor  - DNR    PULMONARY  # Aspiration Pneumonia   - MRSA swab negative  - c/w Zosyn 4.5g q12 for pseudomonas coverage for 7days given prolonged hospital course    GASTROINTESTINAL  #Dysphagia   - s/p NGT placement  - On tube feeds   - Protonix prophylaxis   - repeat speech and swallow    RENAL  #EVARISTO/CKD  - Creatinine improving: 3.93 > 3.6  > 2.75  - Not considering HD at this time     INFECTIOUS DISEASE  #Aspiration Pneumonia   - Zosyn 4.5g q12 for pseudomonas coverage    ENDOCRINE  - ISS    HEME  #Leukocytosis  - WBC count of 64.5k with Neutrophilia (49k), Monocytosis (5k) , eosinophilia (2.89k) and with promyelocytes and myelocytes noted on smear  - DD include reactive vs clonal causes infection/ stressed marrow from recent illness/ CML/ CMML  - No symptoms or signs suggestive of infectious process at this time  - Sent Peripheral flow cytometry, BCR-ABL PCR, JAK2    - JAK2 negative for the V617F activating mutation  -Very low viability, hypocellular specimen showing no phenotypic evidence of non-Hodgkin lymphoma or increase of blasts, see comment.   - Can set up outpatient hematology follow up with Dr. Nguyen on a Thursday (after discharge)  - f/u CML w/u    #Anemia  - Normocytic anemia, with iron panel consistent with anemia of chronic disease/ renal disease  - B12/folate wnl. No evidence of hemolysis  - Given CKD and Anemia, a monoclonal gammopathy workup was and was negative  - Maintain active type and screen    FLUIDS/ELECTROLYTES/NUTRITION  -IVF: N/A  -Monitor, Replete to K>4 and Mg>2  -Diet: NPO w tube feeds     PROPHYLAXIS  -DVT: N/A  -GI: Protonix     DISPO: Medical Telemetry  CODE STATUS: DNR/DNI

## 2021-08-01 NOTE — PROGRESS NOTE ADULT - SUBJECTIVE AND OBJECTIVE BOX
ACCEPTANCE FROM MICU    Hospital Course: 89y Male with PMHx of HTN, severe aortic stenosis and seizure on phenytoin who presented with left facial droop and slurred speech, Stroke w/u negative. Found to have myasthenia gravis with positive anti-ach antibodies. Also found to have leukocytosis suspicious for malignancy. Admitted to [facility] for treatment of MG and w/u of potential CML/CLL. S/p IVIG treatment. Course complicated initially by dysphagia followed hypotension concerning for sepsis in the setting of aspiration pneumonia.  Patient given IVF, resulting in cardiogenic vs. obstructive shock due to severe aortic stenosis. Started on broad spectrum Zosyn/Vanc (MRSA neg d/mitra) when transferred to ICU for aspiration pneumonia. Also noted to have elevated creatinine concerning for EVARISTO (sepsis vs hypoperfusion, with improvement in renal function and urine output. Briefly requiring pressors on transfer including phenylephrine and levophed, successfully weaned off of both with improvement in blood pressures with systolics in 130s-140s.  Patient also with dysphagia and poor PO intake, now S/p NGT placement and tube feeds. Per neurology now continuing with prednisone 10mg daily given EMG study and MG antibodies. Will continue with additional pulm toilet for secretion management. WBC downtrending from peak, c/f CML and will continue trending. Hemoglobin dropped to 5.0 s/p 2 units of PRBCs with adequate correction to 7.7 without further drops in hemolgobin. Hemodynamically stable for further monitoring on medical telemetry.     OVERNIGHT EVENTS: NAEO    SUBJECTIVE / INTERVAL HPI: Patient seen and examined at bedside. Pt would like to have something to wet his mouth as his mouth is dry. Pt complains of constipation, states that he would like to turn on his side to see if that will help him to have a bowel movement. Patient denies chest pain, SOB, palpitations, cough, fever, chills, HA, Dizziness, change in vision/hearing, N/V, abdominal pain, diarrhea, hematochezia/melena, dysuria, hematuria, new onset weakness/numbness, LE pain and/or swelling.    Remaining ROS negative       PHYSICAL EXAM:    General: pt appears frail and cachectic, hard of hearing but conversational and amenable to physical exam  HEENT: NC/AT; PERRL, anicteric sclera; mucous membranes appear slightly dry, bossibly due to breathing from mouth  Neck: supple, no paplpable nodes or nodules  Cardiovascular: +S1/S2, systolic murmur present  Respiratory: Upper airway sounds present, rales b/L, no use of accessory muscles   Gastrointestinal: soft, non-tender but slightly distended, no hepatosplenomegaly, no gaurding or rebound  Extremities: WWP; no edema, clubbing or cyanosis, DSC's in place  Neurological: AAOx3; 5/5 strength R LE, 4/5 strength on L LE, sensory intact  Psychiatric: pleasant mood and affect    VITAL SIGNS:  Vital Signs Last 24 Hrs  T(C): 36.7 (01 Aug 2021 15:25), Max: 37.2 (31 Jul 2021 17:19)  T(F): 98.1 (01 Aug 2021 15:25), Max: 98.9 (31 Jul 2021 17:19)  HR: 62 (01 Aug 2021 12:18) (57 - 83)  BP: 143/66 (01 Aug 2021 12:18) (134/62 - 161/69)  BP(mean): 95 (01 Aug 2021 12:18) (89 - 107)  RR: 19 (01 Aug 2021 12:18) (18 - 22)  SpO2: 98% (01 Aug 2021 12:18) (95% - 98%)      MEDICATIONS:  MEDICATIONS  (STANDING):  albuterol/ipratropium for Nebulization 3 milliLiter(s) Nebulizer every 6 hours  atorvastatin 40 milliGRAM(s) Oral at bedtime  chlorhexidine 2% Cloths 1 Application(s) Topical <User Schedule>  dextrose 40% Gel 15 Gram(s) Oral once  dextrose 5%. 1000 milliLiter(s) (50 mL/Hr) IV Continuous <Continuous>  dextrose 5%. 1000 milliLiter(s) (100 mL/Hr) IV Continuous <Continuous>  dextrose 50% Injectable 25 Gram(s) IV Push once  dextrose 50% Injectable 12.5 Gram(s) IV Push once  dextrose 50% Injectable 25 Gram(s) IV Push once  glucagon  Injectable 1 milliGRAM(s) IntraMuscular once  insulin lispro (ADMELOG) corrective regimen sliding scale   SubCutaneous every 6 hours  levETIRAcetam  IVPB 500 milliGRAM(s) IV Intermittent every 24 hours  pantoprazole  Injectable 40 milliGRAM(s) IV Push every 12 hours  piperacillin/tazobactam IVPB.. 4.5 Gram(s) IV Intermittent every 12 hours  polyethylene glycol 3350 17 Gram(s) Oral once  predniSONE   Tablet 10 milliGRAM(s) Oral every 24 hours    MEDICATIONS  (PRN):  acetaminophen    Suspension .. 600 milliGRAM(s) Enteral Tube every 6 hours PRN Moderate Pain (4 - 6)      ALLERGIES:  Allergies    hay fever (Unknown)  No Known Drug Allergies    Intolerances        LABS:                        8.0    64.51 )-----------( 356      ( 01 Aug 2021 05:29 )             24.7     08-01    150<H>  |  117<H>  |  73<H>  ----------------------------<  165<H>  3.7   |  23  |  2.75<H>    Ca    8.7      01 Aug 2021 05:27  Phos  3.1     08-01  Mg     2.2     08-01    TPro  6.2  /  Alb  2.5<L>  /  TBili  0.9  /  DBili  x   /  AST  38  /  ALT  44  /  AlkPhos  54  08-01        CAPILLARY BLOOD GLUCOSE      POCT Blood Glucose.: 177 mg/dL (01 Aug 2021 11:36)      RADIOLOGY & ADDITIONAL TESTS: Reviewed.

## 2021-08-01 NOTE — PROGRESS NOTE ADULT - PROBLEM SELECTOR PLAN 8
Normocytic anemia, with iron panel consistent with anemia of chronic disease/ renal disease. B12/folate wnl. No evidence of hemolysi. Given CKD and Anemia, a monoclonal gammopathy workup performed and was negative  - If patient develops further drop in hemoglobin, then consider CT abdomen Pelvis to rule out retroperitoneal hematoma that might cause femoral nerve compression.  - Transfuse if <7  - Keep active T&S - Cont keppra 500 q12

## 2021-08-01 NOTE — PROGRESS NOTE ADULT - ASSESSMENT
89M with PMHx of HTN and seizure on phenytoin presents with left facial droop and slurred speech. Stroke workup negative. Neurology consulted for persistent weakness.      Impression  - Myasthenia gravis, new onset. Bedside repetitive nerve stimulation on 7/22 notable for decremental response indicative of MG, Strength improved after receiving full course of IVIG.   - Patient now with left leg weakness, pain, and decreased sensation to temperature on left thigh. Exam somewhat limited by language barrier although patient's family was at the bedside to translate. Weakness is subjective and does not complete correlate with degree of weakness on exam.  - Weakness may be somewhat explained by Myasthenia, but sensory deficits may be secondary to femoral nerve compression.    Plan  - c/w Prednisone 10 mg daily (started 7/31)   - If patient develops further drop in hemoglobin, then consider CT abdomen Pelvis to rule out retroperitoneal hematoma that might cause femoral nerve compression.  - Lumbar spine with/without contrast to further evaluate patient's complaints of weakness, sensory deficits. Can be done when patient more stable from ICU perspective. Given +MG antibodies, no further need for repeat MRI brain.  - Will consider starting Rituximab q6 months for long-term management of myasthenia, pending ID assessment of prior Hep B infection and improvement of current hypotension/pulmonary edema.  - Heme-Onc following for persistent leukocytosis. Flow cytometry, BCR-ABL, JAK2 serologies sent. Can follow up outpatient with Dr. Nguyen     89M with PMHx of HTN and seizure on phenytoin presents with left facial droop and slurred speech. Stroke workup negative. Neurology consulted for persistent weakness.      Impression  - Myasthenia gravis, new onset. Bedside repetitive nerve stimulation on 7/22 notable for decremental response indicative of MG, Strength improved after receiving full course of IVIG.   - Patient now with left leg weakness, pain, and decreased sensation to temperature on left thigh. Exam somewhat limited by language barrier although patient's family was at the bedside to translate. Weakness is subjective and does not complete correlate with degree of weakness on exam.  - Weakness may be somewhat explained by Myasthenia, but sensory deficits may be secondary to femoral nerve compression.    Plan  - c/w Prednisone 10 mg daily (started 7/31)   - If patient develops further drop in hemoglobin, then consider CT abdomen Pelvis to rule out retroperitoneal hematoma that might cause femoral nerve compression.  - Will consider starting Rituximab q6 months for long-term management of myasthenia, pending ID assessment of prior Hep B infection and improvement of current hypotension/pulmonary edema.  - Heme-Onc following for persistent leukocytosis. Flow cytometry, BCR-ABL, JAK2 serologies sent. Can follow up outpatient with Dr. Nguyen

## 2021-08-01 NOTE — PROGRESS NOTE ADULT - PROBLEM SELECTOR PLAN 1
- F/u Myasthenia Gravis antibodies   - S/p IVIG x5 days  - Cont Prednisone 10 mg daily, started 7/31  - Will consider starting Rituximab q6 months for long-term management of myasthenia, pending ID assessment of prior Hep B infection and improvement of current hypotension/pulmonary edema, per neuro recs

## 2021-08-01 NOTE — PROGRESS NOTE ADULT - PROBLEM SELECTOR PLAN 4
Likely 2/2 to aspiration PNA. Pt no longer meets sepsis criteria, no longer requiring pressors.  - Cont Zosyn 4.5 q12 for pseudomonas coverage  - Give fluids as needed

## 2021-08-01 NOTE — PROGRESS NOTE ADULT - SUBJECTIVE AND OBJECTIVE BOX
Neurology progress note     OVERNIGHT EVENTS: NAEO    SUBJECTIVE / INTERVAL HPI: Patient seen and examined at bedside. Patient without complaints at this time. Patient still endorsing LLE weakness and reduced sensation. otherwise without complaints.     Remaining ROS negative     Physical exam:  General: No acute distress, awake and alert  Eyes: Anicteric sclerae, moist conjunctivae, see below for CNs  Neck: trachea midline, FROM, supple, no thyromegaly or lymphadenopathy  Cardiovascular: Regular rate and rhythm, no murmurs, rubs, or gallops. No carotid bruits.   Pulmonary: Anterior breath sounds clear bilaterally, no crackles or wheezing. No use of accessory muscles  GI: Abdomen soft, non-distended, non-tender  Extremities: Radial and DP pulses +2, no edema      Neurological Examination:  General:  Appearance is consistent with chronologic age.  No abnormal facies.  Gross skin survey within normal limits.    Cognitive/Language:  Awake, alert, and oriented to person, place, time and date.  Recent and remote memory intact.  Fund of knowledge is appropriate.  Naming, repetition and comprehension intact.   Nondysarthric.    Cranial Nerves  - Eyes: Visual acuity intact, Visual fields full.  EOMI w/o nystagmus, skew or reported double vision.  PERRL.  No ptosis/weakness of eyelid closure.    - Face:  Facial sensation normal V1 - 3, no facial asymmetry.    - Ears/Nose/Throat:  Hearing grossly intact b/l to finger rub.  Palate elevates midline.  Tongue and uvula midline.   Motor examination:  5/5 UE bilaterally, 4/5 proximal left leg, 4/5 distal left leg, 5/5 right lower extremity.  No observable drift. Normal tone and bulk. No tenderness, twitching, tremors or involuntary movements.  Sensory examination:   Decreased sensation to temperature on left thigh. Sensation otherwise intact    VITAL SIGNS:  Vital Signs Last 24 Hrs  T(C): 36.7 (01 Aug 2021 15:25), Max: 37.2 (31 Jul 2021 17:19)  T(F): 98.1 (01 Aug 2021 15:25), Max: 98.9 (31 Jul 2021 17:19)  HR: 62 (01 Aug 2021 12:18) (57 - 83)  BP: 143/66 (01 Aug 2021 12:18) (134/62 - 161/69)  BP(mean): 95 (01 Aug 2021 12:18) (89 - 107)  RR: 19 (01 Aug 2021 12:18) (18 - 22)  SpO2: 98% (01 Aug 2021 12:18) (95% - 98%)      MEDICATIONS:  MEDICATIONS  (STANDING):  albuterol/ipratropium for Nebulization 3 milliLiter(s) Nebulizer every 6 hours  atorvastatin 40 milliGRAM(s) Oral at bedtime  chlorhexidine 2% Cloths 1 Application(s) Topical <User Schedule>  dextrose 40% Gel 15 Gram(s) Oral once  dextrose 5%. 1000 milliLiter(s) (50 mL/Hr) IV Continuous <Continuous>  dextrose 5%. 1000 milliLiter(s) (100 mL/Hr) IV Continuous <Continuous>  dextrose 50% Injectable 25 Gram(s) IV Push once  dextrose 50% Injectable 12.5 Gram(s) IV Push once  dextrose 50% Injectable 25 Gram(s) IV Push once  glucagon  Injectable 1 milliGRAM(s) IntraMuscular once  insulin lispro (ADMELOG) corrective regimen sliding scale   SubCutaneous every 6 hours  levETIRAcetam  IVPB 500 milliGRAM(s) IV Intermittent every 24 hours  pantoprazole  Injectable 40 milliGRAM(s) IV Push every 12 hours  piperacillin/tazobactam IVPB.. 4.5 Gram(s) IV Intermittent every 12 hours  polyethylene glycol 3350 17 Gram(s) Oral once  predniSONE   Tablet 10 milliGRAM(s) Oral every 24 hours    MEDICATIONS  (PRN):  acetaminophen    Suspension .. 600 milliGRAM(s) Enteral Tube every 6 hours PRN Moderate Pain (4 - 6)      ALLERGIES:  Allergies    hay fever (Unknown)  No Known Drug Allergies    Intolerances        LABS:                        8.0    64.51 )-----------( 356      ( 01 Aug 2021 05:29 )             24.7     08-01    150<H>  |  117<H>  |  73<H>  ----------------------------<  165<H>  3.7   |  23  |  2.75<H>    Ca    8.7      01 Aug 2021 05:27  Phos  3.1     08-01  Mg     2.2     08-01    TPro  6.2  /  Alb  2.5<L>  /  TBili  0.9  /  DBili  x   /  AST  38  /  ALT  44  /  AlkPhos  54  08-01        CAPILLARY BLOOD GLUCOSE      POCT Blood Glucose.: 177 mg/dL (01 Aug 2021 11:36)      RADIOLOGY & ADDITIONAL TESTS: Reviewed.

## 2021-08-01 NOTE — PROGRESS NOTE ADULT - PROBLEM SELECTOR PLAN 5
Currently NPO, NGT placed, tube feeds.  - Allow wet swabs to moisten mouth  - Protonix prophylaxis Likely ATN in the setting of septic shock, poor perfusion. Pt has a history of CKD. baseline 1.6-1.7  - Creatinine improving: 3.93 > 3.6  > 2.75  - Not considering HD at this time   - Cont to trend creatinine

## 2021-08-01 NOTE — PROGRESS NOTE ADULT - PROBLEM SELECTOR PLAN 2
R/O CML  WBC count of 64.5k with Neutrophilia (49k), Monocytosis (5k) , eosinophilia (2.89k) and with promyelocytes and myelocytes noted on smear  - DD include reactive vs clonal causes infection/ stressed marrow from recent illness/ CML/ CMML  - No symptoms or signs suggestive of infectious process at this time  - F/U Peripheral flow cytometry, BCR-ABL PCR, JAK2    -- JAK2 negative for the [id number] activating mutation  -Very low viability, hypocellular specimen showing no phenotypic evidence of non-Hodgkin lymphoma or increase of blasts, see comment.   - Can set up outpatient hematology follow up with Dr. Nguyen on a Thursday (after discharge)

## 2021-08-01 NOTE — PROGRESS NOTE ADULT - ASSESSMENT
89y Male with PMHx of HTN, severe aortic sinensis and seizure on phenytoin who presented with left facial droop and slurred speech, Stroke w/u negative. Found to have myasthenia gravis with positive anti-ach antibodies. Also found to have leukocytosis suspicious for malignancy. Admitted to [facility] for treatment of MG and w/u of potential CML/CLL. S/p IVIG treatment. Course complicated by dysphagia and aspiration pneumonia. Admitted to MICU after developing septic shock likely 2/2 to aspiration PNA requiring pressors. Pt no longer requiring pressors, dispo to [facility] for continued monitoring and further workup of leukocytosis.

## 2021-08-02 LAB
ALBUMIN SERPL ELPH-MCNC: 2.6 G/DL — LOW (ref 3.3–5)
ALP SERPL-CCNC: 63 U/L — SIGNIFICANT CHANGE UP (ref 40–120)
ALT FLD-CCNC: 37 U/L — SIGNIFICANT CHANGE UP (ref 10–45)
ANION GAP SERPL CALC-SCNC: 10 MMOL/L — SIGNIFICANT CHANGE UP (ref 5–17)
ANISOCYTOSIS BLD QL: SLIGHT — SIGNIFICANT CHANGE UP
AST SERPL-CCNC: 30 U/L — SIGNIFICANT CHANGE UP (ref 10–40)
BASO STIPL BLD QL SMEAR: PRESENT — SIGNIFICANT CHANGE UP
BASOPHILS # BLD AUTO: 0 K/UL — SIGNIFICANT CHANGE UP (ref 0–0.2)
BASOPHILS NFR BLD AUTO: 0 % — SIGNIFICANT CHANGE UP (ref 0–2)
BILIRUB SERPL-MCNC: 0.9 MG/DL — SIGNIFICANT CHANGE UP (ref 0.2–1.2)
BUN SERPL-MCNC: 72 MG/DL — HIGH (ref 7–23)
BURR CELLS BLD QL SMEAR: PRESENT — SIGNIFICANT CHANGE UP
CALCIUM SERPL-MCNC: 9.2 MG/DL — SIGNIFICANT CHANGE UP (ref 8.4–10.5)
CHLORIDE SERPL-SCNC: 121 MMOL/L — HIGH (ref 96–108)
CO2 SERPL-SCNC: 24 MMOL/L — SIGNIFICANT CHANGE UP (ref 22–31)
CREAT SERPL-MCNC: 2.36 MG/DL — HIGH (ref 0.5–1.3)
EOSINOPHIL # BLD AUTO: 0.96 K/UL — HIGH (ref 0–0.5)
EOSINOPHIL NFR BLD AUTO: 1.7 % — SIGNIFICANT CHANGE UP (ref 0–6)
GLUCOSE BLDC GLUCOMTR-MCNC: 140 MG/DL — HIGH (ref 70–99)
GLUCOSE BLDC GLUCOMTR-MCNC: 152 MG/DL — HIGH (ref 70–99)
GLUCOSE BLDC GLUCOMTR-MCNC: 155 MG/DL — HIGH (ref 70–99)
GLUCOSE BLDC GLUCOMTR-MCNC: 171 MG/DL — HIGH (ref 70–99)
GLUCOSE SERPL-MCNC: 169 MG/DL — HIGH (ref 70–99)
HCT VFR BLD CALC: 26.3 % — LOW (ref 39–50)
HEMATOPATHOLOGY REPORT: SIGNIFICANT CHANGE UP
HGB BLD-MCNC: 8.2 G/DL — LOW (ref 13–17)
HYPOCHROMIA BLD QL: SIGNIFICANT CHANGE UP
LYMPHOCYTES # BLD AUTO: 0.51 K/UL — LOW (ref 1–3.3)
LYMPHOCYTES # BLD AUTO: 0.9 % — LOW (ref 13–44)
MACROCYTES BLD QL: SLIGHT — SIGNIFICANT CHANGE UP
MAGNESIUM SERPL-MCNC: 2.3 MG/DL — SIGNIFICANT CHANGE UP (ref 1.6–2.6)
MANUAL SMEAR VERIFICATION: SIGNIFICANT CHANGE UP
MCHC RBC-ENTMCNC: 30 PG — SIGNIFICANT CHANGE UP (ref 27–34)
MCHC RBC-ENTMCNC: 31.2 GM/DL — LOW (ref 32–36)
MCV RBC AUTO: 96.3 FL — SIGNIFICANT CHANGE UP (ref 80–100)
METAMYELOCYTES # FLD: 4.3 % — HIGH (ref 0–0)
MICROCYTES BLD QL: SIGNIFICANT CHANGE UP
MONOCYTES # BLD AUTO: 8.83 K/UL — HIGH (ref 0–0.9)
MONOCYTES NFR BLD AUTO: 15.7 % — HIGH (ref 2–14)
MYELOCYTES NFR BLD: 8.7 % — HIGH (ref 0–0)
NEUTROPHILS # BLD AUTO: 38.14 K/UL — HIGH (ref 1.8–7.4)
NEUTROPHILS NFR BLD AUTO: 67.8 % — SIGNIFICANT CHANGE UP (ref 43–77)
OSMOLALITY SERPL: 552 MOSM/KG — HIGH (ref 280–301)
OVALOCYTES BLD QL SMEAR: SLIGHT — SIGNIFICANT CHANGE UP
PHOSPHATE SERPL-MCNC: 2.8 MG/DL — SIGNIFICANT CHANGE UP (ref 2.5–4.5)
PLAT MORPH BLD: NORMAL — SIGNIFICANT CHANGE UP
PLATELET # BLD AUTO: 383 K/UL — SIGNIFICANT CHANGE UP (ref 150–400)
POIKILOCYTOSIS BLD QL AUTO: SLIGHT — SIGNIFICANT CHANGE UP
POLYCHROMASIA BLD QL SMEAR: SIGNIFICANT CHANGE UP
POTASSIUM SERPL-MCNC: 3.9 MMOL/L — SIGNIFICANT CHANGE UP (ref 3.5–5.3)
POTASSIUM SERPL-SCNC: 3.9 MMOL/L — SIGNIFICANT CHANGE UP (ref 3.5–5.3)
PROT SERPL-MCNC: 6.8 G/DL — SIGNIFICANT CHANGE UP (ref 6–8.3)
RBC # BLD: 2.73 M/UL — LOW (ref 4.2–5.8)
RBC # FLD: 19.9 % — HIGH (ref 10.3–14.5)
RBC BLD AUTO: ABNORMAL
SODIUM SERPL-SCNC: 155 MMOL/L — HIGH (ref 135–145)
SODIUM UR-SCNC: 96 MMOL/L — SIGNIFICANT CHANGE UP
SPHEROCYTES BLD QL SMEAR: SIGNIFICANT CHANGE UP
VARIANT LYMPHS # BLD: 0.9 % — SIGNIFICANT CHANGE UP (ref 0–6)
WBC # BLD: 56.26 K/UL — CRITICAL HIGH (ref 3.8–10.5)
WBC # FLD AUTO: 56.26 K/UL — CRITICAL HIGH (ref 3.8–10.5)

## 2021-08-02 PROCEDURE — 99232 SBSQ HOSP IP/OBS MODERATE 35: CPT

## 2021-08-02 PROCEDURE — 99233 SBSQ HOSP IP/OBS HIGH 50: CPT | Mod: GC

## 2021-08-02 PROCEDURE — 71045 X-RAY EXAM CHEST 1 VIEW: CPT | Mod: 26

## 2021-08-02 RX ORDER — PANTOPRAZOLE SODIUM 20 MG/1
40 TABLET, DELAYED RELEASE ORAL DAILY
Refills: 0 | Status: DISCONTINUED | OUTPATIENT
Start: 2021-08-02 | End: 2021-08-05

## 2021-08-02 RX ORDER — LEVETIRACETAM 250 MG/1
500 TABLET, FILM COATED ORAL EVERY 24 HOURS
Refills: 0 | Status: DISCONTINUED | OUTPATIENT
Start: 2021-08-02 | End: 2021-08-10

## 2021-08-02 RX ORDER — SODIUM CHLORIDE 9 MG/ML
1000 INJECTION, SOLUTION INTRAVENOUS
Refills: 0 | Status: DISCONTINUED | OUTPATIENT
Start: 2021-08-02 | End: 2021-08-03

## 2021-08-02 RX ADMIN — PANTOPRAZOLE SODIUM 40 MILLIGRAM(S): 20 TABLET, DELAYED RELEASE ORAL at 05:41

## 2021-08-02 RX ADMIN — ATORVASTATIN CALCIUM 40 MILLIGRAM(S): 80 TABLET, FILM COATED ORAL at 22:48

## 2021-08-02 RX ADMIN — LEVETIRACETAM 400 MILLIGRAM(S): 250 TABLET, FILM COATED ORAL at 05:41

## 2021-08-02 RX ADMIN — SODIUM CHLORIDE 80 MILLILITER(S): 9 INJECTION, SOLUTION INTRAVENOUS at 13:30

## 2021-08-02 RX ADMIN — CHLORHEXIDINE GLUCONATE 1 APPLICATION(S): 213 SOLUTION TOPICAL at 05:42

## 2021-08-02 RX ADMIN — PIPERACILLIN AND TAZOBACTAM 200 GRAM(S): 4; .5 INJECTION, POWDER, LYOPHILIZED, FOR SOLUTION INTRAVENOUS at 03:32

## 2021-08-02 RX ADMIN — Medication 3 MILLILITER(S): at 03:33

## 2021-08-02 RX ADMIN — Medication 1: at 00:22

## 2021-08-02 RX ADMIN — Medication 10 MILLIGRAM(S): at 12:40

## 2021-08-02 RX ADMIN — Medication 1: at 06:16

## 2021-08-02 RX ADMIN — LEVETIRACETAM 500 MILLIGRAM(S): 250 TABLET, FILM COATED ORAL at 12:40

## 2021-08-02 RX ADMIN — Medication 3 MILLILITER(S): at 10:59

## 2021-08-02 RX ADMIN — PIPERACILLIN AND TAZOBACTAM 200 GRAM(S): 4; .5 INJECTION, POWDER, LYOPHILIZED, FOR SOLUTION INTRAVENOUS at 15:05

## 2021-08-02 NOTE — PROGRESS NOTE ADULT - PROBLEM SELECTOR PLAN 10
IVF: N/A  Diet: NPO w tube feeds   DVT: N/A  GI: Protonix   CODE STATUS: DNR  Monitor, Replete to K>4 and Mg>2 IVF: D5W 80cc/hr for 48hrs  Diet: NPO w tube feeds   DVT: N/A  GI: Protonix   CODE STATUS: DNR  Monitor, Replete to K>4 and Mg>2

## 2021-08-02 NOTE — PROGRESS NOTE ADULT - ASSESSMENT
89y Male with PMHx of HTN, severe aortic sinensis and seizure on phenytoin who presented with left facial droop and slurred speech, Stroke w/u negative. Found to have myasthenia gravis with positive anti-ach antibodies. Also found to have leukocytosis suspicious for malignancy. Admitted to [facility] for treatment of MG and w/u of potential CML/CLL. S/p IVIG treatment. Course complicated by dysphagia and aspiration pneumonia. Admitted to MICU after developing septic shock likely 2/2 to aspiration PNA requiring pressors. Pt no longer requiring pressors, dispo to [facility] for continued monitoring and further workup of leukocytosis. 89y Male with PMHx of HTN, severe aortic sinensis and seizure on phenytoin who presented with left facial droop and slurred speech, Stroke w/u negative. Found to have myasthenia gravis with positive anti-ach antibodies. Also found to have leukocytosis suspicious for malignancy. Admitted to [facility] for treatment of MG and w/u of potential CML/CLL. S/p IVIG treatment. Course complicated by dysphagia and aspiration pneumonia. Admitted to MICU after developing septic shock likely 2/2 to aspiration PNA requiring pressors. Pt no longer requiring pressors stepped down to [facility] continued monitoring and further workup of leukocytosis.

## 2021-08-02 NOTE — PROGRESS NOTE ADULT - PROBLEM SELECTOR PLAN 5
Likely ATN in the setting of septic shock, poor perfusion. Pt has a history of CKD. baseline 1.6-1.7  - Creatinine improving: 3.93 > 3.6  > 2.75  - Not considering HD at this time   - Cont to trend creatinine Likely 2/2 to aspiration PNA. Pt no longer meets sepsis criteria, no longer requiring pressors.  - Completed Zosyn 4.5 q12 for pseudomonas coverage 8/2

## 2021-08-02 NOTE — PROGRESS NOTE ADULT - ATTENDING COMMENTS
Breathing is stable, hope he can start to swallow soon. Had one run of NSVT, continue to follow. Continue prednisone, finishing zosyn course today.

## 2021-08-02 NOTE — PROGRESS NOTE ADULT - PROBLEM SELECTOR PLAN 3
CXR 8/1: Frontal examination of fhe chest demonstrates improvement left effusion in comparison to prior examination of the chest 7/31/2021. Cardiomegaly. No interval change position remaining support devices.  Pt NPO, tube feeds.   - Cont Zosyn 4.5 q12 for pseudomonas coverage x7days  (started 7/27) Likely ATN 2/2 to septic shock, poor perfusion. Pt has a history of CKD. baseline 1.6-1.7.   - Creatinine improving: 3.93 > 3.6  > 2.75 > 2.36  - Not considering HD at this time   - Cont to trend creatinine    #Hypernatremia  Serum sodium at 155 (8/2 AM); FWD of 3L  - Start D5W at 80cc/hr for management of hypernatremia over 48hrs  - Start Jevity feeds  - Reassess BMP in AM

## 2021-08-02 NOTE — PROGRESS NOTE ADULT - SUBJECTIVE AND OBJECTIVE BOX
***Note in progress***    OVERNIGHT EVENTS: NAEO    SUBJECTIVE / INTERVAL HPI: Patient seen and examined at bedside. Patient denying chest pain, SOB, palpitations, cough. Patient denies fever, chills, HA, Dizziness, change in vision/hearing, N/V, abdominal pain, diarrhea, constipation, hematochezia/melena, dysuria, hematuria, new onset weakness/numbness, LE pain and/or swelling.    Remaining ROS negative       PHYSICAL EXAM:    General: WDWN  HEENT: NC/AT; PERRL, anicteric sclera; MMM  Neck: supple  Cardiovascular: +S1/S2, RRR  Respiratory: CTA B/L; no W/R/R  Gastrointestinal: soft, NT/ND; +BSx4  Extremities: WWP; no edema, clubbing or cyanosis  Vascular: 2+ radial, DP/PT pulses B/L  Neurological: AAOx3; no focal deficits  Psychiatric: pleasant mood and affect  Dermatologic: no appreciable wounds or damage to the skin    VITAL SIGNS:  Vital Signs Last 24 Hrs  T(C): 36.3 (02 Aug 2021 04:30), Max: 37.1 (01 Aug 2021 21:51)  T(F): 97.3 (02 Aug 2021 04:30), Max: 98.7 (01 Aug 2021 21:51)  HR: 78 (02 Aug 2021 04:03) (57 - 92)  BP: 137/64 (02 Aug 2021 04:03) (127/64 - 157/65)  BP(mean): 94 (02 Aug 2021 04:03) (90 - 102)  RR: 18 (02 Aug 2021 04:03) (17 - 19)  SpO2: 96% (02 Aug 2021 04:03) (95% - 98%)      MEDICATIONS:  MEDICATIONS  (STANDING):  albuterol/ipratropium for Nebulization 3 milliLiter(s) Nebulizer every 6 hours  atorvastatin 40 milliGRAM(s) Oral at bedtime  chlorhexidine 2% Cloths 1 Application(s) Topical <User Schedule>  dextrose 40% Gel 15 Gram(s) Oral once  dextrose 5%. 1000 milliLiter(s) (50 mL/Hr) IV Continuous <Continuous>  dextrose 5%. 1000 milliLiter(s) (100 mL/Hr) IV Continuous <Continuous>  dextrose 50% Injectable 25 Gram(s) IV Push once  dextrose 50% Injectable 12.5 Gram(s) IV Push once  dextrose 50% Injectable 25 Gram(s) IV Push once  glucagon  Injectable 1 milliGRAM(s) IntraMuscular once  insulin lispro (ADMELOG) corrective regimen sliding scale   SubCutaneous every 6 hours  levETIRAcetam  IVPB 500 milliGRAM(s) IV Intermittent every 24 hours  pantoprazole  Injectable 40 milliGRAM(s) IV Push every 12 hours  piperacillin/tazobactam IVPB.. 4.5 Gram(s) IV Intermittent every 12 hours  predniSONE   Tablet 10 milliGRAM(s) Oral every 24 hours  senna 2 Tablet(s) Oral at bedtime    MEDICATIONS  (PRN):  acetaminophen    Suspension .. 600 milliGRAM(s) Enteral Tube every 6 hours PRN Moderate Pain (4 - 6)      ALLERGIES:  Allergies    hay fever (Unknown)  No Known Drug Allergies    Intolerances        LABS:                        8.0    64.51 )-----------( 356      ( 01 Aug 2021 05:29 )             24.7     08-01    150<H>  |  117<H>  |  73<H>  ----------------------------<  165<H>  3.7   |  23  |  2.75<H>    Ca    8.7      01 Aug 2021 05:27  Phos  3.1     08-01  Mg     2.2     08-01    TPro  6.2  /  Alb  2.5<L>  /  TBili  0.9  /  DBili  x   /  AST  38  /  ALT  44  /  AlkPhos  54  08-01        CAPILLARY BLOOD GLUCOSE      POCT Blood Glucose.: 171 mg/dL (02 Aug 2021 06:15)      RADIOLOGY & ADDITIONAL TESTS: Reviewed.   OVERNIGHT EVENTS: NAEO    SUBJECTIVE / INTERVAL HPI: Patient seen and examined at bedside. No complaints at current. Pt denies SOB, palpitations, cough, fever, chills, HA, Dizziness, change in vision/hearing, N/V, abdominal pain, diarrhea, constipation, hematochezia/melena, dysuria, hematuria, new onset weakness/numbness, LE pain and/or swelling.    Remaining ROS negative       PHYSICAL EXAM:    General: Pt is lying in bed, NAD, hard of hearing but amenable to the exam and conversational  HEENT: PERRL, anicteric sclera; conjunctiva pale, mucous membranes and tongue appear dry  Neck: supple, no palpable nodules or nodes  Cardiovascular: harsh crescendo-decrescendo systolic murmur heard throughout, non radiating to carotids  Respiratory: CTA B/L; no W/R/R  Gastrointestinal: soft, Non-tender, slightly distended, no hepatosplenomegaly, +BSx4  Extremities: WWP; no edema, clubbing or cyanosis  Vascular: 2+ radial, DP/PT pulses B/L  Neurological: AAOx3; 4/5 strength on R LE, 1/5 strength on LLE; 5/5 strength in B/L UE sensation intact throughout   Psychiatric: pleasant mood and affect  Dermatologic: no appreciable wounds or damage to the skin    VITAL SIGNS:  Vital Signs Last 24 Hrs  T(C): 36.3 (02 Aug 2021 04:30), Max: 37.1 (01 Aug 2021 21:51)  T(F): 97.3 (02 Aug 2021 04:30), Max: 98.7 (01 Aug 2021 21:51)  HR: 78 (02 Aug 2021 04:03) (57 - 92)  BP: 137/64 (02 Aug 2021 04:03) (127/64 - 157/65)  BP(mean): 94 (02 Aug 2021 04:03) (90 - 102)  RR: 18 (02 Aug 2021 04:03) (17 - 19)  SpO2: 96% (02 Aug 2021 04:03) (95% - 98%)      MEDICATIONS:  MEDICATIONS  (STANDING):  albuterol/ipratropium for Nebulization 3 milliLiter(s) Nebulizer every 6 hours  atorvastatin 40 milliGRAM(s) Oral at bedtime  chlorhexidine 2% Cloths 1 Application(s) Topical <User Schedule>  dextrose 40% Gel 15 Gram(s) Oral once  dextrose 5%. 1000 milliLiter(s) (50 mL/Hr) IV Continuous <Continuous>  dextrose 5%. 1000 milliLiter(s) (100 mL/Hr) IV Continuous <Continuous>  dextrose 50% Injectable 25 Gram(s) IV Push once  dextrose 50% Injectable 12.5 Gram(s) IV Push once  dextrose 50% Injectable 25 Gram(s) IV Push once  glucagon  Injectable 1 milliGRAM(s) IntraMuscular once  insulin lispro (ADMELOG) corrective regimen sliding scale   SubCutaneous every 6 hours  levETIRAcetam  IVPB 500 milliGRAM(s) IV Intermittent every 24 hours  pantoprazole  Injectable 40 milliGRAM(s) IV Push every 12 hours  piperacillin/tazobactam IVPB.. 4.5 Gram(s) IV Intermittent every 12 hours  predniSONE   Tablet 10 milliGRAM(s) Oral every 24 hours  senna 2 Tablet(s) Oral at bedtime    MEDICATIONS  (PRN):  acetaminophen    Suspension .. 600 milliGRAM(s) Enteral Tube every 6 hours PRN Moderate Pain (4 - 6)      ALLERGIES:  Allergies    hay fever (Unknown)  No Known Drug Allergies    Intolerances        LABS:                        8.0    64.51 )-----------( 356      ( 01 Aug 2021 05:29 )             24.7     08-01    150<H>  |  117<H>  |  73<H>  ----------------------------<  165<H>  3.7   |  23  |  2.75<H>    Ca    8.7      01 Aug 2021 05:27  Phos  3.1     08-01  Mg     2.2     08-01    TPro  6.2  /  Alb  2.5<L>  /  TBili  0.9  /  DBili  x   /  AST  38  /  ALT  44  /  AlkPhos  54  08-01        CAPILLARY BLOOD GLUCOSE      POCT Blood Glucose.: 171 mg/dL (02 Aug 2021 06:15)      RADIOLOGY & ADDITIONAL TESTS: Reviewed.

## 2021-08-02 NOTE — PROGRESS NOTE ADULT - ATTENDING COMMENTS
Myasthenia gravis, on prednisone 10 mg daily, will need longer term immunosuppression, possibly rituximab pending heme onc and ID work ups.    Asymmetric LE weakness not typical for myasthenia -- ?resolving RP hematoma, not imaged as it would not , will continue to monitor.    Still with NGT in place, continue periodic swallow evals.

## 2021-08-02 NOTE — PROGRESS NOTE ADULT - PROBLEM SELECTOR PLAN 6
Currently NPO, NGT placed, tube feeds.  - Allow wet swabs to moisten mouth  - Protonix prophylaxis Currently NPO, NGT placed, tube feeds.  - Allow wet swabs to moisten mouth  - Protonix prophylaxis decrease from BID to 1 daily

## 2021-08-02 NOTE — PROGRESS NOTE ADULT - SUBJECTIVE AND OBJECTIVE BOX
Patient seen and examined at bedside this AM. There were no acute events overnight. Patient offers no complaints at this time, says he feels "fine". Denies any pain. Denies any fevers, chills, chest pain, palpitations, shortness of breath, cough, nausea, vomiting, constipation, diarrhea, leg swelling, headaches, lightheadedness or dizziness.    Vitals:  Vital Signs Last 24 Hrs  T(C): 36.3 (02 Aug 2021 04:30), Max: 37.1 (01 Aug 2021 21:51)  T(F): 97.3 (02 Aug 2021 04:30), Max: 98.7 (01 Aug 2021 21:51)  HR: 78 (02 Aug 2021 04:03) (57 - 92)  BP: 137/64 (02 Aug 2021 04:03) (127/64 - 157/65)  BP(mean): 94 (02 Aug 2021 04:03) (90 - 102)  RR: 18 (02 Aug 2021 04:03) (17 - 19)  SpO2: 96% (02 Aug 2021 04:03) (95% - 98%)      Physical Exam:  General: No acute distress  HEENT: NCAT, EOMI, PERRLA, anicteric sclera  Neck: Supple  Cardiovascular: +S1/S2, RRR  Respiratory: CTA B/L, no wheezes, rales or rhonchi  Gastrointestinal: Soft, NTND, +BS in all 4 quadrants, NGT placed  Extremities: No edema, clubbing or cyanosis noted  Vascular: 2+ radial, DP/PT pulses B/L  Neurologic: AAOx3. Motor strength testing 4-/5 proximal muscle weakness, L > R. mild B/L ptosis and mild L sided facial droop.      MEDICATIONS  (STANDING):  albuterol/ipratropium for Nebulization 3 milliLiter(s) Nebulizer every 6 hours  atorvastatin 40 milliGRAM(s) Oral at bedtime  chlorhexidine 2% Cloths 1 Application(s) Topical <User Schedule>  dextrose 40% Gel 15 Gram(s) Oral once  dextrose 5%. 1000 milliLiter(s) (50 mL/Hr) IV Continuous <Continuous>  dextrose 5%. 1000 milliLiter(s) (100 mL/Hr) IV Continuous <Continuous>  dextrose 50% Injectable 25 Gram(s) IV Push once  dextrose 50% Injectable 12.5 Gram(s) IV Push once  dextrose 50% Injectable 25 Gram(s) IV Push once  glucagon  Injectable 1 milliGRAM(s) IntraMuscular once  insulin lispro (ADMELOG) corrective regimen sliding scale   SubCutaneous every 6 hours  levETIRAcetam  IVPB 500 milliGRAM(s) IV Intermittent every 24 hours  pantoprazole  Injectable 40 milliGRAM(s) IV Push every 12 hours  piperacillin/tazobactam IVPB.. 4.5 Gram(s) IV Intermittent every 12 hours  predniSONE   Tablet 10 milliGRAM(s) Oral every 24 hours  senna 2 Tablet(s) Oral at bedtime    MEDICATIONS  (PRN):  acetaminophen    Suspension .. 600 milliGRAM(s) Enteral Tube every 6 hours PRN Moderate Pain (4 - 6)      ALLERGIES:  Allergies    hay fever (Unknown)  No Known Drug Allergies    Intolerances        LABS:                        8.0    64.51 )-----------( 356      ( 01 Aug 2021 05:29 )             24.7     08-01    150<H>  |  117<H>  |  73<H>  ----------------------------<  165<H>  3.7   |  23  |  2.75<H>    Ca    8.7      01 Aug 2021 05:27  Phos  3.1     08-01  Mg     2.2     08-01    TPro  6.2  /  Alb  2.5<L>  /  TBili  0.9  /  DBili  x   /  AST  38  /  ALT  44  /  AlkPhos  54  08-01        CAPILLARY BLOOD GLUCOSE      POCT Blood Glucose.: 171 mg/dL (02 Aug 2021 06:15)      RADIOLOGY & ADDITIONAL TESTS: Reviewed. Patient seen and examined at bedside this AM. There were no acute events overnight. Patient offers no complaints at this time, says he feels "fine". Denies any pain. Denies any fevers, chills, chest pain, palpitations, shortness of breath, cough, nausea, vomiting, constipation, diarrhea, leg swelling, headaches, lightheadedness or dizziness.    Vitals:  Vital Signs Last 24 Hrs  T(C): 36.3 (02 Aug 2021 04:30), Max: 37.1 (01 Aug 2021 21:51)  T(F): 97.3 (02 Aug 2021 04:30), Max: 98.7 (01 Aug 2021 21:51)  HR: 78 (02 Aug 2021 04:03) (57 - 92)  BP: 137/64 (02 Aug 2021 04:03) (127/64 - 157/65)  BP(mean): 94 (02 Aug 2021 04:03) (90 - 102)  RR: 18 (02 Aug 2021 04:03) (17 - 19)  SpO2: 96% (02 Aug 2021 04:03) (95% - 98%)      Physical Exam:  General: No acute distress  HEENT: NCAT, EOMI, PERRLA, anicteric sclera  Neck: Supple  Cardiovascular: +S1/S2, RRR  Respiratory: CTA B/L, no wheezes, rales or rhonchi  Gastrointestinal: Soft, NTND, +BS in all 4 quadrants, NGT placed  Extremities: No edema, clubbing or cyanosis noted  Vascular: 2+ radial, DP/PT pulses B/L  Neurologic: AAOx3. Motor strength testing 4-/5 proximal muscle weakness, L > R. mild B/L ptosis and mild L sided facial droop.      MEDICATIONS  (STANDING):  albuterol/ipratropium for Nebulization 3 milliLiter(s) Nebulizer every 6 hours  atorvastatin 40 milliGRAM(s) Oral at bedtime  chlorhexidine 2% Cloths 1 Application(s) Topical <User Schedule>  dextrose 40% Gel 15 Gram(s) Oral once  dextrose 5%. 1000 milliLiter(s) (50 mL/Hr) IV Continuous <Continuous>  dextrose 5%. 1000 milliLiter(s) (100 mL/Hr) IV Continuous <Continuous>  dextrose 50% Injectable 25 Gram(s) IV Push once  dextrose 50% Injectable 12.5 Gram(s) IV Push once  dextrose 50% Injectable 25 Gram(s) IV Push once  glucagon  Injectable 1 milliGRAM(s) IntraMuscular once  insulin lispro (ADMELOG) corrective regimen sliding scale   SubCutaneous every 6 hours  levETIRAcetam  IVPB 500 milliGRAM(s) IV Intermittent every 24 hours  pantoprazole  Injectable 40 milliGRAM(s) IV Push every 12 hours  piperacillin/tazobactam IVPB.. 4.5 Gram(s) IV Intermittent every 12 hours  predniSONE   Tablet 10 milliGRAM(s) Oral every 24 hours  senna 2 Tablet(s) Oral at bedtime    MEDICATIONS  (PRN):  acetaminophen    Suspension .. 600 milliGRAM(s) Enteral Tube every 6 hours PRN Moderate Pain (4 - 6)      ALLERGIES:  Allergies    hay fever (Unknown)  No Known Drug Allergies    Intolerances        LABS:                           8.2    56.26 )-----------( 383      ( 02 Aug 2021 07:15 )               26.3     08-02    155<H>  |  121<H>  |  72<H>  ----------------------------<  165<H>  3.9   |  24  |  2.36<H>    Ca   9.2      02 Aug 2021 07:15  Phos  2.8    08-02  Mg     2.3     08-02    TPro  6.2  /  Alb  2.5<L>  /  TBili  0.9  /  DBili  x   /  AST  38  /  ALT  44  /  AlkPhos  54  08-01        CAPILLARY BLOOD GLUCOSE      POCT Blood Glucose.: 171 mg/dL (02 Aug 2021 06:15)      RADIOLOGY & ADDITIONAL TESTS: Reviewed.

## 2021-08-02 NOTE — PROGRESS NOTE ADULT - PROBLEM SELECTOR PLAN 4
Likely 2/2 to aspiration PNA. Pt no longer meets sepsis criteria, no longer requiring pressors.  - Cont Zosyn 4.5 q12 for pseudomonas coverage  - Give fluids as needed CXR 8/1: Frontal examination of fhe chest demonstrates improvement left effusion in comparison to prior examination of the chest 7/31/2021. Cardiomegaly. No interval change position remaining support devices.  Pt NPO, tube feeds.   - Zosyn 4.5 q12 for pseudomonas coverage completed 8/2  - Consult speech and swallow

## 2021-08-02 NOTE — PROGRESS NOTE ADULT - PROBLEM SELECTOR PLAN 7
Given underlying mod- severe AS, would be cautious with fluids. Pt is not a surgical candidate for severe AS given poor prognosis as well as family wishes to not pursue aggressive measures.

## 2021-08-02 NOTE — PROGRESS NOTE ADULT - PROBLEM SELECTOR PLAN 2
R/O CML  WBC count of 64.5k with Neutrophilia (49k), Monocytosis (5k) , eosinophilia (2.89k) and with promyelocytes and myelocytes noted on smear  - DD include reactive vs clonal causes infection/ stressed marrow from recent illness/ CML/ CMML  - No symptoms or signs suggestive of infectious process at this time  - F/U Peripheral flow cytometry, BCR-ABL PCR, JAK2    -- JAK2 negative for the V617F activating mutation  -Very low viability, hypocellular specimen showing no phenotypic evidence of non-Hodgkin lymphoma or increase of blasts, see comment.   - Can set up outpatient hematology follow up with Dr. Nguyen on a Thursday (after discharge) R/O CML  WBC count of 64.5k with Neutrophilia (49k), Monocytosis (5k) , eosinophilia (2.89k) and with promyelocytes and myelocytes noted on smear  - DD include reactive vs clonal causes infection/ stressed marrow from recent illness/CML/CMML  - No symptoms or signs suggestive of infectious process at this time  - F/U Peripheral flow cytometry, BCR-ABL PCR, JAK2    -- JAK2 negative for the V617F activating mutation  -Very low viability, hypocellular specimen showing no phenotypic evidence of non-Hodgkin lymphoma or increase of blasts, see comment.   - Can set up outpatient hematology follow up with Dr. Nguyen on a Thursday (after discharge)

## 2021-08-02 NOTE — PROGRESS NOTE ADULT - PROBLEM SELECTOR PLAN 1
- F/u Myasthenia Gravis antibodies   - S/p IVIG x5 days  - Cont Prednisone 10 mg daily, started 7/31  - Will consider starting Rituximab q6 months for long-term management of myasthenia, pending ID assessment of prior Hep B infection and improvement of current hypotension/pulmonary edema, per neuro recs - F/u Myasthenia Gravis antibodies   - S/p IVIG x5 days  - Cont Prednisone 10 mg daily, started 7/31  - Will consider starting Rituximab q6 months for long-term management of myasthenia, pending ID assessment of prior Hep B infection and improvement of current hypotension/pulmonary edema, per neuro recs  - Neurology following  - F/U PT recs

## 2021-08-02 NOTE — PROGRESS NOTE ADULT - PROBLEM SELECTOR PLAN 9
Normocytic anemia, with iron panel consistent with anemia of chronic disease/ renal disease. B12/folate wnl. No evidence of hemolysi. Given CKD and Anemia, a monoclonal gammopathy workup performed and was negative  - If patient develops further drop in hemoglobin, then consider CT abdomen Pelvis to rule out retroperitoneal hematoma that might cause femoral nerve compression.  - Transfuse if <7  - Keep active T&S Normocytic anemia, with iron panel consistent with anemia of chronic disease/ renal disease. B12/folate wnl. No evidence of hemolysis. Given CKD and Anemia, a monoclonal gammopathy workup performed and was negative  - If patient develops further drop in hemoglobin, then consider CT abdomen Pelvis to rule out retroperitoneal hematoma that might cause femoral nerve compression.  - Transfuse if <7  - Keep active T&S

## 2021-08-02 NOTE — PROGRESS NOTE ADULT - ASSESSMENT
89 year old male with newly diagnosed myasthenia gravis, along with a very high WBC 64 this AM of unclear etiology (possibly CML or other hematologic malignancy). S/p x5 days IVIG and started on prednisone 10 mg daily.     Plan:  1. Cont prednisone 10 mg daily  2. Considering Rituximab treatment for 6 months, but pending ID due to history of hepatitis B  3. Follow up MRI Lumbar spine, pending  4. Follow up leukocytosis work up, heme/onc is on board for possible CML vs other malignancy  5. Follow up flow cytometry, BCR-ABL, JAMES-2  6. Hemoglobin dropped to around 5 on this admission and was transferred to MICU for hypotension and anemia. Hb was stable and thus no CT was performed for RP hematoma, would not . No longer hypotensive and now on the floors. Will get CT if Hb drops, was 8 and stable this AM.  89 year old male with newly diagnosed myasthenia gravis, along with a very high WBC 64 this AM of unclear etiology (possibly CML or other hematologic malignancy). S/p x5 days IVIG and started on prednisone 10 mg daily.     Plan:  - Cont prednisone 10 mg daily  - Considering Rituximab treatment for 6 months, but pending ID due to history of hepatitis B  - Follow up leukocytosis work up, heme/onc is on board for possible CML vs other malignancy, flow cytometry, BCR-ABL, JAMES-2  - Hemoglobin dropped to around 5 on this admission and was transferred to MICU for hypotension and anemia. Hb was stable and thus no CT was performed for RP hematoma, would not . No longer hypotensive and now on the floors. Will get CT if Hb drops, was 8 and stable this AM.

## 2021-08-03 LAB
ALBUMIN SERPL ELPH-MCNC: 2.6 G/DL — LOW (ref 3.3–5)
ALP SERPL-CCNC: 47 U/L — SIGNIFICANT CHANGE UP (ref 40–120)
ALT FLD-CCNC: 27 U/L — SIGNIFICANT CHANGE UP (ref 10–45)
ANION GAP SERPL CALC-SCNC: 9 MMOL/L — SIGNIFICANT CHANGE UP (ref 5–17)
AST SERPL-CCNC: 23 U/L — SIGNIFICANT CHANGE UP (ref 10–40)
BCR/ABL BY RT - PCR QUANTITATIVE: SIGNIFICANT CHANGE UP
BILIRUB SERPL-MCNC: 0.9 MG/DL — SIGNIFICANT CHANGE UP (ref 0.2–1.2)
BUN SERPL-MCNC: 66 MG/DL — HIGH (ref 7–23)
CALCIUM SERPL-MCNC: 8.9 MG/DL — SIGNIFICANT CHANGE UP (ref 8.4–10.5)
CHLORIDE SERPL-SCNC: 115 MMOL/L — HIGH (ref 96–108)
CO2 SERPL-SCNC: 24 MMOL/L — SIGNIFICANT CHANGE UP (ref 22–31)
CREAT SERPL-MCNC: 1.91 MG/DL — HIGH (ref 0.5–1.3)
CULTURE RESULTS: NO GROWTH — SIGNIFICANT CHANGE UP
GLUCOSE BLDC GLUCOMTR-MCNC: 181 MG/DL — HIGH (ref 70–99)
GLUCOSE BLDC GLUCOMTR-MCNC: 183 MG/DL — HIGH (ref 70–99)
GLUCOSE BLDC GLUCOMTR-MCNC: 200 MG/DL — HIGH (ref 70–99)
GLUCOSE SERPL-MCNC: 175 MG/DL — HIGH (ref 70–99)
HCT VFR BLD CALC: 27.3 % — LOW (ref 39–50)
HGB BLD-MCNC: 8.5 G/DL — LOW (ref 13–17)
MAGNESIUM SERPL-MCNC: 2 MG/DL — SIGNIFICANT CHANGE UP (ref 1.6–2.6)
MCHC RBC-ENTMCNC: 30.2 PG — SIGNIFICANT CHANGE UP (ref 27–34)
MCHC RBC-ENTMCNC: 31.1 GM/DL — LOW (ref 32–36)
MCV RBC AUTO: 97.2 FL — SIGNIFICANT CHANGE UP (ref 80–100)
NRBC # BLD: 0 /100 WBCS — SIGNIFICANT CHANGE UP (ref 0–0)
PHOSPHATE SERPL-MCNC: 3.4 MG/DL — SIGNIFICANT CHANGE UP (ref 2.5–4.5)
PLATELET # BLD AUTO: 376 K/UL — SIGNIFICANT CHANGE UP (ref 150–400)
POTASSIUM SERPL-MCNC: 3.6 MMOL/L — SIGNIFICANT CHANGE UP (ref 3.5–5.3)
POTASSIUM SERPL-SCNC: 3.6 MMOL/L — SIGNIFICANT CHANGE UP (ref 3.5–5.3)
PROT SERPL-MCNC: 6.5 G/DL — SIGNIFICANT CHANGE UP (ref 6–8.3)
RBC # BLD: 2.81 M/UL — LOW (ref 4.2–5.8)
RBC # FLD: 19.5 % — HIGH (ref 10.3–14.5)
SODIUM SERPL-SCNC: 148 MMOL/L — HIGH (ref 135–145)
SPECIMEN SOURCE: SIGNIFICANT CHANGE UP
WBC # BLD: 46.32 K/UL — CRITICAL HIGH (ref 3.8–10.5)
WBC # FLD AUTO: 46.32 K/UL — CRITICAL HIGH (ref 3.8–10.5)

## 2021-08-03 PROCEDURE — 99233 SBSQ HOSP IP/OBS HIGH 50: CPT

## 2021-08-03 PROCEDURE — 99233 SBSQ HOSP IP/OBS HIGH 50: CPT | Mod: GC

## 2021-08-03 PROCEDURE — 99358 PROLONG SERVICE W/O CONTACT: CPT

## 2021-08-03 RX ORDER — SODIUM CHLORIDE 9 MG/ML
1000 INJECTION, SOLUTION INTRAVENOUS
Refills: 0 | Status: DISCONTINUED | OUTPATIENT
Start: 2021-08-03 | End: 2021-08-04

## 2021-08-03 RX ORDER — POTASSIUM CHLORIDE 20 MEQ
40 PACKET (EA) ORAL ONCE
Refills: 0 | Status: COMPLETED | OUTPATIENT
Start: 2021-08-03 | End: 2021-08-03

## 2021-08-03 RX ADMIN — Medication 40 MILLIEQUIVALENT(S): at 17:47

## 2021-08-03 RX ADMIN — Medication 1: at 17:47

## 2021-08-03 RX ADMIN — Medication 10 MILLIGRAM(S): at 11:58

## 2021-08-03 RX ADMIN — Medication 1: at 11:58

## 2021-08-03 RX ADMIN — PIPERACILLIN AND TAZOBACTAM 200 GRAM(S): 4; .5 INJECTION, POWDER, LYOPHILIZED, FOR SOLUTION INTRAVENOUS at 05:00

## 2021-08-03 RX ADMIN — ATORVASTATIN CALCIUM 40 MILLIGRAM(S): 80 TABLET, FILM COATED ORAL at 21:51

## 2021-08-03 RX ADMIN — PANTOPRAZOLE SODIUM 40 MILLIGRAM(S): 20 TABLET, DELAYED RELEASE ORAL at 11:58

## 2021-08-03 RX ADMIN — Medication 1: at 00:16

## 2021-08-03 RX ADMIN — SENNA PLUS 2 TABLET(S): 8.6 TABLET ORAL at 21:51

## 2021-08-03 RX ADMIN — LEVETIRACETAM 500 MILLIGRAM(S): 250 TABLET, FILM COATED ORAL at 11:58

## 2021-08-03 RX ADMIN — SODIUM CHLORIDE 80 MILLILITER(S): 9 INJECTION, SOLUTION INTRAVENOUS at 21:52

## 2021-08-03 RX ADMIN — Medication 1: at 06:43

## 2021-08-03 NOTE — SWALLOW BEDSIDE ASSESSMENT ADULT - SWALLOW EVAL: RECOMMENDED DIET
Continue NPO, consider NGT
NPO + ice chips pending FEES results
Mechanical soft / thin liquids. Consider advancing to regular diet in 1-2 days provided Pt has good tolerance of mech soft & continues to improve s/p IVIG.
NPO + NGT pending FEES results

## 2021-08-03 NOTE — PROGRESS NOTE ADULT - SUBJECTIVE AND OBJECTIVE BOX
JAMA CANTRELL             MRN-[id number]    CC:slurred speech, L facial droop    HPI:   **STROKE HPI***    HPI: 89y Male with PMHx of HTN and seizure on phenytoin presents with left facial droop and slurred speech. Pt was with family member (great-nephew Mesh [phone]) who noticed at 5pm, pt had acute onset of slurred speech, increased saliva production and a left facial droop. Pt was driven to the ED immediately. On presentation, /94, NIHSS 3. Great-nephew at bedside denies hx of stroke, use of blood thinners, recent trauma/bleeding event. CTH negative for acute hemorrhage. CTP without any perfusion defect. CTA without LVO. In conjunction with patient, family and stroke attending, decision was made to not give tpa since family felt his current deficits are non-disabling to his daily life vs risk of bleeding. Pt and family was made aware of the possibility of worsening symptoms and the opportunity to give tpa was time limited. They expressed understanding and declined tpa administration.     Pt denies CP, SOB, extremity weakness, changes in vision, HA. He endorses a cough that has been occurring for the past few days with sputum production, but has been afebrile. Pt given vanc and zosyn x1 in the ED. CXR pending official read. Patient initially failed dysphagia screen and was given  SD, but passed second dysphagia screen so was able to to loaded with plavix 300 PO.     At baseline, pt is fully independent of all ADLs and iADLs, ambulates without assistance, with hearing loss b/l, baseline right arm tremor. It is unclear if pt sees a neurologist for seizure management. Per wife (Ross [phone], [phone]), his most recent seizure was in April 2020 where he had full body shaking lasting for 5 minutes. He has been on phenytoin 100mg TID and has not had another seizure since.    (19 Jul 2021 00:25)    SUBJECTIVE: Patient resting in bed with wife at bedside, no distress noted. Currently has an NGT.     ROS:  DYSPNEA (Brenden): 0	  NAUS/VOM: N	  SECRETIONS: N	  AGITATION: N  Pain (Y/N):     N  -Provocation/Palliation: n/a   -Quality/Quantity:   n/a   -Radiating:  n/a   -Severity:  n/a   -Timing/Frequency:  n/a   -Impact on ADLs:  n/a     OTHER REVIEW OF SYSTEMS: + weakness and dry mouth   UNABLE TO OBTAIN  due to: n/a     PEx:  T(C): 36.8 (08-03-21 @ 10:30), Max: 37 (08-02-21 @ 17:10)  HR: 62 (08-03-21 @ 12:23) (56 - 74)  BP: 162/72 (08-03-21 @ 12:23) (152/70 - 168/72)  RR: 18 (08-03-21 @ 12:23) (15 - 20)  SpO2: 98% (08-03-21 @ 12:23) (94% - 100%)  Wt(kg): 60.1kg     GENERAL:  [x]Alert  [x]Oriented x3   []Lethargic  []Cachexia  []Unarousable  [x]Verbal  []Non-Verbal  Behavioral:   [] Anxiety  [] Delirium [] Agitation [x] Other - calm   HEENT:  [x]Normal   []Dry mouth   []ET Tube/Trach  []Oral lesions  PULMONARY:   [x]Clear anteriorly  []Tachypnea  []Audible excessive secretions   []Rhonchi        []Right []Left []Bilateral  []Crackles        []Right []Left []Bilateral  []Wheezing     []Right []Left []Bilateral  CARDIOVASCULAR:    [x]Regular []Irregular []Tachy  []Anatoliy []Murmur []Other  GASTROINTESTINAL:  [x]Soft  [x]Distended   []+BS  []Non tender []Tender  []PEG [x] NGT  Last BM:7/3  GENITOURINARY:  [x]Normal [] Incontinent   []Oliguria/Anuria   []Cruz  MUSCULOSKELETAL:   []Normal   [x]Weakness  []Bed/Wheelchair bound []Edema  NEUROLOGIC:   []No focal deficits  [] Cognitive impairment  [x] Dysphagia []Dysarthria [] Paresis []Other   SKIN:   [x]Normal   []Pressure ulcer(s)  []Rash      ALLERGIES: hay fever (Unknown)  No Known Drug Allergies      OPIATE NAÏVE (Y/N): Y    MEDICATIONS: REVIEWED  MEDICATIONS  (STANDING):  atorvastatin 40 milliGRAM(s) Oral at bedtime  dextrose 40% Gel 15 Gram(s) Oral once  dextrose 5%. 1000 milliLiter(s) (50 mL/Hr) IV Continuous <Continuous>  dextrose 5%. 1000 milliLiter(s) (100 mL/Hr) IV Continuous <Continuous>  dextrose 5%. 1000 milliLiter(s) (80 mL/Hr) IV Continuous <Continuous>  dextrose 50% Injectable 25 Gram(s) IV Push once  dextrose 50% Injectable 12.5 Gram(s) IV Push once  dextrose 50% Injectable 25 Gram(s) IV Push once  glucagon  Injectable 1 milliGRAM(s) IntraMuscular once  insulin lispro (ADMELOG) corrective regimen sliding scale   SubCutaneous every 6 hours  levETIRAcetam  Solution 500 milliGRAM(s) Enteral Tube every 24 hours  pantoprazole   Suspension 40 milliGRAM(s) Enteral Tube daily  predniSONE   Tablet 10 milliGRAM(s) Oral every 24 hours  senna 2 Tablet(s) Oral at bedtime    MEDICATIONS  (PRN):  acetaminophen    Suspension .. 600 milliGRAM(s) Enteral Tube every 6 hours PRN Moderate Pain (4 - 6)      LABS: REVIEWED  CBC:                        8.5    46.32 )-----------( 376      ( 03 Aug 2021 12:04 )             27.3     CMP:    08-02    155<H>  |  121<H>  |  72<H>  ----------------------------<  169<H>  3.9   |  24  |  2.36<H>    Ca    9.2      02 Aug 2021 07:15  Phos  3.4     08-03  Mg     2.0     08-03    TPro  6.8  /  Alb  2.6<L>  /  TBili  0.9  /  DBili  x   /  AST  30  /  ALT  37  /  AlkPhos  63  08-02    IMAGING: REVIEWED    ADVANCED DIRECTIVES:           DNR DNI            MOLST    DECISION MAKER: Patient is able to make decisions for himself but defers to the wife.   LEGAL SURROGATE:  Ross (wife): [phone], [phone]    PSYCHOSOCIAL-SPIRITUAL ASSESSMENT:       Reviewed       Care plan unchanged    GOALS OF CARE DISCUSSION       Palliative care info/counseling provided	            Documentation of GOC: DNR/DNI  	      AGENCY CHOICE DISCUSSED:            [facility]     REFERRALS	        Unit SW/Case Mgmt               PT/OT

## 2021-08-03 NOTE — PROGRESS NOTE ADULT - PROBLEM SELECTOR PLAN 4
CXR 8/1: Frontal examination of fhe chest demonstrates improvement left effusion in comparison to prior examination of the chest 7/31/2021. Cardiomegaly. No interval change position remaining support devices.  Pt NPO, tube feeds.   - Zosyn 4.5 q12 for pseudomonas coverage completed 8/2  - Consult speech and swallow CXR 8/1: Frontal examination of fhe chest demonstrates improvement left effusion in comparison to prior examination of the chest 7/31/2021. Cardiomegaly. No interval change position remaining support devices.  Pt NPO, tube feeds.   - Zosyn 4.5 q12 for pseudomonas coverage completed 8/2  - S & S recommend cont NGT + NPO as pt continues to have generalized weakness and hx of silent aspiration, they continue

## 2021-08-03 NOTE — PROGRESS NOTE ADULT - PROBLEM SELECTOR PLAN 1
- S/p IVIG x5 days  - Cont Prednisone 10 mg daily, started 7/31  - Will consider starting Rituximab q6 months for long-term management of myasthenia, pending ID assessment of prior Hep B infection and improvement of current hypotension/pulmonary edema, per neuro recs  - Neurology following  - F/U PT recs

## 2021-08-03 NOTE — PROGRESS NOTE ADULT - PROBLEM SELECTOR PLAN 5
Likely 2/2 to aspiration PNA. Pt no longer meets sepsis criteria, no longer requiring pressors.  - Completed Zosyn 4.5 q12 for pseudomonas coverage 8/2

## 2021-08-03 NOTE — CHART NOTE - NSCHARTNOTEFT_GEN_A_CORE
Admitting Diagnosis:   Patient is a 89y old  Male who presents with a chief complaint of slurred speech, L facial droop (03 Aug 2021 08:12)      PAST MEDICAL & SURGICAL HISTORY:  Hypertension    Seizure        Current Nutrition Order:  Jevity 1.5 Irineo @ 55ml/hr x 24hrs via NGT (1320ml TV, 1980kcal, 84g protein, 1003ml free H2O, 132% RDI, 1.4g/kg ABW protein)    PO Intake: Good (%) [   ]  Fair (50-75%) [   ] Poor (<25%) [   ]- NA NPO    GI Issues: No N/V/C/D reported at this time.   BM 8/2- on bowel regimen    Pain: Pt denied complaints of pain at time of visit     Skin Integrity: Sheng 12, no edema  Intact pressure-wise    Labs:   08-02    155<H>  |  121<H>  |  72<H>  ----------------------------<  169<H>  3.9   |  24  |  2.36<H>    Ca    9.2      02 Aug 2021 07:15  Phos  2.8     08-02  Mg     2.3     08-02    TPro  6.8  /  Alb  2.6<L>  /  TBili  0.9  /  DBili  x   /  AST  30  /  ALT  37  /  AlkPhos  63  08-02    CAPILLARY BLOOD GLUCOSE      POCT Blood Glucose.: 181 mg/dL (03 Aug 2021 06:39)  POCT Blood Glucose.: 152 mg/dL (02 Aug 2021 23:52)  POCT Blood Glucose.: 140 mg/dL (02 Aug 2021 11:50)      Medications:  MEDICATIONS  (STANDING):  atorvastatin 40 milliGRAM(s) Oral at bedtime  dextrose 40% Gel 15 Gram(s) Oral once  dextrose 5%. 1000 milliLiter(s) (50 mL/Hr) IV Continuous <Continuous>  dextrose 5%. 1000 milliLiter(s) (100 mL/Hr) IV Continuous <Continuous>  dextrose 5%. 1000 milliLiter(s) (80 mL/Hr) IV Continuous <Continuous>  dextrose 50% Injectable 25 Gram(s) IV Push once  dextrose 50% Injectable 12.5 Gram(s) IV Push once  dextrose 50% Injectable 25 Gram(s) IV Push once  glucagon  Injectable 1 milliGRAM(s) IntraMuscular once  insulin lispro (ADMELOG) corrective regimen sliding scale   SubCutaneous every 6 hours  levETIRAcetam  Solution 500 milliGRAM(s) Enteral Tube every 24 hours  pantoprazole   Suspension 40 milliGRAM(s) Enteral Tube daily  predniSONE   Tablet 10 milliGRAM(s) Oral every 24 hours  senna 2 Tablet(s) Oral at bedtime    MEDICATIONS  (PRN):  acetaminophen    Suspension .. 600 milliGRAM(s) Enteral Tube every 6 hours PRN Moderate Pain (4 - 6)    Weight: 60.3kg   Weight Change: No new wts recorded since admit     Nutrition Focused Physical Exam: Completed [   ]  Not Pertinent [  X ]    Estimated energy needs: Height 66"; ABW 60.3kg; IBW 64.4kg; 94%IBW; BMI 21.4  ActualBW used for calculations as pt between % of IBW. Needs estimated for age and slightly increased for inflammatory process. Moderate protein 2/2 EVARISTO  Calories: 25-30 kcal/kg = 2593-3467 kcal/day  Protein: 1.2-1.4 g/kg = 72-84g pro/day  Fluids per team 2/2 EVARISTO       Subjective: 89y Male with PMHx of HTN and seizure on phenytoin presents with left facial droop and slurred speech, Stroke w/u negative. Found to have myasthenia gravis. S/p IVIG x5 days. Admitted to ICU for cardiogenic shock vs obstructive shock in the setting of severe aortic stenosis and IV fluid administration. At that time pt was hypotensive w/leukocytosis. Also found to have aspiration pneumonia. S/p FEES on 7/20, and multiple bedside f/u over past week. Pt deemed inappropriate for PO intake and NGT placed for EN. Pt stepped down to 7 Lach. Weaned off of pressors. Pt seen in room, awake, alert, pleasant. EN running at 30ml/hr-slowly uptitrating to goal. Also started on D5@80ml/hr for hypernatremia. Pt denied complaints of N/V or pain this AM. Last BM 8/2. Afebrile. Started on prednisone for MG. POC , 152mg/dL, Na 155 (H), BUN 72/Cr 2.36 (H, trending down). Will continue to follow per RD protocol.      Previous Nutrition Diagnosis: Inadequate Oral Intake RT dysphagia AEB need for EN to meet estimated needs.     Active [X  ]  Resolved [   ]    If resolved, new PES:     Goal: Pt will continue to meet % of daily EER via tolerated route     Recommendations:  1. Continue with current EN order. Monitor for s/s intolerance; maintain aspiration precautions at all times. Additional free H2O flushes per team   2. F/u with SLP recs for diet advancement   3. Monitor lytes and replete prn. POC BG q6hrs   4. Pain and bowel regimens per team   5. Keep nutrition aligned with GOC at all times  *d/w team     Education: d/w family purpose of EN    Risk Level: High [ X  ] Moderate [   ] Low [   ]

## 2021-08-03 NOTE — SWALLOW BEDSIDE ASSESSMENT ADULT - SLP PERTINENT HISTORY OF CURRENT PROBLEM
pt with newly diagnosed MG, along with a very high WBC 64 this AM of unclear etiology (possibly CML or other hematologic malignancy). S/p x5 days IVIG and started on prednisone 10 mg daily. Course complicated by dysphagia and aspiration pneumonia. Admitted to MICU after developing cardiogenic/obstructive following IVF administration in the setting of severe aortic stenosis. Pt with newly diagnosed myasthenia gravis, S/p x5 days IVIG and started on prednisone 10 mg daily. Course complicated by dysphagia and aspiration pneumonia. Admitted to MICU after developing cardiogenic/obstructive following IVF administration in the setting of severe aortic stenosis.

## 2021-08-03 NOTE — SWALLOW BEDSIDE ASSESSMENT ADULT - NS SPL SWALLOW CLINIC TRIAL FT
Pt appears to have made significant improvement re swallow function from IE and f/u. He appears safe to start an oral diet at this time. [facility] soft solids recommended d/t length of time NPO and fair dentition. Provided Pt demonstrates tolerance of oral diet in the next 2-3 d, can adv to regular solids.
Swallow function appears unchanged from FEES on 7/20. This svc will continue to monitor over the next 24-48 hrs.
Limited assessment of oral phase as solids were deferred for instrumental. Voice was mildly wet at baseline; unable to elicit a cough to clear prior to PO trials. Pt with MULTIPLE (~5-6) ?incomplete secondary swallows per single small bolus suggestive of pharyngeal clearance deficits. Additionally, voice became increasingly wet with subtle throat clearing following the swallow. Despite multiple attempts, cough strength was reduced and ineffective at clearing ?penetrated/aspirated material.
Wet/gurgly voicing at baseline suspicious of at least deep laryngeal penetration of secretions. Swallow trigger palpated. Pt with multiple secondary swallows (~4) per single small bolus suggestive of pharyngeal clearance deficits. Observations are consistent with pt's c/o stasis. Voicing became increasingly wet/gurgly with PO. Cued coughs appeared ineffective in clearing ?penetrated/aspirated material.

## 2021-08-03 NOTE — PROGRESS NOTE ADULT - ASSESSMENT
89y Male with PMHx of HTN, severe aortic sinensis and seizure on phenytoin who presented with left facial droop and slurred speech, Stroke w/u negative. Found to have myasthenia gravis with positive anti-ach antibodies. Also found to have leukocytosis suspicious for malignancy. Admitted to [facility] for treatment of MG and w/u of potential CML/CLL. S/p IVIG treatment. Course complicated by dysphagia and aspiration pneumonia. Admitted to MICU after developing septic shock likely 2/2 to aspiration PNA requiring pressors. Pt no longer requiring pressors stepped down to 7 Lachman continued monitoring and further workup of leukocytosis.

## 2021-08-03 NOTE — PROGRESS NOTE ADULT - PROBLEM SELECTOR PLAN 4
Patients wife made patient a DNR/DNI. Remains hopeful that he will improve. Emotional support was provided. Will continue to follow for support. Patients wife made patient a DNR/DNI. Remains hopeful that he will improve. Emotional support was provided. Patients goals are established. Palliative care will sign off at this time. Please reconsult as needed.

## 2021-08-03 NOTE — SWALLOW BEDSIDE ASSESSMENT ADULT - SPECIFY REASON(S)
reconsulted to assess swallowing
consulted to assess swallowing d/t failed dysphagia screen
Assess for safety of PO  intake
Assess for safety of PO intake

## 2021-08-03 NOTE — PROGRESS NOTE ADULT - PROBLEM SELECTOR PLAN 2
R/O CML - DD include reactive vs clonal causes infection/ stressed marrow from recent illness/CML/CMML  JAK2 negative for the V617F activating mutation. WBC count of 64.5k with Neutrophilia (49k), Monocytosis (5k) , eosinophilia (2.89k) and with promyelocytes and myelocytes noted on smear  - No symptoms or signs suggestive of infectious process at this time  - F/U Peripheral flow cytometry, BCR-ABL PCR  - Can set up outpatient hematology follow up with Dr. Nguyen on a Thursday (after discharge)  - Appreciate neurology recs

## 2021-08-03 NOTE — PROGRESS NOTE ADULT - SUBJECTIVE AND OBJECTIVE BOX
NOTE IN PROGRESS    OVERNIGHT EVENTS: NAEO    SUBJECTIVE / INTERVAL HPI: Patient seen and examined at bedside, no complaints at current. Patient denying chest pain, SOB, palpitations, cough, fever, chills, HA, Dizziness, change in vision/hearing, N/V, abdominal pain, diarrhea, constipation, hematochezia/melena, dysuria, hematuria, new onset weakness/numbness, LE pain and/or swelling.    Remaining ROS negative       PHYSICAL EXAM:    General: Patient appears frail/cachectic, NGT in place, feeds running  HEENT: NGT in place, anicteric sclera, MMM  Neck: supple, no palpable nodes or nodules  Cardiovascular: crescendo-decrescendo systolic murmur hears throughout, prominent in R sternal region  Respiratory: CTA B/L; no W/R/R  Gastrointestinal: soft, NT/ND; no gaurding or rebound, no hepatosplenomegaly, +BSx4  Extremities: WWP; no edema, clubbing or cyanosis  Neurological: AAOx3; sensory in tact, 5/5 strength B/L UE, 4/5 strength RLE, 1/5 strength LLE  Psychiatric: pleasant mood and affect  Dermatologic: no appreciable wounds or damage to the skin    VITAL SIGNS:  Vital Signs Last 24 Hrs  T(C): 36.6 (03 Aug 2021 05:04), Max: 37 (02 Aug 2021 17:10)  T(F): 97.8 (03 Aug 2021 05:04), Max: 98.6 (02 Aug 2021 17:10)  HR: 56 (03 Aug 2021 04:08) (56 - 84)  BP: 152/70 (03 Aug 2021 04:08) (149/72 - 168/72)  BP(mean): 101 (03 Aug 2021 04:08) (99 - 107)  RR: 16 (03 Aug 2021 00:36) (15 - 20)  SpO2: 100% (03 Aug 2021 04:08) (94% - 100%)      MEDICATIONS:  MEDICATIONS  (STANDING):  atorvastatin 40 milliGRAM(s) Oral at bedtime  dextrose 40% Gel 15 Gram(s) Oral once  dextrose 5%. 1000 milliLiter(s) (50 mL/Hr) IV Continuous <Continuous>  dextrose 5%. 1000 milliLiter(s) (100 mL/Hr) IV Continuous <Continuous>  dextrose 5%. 1000 milliLiter(s) (80 mL/Hr) IV Continuous <Continuous>  dextrose 50% Injectable 25 Gram(s) IV Push once  dextrose 50% Injectable 12.5 Gram(s) IV Push once  dextrose 50% Injectable 25 Gram(s) IV Push once  glucagon  Injectable 1 milliGRAM(s) IntraMuscular once  insulin lispro (ADMELOG) corrective regimen sliding scale   SubCutaneous every 6 hours  levETIRAcetam  Solution 500 milliGRAM(s) Enteral Tube every 24 hours  pantoprazole   Suspension 40 milliGRAM(s) Enteral Tube daily  predniSONE   Tablet 10 milliGRAM(s) Oral every 24 hours  senna 2 Tablet(s) Oral at bedtime    MEDICATIONS  (PRN):  acetaminophen    Suspension .. 600 milliGRAM(s) Enteral Tube every 6 hours PRN Moderate Pain (4 - 6)      ALLERGIES:  Allergies    hay fever (Unknown)  No Known Drug Allergies    Intolerances        LABS:                        8.2    56.26 )-----------( 383      ( 02 Aug 2021 07:15 )             26.3     08-02    155<H>  |  121<H>  |  72<H>  ----------------------------<  169<H>  3.9   |  24  |  2.36<H>    Ca    9.2      02 Aug 2021 07:15  Phos  2.8     08-02  Mg     2.3     08-02    TPro  6.8  /  Alb  2.6<L>  /  TBili  0.9  /  DBili  x   /  AST  30  /  ALT  37  /  AlkPhos  63  08-02        CAPILLARY BLOOD GLUCOSE      POCT Blood Glucose.: 181 mg/dL (03 Aug 2021 06:39)      RADIOLOGY & ADDITIONAL TESTS: Reviewed.   OVERNIGHT EVENTS: NAEO    SUBJECTIVE / INTERVAL HPI: Patient seen and examined at bedside, no complaints at current. Patient denying chest pain, SOB, palpitations, cough, fever, chills, HA, Dizziness, change in vision/hearing, N/V, abdominal pain, diarrhea, constipation, hematochezia/melena, dysuria, hematuria, new onset weakness/numbness, LE pain and/or swelling.    Remaining ROS negative       PHYSICAL EXAM:    General: Patient appears frail/cachectic, NGT in place, feeds running  HEENT: NGT in place, anicteric sclera, MMM  Neck: supple, no palpable nodes or nodules  Cardiovascular: crescendo-decrescendo systolic murmur hears throughout, prominent in R sternal region  Respiratory: CTA B/L; no W/R/R  Gastrointestinal: soft, NT/ND; no gaurding or rebound, no hepatosplenomegaly, +BSx4  Extremities: WWP; no edema, clubbing or cyanosis  Neurological: AAOx3; sensory in tact, 5/5 strength B/L UE, 4/5 strength RLE, 1/5 strength LLE  Psychiatric: pleasant mood and affect  Dermatologic: no appreciable wounds or damage to the skin    VITAL SIGNS:  Vital Signs Last 24 Hrs  T(C): 36.6 (03 Aug 2021 05:04), Max: 37 (02 Aug 2021 17:10)  T(F): 97.8 (03 Aug 2021 05:04), Max: 98.6 (02 Aug 2021 17:10)  HR: 56 (03 Aug 2021 04:08) (56 - 84)  BP: 152/70 (03 Aug 2021 04:08) (149/72 - 168/72)  BP(mean): 101 (03 Aug 2021 04:08) (99 - 107)  RR: 16 (03 Aug 2021 00:36) (15 - 20)  SpO2: 100% (03 Aug 2021 04:08) (94% - 100%)      MEDICATIONS:  MEDICATIONS  (STANDING):  atorvastatin 40 milliGRAM(s) Oral at bedtime  dextrose 40% Gel 15 Gram(s) Oral once  dextrose 5%. 1000 milliLiter(s) (50 mL/Hr) IV Continuous <Continuous>  dextrose 5%. 1000 milliLiter(s) (100 mL/Hr) IV Continuous <Continuous>  dextrose 5%. 1000 milliLiter(s) (80 mL/Hr) IV Continuous <Continuous>  dextrose 50% Injectable 25 Gram(s) IV Push once  dextrose 50% Injectable 12.5 Gram(s) IV Push once  dextrose 50% Injectable 25 Gram(s) IV Push once  glucagon  Injectable 1 milliGRAM(s) IntraMuscular once  insulin lispro (ADMELOG) corrective regimen sliding scale   SubCutaneous every 6 hours  levETIRAcetam  Solution 500 milliGRAM(s) Enteral Tube every 24 hours  pantoprazole   Suspension 40 milliGRAM(s) Enteral Tube daily  predniSONE   Tablet 10 milliGRAM(s) Oral every 24 hours  senna 2 Tablet(s) Oral at bedtime    MEDICATIONS  (PRN):  acetaminophen    Suspension .. 600 milliGRAM(s) Enteral Tube every 6 hours PRN Moderate Pain (4 - 6)      ALLERGIES:  Allergies    hay fever (Unknown)  No Known Drug Allergies    Intolerances        LABS:                        8.2    56.26 )-----------( 383      ( 02 Aug 2021 07:15 )             26.3     08-02    155<H>  |  121<H>  |  72<H>  ----------------------------<  169<H>  3.9   |  24  |  2.36<H>    Ca    9.2      02 Aug 2021 07:15  Phos  2.8     08-02  Mg     2.3     08-02    TPro  6.8  /  Alb  2.6<L>  /  TBili  0.9  /  DBili  x   /  AST  30  /  ALT  37  /  AlkPhos  63  08-02        CAPILLARY BLOOD GLUCOSE      POCT Blood Glucose.: 181 mg/dL (03 Aug 2021 06:39)      RADIOLOGY & ADDITIONAL TESTS: Reviewed.

## 2021-08-03 NOTE — SWALLOW BEDSIDE ASSESSMENT ADULT - COMMENTS
Pt known well to our services from current admission. FEES completed on 7/20. Per my report, at that time, pt with mild oral and moderate-severe pharyngeal dysphagia. Deficits were marked by reduced pharyngeal swallow efficiency and impaired airway protection and were further confounded by poor secretion management.     On 7/26, pt was seen for a clinical bedside swallow evaluation. A mechanical soft diet with thin liquids was recommended.     Pt was stepped up to the MICU for septic shock, asp PNA. Now stepped down to [facility]. Currently NPO with NGT. Pt known well to our services from current admission. FEES completed on 7/20. Per my report, at that time, pt with mild oral and moderate-severe pharyngeal dysphagia. Deficits were marked by reduced pharyngeal swallow efficiency and impaired airway protection and were further confounded by poor secretion management.     On 7/26, pt was seen for a clinical bedside swallow evaluation. A mechanical soft diet with thin liquids was recommended.     Pt was stepped up to the MICU for septic shock, asp PNA. Now stepped down to [facility]. Our services attempted to see pt yesterday without success d/t poor arousal. Currently NPO with NGT.

## 2021-08-03 NOTE — SWALLOW BEDSIDE ASSESSMENT ADULT - SLP GENERAL OBSERVATIONS
Received awake in bed, NGT in situ, Received awake in bed, NGT in situ, speech is still mildly dysarthric.

## 2021-08-03 NOTE — SWALLOW BEDSIDE ASSESSMENT ADULT - SWALLOW EVAL: DIAGNOSIS
Clinical signs of oropharyngeal dysphagia of unknown etiology. Given subjective complaints and clinical presentation, pt would benefit from instrumental testing to further assess swallow anatomy and physiology.
Unchanged swallow fnx from prior exams as evidenced by multiple swallows per sip & throat clear/cough across PO trials suggestive of penetration &/or aspiration.
Suspect an overall decline in swallow function since 7/26, possibly r/t generalized weakness in setting of septic shock/aspiration PNA vs MG (completed 5/5 IVIG, now on 10mg of prednisone). Based on clinical presentation, known hx of SILENT aspiration, reduced mobility, and complicated hospital course, pt appears at risk for further pulmonary complications r/t dysphagia/aspiration. Recommend cont. NPO + NGT + instrumental swallow assessment to further assess swallow anatomy and physiology.
Functional oropharyngeal swallow with no overt signs of aspiration or other swallowing impairment at this time.

## 2021-08-03 NOTE — CHART NOTE - NSCHARTNOTEFT_GEN_A_CORE
Patient complaining of LE numbness. Per chart review and nursing this is a new finding. On physical exam patient has B/L LE weakness RLE 3/5, LLE 4/5, intact DP pulses B/L, and decreased sensation B/L LE affecting anterior and posterior thigh, buttock and low back. Sensation intact in upper extremities B/L. Patient is mentating well is AAOx3, without any new focal deficits. Rectal exam revealed loose stool noted on monie, without blood, and decreased tone. Lumbar spine with and without contrast ordered, spinal surgery (orthopedic surgery) contacted who will see patient to assess patient for cauda equina syndrome. Attempts were made to reach patient's emergency contact, voicemail reached, callback number left. Patient will continued to be monitored.

## 2021-08-03 NOTE — PROGRESS NOTE ADULT - PROVIDER SPECIALTY LIST ADULT
[FreeTextEntry3] : "I, Gertrude Park, personally scribed the services dictated to me by Dr. Bhargav Goldsmith MD in this documentation on 07/09/2021 "\par \par "I Dr. Bhargav Goldsmith MD, personally performed the services described in this documentation on 07/09/2021 for the patient as scribed by Gertrude Park in my presence. I have reviewed and verified that all the information is accurate and true."\par \par  Palliative Care

## 2021-08-03 NOTE — PROGRESS NOTE ADULT - PROBLEM SELECTOR PLAN 10
IVF: D5W 80cc/hr for 48hrs  Diet: NPO w tube feeds   DVT: N/A  GI: Protonix   CODE STATUS: DNR  Monitor, Replete to K>4 and Mg>2

## 2021-08-03 NOTE — PROGRESS NOTE ADULT - PROBLEM SELECTOR PLAN 9
Normocytic anemia, with iron panel consistent with anemia of chronic disease/ renal disease. B12/folate wnl. No evidence of hemolysis. Given CKD and Anemia, a monoclonal gammopathy workup performed and was negative  - If patient develops further drop in hemoglobin, then consider CT abdomen Pelvis to rule out retroperitoneal hematoma that might cause femoral nerve compression.  - Transfuse if <7  - Keep active T&S

## 2021-08-03 NOTE — PROGRESS NOTE ADULT - PROBLEM SELECTOR PLAN 6
Currently NPO, NGT placed, tube feeds.  - Allow wet swabs to moisten mouth  - Protonix prophylaxis decrease from BID to 1 daily

## 2021-08-03 NOTE — SWALLOW BEDSIDE ASSESSMENT ADULT - CONSISTENCIES ADMINISTERED
2x tsp thin, 2x tsp NTL/thin liquid/nectar thick
thin liquid/puree
2 tsp thin liquid + 2 tsp NTL/thin liquid/nectar thick
thin liquid/puree/mech soft

## 2021-08-04 LAB
ANION GAP SERPL CALC-SCNC: 9 MMOL/L — SIGNIFICANT CHANGE UP (ref 5–17)
BUN SERPL-MCNC: 66 MG/DL — HIGH (ref 7–23)
CALCIUM SERPL-MCNC: 8.6 MG/DL — SIGNIFICANT CHANGE UP (ref 8.4–10.5)
CHLORIDE SERPL-SCNC: 113 MMOL/L — HIGH (ref 96–108)
CO2 SERPL-SCNC: 24 MMOL/L — SIGNIFICANT CHANGE UP (ref 22–31)
CREAT SERPL-MCNC: 1.7 MG/DL — HIGH (ref 0.5–1.3)
GLUCOSE BLDC GLUCOMTR-MCNC: 164 MG/DL — HIGH (ref 70–99)
GLUCOSE BLDC GLUCOMTR-MCNC: 199 MG/DL — HIGH (ref 70–99)
GLUCOSE BLDC GLUCOMTR-MCNC: 205 MG/DL — HIGH (ref 70–99)
GLUCOSE BLDC GLUCOMTR-MCNC: 228 MG/DL — HIGH (ref 70–99)
GLUCOSE SERPL-MCNC: 215 MG/DL — HIGH (ref 70–99)
HCT VFR BLD CALC: 27.3 % — LOW (ref 39–50)
HGB BLD-MCNC: 8.3 G/DL — LOW (ref 13–17)
MAGNESIUM SERPL-MCNC: 2 MG/DL — SIGNIFICANT CHANGE UP (ref 1.6–2.6)
MCHC RBC-ENTMCNC: 30.2 PG — SIGNIFICANT CHANGE UP (ref 27–34)
MCHC RBC-ENTMCNC: 30.4 GM/DL — LOW (ref 32–36)
MCV RBC AUTO: 99.3 FL — SIGNIFICANT CHANGE UP (ref 80–100)
NRBC # BLD: 0 /100 WBCS — SIGNIFICANT CHANGE UP (ref 0–0)
OLIGOCLONAL BANDS CSF ELPH-IMP: PRESENT
PHOSPHATE SERPL-MCNC: 3.2 MG/DL — SIGNIFICANT CHANGE UP (ref 2.5–4.5)
PLATELET # BLD AUTO: 401 K/UL — HIGH (ref 150–400)
POTASSIUM SERPL-MCNC: 4.1 MMOL/L — SIGNIFICANT CHANGE UP (ref 3.5–5.3)
POTASSIUM SERPL-SCNC: 4.1 MMOL/L — SIGNIFICANT CHANGE UP (ref 3.5–5.3)
RBC # BLD: 2.75 M/UL — LOW (ref 4.2–5.8)
RBC # FLD: 19 % — HIGH (ref 10.3–14.5)
SODIUM SERPL-SCNC: 146 MMOL/L — HIGH (ref 135–145)
WBC # BLD: 43.64 K/UL — CRITICAL HIGH (ref 3.8–10.5)
WBC # FLD AUTO: 43.64 K/UL — CRITICAL HIGH (ref 3.8–10.5)

## 2021-08-04 PROCEDURE — 99221 1ST HOSP IP/OBS SF/LOW 40: CPT

## 2021-08-04 PROCEDURE — 99232 SBSQ HOSP IP/OBS MODERATE 35: CPT

## 2021-08-04 PROCEDURE — 93971 EXTREMITY STUDY: CPT | Mod: 26,RT

## 2021-08-04 PROCEDURE — 99233 SBSQ HOSP IP/OBS HIGH 50: CPT | Mod: GC

## 2021-08-04 RX ADMIN — LEVETIRACETAM 500 MILLIGRAM(S): 250 TABLET, FILM COATED ORAL at 11:52

## 2021-08-04 RX ADMIN — Medication 2: at 12:20

## 2021-08-04 RX ADMIN — ATORVASTATIN CALCIUM 40 MILLIGRAM(S): 80 TABLET, FILM COATED ORAL at 22:25

## 2021-08-04 RX ADMIN — Medication 2: at 18:55

## 2021-08-04 RX ADMIN — Medication 10 MILLIGRAM(S): at 11:51

## 2021-08-04 RX ADMIN — PANTOPRAZOLE SODIUM 40 MILLIGRAM(S): 20 TABLET, DELAYED RELEASE ORAL at 11:52

## 2021-08-04 RX ADMIN — Medication 1: at 00:28

## 2021-08-04 RX ADMIN — Medication 1: at 07:11

## 2021-08-04 NOTE — PROGRESS NOTE ADULT - PROBLEM SELECTOR PLAN 7
Given underlying mod- severe AS, would be cautious with fluids. Pt is not a surgical candidate for severe AS given poor prognosis as well as family wishes to not pursue aggressive measures. Given underlying mod- severe AS, would be cautious with fluids. Pt is not a surgical candidate for severe AS given poor prognosis as well as family wishes to not pursue aggressive measures.  - See above management for diuresing.

## 2021-08-04 NOTE — PROGRESS NOTE ADULT - ASSESSMENT
89y Male with PMHx of HTN, severe aortic sinensis and seizure on phenytoin who presented with left facial droop and slurred speech, Stroke w/u negative. Found to have myasthenia gravis with positive anti-ach antibodies. Also found to have leukocytosis suspicious for malignancy. Admitted to [facility] for treatment of MG and w/u of potential CML/CLL. S/p IVIG treatment. Course complicated by dysphagia and aspiration pneumonia. Admitted to MICU after developing septic shock likely 2/2 to aspiration PNA requiring pressors. Pt no longer requiring pressors stepped down to [facility] continued monitoring and further workup of leukocytosis.

## 2021-08-04 NOTE — PROGRESS NOTE ADULT - PROBLEM SELECTOR PLAN 9
Normocytic anemia, with iron panel consistent with anemia of chronic disease/ renal disease. B12/folate wnl. No evidence of hemolysis. Given CKD and Anemia, a monoclonal gammopathy workup performed and was negative  - If patient develops further drop in hemoglobin, then consider CT abdomen Pelvis to rule out retroperitoneal hematoma that might cause femoral nerve compression.  - Transfuse if <7  - Keep active T&S Normocytic anemia, with iron panel consistent with anemia of chronic disease/ renal disease. B12/folate wnl. No evidence of hemolysis. Given CKD and Anemia, a monoclonal gammopathy workup performed and was negative  - Transfuse if <7  - Keep active T&S

## 2021-08-04 NOTE — CONSULT NOTE ADULT - ATTENDING COMMENTS
Juan Antonio Varma is a 89 year old male presenting with new onset LLE weakness, slurred speech and facial drooping. He is otherwise neurologically intact.  He has no back pain and denies any radicular pain.  His workup was negative for stroke but he was found to have myasthenia gravis for which he is being treated. Recommend lumbar MRI w/o contrast to r/o compression of the neural elements as an etiology for LLE weakness. no

## 2021-08-04 NOTE — SWALLOW FEES ASSESSMENT ADULT - ADDITIONAL RECOMMENDATIONS
Will continue to f/u. Pt will likely require a repeat instrumental study and would benefit from an Modified Barium Swallow Study (MBS/VFSS) when clinically appropriate.
Given pt's advanced age, he would benefit from a palliative care consult to further discuss GOC as it relates to long-term feeding options     Will f/u ~48 hours

## 2021-08-04 NOTE — PROGRESS NOTE ADULT - PROBLEM SELECTOR PLAN 2
R/O CML - DD include reactive vs clonal causes infection/ stressed marrow from recent illness/CML/CMML  JAK2 negative for the V617F activating mutation. WBC count of 64.5k with Neutrophilia (49k), Monocytosis (5k) , eosinophilia (2.89k) and with promyelocytes and myelocytes noted on smear  - No symptoms or signs suggestive of infectious process at this time  - F/U Peripheral flow cytometry, BCR-ABL PCR  - Can set up outpatient hematology follow up with Dr. Nguyen on a Thursday (after discharge)  - Appreciate neurology recs R/O CML - DD include reactive vs clonal causes infection/ stressed marrow from recent illness/CML/CMML  JAK2 negative for the V617F activating mutation. BCR/ABL negative. WBC count trending down to baseline. Suspicious for CMML diagnosis.  - No symptoms or signs suggestive of infectious process at this time  - Can set up outpatient hematology follow up with Dr. Nguyen on a Thursday (after discharge)  - Appreciate hem/onc recs, following

## 2021-08-04 NOTE — PROGRESS NOTE ADULT - ASSESSMENT
89 yo M with history of seizures and HTN was admitted to neurology service for stroke symptoms. He was being managed for ischemic stroke and was transferred to Medicine. Hematology consulted for persistent leukocytosis. Patient denies any prior history of known leukocytosis. Denies any recent history of B symptoms (Fevers, fatigues, night sweats, unexplained weight loss, loss of appetite).     #Leukocytosis  - WBC count of 33k with Neutrophilia (24k), Monocytosis (2k) , eosinophilia (2.89k) and with promyelocytes and myelocytes noted when admitted  - Acute rise in WBC reactive in the setting of aspiration and shock. Now downtrend back towards baseline.   - DD include reactive vs clonal causes infection/ stressed marrow from recent illness/ CML/ CMML  - Peripheral flow cytometry- Flow with increased monocytes. No definitive diagnosis. Suspicious for CMML. Will discuss with family if they would like to explore further with BM biopsy .    BCR-ABL PCR, JAK2 were negative  - Will follow up with patient with results  - Can set up outpatient hematology follow up with Dr. Nguyen on a Thursday (after discharge)     #Anemia  - Normocytic anemia, with iron panel consistent with anemia of chronic disease/ renal disease  - B12/folate wnl. No evidence of hemolysis  - Given CKD and Anemia, a monoclonal gammopathy workup was and was negative      D/w Dr. Soni

## 2021-08-04 NOTE — SWALLOW FEES ASSESSMENT ADULT - ORAL PHASE COMMENTS
Pt required prompting to round lips on cup.
Intermittent labial spillage of liquids, especially as pt became more fatigued towards the end of trials.

## 2021-08-04 NOTE — PROGRESS NOTE ADULT - SUBJECTIVE AND OBJECTIVE BOX
Hematology Progress Note    HPI: 89y Male with PMHx of HTN and seizure on phenytoin presents with left facial droop and slurred speech. Pt was with family member (great-nephew Mesh [phone]) who noticed at 5pm, pt had acute onset of slurred speech, increased saliva production and a left facial droop. Pt was driven to the ED immediately. On presentation, /94, NIHSS 3. Great-nephew at bedside denies hx of stroke, use of blood thinners, recent trauma/bleeding event. CTH negative for acute hemorrhage. CTP without any perfusion defect. CTA without LVO. In conjunction with patient, family and stroke attending, decision was made to not give tpa since family felt his current deficits are non-disabling to his daily life vs risk of bleeding. Pt and family was made aware of the possibility of worsening symptoms and the opportunity to give tpa was time limited. They expressed understanding and declined tpa administration.   Pt denies CP, SOB, extremity weakness, changes in vision, HA. He endorses a cough that has been occurring for the past few days with sputum production, but has been afebrile. Pt given vanc and zosyn x1 in the ED. CXR pending official read. Patient initially failed dysphagia screen and was given  AL, but passed second dysphagia screen so was able to to loaded with plavix 300 PO.   At baseline, pt is fully independent of all ADLs and iADLs, ambulates without assistance, with hearing loss b/l, baseline right arm tremor. It is unclear if pt sees a neurologist for seizure management. Per wife (Madeot [phone], [phone]), his most recent seizure was in April 2020 where he had full body shaking lasting for 5 minutes. He has been on phenytoin 100mg TID and has not had another seizure since.     89 yo M with history of seizures and HTN was admitted to neurology service for stroke symptoms. He was being managed for ischemic stroke and was transferred to Medicine. Hematology consulted for persistent leukocytosis. Patient denies any prior history of known leukocytosis. Denies any recent history of B symptoms (Fevers, fatigues, night sweats, unexplained weight loss, loss of appetite).     Interval Hx- Course c/b aspiration and shock. Patient transferred to tele now. Offers no new complaints     FAMILY HISTORY:  none    SOCIAL HISTORY:   Patient lives with wife in apartment.  Smoking status: denies  Drinking: denies  Drug Use: denies    Allergies  hay fever (Unknown)  No Known Drug Allergies    REVIEW OF SYSTEMS:          GENERAL: NAD, well-developed  HEAD:  Atraumatic, Normocephalic  EYES: EOMI,  conjunctiva and sclera clear  NECK: Supple, No JVD  CHEST/LUNG: Clear to auscultation bilaterally; No wheeze  HEART: Regular rate and rhythm; No murmurs, rubs, or gallops  ABDOMEN: Soft, Nontender, Nondistended; Bowel sounds present  EXTREMITIES:  2+ Peripheral Pulses, No clubbing, cyanosis, or edema  SKIN: No rashes or lesions

## 2021-08-04 NOTE — PROGRESS NOTE ADULT - PROBLEM SELECTOR PLAN 3
Likely ATN 2/2 to septic shock, poor perfusion. Pt has a history of CKD. baseline 1.6-1.7.   - Creatinine improving: 3.93 > 3.6  > 2.75 > 2.36  - Not considering HD at this time   - Cont to trend creatinine    #Hypernatremia  Serum sodium at 155 (8/2 AM); FWD of 3L  - F/U 12:00 BMP and recalculate FWD  - Cont D5W at 80cc/hr for management of hypernatremia over 48hrs (started 8/2)  - Cont Jevity feeds Likely ATN 2/2 to septic shock, poor perfusion. Pt has a history of CKD. baseline 1.6-1.7.   - Creatinine improving: 3.93 > 3.6  > 2.75 > 2.36 > 1.91 >1.7  - Not considering HD at this time   - Cont to trend creatinine    #Hypernatremia  Serum sodium at 146 (8/4 AM); Pt appears to be overloaded on exam.   - Stop D5W at 80cc/hr for management of hypernatremia over 48hrs (started 8/2)  - Monitor and consider diureses with Metolazone if pt has persistent overload to avoid sodium derangement  - Cont Jevity feeds

## 2021-08-04 NOTE — PROGRESS NOTE ADULT - SUBJECTIVE AND OBJECTIVE BOX
***Note in progress***    OVERNIGHT EVENTS: NAEO    SUBJECTIVE / INTERVAL HPI: Patient seen and examined at bedside. Patient denying chest pain, SOB, palpitations, cough. Patient denies fever, chills, HA, Dizziness, change in vision/hearing, N/V, abdominal pain, diarrhea, constipation, hematochezia/melena, dysuria, hematuria, new onset weakness/numbness, LE pain and/or swelling.    Remaining ROS negative       PHYSICAL EXAM:    General: WDWN  HEENT: NC/AT; PERRL, anicteric sclera; MMM  Neck: supple  Cardiovascular: +S1/S2, RRR  Respiratory: CTA B/L; no W/R/R  Gastrointestinal: soft, NT/ND; +BSx4  Extremities: WWP; no edema, clubbing or cyanosis  Vascular: 2+ radial, DP/PT pulses B/L  Neurological: AAOx3; no focal deficits  Psychiatric: pleasant mood and affect  Dermatologic: no appreciable wounds or damage to the skin    VITAL SIGNS:  Vital Signs Last 24 Hrs  T(C): 36.7 (04 Aug 2021 05:02), Max: 37.2 (03 Aug 2021 21:30)  T(F): 98 (04 Aug 2021 05:02), Max: 98.9 (03 Aug 2021 21:30)  HR: 62 (04 Aug 2021 04:55) (56 - 74)  BP: 150/68 (04 Aug 2021 04:55) (134/64 - 163/76)  BP(mean): 98 (04 Aug 2021 04:55) (92 - 109)  RR: 18 (04 Aug 2021 04:55) (16 - 19)  SpO2: 97% (04 Aug 2021 04:55) (94% - 98%)      MEDICATIONS:  MEDICATIONS  (STANDING):  atorvastatin 40 milliGRAM(s) Oral at bedtime  dextrose 40% Gel 15 Gram(s) Oral once  dextrose 5%. 1000 milliLiter(s) (50 mL/Hr) IV Continuous <Continuous>  dextrose 5%. 1000 milliLiter(s) (100 mL/Hr) IV Continuous <Continuous>  dextrose 5%. 1000 milliLiter(s) (80 mL/Hr) IV Continuous <Continuous>  dextrose 50% Injectable 25 Gram(s) IV Push once  dextrose 50% Injectable 12.5 Gram(s) IV Push once  dextrose 50% Injectable 25 Gram(s) IV Push once  glucagon  Injectable 1 milliGRAM(s) IntraMuscular once  insulin lispro (ADMELOG) corrective regimen sliding scale   SubCutaneous every 6 hours  levETIRAcetam  Solution 500 milliGRAM(s) Enteral Tube every 24 hours  pantoprazole   Suspension 40 milliGRAM(s) Enteral Tube daily  predniSONE   Tablet 10 milliGRAM(s) Oral every 24 hours  senna 2 Tablet(s) Oral at bedtime    MEDICATIONS  (PRN):  acetaminophen    Suspension .. 600 milliGRAM(s) Enteral Tube every 6 hours PRN Moderate Pain (4 - 6)      ALLERGIES:  Allergies    hay fever (Unknown)  No Known Drug Allergies    Intolerances        LABS:                        8.5    46.32 )-----------( 376      ( 03 Aug 2021 12:04 )             27.3     08-03    148<H>  |  115<H>  |  66<H>  ----------------------------<  175<H>  3.6   |  24  |  1.91<H>    Ca    8.9      03 Aug 2021 12:04  Phos  3.4     08-03  Mg     2.0     08-03    TPro  6.5  /  Alb  2.6<L>  /  TBili  0.9  /  DBili  x   /  AST  23  /  ALT  27  /  AlkPhos  47  08-03        CAPILLARY BLOOD GLUCOSE      POCT Blood Glucose.: 199 mg/dL (04 Aug 2021 06:33)      RADIOLOGY & ADDITIONAL TESTS: Reviewed.   OVERNIGHT EVENTS: NAEO    SUBJECTIVE / INTERVAL HPI: Patient seen and examined at bedside. Pt has no complaints at current. States that he had some discomfort in the legs earlier in the morning but he had been repositioned and it has resolved. Patient denies chest pain, SOB, palpitations, cough, fever, chills, HA, Dizziness, change in vision/hearing, N/V, abdominal pain, diarrhea, constipation, hematochezia/melena, dysuria, hematuria, new onset weakness/numbness, LE pain and/or swelling.    Remaining ROS negative       PHYSICAL EXAM:    General: Patient appears cachectic but puffy on general assessment of the face compared to previous exam, NGT in place, feeds running  HEENT: NGT in place, anicteric sclera, MMM  Neck: supple, JVD present  Cardiovascular: harsh crescendo-decrescendo systolic murmur heard throughout, prominent in R sternal region  Respiratory: crackles B/L; no W/R/R  Gastrointestinal: soft, NT/ND; no gaurding or rebound, no hepatosplenomegaly, +BSx4  Extremities: WWP; no edema, clubbing or cyanosis  Neurological: AAOx3; sensory in tact, 5/5 strength B/L UE, 4/5 strength RLE, 2/5 strength LLE  Psychiatric: pleasant mood and affect  Dermatologic: no appreciable wounds or damage to the skin    VITAL SIGNS:  Vital Signs Last 24 Hrs  T(C): 36.7 (04 Aug 2021 05:02), Max: 37.2 (03 Aug 2021 21:30)  T(F): 98 (04 Aug 2021 05:02), Max: 98.9 (03 Aug 2021 21:30)  HR: 62 (04 Aug 2021 04:55) (56 - 74)  BP: 150/68 (04 Aug 2021 04:55) (134/64 - 163/76)  BP(mean): 98 (04 Aug 2021 04:55) (92 - 109)  RR: 18 (04 Aug 2021 04:55) (16 - 19)  SpO2: 97% (04 Aug 2021 04:55) (94% - 98%)      MEDICATIONS:  MEDICATIONS  (STANDING):  atorvastatin 40 milliGRAM(s) Oral at bedtime  dextrose 40% Gel 15 Gram(s) Oral once  dextrose 5%. 1000 milliLiter(s) (50 mL/Hr) IV Continuous <Continuous>  dextrose 5%. 1000 milliLiter(s) (100 mL/Hr) IV Continuous <Continuous>  dextrose 5%. 1000 milliLiter(s) (80 mL/Hr) IV Continuous <Continuous>  dextrose 50% Injectable 25 Gram(s) IV Push once  dextrose 50% Injectable 12.5 Gram(s) IV Push once  dextrose 50% Injectable 25 Gram(s) IV Push once  glucagon  Injectable 1 milliGRAM(s) IntraMuscular once  insulin lispro (ADMELOG) corrective regimen sliding scale   SubCutaneous every 6 hours  levETIRAcetam  Solution 500 milliGRAM(s) Enteral Tube every 24 hours  pantoprazole   Suspension 40 milliGRAM(s) Enteral Tube daily  predniSONE   Tablet 10 milliGRAM(s) Oral every 24 hours  senna 2 Tablet(s) Oral at bedtime    MEDICATIONS  (PRN):  acetaminophen    Suspension .. 600 milliGRAM(s) Enteral Tube every 6 hours PRN Moderate Pain (4 - 6)      ALLERGIES:  Allergies    hay fever (Unknown)  No Known Drug Allergies    Intolerances        LABS:                        8.5    46.32 )-----------( 376      ( 03 Aug 2021 12:04 )             27.3     08-03    148<H>  |  115<H>  |  66<H>  ----------------------------<  175<H>  3.6   |  24  |  1.91<H>    Ca    8.9      03 Aug 2021 12:04  Phos  3.4     08-03  Mg     2.0     08-03    TPro  6.5  /  Alb  2.6<L>  /  TBili  0.9  /  DBili  x   /  AST  23  /  ALT  27  /  AlkPhos  47  08-03        CAPILLARY BLOOD GLUCOSE      POCT Blood Glucose.: 199 mg/dL (04 Aug 2021 06:33)      RADIOLOGY & ADDITIONAL TESTS: Reviewed.

## 2021-08-04 NOTE — PROGRESS NOTE ADULT - PROBLEM SELECTOR PLAN 10
IVF: D5W 80cc/hr for 48hrs  Diet: NPO w tube feeds   DVT: N/A  GI: Protonix   CODE STATUS: DNR  Monitor, Replete to K>4 and Mg>2 IVF: assess as needed  Diet: NPO w tube feeds   DVT: N/A  GI: Protonix   CODE STATUS: DNR  Monitor, Replete to K>4 and Mg>2

## 2021-08-04 NOTE — SWALLOW FEES ASSESSMENT ADULT - DIAGNOSTIC IMPRESSIONS
Pt p/w moderate pharyngeal dysphagia characterized by inconsistent timeliness of swallow, reduced sensation, and overall weak swallow. Primary difficulty was weak swallow resulting in severe post-deglutitive residue, which then spilled into the laryngeal vestibule, after the swallow (i.e., mostly silent penetration/aspiration). Pt did not spontaneously clear the penetrate/aspirate and residue. However, given an initial cue to cough/throat-clear, pt was able to then carry out multiple coughs/throat-clears and secondary swallows to clear the penetrate/aspirate and residue. This is an improvement from the 7/20/21 FEES as well as an improvement from his clinical presentation yesterday. However, a PO diet is still premature given the severity of residue, blunted sensation, inefficient swallow, and need for assistance with oral suctioning. Recommend continuing NGT as a primary means of nutrition/hydration/medication at this time with ice-chips to stimulate therapeutic swallows.
Pt presents with mild oral and moderate-severe pharyngeal dysphagia of unknown etiology. Given family reports, dysphagia appears to be progressive. Deficits marked by reduced pharyngeal swallow efficiency and impaired airway protection and were further confounded by poor secretion management. Aspiration was SILENT. Pt was unable to implement compensatory strategies to improve safety and efficiency of swallowing function. Given the above and pt's inability to clear secretions/penetration/aspiration as well as his reduced mobility, he appears to be at risk for dysphagia related pulmonary complications.

## 2021-08-04 NOTE — PROGRESS NOTE ADULT - PROBLEM SELECTOR PLAN 1
- S/p IVIG x5 days  - Cont Prednisone 10 mg daily, started 7/31  - Will consider starting Rituximab q6 months for long-term management of myasthenia, pending ID assessment of prior Hep B infection and improvement of current hypotension/pulmonary edema, per neuro recs  - Neurology following  - F/U PT recs - S/p IVIG x5 days  - Cont Prednisone 10 mg daily, started 7/31  - Will consider starting Rituximab q6 months for long-term management of myasthenia, pending ID assessment of prior Hep B infection and improvement of current hypotension/pulmonary edema, per neuro recs  - Neurology following  - F/U PT recs    #Weakness and numbness  LE numbness and weakness of the LLE; previously thought to be 2/2 to resolving RP hematoma possibly causing femoral nerve compression, per neuro recs  - F/U lumbar spine MRI

## 2021-08-04 NOTE — SWALLOW FEES ASSESSMENT ADULT - PHARYNGEAL PHASE COMMENTS
Swallow trigger was relatively timely with smaller liquid volumes. However, with cup sips, liquids began spilling over the superior edge of the epiglottis prior to the swallow. Laryngeal penetration inconsistently occurred during the swallow with liquids, especially with larger liquid volumes, d/t reduced bolus control/coordination. Swallow efficiency was largely impaired. +multiple spontaneous swallows per single small bolus. As trials progressed, residue began to accumulate within the pharynx. Laryngeal penetration and SILENT aspiration continuously occurred after the swallow as residue spilled over into the airway via the aryepiglottic folds and interarytenoid space. A chin tuck, with manual support, was unsuccessful in improving swallow safety/swallow efficiency. Suctioning was necessary to retrieve residue as a cued cough could not consistently be elicited.
Inconsistent timeliness of swallow.   Weak swallow  Pharyngeal residue after the swallow, worse with more viscous consistencies.  Residue continued to spill down BOT and lateral channels after the swallow, eventually pooling in the posterior cricoid region. Residue then spilled over the interarytenoid space into the laryngeal vestibule (mostly without a spontaneous cough/throat-clear response).   With a cue to cough or throat-clear, he was noted to immediately follow the direction, and continued to demonstrate multiple spontaneous cough/throat-clear + secondary swallows until the larynx/pharynx were clear. Again, he benefited from assistance w the Paolo to clear the oral cavity at times after coughing.

## 2021-08-04 NOTE — SWALLOW FEES ASSESSMENT ADULT - COMMENTS
[facility]ring 7/20/21 FEES: "Swallow trigger was relatively timely with smaller liquid volumes. However, with cup sips, liquids began spilling over the superior edge of the epiglottis prior to the swallow. Laryngeal penetration inconsistently occurred during the swallow with liquids, especially with larger liquid volumes, d/t reduced bolus control/coordination. Swallow efficiency was largely impaired. +multiple spontaneous swallows per single small bolus. As trials progressed, residue began to accumulate within the pharynx. Laryngeal penetration and SILENT aspiration continuously occurred after the swallow as residue spilled over into the airway via the aryepiglottic folds and interarytenoid space. A chin tuck, with manual support, was unsuccessful in improving swallow safety/swallow efficiency. Suctioning was necessary to retrieve residue as a cued cough could not consistently be elicited."  Pt was seen for a clinical bedside swallow evaluation yesterday (8/3/21), and was recommended a FEES after the following was noted: "Voice was mildly wet at baseline; unable to elicit a cough to clear prior to PO trials. Pt with MULTIPLE (~5-6) ?incomplete secondary swallows per single small bolus suggestive of pharyngeal clearance deficits. Additionally, voice became increasingly wet with subtle throat clearing following the swallow."    On this visit, pt was received alert, sitting upright in bed. NGT in place. On room air. Voice was judged to be rough, but dry and audible. The risks, benefits and alternatives of this study were discussed with the pt. He expressed understanding and agreed to proceed. The pt tolerated the passing and presence of the scope without difficulty. This study was completed in conjunction w SLP, Adela Armijo. B/L TVF movement noted. Hyperfunction on phonation. Thick white-colored secretions present in the pharynx, mostly in the posterior cricoid region. With cues pt was able to cough  some of this up, but benefited from ice-chips to help thin out these thick secretions. Eventually with multiple ice-chips and cued/spontaneous coughs, throat-clears and secondary swallows, pt was able to fully clear these thick secretions. To note- pt was assisted w oral suctioning to help clear these secretions orally after the pt coughed/throat-cleared.

## 2021-08-04 NOTE — PROGRESS NOTE ADULT - PROBLEM SELECTOR PLAN 5
Likely 2/2 to aspiration PNA. Pt no longer meets sepsis criteria, no longer requiring pressors.  - Completed Zosyn 4.5 q12 for pseudomonas coverage 8/2 Likely 2/2 to aspiration PNA. Pt no longer meets sepsis criteria, no longer requiring pressors.  - S/P zosyn 5 day course  RESOLVED

## 2021-08-04 NOTE — PROGRESS NOTE ADULT - PROBLEM SELECTOR PLAN 4
CXR 8/1: Frontal examination of fhe chest demonstrates improvement left effusion in comparison to prior examination of the chest 7/31/2021. Cardiomegaly. No interval change position remaining support devices.  Pt NPO, tube feeds.   - Zosyn 4.5 q12 for pseudomonas coverage completed 8/2  - S & S recommend cont NGT + NPO as pt continues to have generalized weakness and hx of silent aspiration, they continue CXR 8/1: Frontal examination of fhe chest demonstrates improvement left effusion in comparison to prior examination of the chest 7/31/2021. Cardiomegaly. No interval change position remaining support devices.  Pt NPO, tube feeds.   - S & S performed FEES; not ready for oral diet but improved since admission; maintain NGT feeds

## 2021-08-04 NOTE — SWALLOW FEES ASSESSMENT ADULT - RECOMMENDED CONSISTENCY
NPO.   Continue NGT.  Allow 3-5 ice-chips/hour to stimulate therapeutic swallows. If pt demonstrates wet voice and cannot clear wet vocal quality, pls d/c ice-chips and reconsult this SVC.
NPO + temporary alternate means of nutrition, hydration, medication (if c/w GOC)  -ice chips to improve secretions

## 2021-08-04 NOTE — PROGRESS NOTE ADULT - SUBJECTIVE AND OBJECTIVE BOX
Patient seen and examined at bedside this AM. There were no acute events overnight. Patient offers no complaints at this time, says he feels "fine". Denies any pain. Denies any fevers, chills, chest pain, palpitations, shortness of breath, cough, nausea, vomiting, constipation, diarrhea, leg swelling, headaches, lightheadedness or dizziness.    Vitals:  Vital Signs Last 24 Hrs  T(C): 36.3, Max: 37.2   T(F): 97.3, Max: 98.9   HR: 66 (56 - 92)  BP: 141/66 (127/64 - 168/76)  BP(mean): 95 (90 - 109)  RR: 18 (15 - 20)  SpO2: 97% (94% - 100%)      Physical Exam:  General: No acute distress  HEENT: NCAT, EOMI, PERRLA, anicteric sclera  Neck: Supple  Cardiovascular: +S1/S2, RRR  Respiratory: CTA B/L, L lower lobe diminished breath sounds, +rales, no wheezes or rhonchi  Gastrointestinal: Soft, NTND, +BS in all 4 quadrants, NGT placed  Extremities: No edema, clubbing or cyanosis noted  Vascular: 2+ radial, DP/PT pulses B/L  Neurologic: Motor strength testing 4-/5 proximal UE muscle weakness, L > R. LLE 3/5. Mild L sided facial droop.        MEDICATIONS  (STANDING):  albuterol/ipratropium for Nebulization 3 milliLiter(s) Nebulizer every 6 hours  atorvastatin 40 milliGRAM(s) Oral at bedtime  chlorhexidine 2% Cloths 1 Application(s) Topical <User Schedule>  dextrose 40% Gel 15 Gram(s) Oral once  dextrose 5%. 1000 milliLiter(s) (50 mL/Hr) IV Continuous <Continuous>  dextrose 5%. 1000 milliLiter(s) (100 mL/Hr) IV Continuous <Continuous>  dextrose 50% Injectable 25 Gram(s) IV Push once  dextrose 50% Injectable 12.5 Gram(s) IV Push once  dextrose 50% Injectable 25 Gram(s) IV Push once  glucagon  Injectable 1 milliGRAM(s) IntraMuscular once  insulin lispro (ADMELOG) corrective regimen sliding scale   SubCutaneous every 6 hours  levETIRAcetam  IVPB 500 milliGRAM(s) IV Intermittent every 24 hours  pantoprazole  Injectable 40 milliGRAM(s) IV Push every 12 hours  piperacillin/tazobactam IVPB.. 4.5 Gram(s) IV Intermittent every 12 hours  predniSONE   Tablet 10 milliGRAM(s) Oral every 24 hours  senna 2 Tablet(s) Oral at bedtime    MEDICATIONS  (PRN):  acetaminophen    Suspension .. 600 milliGRAM(s) Enteral Tube every 6 hours PRN Moderate Pain (4 - 6)      ALLERGIES:  Allergies    hay fever (Unknown)  No Known Drug Allergies    Intolerances        LABS:                           8.3    43.64 )-----------( 401                   27.3       146<H>  |  113<H>  |  66<H>  ----------------------------<  215<H>  4.1   |  24  |  1.70<H>    Ca   8.6        Phos  3.2  Mg     2.0    TPro  6.2  /  Alb  2.5<L>  /  TBili  0.9  /  DBili  x   /  AST  38  /  ALT  44  /  AlkPhos  54  08-01        CAPILLARY BLOOD GLUCOSE      POCT Blood Glucose.: 205 mg/dL      RADIOLOGY & ADDITIONAL TESTS: Reviewed. Patient seen and examined at bedside this AM. There were no acute events overnight. Patient offers no complaints at this time, says he feels "fine". Denies any pain. Denies any fevers, chills, chest pain, palpitations, shortness of breath, cough, nausea, vomiting, constipation, diarrhea, leg swelling, headaches, lightheadedness or dizziness.    Vitals:  Vital Signs Last 24 Hrs  T(C): 36.3, Max: 37.2   T(F): 97.3, Max: 98.9   HR: 66 (56 - 92)  BP: 141/66 (127/64 - 168/76)  BP(mean): 95 (90 - 109)  RR: 18 (15 - 20)  SpO2: 97% (94% - 100%)      Physical Exam:  General: No acute distress  HEENT: NCAT, EOMI, PERRLA, anicteric sclera  Neck: Supple  Cardiovascular: +S1/S2, RRR  Respiratory: CTA B/L, L lower lobe diminished breath sounds, +rales, no wheezes or rhonchi  Gastrointestinal: Soft, NTND, +BS in all 4 quadrants, NGT placed  Extremities: No edema, clubbing or cyanosis noted  Vascular: 2+ radial, DP/PT pulses B/L  Neurologic: Motor strength testing 4-/5 proximal UE muscle weakness. LLE 3/5 muscle weakness. Mild L sided facial droop.        MEDICATIONS  (STANDING):  albuterol/ipratropium for Nebulization 3 milliLiter(s) Nebulizer every 6 hours  atorvastatin 40 milliGRAM(s) Oral at bedtime  chlorhexidine 2% Cloths 1 Application(s) Topical <User Schedule>  dextrose 40% Gel 15 Gram(s) Oral once  dextrose 5%. 1000 milliLiter(s) (50 mL/Hr) IV Continuous <Continuous>  dextrose 5%. 1000 milliLiter(s) (100 mL/Hr) IV Continuous <Continuous>  dextrose 50% Injectable 25 Gram(s) IV Push once  dextrose 50% Injectable 12.5 Gram(s) IV Push once  dextrose 50% Injectable 25 Gram(s) IV Push once  glucagon  Injectable 1 milliGRAM(s) IntraMuscular once  insulin lispro (ADMELOG) corrective regimen sliding scale   SubCutaneous every 6 hours  levETIRAcetam  IVPB 500 milliGRAM(s) IV Intermittent every 24 hours  pantoprazole  Injectable 40 milliGRAM(s) IV Push every 12 hours  piperacillin/tazobactam IVPB.. 4.5 Gram(s) IV Intermittent every 12 hours  predniSONE   Tablet 10 milliGRAM(s) Oral every 24 hours  senna 2 Tablet(s) Oral at bedtime    MEDICATIONS  (PRN):  acetaminophen    Suspension .. 600 milliGRAM(s) Enteral Tube every 6 hours PRN Moderate Pain (4 - 6)      ALLERGIES:  Allergies    hay fever (Unknown)  No Known Drug Allergies    Intolerances        LABS:                           8.3    43.64 )-----------( 401                   27.3       146<H>  |  113<H>  |  66<H>  ----------------------------<  215<H>  4.1   |  24  |  1.70<H>    Ca   8.6        Phos  3.2  Mg     2.0    TPro  6.2  /  Alb  2.5<L>  /  TBili  0.9  /  DBili  x   /  AST  38  /  ALT  44  /  AlkPhos  54  08-01        CAPILLARY BLOOD GLUCOSE      POCT Blood Glucose.: 205 mg/dL      RADIOLOGY & ADDITIONAL TESTS: Reviewed.

## 2021-08-04 NOTE — CONSULT NOTE ADULT - SUBJECTIVE AND OBJECTIVE BOX
Orthopaedic Surgery Consult Note    For Surgeon: Dr. Santos    HPI:  89y Male with PMHx of HTN, severe aortic stenosis and seizure on phenytoin who presented with left facial droop and slurred speech, Stroke w/u negative. Found to have myasthenia gravis with positive anti-ach antibodies. Also found to have leukocytosis suspicious for malignancy. Admitted for treatment of MG and w/u of potential CML/CLL. S/p IVIG treatment. Course complicated initially by dysphagia followed hypotension concerning for sepsis in the setting of aspiration pneumonia. Patient given IVF, resulting in cardiogenic vs. obstructive shock due to severe aortic stenosis.  Also noted to have elevated creatinine concerning for EVARISTO. Patient admitted to ICU for futher management. Urine output initially low but improving. However, creatinine remains elevated. Phenylephrine started in an attempt to increase renal perfusion. Discontinued due to bradycardia. Patient also with dysphagia and poor PO intake. S/p NGT placement.     Ortho Addendum:  Ortho consulted due to LLE weakness/numbness and decreased rectal tone. Neurology notes state weakness/numbness began 7/27 at midnight although patient states symptoms are present at baseline. Per wife, pt is fully independent of all ADLs at baseline ambulates without assistance. Pt has no complaints at this time.    Vital Signs Last 24 Hrs  T(C): 36.9 (18 Jul 2021 23:53), Max: 37.1 (18 Jul 2021 19:32)  T(F): 98.4 (18 Jul 2021 23:53), Max: 98.8 (18 Jul 2021 19:51)  HR: 92 (18 Jul 2021 23:53) (84 - 92)  BP: 142/78 (18 Jul 2021 23:53) (123/63 - 184/75)  BP(mean): --  RR: 18 (18 Jul 2021 23:53) (16 - 18)  SpO2: 97% (18 Jul 2021 23:53) (96% - 99%)    Physical exam:  General: No acute distress, awake and alert  Cardiovascular: Regular rate and rhythm, ?murmur  Pulmonary: Transmitted sounds from upper airway. No use of accessory muscles  GI: Abdomen soft, non-tender, non-distended    Neurologic:  -Mental status: Awake, alert, oriented to person, place, and time. Speech is fluent with intact naming (able to name hammock, chair, key, pen, finger phone, scissor) , repetition, and comprehension, mild dysarthria. Recent and remote memory intact. Follows commands. Attention/concentration intact.   -Cranial nerves:   II: Visual fields are full to confrontation.  III, IV, VI: Extraocular movements are intact without nystagmus. Pupils equally round and reactive to light  V:  Facial sensation V1-V3 equal and intact   VII: Left facial droop on activation   VIII: Hearing is grossly intact bilaterally  Motor: Able to maintain AG along all extremities without pronator drift. Strength RUE 3/5, LUE 5/5, RLE 3/5, LUE 5/5. Baseline right arm tremor.  Sensation: Intact to light touch bilaterally. Extinguishes on RLE double simultaneous testing.  Coordination: No dysmetria on finger-to-nose and heel-to-shin bilaterally  Gait: Narrow gait, slow, with leaning to the left    NIHSS: 3 ASPECT Score: 9      Fingerstick Blood Glucose: CAPILLARY BLOOD GLUCOSE  161 (18 Jul 2021 20:36)      POCT Blood Glucose.: 161 mg/dL (18 Jul 2021 19:27)    LABS:                        10.7   32.39 )-----------( 413      ( 18 Jul 2021 20:01 )             32.6     07-18    141  |  108  |  39<H>  ----------------------------<  141<H>  4.7   |  23  |  1.76<H>    Ca    9.6      18 Jul 2021 20:01    TPro  7.6  /  Alb  4.5  /  TBili  0.2  /  DBili  x   /  AST  18  /  ALT  13  /  AlkPhos  100  07-18    PT/INR - ( 18 Jul 2021 20:01 )   PT: 12.3 sec;   INR: 1.03          PTT - ( 18 Jul 2021 20:01 )  PTT:32.7 sec          RADIOLOGY & ADDITIONAL STUDIES:    HCT: No acute intracranial hemorrhage or transcortical infarct.    CTA:1.  No large vessel occlusion or high-grade stenosis within the head and neck.  2.  Incidentally noted 1.8 cm right thyroid nodule. Consider nonemergent dedicated thyroid ultrasound is for further characterization.     (19 Jul 2021 00:25)      89yMale with PMH of _____ presents with lower back pain associated with  LE radicular pain, weakness, and numbness. Now experiencing new-onset radicular pain and well as weakness of _____ and numbness of _____. Denies any other symptoms. Denies any bowel or bladder incontinence.    Allergies    hay fever (Unknown)  No Known Drug Allergies    Intolerances        PAST MEDICAL & SURGICAL HISTORY:  Hypertension    Seizure        MEDICATIONS  (STANDING):  atorvastatin 40 milliGRAM(s) Oral at bedtime  dextrose 40% Gel 15 Gram(s) Oral once  dextrose 5%. 1000 milliLiter(s) (50 mL/Hr) IV Continuous <Continuous>  dextrose 5%. 1000 milliLiter(s) (100 mL/Hr) IV Continuous <Continuous>  dextrose 5%. 1000 milliLiter(s) (80 mL/Hr) IV Continuous <Continuous>  dextrose 50% Injectable 25 Gram(s) IV Push once  dextrose 50% Injectable 12.5 Gram(s) IV Push once  dextrose 50% Injectable 25 Gram(s) IV Push once  glucagon  Injectable 1 milliGRAM(s) IntraMuscular once  insulin lispro (ADMELOG) corrective regimen sliding scale   SubCutaneous every 6 hours  levETIRAcetam  Solution 500 milliGRAM(s) Enteral Tube every 24 hours  pantoprazole   Suspension 40 milliGRAM(s) Enteral Tube daily  predniSONE   Tablet 10 milliGRAM(s) Oral every 24 hours  senna 2 Tablet(s) Oral at bedtime    MEDICATIONS  (PRN):  acetaminophen    Suspension .. 600 milliGRAM(s) Enteral Tube every 6 hours PRN Moderate Pain (4 - 6)      Vital Signs Last 24 Hrs  T(C): 36.8 (04 Aug 2021 00:35), Max: 37.2 (03 Aug 2021 21:30)  T(F): 98.2 (04 Aug 2021 00:35), Max: 98.9 (03 Aug 2021 21:30)  HR: 68 (04 Aug 2021 00:33) (56 - 74)  BP: 134/64 (04 Aug 2021 00:33) (134/64 - 163/76)  BP(mean): 92 (04 Aug 2021 00:33) (92 - 109)  RR: 19 (04 Aug 2021 00:33) (16 - 19)  SpO2: 94% (04 Aug 2021 00:33) (94% - 100%)    Physical Exam:  General: NAD, resting comfortably in bed  Back: Skin intact, no ecchymosis; no TTP over spinous processes of vertebra  LLE:  Straight leg raise +/-  SILT L1-S2  Iliopsoas 5/5  Quadriceps 5/5  EHL/FHL/AT/GS 5/5  DP/PT palpable    RLE:  Straight leg raise +/-  SILT L1-S2  Iliopsoas 5/5  Quadriceps 5/5  EHL/FHL/AT/GS 5/5  DP/PT palpable                            8.5    46.32 )-----------( 376      ( 03 Aug 2021 12:04 )             27.3     08-03    148<H>  |  115<H>  |  66<H>  ----------------------------<  175<H>  3.6   |  24  |  1.91<H>    Ca    8.9      03 Aug 2021 12:04  Phos  3.4     08-03  Mg     2.0     08-03    TPro  6.5  /  Alb  2.6<L>  /  TBili  0.9  /  DBili  x   /  AST  23  /  ALT  27  /  AlkPhos  47  08-03        Imaging:  MRI Lumbar Spine -  Mild retrolisthesis of _____ on _____  with disc protrusion which contacts traversing right and left _____  roots without significant central canal stenosis. Foraminal disc bulging also produces right foraminal narrowing at the _____  -_____   level. Intervertebral discs intact throughout remainder of the lumbar spine with the central canal and neural foramina adequate at all other lumbar levels.      A/P: 89yMale presents with   -Activities as tolerated  -Pain control per ED  -Return if symptoms worsen  -F/u in office w/     -Discussed with Dr. Guzmán Pager [phone]

## 2021-08-04 NOTE — CONSULT NOTE ADULT - SUBJECTIVE AND OBJECTIVE BOX
Orthopaedic Surgery Consult Note    For Surgeon: Dr. Santos    HPI: 89y Male with PMHx of HTN, severe aortic sinensis and seizure on phenytoin who presented with left facial droop and slurred speech, Stroke w/u negative. Found to have myasthenia gravis with positive anti-ach antibodies. Neurology following and notes new-onset LLE weakness/numbness which began midnight 7/27.       Vital Signs Last 24 Hrs  T(C): 36.8 (04 Aug 2021 00:35), Max: 37.2 (03 Aug 2021 21:30)  T(F): 98.2 (04 Aug 2021 00:35), Max: 98.9 (03 Aug 2021 21:30)  HR: 68 (04 Aug 2021 00:33) (56 - 74)  BP: 134/64 (04 Aug 2021 00:33) (134/64 - 163/76)  BP(mean): 92 (04 Aug 2021 00:33) (92 - 109)  RR: 19 (04 Aug 2021 00:33) (16 - 19)  SpO2: 94% (04 Aug 2021 00:33) (94% - 100%)    Physical Exam:  General: NAD, resting comfortably in bed  Back: Skin intact, no ecchymosis; no TTP over spinous processes of vertebra  LLE:  Straight leg raise +/-  SILT L1-S2  Iliopsoas 5/5  Quadriceps 5/5  EHL/FHL/AT/GS 5/5  DP/PT palpable    RLE:  Straight leg raise +/-  SILT L1-S2  Iliopsoas 5/5  Quadriceps 5/5  EHL/FHL/AT/GS 5/5  DP/PT palpable                            8.5    46.32 )-----------( 376      ( 03 Aug 2021 12:04 )             27.3     08-03    148<H>  |  115<H>  |  66<H>  ----------------------------<  175<H>  3.6   |  24  |  1.91<H>    Ca    8.9      03 Aug 2021 12:04  Phos  3.4     08-03  Mg     2.0     08-03    TPro  6.5  /  Alb  2.6<L>  /  TBili  0.9  /  DBili  x   /  AST  23  /  ALT  27  /  AlkPhos  47  08-03      A/P: 89yMale presents with   -Activities as tolerated  -Pain control per ED  -Return if symptoms worsen  -F/u in office w/     -Discussed with Dr. Guzmán Pager [phone]   Orthopaedic Surgery Consult Note    For Surgeon: Dr. Santos    HPI: 89y Male with PMHx of HTN, severe aortic sinensis and seizure on phenytoin who presented with left facial droop and slurred speech, Stroke w/u negative. Found to have myasthenia gravis with positive anti-ach antibodies. Neurology following and notes new-onset LLE weakness/numbness which began midnight 7/27, although patient states symptoms are present at baseline. However, he is unaware of etiology. No new events or trauma during hospital stay which obviously explain onset of LLE symptoms. Per wife, pt is fully independent of all ADLs at baseline ambulates without assistance. Denies any back pain. Pt has no complaints at this time.    Vital Signs Last 24 Hrs  T(C): 36.8 (04 Aug 2021 00:35), Max: 37.2 (03 Aug 2021 21:30)  T(F): 98.2 (04 Aug 2021 00:35), Max: 98.9 (03 Aug 2021 21:30)  HR: 68 (04 Aug 2021 00:33) (56 - 74)  BP: 134/64 (04 Aug 2021 00:33) (134/64 - 163/76)  BP(mean): 92 (04 Aug 2021 00:33) (92 - 109)  RR: 19 (04 Aug 2021 00:33) (16 - 19)  SpO2: 94% (04 Aug 2021 00:33) (94% - 100%)    Physical Exam:  General: NAD, resting comfortably in bed  Back: Skin intact, no ecchymosis; no TTP over spinous processes of vertebra  LLE:  Unable straight leg raise +/-  SILT L1-S2  Iliopsoas 5/5  Quadriceps 5/5  EHL/FHL/AT/GS 5/5  DP/PT palpable    RLE:  Straight leg raise +/-  SILT L1-S2  Iliopsoas 5/5  Quadriceps 5/5  EHL/FHL/AT/GS 5/5  DP/PT palpable                            8.5    46.32 )-----------( 376      ( 03 Aug 2021 12:04 )             27.3     08-03    148<H>  |  115<H>  |  66<H>  ----------------------------<  175<H>  3.6   |  24  |  1.91<H>    Ca    8.9      03 Aug 2021 12:04  Phos  3.4     08-03  Mg     2.0     08-03    TPro  6.5  /  Alb  2.6<L>  /  TBili  0.9  /  DBili  x   /  AST  23  /  ALT  27  /  AlkPhos  47  08-03      A/P: 89yMale presents with   -Activities as tolerated  -Pain control per ED  -Return if symptoms worsen  -F/u in office w/     -Discussed with Dr. Guzmán Pager [phone]   Orthopaedic Surgery Consult Note    For Surgeon: Dr. Santos    HPI: 89y Male with PMHx of HTN, severe aortic sinensis and seizure on phenytoin who presented with left facial droop and slurred speech, Stroke w/u negative. Found to have myasthenia gravis with positive anti-ach antibodies. Neurology following and notes new-onset LLE weakness/numbness which began midnight 7/27, although patient states symptoms are present at baseline. However, he is unaware of etiology. In addition, primary team states he has decreased rectal tone on exam and that loose stool was noted on monie, although nursing states that this has been a daily occurence. No new events or trauma during hospital stay which obviously explain onset of LLE symptoms. Per wife, pt is fully independent of all ADLs at baseline ambulates without assistance. Denies any back pain. Pt has no complaints at this time.    Vital Signs Last 24 Hrs  T(C): 36.8 (04 Aug 2021 00:35), Max: 37.2 (03 Aug 2021 21:30)  T(F): 98.2 (04 Aug 2021 00:35), Max: 98.9 (03 Aug 2021 21:30)  HR: 68 (04 Aug 2021 00:33) (56 - 74)  BP: 134/64 (04 Aug 2021 00:33) (134/64 - 163/76)  BP(mean): 92 (04 Aug 2021 00:33) (92 - 109)  RR: 19 (04 Aug 2021 00:33) (16 - 19)  SpO2: 94% (04 Aug 2021 00:33) (94% - 100%)    Physical Exam:  General: NAD, resting comfortably in bed  Back: Skin intact, no ecchymosis; no TTP over spinous processes of vertebra  LLE:  Unable straight leg raise  SILT L1-S2  EHL/FHL/AT/GS 5/5  Toes wwp    RLE:  Able to straight leg raise  SILT L1-S2  5/5 EHL/FHL/AT/GS  Toes wwp                          8.5    46.32 )-----------( 376      ( 03 Aug 2021 12:04 )             27.3     08-03    148<H>  |  115<H>  |  66<H>  ----------------------------<  175<H>  3.6   |  24  |  1.91<H>    Ca    8.9      03 Aug 2021 12:04  Phos  3.4     08-03  Mg     2.0     08-03    TPro  6.5  /  Alb  2.6<L>  /  TBili  0.9  /  DBili  x   /  AST  23  /  ALT  27  /  AlkPhos  47  08-03      A/P: 89yMale presents with   -Activities as tolerated  -Pain control per ED  -Return if symptoms worsen  -F/u in office w/     -Discussed with Dr. Guzmán Pager [phone]   Orthopaedic Surgery Consult Note    For Surgeon: Dr. Santos    HPI: 89y Male with PMHx of HTN, severe aortic stenosis and seizure on phenytoin who presented with left facial droop and slurred speech, Stroke w/u negative. Found to have myasthenia gravis with positive anti-ach antibodies. Neurology following and notes new-onset LLE weakness/numbness which began midnight 7/27, although patient states symptoms are present at baseline. However, he is unaware of etiology. In addition, primary team states he has decreased rectal tone on exam and that loose stool was noted on monie, although per nursing this has been a daily occurrence No new events or trauma during hospital stay which may obviously explain onset of LLE symptoms. Per wife, pt is fully independent of all ADLs at baseline ambulates without assistance. Denies any back pain. Pt has no complaints at this time.    Vital Signs Last 24 Hrs  T(C): 36.8 (04 Aug 2021 00:35), Max: 37.2 (03 Aug 2021 21:30)  T(F): 98.2 (04 Aug 2021 00:35), Max: 98.9 (03 Aug 2021 21:30)  HR: 68 (04 Aug 2021 00:33) (56 - 74)  BP: 134/64 (04 Aug 2021 00:33) (134/64 - 163/76)  BP(mean): 92 (04 Aug 2021 00:33) (92 - 109)  RR: 19 (04 Aug 2021 00:33) (16 - 19)  SpO2: 94% (04 Aug 2021 00:33) (94% - 100%)    Physical Exam:  General: NAD, resting comfortably in bed  Back: Skin intact, no ecchymosis; no TTP over spinous processes of vertebra  Pt refused rectal exam at this time  LLE:  Unable straight leg raise  SILT L1-S2, L < R  4/5 AT, 5/5 EHL/FHL/GS  Toes wwp  RLE:  Able to straight leg raise  SILT L1-S2  5/5 EHL/FHL/AT/GS  Toes wwp                          8.5    46.32 )-----------( 376      ( 03 Aug 2021 12:04 )             27.3     08-03    148<H>  |  115<H>  |  66<H>  ----------------------------<  175<H>  3.6   |  24  |  1.91<H>    Ca    8.9      03 Aug 2021 12:04  Phos  3.4     08-03  Mg     2.0     08-03    TPro  6.5  /  Alb  2.6<L>  /  TBili  0.9  /  DBili  x   /  AST  23  /  ALT  27  /  AlkPhos  47  08-03      A/P: 89yMale PMH seizure on phenytoin who presented with left facial droop and slurred speech found to have myasthenia gravis. Ortho consulted for LLE weakness/numbness of unknown chronicity. Low suspicion for cauda equina syndrome at this time due to absence of back pain, radicular symptoms, or inciting event/trauma, as well as 5/5 motor/sensory distally in RLE as well 4-5/5 strength distally in LLE. Patient adamantly refusing rectal exam at this time, although patient has reportedly been having loose stools on bed daily and may have age-related diminished rectal tone. Voluntary rectal contracture and anal pinprick sensation not tested.  -No acute orthopedic intervention at this time  -MRI lumbar spine without contrast  -Obtain collateral from wife regarding LLE symptoms prior to admission  -Discussed with Dr. Santos    Ortho Pager [phone]

## 2021-08-04 NOTE — SWALLOW FEES ASSESSMENT ADULT - CONSISTENCIES ADMINISTERED
thin liquid/nectar thick/puree
5 ice-chips, 3 tsp of thin liq by spoon, 1 small cup sip of thin liq, 2 tsp of nectar thick liquids (NTL) by spoon, 1 small cup sip of NTL, 1/2 tsp of puree by spoon

## 2021-08-04 NOTE — PROGRESS NOTE ADULT - ASSESSMENT
89 year old male with newly diagnosed myasthenia gravis, along with leukocytosis 43 this AM of unclear etiology (possibly CMML or other hematologic malignancy). S/p x5 days IVIG and was started on prednisone 10 mg daily for MG.     Plan:  - Cont prednisone 10 mg daily for now  - Speech and swallow eval performed, ice chips okay for now and will advance as tolerated. NGT remains in place  - Considering Rituximab treatment for 6 months, pending ID approval due to history of hepatitis B but likely would not start inpatient  - Following leukocytosis work up, heme/onc is on board for possible CMML vs other malignancy, flow cytometry showed increased monocytes, BCR-ABL, JAMES-2 negative  - Hemoglobin dropped to around 5 on this admission and s/p MICU for hypotension and anemia. Hb stable and thus no CT has been performed for RP hematoma, would not . Will order CT if Hb drops again  - Ortho recommending MRI Lumbar spine w/o contrast to evaluate for left leg weakness, if patient able to tolerate  89 year old male with newly diagnosed myasthenia gravis, along with leukocytosis 43 this AM of unclear etiology (possibly CMML or other hematologic malignancy). S/p x5 days IVIG and was started on prednisone 10 mg daily for MG.     Plan:  - Cont prednisone 10 mg daily for now  - Speech and swallow eval performed, ice chips okay for now and will advance as tolerated. NGT remains in place  - Considering Rituximab treatment for 6 months, pending ID approval due to history of hepatitis B but likely would not start inpatient  - Following leukocytosis work up, heme/onc is on board for possible CMML vs other malignancy, flow cytometry showed increased monocytes, BCR-ABL, JAMES-2 negative  - Hemoglobin dropped to around 5 on this admission and s/p MICU for hypotension and anemia. Hb stable and thus no CT has been performed for RP hematoma, would not . Will order CT if Hb drops again  - Ortho recommending MRI Lumbar spine w/o contrast to evaluate left leg weakness, if patient able to tolerate, will follow up results

## 2021-08-04 NOTE — PROGRESS NOTE ADULT - ATTENDING COMMENTS
Myasthenia gravis, improving s/p IVIG and on prednisone.  Swallowing continues to improve and hopefully will be able to remove NGT soon.  Plan for ongoing immunotherapy is rituximab if reasonable pending heme onc and ID work ups.    Left leg weakness persists; etiology unclear -- asymmetric weakness is unusual in myasthenia, but doubt L spine is involved as he denies back pain.  RP hematoma remains a possibility.  Will follow up MRI L spine.

## 2021-08-05 DIAGNOSIS — E87.0 HYPEROSMOLALITY AND HYPERNATREMIA: ICD-10-CM

## 2021-08-05 LAB
ANION GAP SERPL CALC-SCNC: 9 MMOL/L — SIGNIFICANT CHANGE UP (ref 5–17)
BLD GP AB SCN SERPL QL: NEGATIVE — SIGNIFICANT CHANGE UP
BUN SERPL-MCNC: 66 MG/DL — HIGH (ref 7–23)
CALCIUM SERPL-MCNC: 8.5 MG/DL — SIGNIFICANT CHANGE UP (ref 8.4–10.5)
CHLORIDE SERPL-SCNC: 116 MMOL/L — HIGH (ref 96–108)
CO2 SERPL-SCNC: 25 MMOL/L — SIGNIFICANT CHANGE UP (ref 22–31)
CREAT SERPL-MCNC: 1.67 MG/DL — HIGH (ref 0.5–1.3)
GLUCOSE BLDC GLUCOMTR-MCNC: 118 MG/DL — HIGH (ref 70–99)
GLUCOSE BLDC GLUCOMTR-MCNC: 184 MG/DL — HIGH (ref 70–99)
GLUCOSE BLDC GLUCOMTR-MCNC: 189 MG/DL — HIGH (ref 70–99)
GLUCOSE BLDC GLUCOMTR-MCNC: 199 MG/DL — HIGH (ref 70–99)
GLUCOSE BLDC GLUCOMTR-MCNC: 235 MG/DL — HIGH (ref 70–99)
GLUCOSE SERPL-MCNC: 184 MG/DL — HIGH (ref 70–99)
HCT VFR BLD CALC: 27.2 % — LOW (ref 39–50)
HGB BLD-MCNC: 8.3 G/DL — LOW (ref 13–17)
MAGNESIUM SERPL-MCNC: 2.1 MG/DL — SIGNIFICANT CHANGE UP (ref 1.6–2.6)
MCHC RBC-ENTMCNC: 30.1 PG — SIGNIFICANT CHANGE UP (ref 27–34)
MCHC RBC-ENTMCNC: 30.5 GM/DL — LOW (ref 32–36)
MCV RBC AUTO: 98.6 FL — SIGNIFICANT CHANGE UP (ref 80–100)
NRBC # BLD: 0 /100 WBCS — SIGNIFICANT CHANGE UP (ref 0–0)
PHOSPHATE SERPL-MCNC: 3.6 MG/DL — SIGNIFICANT CHANGE UP (ref 2.5–4.5)
PLATELET # BLD AUTO: 411 K/UL — HIGH (ref 150–400)
POTASSIUM SERPL-MCNC: 4.1 MMOL/L — SIGNIFICANT CHANGE UP (ref 3.5–5.3)
POTASSIUM SERPL-SCNC: 4.1 MMOL/L — SIGNIFICANT CHANGE UP (ref 3.5–5.3)
RBC # BLD: 2.76 M/UL — LOW (ref 4.2–5.8)
RBC # FLD: 18.4 % — HIGH (ref 10.3–14.5)
RH IG SCN BLD-IMP: POSITIVE — SIGNIFICANT CHANGE UP
SODIUM SERPL-SCNC: 150 MMOL/L — HIGH (ref 135–145)
WBC # BLD: 43.7 K/UL — CRITICAL HIGH (ref 3.8–10.5)
WBC # FLD AUTO: 43.7 K/UL — CRITICAL HIGH (ref 3.8–10.5)

## 2021-08-05 PROCEDURE — 99223 1ST HOSP IP/OBS HIGH 75: CPT | Mod: GC

## 2021-08-05 PROCEDURE — 99233 SBSQ HOSP IP/OBS HIGH 50: CPT | Mod: GC

## 2021-08-05 PROCEDURE — 71045 X-RAY EXAM CHEST 1 VIEW: CPT | Mod: 26

## 2021-08-05 RX ORDER — ZINC SULFATE TAB 220 MG (50 MG ZINC EQUIVALENT) 220 (50 ZN) MG
220 TAB ORAL DAILY
Refills: 0 | Status: DISCONTINUED | OUTPATIENT
Start: 2021-08-05 | End: 2021-08-23

## 2021-08-05 RX ADMIN — SENNA PLUS 2 TABLET(S): 8.6 TABLET ORAL at 21:49

## 2021-08-05 RX ADMIN — Medication 1: at 23:59

## 2021-08-05 RX ADMIN — Medication 2: at 18:18

## 2021-08-05 RX ADMIN — Medication 1: at 11:48

## 2021-08-05 RX ADMIN — Medication 10 MILLIGRAM(S): at 11:48

## 2021-08-05 RX ADMIN — ATORVASTATIN CALCIUM 40 MILLIGRAM(S): 80 TABLET, FILM COATED ORAL at 21:49

## 2021-08-05 RX ADMIN — LEVETIRACETAM 500 MILLIGRAM(S): 250 TABLET, FILM COATED ORAL at 11:48

## 2021-08-05 RX ADMIN — PANTOPRAZOLE SODIUM 40 MILLIGRAM(S): 20 TABLET, DELAYED RELEASE ORAL at 11:48

## 2021-08-05 RX ADMIN — Medication 1: at 06:05

## 2021-08-05 NOTE — PROGRESS NOTE ADULT - PROBLEM SELECTOR PLAN 4
CXR 8/1: Frontal examination of fhe chest demonstrates improvement left effusion in comparison to prior examination of the chest 7/31/2021. Cardiomegaly. No interval change position remaining support devices.  Pt NPO, tube feeds.   - S & S performed FEES; not ready for oral diet but improved since admission; maintain NGT feeds

## 2021-08-05 NOTE — PROGRESS NOTE ADULT - ASSESSMENT
89y Male with PMHx of HTN, severe aortic sinensis and seizure on phenytoin who presented with left facial droop and slurred speech, Stroke w/u negative. Found to have myasthenia gravis with positive anti-ach antibodies. Also found to have leukocytosis suspicious for malignancy. Admitted to [facility] for treatment of MG and w/u of potential CML/CLL. S/p IVIG treatment. Course complicated by dysphagia and aspiration pneumonia. Admitted to MICU after developing septic shock likely 2/2 to aspiration PNA requiring pressors. Pt no longer requiring pressors stepped down to 7 Lachman continued monitoring and further workup of leukocytosis. 89y Male with PMHx of HTN, severe aortic stenosis and seizures (on phenytoin) who presented with left facial droop and slurred speech, Stroke w/u negative. Found to have myasthenia gravis with positive anti-Ach antibodies. Also found to have leukocytosis suspicious for malignancy. Admitted to [facility] for treatment of MG and w/u of potential CML/CLL. S/p IVIG treatment. Course complicated by dysphagia and aspiration pneumonia. Admitted to MICU after developing septic shock likely 2/2 to aspiration PNA requiring pressors. Pt no longer requiring pressors stepped down to 7 Lachman continued monitoring and further workup of leukocytosis. Now stepped down to [facility] for treatment of hypernatremia and new found left  lower extremity weakness/numbness.

## 2021-08-05 NOTE — PROGRESS NOTE ADULT - PROBLEM SELECTOR PLAN 3
Likely ATN 2/2 to septic shock, poor perfusion. Pt has a history of CKD. baseline 1.6-1.7.   - Creatinine improving: 3.93 > 3.6  > 2.75 > 2.36 > 1.91 >1.7  - Not considering HD at this time   - Cont to trend creatinine    #Hypernatremia  Serum sodium at 146 (8/4 AM); Pt appears to be overloaded on exam.   - Stop D5W at 80cc/hr for management of hypernatremia over 48hrs (started 8/2)  - Monitor and consider diureses with Metolazone if pt has persistent overload to avoid sodium derangement  - Cont Jevity feeds Likely ATN 2/2 to septic shock, poor perfusion. Pt has a history of CKD. baseline 1.6-1.7.   - Creatinine improving: at baseline as of 8/5: 1.67  - Not considering HD at this time   - Cont to trend creatinine    #Hypernatremia  Serum sodium at 150 (8/5 AM)  - Start free water via tube 250cc q6hrs  - Monitor and consider diureses with Metolazone if pt has persistent overload to avoid sodium derangement  - Cont Jevity 1.5 feeds

## 2021-08-05 NOTE — PROGRESS NOTE ADULT - ASSESSMENT
89y Male with PMHx of HTN, severe aortic sinensis and seizure on phenytoin who presented with left facial droop and slurred speech, Stroke w/u negative. Found to have myasthenia gravis with positive anti-ach antibodies. Also found to have leukocytosis suspicious for malignancy. Admitted to [facility] for treatment of MG and w/u of potential CML/CLL. S/p IVIG treatment. Course complicated by dysphagia and aspiration pneumonia. Admitted to MICU after developing septic shock likely 2/2 to aspiration PNA requiring pressors. Pt no longer requiring pressors stepped down to 7 Lachman continued monitoring and further workup of leukocytosis.

## 2021-08-05 NOTE — PROGRESS NOTE ADULT - PROBLEM SELECTOR PLAN 7
- Cont keppra 500 q12 Normocytic anemia, with iron panel consistent with anemia of chronic disease/ renal disease. B12/folate wnl. No evidence of hemolysis. Given CKD and Anemia, a monoclonal gammopathy workup performed and was negative  -patient s/p 2 units on 7/29, followed by stabilization  -cause of bleed secondary to LP hematoma vs CMML  PLAN:  - continue with SCDs   -monitor for signs of active bleeding  -follow up with MRI lumbar to r/o RP hematoma  - Transfuse if <7  - Keep active T&S

## 2021-08-05 NOTE — PROGRESS NOTE ADULT - PROBLEM SELECTOR PLAN 10
IVF: assess as needed  Diet: NPO w tube feeds   DVT: N/A  GI: Protonix   CODE STATUS: DNR  Monitor, Replete to K>4 and Mg>2

## 2021-08-05 NOTE — PROGRESS NOTE ADULT - PROBLEM SELECTOR PLAN 6
Currently NPO, NGT placed, tube feeds.  - Allow wet swabs to moisten mouth  - Protonix prophylaxis decrease from BID to 1 daily Given underlying mod- severe AS, would be cautious with fluids. Pt is not a surgical candidate for severe AS given poor prognosis as well as family wishes to not pursue aggressive measures.  - See above management for diuresing.

## 2021-08-05 NOTE — PROGRESS NOTE ADULT - PROBLEM SELECTOR PLAN 9
Normocytic anemia, with iron panel consistent with anemia of chronic disease/ renal disease. B12/folate wnl. No evidence of hemolysis. Given CKD and Anemia, a monoclonal gammopathy workup performed and was negative  - Transfuse if <7  - Keep active T&S IVF: assess as needed  Diet: NPO w tube feeds   DVT: N/A  GI: Protonix   CODE STATUS: DNR  Monitor, Replete to K>4 and Mg>2

## 2021-08-05 NOTE — PROGRESS NOTE ADULT - PROBLEM SELECTOR PLAN 7
Given underlying mod- severe AS, would be cautious with fluids. Pt is not a surgical candidate for severe AS given poor prognosis as well as family wishes to not pursue aggressive measures.  - See above management for diuresing. - Cont keppra 500 q12

## 2021-08-05 NOTE — PROGRESS NOTE ADULT - PROBLEM SELECTOR PLAN 5
Likely 2/2 to aspiration PNA. Pt no longer meets sepsis criteria, no longer requiring pressors.  - S/P zosyn 5 day course  RESOLVED Currently NPO, NGT placed, tube feeds.  - Allow wet swabs to moisten mouth  - Protonix 40 mg daily

## 2021-08-05 NOTE — PROGRESS NOTE ADULT - SUBJECTIVE AND OBJECTIVE BOX
***Note in progress***    OVERNIGHT EVENTS: NAEO    SUBJECTIVE / INTERVAL HPI: Patient seen and examined at bedside. Patient denying chest pain, SOB, palpitations, cough. Patient denies fever, chills, HA, Dizziness, change in vision/hearing, N/V, abdominal pain, diarrhea, constipation, hematochezia/melena, dysuria, hematuria, new onset weakness/numbness, LE pain and/or swelling.    Remaining ROS negative       PHYSICAL EXAM:    General: WDWN  HEENT: NC/AT; PERRL, anicteric sclera; MMM  Neck: supple  Cardiovascular: +S1/S2, RRR  Respiratory: CTA B/L; no W/R/R  Gastrointestinal: soft, NT/ND; +BSx4  Extremities: WWP; no edema, clubbing or cyanosis  Vascular: 2+ radial, DP/PT pulses B/L  Neurological: AAOx3; no focal deficits  Psychiatric: pleasant mood and affect  Dermatologic: no appreciable wounds or damage to the skin    VITAL SIGNS:  Vital Signs Last 24 Hrs  T(C): 37.1 (05 Aug 2021 05:08), Max: 37.1 (04 Aug 2021 13:43)  T(F): 98.7 (05 Aug 2021 05:08), Max: 98.8 (04 Aug 2021 13:43)  HR: 72 (05 Aug 2021 04:30) (66 - 98)  BP: 132/62 (05 Aug 2021 04:30) (121/61 - 142/65)  BP(mean): 89 (05 Aug 2021 04:30) (84 - 96)  RR: 18 (05 Aug 2021 04:30) (18 - 18)  SpO2: 97% (05 Aug 2021 04:30) (94% - 97%)      MEDICATIONS:  MEDICATIONS  (STANDING):  atorvastatin 40 milliGRAM(s) Oral at bedtime  dextrose 40% Gel 15 Gram(s) Oral once  dextrose 5%. 1000 milliLiter(s) (50 mL/Hr) IV Continuous <Continuous>  dextrose 5%. 1000 milliLiter(s) (100 mL/Hr) IV Continuous <Continuous>  dextrose 50% Injectable 25 Gram(s) IV Push once  dextrose 50% Injectable 12.5 Gram(s) IV Push once  dextrose 50% Injectable 25 Gram(s) IV Push once  glucagon  Injectable 1 milliGRAM(s) IntraMuscular once  insulin lispro (ADMELOG) corrective regimen sliding scale   SubCutaneous every 6 hours  levETIRAcetam  Solution 500 milliGRAM(s) Enteral Tube every 24 hours  pantoprazole   Suspension 40 milliGRAM(s) Enteral Tube daily  predniSONE   Tablet 10 milliGRAM(s) Oral every 24 hours  senna 2 Tablet(s) Oral at bedtime    MEDICATIONS  (PRN):  acetaminophen    Suspension .. 600 milliGRAM(s) Enteral Tube every 6 hours PRN Moderate Pain (4 - 6)      ALLERGIES:  Allergies    hay fever (Unknown)  No Known Drug Allergies    Intolerances        LABS:                        8.3    43.70 )-----------( 411      ( 05 Aug 2021 06:26 )             27.2     08-05    x   |  x   |  x   ----------------------------<  x   4.1   |  25  |  x     Ca    8.5      05 Aug 2021 06:26  Phos  3.6     08-05  Mg     2.1     08-05    TPro  6.5  /  Alb  2.6<L>  /  TBili  0.9  /  DBili  x   /  AST  23  /  ALT  27  /  AlkPhos  47  08-03        CAPILLARY BLOOD GLUCOSE      POCT Blood Glucose.: 189 mg/dL (05 Aug 2021 05:57)      RADIOLOGY & ADDITIONAL TESTS: Reviewed. TRANSFER TO    Hospital Course:  89 y/o Male with PMHx of HTN, severe aortic stenosis and seizure on phenytoin who presented with left facial droop and slurred speech, Stroke w/u negative. Found to have myasthenia gravis with positive anti-ach antibodies. Also found to have leukocytosis suspicious for malignancy. Admitted to [facility] for treatment of MG and w/u of potential CML/CLL. S/p IVIG treatment. Course complicated initially by dysphagia followed hypotension concerning for sepsis in the setting of aspiration pneumonia. Patient was given fluids with no improvement, ICU was consulted and started on broad spectrum Zosyn/Vanc (MRSA neg d/mitra) when transferred to ICU. Also noted to have elevated creatinine concerning for EVARISTO (sepsis vs hypoperfusion, with improvement in renal function and urine output. Briefly requiring pressors on transfer including phenylephrine and levophed, successfully weaned off of both with improvement in blood pressures with systolics in 130s-140s.  Patient also with dysphagia and poor PO intake, now S/p NGT placement and tube feeds. Per neurology now continuing with prednisone 10mg daily given EMG study and MG antibodies. Will continue with additional pulm toilet for secretion management. WBC downtrending from peak, c/f CML and will continue trending. Hemoglobin dropped to 5.0 possibly in setting of CML vs frequent blood draws, now s/p 2 units of PRBCs with adequate correction to 7.7 without further drops in hemoglobin. Pt remains hemodynamically stable. BCR/ABL PCR and JAMES-2 negative, suspicious for CMML but family and patient declined bone marrow biopsy. Pt has had persistent LLE weakness, pending MRI pelvis and lumbar, neuro following. Step down to [facility] for continued treatment for myasthenia gravis, neuro workup and dispo.    OVERNIGHT EVENTS: Pt ha dNGT replaced    SUBJECTIVE / INTERVAL HPI: Patient seen and examined at bedside. Patient denying chest pain, SOB, palpitations, cough. Patient denies fever, chills, HA, Dizziness, change in vision/hearing, N/V, abdominal pain, diarrhea, constipation, hematochezia/melena, dysuria, hematuria, new onset weakness/numbness, LE pain and/or swelling.    Remaining ROS negative       PHYSICAL EXAM:    General: WDWN  HEENT: NC/AT; PERRL, anicteric sclera; MMM  Neck: supple  Cardiovascular: +S1/S2, RRR  Respiratory: CTA B/L; no W/R/R  Gastrointestinal: soft, NT/ND; +BSx4  Extremities: WWP; no edema, clubbing or cyanosis  Vascular: 2+ radial, DP/PT pulses B/L  Neurological: AAOx3; no focal deficits  Psychiatric: pleasant mood and affect  Dermatologic: no appreciable wounds or damage to the skin    VITAL SIGNS:  Vital Signs Last 24 Hrs  T(C): 37.1 (05 Aug 2021 05:08), Max: 37.1 (04 Aug 2021 13:43)  T(F): 98.7 (05 Aug 2021 05:08), Max: 98.8 (04 Aug 2021 13:43)  HR: 72 (05 Aug 2021 04:30) (66 - 98)  BP: 132/62 (05 Aug 2021 04:30) (121/61 - 142/65)  BP(mean): 89 (05 Aug 2021 04:30) (84 - 96)  RR: 18 (05 Aug 2021 04:30) (18 - 18)  SpO2: 97% (05 Aug 2021 04:30) (94% - 97%)      MEDICATIONS:  MEDICATIONS  (STANDING):  atorvastatin 40 milliGRAM(s) Oral at bedtime  dextrose 40% Gel 15 Gram(s) Oral once  dextrose 5%. 1000 milliLiter(s) (50 mL/Hr) IV Continuous <Continuous>  dextrose 5%. 1000 milliLiter(s) (100 mL/Hr) IV Continuous <Continuous>  dextrose 50% Injectable 25 Gram(s) IV Push once  dextrose 50% Injectable 12.5 Gram(s) IV Push once  dextrose 50% Injectable 25 Gram(s) IV Push once  glucagon  Injectable 1 milliGRAM(s) IntraMuscular once  insulin lispro (ADMELOG) corrective regimen sliding scale   SubCutaneous every 6 hours  levETIRAcetam  Solution 500 milliGRAM(s) Enteral Tube every 24 hours  pantoprazole   Suspension 40 milliGRAM(s) Enteral Tube daily  predniSONE   Tablet 10 milliGRAM(s) Oral every 24 hours  senna 2 Tablet(s) Oral at bedtime    MEDICATIONS  (PRN):  acetaminophen    Suspension .. 600 milliGRAM(s) Enteral Tube every 6 hours PRN Moderate Pain (4 - 6)      ALLERGIES:  Allergies    hay fever (Unknown)  No Known Drug Allergies    Intolerances        LABS:                        8.3    43.70 )-----------( 411      ( 05 Aug 2021 06:26 )             27.2     08-05    x   |  x   |  x   ----------------------------<  x   4.1   |  25  |  x     Ca    8.5      05 Aug 2021 06:26  Phos  3.6     08-05  Mg     2.1     08-05    TPro  6.5  /  Alb  2.6<L>  /  TBili  0.9  /  DBili  x   /  AST  23  /  ALT  27  /  AlkPhos  47  08-03        CAPILLARY BLOOD GLUCOSE      POCT Blood Glucose.: 189 mg/dL (05 Aug 2021 05:57)      RADIOLOGY & ADDITIONAL TESTS: Reviewed. TRANSFER TO    Hospital Course:  89 y/o Male with PMHx of HTN, severe aortic stenosis and seizure on phenytoin who presented with left facial droop and slurred speech, Stroke w/u negative. Found to have myasthenia gravis with positive anti-ach antibodies. Also found to have leukocytosis suspicious for malignancy. Admitted to [facility] for treatment of MG and w/u of potential CML/CLL. S/p IVIG treatment. Course complicated initially by dysphagia followed hypotension concerning for sepsis in the setting of aspiration pneumonia. Patient was given fluids with no improvement, ICU was consulted and started on broad spectrum Zosyn/Vanc (MRSA neg d/mitra) when transferred to ICU. Also noted to have elevated creatinine concerning for EVARISTO (sepsis vs hypoperfusion, with improvement in renal function and urine output. Briefly requiring pressors on transfer including phenylephrine and levophed, successfully weaned off of both with improvement in blood pressures with systolics in 130s-140s.  Patient also with dysphagia and poor PO intake, now S/p NGT placement and tube feeds. Per neurology now continuing with prednisone 10mg daily given EMG study and MG antibodies. Will continue with additional pulm toilet for secretion management. WBC downtrending from peak, c/f CML and will continue trending. Hemoglobin dropped to 5.0 possibly in setting of CML vs frequent blood draws, now s/p 2 units of PRBCs with adequate correction to 7.7 without further drops in hemoglobin. Pt hypernatremic to 155 on 8/3 likely 2/2 to ATN duresis in the setting of pre-renal EVARISTO due to septic shock, started on DSW 80cc/hr over 48hrs but was found to be overloaded on exam 8/4 AM so fluids were stopped and pt monitored. In order to maintain sodium levels, diureses was avoided and water flushes via NGT ordered 250 cc q6hr. Pt remains hemodynamically stable. BCR/ABL PCR and JAMES-2 negative, suspicious for CMML but family and patient declined bone marrow biopsy. Pt has had persistent LLE weakness, pending MRI pelvis and lumbar, neuro following. Step down to [facility] for continued treatment for myasthenia gravis, neuro workup and dispo.    OVERNIGHT EVENTS: Pt had NGT replaced overnight    SUBJECTIVE / INTERVAL HPI: Patient seen and examined at bedside. No complaints at current. Patient denies chest pain, SOB, palpitations, cough, fever, chills, HA, Dizziness, change in vision/hearing, N/V, abdominal pain, diarrhea, constipation, hematochezia/melena, dysuria, hematuria, new onset weakness/numbness, LE pain and/or swelling.    Remaining ROS negative       PHYSICAL EXAM:    General: Patient appears cachectic, NGT in place, feeds running  HEENT: NGT in place, anicteric sclera, MMM  Neck: supple, JVD improved from previous exam  Cardiovascular: harsh crescendo-decrescendo systolic murmur heard, prominent in R sternal region  Respiratory: lungs clear B/L; no W/R/R  Gastrointestinal: soft, NT/ND; no gaurding or rebound, no hepatosplenomegaly, +BSx4  Extremities: WWP; no edema, clubbing or cyanosis  Neurological: AAOx3; sensory in tact, 5/5 strength B/L UE, 4/5 strength RLE, 2/5 strength LLE  Psychiatric: pleasant mood and affect  Dermatologic: no appreciable wounds or damage to the skin    VITAL SIGNS:  Vital Signs Last 24 Hrs  T(C): 37.1 (05 Aug 2021 05:08), Max: 37.1 (04 Aug 2021 13:43)  T(F): 98.7 (05 Aug 2021 05:08), Max: 98.8 (04 Aug 2021 13:43)  HR: 72 (05 Aug 2021 04:30) (66 - 98)  BP: 132/62 (05 Aug 2021 04:30) (121/61 - 142/65)  BP(mean): 89 (05 Aug 2021 04:30) (84 - 96)  RR: 18 (05 Aug 2021 04:30) (18 - 18)  SpO2: 97% (05 Aug 2021 04:30) (94% - 97%)      MEDICATIONS:  MEDICATIONS  (STANDING):  atorvastatin 40 milliGRAM(s) Oral at bedtime  dextrose 40% Gel 15 Gram(s) Oral once  dextrose 5%. 1000 milliLiter(s) (50 mL/Hr) IV Continuous <Continuous>  dextrose 5%. 1000 milliLiter(s) (100 mL/Hr) IV Continuous <Continuous>  dextrose 50% Injectable 25 Gram(s) IV Push once  dextrose 50% Injectable 12.5 Gram(s) IV Push once  dextrose 50% Injectable 25 Gram(s) IV Push once  glucagon  Injectable 1 milliGRAM(s) IntraMuscular once  insulin lispro (ADMELOG) corrective regimen sliding scale   SubCutaneous every 6 hours  levETIRAcetam  Solution 500 milliGRAM(s) Enteral Tube every 24 hours  pantoprazole   Suspension 40 milliGRAM(s) Enteral Tube daily  predniSONE   Tablet 10 milliGRAM(s) Oral every 24 hours  senna 2 Tablet(s) Oral at bedtime    MEDICATIONS  (PRN):  acetaminophen    Suspension .. 600 milliGRAM(s) Enteral Tube every 6 hours PRN Moderate Pain (4 - 6)      ALLERGIES:  Allergies    hay fever (Unknown)  No Known Drug Allergies    Intolerances        LABS:                        8.3    43.70 )-----------( 411      ( 05 Aug 2021 06:26 )             27.2     08-05    x   |  x   |  x   ----------------------------<  x   4.1   |  25  |  x     Ca    8.5      05 Aug 2021 06:26  Phos  3.6     08-05  Mg     2.1     08-05    TPro  6.5  /  Alb  2.6<L>  /  TBili  0.9  /  DBili  x   /  AST  23  /  ALT  27  /  AlkPhos  47  08-03        CAPILLARY BLOOD GLUCOSE      POCT Blood Glucose.: 189 mg/dL (05 Aug 2021 05:57)      RADIOLOGY & ADDITIONAL TESTS: Reviewed.

## 2021-08-05 NOTE — PROGRESS NOTE ADULT - PROBLEM SELECTOR PLAN 2
R/O CML - DD include reactive vs clonal causes infection/ stressed marrow from recent illness/CML/CMML  JAK2 negative for the V617F activating mutation. BCR/ABL negative. WBC count trending down to baseline. Suspicious for CMML diagnosis.  - No symptoms or signs suggestive of infectious process at this time  - Can set up outpatient hematology follow up with Dr. Nguyen on a Thursday (after discharge)  - Appreciate hem/onc recs, following -positive anti-ach antibiotics  - S/p IVIG x5 days  - Will consider starting Rituximab q6 months for long-term management of myasthenia, pending ID assessment of prior Hep B infection and improvement of current hypotension/pulmonary edema, per neuro recs  PLAN:  - Cont Prednisone 10 mg daily, started 7/31  - Neurology following, appreciate reccs  - F/U PT recs    #Weakness and numbness LLE  -unclear etiology, as myasthenia gravis less likely to present with unilateral symptoms  -patient denies back pain  -neuro consulted  -F/U lumbar MRI

## 2021-08-05 NOTE — PROGRESS NOTE ADULT - PROBLEM SELECTOR PLAN 1
- S/p IVIG x5 days  - Cont Prednisone 10 mg daily, started 7/31  - Will consider starting Rituximab q6 months for long-term management of myasthenia, pending ID assessment of prior Hep B infection and improvement of current hypotension/pulmonary edema, per neuro recs  - Neurology following  - F/U PT recs    #Weakness and numbness LLE  - F/U lumbar and pelvic MRI -Na of 150 today. Patient asymptomatic, A&Ox3  -likely secondary to dehydration as patient with NG tube  -started on 250ml free water q6h  -will monitor AM BMP

## 2021-08-05 NOTE — PROGRESS NOTE ADULT - PROBLEM SELECTOR PLAN 4
CXR 8/1: Frontal examination of fhe chest demonstrates improvement left effusion in comparison to prior examination of the chest 7/31/2021. Cardiomegaly. No interval change position remaining support devices.  Pt NPO, tube feeds.   - S & S performed FEES; not ready for oral diet but improved since admission; maintain NGT feeds Likely ATN 2/2 to septic shock, poor perfusion. Pt has a history of CKD. baseline 1.6-1.7.   - Creatinine improving  - Cont to trend creatinine

## 2021-08-05 NOTE — CHART NOTE - NSCHARTNOTEFT_GEN_A_CORE
Admitting Diagnosis:   Patient is a 89y old  Male who presents with a chief complaint of slurred speech, L facial droop (05 Aug 2021 07:12)      PAST MEDICAL & SURGICAL HISTORY:  Hypertension    Seizure        Current Nutrition Order:  Jevity 1.5 Irineo @ 55ml/hr x 24hrs via NGT (1320ml TV, 1980kcal, 84g protein, 1003ml free H2O, 132% RDI, 1.4g/kg ABW protein)    PO Intake: Good (%) [   ]  Fair (50-75%) [   ] Poor (<25%) [   ]- NA NPO    GI Issues: No N/V/C/D reported at this time.   BM 8/5    Pain: Pt denied complaints of pain at time of visit     Skin Integrity: Sheng 14, no edema  Sacrum stage II and coccyx DTI      Labs:   08-05    150<H>  |  116<H>  |  66<H>  ----------------------------<  184<H>  4.1   |  25  |  1.67<H>    Ca    8.5      05 Aug 2021 06:26  Phos  3.6     08-05  Mg     2.1     08-05    TPro  6.5  /  Alb  2.6<L>  /  TBili  0.9  /  DBili  x   /  AST  23  /  ALT  27  /  AlkPhos  47  08-03    CAPILLARY BLOOD GLUCOSE      POCT Blood Glucose.: 199 mg/dL (05 Aug 2021 11:35)  POCT Blood Glucose.: 189 mg/dL (05 Aug 2021 05:57)  POCT Blood Glucose.: 118 mg/dL (05 Aug 2021 01:43)  POCT Blood Glucose.: 228 mg/dL (04 Aug 2021 18:52)  POCT Blood Glucose.: 205 mg/dL (04 Aug 2021 11:59)      Medications:  MEDICATIONS  (STANDING):  atorvastatin 40 milliGRAM(s) Oral at bedtime  dextrose 40% Gel 15 Gram(s) Oral once  dextrose 5%. 1000 milliLiter(s) (50 mL/Hr) IV Continuous <Continuous>  dextrose 5%. 1000 milliLiter(s) (100 mL/Hr) IV Continuous <Continuous>  dextrose 50% Injectable 25 Gram(s) IV Push once  dextrose 50% Injectable 12.5 Gram(s) IV Push once  dextrose 50% Injectable 25 Gram(s) IV Push once  glucagon  Injectable 1 milliGRAM(s) IntraMuscular once  insulin lispro (ADMELOG) corrective regimen sliding scale   SubCutaneous every 6 hours  levETIRAcetam  Solution 500 milliGRAM(s) Enteral Tube every 24 hours  pantoprazole   Suspension 40 milliGRAM(s) Enteral Tube daily  predniSONE   Tablet 10 milliGRAM(s) Oral every 24 hours  senna 2 Tablet(s) Oral at bedtime    MEDICATIONS  (PRN):  acetaminophen    Suspension .. 600 milliGRAM(s) Enteral Tube every 6 hours PRN Moderate Pain (4 - 6)      Weight: 60.3kg   Weight Change: No new wts recorded since admit     Nutrition Focused Physical Exam: Completed [   ]  Not Pertinent [  X ]- appears to have age-related muscle loss    Estimated energy needs: Height 66"; ABW 60.3kg; IBW 64.4kg; 94%IBW; BMI 21.4  ActualBW used for calculations as pt between % of IBW. Needs estimated for age and slightly increased for inflammatory process. Moderate protein 2/2 EVARISTO  Calories: 25-30 kcal/kg = 6128-0309 kcal/day  Protein: 1.2-1.4 g/kg = 72-84g pro/day  Fluids per team 2/2 EVARISTO/fluid overload      Subjective: 89y Male with PMHx of HTN and seizure on phenytoin presents with left facial droop and slurred speech, Stroke w/u negative. Found to have myasthenia gravis. S/p IVIG x5 days. Admitted to ICU for cardiogenic shock vs obstructive shock in the setting of severe aortic stenosis and IV fluid administration. At that time pt was hypotensive w/leukocytosis. Also found to have aspiration pneumonia. S/p FEES on 7/20, and multiple bedside f/u over past week. Pt deemed inappropriate for PO intake and NGT placed for EN. Pt stepped down to 7 Lach. Weaned off of pressors. S/p repeat FEES on 8/4 but still recommended to remain NPO w/EN via NGT. Ortho/neurology following for LE weakness and recommended for MRI L-spine. Family choosing not to pursue bone marrow biopsy to w/u possible CML. Pt seen in room, awake, alert, pleasant. EN running at goal w/no s/s intolerance. Pt denied N/V/C/D or pain. Sucking on ice chips per SLP recs. Afebrile. Na 150 (H), POC , 118, 228mg/dL, BUN/Cr trending down. Will continue to follow per RD protocol.     Previous Nutrition Diagnosis: Inadequate Oral Intake RT dysphagia AEB need for EN to meet estimated needs.     Active [X  ]  Resolved [   ]    If resolved, new PES:     Goal: Pt will continue to meet % of daily EER via tolerated route     Recommendations:  1. Continue with current EN order. Monitor for s/s intolerance; maintain aspiration precautions at all times. Additional free H2O flushes per team   2. F/u with SLP recs for diet advancement   3. Monitor lytes and replete prn. POC BG q6hrs (fingersticks noted to be elevated on prednisone)  4. Pain and bowel regimens per team   5. Recommend addition of MVI and Zinc for wound healing   6. Keep nutrition aligned with GOC at all times  *d/w MD    Education: discussed purpose of NPO and pending repeat SLP eval     Risk Level: High [ X  ] Moderate [   ] Low [   ].

## 2021-08-05 NOTE — PROGRESS NOTE ADULT - PROBLEM SELECTOR PLAN 2
R/O CML - DD include reactive vs clonal causes infection/ stressed marrow from recent illness/CML/CMML  JAK2 negative for the V617F activating mutation. BCR/ABL negative. WBC count trending down to baseline. Suspicious for CMML diagnosis.  - No symptoms or signs suggestive of infectious process at this time  - Can set up outpatient hematology follow up with Dr. Nguyen on a Thursday (after discharge)  - Appreciate hem/onc recs, following

## 2021-08-05 NOTE — PROGRESS NOTE ADULT - SUBJECTIVE AND OBJECTIVE BOX
**INCOMPLETE NOTE    OVERNIGHT EVENTS:    SUBJECTIVE:  Patient seen and examined at bedside.    Vital Signs Last 12 Hrs  T(F): 97.3 (08-05-21 @ 10:04), Max: 98.7 (08-05-21 @ 05:08)  HR: 66 (08-05-21 @ 12:00) (66 - 72)  BP: 159/72 (08-05-21 @ 12:00) (132/62 - 159/72)  BP(mean): 103 (08-05-21 @ 12:00) (89 - 103)  RR: 18 (08-05-21 @ 12:00) (18 - 18)  SpO2: 96% (08-05-21 @ 12:00) (96% - 97%)  I&O's Summary    04 Aug 2021 07:01  -  05 Aug 2021 07:00  --------------------------------------------------------  IN: 720 mL / OUT: 650 mL / NET: 70 mL    05 Aug 2021 07:01  -  05 Aug 2021 13:42  --------------------------------------------------------  IN: 0 mL / OUT: 300 mL / NET: -300 mL        PHYSICAL EXAM:  Constitutional: NAD, comfortable in bed.  HEENT: NC/AT, PERRLA, EOMI, no conjunctival pallor or scleral icterus, MMM  Neck: Supple, no JVD  Respiratory: CTA B/L. No w/r/r.   Cardiovascular: RRR, normal S1 and S2, no m/r/g.   Gastrointestinal: +BS, soft NTND, no guarding or rebound tenderness, no palpable masses   Extremities: wwp; no cyanosis, clubbing or edema.   Vascular: Pulses equal and strong throughout.   Neurological: AAOx3, no CN deficits, strength and sensation intact throughout.   Skin: No gross skin abnormalities or rashes        LABS:                        8.3    43.70 )-----------( 411      ( 05 Aug 2021 06:26 )             27.2     08-05    150<H>  |  116<H>  |  66<H>  ----------------------------<  184<H>  4.1   |  25  |  1.67<H>    Ca    8.5      05 Aug 2021 06:26  Phos  3.6     08-05  Mg     2.1     08-05              RADIOLOGY & ADDITIONAL TESTS:    MEDICATIONS  (STANDING):  atorvastatin 40 milliGRAM(s) Oral at bedtime  dextrose 40% Gel 15 Gram(s) Oral once  dextrose 5%. 1000 milliLiter(s) (50 mL/Hr) IV Continuous <Continuous>  dextrose 5%. 1000 milliLiter(s) (100 mL/Hr) IV Continuous <Continuous>  dextrose 50% Injectable 25 Gram(s) IV Push once  dextrose 50% Injectable 12.5 Gram(s) IV Push once  dextrose 50% Injectable 25 Gram(s) IV Push once  glucagon  Injectable 1 milliGRAM(s) IntraMuscular once  insulin lispro (ADMELOG) corrective regimen sliding scale   SubCutaneous every 6 hours  levETIRAcetam  Solution 500 milliGRAM(s) Enteral Tube every 24 hours  multivitamin 1 Tablet(s) Oral daily  pantoprazole   Suspension 40 milliGRAM(s) Enteral Tube daily  predniSONE   Tablet 10 milliGRAM(s) Oral every 24 hours  senna 2 Tablet(s) Oral at bedtime  zinc sulfate 220 milliGRAM(s) Oral daily    MEDICATIONS  (PRN):  acetaminophen    Suspension .. 600 milliGRAM(s) Enteral Tube every 6 hours PRN Moderate Pain (4 - 6)   TRANSFER FROM [facility] TO [facility]  Hospital Course:  89 y/o Male with PMHx of HTN, severe aortic stenosis and seizure on phenytoin who presented with left facial droop and slurred speech, Stroke w/u negative. Found to have myasthenia gravis with positive anti-ach antibodies. Also found to have leukocytosis suspicious for malignancy. Admitted to [facility] for treatment of MG and w/u of potential CML/CLL. S/p IVIG treatment. Course complicated initially by dysphagia followed hypotension concerning for sepsis in the setting of aspiration pneumonia. Patient was given fluids with no improvement, ICU was consulted and started on broad spectrum Zosyn/Vanc (MRSA neg d/mitra) when transferred to ICU. Also noted to have elevated creatinine concerning for EVARISTO (sepsis vs hypoperfusion, with improvement in renal function and urine output. Briefly requiring pressors on transfer including phenylephrine and levophed, successfully weaned off of both with improvement in blood pressures with systolics in 130s-140s.  Patient also with dysphagia and poor PO intake, now S/p NGT placement and tube feeds. Per neurology now continuing with prednisone 10mg daily given EMG study and MG antibodies. Will continue with additional pulm toilet for secretion management. WBC downtrending from peak, c/f CML and will continue trending. Hemoglobin dropped to 5.0 possibly in setting of CML vs frequent blood draws, now s/p 2 units of PRBCs with adequate correction to 7.7 without further drops in hemoglobin. Pt hypernatremic to 155 on 8/3 likely 2/2 to ATN duresis in the setting of pre-renal EVARISTO due to septic shock, started on DSW 80cc/hr over 48hrs but was found to be overloaded on exam 8/4 AM so fluids were stopped and pt monitored. In order to maintain sodium levels, diureses was avoided and water flushes via NGT ordered 250 cc q6hr. Pt remains hemodynamically stable. BCR/ABL PCR and JAMES-2 negative, suspicious for CMML but family and patient declined bone marrow biopsy. Pt has had persistent LLE weakness, pending MRI pelvis and lumbar, neuro following. Step down to [facility] for continued treatment for myasthenia gravis, neuro workup and dispo.    SUBJECTIVE:  Patient seen and examined at bedside.    Vital Signs Last 12 Hrs  T(F): 97.3 (08-05-21 @ 10:04), Max: 98.7 (08-05-21 @ 05:08)  HR: 66 (08-05-21 @ 12:00) (66 - 72)  BP: 159/72 (08-05-21 @ 12:00) (132/62 - 159/72)  BP(mean): 103 (08-05-21 @ 12:00) (89 - 103)  RR: 18 (08-05-21 @ 12:00) (18 - 18)  SpO2: 96% (08-05-21 @ 12:00) (96% - 97%)  I&O's Summary    04 Aug 2021 07:01  -  05 Aug 2021 07:00  --------------------------------------------------------  IN: 720 mL / OUT: 650 mL / NET: 70 mL    05 Aug 2021 07:01  -  05 Aug 2021 13:42  --------------------------------------------------------  IN: 0 mL / OUT: 300 mL / NET: -300 mL        PHYSICAL EXAM:  Constitutional: NAD, comfortable in bed.  HEENT: NC/AT, PERRLA, EOMI, no conjunctival pallor or scleral icterus, MMM  Neck: Supple, no JVD  Respiratory: CTA B/L. No w/r/r.   Cardiovascular: RRR, normal S1 and S2, no m/r/g.   Gastrointestinal: +BS, soft NTND, no guarding or rebound tenderness, no palpable masses   Extremities: wwp; no cyanosis, clubbing or edema.   Vascular: Pulses equal and strong throughout.   Neurological: AAOx3, no CN deficits, strength and sensation intact throughout.   Skin: No gross skin abnormalities or rashes        LABS:                        8.3    43.70 )-----------( 411      ( 05 Aug 2021 06:26 )             27.2     08-05    150<H>  |  116<H>  |  66<H>  ----------------------------<  184<H>  4.1   |  25  |  1.67<H>    Ca    8.5      05 Aug 2021 06:26  Phos  3.6     08-05  Mg     2.1     08-05              RADIOLOGY & ADDITIONAL TESTS:    MEDICATIONS  (STANDING):  atorvastatin 40 milliGRAM(s) Oral at bedtime  dextrose 40% Gel 15 Gram(s) Oral once  dextrose 5%. 1000 milliLiter(s) (50 mL/Hr) IV Continuous <Continuous>  dextrose 5%. 1000 milliLiter(s) (100 mL/Hr) IV Continuous <Continuous>  dextrose 50% Injectable 25 Gram(s) IV Push once  dextrose 50% Injectable 12.5 Gram(s) IV Push once  dextrose 50% Injectable 25 Gram(s) IV Push once  glucagon  Injectable 1 milliGRAM(s) IntraMuscular once  insulin lispro (ADMELOG) corrective regimen sliding scale   SubCutaneous every 6 hours  levETIRAcetam  Solution 500 milliGRAM(s) Enteral Tube every 24 hours  multivitamin 1 Tablet(s) Oral daily  pantoprazole   Suspension 40 milliGRAM(s) Enteral Tube daily  predniSONE   Tablet 10 milliGRAM(s) Oral every 24 hours  senna 2 Tablet(s) Oral at bedtime  zinc sulfate 220 milliGRAM(s) Oral daily    MEDICATIONS  (PRN):  acetaminophen    Suspension .. 600 milliGRAM(s) Enteral Tube every 6 hours PRN Moderate Pain (4 - 6)   TRANSFER FROM [facility] TO [facility]  Hospital Course:  89 y/o Male with PMHx of HTN, severe aortic stenosis and seizure on phenytoin who presented with left facial droop and slurred speech, Stroke w/u negative. Found to have myasthenia gravis with positive anti-ach antibodies. Also found to have leukocytosis suspicious for malignancy. Admitted to [facility] for treatment of MG and w/u of potential CML/CLL. S/p IVIG treatment. Course complicated initially by dysphagia followed hypotension concerning for sepsis in the setting of aspiration pneumonia. Patient was given fluids with no improvement, ICU was consulted and started on broad spectrum Zosyn/Vanc (MRSA neg d/mitra) when transferred to ICU. Also noted to have elevated creatinine concerning for EVARISTO (sepsis vs hypoperfusion, with improvement in renal function and urine output. Briefly requiring pressors on transfer including phenylephrine and levophed, successfully weaned off of both with improvement in blood pressures with systolics in 130s-140s.  Patient also with dysphagia and poor PO intake, now S/p NGT placement and tube feeds. Per neurology now continuing with prednisone 10mg daily given EMG study and MG antibodies. Will continue with additional pulm toilet for secretion management. WBC downtrending from peak, c/f CML and will continue trending. Hemoglobin dropped to 5.0 possibly in setting of CML vs frequent blood draws, now s/p 2 units of PRBCs with adequate correction to 7.7 without further drops in hemoglobin. Pt hypernatremic to 155 on 8/3 likely 2/2 to ATN duresis in the setting of pre-renal EVARISTO due to septic shock, started on DSW 80cc/hr over 48hrs but was found to be overloaded on exam 8/4 AM so fluids were stopped and pt monitored. In order to maintain sodium levels, diureses was avoided and water flushes via NGT ordered 250 cc q6hr. Pt remains hemodynamically stable. BCR/ABL PCR and JAMES-2 negative, suspicious for CMML but family and patient declined bone marrow biopsy. Pt has had persistent LLE weakness, pending MRI pelvis and lumbar, neuro following. Step down to [facility] for continued treatment for myasthenia gravis, neuro workup and dispo.    OVERNIGHT: NGT was placed    SUBJECTIVE:  Patient seen and examined at bedside. Patient reports limited strength and sensation in LLE which developed 2 days ago, otherwise denies further complaints. Denies fevers/chills, headache, chest pain, shortness of breath, abdominal pain, nausea/vomiting.    ROS negative unless stated above    Vital Signs Last 12 Hrs  T(F): 97.3 (08-05-21 @ 10:04), Max: 98.7 (08-05-21 @ 05:08)  HR: 66 (08-05-21 @ 12:00) (66 - 72)  BP: 159/72 (08-05-21 @ 12:00) (132/62 - 159/72)  BP(mean): 103 (08-05-21 @ 12:00) (89 - 103)  RR: 18 (08-05-21 @ 12:00) (18 - 18)  SpO2: 96% (08-05-21 @ 12:00) (96% - 97%)  I&O's Summary    04 Aug 2021 07:01  -  05 Aug 2021 07:00  --------------------------------------------------------  IN: 720 mL / OUT: 650 mL / NET: 70 mL    05 Aug 2021 07:01  -  05 Aug 2021 13:42  --------------------------------------------------------  IN: 0 mL / OUT: 300 mL / NET: -300 mL        PHYSICAL EXAM:  Constitutional: NAD, comfortable in bed. NGT in place  HEENT: No scleral icterus, MMM  Neck: Supple, + JVD  Respiratory: CTA B/L. No w/r/r.   Cardiovascular: RRR, systolic murmur in right upper sternal border  Gastrointestinal: +BS, soft NTND, no guarding or rebound tenderness  Extremities: wwp; LUE swelling, otherwise no further edema   Vascular: Pulses equal and strong throughout.   Neurological: AAOx3; sensation intact throughout upper and lower extremities, 5/5 strength B/L UE, 4/5 strength RLE, 2/5 strength LLE      LABS:                        8.3    43.70 )-----------( 411      ( 05 Aug 2021 06:26 )             27.2     08-05    150<H>  |  116<H>  |  66<H>  ----------------------------<  184<H>  4.1   |  25  |  1.67<H>    Ca    8.5      05 Aug 2021 06:26  Phos  3.6     08-05  Mg     2.1     08-05              RADIOLOGY & ADDITIONAL TESTS:    MEDICATIONS  (STANDING):  atorvastatin 40 milliGRAM(s) Oral at bedtime  dextrose 40% Gel 15 Gram(s) Oral once  dextrose 5%. 1000 milliLiter(s) (50 mL/Hr) IV Continuous <Continuous>  dextrose 5%. 1000 milliLiter(s) (100 mL/Hr) IV Continuous <Continuous>  dextrose 50% Injectable 25 Gram(s) IV Push once  dextrose 50% Injectable 12.5 Gram(s) IV Push once  dextrose 50% Injectable 25 Gram(s) IV Push once  glucagon  Injectable 1 milliGRAM(s) IntraMuscular once  insulin lispro (ADMELOG) corrective regimen sliding scale   SubCutaneous every 6 hours  levETIRAcetam  Solution 500 milliGRAM(s) Enteral Tube every 24 hours  multivitamin 1 Tablet(s) Oral daily  pantoprazole   Suspension 40 milliGRAM(s) Enteral Tube daily  predniSONE   Tablet 10 milliGRAM(s) Oral every 24 hours  senna 2 Tablet(s) Oral at bedtime  zinc sulfate 220 milliGRAM(s) Oral daily    MEDICATIONS  (PRN):  acetaminophen    Suspension .. 600 milliGRAM(s) Enteral Tube every 6 hours PRN Moderate Pain (4 - 6)

## 2021-08-05 NOTE — PROGRESS NOTE ADULT - ATTENDING COMMENTS
Patient seen and examined at bedside. Agree with exam, a/p as above with the following addendum/edits:     Known to me from before ICU stay. 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 Cr has since stabilized, leukocytosis persistent however down to 40K from 80K. On exam he appears much more alert and awake, strength in UE has improved, and voice has improved. On exam he has LE weakness that is not consistent w/ MG, awaiting MRI but also concern for possible bleed into leg. H/H now stable but will w/u MRI results. C/w Keppra for seizures, completed abx course for asp PNA, c/w water flushes for hypernatremia, s/s eval for dysphagia. Care d/w wife over the phone. C/w PT.

## 2021-08-05 NOTE — PROGRESS NOTE ADULT - PROBLEM SELECTOR PLAN 1
- S/p IVIG x5 days  - Cont Prednisone 10 mg daily, started 7/31  - Will consider starting Rituximab q6 months for long-term management of myasthenia, pending ID assessment of prior Hep B infection and improvement of current hypotension/pulmonary edema, per neuro recs  - Neurology following  - F/U PT recs    #Weakness and numbness  LE numbness and weakness of the LLE; previously thought to be 2/2 to resolving RP hematoma possibly causing femoral nerve compression, per neuro recs  - F/U lumbar spine MRI - S/p IVIG x5 days  - Cont Prednisone 10 mg daily, started 7/31  - Will consider starting Rituximab q6 months for long-term management of myasthenia, pending ID assessment of prior Hep B infection and improvement of current hypotension/pulmonary edema, per neuro recs  - Neurology following  - F/U PT recs    #Weakness and numbness LLE  - F/U lumbar and pelvic MRI

## 2021-08-05 NOTE — PROGRESS NOTE ADULT - PROBLEM SELECTOR PLAN 8
- Cont keppra 500 q12 Normocytic anemia, with iron panel consistent with anemia of chronic disease/ renal disease. B12/folate wnl. No evidence of hemolysis. Given CKD and Anemia, a monoclonal gammopathy workup performed and was negative  - Transfuse if <7  - Keep active T&S

## 2021-08-05 NOTE — PROGRESS NOTE ADULT - PROBLEM SELECTOR PLAN 5
Currently NPO, NGT placed, tube feeds.  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 BCR/ABL negative. WBC count trending down to baseline.   - No symptoms or signs suggestive of infectious process at this time  -leukocytosis now in 40s  -family does not wish to pursue BM biopsy   - Can set up outpatient hematology follow up with Dr. Nguyen on a Thursday (after discharge)  - Appreciate hem/onc recs, following

## 2021-08-05 NOTE — PROGRESS NOTE ADULT - PROBLEM SELECTOR PLAN 3
Likely ATN 2/2 to septic shock, poor perfusion. Pt has a history of CKD. baseline 1.6-1.7.   - Creatinine improving: at baseline as of 8/5: 1.67  - Not considering HD at this time   - Cont to trend creatinine    #Hypernatremia  Serum sodium at 150 (8/5 AM)  - Start free water via tube 250cc q6hrs  - Monitor and consider diureses with Metolazone if pt has persistent overload to avoid sodium derangement  - Cont Jevity 1.5 feeds Currently NPO, NGT placed, tube feeds    PLAN:  - wet swabs to moisten mouth  - Protonix 40 mg daily  -recommend barium swallow tomorrow morning for further evaluation    #Left upper extremity swelling  -LUE US pending - f/u results

## 2021-08-05 NOTE — PROGRESS NOTE ADULT - PROBLEM SELECTOR PLAN 10
IVF: assess as needed  Diet: NPO w tube feeds   DVT: SCDs  GI: Protonix   CODE STATUS: DNR  Monitor, Replete to K>4 and Mg>2 IVF: assess as needed  Diet: NPO w tube feeds   DVT: SCDs  GI: None  CODE STATUS: DNR  Monitor, Replete to K>4 and Mg>2

## 2021-08-05 NOTE — PROGRESS NOTE ADULT - PROBLEM SELECTOR PLAN 9
IVF: assess as needed  Diet: NPO w tube feeds   DVT: N/A  GI: Protonix   CODE STATUS: DNR  Monitor, Replete to K>4 and Mg>2 - Cont keppra 500 q12

## 2021-08-05 NOTE — PROGRESS NOTE ADULT - PROBLEM SELECTOR PLAN 8
Normocytic anemia, with iron panel consistent with anemia of chronic disease/ renal disease. B12/folate wnl. No evidence of hemolysis. Given CKD and Anemia, a monoclonal gammopathy workup performed and was negative  - Transfuse if <7  - Keep active T&S Given underlying mod- severe AS, would be cautious with fluids. Pt is not a surgical candidate for severe AS given poor prognosis as well as family wishes to not pursue aggressive measures.  -patient does not appear to be fluid overloaded on exam, no JVD or crackles on exam  -no need for diuresis at this time  CXR on 8/5 showed clear lungs and small unchanged left pleural effusion  -continue to monitor

## 2021-08-06 DIAGNOSIS — R29.898 OTHER SYMPTOMS AND SIGNS INVOLVING THE MUSCULOSKELETAL SYSTEM: ICD-10-CM

## 2021-08-06 LAB
ANION GAP SERPL CALC-SCNC: 5 MMOL/L — SIGNIFICANT CHANGE UP (ref 5–17)
ANISOCYTOSIS BLD QL: SLIGHT — SIGNIFICANT CHANGE UP
BASOPHILS # BLD AUTO: 0 K/UL — SIGNIFICANT CHANGE UP (ref 0–0.2)
BASOPHILS NFR BLD AUTO: 0 % — SIGNIFICANT CHANGE UP (ref 0–2)
BUN SERPL-MCNC: 76 MG/DL — HIGH (ref 7–23)
CALCIUM SERPL-MCNC: 8.6 MG/DL — SIGNIFICANT CHANGE UP (ref 8.4–10.5)
CHLORIDE SERPL-SCNC: 113 MMOL/L — HIGH (ref 96–108)
CO2 SERPL-SCNC: 27 MMOL/L — SIGNIFICANT CHANGE UP (ref 22–31)
CREAT SERPL-MCNC: 1.66 MG/DL — HIGH (ref 0.5–1.3)
DACRYOCYTES BLD QL SMEAR: SLIGHT — SIGNIFICANT CHANGE UP
EOSINOPHIL # BLD AUTO: 0.94 K/UL — HIGH (ref 0–0.5)
EOSINOPHIL NFR BLD AUTO: 2.6 % — SIGNIFICANT CHANGE UP (ref 0–6)
GIANT PLATELETS BLD QL SMEAR: PRESENT — SIGNIFICANT CHANGE UP
GLUCOSE BLDC GLUCOMTR-MCNC: 158 MG/DL — HIGH (ref 70–99)
GLUCOSE BLDC GLUCOMTR-MCNC: 172 MG/DL — HIGH (ref 70–99)
GLUCOSE BLDC GLUCOMTR-MCNC: 188 MG/DL — HIGH (ref 70–99)
GLUCOSE SERPL-MCNC: 177 MG/DL — HIGH (ref 70–99)
HCT VFR BLD CALC: 25.8 % — LOW (ref 39–50)
HGB BLD-MCNC: 7.9 G/DL — LOW (ref 13–17)
HYPOCHROMIA BLD QL: SIGNIFICANT CHANGE UP
LYMPHOCYTES # BLD AUTO: 0.65 K/UL — LOW (ref 1–3.3)
LYMPHOCYTES # BLD AUTO: 1.8 % — LOW (ref 13–44)
MACROCYTES BLD QL: SLIGHT — SIGNIFICANT CHANGE UP
MAGNESIUM SERPL-MCNC: 2 MG/DL — SIGNIFICANT CHANGE UP (ref 1.6–2.6)
MANUAL SMEAR VERIFICATION: SIGNIFICANT CHANGE UP
MCHC RBC-ENTMCNC: 30.4 PG — SIGNIFICANT CHANGE UP (ref 27–34)
MCHC RBC-ENTMCNC: 30.6 GM/DL — LOW (ref 32–36)
MCV RBC AUTO: 99.2 FL — SIGNIFICANT CHANGE UP (ref 80–100)
MONOCYTES # BLD AUTO: 2.86 K/UL — HIGH (ref 0–0.9)
MONOCYTES NFR BLD AUTO: 7.9 % — SIGNIFICANT CHANGE UP (ref 2–14)
MYELOCYTES NFR BLD: 14 % — HIGH (ref 0–0)
NEUTROPHILS # BLD AUTO: 26.72 K/UL — HIGH (ref 1.8–7.4)
NEUTROPHILS NFR BLD AUTO: 73.7 % — SIGNIFICANT CHANGE UP (ref 43–77)
OVALOCYTES BLD QL SMEAR: SLIGHT — SIGNIFICANT CHANGE UP
PHOSPHATE SERPL-MCNC: 4.1 MG/DL — SIGNIFICANT CHANGE UP (ref 2.5–4.5)
PLAT MORPH BLD: NORMAL — SIGNIFICANT CHANGE UP
PLATELET # BLD AUTO: 426 K/UL — HIGH (ref 150–400)
POIKILOCYTOSIS BLD QL AUTO: SLIGHT — SIGNIFICANT CHANGE UP
POLYCHROMASIA BLD QL SMEAR: SLIGHT — SIGNIFICANT CHANGE UP
POTASSIUM SERPL-MCNC: 4.3 MMOL/L — SIGNIFICANT CHANGE UP (ref 3.5–5.3)
POTASSIUM SERPL-SCNC: 4.3 MMOL/L — SIGNIFICANT CHANGE UP (ref 3.5–5.3)
RBC # BLD: 2.6 M/UL — LOW (ref 4.2–5.8)
RBC # FLD: 18.1 % — HIGH (ref 10.3–14.5)
RBC BLD AUTO: ABNORMAL
SODIUM SERPL-SCNC: 145 MMOL/L — SIGNIFICANT CHANGE UP (ref 135–145)
SPHEROCYTES BLD QL SMEAR: SLIGHT — SIGNIFICANT CHANGE UP
WBC # BLD: 36.25 K/UL — HIGH (ref 3.8–10.5)
WBC # FLD AUTO: 36.25 K/UL — HIGH (ref 3.8–10.5)

## 2021-08-06 PROCEDURE — 72148 MRI LUMBAR SPINE W/O DYE: CPT | Mod: 26

## 2021-08-06 PROCEDURE — 93971 EXTREMITY STUDY: CPT | Mod: 26,LT

## 2021-08-06 PROCEDURE — 99233 SBSQ HOSP IP/OBS HIGH 50: CPT | Mod: GC

## 2021-08-06 PROCEDURE — 74230 X-RAY XM SWLNG FUNCJ C+: CPT | Mod: 26

## 2021-08-06 RX ADMIN — Medication 1 TABLET(S): at 11:33

## 2021-08-06 RX ADMIN — SENNA PLUS 2 TABLET(S): 8.6 TABLET ORAL at 22:30

## 2021-08-06 RX ADMIN — LEVETIRACETAM 500 MILLIGRAM(S): 250 TABLET, FILM COATED ORAL at 11:32

## 2021-08-06 RX ADMIN — ATORVASTATIN CALCIUM 40 MILLIGRAM(S): 80 TABLET, FILM COATED ORAL at 22:30

## 2021-08-06 RX ADMIN — Medication 1: at 06:52

## 2021-08-06 RX ADMIN — Medication 10 MILLIGRAM(S): at 11:33

## 2021-08-06 RX ADMIN — ZINC SULFATE TAB 220 MG (50 MG ZINC EQUIVALENT) 220 MILLIGRAM(S): 220 (50 ZN) TAB at 11:33

## 2021-08-06 RX ADMIN — Medication 1: at 12:15

## 2021-08-06 RX ADMIN — Medication 1: at 17:17

## 2021-08-06 NOTE — PROGRESS NOTE ADULT - PROBLEM SELECTOR PLAN 7
Normocytic anemia, with iron panel consistent with anemia of chronic disease/ renal disease. B12/folate wnl. No evidence of hemolysis. Given CKD and Anemia, a monoclonal gammopathy workup performed and was negative  -patient s/p 2 units on 7/29, followed by stabilization  -cause of bleed secondary to LP hematoma vs CMML  PLAN:  - continue with SCDs   -monitor for signs of active bleeding  -follow up with MRI lumbar to r/o RP hematoma  - Transfuse if <7  - Keep active T&S

## 2021-08-06 NOTE — SWALLOW VFSS/MBS ASSESSMENT ADULT - ORAL PREP COMMENTS
Required verbal cues to adequately accept bolus and establish labial seal around spoon/cup. Anterior spillage observed during thin/NTL trials, indicative of reduced bolus control and containment.

## 2021-08-06 NOTE — SWALLOW VFSS/MBS ASSESSMENT ADULT - PHARYNGEAL PHASE COMMENTS
Timeliness of the initiation of the pharyngeal swallow became increasingly delayed as the evaluation progressed, likely 2/2 fatigue, despite increased viscosity of trials. Complete epiglottic inversion observed. WFL hyolaryngeal elevation, however, reduced anterior movement of hyolaryngeal complex. During all thin liquid trials penetration was observed during the swallow. Mild penetration observed during swallow of tsp NTL, and trace penetration noted during swallow of puree. Pt presented with reduced tongue base retraction to the posterior pharyngeal wall, resulting in moderate residue filling vallecular space throughout all trials, worsening with puree. During all thin liquid trials, aspiration was observed after the swallow, with residue spilling over the laryngeal side of epiglottis and bilateral arytenoids from vallecula and pyriform sinuses. While pt attempting to clear pharyngeal residue during tsp NTL, deep penetration observed during secondary swallow, inconsistently cleared from airway. NTL wash was not beneficial to clear puree residue from vallecular and pyriform sinuses, with penetration was observed after the swallow. At the end of the study pt presenting deficits to initiate a complete pharyngeal swallow, clear pharyngeal residue, and unable to produce productive cough/throat clear. Timeliness of the initiation of the pharyngeal swallow became increasingly delayed as the evaluation progressed, likely 2/2 fatigue, unrelated to viscosity of trials. Complete epiglottic inversion observed. WFL hyolaryngeal elevation, however, reduced anterior movement of hyolaryngeal complex. During all thin liquid trials penetration was observed during the swallow. Mild penetration also observed during swallow of tsp NTL, and trace penetration noted during swallow of puree. Pt presented with reduced tongue base retraction to the posterior pharyngeal wall, resulting in moderate residue filling vallecular space throughout all trials, worst with puree.   With thin liquid residue: penetrate on laryngeal side of epiglottis and post-deglutitive residue (from valleculae and pyriforms) spilled deeper into the laryngeal vestibule, resulting in SILENT ASPIRATION.   This occurred x1 with NTL as well. With severe puree residue, multiple cued secondary swallows mildly reduced the residue. NTL wash x2 also minimally reduced the severity of residue from valleculae and pyriform sinuses, with penetration was observed after the swallow. Suspect delayed aspiration of the penetrate with time given pt's weak cough, severity of post-deglutitive residue and increasingly wet voice (after flouro was turned off). Timeliness of the initiation of the pharyngeal swallow became increasingly delayed as the evaluation progressed, likely 2/2 fatigue, unrelated to viscosity of trials. Complete epiglottic inversion observed. WFL hyolaryngeal elevation, however, reduced anterior movement of hyolaryngeal complex. During all thin liquid trials penetration was observed during the swallow. Mild penetration also observed during swallow of tsp NTL, and trace penetration noted during swallow of puree. Pt presented with reduced tongue base retraction to the posterior pharyngeal wall, resulting in moderate residue filling vallecular space throughout all trials, worst with puree.   With thin liquid residue: penetrate on laryngeal side of epiglottis and post-deglutitive residue (from valleculae and pyriforms) spilled deeper into the laryngeal vestibule, resulting in SILENT ASPIRATION.   This occurred x1 with NTL as well (with a greater degree of aspiration compared to thin liq). With severe puree residue, multiple cued secondary swallows mildly reduced the residue. NTL wash x2 also minimally reduced the severity of residue from valleculae and pyriform sinuses, and this residue began to spill over, resulting in penetration after the swallow. Suspect delayed aspiration of the penetrate with time given pt's weak CUED cough, severity of post-deglutitive residue and increasingly wet voice (after flouro was turned off).

## 2021-08-06 NOTE — PROGRESS NOTE ADULT - PROBLEM SELECTOR PLAN 8
Given underlying mod- severe AS, would be cautious with fluids. Pt is not a surgical candidate for severe AS given poor prognosis as well as family wishes to not pursue aggressive measures.  -patient does not appear to be fluid overloaded on exam, no JVD or crackles on exam  -no need for diuresis at this time  CXR on 8/5 showed clear lungs and small unchanged left pleural effusion  -continue to monitor

## 2021-08-06 NOTE — SWALLOW VFSS/MBS ASSESSMENT ADULT - COMMENTS
Pt is well known to our service. Repeat FEES completed on 8/4 revealed "moderate pharyngeal dysphagia characterized by inconsistent timeliness of swallow, reduced sensation, and overall weak swallow. Primary difficulty was weak swallow resulting in severe post-deglutitive residue, which then spilled into the laryngeal vestibule, after the swallow (i.e., mostly silent penetration/aspiration). Pt did not spontaneously clear the penetrate/aspirate and residue. However, given an initial cue to cough/throat-clear, pt was able to then carry out multiple coughs/throat-clears and secondary swallows to clear the penetrate/aspirate and residue. This is an improvement from the 7/20/21 FEES as well as an improvement from his clinical presentation yesterday. However, a PO diet is still premature given the severity of residue, blunted sensation, inefficient swallow, and need for assistance with oral suctioning."

## 2021-08-06 NOTE — SWALLOW VFSS/MBS ASSESSMENT ADULT - ADDITIONAL RECOMMENDATIONS
Establish GOC for feeding/nutrition. 1) Overall, pt's strength over the course of his hospitalization appears to have varied considerably. Although he looked clinically improved on 7/26, he again looked worsened clinically on 8/3. He then appeared mildly improved on 8/4, but again appears to have severely reduced strength.  If it is not anticipated that the pt's strength will return soon (i.e., by the time the NGT needs to be removed), then recommend establishing long-term feeding goals. Aside from current aspiration risk, even if pt were able to swallow safely, given pt's weak/deconditioned state, it is unlikely that pt would be able to meet caloric needs by mouth. If GOC are to continue PO for pleasure/QOL despite risks of asp/PNA, recommend a diet of puree/thin w safe feeding strategies.  2) Continue to allow 3-5 ice-chips per hour as tolerated by the pt, when alert, sittign upright, and breathing comfortably  3) This SVC will continue to follow 1) Overall, pt's strength over the course of his hospitalization appears to have varied considerably. Although he looked clinically improved on 7/26, he again looked worsened clinically on 8/3. He then appeared mildly improved on 8/4, but again appears to have severely reduced strength.  If it is not anticipated that the pt's strength will return soon (i.e., by the time the NGT needs to be removed), then recommend establishing long-term feeding goals. Aside from current aspiration risk, even if pt were able to swallow safely, given pt's weak/deconditioned state, it is unlikely that pt would be able to meet caloric needs by mouth. If GOC are to continue PO for pleasure/QOL despite risks of asp/PNA, recommend a diet of puree/thin w safe feeding strategies.  2) Continue to allow 3-5 ice-chips per hour as tolerated by the pt, when alert, sittign upright, and breathing comfortably for oral  hygiene/comfort, to thin secretions, and therapeutic swallows.  3) This SVC will continue to follow

## 2021-08-06 NOTE — PROGRESS NOTE ADULT - PROBLEM SELECTOR PLAN 4
Likely ATN 2/2 to septic shock, poor perfusion. Pt has a history of CKD. baseline 1.6-1.7.   - Creatinine improving  - Cont to trend creatinine    #Hypernatremia  RESOLVED  -Na of 145 today. Patient asymptomatic, A&Ox3  -discontinue 250ml free water q6h  -continue to monitor

## 2021-08-06 NOTE — PROGRESS NOTE ADULT - PROBLEM SELECTOR PLAN 5
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 BCR/ABL negative. WBC count trending down to baseline.   - No symptoms or signs suggestive of infectious process at this time  -leukocytosis now in 40s  -family does not wish to pursue BM biopsy   - Can set up outpatient hematology follow up with Dr. Nguyen on a Thursday (after discharge)  - Appreciate hem/onc recs, following

## 2021-08-06 NOTE — PROGRESS NOTE ADULT - PROBLEM SELECTOR PLAN 10
IVF: assess as needed  Diet: NPO w tube feeds   DVT: SCDs  GI: None  CODE STATUS: DNR  Monitor, Replete to K>4 and Mg>2

## 2021-08-06 NOTE — SWALLOW VFSS/MBS ASSESSMENT ADULT - ORAL PHASE COMMENTS
Reduced bolus containment and control further observed through premature spillage into vallecula, despite verbal cues to hold bolus within oral cavity. Reduced timeliness of A-P transport, likely 2/2 fatigue as trials progressed. Moderate oral residue was observed after the swallow, in which pt was not sensate to, and did not benefit from verbal cues to clear. Reduced bolus containment and control. Premature spillage into vallecula, despite verbal cues to hold bolus within oral cavity. Reduced timeliness of A-P transport, likely 2/2 fatigue as trials progressed. Moderate oral residue was observed after the swallow, to which pt was not sensate, and required multiple verbal cues to clear.

## 2021-08-06 NOTE — PROGRESS NOTE ADULT - ATTENDING COMMENTS
Patient seen and examined at bedside. Agree with exam, a/p as above with the following addendum/edits:     Wife at bedside. Awake and alert today. Able to move his LLE more on my exam and unclear if sensation has improved. Still w/ NGT, underwent esophogram earlier today, awaiting radiology read and s/s recs. Briefly discussed hospice if swallowing does not improve as wife does not want PEG. Wife asking about starting specific myasthenia meds, will defer to neuro. Awaiting MRI of lumbar spine for LLE weakness and to also r/o possible hematoma from Hgb drop in ICU. Hypernatremia resolved w/ free water flushes via NGT which can now be discontinued. C/w PT.

## 2021-08-06 NOTE — SWALLOW VFSS/MBS ASSESSMENT ADULT - UNSUCCESSFUL STRATEGIES TRIALED DURING PROCEDURE
Compensatory strategy of chin tuck was trialed during the evaluation. Pt required tactile support to execute chin tuck position in which pt then presented with gross aspiration of residue from vallecular and pyriform sinuses. While a chin tuck improved swallow safety during the swallow across all trials (e.g., with intermittent trace penetration during thin liq, exacerbated by increased volume of bolus, with the majority of bolus ejected from airway), swallow efficiency did not improve, resulting in moderate pharyngeal residue, with eventual suspected silent aspiration d/t weak swallow to clear residue, wet vocal quality, and no volitional cough/throat clear. Pt was not able to execute chin tuck position independently, and clinically judged to increase fatigue during study./chin tuck/nonproductive volitional cough following clinician cue/productive volitional cough following clinician cue

## 2021-08-06 NOTE — PROGRESS NOTE ADULT - SUBJECTIVE AND OBJECTIVE BOX
Orthopaedic Spine Resident Note    S:  No acute overnight events.  Pain well controlled.    No fevers/chills/shortness of breath/chest pain/new neurologic complaints.    Vital Signs Last 24 Hrs  T(C): 36.5 (06 Aug 2021 18:00), Max: 37.1 (06 Aug 2021 03:58)  T(F): 97.7 (06 Aug 2021 18:00), Max: 98.8 (06 Aug 2021 03:58)  HR: 71 (06 Aug 2021 18:00) (65 - 76)  BP: 138/58 (06 Aug 2021 18:00) (130/61 - 160/66)  BP(mean): --  RR: 18 (06 Aug 2021 18:00) (18 - 18)  SpO2: 96% (06 Aug 2021 18:00) (95% - 98%)    VSS  General: Well-appearing adult Male lying supine; NAD; A&Ox3  Motor:  LLE- Hip Flex/Knee Ext/TA/EHL/GS 4-/5 strength  RLE- Hip Flex/Knee Ext/TA/EHL/GS 5/5 strength  Sensory:  LLE- SILT L2-S1 dermatomes   RLE- SILT L2-S1 dermatomes  Vascular:  Feet WWP; DP 2+ bilaterally; Cap refill < 2 sec    I&O's Detail    05 Aug 2021 07:01  -  06 Aug 2021 07:00  --------------------------------------------------------  IN:    Enteral Tube Flush: 250 mL    Jevity 1.5: 990 mL  Total IN: 1240 mL    OUT:    Incontinent per Condom Catheter (mL): 1300 mL  Total OUT: 1300 mL    Total NET: -60 mL      06 Aug 2021 07:01  -  06 Aug 2021 17:45  --------------------------------------------------------  IN:  Total IN: 0 mL    OUT:    Voided (mL): 200 mL  Total OUT: 200 mL    Total NET: -200 mL          Labs:                        7.9    36.25 )-----------( 426      ( 06 Aug 2021 07:53 )             25.8     06 Aug 2021 07:53    145    |  113    |  76     ----------------------------<  177    4.3     |  27     |  1.66     Ca    8.6        06 Aug 2021 07:53  Phos  4.1       06 Aug 2021 07:53  Mg     2.0       06 Aug 2021 07:53            A/P: 89yMale PMH seizure on phenytoin who presented with left facial droop and slurred speech found to have myasthenia gravis. Ortho consulted for LLE weakness/numbness of unknown chronicity. Low suspicion for cauda equina syndrome at this time due to absence of back pain, radicular symptoms, or inciting event/trauma, as well as 5/5 motor/sensory distally in RLE as well 4-5/5 strength distally in LLE. Patient adamantly refusing rectal exam, although patient has reportedly been having loose stools on bed daily and may have age-related diminished rectal tone.   -No acute orthopedic intervention at this time  -f/u MRI lumbar spine without contrast  -Rest of care per primary team    Ortho Pager [phone] Orthopaedic Spine Resident Note    S:  No acute overnight events.  Pain well controlled.    No fevers/chills/shortness of breath/chest pain/new neurologic complaints.    Vital Signs Last 24 Hrs  T(C): 36.5 (06 Aug 2021 18:00), Max: 37.1 (06 Aug 2021 03:58)  T(F): 97.7 (06 Aug 2021 18:00), Max: 98.8 (06 Aug 2021 03:58)  HR: 71 (06 Aug 2021 18:00) (65 - 76)  BP: 138/58 (06 Aug 2021 18:00) (130/61 - 160/66)  BP(mean): --  RR: 18 (06 Aug 2021 18:00) (18 - 18)  SpO2: 96% (06 Aug 2021 18:00) (95% - 98%)    VSS  General: Well-appearing adult Male lying supine; NAD; A&Ox3  Motor:  LLE- Hip Flex/Knee Ext/TA/EHL/GS 4-/5 strength  RLE- Hip Flex/Knee Ext/TA/EHL/GS 5/5 strength  Sensory:  LLE- SILT L2-S1 dermatomes   RLE- SILT L2-S1 dermatomes  Vascular:  Feet WWP; DP 2+ bilaterally; Cap refill < 2 sec    I&O's Detail    05 Aug 2021 07:01  -  06 Aug 2021 07:00  --------------------------------------------------------  IN:    Enteral Tube Flush: 250 mL    Jevity 1.5: 990 mL  Total IN: 1240 mL    OUT:    Incontinent per Condom Catheter (mL): 1300 mL  Total OUT: 1300 mL    Total NET: -60 mL      06 Aug 2021 07:01  -  06 Aug 2021 17:45  --------------------------------------------------------  IN:  Total IN: 0 mL    OUT:    Voided (mL): 200 mL  Total OUT: 200 mL    Total NET: -200 mL          Labs:                        7.9    36.25 )-----------( 426      ( 06 Aug 2021 07:53 )             25.8     06 Aug 2021 07:53    145    |  113    |  76     ----------------------------<  177    4.3     |  27     |  1.66     Ca    8.6        06 Aug 2021 07:53  Phos  4.1       06 Aug 2021 07:53  Mg     2.0       06 Aug 2021 07:53    Imaging:  MRI Lumbar Spine (8/6):  Massive left retroperitoneal hematoma, likely explaining exacerbated left leg weakness and dropping hematocrit. CT abdomen/pelvis advised.    Marrow signal abnormality is seen throughout the lumbar spine, concerning for lymphoproliferative disorder given the leukocytosis. Bone marrow biopsy is recommended.    Chronic disc and facet degenerative changes of the lumbar spine present, as above, L4-5 worse than L3-4.      Case findings and recommendation above discussed with Dr. Mcclure of neurology shortly prior to dictation time.          A/P: 89[facility] who presented with left facial droop and slurred speech found to have myasthenia gravis. Ortho consulted for LLE weakness/numbness of unknown chronicity. Low suspicion for cauda equina syndrome at this time due to absence of back pain, radicular symptoms, or inciting event/trauma, as well as 5/5 motor/sensory distally in RLE as well 4-5/5 strength distally in LLE. Patient adamantly refusing rectal exam, although patient has reportedly been having loose stools on bed daily and may have age-related diminished rectal tone.   -No acute orthopedic intervention at this time  -MRI lumbar spine without contrast shows massive L retroperitoneal hematoma which may explain LLE weakness due to compression of femoral nerve. Recommend IR consult for drainage.  -Rest of care per primary team    Ortho Pager [phone]

## 2021-08-06 NOTE — SWALLOW VFSS/MBS ASSESSMENT ADULT - SLP GENERAL OBSERVATIONS
Pt received awake and alert; however, lethargic. NGT in place. Pt with wet vocal quality at rest, requiring verbal cues to clear. Pt on room air. Pt received awake and alert; however, lethargic. NGT in place. Pt on room air. Pt with wet vocal quality at rest, requiring verbal cues to clear. Voice was judged to be of reduced strength, patient barely spoke during this study (which is a change from his presentation from the FEES on 8/4), and as the study progressed, he appeared increasingly weak-- slumping forward (needing additional supports to keep him in an upright/neutral position).

## 2021-08-06 NOTE — SWALLOW VFSS/MBS ASSESSMENT ADULT - DIAGNOSTIC IMPRESSIONS
Pt presents with moderate oropharyngeal dysphagia, characterized by reduced bolus control/containment, delay of initiation of pharyngeal swallow, and reduced swallowing efficiency and safety, evidenced by pen/asp during and after swallows. Factors that put pt further at risk include blunt sensation to airway invasion, weak cough/throat clear to expel from airway, and increasing fatigue as trials progressed. Pt's swallow function is primarily affected by reduced oral and pharyngeal bolus clearance, resulting in penetration and aspiration of residue with no volitional manner to expel. At this time, cannot recommend a safe oral diet, and pt should remain NPO/NGT. Upon establishment of GOC, if pt prefers comfort feeds, recs for puree/thins. Pt presents with moderate-severe oropharyngeal dysphagia, characterized by reduced bolus control/containment, delay of initiation of pharyngeal swallow, poor sensation, and reduced swallowing efficiency and safety, evidenced by SILENT ASPIRATION.  Factors that put pt further at risk include blunted sensation even to significant oral residue, weak cough/throat clear, and worsening fatigue even with minimal PO trials. Pt's swallow function is primarily affected by reduced oral and pharyngeal bolus clearance, resulting in penetration and aspiration of residue with no volitional manner to expel. At this time, cannot recommend a safe oral diet, and pt should remain NPO/NGT. Upon establishment of GOC, if pt prefers comfort feeds, recs for puree/thins. Pt presents with moderate-severe oropharyngeal dysphagia, characterized by reduced bolus control/containment, delay of initiation of pharyngeal swallow, poor sensation, and reduced swallowing efficiency and safety, resulting in SILENT ASPIRATION.  Factors that put pt further at risk include blunted sensation even to mod-severe oral residue, weak cough/throat clear, and severe fatigue even with minimal PO trials. At this time, cannot recommend a safe oral diet; recommend continuation of current NPO/NGT with further GOC discussion. It is not anticipated that pt will be able to return to a PO diet until pt's endurance improves.

## 2021-08-06 NOTE — PROGRESS NOTE ADULT - ASSESSMENT
89y Male with PMHx of HTN, severe aortic stenosis and seizures (on phenytoin) who presented with left facial droop and slurred speech, Stroke w/u negative. Found to have myasthenia gravis with positive anti-Ach antibodies. Also found to have leukocytosis suspicious for malignancy. Admitted to [facility] for treatment of MG and w/u of potential CML/CLL. S/p IVIG treatment. Course complicated by dysphagia and aspiration pneumonia. Admitted to MICU after developing septic shock likely 2/2 to aspiration PNA requiring pressors. Pt no longer requiring pressors stepped down to 7 L[facility]man continued monitoring and further workup of leukocytosis. Now stepped down to [facility] for treatment of hypernatremia, LLE weakness/numbness, dysphagia.

## 2021-08-06 NOTE — PROGRESS NOTE ADULT - PROBLEM SELECTOR PLAN 3
Currently NPO, NGT placed, tube feeds    PLAN:  - wet swabs to moisten mouth  - Protonix 40 mg daily  -recommend barium swallow tomorrow morning for further evaluation    #Left upper extremity swelling  -LUE US pending - f/u results Currently NPO, NGT placed, tube feeds    PLAN:  - wet swabs to moisten mouth  -f/u final Barium swallow results  -per speech and swallow, patient should remain NPO/NGT at this time.     #Left upper extremity swelling  -LUE US pending - f/u results

## 2021-08-06 NOTE — PROGRESS NOTE ADULT - SUBJECTIVE AND OBJECTIVE BOX
OVERNIGHT EVENTS: MICHAEL.    SUBJECTIVE:  Patient seen and examined at bedside. Patient reports continued left lower extremity weakness and numbness. Patient denies further complaints, including no chest pain, shortness of breath, cough, abdominal pain.    ROS: negative unless stated above    Vital Signs Last 12 Hrs  T(F): 98.1 (08-06-21 @ 08:27), Max: 98.8 (08-06-21 @ 03:58)  HR: 66 (08-06-21 @ 08:27) (65 - 68)  BP: 136/60 (08-06-21 @ 08:27) (136/60 - 160/66)  BP(mean): --  RR: 18 (08-06-21 @ 08:27) (18 - 18)  SpO2: 95% (08-06-21 @ 08:27) (95% - 98%)  I&O's Summary    05 Aug 2021 07:01  -  06 Aug 2021 07:00  --------------------------------------------------------  IN: 1240 mL / OUT: 1300 mL / NET: -60 mL    06 Aug 2021 07:01  -  06 Aug 2021 10:33  --------------------------------------------------------  IN: 0 mL / OUT: 200 mL / NET: -200 mL        PHYSICAL EXAM:  General: Patient appears cachectic, no acute distress  HEENT: NGT in place, anicteric sclera, MMM  Neck: supple, no JVD  Cardiovascular: harsh crescendo-decrescendo systolic murmur heard, prominent in R sternal region  Respiratory: lungs clear B/L; no W/R/R  Gastrointestinal: soft, NT/ND; no guarding or rebound +BSx4  Extremities: WWP; no edema  Neurological: AAOx3; no sensation to left lower extremity, sensation intact in RLE and bilateral upper extremities. Decreased sensation to left side of face, intact to right. 4/5 strength B/L UE, 4/5 strength RLE, 2/5 strength LLE. Left and right hand noted to have mild tremors/twitches  Psychiatric: pleasant mood and affect  Dermatologic: no appreciable wounds or damage to the skin        LABS:                        7.9    36.25 )-----------( 426      ( 06 Aug 2021 07:53 )             25.8     08-06    145  |  113<H>  |  76<H>  ----------------------------<  177<H>  4.3   |  27  |  1.66<H>    Ca    8.6      06 Aug 2021 07:53  Phos  4.1     08-06  Mg     2.0     08-06              RADIOLOGY & ADDITIONAL TESTS:    MEDICATIONS  (STANDING):  atorvastatin 40 milliGRAM(s) Oral at bedtime  dextrose 40% Gel 15 Gram(s) Oral once  dextrose 5%. 1000 milliLiter(s) (50 mL/Hr) IV Continuous <Continuous>  dextrose 5%. 1000 milliLiter(s) (100 mL/Hr) IV Continuous <Continuous>  dextrose 50% Injectable 25 Gram(s) IV Push once  dextrose 50% Injectable 25 Gram(s) IV Push once  dextrose 50% Injectable 12.5 Gram(s) IV Push once  glucagon  Injectable 1 milliGRAM(s) IntraMuscular once  insulin lispro (ADMELOG) corrective regimen sliding scale   SubCutaneous every 6 hours  levETIRAcetam  Solution 500 milliGRAM(s) Enteral Tube every 24 hours  multivitamin 1 Tablet(s) Oral daily  predniSONE   Tablet 10 milliGRAM(s) Oral every 24 hours  senna 2 Tablet(s) Oral at bedtime  zinc sulfate 220 milliGRAM(s) Oral daily    MEDICATIONS  (PRN):  acetaminophen    Suspension .. 600 milliGRAM(s) Enteral Tube every 6 hours PRN Moderate Pain (4 - 6)

## 2021-08-06 NOTE — PROGRESS NOTE ADULT - PROBLEM SELECTOR PLAN 2
-positive anti-ach antibiotics  - S/p IVIG x5 days  - Will consider starting Rituximab q6 months for long-term management of myasthenia, pending ID assessment of prior Hep B infection and improvement of current hypotension/pulmonary edema, per neuro recs  PLAN:  - Cont Prednisone 10 mg daily, started 7/31  - Neurology following, appreciate reccs  - F/U PT recs

## 2021-08-06 NOTE — PROGRESS NOTE ADULT - PROBLEM SELECTOR PLAN 1
-patient developed new left lower extremity numbness and weakness on 8/3  -patient denies back pain  -patient has strength of 2/5 in LLE (RLE is 4/5) with no sensation in the left lower extremity. Bilateral upper extremities sensation intact with strength of 4/5  -less likely to be due to MG, as findings are unilateral    PLAN:  -neuro following, appreciate reccs  -MRI lumbar pending

## 2021-08-06 NOTE — PROGRESS NOTE ADULT - PROBLEM SELECTOR PLAN 6
-patient initially stepped up from [facility] to MICU for aspiration pneumonia and septic shock  -s/p Zosyn 5 day course   -now afebrile  PLAN:  Pt NPO, tube feeds.   - maintain NGT feeds  -follow up with speech and swallow and barium swallow

## 2021-08-07 DIAGNOSIS — K66.1 HEMOPERITONEUM: ICD-10-CM

## 2021-08-07 LAB
ANION GAP SERPL CALC-SCNC: 8 MMOL/L — SIGNIFICANT CHANGE UP (ref 5–17)
ANISOCYTOSIS BLD QL: SLIGHT — SIGNIFICANT CHANGE UP
BASOPHILS # BLD AUTO: 0.32 K/UL — HIGH (ref 0–0.2)
BASOPHILS NFR BLD AUTO: 0.9 % — SIGNIFICANT CHANGE UP (ref 0–2)
BUN SERPL-MCNC: 72 MG/DL — HIGH (ref 7–23)
BURR CELLS BLD QL SMEAR: PRESENT — SIGNIFICANT CHANGE UP
CALCIUM SERPL-MCNC: 8.8 MG/DL — SIGNIFICANT CHANGE UP (ref 8.4–10.5)
CHLORIDE SERPL-SCNC: 111 MMOL/L — HIGH (ref 96–108)
CO2 SERPL-SCNC: 27 MMOL/L — SIGNIFICANT CHANGE UP (ref 22–31)
CREAT SERPL-MCNC: 1.55 MG/DL — HIGH (ref 0.5–1.3)
EOSINOPHIL # BLD AUTO: 1.54 K/UL — HIGH (ref 0–0.5)
EOSINOPHIL NFR BLD AUTO: 4.3 % — SIGNIFICANT CHANGE UP (ref 0–6)
GLUCOSE BLDC GLUCOMTR-MCNC: 145 MG/DL — HIGH (ref 70–99)
GLUCOSE BLDC GLUCOMTR-MCNC: 172 MG/DL — HIGH (ref 70–99)
GLUCOSE BLDC GLUCOMTR-MCNC: 174 MG/DL — HIGH (ref 70–99)
GLUCOSE BLDC GLUCOMTR-MCNC: 180 MG/DL — HIGH (ref 70–99)
GLUCOSE SERPL-MCNC: 171 MG/DL — HIGH (ref 70–99)
HCT VFR BLD CALC: 25.7 % — LOW (ref 39–50)
HGB BLD-MCNC: 7.7 G/DL — LOW (ref 13–17)
LYMPHOCYTES # BLD AUTO: 1.25 K/UL — SIGNIFICANT CHANGE UP (ref 1–3.3)
LYMPHOCYTES # BLD AUTO: 3.5 % — LOW (ref 13–44)
MAGNESIUM SERPL-MCNC: 2.2 MG/DL — SIGNIFICANT CHANGE UP (ref 1.6–2.6)
MANUAL SMEAR VERIFICATION: SIGNIFICANT CHANGE UP
MCHC RBC-ENTMCNC: 29.5 PG — SIGNIFICANT CHANGE UP (ref 27–34)
MCHC RBC-ENTMCNC: 30 GM/DL — LOW (ref 32–36)
MCV RBC AUTO: 98.5 FL — SIGNIFICANT CHANGE UP (ref 80–100)
MONOCYTES # BLD AUTO: 1.86 K/UL — HIGH (ref 0–0.9)
MONOCYTES NFR BLD AUTO: 5.2 % — SIGNIFICANT CHANGE UP (ref 2–14)
MYELOCYTES NFR BLD: 7.8 % — HIGH (ref 0–0)
NEUTROPHILS # BLD AUTO: 28.02 K/UL — HIGH (ref 1.8–7.4)
NEUTROPHILS NFR BLD AUTO: 78.3 % — HIGH (ref 43–77)
OVALOCYTES BLD QL SMEAR: SLIGHT — SIGNIFICANT CHANGE UP
PHOSPHATE SERPL-MCNC: 4.2 MG/DL — SIGNIFICANT CHANGE UP (ref 2.5–4.5)
PLAT MORPH BLD: ABNORMAL
PLATELET # BLD AUTO: 419 K/UL — HIGH (ref 150–400)
POIKILOCYTOSIS BLD QL AUTO: SLIGHT — SIGNIFICANT CHANGE UP
POLYCHROMASIA BLD QL SMEAR: SLIGHT — SIGNIFICANT CHANGE UP
POTASSIUM SERPL-MCNC: 4.7 MMOL/L — SIGNIFICANT CHANGE UP (ref 3.5–5.3)
POTASSIUM SERPL-SCNC: 4.7 MMOL/L — SIGNIFICANT CHANGE UP (ref 3.5–5.3)
RBC # BLD: 2.61 M/UL — LOW (ref 4.2–5.8)
RBC # FLD: 17.6 % — HIGH (ref 10.3–14.5)
RBC BLD AUTO: ABNORMAL
SCHISTOCYTES BLD QL AUTO: SLIGHT — SIGNIFICANT CHANGE UP
SODIUM SERPL-SCNC: 146 MMOL/L — HIGH (ref 135–145)
SPHEROCYTES BLD QL SMEAR: SLIGHT — SIGNIFICANT CHANGE UP
WBC # BLD: 35.78 K/UL — HIGH (ref 3.8–10.5)
WBC # FLD AUTO: 35.78 K/UL — HIGH (ref 3.8–10.5)

## 2021-08-07 PROCEDURE — 99233 SBSQ HOSP IP/OBS HIGH 50: CPT

## 2021-08-07 PROCEDURE — 74176 CT ABD & PELVIS W/O CONTRAST: CPT | Mod: 26

## 2021-08-07 PROCEDURE — 99233 SBSQ HOSP IP/OBS HIGH 50: CPT | Mod: GC

## 2021-08-07 RX ORDER — POLYETHYLENE GLYCOL 3350 17 G/17G
17 POWDER, FOR SOLUTION ORAL
Refills: 0 | Status: DISCONTINUED | OUTPATIENT
Start: 2021-08-07 | End: 2021-08-23

## 2021-08-07 RX ADMIN — Medication 1: at 07:45

## 2021-08-07 RX ADMIN — Medication 1: at 17:44

## 2021-08-07 RX ADMIN — Medication 10 MILLIGRAM(S): at 12:43

## 2021-08-07 RX ADMIN — ZINC SULFATE TAB 220 MG (50 MG ZINC EQUIVALENT) 220 MILLIGRAM(S): 220 (50 ZN) TAB at 12:43

## 2021-08-07 RX ADMIN — ATORVASTATIN CALCIUM 40 MILLIGRAM(S): 80 TABLET, FILM COATED ORAL at 22:34

## 2021-08-07 RX ADMIN — Medication 1: at 12:44

## 2021-08-07 RX ADMIN — Medication 1 TABLET(S): at 12:43

## 2021-08-07 RX ADMIN — SENNA PLUS 2 TABLET(S): 8.6 TABLET ORAL at 22:34

## 2021-08-07 RX ADMIN — LEVETIRACETAM 500 MILLIGRAM(S): 250 TABLET, FILM COATED ORAL at 12:43

## 2021-08-07 NOTE — PROGRESS NOTE ADULT - ASSESSMENT
89y Male with PMHx of HTN, severe aortic stenosis and seizures (on phenytoin) who presented with left facial droop and slurred speech, Stroke w/u negative. Found to have myasthenia gravis with positive anti-Ach antibodies. Also found to have leukocytosis suspicious for malignancy. Admitted to [facility] for treatment of MG and w/u of potential CML/CLL. S/p IVIG treatment. Course complicated by dysphagia and aspiration pneumonia. Admitted to MICU after developing septic shock likely 2/2 to aspiration PNA requiring pressors. Pt no longer requiring pressors stepped down to 7 [facility] continued monitoring and further workup of leukocytosis. Now following for L RP hematoma and dysphagia, as well as continued MG treatment.

## 2021-08-07 NOTE — PROGRESS NOTE ADULT - PROBLEM SELECTOR PLAN 7
Normocytic anemia, with iron panel consistent with anemia of chronic disease/ renal disease. B12/folate wnl. No evidence of hemolysis. Given CKD and Anemia, a monoclonal gammopathy workup performed and was negative  -patient s/p 2 units on 7/29, followed by stabilization  -cause of bleed secondary to LP hematoma vs CMML  PLAN:  - continue with SCDs   -monitor for signs of active bleeding  -follow up with MRI lumbar to r/o RP hematoma  - Transfuse if <7  - Keep active T&S Normocytic anemia, with iron panel consistent with anemia of chronic disease/ renal disease. B12/folate wnl. No evidence of hemolysis. Given CKD and Anemia, a monoclonal gammopathy workup performed and was negative  -patient s/p 2 units on 7/29, followed by stabilization  MRI confirmed massive L RP hematoma    PLAN:  - continue with SCDs   -monitor for signs of active bleeding  - Transfuse if <7  - Keep active T&S  f/u with IR regarding drainage

## 2021-08-07 NOTE — PROGRESS NOTE ADULT - PROBLEM SELECTOR PLAN 1
-patient developed new left lower extremity numbness and weakness on 8/3  -patient denies back pain at present  -patient has strength of 3/5 in LLE (RLE is 4/5). Now has sensation to left foot and anterior tibial region, no sensation to left thigh. Intact sensation to RLE.  Bilateral upper extremities sensation intact with strength of 4/5  -MRI lumbar showed L RP bleed. Ortho notified and recommended IR consult for drainage  -hemoglobin of 7.7 today, continue to monitor    PLAN:  -neuro following, appreciate reccs  -CT A/P pending  -f/u with IR regarding hematoma drainage

## 2021-08-07 NOTE — PROGRESS NOTE ADULT - ASSESSMENT
A 89 year old male with newly diagnosed myasthenia gravis, along with leukocytosis 43 this AM of unclear etiology (possibly CMML or other hematologic malignancy). S/p x5 days IVIG and was started on prednisone 10 mg daily for MG.     Plan:  - Cont prednisone 10 mg daily for now, will continue on this medication regimen for MG. Will not start mestinon or other medications due to secretions and dysphagia  - Speech and swallow eval performed and following, ailin ice chips okay for now and will advance as tolerated. NGT remains in place with TF's  - Still considering Rituximab treatment for 6 months, pending ID approval due to history of hepatitis B but likely would not start inpatient at this point  - Following leukocytosis work up, heme/onc is on board for possible CMML vs other malignancy, flow cytometry showed increased monocytes, BCR-ABL, JAMES-2 was negative. MRI Lumbar spine showed marrow signal abnormality in L spine, concerning for lymphoproliferative disorder. Bone marrow biopsy recommended but family is refusing  - Hb dropped to around 5 on this admission and s/p MICU for hypotension and anemia. Hb stable  - MRI Lumbar spine w/o contrast done to evaluate left leg weakness, showed massive L RP hematoma, stable, likely contributing to LLE weakness. CT A/P is advised but no acute intervention at this time

## 2021-08-07 NOTE — PROGRESS NOTE ADULT - SUBJECTIVE AND OBJECTIVE BOX
OVERNIGHT EVENTS: MRI lumbar yesterday showed massive L retroperitoneal hematoma. Ortho recommended IR consult for drainage.    SUBJECTIVE:  Patient seen and examined at bedside. Patient reports improvement in left lower extremity weakness, but states it still continues. Also reports he has not had a BM in two days. Patient denies fevers/chills, chest pain, shortness of breath, cough, abdominal pain.    ROS: otherwise negative    Vital Signs Last 12 Hrs  T(F): 98.2 (08-07-21 @ 09:03), Max: 98.6 (08-07-21 @ 05:59)  HR: 66 (08-07-21 @ 09:03) (66 - 67)  BP: 156/65 (08-07-21 @ 09:03) (156/65 - 160/64)  BP(mean): --  RR: 18 (08-07-21 @ 09:03) (18 - 18)  SpO2: 98% (08-07-21 @ 09:03) (96% - 98%)  I&O's Summary    06 Aug 2021 07:01  -  07 Aug 2021 07:00  --------------------------------------------------------  IN: 660 mL / OUT: 900 mL / NET: -240 mL    07 Aug 2021 07:01  -  07 Aug 2021 09:55  --------------------------------------------------------  IN: 0 mL / OUT: 1600 mL / NET: -1600 mL        PHYSICAL EXAM:  General: Patient appears cachectic, no acute respiratory or painful distress  HEENT: NGT in place, anicteric sclera, MMM  Neck: supple, no JVD  Cardiovascular: harsh crescendo-decrescendo systolic murmur heard, prominent in R sternal region  Respiratory: lungs clear B/L; no W/R/R  Gastrointestinal: soft, NT/ND; no guarding or rebound +BSx4  Extremities: WWP; no edema  Neurological: AAOx3; Has sensation to left foot and anterior tibial region, denies sensation to left thigh. Sensation intact in RLE and bilateral upper extremities. Decreased sensation to left side of face, intact to right. 4/5 strength B/L UE, 4/5 strength RLE, 3/5 strength LLE. Able to move toes. Left and right hand noted to have mild tremors/twitches  Psychiatric: pleasant mood and affect  Dermatologic: no appreciable wounds or damage to the skin        LABS:                        7.7    35.78 )-----------( 419      ( 07 Aug 2021 08:04 )             25.7     08-07    146<H>  |  111<H>  |  72<H>  ----------------------------<  171<H>  4.7   |  27  |  1.55<H>    Ca    8.8      07 Aug 2021 08:04  Phos  4.2     08-07  Mg     2.2     08-07              RADIOLOGY & ADDITIONAL TESTS:    MEDICATIONS  (STANDING):  atorvastatin 40 milliGRAM(s) Oral at bedtime  dextrose 40% Gel 15 Gram(s) Oral once  dextrose 5%. 1000 milliLiter(s) (50 mL/Hr) IV Continuous <Continuous>  dextrose 5%. 1000 milliLiter(s) (100 mL/Hr) IV Continuous <Continuous>  dextrose 50% Injectable 25 Gram(s) IV Push once  dextrose 50% Injectable 12.5 Gram(s) IV Push once  dextrose 50% Injectable 25 Gram(s) IV Push once  glucagon  Injectable 1 milliGRAM(s) IntraMuscular once  insulin lispro (ADMELOG) corrective regimen sliding scale   SubCutaneous every 6 hours  levETIRAcetam  Solution 500 milliGRAM(s) Enteral Tube every 24 hours  multivitamin 1 Tablet(s) Oral daily  polyethylene glycol 3350 17 Gram(s) Oral two times a day  predniSONE   Tablet 10 milliGRAM(s) Oral every 24 hours  senna 2 Tablet(s) Oral at bedtime  zinc sulfate 220 milliGRAM(s) Oral daily    MEDICATIONS  (PRN):  acetaminophen    Suspension .. 600 milliGRAM(s) Enteral Tube every 6 hours PRN Moderate Pain (4 - 6)

## 2021-08-07 NOTE — PROGRESS NOTE ADULT - SUBJECTIVE AND OBJECTIVE BOX
Neurology Progress Note    Interval History: Patient seen and examined at bedside this AM. Patient states he feels okay, offers no new complaints. Continued LLE weakness as per patient and family member at bedside. Denies any fevers, chills, chest pain, palpitations, shortness of breath, cough, nausea, vomiting, constipation, diarrhea, leg swelling, headaches, numbness, tingling, lightheadedness, dizziness.      Medications:  acetaminophen    Suspension .. 600 milliGRAM(s) Enteral Tube every 6 hours PRN  atorvastatin 40 milliGRAM(s) Oral at bedtime  dextrose 40% Gel 15 Gram(s) Oral once  dextrose 5%. 1000 milliLiter(s) IV Continuous <Continuous>  dextrose 5%. 1000 milliLiter(s) IV Continuous <Continuous>  dextrose 50% Injectable 25 Gram(s) IV Push once  dextrose 50% Injectable 12.5 Gram(s) IV Push once  dextrose 50% Injectable 25 Gram(s) IV Push once  glucagon  Injectable 1 milliGRAM(s) IntraMuscular once  insulin lispro (ADMELOG) corrective regimen sliding scale   SubCutaneous every 6 hours  levETIRAcetam  Solution 500 milliGRAM(s) Enteral Tube every 24 hours  multivitamin 1 Tablet(s) Oral daily  polyethylene glycol 3350 17 Gram(s) Oral two times a day  predniSONE   Tablet 10 milliGRAM(s) Oral every 24 hours  senna 2 Tablet(s) Oral at bedtime  zinc sulfate 220 milliGRAM(s) Oral daily      Vital Signs Last 24 Hrs  T(C): 36.4 (07 Aug 2021 15:37), Max: 37 (07 Aug 2021 05:59)  T(F): 97.6 (07 Aug 2021 15:37), Max: 98.6 (07 Aug 2021 05:59)  HR: 68 (07 Aug 2021 15:37) (66 - 71)  BP: 149/73 (07 Aug 2021 15:37) (143/68 - 160/64)  BP(mean): --  RR: 18 (07 Aug 2021 15:37) (18 - 18)  SpO2: 95% (07 Aug 2021 15:37) (95% - 98%)    General: No acute distress  HEENT: NCAT, EOMI, PERRLA, anicteric sclera  Neck: Supple  Cardiovascular: +S1/S2, RRR  Respiratory: CTA B/L, L lower lobe diminished breath sounds, +rales, no wheezes or rhonchi  Gastrointestinal: Soft, NTND, +BS in all 4 quadrants, NGT placed  Extremities: No edema, clubbing or cyanosis noted  Vascular: 2+ radial, DP/PT pulses B/L  Neurologic: Motor strength testing 4-/5 proximal UE muscle weakness. LLE 3/5 muscle weakness. Mild L sided facial droop.        Labs:  CBC Full  -  ( 07 Aug 2021 08:04 )  WBC Count : 35.78 K/uL  RBC Count : 2.61 M/uL  Hemoglobin : 7.7 g/dL  Hematocrit : 25.7 %  Platelet Count - Automated : 419 K/uL  Mean Cell Volume : 98.5 fl  Mean Cell Hemoglobin : 29.5 pg  Mean Cell Hemoglobin Concentration : 30.0 gm/dL  Auto Neutrophil # : 28.02 K/uL  Auto Lymphocyte # : 1.25 K/uL  Auto Monocyte # : 1.86 K/uL  Auto Eosinophil # : 1.54 K/uL  Auto Basophil # : 0.32 K/uL  Auto Neutrophil % : 78.3 %  Auto Lymphocyte % : 3.5 %  Auto Monocyte % : 5.2 %  Auto Eosinophil % : 4.3 %  Auto Basophil % : 0.9 %      08-07    146<H>  |  111<H>  |  72<H>  ----------------------------<  171<H>  4.7   |  27  |  1.55<H>    Ca    8.8      07 Aug 2021 08:04  Phos  4.2     08-07  Mg     2.2     08-07     Neurology Progress Note    Interval History: Patient seen and examined at bedside this AM. Patient states he feels okay, offers no new complaints. Continued LLE weakness as per patient and family member at bedside. Denies any fevers, chills, chest pain, palpitations, shortness of breath, cough, nausea, vomiting, constipation, diarrhea, leg swelling, headaches, numbness, tingling, lightheadedness, dizziness.      Medications:  acetaminophen    Suspension .. 600 milliGRAM(s) Enteral Tube every 6 hours PRN  atorvastatin 40 milliGRAM(s) Oral at bedtime  glucagon  Injectable 1 milliGRAM(s) IntraMuscular once  insulin lispro (ADMELOG) corrective regimen sliding scale   SubCutaneous every 6 hours  levETIRAcetam  Solution 500 milliGRAM(s) Enteral Tube every 24 hours  multivitamin 1 Tablet(s) Oral daily  polyethylene glycol 3350 17 Gram(s) Oral two times a day  predniSONE   Tablet 10 milliGRAM(s) Oral every 24 hours  senna 2 Tablet(s) Oral at bedtime  zinc sulfate 220 milliGRAM(s) Oral daily      Vital Signs Last 24 Hrs  T(C): 36.4 (07 Aug 2021 15:37), Max: 37 (07 Aug 2021 05:59)  T(F): 97.6 (07 Aug 2021 15:37), Max: 98.6 (07 Aug 2021 05:59)  HR: 68 (07 Aug 2021 15:37) (66 - 71)  BP: 149/73 (07 Aug 2021 15:37) (143/68 - 160/64)  BP(mean): --  RR: 18 (07 Aug 2021 15:37) (18 - 18)  SpO2: 95% (07 Aug 2021 15:37) (95% - 98%)    General: No acute distress  HEENT: NCAT, EOMI, PERRLA, anicteric sclera  Neck: Supple  Cardiovascular: +S1/S2, RRR  Respiratory: CTA B/L, L lower lobe diminished breath sounds, +rales, no wheezes or rhonchi  Gastrointestinal: Soft, NTND, +BS in all 4 quadrants, NGT placed  Extremities: No edema, clubbing or cyanosis noted  Vascular: 2+ radial, DP/PT pulses B/L  Neurologic: Motor strength testing 4-/5 proximal UE muscle weakness. LLE 3/5 muscle weakness. Mild L sided facial droop.        Labs:                          7.7    35.78 )-----------( 419      ( 07 Aug 2021 08:04 )             25.7       08-07    146<H>  |  111<H>  |  72<H>  ----------------------------<  171<H>  4.7   |  27  |  1.55<H>    Ca    8.8      07 Aug 2021 08:04  Phos  4.2     08-07  Mg     2.2     08-07

## 2021-08-07 NOTE — PROGRESS NOTE ADULT - ATTENDING COMMENTS
Plan of care as outlined above MRI LSpine shows massive L RP hematoma, CT abdomen done this evening, will f/up results  IR has been consulted as well, apprec Ortho recs  Patient feels that his Left LE is stronger now  Apprec Neuro recs  NGT remains in place  Rest as above

## 2021-08-07 NOTE — PROGRESS NOTE ADULT - PROBLEM SELECTOR PLAN 4
Likely ATN 2/2 to septic shock, poor perfusion. Pt has a history of CKD. baseline 1.6-1.7.   - Creatinine improving  - Cont to trend creatinine    #Hypernatremia  RESOLVED  -Na of 145 today. Patient asymptomatic, A&Ox3  -discontinue 250ml free water q6h  -continue to monitor Likely ATN 2/2 to septic shock, poor perfusion. Pt has a history of CKD. baseline 1.6-1.7.   - Creatinine improving  - Cont to trend creatinine    #Hypernatremia  RESOLVED  -Na of 145 today. Patient asymptomatic, A&Ox3  -started on 150cc free water q6h  -continue to monitor

## 2021-08-07 NOTE — PROGRESS NOTE ADULT - PROBLEM SELECTOR PLAN 2
Currently NPO, NGT placed, tube feeds    PLAN:  - wet swabs to moisten mouth  -f/u final Barium swallow results  -per speech and swallow, patient should remain NPO/NGT at this time.     #Left upper extremity swelling  -LUE US pending - f/u results Currently NPO, NGT placed, tube feeds    PLAN:  - wet swabs to moisten mouth  -f/u final Barium swallow results  -per speech and swallow, patient should remain NPO/NGT at this time.     #Left upper extremity swelling  -LUE US consistent with superficial thrombophlebitis

## 2021-08-07 NOTE — PROGRESS NOTE ADULT - ATTENDING COMMENTS
MRI lumbar spine independently reviewed and discussed with neuroradiology (Dr. Bautista).  Large RP hematoma, which explains his left leg weakness.  CTAP and IR consultation for possible drainage pending.  NGT also remains in place per S/S recs.  MRI also notable for vertebral marrow signal abnormality which in conjunction with leukocytosis raises concern for hematologic maligancy -- onc work up is ongoing, onc team to discuss ?bone marrow biopsy with patient and family.   For now will continue prednisone 10 mg for myasthenia but defer additional immunosuppression pending onc work up. MRI lumbar spine independently reviewed and discussed with neuroradiology (Dr. Bautista).  Large RP hematoma, which explains his left leg weakness.  CTAP and IR consultation for possible drainage pending.  NGT also remains in place per S/S recs.  MRI also notable for vertebral marrow signal abnormality which in conjunction with leukocytosis raises concern for hematologic maligancy -- onc work up is ongoing, onc team to discuss ?bone marrow biopsy with patient and family.   For now will continue prednisone 10 mg for myasthenia but defer additional immunosuppression pending onc work up.  Neurology will sign off for now.  When patient is ready for discharge, please call [phone] to schedule follow up with Dr. Wing [place].

## 2021-08-08 LAB
ANION GAP SERPL CALC-SCNC: 7 MMOL/L — SIGNIFICANT CHANGE UP (ref 5–17)
ANISOCYTOSIS BLD QL: SLIGHT — SIGNIFICANT CHANGE UP
BASOPHILS # BLD AUTO: 0.3 K/UL — HIGH (ref 0–0.2)
BASOPHILS NFR BLD AUTO: 0.8 % — SIGNIFICANT CHANGE UP (ref 0–2)
BLD GP AB SCN SERPL QL: NEGATIVE — SIGNIFICANT CHANGE UP
BUN SERPL-MCNC: 75 MG/DL — HIGH (ref 7–23)
BURR CELLS BLD QL SMEAR: PRESENT — SIGNIFICANT CHANGE UP
CALCIUM SERPL-MCNC: 9 MG/DL — SIGNIFICANT CHANGE UP (ref 8.4–10.5)
CHLORIDE SERPL-SCNC: 111 MMOL/L — HIGH (ref 96–108)
CO2 SERPL-SCNC: 27 MMOL/L — SIGNIFICANT CHANGE UP (ref 22–31)
CREAT SERPL-MCNC: 1.56 MG/DL — HIGH (ref 0.5–1.3)
EOSINOPHIL # BLD AUTO: 1.92 K/UL — HIGH (ref 0–0.5)
EOSINOPHIL NFR BLD AUTO: 5.2 % — SIGNIFICANT CHANGE UP (ref 0–6)
GIANT PLATELETS BLD QL SMEAR: PRESENT — SIGNIFICANT CHANGE UP
GLUCOSE BLDC GLUCOMTR-MCNC: 137 MG/DL — HIGH (ref 70–99)
GLUCOSE BLDC GLUCOMTR-MCNC: 143 MG/DL — HIGH (ref 70–99)
GLUCOSE BLDC GLUCOMTR-MCNC: 171 MG/DL — HIGH (ref 70–99)
GLUCOSE BLDC GLUCOMTR-MCNC: 178 MG/DL — HIGH (ref 70–99)
GLUCOSE BLDC GLUCOMTR-MCNC: 179 MG/DL — HIGH (ref 70–99)
GLUCOSE BLDC GLUCOMTR-MCNC: 213 MG/DL — HIGH (ref 70–99)
GLUCOSE SERPL-MCNC: 159 MG/DL — HIGH (ref 70–99)
HCT VFR BLD CALC: 26.5 % — LOW (ref 39–50)
HGB BLD-MCNC: 8 G/DL — LOW (ref 13–17)
HYPOCHROMIA BLD QL: SLIGHT — SIGNIFICANT CHANGE UP
LYMPHOCYTES # BLD AUTO: 0.33 K/UL — LOW (ref 1–3.3)
LYMPHOCYTES # BLD AUTO: 0.9 % — LOW (ref 13–44)
MAGNESIUM SERPL-MCNC: 2.1 MG/DL — SIGNIFICANT CHANGE UP (ref 1.6–2.6)
MANUAL SMEAR VERIFICATION: SIGNIFICANT CHANGE UP
MCHC RBC-ENTMCNC: 29.7 PG — SIGNIFICANT CHANGE UP (ref 27–34)
MCHC RBC-ENTMCNC: 30.2 GM/DL — LOW (ref 32–36)
MCV RBC AUTO: 98.5 FL — SIGNIFICANT CHANGE UP (ref 80–100)
MICROCYTES BLD QL: SLIGHT — SIGNIFICANT CHANGE UP
MONOCYTES # BLD AUTO: 1.26 K/UL — HIGH (ref 0–0.9)
MONOCYTES NFR BLD AUTO: 3.4 % — SIGNIFICANT CHANGE UP (ref 2–14)
MYELOCYTES NFR BLD: 4.3 % — HIGH (ref 0–0)
NEUTROPHILS # BLD AUTO: 31.21 K/UL — HIGH (ref 1.8–7.4)
NEUTROPHILS NFR BLD AUTO: 84.5 % — HIGH (ref 43–77)
OVALOCYTES BLD QL SMEAR: SLIGHT — SIGNIFICANT CHANGE UP
PHOSPHATE SERPL-MCNC: 4.2 MG/DL — SIGNIFICANT CHANGE UP (ref 2.5–4.5)
PLAT MORPH BLD: ABNORMAL
PLATELET # BLD AUTO: 429 K/UL — HIGH (ref 150–400)
POIKILOCYTOSIS BLD QL AUTO: SLIGHT — SIGNIFICANT CHANGE UP
POLYCHROMASIA BLD QL SMEAR: SLIGHT — SIGNIFICANT CHANGE UP
POTASSIUM SERPL-MCNC: 4.8 MMOL/L — SIGNIFICANT CHANGE UP (ref 3.5–5.3)
POTASSIUM SERPL-SCNC: 4.8 MMOL/L — SIGNIFICANT CHANGE UP (ref 3.5–5.3)
PROMYELOCYTES # FLD: 0.9 % — HIGH (ref 0–0)
RBC # BLD: 2.69 M/UL — LOW (ref 4.2–5.8)
RBC # FLD: 17.2 % — HIGH (ref 10.3–14.5)
RBC BLD AUTO: ABNORMAL
RH IG SCN BLD-IMP: POSITIVE — SIGNIFICANT CHANGE UP
SCHISTOCYTES BLD QL AUTO: SLIGHT — SIGNIFICANT CHANGE UP
SODIUM SERPL-SCNC: 145 MMOL/L — SIGNIFICANT CHANGE UP (ref 135–145)
SPHEROCYTES BLD QL SMEAR: SLIGHT — SIGNIFICANT CHANGE UP
WBC # BLD: 36.94 K/UL — HIGH (ref 3.8–10.5)
WBC # FLD AUTO: 36.94 K/UL — HIGH (ref 3.8–10.5)

## 2021-08-08 PROCEDURE — 99233 SBSQ HOSP IP/OBS HIGH 50: CPT

## 2021-08-08 RX ADMIN — ATORVASTATIN CALCIUM 40 MILLIGRAM(S): 80 TABLET, FILM COATED ORAL at 21:26

## 2021-08-08 RX ADMIN — LEVETIRACETAM 500 MILLIGRAM(S): 250 TABLET, FILM COATED ORAL at 12:24

## 2021-08-08 RX ADMIN — POLYETHYLENE GLYCOL 3350 17 GRAM(S): 17 POWDER, FOR SOLUTION ORAL at 06:36

## 2021-08-08 RX ADMIN — Medication 1 TABLET(S): at 12:24

## 2021-08-08 RX ADMIN — ZINC SULFATE TAB 220 MG (50 MG ZINC EQUIVALENT) 220 MILLIGRAM(S): 220 (50 ZN) TAB at 12:24

## 2021-08-08 RX ADMIN — Medication 1: at 12:23

## 2021-08-08 RX ADMIN — Medication 1: at 06:34

## 2021-08-08 RX ADMIN — Medication 2: at 18:08

## 2021-08-08 RX ADMIN — Medication 10 MILLIGRAM(S): at 12:25

## 2021-08-08 NOTE — PROGRESS NOTE ADULT - PROBLEM SELECTOR PLAN 2
Currently NPO, NGT placed, tube feeds  Swallow follow up in few days      #Left upper extremity swelling  -LUE US consistent with superficial thrombophlebitis

## 2021-08-08 NOTE — PROGRESS NOTE ADULT - PROBLEM SELECTOR PLAN 1
patient developed new left lower extremity numbness and weakness on 8/3  CT abdomen results reviewed, apprec Ortho recs  Awaiting IR eval tomorrow for possible drainage  H/H has remained stable in past few days

## 2021-08-08 NOTE — PROGRESS NOTE ADULT - PROBLEM SELECTOR PLAN 4
Likely ATN 2/2 to septic shock, poor perfusion. Pt has a history of CKD stage 3. baseline 1.6-1.7  - Creatinine back at baseline

## 2021-08-08 NOTE — PROGRESS NOTE ADULT - PROBLEM SELECTOR PLAN 8
Not a surgical candidate for severe AS given poor prognosis as well as family wishes to not pursue aggressive measures  Cautious with IVF administration  Euvolemic at present time

## 2021-08-08 NOTE — PROGRESS NOTE ADULT - PROBLEM SELECTOR PLAN 7
Stable since transfusion  Since diagnosis or RPB H/H has remained stable  Continue to monitor CBC closely

## 2021-08-08 NOTE — CHART NOTE - NSCHARTNOTEFT_GEN_A_CORE
Provider received call from radiologist Dr. Aaron regarding CT A&P. It appears that the documented L retroperitoneal hematoma may have acute and subacute components to it. Per neuro recs, hematoma appears stable in imaging and there is no acute intervention needed. Currently, VSS and patient's clinical condition remains unchanged. Provider will communicate these findings to the day team who are already planning to consult IR for hematoma drainage.

## 2021-08-08 NOTE — PROGRESS NOTE ADULT - PROBLEM SELECTOR PLAN 3
S/p IVIG x5 days  Cont Prednisone 10 mg daily, started 7/31  Neurology following, appreciate recdory  F/U PT recs

## 2021-08-08 NOTE — PROGRESS NOTE ADULT - SUBJECTIVE AND OBJECTIVE BOX
Patient is a 89y old  Male who presents with a chief complaint of slurred speech, L facial droop (07 Aug 2021 19:45)      INTERVAL HPI/OVERNIGHT EVENTS:  Seen by me this afternoon, resting comfortably in bed. No pain on LLE. Receiving TF's via NGT.    Review of Systems: 12 point review of systems otherwise negative    MEDICATIONS  (STANDING):  atorvastatin 40 milliGRAM(s) Oral at bedtime  dextrose 40% Gel 15 Gram(s) Oral once  dextrose 5%. 1000 milliLiter(s) (50 mL/Hr) IV Continuous <Continuous>  dextrose 5%. 1000 milliLiter(s) (100 mL/Hr) IV Continuous <Continuous>  dextrose 50% Injectable 25 Gram(s) IV Push once  dextrose 50% Injectable 12.5 Gram(s) IV Push once  dextrose 50% Injectable 25 Gram(s) IV Push once  glucagon  Injectable 1 milliGRAM(s) IntraMuscular once  insulin lispro (ADMELOG) corrective regimen sliding scale   SubCutaneous every 6 hours  levETIRAcetam  Solution 500 milliGRAM(s) Enteral Tube every 24 hours  multivitamin 1 Tablet(s) Oral daily  polyethylene glycol 3350 17 Gram(s) Oral two times a day  predniSONE   Tablet 10 milliGRAM(s) Oral every 24 hours  senna 2 Tablet(s) Oral at bedtime  zinc sulfate 220 milliGRAM(s) Oral daily    MEDICATIONS  (PRN):  acetaminophen    Suspension .. 600 milliGRAM(s) Enteral Tube every 6 hours PRN Moderate Pain (4 - 6)      Allergies    hay fever (Unknown)  No Known Drug Allergies    Intolerances          Vital Signs Last 24 Hrs  T(C): 36.5 (08 Aug 2021 16:04), Max: 36.6 (07 Aug 2021 21:00)  T(F): 97.7 (08 Aug 2021 16:04), Max: 97.9 (07 Aug 2021 21:00)  HR: 66 (08 Aug 2021 16:04) (63 - 68)  BP: 151/65 (08 Aug 2021 16:04) (133/72 - 169/72)  BP(mean): --  RR: 18 (08 Aug 2021 16:04) (14 - 18)  SpO2: 97% (08 Aug 2021 16:04) (97% - 100%)  CAPILLARY BLOOD GLUCOSE      POCT Blood Glucose.: 213 mg/dL (08 Aug 2021 17:26)  POCT Blood Glucose.: 179 mg/dL (08 Aug 2021 12:11)  POCT Blood Glucose.: 178 mg/dL (08 Aug 2021 06:07)  POCT Blood Glucose.: 143 mg/dL (08 Aug 2021 01:50)  POCT Blood Glucose.: 171 mg/dL (08 Aug 2021 00:05)      08-07 @ 07:01  -  08-08 @ 07:00  --------------------------------------------------------  IN: 750 mL / OUT: 3410 mL / NET: -2660 mL        Physical Exam:    Daily     Daily   General:  Well appearing, NAD, not cachetic  HEENT:  Nonicteric, PERRLA, +NGT in place  CV:  S1S2 ok  Lungs:  CTA B/L, no wheezes, rales, rhonchi  Abdomen:  Soft, non-tender, no distended, positive BS  Extremities:  2+ pulses, no c/c, no edema  Skin:  Warm and dry, no rashes  :  No lin  Neuro:  AAOx3  No Restraints    LABS:                        8.0    36.94 )-----------( 429      ( 08 Aug 2021 06:41 )             26.5     08-08    145  |  111<H>  |  75<H>  ----------------------------<  159<H>  4.8   |  27  |  1.56<H>    Ca    9.0      08 Aug 2021 06:41  Phos  4.2     08-08  Mg     2.1     08-08              RADIOLOGY & ADDITIONAL TESTS:  Reviewed by me

## 2021-08-09 LAB
ALBUMIN SERPL ELPH-MCNC: 2.9 G/DL — LOW (ref 3.3–5)
ALP SERPL-CCNC: 64 U/L — SIGNIFICANT CHANGE UP (ref 40–120)
ALT FLD-CCNC: 32 U/L — SIGNIFICANT CHANGE UP (ref 10–45)
ANION GAP SERPL CALC-SCNC: 8 MMOL/L — SIGNIFICANT CHANGE UP (ref 5–17)
ANISOCYTOSIS BLD QL: SLIGHT — SIGNIFICANT CHANGE UP
APTT BLD: 31.2 SEC — SIGNIFICANT CHANGE UP (ref 27.5–35.5)
AST SERPL-CCNC: 30 U/L — SIGNIFICANT CHANGE UP (ref 10–40)
BASOPHILS # BLD AUTO: 0 K/UL — SIGNIFICANT CHANGE UP (ref 0–0.2)
BASOPHILS NFR BLD AUTO: 0 % — SIGNIFICANT CHANGE UP (ref 0–2)
BILIRUB SERPL-MCNC: 0.7 MG/DL — SIGNIFICANT CHANGE UP (ref 0.2–1.2)
BUN SERPL-MCNC: 73 MG/DL — HIGH (ref 7–23)
CALCIUM SERPL-MCNC: 9.3 MG/DL — SIGNIFICANT CHANGE UP (ref 8.4–10.5)
CHLORIDE SERPL-SCNC: 111 MMOL/L — HIGH (ref 96–108)
CO2 SERPL-SCNC: 27 MMOL/L — SIGNIFICANT CHANGE UP (ref 22–31)
CREAT SERPL-MCNC: 1.47 MG/DL — HIGH (ref 0.5–1.3)
EOSINOPHIL # BLD AUTO: 0.35 K/UL — SIGNIFICANT CHANGE UP (ref 0–0.5)
EOSINOPHIL NFR BLD AUTO: 0.9 % — SIGNIFICANT CHANGE UP (ref 0–6)
GIANT PLATELETS BLD QL SMEAR: PRESENT — SIGNIFICANT CHANGE UP
GLUCOSE BLDC GLUCOMTR-MCNC: 139 MG/DL — HIGH (ref 70–99)
GLUCOSE BLDC GLUCOMTR-MCNC: 153 MG/DL — HIGH (ref 70–99)
GLUCOSE BLDC GLUCOMTR-MCNC: 223 MG/DL — HIGH (ref 70–99)
GLUCOSE SERPL-MCNC: 158 MG/DL — HIGH (ref 70–99)
HCT VFR BLD CALC: 29.2 % — LOW (ref 39–50)
HGB BLD-MCNC: 8.7 G/DL — LOW (ref 13–17)
HYPOCHROMIA BLD QL: SIGNIFICANT CHANGE UP
INR BLD: 1.11 — SIGNIFICANT CHANGE UP (ref 0.88–1.16)
LYMPHOCYTES # BLD AUTO: 2.04 K/UL — SIGNIFICANT CHANGE UP (ref 1–3.3)
LYMPHOCYTES # BLD AUTO: 5.3 % — LOW (ref 13–44)
MACROCYTES BLD QL: SLIGHT — SIGNIFICANT CHANGE UP
MAGNESIUM SERPL-MCNC: 2.1 MG/DL — SIGNIFICANT CHANGE UP (ref 1.6–2.6)
MANUAL SMEAR VERIFICATION: SIGNIFICANT CHANGE UP
MCHC RBC-ENTMCNC: 29.7 PG — SIGNIFICANT CHANGE UP (ref 27–34)
MCHC RBC-ENTMCNC: 29.8 GM/DL — LOW (ref 32–36)
MCV RBC AUTO: 99.7 FL — SIGNIFICANT CHANGE UP (ref 80–100)
MONOCYTES # BLD AUTO: 2.35 K/UL — HIGH (ref 0–0.9)
MONOCYTES NFR BLD AUTO: 6.1 % — SIGNIFICANT CHANGE UP (ref 2–14)
MYELOCYTES NFR BLD: 0.9 % — HIGH (ref 0–0)
NEUTROPHILS # BLD AUTO: 33.44 K/UL — HIGH (ref 1.8–7.4)
NEUTROPHILS NFR BLD AUTO: 86.8 % — HIGH (ref 43–77)
OVALOCYTES BLD QL SMEAR: SLIGHT — SIGNIFICANT CHANGE UP
PHOSPHATE SERPL-MCNC: 4.4 MG/DL — SIGNIFICANT CHANGE UP (ref 2.5–4.5)
PLAT MORPH BLD: NORMAL — SIGNIFICANT CHANGE UP
PLATELET # BLD AUTO: 464 K/UL — HIGH (ref 150–400)
POIKILOCYTOSIS BLD QL AUTO: SLIGHT — SIGNIFICANT CHANGE UP
POLYCHROMASIA BLD QL SMEAR: SLIGHT — SIGNIFICANT CHANGE UP
POTASSIUM SERPL-MCNC: 5.1 MMOL/L — SIGNIFICANT CHANGE UP (ref 3.5–5.3)
POTASSIUM SERPL-SCNC: 5.1 MMOL/L — SIGNIFICANT CHANGE UP (ref 3.5–5.3)
PROT SERPL-MCNC: 6.8 G/DL — SIGNIFICANT CHANGE UP (ref 6–8.3)
PROTHROM AB SERPL-ACNC: 13.2 SEC — SIGNIFICANT CHANGE UP (ref 10.6–13.6)
RBC # BLD: 2.93 M/UL — LOW (ref 4.2–5.8)
RBC # FLD: 17.3 % — HIGH (ref 10.3–14.5)
RBC BLD AUTO: ABNORMAL
SCHISTOCYTES BLD QL AUTO: SLIGHT — SIGNIFICANT CHANGE UP
SODIUM SERPL-SCNC: 146 MMOL/L — HIGH (ref 135–145)
SPHEROCYTES BLD QL SMEAR: SLIGHT — SIGNIFICANT CHANGE UP
WBC # BLD: 38.52 K/UL — HIGH (ref 3.8–10.5)
WBC # FLD AUTO: 38.52 K/UL — HIGH (ref 3.8–10.5)

## 2021-08-09 PROCEDURE — 71045 X-RAY EXAM CHEST 1 VIEW: CPT | Mod: 26

## 2021-08-09 PROCEDURE — 99447 NTRPROF PH1/NTRNET/EHR 11-20: CPT

## 2021-08-09 PROCEDURE — 99233 SBSQ HOSP IP/OBS HIGH 50: CPT

## 2021-08-09 PROCEDURE — 99233 SBSQ HOSP IP/OBS HIGH 50: CPT | Mod: GC

## 2021-08-09 RX ORDER — HEPARIN SODIUM 5000 [USP'U]/ML
5000 INJECTION INTRAVENOUS; SUBCUTANEOUS EVERY 8 HOURS
Refills: 0 | Status: DISCONTINUED | OUTPATIENT
Start: 2021-08-09 | End: 2021-08-13

## 2021-08-09 RX ORDER — PYRIDOSTIGMINE BROMIDE 60 MG/5ML
30 SOLUTION ORAL THREE TIMES A DAY
Refills: 0 | Status: DISCONTINUED | OUTPATIENT
Start: 2021-08-09 | End: 2021-08-10

## 2021-08-09 RX ADMIN — LEVETIRACETAM 500 MILLIGRAM(S): 250 TABLET, FILM COATED ORAL at 12:06

## 2021-08-09 RX ADMIN — Medication 10 MILLIGRAM(S): at 12:07

## 2021-08-09 RX ADMIN — Medication 1 TABLET(S): at 12:06

## 2021-08-09 RX ADMIN — Medication 2: at 18:14

## 2021-08-09 RX ADMIN — Medication 1: at 12:06

## 2021-08-09 RX ADMIN — ZINC SULFATE TAB 220 MG (50 MG ZINC EQUIVALENT) 220 MILLIGRAM(S): 220 (50 ZN) TAB at 12:07

## 2021-08-09 NOTE — CONSULT NOTE ADULT - NSICDXPILOT_GEN_ALL_CORE
[place]

## 2021-08-09 NOTE — PROGRESS NOTE ADULT - SUBJECTIVE AND OBJECTIVE BOX
OVERNIGHT EVENTS: MICHAEL    SUBJECTIVE:  Patient seen and examined at bedside. Patient states he feels well and does not complain     Vital Signs Last 12 Hrs  T(F): 97.9 (08-09-21 @ 05:39), Max: 97.9 (08-09-21 @ 05:39)  HR: 64 (08-09-21 @ 05:39) (64 - 64)  BP: 171/63 (08-09-21 @ 05:39) (171/63 - 171/63)  BP(mean): --  RR: 16 (08-09-21 @ 05:39) (16 - 16)  SpO2: 99% (08-09-21 @ 05:39) (99% - 99%)  I&O's Summary    08 Aug 2021 07:01  -  09 Aug 2021 07:00  --------------------------------------------------------  IN: 440 mL / OUT: 1200 mL / NET: -760 mL        PHYSICAL EXAM:  Constitutional: NAD, comfortable in bed.  HEENT: NC/AT, PERRLA, EOMI, no conjunctival pallor or scleral icterus, MMM  Neck: Supple, no JVD  Respiratory: CTA B/L. No w/r/r.   Cardiovascular: RRR, normal S1 and S2, no m/r/g.   Gastrointestinal: +BS, soft NTND, no guarding or rebound tenderness, no palpable masses   Extremities: wwp; no cyanosis, clubbing or edema.   Vascular: Pulses equal and strong throughout.   Neurological: AAOx3, no CN deficits, strength and sensation intact throughout.   Skin: No gross skin abnormalities or rashes        LABS:                        8.7    38.52 )-----------( 464      ( 09 Aug 2021 08:43 )             29.2     08-09    146<H>  |  111<H>  |  73<H>  ----------------------------<  158<H>  5.1   |  27  |  1.47<H>    Ca    9.3      09 Aug 2021 08:43  Phos  4.4     08-09  Mg     2.1     08-09    TPro  6.8  /  Alb  2.9<L>  /  TBili  0.7  /  DBili  x   /  AST  30  /  ALT  32  /  AlkPhos  64  08-09    PT/INR - ( 09 Aug 2021 08:43 )   PT: 13.2 sec;   INR: 1.11          PTT - ( 09 Aug 2021 08:43 )  PTT:31.2 sec        RADIOLOGY & ADDITIONAL TESTS:    MEDICATIONS  (STANDING):  atorvastatin 40 milliGRAM(s) Oral at bedtime  dextrose 40% Gel 15 Gram(s) Oral once  dextrose 5%. 1000 milliLiter(s) (50 mL/Hr) IV Continuous <Continuous>  dextrose 5%. 1000 milliLiter(s) (100 mL/Hr) IV Continuous <Continuous>  dextrose 50% Injectable 25 Gram(s) IV Push once  dextrose 50% Injectable 12.5 Gram(s) IV Push once  dextrose 50% Injectable 25 Gram(s) IV Push once  glucagon  Injectable 1 milliGRAM(s) IntraMuscular once  insulin lispro (ADMELOG) corrective regimen sliding scale   SubCutaneous every 6 hours  levETIRAcetam  Solution 500 milliGRAM(s) Enteral Tube every 24 hours  multivitamin 1 Tablet(s) Oral daily  polyethylene glycol 3350 17 Gram(s) Oral two times a day  predniSONE   Tablet 10 milliGRAM(s) Oral every 24 hours  senna 2 Tablet(s) Oral at bedtime  zinc sulfate 220 milliGRAM(s) Oral daily    MEDICATIONS  (PRN):  acetaminophen    Suspension .. 600 milliGRAM(s) Enteral Tube every 6 hours PRN Moderate Pain (4 - 6)     OVERNIGHT EVENTS: MICHAEL    SUBJECTIVE:  Patient seen and examined at bedside. Patient states he feels well and does not complain of pain. States he is able to mildly move his left lower extremity. States he feels constipated. Otherwise denies headache, chills, chest pain, palpitations, shortness of breath, abdominal pain, n/v.    ROS: negative unless stated above    Vital Signs Last 12 Hrs  T(F): 97.9 (08-09-21 @ 05:39), Max: 97.9 (08-09-21 @ 05:39)  HR: 64 (08-09-21 @ 05:39) (64 - 64)  BP: 171/63 (08-09-21 @ 05:39) (171/63 - 171/63)  BP(mean): --  RR: 16 (08-09-21 @ 05:39) (16 - 16)  SpO2: 99% (08-09-21 @ 05:39) (99% - 99%)  I&O's Summary    08 Aug 2021 07:01  -  09 Aug 2021 07:00  --------------------------------------------------------  IN: 440 mL / OUT: 1200 mL / NET: -760 mL        PHYSICAL EXAM:  General: Patient appears cachectic, no acute respiratory or painful distress  HEENT: NGT in place, anicteric sclera, MMM  Neck: supple, no JVD  Cardiovascular: harsh crescendo-decrescendo systolic murmur heard, prominent in R sternal region  Respiratory: lungs clear B/L; no W/R/R  Gastrointestinal: soft, NT/ND; no guarding or rebound +BSx4  Extremities: WWP; no edema  Neurological: AAOx3; Has sensation to left foot and anterior tibial region, denies sensation to left thigh. Sensation intact in RLE and bilateral upper extremities. Decreased sensation to left side of face, intact to right. 4/5 strength B/L UE, 4/5 strength RLE, 3/5 strength LLE. Able to move toes. Left and right hand noted to have mild tremors/twitches  Psychiatric: pleasant mood and affect  Dermatologic: no appreciable wounds or damage to the skin        LABS:                        8.7    38.52 )-----------( 464      ( 09 Aug 2021 08:43 )             29.2     08-09    146<H>  |  111<H>  |  73<H>  ----------------------------<  158<H>  5.1   |  27  |  1.47<H>    Ca    9.3      09 Aug 2021 08:43  Phos  4.4     08-09  Mg     2.1     08-09    TPro  6.8  /  Alb  2.9<L>  /  TBili  0.7  /  DBili  x   /  AST  30  /  ALT  32  /  AlkPhos  64  08-09    PT/INR - ( 09 Aug 2021 08:43 )   PT: 13.2 sec;   INR: 1.11          PTT - ( 09 Aug 2021 08:43 )  PTT:31.2 sec        RADIOLOGY & ADDITIONAL TESTS:    MEDICATIONS  (STANDING):  atorvastatin 40 milliGRAM(s) Oral at bedtime  dextrose 40% Gel 15 Gram(s) Oral once  dextrose 5%. 1000 milliLiter(s) (50 mL/Hr) IV Continuous <Continuous>  dextrose 5%. 1000 milliLiter(s) (100 mL/Hr) IV Continuous <Continuous>  dextrose 50% Injectable 25 Gram(s) IV Push once  dextrose 50% Injectable 12.5 Gram(s) IV Push once  dextrose 50% Injectable 25 Gram(s) IV Push once  glucagon  Injectable 1 milliGRAM(s) IntraMuscular once  insulin lispro (ADMELOG) corrective regimen sliding scale   SubCutaneous every 6 hours  levETIRAcetam  Solution 500 milliGRAM(s) Enteral Tube every 24 hours  multivitamin 1 Tablet(s) Oral daily  polyethylene glycol 3350 17 Gram(s) Oral two times a day  predniSONE   Tablet 10 milliGRAM(s) Oral every 24 hours  senna 2 Tablet(s) Oral at bedtime  zinc sulfate 220 milliGRAM(s) Oral daily    MEDICATIONS  (PRN):  acetaminophen    Suspension .. 600 milliGRAM(s) Enteral Tube every 6 hours PRN Moderate Pain (4 - 6)     OVERNIGHT EVENTS: MICHAEL    SUBJECTIVE:  Patient seen and examined at bedside. Patient states he feels well and does not complain of pain. States he is able to mildly move his left lower extremity. States he feels constipated. Otherwise denies headache, chills, chest pain, palpitations, shortness of breath, abdominal pain, n/v.    ROS: negative unless stated above    Vital Signs Last 12 Hrs  T(F): 97.9 (08-09-21 @ 05:39), Max: 97.9 (08-09-21 @ 05:39)  HR: 64 (08-09-21 @ 05:39) (64 - 64)  BP: 171/63 (08-09-21 @ 05:39) (171/63 - 171/63)  BP(mean): --  RR: 16 (08-09-21 @ 05:39) (16 - 16)  SpO2: 99% (08-09-21 @ 05:39) (99% - 99%)  I&O's Summary    08 Aug 2021 07:01  -  09 Aug 2021 07:00  --------------------------------------------------------  IN: 440 mL / OUT: 1200 mL / NET: -760 mL        PHYSICAL EXAM:  General: Patient appears cachectic, no acute respiratory or painful distress  HEENT: NGT in place, anicteric sclera, MMM  Neck: supple, no JVD  Cardiovascular: harsh crescendo-decrescendo systolic murmur heard, prominent in R sternal region  Respiratory: lungs clear B/L; no W/R/R  Gastrointestinal: soft, NT/ND; no guarding or rebound +BSx4  Extremities: WWP; no edema  Neurological: AAOx3; Has sensation to left foot and anterior tibial region, denies sensation to left thigh. Sensation intact in RLE and bilateral upper extremities. Decreased sensation to left side of face, intact to right. 4/5 strength B/L UE, 4/5 strength RLE, 3/5 strength LLE. Able to move toes.  Psychiatric: pleasant mood and affect  Dermatologic: no appreciable wounds or damage to the skin        LABS:                        8.7    38.52 )-----------( 464      ( 09 Aug 2021 08:43 )             29.2     08-09    146<H>  |  111<H>  |  73<H>  ----------------------------<  158<H>  5.1   |  27  |  1.47<H>    Ca    9.3      09 Aug 2021 08:43  Phos  4.4     08-09  Mg     2.1     08-09    TPro  6.8  /  Alb  2.9<L>  /  TBili  0.7  /  DBili  x   /  AST  30  /  ALT  32  /  AlkPhos  64  08-09    PT/INR - ( 09 Aug 2021 08:43 )   PT: 13.2 sec;   INR: 1.11          PTT - ( 09 Aug 2021 08:43 )  PTT:31.2 sec        RADIOLOGY & ADDITIONAL TESTS:    MEDICATIONS  (STANDING):  atorvastatin 40 milliGRAM(s) Oral at bedtime  dextrose 40% Gel 15 Gram(s) Oral once  dextrose 5%. 1000 milliLiter(s) (50 mL/Hr) IV Continuous <Continuous>  dextrose 5%. 1000 milliLiter(s) (100 mL/Hr) IV Continuous <Continuous>  dextrose 50% Injectable 25 Gram(s) IV Push once  dextrose 50% Injectable 12.5 Gram(s) IV Push once  dextrose 50% Injectable 25 Gram(s) IV Push once  glucagon  Injectable 1 milliGRAM(s) IntraMuscular once  insulin lispro (ADMELOG) corrective regimen sliding scale   SubCutaneous every 6 hours  levETIRAcetam  Solution 500 milliGRAM(s) Enteral Tube every 24 hours  multivitamin 1 Tablet(s) Oral daily  polyethylene glycol 3350 17 Gram(s) Oral two times a day  predniSONE   Tablet 10 milliGRAM(s) Oral every 24 hours  senna 2 Tablet(s) Oral at bedtime  zinc sulfate 220 milliGRAM(s) Oral daily    MEDICATIONS  (PRN):  acetaminophen    Suspension .. 600 milliGRAM(s) Enteral Tube every 6 hours PRN Moderate Pain (4 - 6)

## 2021-08-09 NOTE — PROGRESS NOTE ADULT - NSICDXPILOT_GEN_ALL_CORE
[place]

## 2021-08-09 NOTE — PROGRESS NOTE ADULT - PROBLEM SELECTOR PLAN 7
Normocytic anemia, with iron panel consistent with anemia of chronic disease/ renal disease. B12/folate wnl. No evidence of hemolysis. Given CKD and Anemia, a monoclonal gammopathy workup performed and was negative  -patient s/p 2 units on 7/29, followed by stabilization  -MRI confirmed massive L RP hematoma    PLAN:  - restarted on heparin subq, as patient's hemoglobin has now stabilized, no signs of active bleeding, remains hemodynamically stable  -monitor for signs of active bleeding  -trend CBC  - Transfuse if <7  - Keep active T&S

## 2021-08-09 NOTE — PROGRESS NOTE ADULT - PROBLEM SELECTOR PLAN 9
- Cont keppra 500 q12      #Left upper extremity swelling  -LUE US consistent with superficial thrombophlebitis

## 2021-08-09 NOTE — PROGRESS NOTE ADULT - ATTENDING COMMENTS
Patient seen and examined at bedside. Agree with exam, a/p as above with the following addendum/edits:     RP hematoma - Hgb stable, no intervention, elevate LLE, can restart DVT ppx and monitor H/H given high risk of DVT  Dysphagia - not improving, f/u s/s and neuro recs, wife against PEG, comfort feeds? discussed high risk of aspiration  Appreciate palliative care recs

## 2021-08-09 NOTE — PROGRESS NOTE ADULT - PROBLEM SELECTOR PLAN 1
Currently NPO, NGT placed, tube feeds    PLAN:  - wet swabs to moisten mouth  -per speech and swallow, patient should remain NPO/NGT at this time. f/o on GOC. If family wishes do to comfort feeds despite risk for aspiration, can do puree/thin with safe feeding strategies  -pending discharge to acute rehab Currently NPO, NGT placed, tube feeds    PLAN:  - wet swabs to moisten mouth  -per speech and swallow, patient should remain NPO/NGT at this time. f/o on GOC. If family wishes do to comfort feeds despite risk for aspiration, can do puree/thin with safe feeding strategies  -pending discharge to [facility]

## 2021-08-09 NOTE — PROGRESS NOTE ADULT - ASSESSMENT
89y Male with PMHx of HTN, severe aortic stenosis and seizures (on phenytoin) who presented with left facial droop and slurred speech, Stroke w/u negative. Found to have myasthenia gravis with positive anti-Ach antibodies. Also found to have leukocytosis suspicious for malignancy. Admitted to [facility] for treatment of MG and w/u of potential CML/CLL. S/p IVIG treatment. Course complicated by dysphagia and aspiration pneumonia. Admitted to MICU after developing septic shock likely 2/2 to aspiration PNA requiring pressors. 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 Patient found to have L RP hematoma, likely causing new LLE weakness and numbness. Now following for dysphagia, as well as continued MG treatment, pending discharge to acute rehab.

## 2021-08-09 NOTE — PROGRESS NOTE ADULT - PROBLEM SELECTOR PLAN 5
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 Nguyen on a Thursday (after discharge)  - Appreciate hem/onc recs, following

## 2021-08-09 NOTE — PROGRESS NOTE ADULT - ASSESSMENT
89 yo M with history of seizures and HTN was admitted to neurology service for stroke symptoms. He was being managed for ischemic stroke and was transferred to Medicine. Hematology consulted for persistent leukocytosis. Patient denies any prior history of known leukocytosis. Denies any recent history of B symptoms (Fevers, fatigues, night sweats, unexplained weight loss, loss of appetite).     #Leukocytosis  - WBC count of 33k with Neutrophilia (24k), Monocytosis (2k) , eosinophilia (2.89k) and with promyelocytes and myelocytes noted when admitted  - Acute rise in WBC reactive in the setting of aspiration and shock. Now downtrend back towards baseline.   - DD include reactive vs clonal causes infection/ stressed marrow from recent illness/ CML/ CMML  - Peripheral flow cytometry- Flow with increased monocytes. No definitive diagnosis. Suspicious for CMML.   - Blood work from 2018 reviewed, WBC count in the 30s with elevated monocytes  - discussed with family if they would like to explore further with BM biopsy to confirm diagnosis. Would not want to perform BM biopsy at this time. Family ok monitoring counts for now.     BCR-ABL PCR, JAK2 were negative  - Can set up outpatient hematology follow up with Dr. Nguyen on a Thursday (after discharge) for routine monitoring of CBC.     Explained to family that his CMML with elevated WBC count has worse prognosis and higher risk of transformation. Will continue to observe for now and if evidence of transformation will address at that time.     #Anemia  - Normocytic anemia, with iron panel consistent with anemia of chronic disease/ renal disease  - B12/folate wnl. No evidence of hemolysis  - Given CKD and Anemia, a monoclonal gammopathy workup was ordered and was negative

## 2021-08-09 NOTE — PROGRESS NOTE ADULT - PROBLEM SELECTOR PLAN 8
Given underlying mod- severe AS, would be cautious with fluids. Pt is not a surgical candidate for severe AS given poor prognosis as well as family wishes to not pursue aggressive measures.  -patient does not appear to be fluid overloaded on exam, no JVD or crackles on exam  -no need for diuresis at this time  -continue to monitor

## 2021-08-09 NOTE — PROGRESS NOTE ADULT - SUBJECTIVE AND OBJECTIVE BOX
Hematology Progress Note    HPI: 89y Male with PMHx of HTN and seizure on phenytoin presents with left facial droop and slurred speech. Pt was with family member (great-nephew Mesh [phone]) who noticed at 5pm, pt had acute onset of slurred speech, increased saliva production and a left facial droop. Pt was driven to the ED immediately. On presentation, /94, NIHSS 3. Great-nephew at bedside denies hx of stroke, use of blood thinners, recent trauma/bleeding event. CTH negative for acute hemorrhage. CTP without any perfusion defect. CTA without LVO. In conjunction with patient, family and stroke attending, decision was made to not give tpa since family felt his current deficits are non-disabling to his daily life vs risk of bleeding. Pt and family was made aware of the possibility of worsening symptoms and the opportunity to give tpa was time limited. They expressed understanding and declined tpa administration.   Pt denies CP, SOB, extremity weakness, changes in vision, HA. He endorses a cough that has been occurring for the past few days with sputum production, but has been afebrile. Pt given vanc and zosyn x1 in the ED. CXR pending official read. Patient initially failed dysphagia screen and was given  MA, but passed second dysphagia screen so was able to to loaded with plavix 300 PO.   At baseline, pt is fully independent of all ADLs and iADLs, ambulates without assistance, with hearing loss b/l, baseline right arm tremor. It is unclear if pt sees a neurologist for seizure management. Per wife (Madeot [phone], [phone]), his most recent seizure was in April 2020 where he had full body shaking lasting for 5 minutes. He has been on phenytoin 100mg TID and has not had another seizure since.     89 yo M with history of seizures and HTN was admitted to neurology service for stroke symptoms. He was being managed for ischemic stroke and was transferred to Medicine. Hematology consulted for persistent leukocytosis. Patient denies any prior history of known leukocytosis. Denies any recent history of B symptoms (Fevers, fatigues, night sweats, unexplained weight loss, loss of appetite).     Interval Hx- seen at bedside. Offers no new complaints     FAMILY HISTORY:  none    SOCIAL HISTORY:   Patient lives with wife in apartment.  Smoking status: denies  Drinking: denies  Drug Use: denies    Allergies  hay fever (Unknown)  No Known Drug Allergies    REVIEW OF SYSTEMS:      GENERAL: NAD, well-developed  HEAD:  Atraumatic, Normocephalic  EYES: EOMI,  sclera clear  NECK: Supple, No JVD  CHEST/LUNG: Clear to auscultation bilaterally; No wheeze  HEART: Regular rate and rhythm; No murmurs, rubs, or gallops  ABDOMEN: Soft, Nontender, Nondistended; Bowel sounds present  EXTREMITIES: No clubbing, cyanosis, or edema  SKIN: No rashes or lesions

## 2021-08-09 NOTE — PROGRESS NOTE ADULT - PROBLEM SELECTOR PLAN 4
Likely ATN 2/2 to septic shock, poor perfusion. Pt has a history of CKD. baseline 1.6-1.7.   - Creatinine improving  - Cont to trend creatinine    #Hypernatremia  Improving  -Na of 146 today. Patient asymptomatic, A&Ox3  -Continue 125cc free water q6h  -continue to monitor

## 2021-08-09 NOTE — PROGRESS NOTE ADULT - SUBJECTIVE AND OBJECTIVE BOX
Neurology Progress Note    Interval History:    Patient seen and examined at bedside this AM. Patient continues to state that he feels fine, denies any pain. His family member was at bedside, stating that his LLE remains weak but it is improving. The patient also endorses constipation. He denies any other complaints at this time.       PAST MEDICAL & SURGICAL HISTORY:  Hypertension  Seizure      Medications:  acetaminophen    Suspension .. 600 milliGRAM(s) Enteral Tube every 6 hours PRN  atorvastatin 40 milliGRAM(s) Oral at bedtime  dextrose 40% Gel 15 Gram(s) Oral once  dextrose 5%. 1000 milliLiter(s) IV Continuous <Continuous>  dextrose 5%. 1000 milliLiter(s) IV Continuous <Continuous>  dextrose 50% Injectable 25 Gram(s) IV Push once  dextrose 50% Injectable 12.5 Gram(s) IV Push once  dextrose 50% Injectable 25 Gram(s) IV Push once  glucagon  Injectable 1 milliGRAM(s) IntraMuscular once  heparin   Injectable 5000 Unit(s) SubCutaneous every 8 hours  insulin lispro (ADMELOG) corrective regimen sliding scale   SubCutaneous every 6 hours  levETIRAcetam  Solution 500 milliGRAM(s) Enteral Tube every 24 hours  multivitamin 1 Tablet(s) Oral daily  polyethylene glycol 3350 17 Gram(s) Oral two times a day  predniSONE   Tablet 10 milliGRAM(s) Oral every 24 hours  senna 2 Tablet(s) Oral at bedtime  zinc sulfate 220 milliGRAM(s) Oral daily      Vital Signs Last 24 Hrs  T(C): 36.1 (09 Aug 2021 12:16), Max: 36.6 (09 Aug 2021 05:39)  T(F): 97 (09 Aug 2021 12:16), Max: 97.9 (09 Aug 2021 05:39)  HR: 65 (09 Aug 2021 12:16) (64 - 72)  BP: 148/67 (09 Aug 2021 12:16) (135/65 - 171/63)  BP(mean): --  RR: 18 (09 Aug 2021 12:16) (16 - 18)  SpO2: 98% (09 Aug 2021 12:16) (97% - 99%)      Neurological Exam:   Mental status: Awake, alert and oriented x3.  Recent and remote memory intact.  Naming, repetition and comprehension intact.  Attention/concentration intact.  No dysarthria, no aphasia.  Fund of knowledge appropriate.    Cranial nerves: Pupils equally round and reactive to light, visual fields full, no nystagmus, extraocular muscles intact, V1 through V3 intact bilaterally and symmetric, face symmetric, hearing intact to finger rub, palate elevation symmetric, tongue was midline.  Motor:  4/5 B/L UE, 4/5 in RLE, 3/5 IN LLE but improving.  strength 5/5.  Normal tone and bulk.  No abnormal movements.    Sensation: Intact to light touch, proprioception, and pinprick.   Coordination: No dysmetria on finger-to-nose and heel-to-shin.  No dysdiadokinesia.  Reflexes: 2+ in bilateral UE/LE, downgoing toes bilaterally. (-) [place].      Labs:  CBC Full  -  ( 09 Aug 2021 08:43 )  WBC Count : 38.52 K/uL  RBC Count : 2.93 M/uL  Hemoglobin : 8.7 g/dL  Hematocrit : 29.2 %  Platelet Count - Automated : 464 K/uL  Mean Cell Volume : 99.7 fl  Mean Cell Hemoglobin : 29.7 pg  Mean Cell Hemoglobin Concentration : 29.8 gm/dL  Auto Neutrophil # : 33.44 K/uL  Auto Lymphocyte # : 2.04 K/uL  Auto Monocyte # : 2.35 K/uL  Auto Eosinophil # : 0.35 K/uL  Auto Basophil # : 0.00 K/uL  Auto Neutrophil % : 86.8 %  Auto Lymphocyte % : 5.3 %  Auto Monocyte % : 6.1 %  Auto Eosinophil % : 0.9 %  Auto Basophil % : 0.0 %    08-09    146<H>  |  111<H>  |  73<H>  ----------------------------<  158<H>  5.1   |  27  |  1.47<H>    Ca    9.3      09 Aug 2021 08:43  Phos  4.4     08-09  Mg     2.1     08-09    TPro  6.8  /  Alb  2.9<L>  /  TBili  0.7  /  DBili  x   /  AST  30  /  ALT  32  /  AlkPhos  64  08-09    LIVER FUNCTIONS - ( 09 Aug 2021 08:43 )  Alb: 2.9 g/dL / Pro: 6.8 g/dL / ALK PHOS: 64 U/L / ALT: 32 U/L / AST: 30 U/L / GGT: x           PT/INR - ( 09 Aug 2021 08:43 )   PT: 13.2 sec;   INR: 1.11          PTT - ( 09 Aug 2021 08:43 )  PTT:31.2 sec   Neurology Progress Note    Interval History:    Heme onc recommending bone marrow biopsy for investigation of ?hematologic malignancy -- patient and family declined.  Still not able to swallow safely per SLP.  Patient/family do not want PEG.  IR draining of RP hematoma still pending.      MEDICATIONS  (STANDING):  atorvastatin 40 milliGRAM(s) Oral at bedtime  heparin   Injectable 5000 Unit(s) SubCutaneous every 8 hours  insulin lispro (ADMELOG) corrective regimen sliding scale   SubCutaneous every 6 hours  levETIRAcetam  Solution 500 milliGRAM(s) Enteral Tube every 24 hours  multivitamin 1 Tablet(s) Oral daily  polyethylene glycol 3350 17 Gram(s) Oral two times a day  predniSONE   Tablet 10 milliGRAM(s) Oral every 24 hours  pyridostigmine 30 milliGRAM(s) Oral three times a day  senna 2 Tablet(s) Oral at bedtime  zinc sulfate 220 milliGRAM(s) Oral daily    MEDICATIONS  (PRN):  acetaminophen    Suspension .. 600 milliGRAM(s) Enteral Tube every 6 hours PRN Moderate Pain (4 - 6)    Vital Signs Last 24 Hrs  T(C): 36.1 (09 Aug 2021 12:16), Max: 36.6 (09 Aug 2021 05:39)  T(F): 97 (09 Aug 2021 12:16), Max: 97.9 (09 Aug 2021 05:39)  HR: 65 (09 Aug 2021 12:16) (64 - 72)  BP: 148/67 (09 Aug 2021 12:16) (135/65 - 171/63)  BP(mean): --  RR: 18 (09 Aug 2021 12:16) (16 - 18)  SpO2: 98% (09 Aug 2021 12:16) (97% - 99%)      Neurological Exam:   Mental status: Awake, alert and oriented x3  Motor:  4/5 B/L UE, 4/5 in RLE, 3/5 IN LLE but improving.  strength 5/5.  Normal tone and bulk.  No abnormal movements.      Labs:                        8.7    38.52 )-----------( 464      ( 09 Aug 2021 08:43 )             29.2       08-09    146<H>  |  111<H>  |  73<H>  ----------------------------<  158<H>  5.1   |  27  |  1.47<H>    Ca    9.3      09 Aug 2021 08:43  Phos  4.4     08-09  Mg     2.1     08-09    TPro  6.8  /  Alb  2.9<L>  /  TBili  0.7  /  DBili  x   /  AST  30  /  ALT  32  /  AlkPhos  64  08-09

## 2021-08-09 NOTE — PROGRESS NOTE ADULT - ATTENDING COMMENTS
Exam stable.  NGT still in place, unable to remove safely per SLP.  Will trial mestinon but stop if secretions increase.

## 2021-08-09 NOTE — PROGRESS NOTE ADULT - PROBLEM SELECTOR PLAN 2
-patient developed new left lower extremity numbness and weakness on 8/3  -patient denies back pain at present  -patient has strength of 3/5 in LLE (RLE is 4/5). Now has sensation to left foot and anterior tibial region, no sensation to left thigh. Intact sensation to RLE.  Bilateral upper extremities sensation intact with strength of 4/5  -MRI lumbar showed L RP bleed  -no plans for IR drainage at this time, as hematoma appears to be resolving  -hemoglobin stable, no signs of hemodynamic instability    PLAN:  -continue to monitor hemoglobin  -restarted DVT prophylaxis AC as patient does not appear to be acutely bleeding

## 2021-08-09 NOTE — PROGRESS NOTE ADULT - PROBLEM SELECTOR PLAN 10
IVF: assess as needed  Diet: NPO w tube feeds   DVT: Heparin Subq  GI: None  CODE STATUS: DNR  Monitor, Replete to K>4 and Mg>2 IVF: assess as needed  Diet: NPO w tube feeds   DVT: Heparin Subq  GI: None  CODE STATUS: DNR  Monitor, Replete to K>4 and Mg>2    Dispo: pending [facility]

## 2021-08-09 NOTE — CONSULT NOTE ADULT - ASSESSMENT
Assessment: 89 yo M with history of seizures and HTN was admitted to neurology service for stroke symptoms. He was being managed for ischemic stroke and was transferred to Medicine. Newly diagnosed myasthenia gravis, along with leukocytosis 43 this AM of unclear etiology (possibly CMML or other hematologic malignancy). S/p x5 days IVIG and was started on prednisone 10 mg daily for MG. CT showing large retroperitoneal hematoma. Case reviewed with Dr. Mattson. MRI showing retroperitoneal hematoma, not amenable to percutaneous drainage. Recommend re-imaging and re-evaluating patient to assess if hematoma is drainable.    Communicated with: Dr. Wright

## 2021-08-09 NOTE — CONSULT NOTE ADULT - SUBJECTIVE AND OBJECTIVE BOX
89 yo M with history of seizures and HTN was admitted to neurology service for stroke symptoms. He was being managed for ischemic stroke and was transferred to Medicine. Newly diagnosed myasthenia gravis, along with leukocytosis 43 this AM of unclear etiology (possibly CMML or other hematologic malignancy). S/p x5 days IVIG and was started on prednisone 10 mg daily for MG. CT showing large retroperitoneal hematoma. IR consulted for hematoma aspiration and possible drainage.     Clinical History: SLURRED SPEECH    Yes    Handoff    MEWS Score    Hypertension    Seizure    Facial droop    Leukocytosis, unspecified type    Dysphagia    Seizure    EVARISTO (acute kidney injury)    Severe aortic stenosis    Hypertension    Anemia    Nutrition, metabolism, and development symptoms    Debility    Dysphagia, unspecified type    Encounter for palliative care    Counseling and coordination of care    Acidosis, metabolic    Hyperphosphatemia    Myasthenia gravis    Stroke    Sepsis    Muscle cramps    Aspiration pneumonia    Shock, septic    Aortic stenosis    Hypernatremia    Weakness of left leg    Retroperitoneal hematoma    Insertion of intravenous catheter with ultrasound guidance    Insertion, arterial line, radial, left    SLURRED SPEECH    Stroke    Stroke    Slurred speech    Leukocytosis, unspecified type    [email or web address]        Meds:acetaminophen    Suspension .. 600 milliGRAM(s) Enteral Tube every 6 hours PRN  atorvastatin 40 milliGRAM(s) Oral at bedtime  dextrose 40% Gel 15 Gram(s) Oral once  dextrose 5%. 1000 milliLiter(s) IV Continuous <Continuous>  dextrose 5%. 1000 milliLiter(s) IV Continuous <Continuous>  dextrose 50% Injectable 25 Gram(s) IV Push once  dextrose 50% Injectable 12.5 Gram(s) IV Push once  dextrose 50% Injectable 25 Gram(s) IV Push once  glucagon  Injectable 1 milliGRAM(s) IntraMuscular once  insulin lispro (ADMELOG) corrective regimen sliding scale   SubCutaneous every 6 hours  levETIRAcetam  Solution 500 milliGRAM(s) Enteral Tube every 24 hours  multivitamin 1 Tablet(s) Oral daily  polyethylene glycol 3350 17 Gram(s) Oral two times a day  predniSONE   Tablet 10 milliGRAM(s) Oral every 24 hours  senna 2 Tablet(s) Oral at bedtime  zinc sulfate 220 milliGRAM(s) Oral daily      Allergies:hay fever (Unknown)  No Known Drug Allergies        Labs:                           8.7    38.52 )-----------( 464      ( 09 Aug 2021 08:43 )             29.2     PT/INR - ( 09 Aug 2021 08:43 )   PT: 13.2 sec;   INR: 1.11          PTT - ( 09 Aug 2021 08:43 )  PTT:31.2 sec  08-09    146<H>  |  111<H>  |  73<H>  ----------------------------<  158<H>  5.1   |  27  |  1.47<H>    Ca    9.3      09 Aug 2021 08:43  Phos  4.4     08-09  Mg     2.1     08-09    TPro  6.8  /  Alb  2.9<L>  /  TBili  0.7  /  DBili  x   /  AST  30  /  ALT  32  /  AlkPhos  64  08-09          Imaging Findings: Large left retroperitoneal hematoma involving iliacus muscle and extending to anterior abdominal and pelvic wall filling the left lower quadrant. Heterogeneous density indicates acute and subacute components. Mass effect on bowel and urinary bladder.

## 2021-08-09 NOTE — PROGRESS NOTE ADULT - ASSESSMENT
A 89 year old male with newly diagnosed myasthenia gravis, along with leukocytosis 43 this AM of unclear etiology (possibly CMML or other hematologic malignancy). S/p x5 days IVIG and was started on prednisone 10 mg daily for MG.     Plan:  - Cont prednisone 10 mg daily for now, will continue on this medication regimen for MG. Will not start mestinon or other medications due to secretions and dysphagia  - Speech and swallow eval performed and following, ailin ice chips okay for now. NGT remains in place with TF's  - Still considering Rituximab treatment for 6 months, pending ID approval due to history of hepatitis B but likely would not start given hx of undiagnosed malignancy  - Following leukocytosis work up, heme/onc is on board for possible CMML vs other malignancy, flow cytometry showed increased monocytes, BCR-ABL, JAMES-2 was negative. MRI Lumbar spine showed marrow signal abnormality in L spine, concerning for lymphoproliferative disorder. Bone marrow biopsy recommended but family is refusing  - Hb dropped to around 5 on this admission and s/p MICU for hypotension and anemia. Hb stable. MRI Lumbar spine w/o contrast done to evaluate left leg weakness, showed massive L RP hematoma, stable, likely contributing to LLE weakness. As per IR currently not a candidate for percutaneous drainage.  - Likely d/c to [facility] 89-year-old man with newly diagnosed myasthenia gravis and leukocytosis (possibly CMML or other hematologic malignancy -- patient and family declined bone marrow biopsy).  S/p x 5 days IVIG and currently on prednisone 10 mg daily.      He remains dysphagic with NGT in place.  He and his family are not interested in PEG tube.  Reasonable to try mestinon, because if his swallowing does not improve and his does not want PEG, the only real option is comfort feeding with a high risk of aspiration.  Can start 30 mg TID and monitor closely for increased secretions.    Also recommend touching base with oncology about feasibility of rituximab for myasthenia in the absence of a definitive hematologic diagnosis.    Will follow

## 2021-08-10 ENCOUNTER — TRANSCRIPTION ENCOUNTER (OUTPATIENT)
Age: 86
End: 2021-08-10

## 2021-08-10 LAB
ANION GAP SERPL CALC-SCNC: 7 MMOL/L — SIGNIFICANT CHANGE UP (ref 5–17)
ANISOCYTOSIS BLD QL: SLIGHT — SIGNIFICANT CHANGE UP
BASOPHILS # BLD AUTO: 0 K/UL — SIGNIFICANT CHANGE UP (ref 0–0.2)
BASOPHILS NFR BLD AUTO: 0 % — SIGNIFICANT CHANGE UP (ref 0–2)
BUN SERPL-MCNC: 69 MG/DL — HIGH (ref 7–23)
CALCIUM SERPL-MCNC: 9.1 MG/DL — SIGNIFICANT CHANGE UP (ref 8.4–10.5)
CHLORIDE SERPL-SCNC: 111 MMOL/L — HIGH (ref 96–108)
CO2 SERPL-SCNC: 22 MMOL/L — SIGNIFICANT CHANGE UP (ref 22–31)
CREAT SERPL-MCNC: 1.5 MG/DL — HIGH (ref 0.5–1.3)
EOSINOPHIL # BLD AUTO: 1.96 K/UL — HIGH (ref 0–0.5)
EOSINOPHIL NFR BLD AUTO: 5 % — SIGNIFICANT CHANGE UP (ref 0–6)
GLUCOSE BLDC GLUCOMTR-MCNC: 104 MG/DL — HIGH (ref 70–99)
GLUCOSE BLDC GLUCOMTR-MCNC: 109 MG/DL — HIGH (ref 70–99)
GLUCOSE BLDC GLUCOMTR-MCNC: 116 MG/DL — HIGH (ref 70–99)
GLUCOSE BLDC GLUCOMTR-MCNC: 85 MG/DL — SIGNIFICANT CHANGE UP (ref 70–99)
GLUCOSE BLDC GLUCOMTR-MCNC: 93 MG/DL — SIGNIFICANT CHANGE UP (ref 70–99)
GLUCOSE BLDC GLUCOMTR-MCNC: 95 MG/DL — SIGNIFICANT CHANGE UP (ref 70–99)
GLUCOSE SERPL-MCNC: 91 MG/DL — SIGNIFICANT CHANGE UP (ref 70–99)
HCT VFR BLD CALC: 32.5 % — LOW (ref 39–50)
HGB BLD-MCNC: 8.9 G/DL — LOW (ref 13–17)
HYPOCHROMIA BLD QL: SLIGHT — SIGNIFICANT CHANGE UP
LG PLATELETS BLD QL AUTO: SLIGHT — SIGNIFICANT CHANGE UP
LYMPHOCYTES # BLD AUTO: 2.35 K/UL — SIGNIFICANT CHANGE UP (ref 1–3.3)
LYMPHOCYTES # BLD AUTO: 6 % — LOW (ref 13–44)
MACROCYTES BLD QL: SLIGHT — SIGNIFICANT CHANGE UP
MAGNESIUM SERPL-MCNC: 2.2 MG/DL — SIGNIFICANT CHANGE UP (ref 1.6–2.6)
MANUAL SMEAR VERIFICATION: SIGNIFICANT CHANGE UP
MCHC RBC-ENTMCNC: 27.4 GM/DL — LOW (ref 32–36)
MCHC RBC-ENTMCNC: 29.8 PG — SIGNIFICANT CHANGE UP (ref 27–34)
MCV RBC AUTO: 108.7 FL — HIGH (ref 80–100)
METAMYELOCYTES # FLD: 1 % — HIGH (ref 0–0)
MONOCYTES # BLD AUTO: 2.74 K/UL — HIGH (ref 0–0.9)
MONOCYTES NFR BLD AUTO: 7 % — SIGNIFICANT CHANGE UP (ref 2–14)
MYELOCYTES NFR BLD: 6 % — HIGH (ref 0–0)
NEUTROPHILS # BLD AUTO: 29.39 K/UL — HIGH (ref 1.8–7.4)
NEUTROPHILS NFR BLD AUTO: 75 % — SIGNIFICANT CHANGE UP (ref 43–77)
NRBC # BLD: 0 /100 — SIGNIFICANT CHANGE UP (ref 0–0)
NRBC # BLD: SIGNIFICANT CHANGE UP /100 WBCS (ref 0–0)
OVALOCYTES BLD QL SMEAR: SLIGHT — SIGNIFICANT CHANGE UP
PHOSPHATE SERPL-MCNC: 5 MG/DL — HIGH (ref 2.5–4.5)
PLAT MORPH BLD: ABNORMAL
PLATELET # BLD AUTO: 304 K/UL — SIGNIFICANT CHANGE UP (ref 150–400)
POLYCHROMASIA BLD QL SMEAR: SLIGHT — SIGNIFICANT CHANGE UP
POTASSIUM SERPL-MCNC: SIGNIFICANT CHANGE UP MMOL/L (ref 3.5–5.3)
POTASSIUM SERPL-SCNC: SIGNIFICANT CHANGE UP MMOL/L (ref 3.5–5.3)
RBC # BLD: 2.99 M/UL — LOW (ref 4.2–5.8)
RBC # FLD: 17.5 % — HIGH (ref 10.3–14.5)
RBC BLD AUTO: ABNORMAL
SCHISTOCYTES BLD QL AUTO: SLIGHT — SIGNIFICANT CHANGE UP
SODIUM SERPL-SCNC: 140 MMOL/L — SIGNIFICANT CHANGE UP (ref 135–145)
WBC # BLD: 39.18 K/UL — HIGH (ref 3.8–10.5)
WBC # FLD AUTO: 39.18 K/UL — HIGH (ref 3.8–10.5)

## 2021-08-10 PROCEDURE — 99233 SBSQ HOSP IP/OBS HIGH 50: CPT | Mod: GC

## 2021-08-10 PROCEDURE — 99233 SBSQ HOSP IP/OBS HIGH 50: CPT

## 2021-08-10 RX ORDER — LEVETIRACETAM 250 MG/1
500 TABLET, FILM COATED ORAL EVERY 24 HOURS
Refills: 0 | Status: DISCONTINUED | OUTPATIENT
Start: 2021-08-10 | End: 2021-08-23

## 2021-08-10 RX ORDER — PYRIDOSTIGMINE BROMIDE 60 MG/5ML
1 SOLUTION ORAL THREE TIMES A DAY
Refills: 0 | Status: DISCONTINUED | OUTPATIENT
Start: 2021-08-10 | End: 2021-08-12

## 2021-08-10 RX ADMIN — LEVETIRACETAM 400 MILLIGRAM(S): 250 TABLET, FILM COATED ORAL at 13:37

## 2021-08-10 RX ADMIN — PYRIDOSTIGMINE BROMIDE 1 MILLIGRAM(S): 60 SOLUTION ORAL at 22:23

## 2021-08-10 RX ADMIN — PYRIDOSTIGMINE BROMIDE 1 MILLIGRAM(S): 60 SOLUTION ORAL at 15:00

## 2021-08-10 RX ADMIN — Medication 8 MILLIGRAM(S): at 19:48

## 2021-08-10 NOTE — DISCHARGE NOTE PROVIDER - NSFOLLOWUPCLINICS_GEN_ALL_ED_FT
[facility]  Family Medicine  [address] [place] [address], [address]  [place], [place] [address]  Phone: [phone]  Fax:   Scheduled Appointment: 8/26/2021 1:45 PM

## 2021-08-10 NOTE — PROGRESS NOTE ADULT - ATTENDING COMMENTS
Patient seen and examined at bedside. Agree with exam, a/p as above with the following addendum/edits:     Extensive discussion w/ wife about GOC. He aspirated last night w/ the NGT and continues to fail s/s. She is resistant to PEG tube placement. 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 He was not able to get most of his meds last night. Will d/w s/s and neuro if there is a possibility of improved swallowing function. Either way, we have decided to remove NGT which was clogged and try feeding knowing he will fail s/s and knowing the risks of aspiration.

## 2021-08-10 NOTE — PROGRESS NOTE ADULT - PROBLEM SELECTOR PLAN 2
-patient developed new left lower extremity numbness and weakness on 8/3  -patient denies back pain at present  -patient has strength of 3/5 in LLE (RLE is 4/5). Now has sensation to left foot and anterior tibial region, no sensation to left thigh. Intact sensation to RLE.  Bilateral upper extremities sensation intact with strength of 4/5  -MRI lumbar showed L RP bleed  -no plans for IR drainage at this time, as hematoma appears to be resolving  -hemoglobin stable, no signs of hemodynamic instability    PLAN:  -continue to monitor hemoglobin  -restarted DVT prophylaxis AC as patient does not appear to be acutely bleeding -patient developed new left lower extremity numbness and weakness on 8/3  -patient denies back pain at present  -patient has strength of 3/5 in LLE (RLE is 4/5). Now has sensation to left foot and anterior tibial region, no sensation to left thigh. Intact sensation to RLE.  Bilateral upper extremities sensation intact with strength of 4/5  -MRI lumbar showed L RP bleed  -no plans for IR drainage at this time, as hematoma appears to be resolving  -hemoglobin stable, no signs of hemodynamic instability    PLAN:  -continue to monitor hemoglobin  -restarted DVT prophylaxis AC as patient does not appear to be acutely bleeding - family declined, due to concern for bleed

## 2021-08-10 NOTE — PROGRESS NOTE ADULT - PROBLEM SELECTOR PLAN 10
IVF: assess as needed  Diet: NPO w tube feeds   DVT: Heparin Subq  GI: None  CODE STATUS: DNR  Monitor, Replete to K>4 and Mg>2    Dispo: pending [facility] IVF: assess as needed  Diet: thin puree - discussed risks of aspiration with family and patient  DVT: Heparin Subq - family declining at this time, given concern for RP bleed  GI: None  CODE STATUS: DNR  Monitor, Replete to K>4 and Mg>2    Dispo: pending [facility]

## 2021-08-10 NOTE — PROGRESS NOTE ADULT - PROBLEM SELECTOR PLAN 1
Currently NPO, NGT placed, tube feeds    PLAN:  - wet swabs to moisten mouth  -per speech and swallow, patient should remain NPO/NGT at this time. f/o on GOC. If family wishes do to comfort feeds despite risk for aspiration, can do puree/thin with safe feeding strategies  -pending discharge to [facility] -NGT removed, clogged last night    PLAN:  - family does not wish to have PEG tube placed  -discussed the risks of aspiration with family  -patient started on thin puree diet  -pending discharge to [facility]

## 2021-08-10 NOTE — DISCHARGE NOTE PROVIDER - CARE PROVIDER_API CALL
[place])  Neurology; Neuromuscular Medicine  [address], [address] [place] [address]  Phone: [phone]  Fax: [phone]  Scheduled Appointment: 11/22/2021 03:40 AM    Brandon Nguyen)  Medical Oncology  [address] [address]  Phone: [phone]  Fax: [phone]  Scheduled Appointment: 08/26/2021 09:30 AM

## 2021-08-10 NOTE — CHART NOTE - NSCHARTNOTEFT_GEN_A_CORE
Family at bedside witnessed an aspiration event. Feeds stopped and patient suctioned. O2 sat has remained at % overnight. No new infiltrates or consolidation noted on CXR. NG tube then clogged. Attempts to unclog with ginger ale failed. Patient unable to receive 10 PM PO meds, including the first scheduled dose of pyridostigmine. Patient declined PM SQ heparin due to concern for hematoma growth. Family at bedside witnessed an aspiration event. Feeds stopped and patient suctioned. O2 sat has remained at % overnight. No new infiltrates or consolidation noted on CXR. NG tube then clogged. Attempts to unclog with ginger ale failed. Patient unable to receive 10 PM PO meds, including the first scheduled dose of pyridostigmine. Patient and family declined PM SQ heparin due to concern for hematoma growth. Family at bedside witnessed an aspiration event. Feeds stopped and patient suctioned. O2 sat has remained at % overnight. No new infiltrates or consolidation noted on CXR. NG tube then clogged. Attempts to unclog with ginger ale failed. Patient unable to receive 10 PM PO meds, including the first scheduled dose of pyridostigmine. Patient and family declined PM SQ heparin due to concern for hematoma growth. Risks and benefits associated with a lack of DVT PPx explained to patient and family who verbalized understanding.

## 2021-08-10 NOTE — DISCHARGE NOTE PROVIDER - NSDCMRMEDTOKEN_GEN_ALL_CORE_FT
amlodipine-benazepril 5 mg-10 mg oral capsule: 1 cap(s) orally once a day  phenytoin 100 mg oral capsule: 1 cap(s) orally 3 times a day   amlodipine-benazepril 5 mg-10 mg oral capsule: 1 cap(s) orally once a day  atorvastatin 40 mg oral tablet: 1 tab(s) orally once a day (at bedtime)  cholecalciferol oral tablet: 2000 unit(s) orally once a day  Multiple Vitamins oral tablet: 1 tab(s) orally once a day  phenytoin 100 mg oral capsule: 1 cap(s) orally 3 times a day   atorvastatin 40 mg oral tablet: 1 tab(s) orally once a day (at bedtime)  calcium carbonate 1250 mg (500 mg elemental calcium) oral tablet: 1 tab(s) orally every 24 hours  cholecalciferol oral tablet: 2000 unit(s) orally once a day  Keppra 500 mg oral tablet: 1 tab(s) orally once a day   Multiple Vitamins oral tablet: 1 tab(s) orally once a day  predniSONE 10 mg oral tablet: 1 tab(s) orally every 24 hours  pyridostigmine 60 mg oral tablet: 0.5 tab(s) orally every 8 hours

## 2021-08-10 NOTE — DISCHARGE NOTE PROVIDER - HOSPITAL COURSE
#Discharge: do not delete    Patient is a 89 y/o Male with PMHx of HTN, severe aortic stenosis and seizure on phenytoin who presented with left facial droop and slurred speech, Stroke w/u negative. Found to have myasthenia gravis with positive anti-ach antibodies. Now s/p IVIG treatment, currently treated with Prednisone PO. 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 Course complicated by aspiration pneumonia with septic shock vs hypovolemic shock, requiring MICU admission and pressors. Also complicated by anemia, secondary to L RP bleed, s/p 2 units of pRBCs, now causing acute LLE weakness. Patient now with stable hemoglobin, not requiring IR drainage of hematoma. Initially with ATN, now improving creatinine. Patient off pressors and transferred to [facility] for continued management of dysphagia and MG treatment. Per neuro, now started on Mestinon. Patient initially with NGT/NPO with tube feedings, which is now removed due to NGT clogging. PO medications transitioned to IV and plans for comfort feeds per speech and swallow.      Problem List/Main Diagnoses (system-based):   #Myasthenia gravis  -Patient presented for left sided facial droop and slurred speech  -stroke work up negative  -s/p IVIG treatment  -now on Prednisone and Mestinon (started 8/10)    #CMML  -patient found to have persistent leukocytosis  -jak2 and bcr/abl negative  -likely CMML per Heme/Onc  -family denies BM biopsy at this time  -heme/onc recommends outpatient f/u    #L RP bleed  -s/p 2units pRBCs  -MRI showed massive L RP hematoma  -IR consulted, reported no acute drainage at this time, recommended continued monitoring  -patient complaining of LLE weakness and numbness, secondary to bleed. likely will improved as hematoma resolves  -continued to follow hemoglobin, which remained stable  -PT following patient    Inpatient treatment course:   New medications:   Labs to be followed outpatient:   Exam to be followed outpatient:    #Discharge: do not delete    Patient is a 89 y/o Male with PMHx of HTN, severe aortic stenosis and seizure on phenytoin who presented with left facial droop and slurred speech, Stroke w/u negative. Found to have myasthenia gravis with positive anti-ach antibodies. Now s/p IVIG treatment, currently treated with Prednisone PO. 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 Course complicated by aspiration pneumonia with septic shock vs hypovolemic shock, requiring MICU admission and pressors. Also complicated by anemia, secondary to L RP bleed, s/p 2 units of pRBCs, now causing acute LLE weakness. Patient now with stable hemoglobin, not requiring IR drainage of hematoma. Initially with ATN, now improving creatinine. Patient off pressors and transferred to [facility] for continued management of dysphagia and MG treatment. Per neuro, now started on Mestinon. Patient initially with NGT/NPO with tube feedings, which is now removed due to NGT clogging. PO medications transitioned to IV and plans for puree thin liquid feeds per family request, as they do not wish to have PEG tube placement at this time.    Problem List/Main Diagnoses (system-based):   #Dysphagia  -patient with dysphagia during admission, unable to control secretions  -initially with NGT, however, clogged and removed on 8/10  -family does not wish to have PEG tube placed, risks of aspiration discussed in detail with the family with understanding  -started on thin puree diet    #Myasthenia gravis  -Patient presented for left sided facial droop and slurred speech  -stroke work up negative  -s/p IVIG treatment  -now on Prednisone, neuro following, recommend continuing outpatient  - Mestinon started 8/10    #CMML  -patient found to have persistent leukocytosis  -jak2 and bcr/abl negative  -likely CMML per Heme/Onc  -family denies BM biopsy at this time  -heme/onc recommends outpatient f/u    #L RP bleed  -s/p 2units pRBCs  -MRI showed massive L RP hematoma  -IR consulted, reported no acute drainage at this time, recommended continued monitoring  -patient complaining of LLE weakness and numbness, secondary to bleed. likely will improved as hematoma resolves  -continued to follow hemoglobin, which remained stable  -PT following patient    #Hypotension, secondary to septic shock from aspiration pneumonia vs hypovolemia  -patient developed episode of hypotension, requiring admission to MICU for pressors  -concern for aspiration pneumonia on CXR vs hypovolemia with hemoglobin drop to ~5  -able to be weaned off pressors and stepped down to RMF    #Aspiration pneumonia  -patient had aspiration pneumonia during stay, likely causing septic shock  -s/p antibiotic treatment  -no longer febrile    #ATN  -ATN during admission, likely secondary to hypotension  -improving creatinine during admission    Inpatient treatment course: As Above  New medications:   Labs to be followed outpatient:   Exam to be followed outpatient:    #Discharge: do not delete    Patient is a 89 y/o Male with PMHx of HTN, severe aortic stenosis and seizure on phenytoin who presented with left facial droop and slurred speech, Stroke w/u negative. Found to have myasthenia gravis with positive anti-ach antibodies. Now s/p IVIG treatment, currently treated with Prednisone PO. 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 Course complicated by aspiration pneumonia with septic shock vs hypovolemic shock, requiring MICU admission and pressors. Also complicated by anemia, secondary to L RP bleed, s/p 2 units of pRBCs, now causing acute LLE weakness. Patient now with stable hemoglobin, not requiring IR drainage of hematoma. Initially with ATN, now improving creatinine. Patient off pressors and transferred to [facility] for continued management of dysphagia and MG treatment. Per neuro, now started on Mestinon. Patient initially with NGT/NPO with tube feedings, which is now removed due to NGT clogging. PO medications transitioned to IV and plans for puree thin liquid feeds per family request, as they do not wish to have PEG tube placement at this time.    Problem List/Main Diagnoses (system-based):   #Dysphagia  -patient with dysphagia during admission, unable to control secretions  -initially with NGT placed 7/29, however, clogged and removed on 8/10  -family does not wish to have PEG tube placed, risks of aspiration discussed in detail with the family with understanding  -started on thin puree diet    #Myasthenia gravis  -Patient presented for left sided facial droop and slurred speech  -stroke work up negative  -s/p IVIG treatment  -now on Prednisone, neuro following, recommend continuing outpatient  - Mestinon started 8/10    #CMML  -patient found to have persistent leukocytosis  -jak2 and bcr/abl negative  -likely CMML per Heme/Onc  -family denies BM biopsy at this time  -heme/onc recommends outpatient f/u    #L RP bleed  -s/p 2units pRBCs  -MRI showed massive L RP hematoma  -IR consulted, reported no acute drainage at this time, recommended continued monitoring  -patient complaining of LLE weakness and numbness, secondary to bleed. likely will improved as hematoma resolves  -continued to follow hemoglobin, which remained stable  -PT following patient    #Hypotension, secondary to septic shock from aspiration pneumonia vs hypovolemia  -patient developed episode of hypotension, requiring admission to MICU for pressors  -concern for aspiration pneumonia on CXR vs hypovolemia with hemoglobin drop to ~5  -able to be weaned off pressors and stepped down to RMF    #Aspiration pneumonia  -patient had aspiration pneumonia during stay, likely causing septic shock  -s/p antibiotic treatment  -no longer febrile    #ATN  -ATN during admission, likely secondary to hypotension  -improving creatinine during admission    Inpatient treatment course: As Above  New medications:   Labs to be followed outpatient:   Exam to be followed outpatient:    #Discharge: do not delete    Patient is a 89 y/o M with PMHx of HTN, severe aortic stenosis and seizure on phenytoin who presented with left facial droop and slurred speech, Stroke w/u negative. Found to have myasthenia gravis with positive anti-ach antibodies. Now s/p IVIG treatment, currently treated with Prednisone PO. 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 Course complicated by aspiration pneumonia with septic shock vs hypovolemic shock, requiring MICU admission and pressors. Also complicated by anemia, secondary to L RP bleed, s/p 2 units of pRBCs, now causing acute LLE weakness. Patient now with stable hemoglobin, not requiring IR drainage of hematoma. Initially with ATN, now improving creatinine. Patient off pressors and transferred to [facility] for continued management of dysphagia and MG treatment. Per neuro, now started on Mestinon. Patient initially with NGT/NPO with tube feedings, which is now removed due to NGT clogging. PO medications transitioned to IV and plans for puree thin liquid feeds per family request, as they do not wish to have PEG tube placement at this time.    Problem List/Main Diagnoses (system-based):   #Dysphagia  -patient with dysphagia during admission, unable to control secretions  -initially with NGT placed 7/29, however, clogged and removed on 8/10  -family does not wish to have PEG tube placed, risks of aspiration discussed in detail with the family with understanding  -started on thin puree diet    #Myasthenia gravis  -Patient presented for left sided facial droop and slurred speech  -stroke work up negative  -s/p IVIG treatment  -now on Prednisone, neuro following, recommend continuing outpatient  - Mestinon started 8/10    #CMML  -patient found to have persistent leukocytosis  -jak2 and bcr/abl negative  -likely CMML per Heme/Onc  -family denies BM biopsy at this time  -heme/onc recommends outpatient f/u    #L RP bleed  -s/p 2units pRBCs  -MRI showed massive L RP hematoma  -IR consulted, reported no acute drainage at this time, recommended continued monitoring  -patient complaining of LLE weakness and numbness, secondary to bleed. likely will improved as hematoma resolves  -continued to follow hemoglobin, which remained stable  -PT following patient    #Hypotension, secondary to septic shock from aspiration pneumonia vs hypovolemia  -patient developed episode of hypotension, requiring admission to MICU for pressors  -concern for aspiration pneumonia on CXR vs hypovolemia with hemoglobin drop to ~5  -able to be weaned off pressors and stepped down to RMF    #Aspiration pneumonia  -patient had aspiration pneumonia during stay, likely causing septic shock  -s/p antibiotic treatment  -no longer febrile    #ATN  -ATN during admission, likely secondary to hypotension  -improving creatinine during admission    Inpatient treatment course: As Above  New medications:   Labs to be followed outpatient:   Exam to be followed outpatient:    #Discharge: do not delete    Patient is a 89 y/o M with PMHx of HTN, severe aortic stenosis and seizure on phenytoin who presented with left facial droop and slurred speech, Stroke w/u negative. Found to have myasthenia gravis with positive anti-ach antibodies. Now s/p IVIG treatment, currently treated with Prednisone PO. 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 Course complicated by aspiration pneumonia with septic shock vs hypovolemic shock, requiring MICU admission and pressors. Also complicated by anemia, secondary to L RP bleed, s/p 2 units of pRBCs, now causing acute LLE weakness. Patient now with stable hemoglobin, not requiring IR drainage of hematoma. Initially with ATN, now improving creatinine. Patient off pressors and transferred to [facility] for continued management of dysphagia and MG treatment. Per neuro, now started on Mestinon. Patient initially with NGT/NPO with tube feedings, which is now removed due to NGT clogging. PO medications transitioned to IV and plans for puree thin liquid feeds per family request, as they do not wish to have PEG tube placement at this time.    Problem List/Main Diagnoses (system-based):   #Dysphagia  -patient with dysphagia during admission, unable to control secretions  -initially with NGT placed 7/29, however, clogged and removed on 8/10  -family does not wish to have PEG tube placed, risks of aspiration discussed in detail with the family with understanding  -started on thin puree diet    #Myasthenia gravis  -Patient presented for left sided facial droop and slurred speech  -stroke work up negative  -s/p IVIG treatment  -now on Prednisone, neuro following, recommend continuing outpatient  - Mestinon started 8/10    #CMML  -patient found to have persistent leukocytosis  -jak2 and bcr/abl negative  -likely CMML per Heme/Onc  -family denies BM biopsy at this time  -heme/onc recommends outpatient f/u    #L RP bleed  -s/p 2units pRBCs  -MRI showed massive L RP hematoma  -IR consulted, reported no acute drainage at this time, recommended continued monitoring  -patient complaining of LLE weakness and numbness, secondary to bleed. likely will improved as hematoma resolves  -continued to follow hemoglobin, which remained stable  -PT following patient    #Hypotension, secondary to septic shock from aspiration pneumonia vs hypovolemia  -patient developed episode of hypotension, requiring admission to MICU for pressors  -concern for aspiration pneumonia on CXR vs hypovolemia with hemoglobin drop to ~5  -able to be weaned off pressors and stepped down to RMF    #Aspiration pneumonia  -patient had aspiration pneumonia during stay, likely causing septic shock  -s/p antibiotic treatment  -no longer febrile    #ATN  -ATN during admission, likely secondary to hypotension  -improving creatinine during admission    Inpatient treatment course: As Above  New medications:   Labs to be followed outpatient:   Exam to be followed outpatient:    #Discharge: do not delete    Patient is a 89 y/o M with PMHx of HTN, severe aortic stenosis and seizure on phenytoin who presented with left facial droop and slurred speech, stroke w/u negative, found to have myasthenia gravis, now s/p IVIG treatment. 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     Problem List/Main Diagnoses (system-based):     #Dysphagia  -patient with dysphagia during admission, unable to control secretions  -initially with NGT placed 7/29, however, clogged and removed on 8/10  -family does not wish to have PEG tube placed, risks of aspiration discussed in detail with the family with understanding  -started on thin puree diet    #Retroperitoneal hematoma  -patient developed new left lower extremity numbness and weakness on 8/3, denies back pain at present  -patient has strength of 1/5 L hip flexion (RLE is 4/5). Now has sensation to left foot and anterior tibial region, no sensation to left thigh. Intact sensation to RLE.  Bilateral upper extremities sensation intact with strength of 4/5  -MRI lumbar showed L RP bleed  -no plans for IR drainage at this time, as hematoma appears to be resolving  -hemoglobin 10.3 (8/11)->8.4 -> 8.9 (8/15), no signs of hemodynamic instability  -Heparin dc'ed as per patient and family's wishes.     #Myasthenia gravis   -Patient presented for left sided facial droop and slurred speech  -stroke work up negative  -confirmed anti-ACh ab  -s/p IVIG treatment  - neuro following, recommend continuing outpatient  - Mestinon started 8/10  - C/w solumedrol    #Leukocytosis likely 2/2 to CMML  -jak2 and bcr/abl negative  -likely CMML per Heme/Onc  -family denies BM biopsy at this time  -heme/onc recommends outpatient f/u    #New L retroperitoneal hematoma - IMPROVING  -s/p 2units pRBCs  -Confirmed on MRI showed massive L RP hematoma  -IR consulted, reported no acute drainage at this time, recommended continued monitoring  -patient complaining of LLE weakness and numbness, secondary to bleed. likely will improved as hematoma resolves  -continued to follow hemoglobin, which remained stable  -PT following patient    #EVARISTO on CKD (baseline Cr 1.6-1.7) - IMPROVING  -ATN during admission, likely secondary to hypotension/poor perfusion  -Improving creatinine, 8/15 1.40.     #Hypotension, secondary to septic shock from aspiration pneumonia vs hypovolemia - RESOLVED  -patient developed episode of hypotension, initially requiring admission to MICU for pressors, now off pressors  -concern for aspiration pneumonia on CXR vs hypovolemia with hemoglobin drop to ~5  - BP now stable at 140s-150s systolic/60s-70s diastolic    #Normocytic anemia  - Iron panel consistent with ACD/ renal disease. B12/folate wnl. No evidence of hemolysis.   -Given CKD and Anemia, a monoclonal gammopathy workup performed and was negative  -patient s/p 2 units on 7/29, followed by stabilization  -MRI confirmed massive L RP hematoma  -today (08/15) Hb is 8.4    #Aspiration pneumonia RESOLVED  -patient had aspiration pneumonia during stay, likely causing septic shock  -s/p 5 day course of zosyn  -no longer febrile    #Aortic stenosis  Not a surgical candidate for severe AS given poor prognosis as well as family wishes to not pursue aggressive measures  No acute intervention    New medications: MESTINON, KEPPRA 500MG BID  Changes to old medications: NONE  Medications that were stopped: NONE    Items to follow up as outpatient:     Physical exam at the time of discharge:     #Discharge: do not delete    Patient is a 89 y/o M with PMHx of HTN, severe aortic stenosis and seizure on phenytoin who presented with left facial droop and slurred speech, stroke w/u negative, found to have myasthenia gravis, now s/p IVIG treatment. 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     Problem List/Main Diagnoses (system-based):     #Myasthenia gravis   -Patient presented for left sided facial droop and slurred speech  -stroke work up negative  -confirmed anti-ACh ab  -s/p IVIG treatment  - neuro following, recommend continuing outpatient  - Mestinon 30mg PO started 8/10  - C/w solumedrol  - Plan to start rituxin (family amenable per heme onc 8/16)    #Dysphagia  -patient with dysphagia during admission, unable to control secretions  -initially with NGT placed 7/29, however, clogged and removed on 8/10  -family does not wish to have PEG tube placed, risks of aspiration discussed in detail with the family with understanding  -started on thin puree diet    #Retroperitoneal hematoma  -patient developed new left lower extremity numbness and weakness on 8/3, denies back pain at present  -patient has strength of 1/5 L hip flexion (RLE is 4/5). Now has sensation to left foot and anterior tibial region, no sensation to left thigh. Intact sensation to RLE.  Bilateral upper extremities sensation intact with strength of 4/5  -MRI lumbar showed L RP bleed  -no plans for IR drainage at this time, as hematoma appears to be resolving  -hemoglobin 10.3 (8/11)->8.4 -> 8.9 (8/15), no signs of hemodynamic instability  -Heparin dc'ed as per patient and family's wishes.     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 likely will improved as hematoma resolves  -continued to follow hemoglobin, which remained stable  -PT following patient    #EVARISTO on CKD (baseline Cr 1.6-1.7) - IMPROVING  -ATN during admission, likely secondary to hypotension/poor perfusion  -Improving creatinine, 8/15 1.40.     #Hypotension, secondary to septic shock from aspiration pneumonia vs hypovolemia - RESOLVED  -patient developed episode of hypotension, initially requiring admission to MICU for pressors, now off pressors  -concern for aspiration pneumonia on CXR vs hypovolemia with hemoglobin drop to ~5  - BP now stable at 140s-150s systolic/60s-70s diastolic    #Normocytic anemia  - Iron panel consistent with ACD/ renal disease. B12/folate wnl. No evidence of hemolysis.   -Given CKD and Anemia, a monoclonal gammopathy workup performed and was negative  -patient s/p 2 units on 7/29, followed by stabilization  -MRI confirmed massive L RP hematoma  -today (08/15) Hb is 8.4    #Aspiration pneumonia RESOLVED  -patient had aspiration pneumonia during stay, likely causing septic shock  -s/p 5 day course of zosyn  -no longer febrile    #Aortic stenosis  Not a surgical candidate for severe AS given poor prognosis as well as family wishes to not pursue aggressive measures  No acute intervention    New medications: MESTINON, KEPPRA 500MG BID  Changes to old medications: NONE  Medications that were stopped: NONE    Items to follow up as outpatient:     Physical exam at the time of discharge:     #Discharge: do not delete    Patient is a 89 y/o M with PMHx of HTN, severe aortic stenosis and seizure on phenytoin who presented with left facial droop and slurred speech, stroke w/u negative, found to have myasthenia gravis, now s/p IVIG treatment. 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     Problem List/Main Diagnoses (system-based):     #Myasthenia gravis   -Patient presented for left sided facial droop and slurred speech  -stroke work up negative  -confirmed anti-ACh ab  -s/p IVIG treatment  - neuro following, recommend continuing outpatient  - Mestinon 30mg PO started 8/10  - C/w solumedrol  - Family not amenable to starting rituxin  - Plan to D/C with vitamin D and calcium    #Dysphagia  -patient with dysphagia during admission, unable to control secretions  -initially with NGT placed 7/29, however, clogged and removed on 8/10  -family does not wish to have PEG tube placed, risks of aspiration discussed in detail with the family with understanding  -started on thin puree diet    #Retroperitoneal hematoma  -patient developed new left lower extremity numbness and weakness on 8/3, denies back pain at present  -patient has strength of 1/5 L hip flexion (RLE is 4/5). Now has sensation to left foot and anterior tibial region, no sensation to left thigh. Intact sensation to RLE.  Bilateral upper extremities sensation intact with strength of 4/5  -MRI lumbar showed L RP bleed  -no plans for IR drainage at this time, as hematoma appears to be resolving  -hemoglobin 10.3 (8/11)->8.4 -> 8.9 (8/15), no signs of hemodynamic instability  -Heparin dc'ed as per patient and family's wishes.     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 likely will improved as hematoma resolves  -continued to follow hemoglobin, which remained stable  -PT following patient    #EVARISTO on CKD (baseline Cr 1.6-1.7) - IMPROVING  -ATN during admission, likely secondary to hypotension/poor perfusion  -Improving creatinine, 8/15 1.40.     #Hypotension, secondary to septic shock from aspiration pneumonia vs hypovolemia - RESOLVED  -patient developed episode of hypotension, initially requiring admission to MICU for pressors, now off pressors  -concern for aspiration pneumonia on CXR vs hypovolemia with hemoglobin drop to ~5  - BP now stable at 140s-150s systolic/60s-70s diastolic    #Normocytic anemia  - Iron panel consistent with ACD/ renal disease. B12/folate wnl. No evidence of hemolysis.   -Given CKD and Anemia, a monoclonal gammopathy workup performed and was negative  -patient s/p 2 units on 7/29, followed by stabilization  -MRI confirmed massive L RP hematoma  -today (08/15) Hb is 8.4    #Aspiration pneumonia RESOLVED  -patient had aspiration pneumonia during stay, likely causing septic shock  -s/p 5 day course of zosyn  -no longer febrile    #Aortic stenosis  Not a surgical candidate for severe AS given poor prognosis as well as family wishes to not pursue aggressive measures  No acute intervention    New medications: MESTINON, KEPPRA 500MG BID  Changes to old medications: NONE  Medications that were stopped: NONE    Items to follow up as outpatient:     Physical exam at the time of discharge:     #Discharge: do not delete    Patient is a 89 y/o M with PMHx of HTN, severe aortic stenosis and seizure on phenytoin who presented with left facial droop and slurred speech, stroke w/u negative, found to have myasthenia gravis, now s/p IVIG treatment. 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     Problem List/Main Diagnoses (system-based):     #Myasthenia gravis   -Patient presented for left sided facial droop and slurred speech  -stroke work up negative  -confirmed anti-ACh ab  -s/p IVIG treatment  - neuro following, recommend continuing outpatient  - Mestinon 30mg PO started 8/10  - C/w solumedrol  - Family not amenable to starting rituxin  - Plan to D/C with vitamin D and calcium    #Dysphagia  -patient with dysphagia during admission, unable to control secretions  -initially with NGT placed 7/29, however, clogged and removed on 8/10  -family does not wish to have PEG tube placed, risks of aspiration discussed in detail with the family with understanding  -started on thin puree diet    #Retroperitoneal hematoma  -patient developed new left lower extremity numbness and weakness on 8/3, denies back pain at present  -patient has strength of 1/5 L hip flexion (RLE is 4/5). Now has sensation to left foot and anterior tibial region, no sensation to left thigh. Intact sensation to RLE.  Bilateral upper extremities sensation intact with strength of 4/5  -MRI lumbar showed L RP bleed  -no plans for IR drainage at this time, as hematoma appears to be resolving  -hemoglobin 10.3 (8/11)->8.4 -> 8.9 (8/15), no signs of hemodynamic instability  -Heparin dc'ed as per patient and family's wishes.     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 likely will improved as hematoma resolves  -continued to follow hemoglobin, which remained stable  -PT following patient    #EVARISTO on CKD (baseline Cr 1.6-1.7) - IMPROVING  -ATN during admission, likely secondary to hypotension/poor perfusion  -Improving creatinine, 8/15 1.40.     #Normocytic anemia  - Iron panel consistent with ACD/ renal disease. B12/folate wnl. No evidence of hemolysis.   -Given CKD and Anemia, a monoclonal gammopathy workup performed and was negative  -patient s/p 2 units on 7/29, followed by stabilization  -MRI confirmed massive L RP hematoma  -today (08/15) Hb is 8.4    #Aspiration pneumonia RESOLVED  -patient had aspiration pneumonia during stay, likely causing septic shock  -s/p 5 day course of zosyn  -no longer febrile    #Aortic stenosis  Not a surgical candidate for severe AS given poor prognosis as well as family wishes to not pursue aggressive measures  No acute intervention    New medications: MESTINON (PYRIDOSTIGMINE), PREDNISONE, CALCIUM D3, KEPPRA 500MG QD,    Changes to old medications: NONE  Medications that were stopped: AMLODIPINE-BENZAPRIL    Items to follow up as outpatient: Neurology, Oncology    Physical exam at the time of discharge:       #Discharge: do not delete    Patient is a 89 y/o M with PMHx of HTN, severe aortic stenosis and seizure on phenytoin who presented with left facial droop and slurred speech, stroke w/u negative, found to have myasthenia gravis, now s/p IVIG treatment. 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     Problem List/Main Diagnoses (system-based):     #Myasthenia gravis   -Patient presented for left sided facial droop and slurred speech  -stroke work up negative  -confirmed anti-ACh ab  -s/p IVIG treatment  - continue prednisone 10mg daily   - Mestinon 30mg PO started 8/10  - Family not amenable to starting rituxin at this time   - Plan to D/C with vitamin D and calcium    #Dysphagia  -patient with dysphagia during admission, unable to control secretions  -initially with NGT placed 7/29, however, clogged and removed on 8/10  -family does not wish to have PEG tube placed, risks of aspiration discussed in detail with the family with understanding  -started on thin puree diet    #Retroperitoneal hematoma  -patient developed new left lower extremity numbness and weakness on 8/3, denies back pain at present  -patient has strength of 2/5 L knee extension, 4/5 hip flexion   -MRI lumbar showed L RP bleed  -no plans for IR drainage at this time, as hematoma appears to be resolving  -hemoglobin 10.3 (8/11)->8.4 -> 8.9 (8/15), no signs of hemodynamic instability  -Heparin dc'ed as per patient and family's wishes.     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 likely will improved as hematoma resolves  -continued to follow hemoglobin, which remained stable  -PT following patient    #EVARISTO on CKD (baseline Cr 1.6-1.7) - IMPROVING  -ATN during admission, likely secondary to hypotension/poor perfusion  -Improving creatinine, 8/15 1.40.     #Normocytic anemia  - Iron panel consistent with ACD/ renal disease. B12/folate wnl. No evidence of hemolysis.   -Given CKD and Anemia, a monoclonal gammopathy workup performed and was negative  -patient s/p 2 units on 7/29, followed by stabilization  -MRI confirmed massive L RP hematoma  -today (08/15) Hb is 8.4    #Aspiration pneumonia RESOLVED  -patient had aspiration pneumonia during stay, likely causing septic shock  -s/p 5 day course of zosyn  -no longer febrile    #Aortic stenosis  Not a surgical candidate for severe AS given poor prognosis as well as family wishes to not pursue aggressive measures  No acute intervention    New medications: MESTINON (PYRIDOSTIGMINE), PREDNISONE, CALCIUM D3, KEPPRA 500MG QD,    Changes to old medications: NONE  Medications that were stopped: AMLODIPINE-BENZAPRIL    Items to follow up as outpatient: Neurology, Oncology    Physical exam at the time of discharge:       #Discharge: do not delete    Patient is a 89 y/o M with PMHx of HTN, severe aortic stenosis and seizure on phenytoin who presented with left facial droop and slurred speech, stroke w/u negative, found to have myasthenia gravis, now s/p IVIG treatment. 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     Problem List/Main Diagnoses (system-based):     #Myasthenia gravis   -Patient presented for left sided facial droop and slurred speech  -stroke work up negative  -confirmed anti-ACh ab  -s/p IVIG treatment  - continue prednisone 10mg daily   - Mestinon 30mg PO started 8/10  - Family not amenable to starting rituxin at this time   - Plan to D/C with vitamin D and calcium    #Dysphagia  -patient with dysphagia during admission, unable to control secretions  -initially with NGT placed 7/29, however, clogged and removed on 8/10  -family does not wish to have PEG tube placed, risks of aspiration discussed in detail with the family with understanding  -started on thin puree diet    #Retroperitoneal hematoma  -patient developed new left lower extremity numbness and weakness on 8/3, denies back pain at present  -patient has strength of 2/5 L knee extension, 4/5 hip flexion   -MRI lumbar showed L RP bleed  -no plans for IR drainage at this time, as hematoma appears to be resolving  -hemoglobin 10.3 (8/11)->8.4 -> 8.9 (8/15), no signs of hemodynamic instability  -Heparin dc'ed as per patient and family's wishes.     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 likely will improved as hematoma resolves  -continued to follow hemoglobin, which remained stable  -PT following patient    #EVARISTO on CKD (baseline Cr 1.6-1.7) - IMPROVING  -ATN during admission, likely secondary to hypotension/poor perfusion  -Improving creatinine, 8/15 1.40.     #Normocytic anemia  - Iron panel consistent with ACD/ renal disease. B12/folate wnl. No evidence of hemolysis.   -Given CKD and Anemia, a monoclonal gammopathy workup performed and was negative  -patient s/p 2 units on 7/29, followed by stabilization  -MRI confirmed massive L RP hematoma  -today (08/15) Hb is 8.4    #Aspiration pneumonia RESOLVED  -patient had aspiration pneumonia during stay, likely causing septic shock  -s/p 5 day course of zosyn  -no longer febrile    #Aortic stenosis  Not a surgical candidate for severe AS given poor prognosis as well as family wishes to not pursue aggressive measures  No acute intervention    New medications: MESTINON (PYRIDOSTIGMINE), PREDNISONE, CALCIUM D3, KEPPRA 500MG QD,    Changes to old medications: NONE  Medications that were stopped: AMLODIPINE-BENZAPRIL    Items to follow up as outpatient: Neurology, Oncology    Physical exam at the time of discharge:  Constitutional: thin, frail-appearing male lying in bed in NAD  HEENT: PERRL, EOMI grossly, sclera anicteric, neck supple, no JVD, MM slightly dry  Respiratory: fair inspiratory effort, low bibasilar ?rhonchi, unlabored respirations  Cardiovascular: HR 56, +AS murmur, no r/g  Gastrointestinal: soft, NTND, no masses palpable, BS normoactive x4 quadrants  Extremities: Warm, well perfused, pulses equal bilateral upper and lower extremities, including LLE, no edema, no clubbing or cyanosis, brisk capillary refill.   Skin: Normal temperature, warm, dry, no rashes or lesions  Neurological: AAOx2. Recent and remote memory intact.  Naming, repetition and comprehension intact.  Attention/concentration intact.  No dysarthria, no aphasia.  Fund of knowledge appropriate.    Cranial nerves: PERRLA, visual fields full, no nystagmus, EOMI, face symmetric, tongue was midline.  Motor strength testing 4/5 B/L UE. 4/5 in RLE. LLE 3/5.  strength 5/5 B/L. Normal tone and bulk. No abnormal movements.  Sensation: Intact to light touch, proprioception, and pinprick.   Coordination: No dysmetria on finger-to-nose.  No dysdiadokinesia.  Reflexes: 2+ in bilateral UE/LE, downgoing toes bilaterally. (-) Josefina.

## 2021-08-10 NOTE — CHART NOTE - NSCHARTNOTEFT_GEN_A_CORE
Admitting Diagnosis:   Patient is a 89y old  Male who presents with a chief complaint of slurred speech, L facial droop (10 Aug 2021 12:03)      PAST MEDICAL & SURGICAL HISTORY:  Hypertension    Seizure        Current Nutrition Order:   Jevity 1.5 Irineo @ 55ml/hr x 24hrs via NGT (1320ml TV, 1980kcal, 84g protein, 1003ml free H2O, 132% RDI, 1.4g/kg ABW protein)    PO Intake: Good (%) [   ]  Fair (50-75%) [   ] Poor (<25%) [   ] -- N/A    GI Issues: No reported n/v/d/c    Pain: Denies pain per flowsheet    Skin Integrity: Sheng 14, sacrum stg 2, coccyx suspected deep tissue    Labs:   08-10    140  |  111<H>  |  69<H>  ----------------------------<  91  SEE NOTE   |  22  |  1.50<H>    Ca    9.1      10 Aug 2021 06:23  Phos  5.0     08-10  Mg     2.2     08-10    TPro  6.8  /  Alb  2.9<L>  /  TBili  0.7  /  DBili  x   /  AST  30  /  ALT  32  /  AlkPhos  64  08-09    CAPILLARY BLOOD GLUCOSE      POCT Blood Glucose.: 109 mg/dL (10 Aug 2021 12:11)  POCT Blood Glucose.: 93 mg/dL (10 Aug 2021 06:14)  POCT Blood Glucose.: 95 mg/dL (10 Aug 2021 02:18)  POCT Blood Glucose.: 85 mg/dL (10 Aug 2021 00:14)  POCT Blood Glucose.: 223 mg/dL (09 Aug 2021 17:17)      Medications:  MEDICATIONS  (STANDING):  atorvastatin 40 milliGRAM(s) Oral at bedtime  dextrose 40% Gel 15 Gram(s) Oral once  dextrose 5%. 1000 milliLiter(s) (50 mL/Hr) IV Continuous <Continuous>  dextrose 5%. 1000 milliLiter(s) (100 mL/Hr) IV Continuous <Continuous>  dextrose 50% Injectable 25 Gram(s) IV Push once  dextrose 50% Injectable 12.5 Gram(s) IV Push once  dextrose 50% Injectable 25 Gram(s) IV Push once  glucagon  Injectable 1 milliGRAM(s) IntraMuscular once  heparin   Injectable 5000 Unit(s) SubCutaneous every 8 hours  insulin lispro (ADMELOG) corrective regimen sliding scale   SubCutaneous every 6 hours  levETIRAcetam  IVPB 500 milliGRAM(s) IV Intermittent every 24 hours  methylPREDNISolone sodium succinate Injectable 8 milliGRAM(s) IV Push every 24 hours  multivitamin 1 Tablet(s) Oral daily  polyethylene glycol 3350 17 Gram(s) Oral two times a day  pyridostigmine Injectable 1 milliGRAM(s) IV Push three times a day  senna 2 Tablet(s) Oral at bedtime  zinc sulfate 220 milliGRAM(s) Oral daily    MEDICATIONS  (PRN):  acetaminophen    Suspension .. 600 milliGRAM(s) Enteral Tube every 6 hours PRN Moderate Pain (4 - 6)    Adm Anthropometrics:  Height 66"  ABW 60.3kg  IBW 64.4kg  94%IBW  BMI 21.4    Weight Change: no new wts in EMR. Please obtain wt weekly to assess for wt changes    Estimated energy needs:   ActualBW used for calculations as pt between % of IBW. Needs estimated for age and slightly increased for inflammatory process. Moderate protein 2/2 EVARISTO  Calories: 25-30 kcal/kg = 3553-2000 kcal/day  Protein: 1.2-1.4 g/kg = 72-84g pro/day  Fluids per team 2/2 EVARISTO/fluid overload    Subjective:   89y Male with PMHx of HTN and seizure on phenytoin presents with left facial droop and slurred speech, Stroke w/u negative. Found to have myasthenia gravis. S/p IVIG x5 days. Admitted to ICU for cardiogenic shock vs obstructive shock in the setting of severe aortic stenosis and IV fluid administration. At that time pt was hypotensive w/leukocytosis. Also found to have aspiration pneumonia. S/p FEES on 7/20, and multiple bedside f/u over past week. Pt deemed inappropriate for PO intake and NGT placed for EN. Pt stepped down to 7 Lach. Weaned off of pressors. S/p repeat FEES on 8/4 but still recommended to remain NPO w/EN via NGT. Ortho/neurology following for LE weakness and recommended for MRI L-spine. Family choosing not to pursue bone marrow biopsy to w/u possible CML or other hematologic malignancy. S/p x 5day IVIG. SLP re-eval 8/6, pt to remain NPO with NGT. Pt and family not wanting PEG but pt with high risk of aspiration if on comfort feeds. Trial mestinon and monitoring increased secretions.  On assessment, pt seen in room, sleeping. Called out to pt but unable to arouse. Currently, EN running at goal rate. No reported s/s of intolerance. No reported n/v/d/c or pain. Will continue to follow per RD protocol.     Previous Nutrition Diagnosis:  Inadequate Oral Intake RT dysphagia AEB need for EN to meet estimated needs.    Active [  x ]  Resolved [   ]    If resolved, new PES:     Goal: Pt will continue to meet % of daily EER via tolerated route     Recommendations:  1. Continue with current EN order. Monitor for s/s intolerance; maintain aspiration precautions at all times. Additional free H2O flushes per team   2. F/u with SLP recs for diet advancement   3. Monitor lytes and replete prn. POC BG q6hrs  4. Pain and bowel regimens per team   5.Continue MVI and Zinc  6. Keep nutrition aligned with GOC at all times    Education: N/a today, completed at previous f/u    Risk Level: High [ x ] Moderate [   ] Low [   ]

## 2021-08-10 NOTE — DISCHARGE NOTE PROVIDER - NSDCCPCAREPLAN_GEN_ALL_CORE_FT
PRINCIPAL DISCHARGE DIAGNOSIS  Diagnosis: Myasthenia gravis  Assessment and Plan of Treatment: You were admitted to the hospital for myasthenia gravis, which is a neuromuscular disease that causes weakness in the muscles. The muscles that can be affected include those involved in breathing, as well as moving the parts of the body such as arms and legs. During your admission, you were treated with IVIG, which may help your symptoms. In addition, you are currently receiving Prednisone, as well as Mestinon. We recommend you follow up with your outpatient Neurologist for continued monitoring and evaluation of your symptoms.      SECONDARY DISCHARGE DIAGNOSES  Diagnosis: Leukocytosis, unspecified type  Assessment and Plan of Treatment: During your admission, we found your white blood cell counts to be elevated. Your white blood cells counts can be elevated due to an infection, however, we do not believe you have an infection at this time. 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 A bone marrow biopsy was not done in the hospital, but the hemotologist recommends you follow up with them when discharged for continued monitoring.    Diagnosis: Dysphagia  Assessment and Plan of Treatment: During your admission, you developed difficutly swallowing, which is also known as dysphagia. We believe this difficulty swallowing may be due to your myasthenia gravis. You initially had a tube placed in your nose, which went into your stomach. This tube is how you were receiving food as well as medications.    Diagnosis: Anemia  Assessment and Plan of Treatment: While admitted, you had an episode of your blood counts (hemoglobin) being low. You received two units of blood, which improved your hemoglobin. Your hemoglobin then remained stable. A MRI of your lumbar spine showed a left retroperitoneal hematoma, which is the cause of your low hemoglobin. We did not drain the hematoma while you were admitted, as your hemoglobin continued to remain stable. Please follow up with your primary care doctor for further monitoring and management.     PRINCIPAL DISCHARGE DIAGNOSIS  Diagnosis: Myasthenia gravis  Assessment and Plan of Treatment: You were admitted to the hospital for myasthenia gravis, which is a neuromuscular disease that causes weakness in the muscles. The muscles that can be affected include those involved in breathing, as well as moving the parts of the body such as arms and legs. During your admission, you were treated with IVIG and a steroid called methylprednisone, which may help your symptoms. In addition, you are currently receiving Mestinon (pyridostigmine), which you will continue taking after leaving the hospital. We recommend you follow up with your outpatient Neurologist for continued monitoring and evaluation of your symptoms.      SECONDARY DISCHARGE DIAGNOSES  Diagnosis: Leukocytosis, unspecified type  Assessment and Plan of Treatment: During your admission, we found your white blood cell counts to be elevated. Your white blood cells counts can be elevated due to an infection, however, we do not believe you have an infection at this time. 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 A bone marrow biopsy was not done in the hospital, but the hemotologist recommends you follow up with them when discharged for continued monitoring.    Diagnosis: Dysphagia  Assessment and Plan of Treatment: During your admission, you developed difficutly swallowing, which is also known as dysphagia. We believe this difficulty swallowing may be due to your myasthenia gravis. You initially had a tube placed in your nose, which went into your stomach, and is how you were receiving food as well as medications. That tube was removed on August 10th.    Diagnosis: Anemia  Assessment and Plan of Treatment: While admitted, you had an episode of your blood counts (hemoglobin) being low. You received two units of blood, which improved your hemoglobin. Your hemoglobin then remained stable. A MRI of your lumbar spine showed a left retroperitoneal hematoma, which is the cause of your low hemoglobin. We did not drain the hematoma while you were admitted, as your hemoglobin continued to remain stable. Please follow up with your primary care doctor for further monitoring and management.     PRINCIPAL DISCHARGE DIAGNOSIS  Diagnosis: Myasthenia gravis  Assessment and Plan of Treatment: You were admitted to the hospital for myasthenia gravis, which is a neuromuscular disease that causes weakness in the muscles. The muscles that can be affected include those involved in breathing, as well as moving the parts of the body such as arms and legs. During your admission, you were treated with IVIG and a steroid called methylprednisone, which may help your symptoms. In addition, you are currently receiving Mestinon (pyridostigmine), which you will continue taking after leaving the hospital. We recommend you follow up with your outpatient Neurologist for continued monitoring and evaluation of your symptoms.      SECONDARY DISCHARGE DIAGNOSES  Diagnosis: Leukocytosis, unspecified type  Assessment and Plan of Treatment: During your admission, we found your white blood cell counts to be elevated. Your white blood cells counts can be elevated due to an infection, however, we do not believe you have an infection at this time. 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 A bone marrow biopsy was not done in the hospital, but the hemotologist recommends you follow up with them when discharged for continued monitoring.  PLEASE FOLLOW UP WITH THE ONCOLOGIST DR. ROBERTS ON THURSDAY AUGUST 26 2021 AT 9:30AM.    Diagnosis: Dysphagia  Assessment and Plan of Treatment: During your admission, you developed difficutly swallowing, which is also known as dysphagia. We believe this difficulty swallowing may be due to your myasthenia gravis. You initially had a tube placed in your nose, which went into your stomach, and is how you were receiving food as well as medications. That tube was removed on August 10th.    Diagnosis: Anemia  Assessment and Plan of Treatment: While admitted, you had an episode of your blood counts (hemoglobin) being low. You received two units of blood, which improved your hemoglobin. Your hemoglobin then remained stable. A MRI of your lumbar spine showed a left retroperitoneal hematoma, which is the cause of your low hemoglobin. We did not drain the hematoma while you were admitted, as your hemoglobin continued to remain stable. Please follow up with your primary care doctor for further monitoring and management.     PRINCIPAL DISCHARGE DIAGNOSIS  Diagnosis: Myasthenia gravis  Assessment and Plan of Treatment: You were admitted to the hospital for myasthenia gravis, which is a neuromuscular disease that causes weakness in the muscles. The muscles that can be affected include those involved in breathing, as well as moving the parts of the body such as arms and legs. During your admission, you were treated with IVIG and a steroid called methylprednisone, which may help your symptoms. In addition, you are currently receiving Mestinon (pyridostigmine), which you will continue taking after leaving the hospital. PLEASE FOLLOW UP WITH NEUROLOGIST DR. CHAVO WILKERSON ON NOVEMBER 22, 2021 AT 3:40PM. YOU CAN FOLLOW UP WITH THE [facility] OFFICE TO RE-SCHEDULE TO A SOONER APPOINTMENT IF AVAILABLE.      SECONDARY DISCHARGE DIAGNOSES  Diagnosis: Leukocytosis, unspecified type  Assessment and Plan of Treatment: During your admission, we found your white blood cell counts to be elevated. Your white blood cells counts can be elevated due to an infection, however, we do not believe you have an infection at this time. 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 A bone marrow biopsy was not done in the hospital, but the hemotologist recommends you follow up with them when discharged for continued monitoring.  PLEASE FOLLOW UP WITH THE ONCOLOGIST DR. ROBERTS ON THURSDAY AUGUST 26 2021 AT 9:30AM.    Diagnosis: Dysphagia  Assessment and Plan of Treatment: During your admission, you developed difficutly swallowing, which is also known as dysphagia. We believe this difficulty swallowing may be due to your myasthenia gravis. You initially had a tube placed in your nose, which went into your stomach, and is how you were receiving food as well as medications. That tube was removed on August 10th.    Diagnosis: Anemia  Assessment and Plan of Treatment: While admitted, you had an episode of your blood counts (hemoglobin) being low. You received two units of blood, which improved your hemoglobin. Your hemoglobin then remained stable. A MRI of your lumbar spine showed a left retroperitoneal hematoma, which is the cause of your low hemoglobin. We did not drain the hematoma while you were admitted, as your hemoglobin continued to remain stable. Please follow up with your primary care doctor for further monitoring and management.     PRINCIPAL DISCHARGE DIAGNOSIS  Diagnosis: Myasthenia gravis  Assessment and Plan of Treatment: You were admitted to the hospital for myasthenia gravis, which is a neuromuscular disease that causes weakness in the muscles. The muscles that can be affected include those involved in breathing, as well as moving the parts of the body such as arms and legs. During your admission, you were treated with IVIG and a steroid called methylprednisone, which may help your symptoms. In addition, you are currently receiving Mestinon (pyridostigmine) and prednisone 10mg. PLEASE CONTINUE THE MESTINON AND PREDNISONE AFTER LEAVING THE HOSPITAL. PLEASE FOLLOW UP WITH NEUROLOGIST DR. CHAVO WILKERSON ON NOVEMBER 22, 2021 AT 3:40PM. YOU CAN FOLLOW UP WITH THE [facility] OFFICE TO RE-SCHEDULE TO A SOONER APPOINTMENT IF AVAILABLE.  PLEASE FOLLOW UP WITH THE [facility] ON AUGUST 26TH, 2021 AT 1:45PM. IF YOU NEED TO RESCHEDULE, PLEASE CALL _________ TO CHANGE YOUR APPOINTMENT.      SECONDARY DISCHARGE DIAGNOSES  Diagnosis: Seizure  Assessment and Plan of Treatment: In the hospital, you were managed for seizures. We changed your medications from Phenytoin to Keppra, which helped control your seizures. PLEASE TAKE THE KEPPRA 500MG ONCE A DAY. PLEASE DO NOT TAKE THE PHENYTOIN ANYMORE (PLEASE DISCARD APPROPRIATELY). PLEASE FOLLOW UP WITH NEUROLOGIST DR. CHAVO WILKERSON ON NOVEMBER 22, 2021 AT 3:40PM.    Diagnosis: Leukocytosis, unspecified type  Assessment and Plan of Treatment: During your admission, we found your white blood cell counts to be elevated. Your white blood cells counts can be elevated due to an infection, however, we do not believe you have an infection at this time. 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 A bone marrow biopsy was not done in the hospital, but the hemotologist recommends you follow up with them when discharged for continued monitoring.  PLEASE FOLLOW UP WITH THE ONCOLOGIST DR. ROBERTS ON THURSDAY AUGUST 26 2021 AT 9:30AM.    Diagnosis: Dysphagia  Assessment and Plan of Treatment: During your admission, you developed difficutly swallowing, which is also known as dysphagia. We believe this difficulty swallowing may be due to your myasthenia gravis. You initially had a tube placed in your nose, which went into your stomach, and is how you were receiving food as well as medications. That tube was removed on August 10th.    Diagnosis: Anemia  Assessment and Plan of Treatment: While admitted, you had an episode of your blood counts (hemoglobin) being low. You received two units of blood, which improved your hemoglobin. Your hemoglobin then remained stable. A MRI of your lumbar spine showed a left retroperitoneal hematoma, which is the cause of your low hemoglobin. We did not drain the hematoma while you were admitted, as your hemoglobin continued to remain stable. Please follow up with your primary care doctor for further monitoring and management.     PRINCIPAL DISCHARGE DIAGNOSIS  Diagnosis: Myasthenia gravis  Assessment and Plan of Treatment: You were admitted to the hospital for myasthenia gravis, which is a neuromuscular disease that causes weakness in the muscles. The muscles that can be affected include those involved in breathing, as well as moving the parts of the body such as arms and legs. During your admission, you were treated with IVIG and a steroid called methylprednisone, which may help your symptoms. In addition, you are currently receiving Mestinon (pyridostigmine) and prednisone 10mg. PLEASE CONTINUE THE MESTINON AND PREDNISONE AFTER LEAVING THE HOSPITAL. PLEASE FOLLOW UP WITH NEUROLOGIST DR. CHAVO WILKERSON ON NOVEMBER 22, 2021 AT 3:40PM. YOU CAN FOLLOW UP WITH THE [facility] OFFICE TO RE-SCHEDULE TO A SOONER APPOINTMENT IF AVAILABLE.  PLEASE FOLLOW UP WITH THE [facility] ON AUGUST 26TH, 2021 AT 1:45PM. IF YOU NEED TO RESCHEDULE, PLEASE CALL [phone] TO CHANGE YOUR APPOINTMENT.      SECONDARY DISCHARGE DIAGNOSES  Diagnosis: Seizure  Assessment and Plan of Treatment: In the hospital, you were managed for seizures. We changed your medications from Phenytoin to Keppra, which helped control your seizures. PLEASE TAKE THE KEPPRA 500MG ONCE A DAY. PLEASE DO NOT TAKE THE PHENYTOIN ANYMORE (PLEASE DISCARD APPROPRIATELY). PLEASE FOLLOW UP WITH NEUROLOGIST DR. CHAVO WILKERSON ON NOVEMBER 22, 2021 AT 3:40PM.    Diagnosis: Leukocytosis, unspecified type  Assessment and Plan of Treatment: During your admission, we found your white blood cell counts to be elevated. Your white blood cells counts can be elevated due to an infection, however, we do not believe you have an infection at this time. 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 A bone marrow biopsy was not done in the hospital, but the hemotologist recommends you follow up with them when discharged for continued monitoring.  PLEASE FOLLOW UP WITH THE ONCOLOGIST DR. ROBERTS ON THURSDAY AUGUST 26 2021 AT 9:30AM.    Diagnosis: Dysphagia  Assessment and Plan of Treatment: During your admission, you developed difficutly swallowing, which is also known as dysphagia. We believe this difficulty swallowing may be due to your myasthenia gravis. You initially had a tube placed in your nose, which went into your stomach, and is how you were receiving food as well as medications. That tube was removed on August 10th.    Diagnosis: Anemia  Assessment and Plan of Treatment: While admitted, you had an episode of your blood counts (hemoglobin) being low. You received two units of blood, which improved your hemoglobin. Your hemoglobin then remained stable. A MRI of your lumbar spine showed a left retroperitoneal hematoma, which is the cause of your low hemoglobin. We did not drain the hematoma while you were admitted, as your hemoglobin continued to remain stable. Please follow up with your primary care doctor for further monitoring and management.

## 2021-08-10 NOTE — PROGRESS NOTE ADULT - PROBLEM SELECTOR PLAN 7
Normocytic anemia, with iron panel consistent with anemia of chronic disease/ renal disease. B12/folate wnl. No evidence of hemolysis. Given CKD and Anemia, a monoclonal gammopathy workup performed and was negative  -patient s/p 2 units on 7/29, followed by stabilization  -MRI confirmed massive L RP hematoma    PLAN:  - restarted on heparin subq, as patient's hemoglobin has now stabilized, no signs of active bleeding, remains hemodynamically stable  -monitor for signs of active bleeding  -trend CBC  - Transfuse if <7  - Keep active T&S Normocytic anemia, with iron panel consistent with anemia of chronic disease/ renal disease. B12/folate wnl. No evidence of hemolysis. Given CKD and Anemia, a monoclonal gammopathy workup performed and was negative  -patient s/p 2 units on 7/29, followed by stabilization  -MRI confirmed massive L RP hematoma    PLAN:  - restarted on heparin subq, as patient's hemoglobin has now stabilized, no signs of active bleeding, remains hemodynamically stable - family declined  -monitor for signs of active bleeding  -trend CBC  - Transfuse if <7  - Keep active T&S

## 2021-08-10 NOTE — PROGRESS NOTE ADULT - SUBJECTIVE AND OBJECTIVE BOX
OVERNIGHT EVENTS: Patient had an episode of aspiration. Tube feeds were stopped. CXR unchanged from prior. O2 saturation remained     SUBJECTIVE:  Patient seen and examined at bedside.    Vital Signs Last 12 Hrs  T(F): 97.8 (08-10-21 @ 06:35), Max: 97.8 (08-10-21 @ 06:35)  HR: 60 (08-10-21 @ 06:35) (60 - 60)  BP: 166/70 (08-10-21 @ 06:35) (166/70 - 166/70)  BP(mean): --  RR: 18 (08-10-21 @ 06:35) (18 - 18)  SpO2: 99% (08-10-21 @ 06:35) (99% - 99%)  I&O's Summary    09 Aug 2021 07:01  -  10 Aug 2021 07:00  --------------------------------------------------------  IN: 0 mL / OUT: 1050 mL / NET: -1050 mL        PHYSICAL EXAM:  Constitutional: NAD, comfortable in bed.  HEENT: NC/AT, PERRLA, EOMI, no conjunctival pallor or scleral icterus, MMM  Neck: Supple, no JVD  Respiratory: CTA B/L. No w/r/r.   Cardiovascular: RRR, normal S1 and S2, no m/r/g.   Gastrointestinal: +BS, soft NTND, no guarding or rebound tenderness, no palpable masses   Extremities: wwp; no cyanosis, clubbing or edema.   Vascular: Pulses equal and strong throughout.   Neurological: AAOx3, no CN deficits, strength and sensation intact throughout.   Skin: No gross skin abnormalities or rashes        LABS:                        8.9    39.18 )-----------( 304      ( 10 Aug 2021 06:23 )             32.5     08-10    140  |  111<H>  |  69<H>  ----------------------------<  91  SEE NOTE   |  22  |  1.50<H>    Ca    9.1      10 Aug 2021 06:23  Phos  5.0     08-10  Mg     2.2     08-10    TPro  6.8  /  Alb  2.9<L>  /  TBili  0.7  /  DBili  x   /  AST  30  /  ALT  32  /  AlkPhos  64  08-09    PT/INR - ( 09 Aug 2021 08:43 )   PT: 13.2 sec;   INR: 1.11          PTT - ( 09 Aug 2021 08:43 )  PTT:31.2 sec        RADIOLOGY & ADDITIONAL TESTS:    MEDICATIONS  (STANDING):  atorvastatin 40 milliGRAM(s) Oral at bedtime  dextrose 40% Gel 15 Gram(s) Oral once  dextrose 5%. 1000 milliLiter(s) (50 mL/Hr) IV Continuous <Continuous>  dextrose 5%. 1000 milliLiter(s) (100 mL/Hr) IV Continuous <Continuous>  dextrose 50% Injectable 25 Gram(s) IV Push once  dextrose 50% Injectable 12.5 Gram(s) IV Push once  dextrose 50% Injectable 25 Gram(s) IV Push once  glucagon  Injectable 1 milliGRAM(s) IntraMuscular once  heparin   Injectable 5000 Unit(s) SubCutaneous every 8 hours  insulin lispro (ADMELOG) corrective regimen sliding scale   SubCutaneous every 6 hours  levETIRAcetam  IVPB 500 milliGRAM(s) IV Intermittent every 24 hours  methylPREDNISolone sodium succinate Injectable 8 milliGRAM(s) IV Push every 24 hours  multivitamin 1 Tablet(s) Oral daily  polyethylene glycol 3350 17 Gram(s) Oral two times a day  pyridostigmine Injectable 1 milliGRAM(s) IV Push three times a day  senna 2 Tablet(s) Oral at bedtime  zinc sulfate 220 milliGRAM(s) Oral daily    MEDICATIONS  (PRN):  acetaminophen    Suspension .. 600 milliGRAM(s) Enteral Tube every 6 hours PRN Moderate Pain (4 - 6)   HOSPITAL COURSE:  Patient is a 89 y/o Male with PMHx of HTN, severe aortic stenosis and seizure on phenytoin who presented with left facial droop and slurred speech, Stroke w/u negative. Found to have myasthenia gravis with positive anti-ach antibodies. Now s/p IVIG treatment, currently treated with Prednisone PO. 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 Course complicated by septic shock, secondary to aspiration pneumonia, requiring MICU admission and pressors. Also complicated by anemia, secondary to L RP bleed, s/p 2 units of pRBCs, now causing acute LLE weakness. Patient now with stable hemoglobin, not requiring IR drainage of hematoma. Initially with ATN, now improving creatinine. Patient off pressors and transferred to [facility] for continued management of dysphagia and MG treatment. Per neuro, now started on Mestinon. Patient initially with NGT/NPO with tube feedings, which is now removed due to NGT clogging. PO medications transitioned to IV and plans for comfort feeds per speech and swallow.      OVERNIGHT EVENTS: Patient had an episode of aspiration. Tube feeds were stopped. CXR unchanged from prior. O2 saturation remained     SUBJECTIVE:  Patient seen and examined at bedside.    Vital Signs Last 12 Hrs  T(F): 97.8 (08-10-21 @ 06:35), Max: 97.8 (08-10-21 @ 06:35)  HR: 60 (08-10-21 @ 06:35) (60 - 60)  BP: 166/70 (08-10-21 @ 06:35) (166/70 - 166/70)  BP(mean): --  RR: 18 (08-10-21 @ 06:35) (18 - 18)  SpO2: 99% (08-10-21 @ 06:35) (99% - 99%)  I&O's Summary    09 Aug 2021 07:01  -  10 Aug 2021 07:00  --------------------------------------------------------  IN: 0 mL / OUT: 1050 mL / NET: -1050 mL        PHYSICAL EXAM:  Constitutional: NAD, comfortable in bed.  HEENT: NC/AT, PERRLA, EOMI, no conjunctival pallor or scleral icterus, MMM  Neck: Supple, no JVD  Respiratory: CTA B/L. No w/r/r.   Cardiovascular: RRR, normal S1 and S2, no m/r/g.   Gastrointestinal: +BS, soft NTND, no guarding or rebound tenderness, no palpable masses   Extremities: wwp; no cyanosis, clubbing or edema.   Vascular: Pulses equal and strong throughout.   Neurological: AAOx3, no CN deficits, strength and sensation intact throughout.   Skin: No gross skin abnormalities or rashes        LABS:                        8.9    39.18 )-----------( 304      ( 10 Aug 2021 06:23 )             32.5     08-10    140  |  111<H>  |  69<H>  ----------------------------<  91  SEE NOTE   |  22  |  1.50<H>    Ca    9.1      10 Aug 2021 06:23  Phos  5.0     08-10  Mg     2.2     08-10    TPro  6.8  /  Alb  2.9<L>  /  TBili  0.7  /  DBili  x   /  AST  30  /  ALT  32  /  AlkPhos  64  08-09    PT/INR - ( 09 Aug 2021 08:43 )   PT: 13.2 sec;   INR: 1.11          PTT - ( 09 Aug 2021 08:43 )  PTT:31.2 sec        RADIOLOGY & ADDITIONAL TESTS:    MEDICATIONS  (STANDING):  atorvastatin 40 milliGRAM(s) Oral at bedtime  dextrose 40% Gel 15 Gram(s) Oral once  dextrose 5%. 1000 milliLiter(s) (50 mL/Hr) IV Continuous <Continuous>  dextrose 5%. 1000 milliLiter(s) (100 mL/Hr) IV Continuous <Continuous>  dextrose 50% Injectable 25 Gram(s) IV Push once  dextrose 50% Injectable 12.5 Gram(s) IV Push once  dextrose 50% Injectable 25 Gram(s) IV Push once  glucagon  Injectable 1 milliGRAM(s) IntraMuscular once  heparin   Injectable 5000 Unit(s) SubCutaneous every 8 hours  insulin lispro (ADMELOG) corrective regimen sliding scale   SubCutaneous every 6 hours  levETIRAcetam  IVPB 500 milliGRAM(s) IV Intermittent every 24 hours  methylPREDNISolone sodium succinate Injectable 8 milliGRAM(s) IV Push every 24 hours  multivitamin 1 Tablet(s) Oral daily  polyethylene glycol 3350 17 Gram(s) Oral two times a day  pyridostigmine Injectable 1 milliGRAM(s) IV Push three times a day  senna 2 Tablet(s) Oral at bedtime  zinc sulfate 220 milliGRAM(s) Oral daily    MEDICATIONS  (PRN):  acetaminophen    Suspension .. 600 milliGRAM(s) Enteral Tube every 6 hours PRN Moderate Pain (4 - 6)

## 2021-08-10 NOTE — PROGRESS NOTE ADULT - ASSESSMENT
89y Male with PMHx of HTN, severe aortic stenosis and seizures (on phenytoin) who presented with left facial droop and slurred speech, Stroke w/u negative. Found to have myasthenia gravis with positive anti-Ach antibodies. Also found to have leukocytosis suspicious for malignancy. Admitted to [facility] for treatment of MG and w/u of potential CML/CLL. S/p IVIG treatment. Course complicated by dysphagia and aspiration pneumonia. Admitted to MICU after developing septic shock likely 2/2 to aspiration PNA requiring pressors. 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 Patient found to have L RP hematoma, likely causing new LLE weakness and numbness. Now following for dysphagia, as well as continued MG treatment, pending discharge to acute rehab.

## 2021-08-10 NOTE — PROGRESS NOTE ADULT - PROBLEM SELECTOR PLAN 3
-positive anti-ach antibiotics  - S/p IVIG x5 days  PLAN:  - Cont Prednisone 10 mg daily, started 7/31  - Neurology following, appreciate reccs  - pending discharge to acute rehab - patient is able to tolerate 3 hours of PT -positive anti-ach antibiotics  - S/p IVIG x5 days  PLAN:  - Cont Prednisone 10 mg daily, started 7/31 --> switched to Solumedrol IV as NGT removed  -started on Mestinon  - Neurology following, afua beltran

## 2021-08-10 NOTE — PROGRESS NOTE ADULT - ATTENDING COMMENTS
I was physically present for the key portions of the evaluation and managemnent (E/M) service provided.  I agree with the above history, physical, and plan which I have reviewed and edited where appropriate, with the exceptions as per my note.    Pt seen and examined.    Neuro exam is stable.    Still with inability to safely swallow. Pt and wife refused peg. Only other option is palliative feeds.     Rituximab still reasonable to consider for rx of MG. There is no CI as per heme. Pt had been exposed to hepB in the past but likely not active, will check pcr, consult ID, and will likely need to start ppx before rituximab.

## 2021-08-10 NOTE — PROGRESS NOTE ADULT - PROBLEM SELECTOR PLAN 4
Likely ATN 2/2 to septic shock, poor perfusion. Pt has a history of CKD. baseline 1.6-1.7.   - Creatinine improving  - Cont to trend creatinine    #Hypernatremia  Improving  -Na of 146 today. Patient asymptomatic, A&Ox3  -Continue 125cc free water q6h  -continue to monitor Likely ATN 2/2 to septic shock, poor perfusion. Pt has a history of CKD. baseline 1.6-1.7.   - Creatinine improving  - Cont to trend creatinine    #Hypernatremia  RESOLVED  - Patient asymptomatic, A&Ox3  -d/c 125cc free water q6h  -continue to monitor

## 2021-08-10 NOTE — DISCHARGE NOTE PROVIDER - CARE PROVIDERS DIRECT ADDRESSES
[email or web address]

## 2021-08-10 NOTE — PROGRESS NOTE ADULT - PROBLEM SELECTOR PLAN 9
- Cont keppra 500 q12      #Left upper extremity swelling  -LUE US consistent with superficial thrombophlebitis - Cont keppra 500 q12 (started on IV as NGT removed)      #Left upper extremity swelling  -LUE US consistent with superficial thrombophlebitis

## 2021-08-10 NOTE — DISCHARGE NOTE PROVIDER - PROVIDER TOKENS
PROVIDER:[TOKEN:[[id number]],SCHEDULEDAPPT:[11/22/2021],SCHEDULEDAPPTTIME:[03:40 AM]],PROVIDER:[TOKEN:[[id number]],SCHEDULEDAPPT:[08/26/2021],SCHEDULEDAPPTTIME:[09:30 AM]]

## 2021-08-10 NOTE — PROGRESS NOTE ADULT - ASSESSMENT
89-year-old man with newly diagnosed myasthenia gravis and leukocytosis (possibly CMML or other hematologic malignancy -- patient and family declined bone marrow biopsy).  S/p x 5 days IVIG and currently on prednisone 10 mg daily.      Plan:  - Cont prednisone 10 mg daily, will continue on this medication regimen for MG  - Started mestinon 30 mg TID and monitoring for any increased secretions. Will continue outpatient along with prednisone.  - Speech and swallow eval performed and following, ailin min ice chips. NGT removed at this time. Family refusing PEG tube at this time. Likely trying trickle feeds with family knowing risks of aspiration  - Still considering Rituximab treatment for 6 months, pending ID approval due to history of hepatitis B. Heme/onc and ID recs appreciated  - Following leukocytosis work up, heme/onc is on board for possible CMML vs other malignancy, flow cytometry showed increased monocytes, BCR-ABL, JAMES-2 was negative. MRI Lumbar spine showed marrow signal abnormality in L spine, concerning for lymphoproliferative disorder. Bone marrow biopsy recommended but family is refusing at this time. Will not work up further  - Hb dropped to around 5 on this admission and with MICU course for hypotension and anemia. Hb stable. No CT performed. MRI Lumbar spine w/o contrast done for left leg weakness, showed massive L RP hematoma, stable, likely contributing to LLE weakness. CT A/P is advised but no acute intervention at this time as per IR

## 2021-08-11 DIAGNOSIS — E87.5 HYPERKALEMIA: ICD-10-CM

## 2021-08-11 LAB
ANION GAP SERPL CALC-SCNC: 10 MMOL/L — SIGNIFICANT CHANGE UP (ref 5–17)
ANION GAP SERPL CALC-SCNC: 8 MMOL/L — SIGNIFICANT CHANGE UP (ref 5–17)
ANION GAP SERPL CALC-SCNC: 8 MMOL/L — SIGNIFICANT CHANGE UP (ref 5–17)
ANION GAP SERPL CALC-SCNC: 9 MMOL/L — SIGNIFICANT CHANGE UP (ref 5–17)
ANISOCYTOSIS BLD QL: SLIGHT — SIGNIFICANT CHANGE UP
BASOPHILS # BLD AUTO: 0.65 K/UL — HIGH (ref 0–0.2)
BASOPHILS NFR BLD AUTO: 1.8 % — SIGNIFICANT CHANGE UP (ref 0–2)
BUN SERPL-MCNC: 67 MG/DL — HIGH (ref 7–23)
BUN SERPL-MCNC: 67 MG/DL — HIGH (ref 7–23)
BUN SERPL-MCNC: 68 MG/DL — HIGH (ref 7–23)
BUN SERPL-MCNC: 70 MG/DL — HIGH (ref 7–23)
BURR CELLS BLD QL SMEAR: PRESENT — SIGNIFICANT CHANGE UP
CALCIUM SERPL-MCNC: 9.4 MG/DL — SIGNIFICANT CHANGE UP (ref 8.4–10.5)
CALCIUM SERPL-MCNC: 9.4 MG/DL — SIGNIFICANT CHANGE UP (ref 8.4–10.5)
CALCIUM SERPL-MCNC: 9.8 MG/DL — SIGNIFICANT CHANGE UP (ref 8.4–10.5)
CALCIUM SERPL-MCNC: 9.8 MG/DL — SIGNIFICANT CHANGE UP (ref 8.4–10.5)
CHLORIDE SERPL-SCNC: 108 MMOL/L — SIGNIFICANT CHANGE UP (ref 96–108)
CHLORIDE SERPL-SCNC: 109 MMOL/L — HIGH (ref 96–108)
CHLORIDE SERPL-SCNC: 111 MMOL/L — HIGH (ref 96–108)
CHLORIDE SERPL-SCNC: 111 MMOL/L — HIGH (ref 96–108)
CO2 SERPL-SCNC: 22 MMOL/L — SIGNIFICANT CHANGE UP (ref 22–31)
CO2 SERPL-SCNC: 23 MMOL/L — SIGNIFICANT CHANGE UP (ref 22–31)
CO2 SERPL-SCNC: 23 MMOL/L — SIGNIFICANT CHANGE UP (ref 22–31)
CO2 SERPL-SCNC: 24 MMOL/L — SIGNIFICANT CHANGE UP (ref 22–31)
CREAT SERPL-MCNC: 1.51 MG/DL — HIGH (ref 0.5–1.3)
CREAT SERPL-MCNC: 1.56 MG/DL — HIGH (ref 0.5–1.3)
ELLIPTOCYTES BLD QL SMEAR: SLIGHT — SIGNIFICANT CHANGE UP
EOSINOPHIL # BLD AUTO: 0 K/UL — SIGNIFICANT CHANGE UP (ref 0–0.5)
EOSINOPHIL NFR BLD AUTO: 0 % — SIGNIFICANT CHANGE UP (ref 0–6)
GIANT PLATELETS BLD QL SMEAR: PRESENT — SIGNIFICANT CHANGE UP
GLUCOSE BLDC GLUCOMTR-MCNC: 123 MG/DL — HIGH (ref 70–99)
GLUCOSE BLDC GLUCOMTR-MCNC: 203 MG/DL — HIGH (ref 70–99)
GLUCOSE BLDC GLUCOMTR-MCNC: 231 MG/DL — HIGH (ref 70–99)
GLUCOSE BLDC GLUCOMTR-MCNC: 92 MG/DL — SIGNIFICANT CHANGE UP (ref 70–99)
GLUCOSE SERPL-MCNC: 122 MG/DL — HIGH (ref 70–99)
GLUCOSE SERPL-MCNC: 160 MG/DL — HIGH (ref 70–99)
GLUCOSE SERPL-MCNC: 200 MG/DL — HIGH (ref 70–99)
GLUCOSE SERPL-MCNC: 96 MG/DL — SIGNIFICANT CHANGE UP (ref 70–99)
HCT VFR BLD CALC: 35.7 % — LOW (ref 39–50)
HGB BLD-MCNC: 10.3 G/DL — LOW (ref 13–17)
HYPOCHROMIA BLD QL: SLIGHT — SIGNIFICANT CHANGE UP
LYMPHOCYTES # BLD AUTO: 1.59 K/UL — SIGNIFICANT CHANGE UP (ref 1–3.3)
LYMPHOCYTES # BLD AUTO: 4.4 % — LOW (ref 13–44)
MACROCYTES BLD QL: SLIGHT — SIGNIFICANT CHANGE UP
MAGNESIUM SERPL-MCNC: 2.2 MG/DL — SIGNIFICANT CHANGE UP (ref 1.6–2.6)
MANUAL SMEAR VERIFICATION: SIGNIFICANT CHANGE UP
MCHC RBC-ENTMCNC: 28.9 GM/DL — LOW (ref 32–36)
MCHC RBC-ENTMCNC: 29.8 PG — SIGNIFICANT CHANGE UP (ref 27–34)
MCV RBC AUTO: 103.2 FL — HIGH (ref 80–100)
MICROCYTES BLD QL: SLIGHT — SIGNIFICANT CHANGE UP
MONOCYTES # BLD AUTO: 2.21 K/UL — HIGH (ref 0–0.9)
MONOCYTES NFR BLD AUTO: 6.1 % — SIGNIFICANT CHANGE UP (ref 2–14)
MYELOCYTES NFR BLD: 4.4 % — HIGH (ref 0–0)
NEUTROPHILS # BLD AUTO: 30.18 K/UL — HIGH (ref 1.8–7.4)
NEUTROPHILS NFR BLD AUTO: 83.3 % — HIGH (ref 43–77)
PHOSPHATE SERPL-MCNC: 5.8 MG/DL — HIGH (ref 2.5–4.5)
PLAT MORPH BLD: ABNORMAL
PLATELET # BLD AUTO: 391 K/UL — SIGNIFICANT CHANGE UP (ref 150–400)
POIKILOCYTOSIS BLD QL AUTO: SIGNIFICANT CHANGE UP
POLYCHROMASIA BLD QL SMEAR: SLIGHT — SIGNIFICANT CHANGE UP
POTASSIUM SERPL-MCNC: 5.5 MMOL/L — HIGH (ref 3.5–5.3)
POTASSIUM SERPL-MCNC: 5.7 MMOL/L — HIGH (ref 3.5–5.3)
POTASSIUM SERPL-MCNC: 6 MMOL/L — HIGH (ref 3.5–5.3)
POTASSIUM SERPL-MCNC: 6.4 MMOL/L — CRITICAL HIGH (ref 3.5–5.3)
POTASSIUM SERPL-SCNC: 5.5 MMOL/L — HIGH (ref 3.5–5.3)
POTASSIUM SERPL-SCNC: 5.7 MMOL/L — HIGH (ref 3.5–5.3)
POTASSIUM SERPL-SCNC: 6 MMOL/L — HIGH (ref 3.5–5.3)
POTASSIUM SERPL-SCNC: 6.4 MMOL/L — CRITICAL HIGH (ref 3.5–5.3)
RBC # BLD: 3.46 M/UL — LOW (ref 4.2–5.8)
RBC # FLD: 17.1 % — HIGH (ref 10.3–14.5)
RBC BLD AUTO: ABNORMAL
SCHISTOCYTES BLD QL AUTO: SLIGHT — SIGNIFICANT CHANGE UP
SODIUM SERPL-SCNC: 140 MMOL/L — SIGNIFICANT CHANGE UP (ref 135–145)
SODIUM SERPL-SCNC: 141 MMOL/L — SIGNIFICANT CHANGE UP (ref 135–145)
SODIUM SERPL-SCNC: 142 MMOL/L — SIGNIFICANT CHANGE UP (ref 135–145)
SODIUM SERPL-SCNC: 143 MMOL/L — SIGNIFICANT CHANGE UP (ref 135–145)
WBC # BLD: 36.23 K/UL — HIGH (ref 3.8–10.5)
WBC # FLD AUTO: 36.23 K/UL — HIGH (ref 3.8–10.5)

## 2021-08-11 PROCEDURE — 99233 SBSQ HOSP IP/OBS HIGH 50: CPT | Mod: GC

## 2021-08-11 PROCEDURE — 99233 SBSQ HOSP IP/OBS HIGH 50: CPT

## 2021-08-11 PROCEDURE — 99222 1ST HOSP IP/OBS MODERATE 55: CPT

## 2021-08-11 PROCEDURE — 93010 ELECTROCARDIOGRAM REPORT: CPT

## 2021-08-11 PROCEDURE — 99358 PROLONG SERVICE W/O CONTACT: CPT

## 2021-08-11 RX ORDER — SODIUM ZIRCONIUM CYCLOSILICATE 10 G/10G
10 POWDER, FOR SUSPENSION ORAL ONCE
Refills: 0 | Status: DISCONTINUED | OUTPATIENT
Start: 2021-08-11 | End: 2021-08-11

## 2021-08-11 RX ORDER — INSULIN HUMAN 100 [IU]/ML
10 INJECTION, SOLUTION SUBCUTANEOUS ONCE
Refills: 0 | Status: COMPLETED | OUTPATIENT
Start: 2021-08-11 | End: 2021-08-11

## 2021-08-11 RX ORDER — SODIUM CHLORIDE 9 MG/ML
1000 INJECTION INTRAMUSCULAR; INTRAVENOUS; SUBCUTANEOUS
Refills: 0 | Status: DISCONTINUED | OUTPATIENT
Start: 2021-08-11 | End: 2021-08-12

## 2021-08-11 RX ORDER — SODIUM ZIRCONIUM CYCLOSILICATE 10 G/10G
10 POWDER, FOR SUSPENSION ORAL ONCE
Refills: 0 | Status: COMPLETED | OUTPATIENT
Start: 2021-08-11 | End: 2021-08-11

## 2021-08-11 RX ORDER — DEXTROSE 50 % IN WATER 50 %
50 SYRINGE (ML) INTRAVENOUS ONCE
Refills: 0 | Status: COMPLETED | OUTPATIENT
Start: 2021-08-11 | End: 2021-08-11

## 2021-08-11 RX ORDER — SODIUM ZIRCONIUM CYCLOSILICATE 10 G/10G
10 POWDER, FOR SUSPENSION ORAL EVERY 8 HOURS
Refills: 0 | Status: DISCONTINUED | OUTPATIENT
Start: 2021-08-11 | End: 2021-08-13

## 2021-08-11 RX ORDER — CALCIUM CHLORIDE
500 POWDER (GRAM) MISCELLANEOUS ONCE
Refills: 0 | Status: DISCONTINUED | OUTPATIENT
Start: 2021-08-11 | End: 2021-08-11

## 2021-08-11 RX ORDER — CALCIUM GLUCONATE 100 MG/ML
2 VIAL (ML) INTRAVENOUS ONCE
Refills: 0 | Status: COMPLETED | OUTPATIENT
Start: 2021-08-11 | End: 2021-08-11

## 2021-08-11 RX ADMIN — INSULIN HUMAN 10 UNIT(S): 100 INJECTION, SOLUTION SUBCUTANEOUS at 11:31

## 2021-08-11 RX ADMIN — Medication 8 MILLIGRAM(S): at 11:34

## 2021-08-11 RX ADMIN — PYRIDOSTIGMINE BROMIDE 1 MILLIGRAM(S): 60 SOLUTION ORAL at 11:30

## 2021-08-11 RX ADMIN — LEVETIRACETAM 400 MILLIGRAM(S): 250 TABLET, FILM COATED ORAL at 11:32

## 2021-08-11 RX ADMIN — Medication 2: at 18:50

## 2021-08-11 RX ADMIN — Medication 200 GRAM(S): at 13:51

## 2021-08-11 RX ADMIN — HEPARIN SODIUM 5000 UNIT(S): 5000 INJECTION INTRAVENOUS; SUBCUTANEOUS at 13:53

## 2021-08-11 RX ADMIN — Medication 1 TABLET(S): at 11:32

## 2021-08-11 RX ADMIN — SODIUM ZIRCONIUM CYCLOSILICATE 10 GRAM(S): 10 POWDER, FOR SUSPENSION ORAL at 18:51

## 2021-08-11 RX ADMIN — Medication 50 MILLILITER(S): at 11:31

## 2021-08-11 RX ADMIN — ZINC SULFATE TAB 220 MG (50 MG ZINC EQUIVALENT) 220 MILLIGRAM(S): 220 (50 ZN) TAB at 11:31

## 2021-08-11 RX ADMIN — POLYETHYLENE GLYCOL 3350 17 GRAM(S): 17 POWDER, FOR SOLUTION ORAL at 18:51

## 2021-08-11 RX ADMIN — SODIUM CHLORIDE 100 MILLILITER(S): 9 INJECTION INTRAMUSCULAR; INTRAVENOUS; SUBCUTANEOUS at 23:05

## 2021-08-11 RX ADMIN — PYRIDOSTIGMINE BROMIDE 1 MILLIGRAM(S): 60 SOLUTION ORAL at 18:49

## 2021-08-11 NOTE — PROGRESS NOTE ADULT - PROBLEM SELECTOR PLAN 5
Patient last assessed:  8/3/2021 to manage: GOC/AD, symptoms, and support.  30 Minutes; Start: 13961xy  End: 1100am  , of non-face-to-face prolonged service provided that relates to (face-to-face) care that has or will occur and ongoing patient management, including one or more of the following:   - Reviewed records from other physicians or other health care professional services, including one or more of the following: other medical records and diagnostic / radiology study results

## 2021-08-11 NOTE — PROGRESS NOTE ADULT - PROBLEM SELECTOR PLAN 2
-patient developed new left lower extremity numbness and weakness on 8/3  -patient denies back pain at present  -patient has strength of 3/5 in LLE (RLE is 4/5). Now has sensation to left foot and anterior tibial region, no sensation to left thigh. Intact sensation to RLE.  Bilateral upper extremities sensation intact with strength of 4/5  -MRI lumbar showed L RP bleed  -no plans for IR drainage at this time, as hematoma appears to be resolving  -hemoglobin stable, no signs of hemodynamic instability    PLAN:  -continue to monitor hemoglobin  -restarted DVT prophylaxis AC as patient does not appear to be acutely bleeding - family declined, due to concern for bleed -NGT removed, clogged last night    PLAN:  - family does not wish to have PEG tube placed  -discussed the risks of aspiration with family  -patient started on thin puree diet  -pending discharge to [facility] -NGT clogged and removed 8/11    PLAN:  - family does not wish to have PEG tube placed at this time  -discussed the risks of aspiration with family  -patient started on thin puree diet  -pending discharge to [facility] s/p NGT placed 7/29 with subsequent clogging and removal on 8/11    PLAN:  - family does not wish to have PEG tube placed at this time  -discussed the risks of aspiration with family  -patient started on thin puree diet  -pending discharge to [facility] s/p NGT placed 7/23 with subsequent clogging and removal on 8/11    PLAN:  - family does not wish to have PEG tube placed at this time  -discussed the risks of aspiration with family  -patient started on thin puree diet  -pending discharge to [facility]

## 2021-08-11 NOTE — PROGRESS NOTE ADULT - ATTENDING COMMENTS
Patient seen and examined at bedside. Agree with exam, a/p as above with the following addendum/edits:     Care d/w wife at bedside. NGT removed yesterday, has been taking small bites of food w/o aspirating (wife and I fed him at bedside too). Initial plan was to send to rehab tomorrow if tolerating PO but course c/b by hyperkalemia today of unclear etiology. Had happened when in ICU as well but no etiology identified. No EVARISTO to cause hyperkalemia, none of his meds appear to be the culprit either (only new med was mestinon and everything else was changed to IV once NGT removed). Monitor BMP, give lokelma. If persistently hyperkalemic may need to consult nephrology. Neuro to speak w/ wife about rituxan therapy which she will likely agree to. In that case, patient will need to be transferred to . Patient seen and examined at bedside. Agree with exam, a/p as above with the following addendum/edits:     Care d/w wife at bedside. NGT removed yesterday, has been taking small bites of food w/o aspirating (wife and I fed him at bedside too). Initial plan was to send to rehab tomorrow if tolerating PO but course c/b by hyperkalemia today of unclear etiology. Had happened when in ICU as well but no etiology identified. No EVARISTO to cause hyperkalemia, none of his meds appear to be the culprit either (only new med was mestinon and everything else was changed to IV once NGT removed). Monitor BMP, give lokelma. If persistently hyperkalemic may need to consult nephrology. Neuro to speak w/ wife about rituxan therapy which she will likely agree to. In that case, patient will need to be transferred to . Started gentle IVF hydration given minimal PO intake, monitor FS as well.

## 2021-08-11 NOTE — PROGRESS NOTE ADULT - PROBLEM SELECTOR PLAN 2
Recommended for modified diet after S&S eval. Patient/wife would not want long-term feeding tube. She is aware that he is aspirating but remains hopeful that we will get better.

## 2021-08-11 NOTE — CONSULT NOTE ADULT - PROVIDER SPECIALTY LIST ADULT
Intervent Radiology
Neurology
Structural Heart
Neurology
Cardiology
Critical Care
Rehab Medicine
Hospitalist
Infectious Disease
Orthopedics
Orthopedics
Cardiology
Heme/Onc
Palliative Care

## 2021-08-11 NOTE — PROGRESS NOTE ADULT - ASSESSMENT
89y Male with PMHx of HTN, severe aortic stenosis and seizures (on phenytoin) who presented with left facial droop and slurred speech, Stroke w/u negative. Found to have myasthenia gravis with positive anti-Ach antibodies. Also found to have leukocytosis suspicious for malignancy. Admitted to [facility] for treatment of MG and w/u of potential CML/CLL. S/p IVIG treatment. Course complicated by dysphagia and aspiration pneumonia. Admitted to MICU after developing septic shock likely 2/2 to aspiration PNA requiring pressors. 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 Patient found to have L RP hematoma, likely causing new LLE weakness and numbness. Now following for dysphagia, as well as continued MG treatment, pending discharge to acute rehab. 89y Male with PMHx of HTN, severe aortic stenosis and seizures (on phenytoin) who presented with left facial droop and slurred speech, Stroke w/u negative. Found to have myasthenia gravis with positive anti-Ach antibodies. Also found to have leukocytosis suspicious for malignancy. Admitted to [facility] for treatment of MG and w/u of potential CML/CLL. S/p IVIG treatment. Course complicated by dysphagia and aspiration pneumonia. Admitted to MICU after developing septic shock likely 2/2 to aspiration PNA requiring pressors. 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 Patient found to have L RP hematoma, likely causing new LLE weakness and numbness. Now following for dysphagia, as well as continued MG treatment, pending discharge to acute rehab. Started on pyridostigmine 8/10, found to be hyperkalemic to 6.4 8/11 AM.  89M PMHx of HTN, severe aortic stenosis and seizures (on phenytoin) who presented with left facial droop and slurred speech, Stroke w/u negative. Found to have myasthenia gravis with positive anti-Ach antibodies. Also found to have leukocytosis suspicious for malignancy. Admitted to [facility] for treatment of MG and w/u of potential CML/CLL. S/p IVIG treatment. Course complicated by dysphagia and aspiration pneumonia. Admitted to MICU after developing septic shock likely 2/2 to aspiration PNA requiring pressors. 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 Patient found to have L RP hematoma, likely causing new LLE weakness and numbness. Now following for dysphagia, as well as continued MG treatment, pending discharge to acute rehab. Started on pyridostigmine 8/10, found to be hyperkalemic to 6.4 8/11 AM.

## 2021-08-11 NOTE — PROGRESS NOTE ADULT - PROBLEM SELECTOR PLAN 1
-NGT removed, clogged last night    PLAN:  - family does not wish to have PEG tube placed  -discussed the risks of aspiration with family  -patient started on thin puree diet  -pending discharge to [facility] K 5.5 (8/9)->6.4 this AM (8/11), EKG w/ krystina to 57, -294 since prior on 7/28, increased T wave amplitude compared to prior, intact p waves, narrow QRS   Etiology unclear at this time    Plan:   - no PO lokelma given aspiration risk  - give calcium gluconate  - insulin+D5 push  - rpt BMP 10AM  - rpt EKG, f/u K 5.5 (8/9)->6.4 this AM (8/11), EKG w/ krystina to 57, -294 since prior on 7/28, increased T wave amplitude compared to prior, intact p waves, narrow QRS   Etiology unclear at this time, present at admission, s/p lokelma, second episode K 6.5 on 7/28 while in ICU and treated again with lokelma, no w/u documented in progress notes    Plan:   - give calcium gluconate 1g   - insulin+D5 push  - rpt BMP 10AM - K 5.5  - rpt EKG, f/u  - lokelma   - fpt BMP 4PM, f/u

## 2021-08-11 NOTE — PROGRESS NOTE ADULT - PROBLEM SELECTOR PLAN 3
Symptomatically improving with IVIG. Continue care as per primary team. Appreciate neurology involvement.

## 2021-08-11 NOTE — PROGRESS NOTE ADULT - SUBJECTIVE AND OBJECTIVE BOX
JAMA CANTRELL             MRN-[id number]    CC: slurred speech, L facial droop    HPI:   **STROKE HPI***    HPI: 89y Male with PMHx of HTN and seizure on phenytoin presents with left facial droop and slurred speech. Pt was with family member (great-nephew Mesh [phone]) who noticed at 5pm, pt had acute onset of slurred speech, increased saliva production and a left facial droop. Pt was driven to the ED immediately. On presentation, /94, NIHSS 3. Great-nephew at bedside denies hx of stroke, use of blood thinners, recent trauma/bleeding event. CTH negative for acute hemorrhage. CTP without any perfusion defect. CTA without LVO. In conjunction with patient, family and stroke attending, decision was made to not give tpa since family felt his current deficits are non-disabling to his daily life vs risk of bleeding. Pt and family was made aware of the possibility of worsening symptoms and the opportunity to give tpa was time limited. They expressed understanding and declined tpa administration.     Pt denies CP, SOB, extremity weakness, changes in vision, HA. He endorses a cough that has been occurring for the past few days with sputum production, but has been afebrile. Pt given vanc and zosyn x1 in the ED. CXR pending official read. Patient initially failed dysphagia screen and was given  OH, but passed second dysphagia screen so was able to to loaded with plavix 300 PO.     At baseline, pt is fully independent of all ADLs and iADLs, ambulates without assistance, with hearing loss b/l, baseline right arm tremor. It is unclear if pt sees a neurologist for seizure management. Per wife (Ross [phone], [phone]), his most recent seizure was in April 2020 where he had full body shaking lasting for 5 minutes. He has been on phenytoin 100mg TID and has not had another seizure since.      (19 Jul 2021 00:25)    SUBJECTIVE: Patient resting in bed in no distress.     ROS:  DYSPNEA (Brenden): 0	  NAUS/VOM: N	  SECRETIONS: N	  AGITATION: N  Pain (Y/N):    N   -Provocation/Palliation: n/a   -Quality/Quantity: n/a   -Radiating:  n/a   -Severity: n/a   -Timing/Frequency: n/a   -Impact on ADLs: n/a     OTHER REVIEW OF SYSTEMS: +weakness  UNABLE TO OBTAIN  due to: n/a     PEx:  T(C): 36.4 (08-11-21 @ 11:09), Max: 37.1 (08-11-21 @ 06:02)  HR: 78 (08-11-21 @ 11:09) (59 - 78)  BP: 157/72 (08-11-21 @ 11:09) (134/76 - 157/72)  RR: 16 (08-11-21 @ 11:09) (16 - 18)  SpO2: 98% (08-11-21 @ 11:09) (95% - 98%)  Wt(kg): 60.1kg    GENERAL:  [x]Alert  [x]Oriented x3   []Lethargic  []Cachexia  []Unarousable  [x]Verbal  []Non-Verbal  Behavioral:   [] Anxiety  [] Delirium [] Agitation [x] Other - calm   HEENT:  []Normal   [x]Dry mouth   []ET Tube/Trach  []Oral lesions  PULMONARY:   [x]Clear anteriorly  []Tachypnea  []Audible excessive secretions   []Rhonchi        []Right []Left []Bilateral  []Crackles        []Right []Left []Bilateral  []Wheezing     []Right []Left []Bilateral  CARDIOVASCULAR:    [x]Regular []Irregular []Tachy  []Anatoliy []Murmur []Other  GASTROINTESTINAL:  [x]Soft  [x]Distended   []+BS  []Non tender []Tender  []PEG [x] NGT  Last BM:  GENITOURINARY:  []Normal [x] Incontinent   []Oliguria/Anuria   []Cruz  MUSCULOSKELETAL:   []Normal   [x]Weakness  []Bed/Wheelchair bound []Edema  NEUROLOGIC:   []No focal deficits  [] Cognitive impairment  [x] Dysphagia []Dysarthria [] Paresis []Other   SKIN:   [x]Normal   []Pressure ulcer(s)  []Rash    ALLERGIES: hay fever (Unknown)  No Known Drug Allergies      OPIATE NAÏVE (Y/N): Y    MEDICATIONS: REVIEWED  MEDICATIONS  (STANDING):  atorvastatin 40 milliGRAM(s) Oral at bedtime  calcium gluconate IVPB 2 Gram(s) IV Intermittent once  dextrose 40% Gel 15 Gram(s) Oral once  dextrose 5%. 1000 milliLiter(s) (50 mL/Hr) IV Continuous <Continuous>  dextrose 5%. 1000 milliLiter(s) (100 mL/Hr) IV Continuous <Continuous>  dextrose 50% Injectable 25 Gram(s) IV Push once  dextrose 50% Injectable 12.5 Gram(s) IV Push once  dextrose 50% Injectable 25 Gram(s) IV Push once  glucagon  Injectable 1 milliGRAM(s) IntraMuscular once  heparin   Injectable 5000 Unit(s) SubCutaneous every 8 hours  insulin lispro (ADMELOG) corrective regimen sliding scale   SubCutaneous every 6 hours  levETIRAcetam  IVPB 500 milliGRAM(s) IV Intermittent every 24 hours  methylPREDNISolone sodium succinate Injectable 8 milliGRAM(s) IV Push every 24 hours  multivitamin 1 Tablet(s) Oral daily  polyethylene glycol 3350 17 Gram(s) Oral two times a day  pyridostigmine Injectable 1 milliGRAM(s) IV Push three times a day  senna 2 Tablet(s) Oral at bedtime  zinc sulfate 220 milliGRAM(s) Oral daily    MEDICATIONS  (PRN):  acetaminophen    Suspension .. 600 milliGRAM(s) Enteral Tube every 6 hours PRN Moderate Pain (4 - 6)    LABS: REVIEWED  CBC:                        10.3   36.23 )-----------( 391      ( 11 Aug 2021 06:07 )             35.7     CMP:    08-11    142  |  111<H>  |  68<H>  ----------------------------<  96  6.4<HH>   |  23  |  1.56<H>    Ca    9.4      11 Aug 2021 06:07  Phos  5.8     08-11  Mg     2.2     08-11    IMAGING: REVIEWED    ADVANCED DIRECTIVES:           DNR DNI            MOLST    DECISION MAKER: Patient is able to make decisions for himself but defers to the wife.   LEGAL SURROGATE:  Ross (wife): [phone], [phone]    PSYCHOSOCIAL-SPIRITUAL ASSESSMENT:       Reviewed       Care plan unchanged    GOALS OF CARE DISCUSSION       Palliative care info/counseling provided	            Documentation of GOC: DNR/DNI  	      AGENCY CHOICE DISCUSSED:            [facility]     REFERRALS	         Unit SW/Case Mgmt                PT/OT

## 2021-08-11 NOTE — PROGRESS NOTE ADULT - ASSESSMENT
A 89-year-old man with newly diagnosed myasthenia gravis and leukocytosis (possibly CMML or other hematologic malignancy -- patient and family declined bone marrow biopsy).  S/p x 5 days IVIG and currently on prednisone 10 mg daily.      Plan:  - Cont steroids, now on IV solumedrol 8 mg q24 hrs because NGT removed, will continue on this medication regimen for MG while inpatient  - Cont mestinon, now on IV mestinon 1 mg TID. monitor for increasing secretions  - Speech and swallow eval performed and following, ailin min ice chips. NGT removed at this time. Advanced to thin pureed diet with family knowing risks of aspiration. Family refusing PEG tube at this time   - ID was consulted for initiating Rituximab treatment, Hep B quantitative PCR is pending. Will follow their recommendations pending Hep B status. Likely would need to transfer patient to another unit as well for infusion. Will speak to family regarding initiating this treatment  - Regardubg leukocytosis work up, heme/onc is on board for possible CMML vs other malignancy, flow cytometry showed increased monocytes, BCR-ABL, JAMES-2 was negative. Bone marrow biopsy recommended but family is refusing at this time. Will not work up further  - MRI Lumbar spine w/o contrast done for left leg weakness, showed massive L RP hematoma, stable and resolving, likely contributing to LLE weakness which is improving. No acute intervention planned at this time as per IR

## 2021-08-11 NOTE — CONSULT NOTE ADULT - CONSULT REASON
Clearance to give rituximab
LE weakness/numbness
Leukocytosis
R/o cauda equina
slurred speech, L facial droop
Hypotension
abnormal Echocardiogram
hypotension
PM&R evaluation
Severe AS
Weakness
medical comanagement
Complex decision making related to goals of care
request for left retroperitoneal hematoma aspiration and drainage

## 2021-08-11 NOTE — CONSULT NOTE ADULT - REASON FOR ADMISSION
slurred speech, L facial droop

## 2021-08-11 NOTE — PROGRESS NOTE ADULT - PROBLEM SELECTOR PLAN 9
- Cont keppra 500 q12 (started on IV as NGT removed)      #Left upper extremity swelling  -LUE US consistent with superficial thrombophlebitis

## 2021-08-11 NOTE — PROGRESS NOTE ADULT - SUBJECTIVE AND OBJECTIVE BOX
INCOMPLETE NOTE     INTERVAL HPI/OVERNIGHT EVENTS:  Patient was seen and examined at bedside.    No o/n events, patient resting comfortably. No complaints at this time. Patient denies: fever, chills, dizziness, weakness, HA, Changes in vision, CP, palpitations, SOB, cough, N/V/D/C, dysuria, changes in bowel movements, LE edema. ROS otherwise negative.        VITAL SIGNS:  T(F): 98.7 (08-11-21 @ 06:02)  HR: 59 (08-11-21 @ 06:02)  BP: 150/63 (08-11-21 @ 06:02)  RR: 17 (08-11-21 @ 06:02)  SpO2: 98% (08-11-21 @ 06:02)  Wt(kg): --    PHYSICAL EXAM:    Constitutional: female/male, WDWN, NAD  HEENT: PERRL, EOMI, sclera non-icteric, neck supple, trachea midline, no masses, no JVD, MMM, good dentition  Respiratory: CTA b/l, good air entry b/l, no wheezing, no rhonchi, no rales, without accessory muscle use and no intercostal retractions  Cardiovascular: RRR, normal S1S2, no M/R/G  Gastrointestinal: soft, NTND, no masses palpable, BS normal  Extremities: Warm, well perfused, pulses equal bilateral upper and lower extremities, no edema, no clubbing  Neurological: AAOx3, CN Grossly intact  Skin: Normal temperature, warm, dry    MEDICATIONS  (STANDING):  atorvastatin 40 milliGRAM(s) Oral at bedtime  dextrose 40% Gel 15 Gram(s) Oral once  dextrose 5%. 1000 milliLiter(s) (50 mL/Hr) IV Continuous <Continuous>  dextrose 5%. 1000 milliLiter(s) (100 mL/Hr) IV Continuous <Continuous>  dextrose 50% Injectable 25 Gram(s) IV Push once  dextrose 50% Injectable 12.5 Gram(s) IV Push once  dextrose 50% Injectable 25 Gram(s) IV Push once  glucagon  Injectable 1 milliGRAM(s) IntraMuscular once  heparin   Injectable 5000 Unit(s) SubCutaneous every 8 hours  insulin lispro (ADMELOG) corrective regimen sliding scale   SubCutaneous every 6 hours  levETIRAcetam  IVPB 500 milliGRAM(s) IV Intermittent every 24 hours  methylPREDNISolone sodium succinate Injectable 8 milliGRAM(s) IV Push every 24 hours  multivitamin 1 Tablet(s) Oral daily  polyethylene glycol 3350 17 Gram(s) Oral two times a day  pyridostigmine Injectable 1 milliGRAM(s) IV Push three times a day  senna 2 Tablet(s) Oral at bedtime  zinc sulfate 220 milliGRAM(s) Oral daily    MEDICATIONS  (PRN):  acetaminophen    Suspension .. 600 milliGRAM(s) Enteral Tube every 6 hours PRN Moderate Pain (4 - 6)      Allergies    hay fever (Unknown)  No Known Drug Allergies    Intolerances        LABS:                        10.3   36.23 )-----------( 391      ( 11 Aug 2021 06:07 )             35.7     08-11    142  |  111<H>  |  68<H>  ----------------------------<  96  x    |  23  |  1.56<H>    Ca    9.4      11 Aug 2021 06:07  Phos  5.8     08-11  Mg     2.2     08-11    TPro  6.8  /  Alb  2.9<L>  /  TBili  0.7  /  DBili  x   /  AST  30  /  ALT  32  /  AlkPhos  64  08-09    PT/INR - ( 09 Aug 2021 08:43 )   PT: 13.2 sec;   INR: 1.11          PTT - ( 09 Aug 2021 08:43 )  PTT:31.2 sec    CAPILLARY BLOOD GLUCOSE      POCT Blood Glucose.: 92 mg/dL (11 Aug 2021 06:28)  POCT Blood Glucose.: 116 mg/dL (10 Aug 2021 23:40)  POCT Blood Glucose.: 104 mg/dL (10 Aug 2021 17:43)  POCT Blood Glucose.: 109 mg/dL (10 Aug 2021 12:11)                 INCOMPLETE NOTE     INTERVAL HPI/OVERNIGHT EVENTS:  Patient was seen and examined at bedside.   O/n, pt received pyridostigmine x1, HR noted to be krystina to 59. This morning patient resting comfortably. Called by nurse about critical value K 6.4. Pt denies any chest pain or discomfort, no palpitations, no abd pain, no new numbness or weakness. Pt offers no complaints at this time. Patient denies: HA, changes in vision, SOB, cough, D/C, dysuria, changes in bowel movements, LE edema. ROS otherwise negative.        VITAL SIGNS:  T(F): 98.7 (08-11-21 @ 06:02)  HR: 59 (08-11-21 @ 06:02)  BP: 150/63 (08-11-21 @ 06:02)  RR: 17 (08-11-21 @ 06:02)  SpO2: 98% (08-11-21 @ 06:02)  Wt(kg): --    PHYSICAL EXAM:    Constitutional: thin, frail-appearing male lying in bed in NAD  HEENT: PERRL, EOMI grossly, sclera anicteric, neck supple, no JVD, MM a little dry  Respiratory: fair inspiratory effort, low bibasilar ?rhonchi, unlabored respirations  Cardiovascular: HR 56, +murmur?, possible rub, no gallop  Gastrointestinal: soft, NTND, no masses palpable, BS normoactive x4 quadrants  Extremities: Warm, well perfused, pulses equal bilateral upper and lower extremities, no edema, no clubbing or cyanosis, brisk capillary refill  Neurological: AAOx3, CN II-XII grossly intact, no focal deficits   Skin: Normal temperature, warm, dry, no rashes or lesions    MEDICATIONS  (STANDING):  atorvastatin 40 milliGRAM(s) Oral at bedtime  dextrose 40% Gel 15 Gram(s) Oral once  dextrose 5%. 1000 milliLiter(s) (50 mL/Hr) IV Continuous <Continuous>  dextrose 5%. 1000 milliLiter(s) (100 mL/Hr) IV Continuous <Continuous>  dextrose 50% Injectable 25 Gram(s) IV Push once  dextrose 50% Injectable 12.5 Gram(s) IV Push once  dextrose 50% Injectable 25 Gram(s) IV Push once  glucagon  Injectable 1 milliGRAM(s) IntraMuscular once  heparin   Injectable 5000 Unit(s) SubCutaneous every 8 hours  insulin lispro (ADMELOG) corrective regimen sliding scale   SubCutaneous every 6 hours  levETIRAcetam  IVPB 500 milliGRAM(s) IV Intermittent every 24 hours  methylPREDNISolone sodium succinate Injectable 8 milliGRAM(s) IV Push every 24 hours  multivitamin 1 Tablet(s) Oral daily  polyethylene glycol 3350 17 Gram(s) Oral two times a day  pyridostigmine Injectable 1 milliGRAM(s) IV Push three times a day  senna 2 Tablet(s) Oral at bedtime  zinc sulfate 220 milliGRAM(s) Oral daily    MEDICATIONS  (PRN):  acetaminophen    Suspension .. 600 milliGRAM(s) Enteral Tube every 6 hours PRN Moderate Pain (4 - 6)      Allergies    hay fever (Unknown)  No Known Drug Allergies    Intolerances        LABS:                        10.3   36.23 )-----------( 391      ( 11 Aug 2021 06:07 )             35.7     08-11    142  |  111<H>  |  68<H>  ----------------------------<  96  x    |  23  |  1.56<H>    Ca    9.4      11 Aug 2021 06:07  Phos  5.8     08-11  Mg     2.2     08-11    TPro  6.8  /  Alb  2.9<L>  /  TBili  0.7  /  DBili  x   /  AST  30  /  ALT  32  /  AlkPhos  64  08-09    PT/INR - ( 09 Aug 2021 08:43 )   PT: 13.2 sec;   INR: 1.11          PTT - ( 09 Aug 2021 08:43 )  PTT:31.2 sec    CAPILLARY BLOOD GLUCOSE      POCT Blood Glucose.: 92 mg/dL (11 Aug 2021 06:28)  POCT Blood Glucose.: 116 mg/dL (10 Aug 2021 23:40)  POCT Blood Glucose.: 104 mg/dL (10 Aug 2021 17:43)  POCT Blood Glucose.: 109 mg/dL (10 Aug 2021 12:11)                 INTERVAL HPI/OVERNIGHT EVENTS:  Patient was seen and examined at bedside.   O/n, pt received pyridostigmine x1, HR noted to be krystina to 59. This morning patient resting comfortably. Called by nurse about critical value K 6.4. Pt denies any chest pain or discomfort, no palpitations, no abd pain, no new numbness or weakness. Pt offers no complaints at this time. Patient denies: HA, changes in vision, SOB, cough, D/C, dysuria, changes in bowel movements, LE edema. ROS otherwise negative.        VITAL SIGNS:  T(F): 98.7 (08-11-21 @ 06:02)  HR: 59 (08-11-21 @ 06:02)  BP: 150/63 (08-11-21 @ 06:02)  RR: 17 (08-11-21 @ 06:02)  SpO2: 98% (08-11-21 @ 06:02)  Wt(kg): --    PHYSICAL EXAM:    Constitutional: thin, frail-appearing male lying in bed in NAD  HEENT: PERRL, EOMI grossly, sclera anicteric, neck supple, no JVD, MM a little dry  Respiratory: fair inspiratory effort, low bibasilar ?rhonchi, unlabored respirations  Cardiovascular: HR 56, +murmur?, possible rub, no gallop  Gastrointestinal: soft, NTND, no masses palpable, BS normoactive x4 quadrants  Extremities: Warm, well perfused, pulses equal bilateral upper and lower extremities, no edema, no clubbing or cyanosis, brisk capillary refill  Neurological: AAOx3, CN II-XII grossly intact, no focal deficits   Skin: Normal temperature, warm, dry, no rashes or lesions    MEDICATIONS  (STANDING):  atorvastatin 40 milliGRAM(s) Oral at bedtime  dextrose 40% Gel 15 Gram(s) Oral once  dextrose 5%. 1000 milliLiter(s) (50 mL/Hr) IV Continuous <Continuous>  dextrose 5%. 1000 milliLiter(s) (100 mL/Hr) IV Continuous <Continuous>  dextrose 50% Injectable 25 Gram(s) IV Push once  dextrose 50% Injectable 12.5 Gram(s) IV Push once  dextrose 50% Injectable 25 Gram(s) IV Push once  glucagon  Injectable 1 milliGRAM(s) IntraMuscular once  heparin   Injectable 5000 Unit(s) SubCutaneous every 8 hours  insulin lispro (ADMELOG) corrective regimen sliding scale   SubCutaneous every 6 hours  levETIRAcetam  IVPB 500 milliGRAM(s) IV Intermittent every 24 hours  methylPREDNISolone sodium succinate Injectable 8 milliGRAM(s) IV Push every 24 hours  multivitamin 1 Tablet(s) Oral daily  polyethylene glycol 3350 17 Gram(s) Oral two times a day  pyridostigmine Injectable 1 milliGRAM(s) IV Push three times a day  senna 2 Tablet(s) Oral at bedtime  zinc sulfate 220 milliGRAM(s) Oral daily    MEDICATIONS  (PRN):  acetaminophen    Suspension .. 600 milliGRAM(s) Enteral Tube every 6 hours PRN Moderate Pain (4 - 6)      Allergies    hay fever (Unknown)  No Known Drug Allergies    Intolerances        LABS:                        10.3   36.23 )-----------( 391      ( 11 Aug 2021 06:07 )             35.7     08-11    142  |  111<H>  |  68<H>  ----------------------------<  96  x    |  23  |  1.56<H>    Ca    9.4      11 Aug 2021 06:07  Phos  5.8     08-11  Mg     2.2     08-11    TPro  6.8  /  Alb  2.9<L>  /  TBili  0.7  /  DBili  x   /  AST  30  /  ALT  32  /  AlkPhos  64  08-09    PT/INR - ( 09 Aug 2021 08:43 )   PT: 13.2 sec;   INR: 1.11          PTT - ( 09 Aug 2021 08:43 )  PTT:31.2 sec    CAPILLARY BLOOD GLUCOSE      POCT Blood Glucose.: 92 mg/dL (11 Aug 2021 06:28)  POCT Blood Glucose.: 116 mg/dL (10 Aug 2021 23:40)  POCT Blood Glucose.: 104 mg/dL (10 Aug 2021 17:43)  POCT Blood Glucose.: 109 mg/dL (10 Aug 2021 12:11)

## 2021-08-11 NOTE — PROGRESS NOTE ADULT - PROBLEM SELECTOR PLAN 4
Likely ATN 2/2 to septic shock, poor perfusion. Pt has a history of CKD. baseline 1.6-1.7.   - Creatinine improving  - Cont to trend creatinine    #Hypernatremia  RESOLVED  - Patient asymptomatic, A&Ox3  -d/c 125cc free water q6h  -continue to monitor -positive anti-ach antibiotics  - S/p IVIG x5 days  PLAN:  - Cont Prednisone 10 mg daily, started 7/31 --> switched to Solumedrol IV as NGT removed  - started on Mestinon  - Neurology following, afua beltran -positive anti-ach antibiotics  - S/p IVIG x5 days  PLAN:  - Cont Prednisone 10 mg daily, started 7/31 --> switched to Solumedrol IV as NGT removed  - c/w Mestinon  - Neurology following, appreciate recs  - potential transfer to [facility] for rituximab per neuro -positive anti-ach antibiotics  - S/p IVIG x5 days  PLAN:  - Cont Prednisone 10 mg daily, started 7/31 --> switched to Solumedrol IV as NGT removed  - c/w Mestinon  - Neurology following, appreciate recs  - potential transfer to [facility] for rituximab per neuro  - no contraindications to rituximab per heme/onc

## 2021-08-11 NOTE — PROGRESS NOTE ADULT - PROBLEM SELECTOR PLAN 3
-positive anti-ach antibiotics  - S/p IVIG x5 days  PLAN:  - Cont Prednisone 10 mg daily, started 7/31 --> switched to Solumedrol IV as NGT removed  -started on Mestinon  - Neurology following, afua beltran -patient developed new left lower extremity numbness and weakness on 8/3  -patient denies back pain at present  -patient has strength of 3/5 in LLE (RLE is 4/5). Now has sensation to left foot and anterior tibial region, no sensation to left thigh. Intact sensation to RLE.  Bilateral upper extremities sensation intact with strength of 4/5  -MRI lumbar showed L RP bleed  -no plans for IR drainage at this time, as hematoma appears to be resolving  -hemoglobin stable, no signs of hemodynamic instability    PLAN:  -continue to monitor hemoglobin  -restarted DVT prophylaxis AC as patient does not appear to be acutely bleeding - family declined, due to concern for bleed

## 2021-08-11 NOTE — CONSULT NOTE ADULT - CONSULT REQUESTED BY NAME
Dr. Flores
Dr. Martinez
Dr. Otero
Evie
Dr Otero
Neurology
Darryl Hunter, [place]
Dr. Flores
Dr. Otero
Evie
Dr. Flores
Dr. Benavides
Stroke
Dr. Benavides

## 2021-08-11 NOTE — CONSULT NOTE ADULT - ASSESSMENT
89 M with HTN, seizure disorder, dysphagia, new diagnosis of myasthenia gravis. ID consulted to evaluate on workup required before initiating rituximab given positive hepatitis B serologies.    Based on positive hepatitis B Surface antibody and Core antibody, with negative surface antigen. With these results the patient either has had acute hepatitis B that has completely cleared, or he has latent chronic hepatitis B that has the potential for reactivation with the immunosuppression of rituximab.    The use of rituximab in a patient who is 89 years old and has recently been admitted to the MICU for presumed aspiration is concerning. It is unclear on initial assessment that the patient's aspiration risk will decrease on treatment with rituximab, and the patient will have an increased risk of aspiration pneumonia while immunosuppressed. Will rely on neurology to comment on potential benefit of rituximab therapy.    Would recommend:  - Please send hepatitis B virus PCR to aid in distinction between resolved and chronic latent infection.  - if the hepatitis B PCR is NEGATIVE, then would recommend starting lamivudine 100mg PO every 24 hours when rituximab is initiated. Give lamivudine for the duration of treatment with rituximab plus 1 year after cessation of rituximab  - if the hepatitis B PCR is POSITIVE, please reconsult infectious disease team 1 for further antiviral recommendations    Infectious disease team 1 will sign-off at this time. Please reach out with questions or if the hepatitis B PCR is positive.  See below attestation for finalized recommendations.    Fabio Singleton MD PGY2 Internal Medicine  Infectious Disease Consult Service  [phone] 89 M with HTN, seizure disorder, dysphagia, new diagnosis of myasthenia gravis. ID consulted to evaluate on workup required before initiating rituximab given positive hepatitis B serologies.    Based on positive hepatitis B Surface antibody and Core antibody, with negative surface antigen. With these results the patient either has had acute hepatitis B that has completely cleared, or he has latent chronic hepatitis B that has the potential for reactivation with the immunosuppression of rituximab.      Would recommend:  - Please send hepatitis B virus PCR to aid in distinction between resolved and chronic latent infection.  - if the hepatitis B PCR is NEGATIVE, then would recommend starting lamivudine 100mg PO every 24 hours when rituximab is initiated. Give lamivudine for the duration of treatment with rituximab plus 1 year after cessation of rituximab  - if the hepatitis B PCR is POSITIVE, please reconsult infectious disease team 1 for further antiviral recommendations    Infectious disease team 1 will sign-off at this time. Please reach out with questions or if the hepatitis B PCR is positive.  See below attestation for finalized recommendations.    Fabio Singleton MD PGY2 Internal Medicine  Infectious Disease Consult Service  [phone]

## 2021-08-11 NOTE — PROGRESS NOTE ADULT - PROBLEM SELECTOR PLAN 7
Normocytic anemia, with iron panel consistent with anemia of chronic disease/ renal disease. B12/folate wnl. No evidence of hemolysis. Given CKD and Anemia, a monoclonal gammopathy workup performed and was negative  -patient s/p 2 units on 7/29, followed by stabilization  -MRI confirmed massive L RP hematoma    PLAN:  - restarted on heparin subq, as patient's hemoglobin has now stabilized, no signs of active bleeding, remains hemodynamically stable - family declined  -monitor for signs of active bleeding  -trend CBC  - Transfuse if <7  - Keep active T&S Normocytic anemia, with iron panel consistent with anemia of chronic disease/ renal disease. B12/folate wnl. No evidence of hemolysis. 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 Nguyen on a Thursday (after discharge)  - Appreciate hem/onc recs, following.

## 2021-08-11 NOTE — CONSULT NOTE ADULT - ATTENDING COMMENTS
89 yo M with newly diagnosed MG, considering rituximab.  HBV core Ab and sAb positive, sAg negative.  Use of rituximab is a/c >10% risk for reactivation following HBV.  Recommendations as above.  Please recall if further ID input is desired - team 1.

## 2021-08-11 NOTE — PROGRESS NOTE ADULT - ATTENDING COMMENTS
I was physically present for the key portions of the evaluation and managemnent (E/M) service provided.  I agree with the above history, physical, and plan which I have reviewed and edited where appropriate, with the exceptions as per my note.    Pt seen and examined.    Neuro exam stable.    Has started palliative eating post-GOC discussion.    For MG, plan for rituximab 1g assuming hepB PCR is negative, with hepB ppx as per ID. Order placed on nursing manager's desk on 7W.    Otherwise, management as per primary team.    discussed with primary team, housestaff.

## 2021-08-11 NOTE — PROGRESS NOTE ADULT - PROBLEM SELECTOR PLAN 5
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 Nguyen on a Thursday (after discharge)  - Appreciate hem/onc recs, following Likely ATN 2/2 to septic shock, poor perfusion. Pt has a history of CKD. baseline 1.6-1.7.   - Creatinine improving  - Cont to trend creatinine    #Hypernatremia  RESOLVED  - Patient asymptomatic, A&Ox3  -d/c 125cc free water q6h  -continue to monitor

## 2021-08-11 NOTE — PROGRESS NOTE ADULT - SUBJECTIVE AND OBJECTIVE BOX
Neurology Progress Note    Interval History:  Patient seen and examined at bedside this AM. Patient states he feels well, has no complaints at this time. Denies any pain. States he feels okay. Wife at bedside.      Medications:  acetaminophen    Suspension .. 600 milliGRAM(s) Oral every 6 hours PRN  atorvastatin 40 milliGRAM(s) Oral at bedtime  dextrose 40% Gel 15 Gram(s) Oral once  dextrose 5%. 1000 milliLiter(s) IV Continuous <Continuous>  dextrose 5%. 1000 milliLiter(s) IV Continuous <Continuous>  dextrose 50% Injectable 25 Gram(s) IV Push once  dextrose 50% Injectable 12.5 Gram(s) IV Push once  dextrose 50% Injectable 25 Gram(s) IV Push once  glucagon  Injectable 1 milliGRAM(s) IntraMuscular once  heparin   Injectable 5000 Unit(s) SubCutaneous every 8 hours  insulin lispro (ADMELOG) corrective regimen sliding scale   SubCutaneous every 6 hours  levETIRAcetam  IVPB 500 milliGRAM(s) IV Intermittent every 24 hours  methylPREDNISolone sodium succinate Injectable 8 milliGRAM(s) IV Push every 24 hours  multivitamin 1 Tablet(s) Oral daily  polyethylene glycol 3350 17 Gram(s) Oral two times a day  pyridostigmine Injectable 1 milliGRAM(s) IV Push three times a day  senna 2 Tablet(s) Oral at bedtime  sodium zirconium cyclosilicate 10 Gram(s) Oral once  zinc sulfate 220 milliGRAM(s) Oral daily      Vital Signs Last 24 Hrs  T(C): 36.3 (11 Aug 2021 15:30), Max: 37.1 (11 Aug 2021 06:02)  T(F): 97.4 (11 Aug 2021 15:30), Max: 98.7 (11 Aug 2021 06:02)  HR: 84 (11 Aug 2021 15:30) (59 - 84)  BP: 137/74 (11 Aug 2021 15:30) (134/76 - 157/72)  BP(mean): --  RR: 18 (11 Aug 2021 15:30) (16 - 18)  SpO2: 95% (11 Aug 2021 15:30) (95% - 98%)      General: No acute distress  HEENT: NCAT, EOMI, PERRLA, anicteric sclera  Neck: Supple  Cardiovascular: +S1/S2, RRR  Respiratory: CTA B/L, L lower lobe diminished breath sounds, +rales, no wheezes or rhonchi  Gastrointestinal: Soft, NTND, +BS in all 4 quadrants, NGT placed  Extremities: No edema, clubbing or cyanosis noted  Vascular: 2+ radial, DP/PT pulses B/L  Neurologic: AAOx3. Motor strength testing 4/5 B/L UE. 4/5 in RLE. LLE 4/5, improving.  strength 5/5 B/L. Normal tone and bulk. Mild L sided facial droop noted, unchanged. No dysarthria.      Labs:  CBC Full  -  ( 11 Aug 2021 06:07 )  WBC Count : 36.23 K/uL  RBC Count : 3.46 M/uL  Hemoglobin : 10.3 g/dL  Hematocrit : 35.7 %  Platelet Count - Automated : 391 K/uL  Mean Cell Volume : 103.2 fl  Mean Cell Hemoglobin : 29.8 pg  Mean Cell Hemoglobin Concentration : 28.9 gm/dL  Auto Neutrophil # : 30.18 K/uL  Auto Lymphocyte # : 1.59 K/uL  Auto Monocyte # : 2.21 K/uL  Auto Eosinophil # : 0.00 K/uL  Auto Basophil # : 0.65 K/uL  Auto Neutrophil % : 83.3 %  Auto Lymphocyte % : 4.4 %  Auto Monocyte % : 6.1 %  Auto Eosinophil % : 0.0 %  Auto Basophil % : 1.8 %      08-11    143  |  111<H>  |  67<H>  ----------------------------<  122<H>  5.5<H>   |  24  |  1.56<H>    Ca    9.4      11 Aug 2021 12:52  Phos  5.8     08-11  Mg     2.2     08-11           Neurology Progress Note    Interval History:  Patient seen and examined at bedside this AM. Overnight, the patient had a witnessed aspiration event. Patient did not desat, CXR showed no new infiltrates. Patient in no acute distress, resting comfortably in bed with wife at bedside. Patient states he felt well this AM, has no complaints at this time. Denies any pain. Denies any lightheadedness or dizziness. Denies any headaches. Denies any visual disturbances or diplopia.      Medications:  acetaminophen    Suspension .. 600 milliGRAM(s) Oral every 6 hours PRN  atorvastatin 40 milliGRAM(s) Oral at bedtime  dextrose 40% Gel 15 Gram(s) Oral once  dextrose 5%. 1000 milliLiter(s) IV Continuous <Continuous>  dextrose 5%. 1000 milliLiter(s) IV Continuous <Continuous>  dextrose 50% Injectable 25 Gram(s) IV Push once  dextrose 50% Injectable 12.5 Gram(s) IV Push once  dextrose 50% Injectable 25 Gram(s) IV Push once  glucagon  Injectable 1 milliGRAM(s) IntraMuscular once  heparin   Injectable 5000 Unit(s) SubCutaneous every 8 hours  insulin lispro (ADMELOG) corrective regimen sliding scale   SubCutaneous every 6 hours  levETIRAcetam  IVPB 500 milliGRAM(s) IV Intermittent every 24 hours  methylPREDNISolone sodium succinate Injectable 8 milliGRAM(s) IV Push every 24 hours  multivitamin 1 Tablet(s) Oral daily  polyethylene glycol 3350 17 Gram(s) Oral two times a day  pyridostigmine Injectable 1 milliGRAM(s) IV Push three times a day  senna 2 Tablet(s) Oral at bedtime  sodium zirconium cyclosilicate 10 Gram(s) Oral once  zinc sulfate 220 milliGRAM(s) Oral daily      Vital Signs Last 24 Hrs  T(C): 36.3 (11 Aug 2021 15:30), Max: 37.1 (11 Aug 2021 06:02)  T(F): 97.4 (11 Aug 2021 15:30), Max: 98.7 (11 Aug 2021 06:02)  HR: 84 (11 Aug 2021 15:30) (59 - 84)  BP: 137/74 (11 Aug 2021 15:30) (134/76 - 157/72)  BP(mean): --  RR: 18 (11 Aug 2021 15:30) (16 - 18)  SpO2: 95% (11 Aug 2021 15:30) (95% - 98%)      General: No acute distress  HEENT: NCAT, EOMI, PERRLA, anicteric sclera  Neck: Supple  Cardiovascular: +S1/S2, RRR  Respiratory: CTA B/L, L lower lobe diminished breath sounds, +rales, no wheezes or rhonchi  Gastrointestinal: Soft, NTND, +BS in all 4 quadrants, NGT placed  Extremities: No edema, clubbing or cyanosis noted  Vascular: 2+ radial, DP/PT pulses B/L  Neurologic: AAOx3. Motor strength testing 4/5 B/L UE. 4/5 in RLE. LLE 4/5, improving.  strength 5/5 B/L. Normal tone and bulk. Mild L sided facial droop noted, unchanged. No dysarthria.      Labs:  CBC Full  -  ( 11 Aug 2021 06:07 )  WBC Count : 36.23 K/uL  RBC Count : 3.46 M/uL  Hemoglobin : 10.3 g/dL  Hematocrit : 35.7 %  Platelet Count - Automated : 391 K/uL  Mean Cell Volume : 103.2 fl  Mean Cell Hemoglobin : 29.8 pg  Mean Cell Hemoglobin Concentration : 28.9 gm/dL  Auto Neutrophil # : 30.18 K/uL  Auto Lymphocyte # : 1.59 K/uL  Auto Monocyte # : 2.21 K/uL  Auto Eosinophil # : 0.00 K/uL  Auto Basophil # : 0.65 K/uL  Auto Neutrophil % : 83.3 %  Auto Lymphocyte % : 4.4 %  Auto Monocyte % : 6.1 %  Auto Eosinophil % : 0.0 %  Auto Basophil % : 1.8 %      08-11    143  |  111<H>  |  67<H>  ----------------------------<  122<H>  5.5<H>   |  24  |  1.56<H>    Ca    9.4      11 Aug 2021 12:52  Phos  5.8     08-11  Mg     2.2     08-11

## 2021-08-11 NOTE — CONSULT NOTE ADULT - SUBJECTIVE AND OBJECTIVE BOX
89 M with PMHx HTN, seizure disorder previously on phenytoin presented with facial droop and slurred speech, admitted for evaluation and management of newly diagnosed myasthenia gravis.  ID consulted to evaluate for safety of rituximab initiation given positive hepatitis B antibody results.  He initially presented on July 18th for acute L facial droop, increased saliva production, and slurred speech. Code stroke was activated on admission and workup was negative, decision was made to not administer tpa. Anti-Ach antibodies positive, diagnosed with myasthenia gravis and treated with 5 days IVIG, prednisone 10mg, and pyridostigmine.    Initial evaluation also notable for marked leukocytosis, previously not known to patient. On hematology evaluation with flow cytometry showing increased monocytes, preliminary diagnosis of CMML made. Bone biopsy was offered and declined.    Hospital course complicated by aspiration 2/2 dysphagia presumably due to myasthenia gravis. Patient has had multiple observed aspirations and was NPO and had an NGT placed as well. Completely partial courses of Unasyn for aspiration PNA and Zosyn during 7/27 evaluation by ICU    Patient having refractory dysphagia leading to GOC discussions with palliative and decision to feed patient despite risks of aspiration. Neurology recommending rituximab for further myasthenia treatment, thus ID was consulted to comment on positive hepatitis B surface antibody and core antibody positivity.      Patient was seen and examined at bedside. As per nurse and patient, no o/n events, patient resting comfortably. No complaints at this time. Patient denies: fever, chills, lightheadedness, weakness, CP, SOB, cough, N/V. ROS otherwise negative.    VITAL SIGNS:  T(F): 97.4 (08-11-21 @ 15:30)  HR: 84 (08-11-21 @ 15:30)  BP: 137/74 (08-11-21 @ 15:30)  RR: 18 (08-11-21 @ 15:30)  SpO2: 95% (08-11-21 @ 15:30)  Wt(kg): --      08-10-21 @ 07:01  -  08-11-21 @ 07:00  --------------------------------------------------------  IN: 0 mL / OUT: 425 mL / NET: -425 mL        PHYSICAL EXAM:    Constitutional: thin elderly man, sleepy but rousable and interactive  HEENT: NC/AT, PER, anicteric sclera, no nasal discharge; MMM  Respiratory: CTA B/L; no W/R/R, no retractions  Cardiac: +S1/S2; RRR; no M/R/G  Gastrointestinal: soft, NT/ND; no rebound or guarding  Extremities: WWP, no clubbing or cyanosis; no peripheral edema  Vascular: 2+ radial, DP/PT pulses B/L    MEDICATIONS  (STANDING):  atorvastatin 40 milliGRAM(s) Oral at bedtime  dextrose 40% Gel 15 Gram(s) Oral once  dextrose 5%. 1000 milliLiter(s) (50 mL/Hr) IV Continuous <Continuous>  dextrose 5%. 1000 milliLiter(s) (100 mL/Hr) IV Continuous <Continuous>  dextrose 50% Injectable 25 Gram(s) IV Push once  dextrose 50% Injectable 12.5 Gram(s) IV Push once  dextrose 50% Injectable 25 Gram(s) IV Push once  glucagon  Injectable 1 milliGRAM(s) IntraMuscular once  heparin   Injectable 5000 Unit(s) SubCutaneous every 8 hours  insulin lispro (ADMELOG) corrective regimen sliding scale   SubCutaneous every 6 hours  levETIRAcetam  IVPB 500 milliGRAM(s) IV Intermittent every 24 hours  methylPREDNISolone sodium succinate Injectable 8 milliGRAM(s) IV Push every 24 hours  multivitamin 1 Tablet(s) Oral daily  polyethylene glycol 3350 17 Gram(s) Oral two times a day  pyridostigmine Injectable 1 milliGRAM(s) IV Push three times a day  senna 2 Tablet(s) Oral at bedtime  sodium zirconium cyclosilicate 10 Gram(s) Oral once  zinc sulfate 220 milliGRAM(s) Oral daily    MEDICATIONS  (PRN):  acetaminophen    Suspension .. 600 milliGRAM(s) Oral every 6 hours PRN Moderate Pain (4 - 6)      Allergies    hay fever (Unknown)  No Known Drug Allergies    Intolerances        LABS:                        10.3   36.23 )-----------( 391      ( 11 Aug 2021 06:07 )             35.7     08-11    143  |  111<H>  |  67<H>  ----------------------------<  122<H>  5.5<H>   |  24  |  1.56<H>    Ca    9.4      11 Aug 2021 12:52  Phos  5.8     08-11  Mg     2.2     08-11            RADIOLOGY & ADDITIONAL TESTS:  Reviewed

## 2021-08-11 NOTE — PROGRESS NOTE ADULT - PROBLEM SELECTOR PLAN 1
PPS 50%. Skin care PRN. Assist with ADL's PRN. Appreciate PT involvement. Wife continues to want rehab.

## 2021-08-11 NOTE — PROGRESS NOTE ADULT - PROBLEM SELECTOR PLAN 10
IVF: assess as needed  Diet: thin puree - discussed risks of aspiration with family and patient  DVT: Heparin Subq - family declining at this time, given concern for RP bleed  GI: None  CODE STATUS: DNR  Monitor, Replete to K>4 and Mg>2    Dispo: pending [facility] F: assess as needed  E: Monitor, Replete to K>4 and Mg>2, caution repletion in the setting of EVARISTO  N: thin puree - discussed risks of aspiration with family and patient    VTE PPx: Heparin Subq - family declining at this time, given concern for RP bleed  GI PPx: None    CODE STATUS: DNR    Dispo: pending TUSHAR placement F: assess as needed  E: Monitor, Replete to K>4 and Mg>2, caution repletion in the setting of EVARISTO  N: thin puree, low K - discussed risks of aspiration with family and patient    VTE PPx: Heparin Subq - family declining at this time, given concern for RP bleed  GI PPx: None    CODE STATUS: DNR    Dispo: pending TUSHAR placement

## 2021-08-11 NOTE — PROGRESS NOTE ADULT - PROBLEM SELECTOR PLAN 4
Patients wife made patient a DNR/DNI. Remains hopeful that he will improve and is hoping for rehab. Emotional support was provided. Patients wife made patient a DNR/DNI. Remains hopeful that he will improve and is hoping for rehab. Emotional support was provided. Palliative care will sign off at this time. Please reconsult as needed.

## 2021-08-11 NOTE — CONSULT NOTE ADULT - CONSULT REQUESTED DATE/TIME
04-Aug-2021 01:07
18-Jul-2021 19:28
19-Jul-2021
27-Jul-2021 16:46
27-Jul-2021
19-Jul-2021 05:05
04-Aug-2021 00:45
10-Aug-2021 17:46
20-Jul-2021 00:08
20-Jul-2021 10:15
09-Aug-2021 10:52
21-Jul-2021 11:24
21-Jul-2021 13:31
21-Jul-2021 11:33

## 2021-08-12 LAB
ANION GAP SERPL CALC-SCNC: 9 MMOL/L — SIGNIFICANT CHANGE UP (ref 5–17)
BUN SERPL-MCNC: 65 MG/DL — HIGH (ref 7–23)
CALCIUM SERPL-MCNC: 9.2 MG/DL — SIGNIFICANT CHANGE UP (ref 8.4–10.5)
CHLORIDE SERPL-SCNC: 112 MMOL/L — HIGH (ref 96–108)
CO2 SERPL-SCNC: 24 MMOL/L — SIGNIFICANT CHANGE UP (ref 22–31)
CREAT SERPL-MCNC: 1.55 MG/DL — HIGH (ref 0.5–1.3)
GLUCOSE BLDC GLUCOMTR-MCNC: 105 MG/DL — HIGH (ref 70–99)
GLUCOSE BLDC GLUCOMTR-MCNC: 122 MG/DL — HIGH (ref 70–99)
GLUCOSE BLDC GLUCOMTR-MCNC: 172 MG/DL — HIGH (ref 70–99)
GLUCOSE BLDC GLUCOMTR-MCNC: 85 MG/DL — SIGNIFICANT CHANGE UP (ref 70–99)
GLUCOSE BLDC GLUCOMTR-MCNC: 90 MG/DL — SIGNIFICANT CHANGE UP (ref 70–99)
GLUCOSE SERPL-MCNC: 87 MG/DL — SIGNIFICANT CHANGE UP (ref 70–99)
HCT VFR BLD CALC: 28.3 % — LOW (ref 39–50)
HCT VFR BLD CALC: 28.9 % — LOW (ref 39–50)
HGB BLD-MCNC: 8.4 G/DL — LOW (ref 13–17)
HGB BLD-MCNC: 8.7 G/DL — LOW (ref 13–17)
MAGNESIUM SERPL-MCNC: 2.2 MG/DL — SIGNIFICANT CHANGE UP (ref 1.6–2.6)
MCHC RBC-ENTMCNC: 29.7 GM/DL — LOW (ref 32–36)
MCHC RBC-ENTMCNC: 30 PG — SIGNIFICANT CHANGE UP (ref 27–34)
MCHC RBC-ENTMCNC: 30.1 GM/DL — LOW (ref 32–36)
MCHC RBC-ENTMCNC: 30.1 PG — SIGNIFICANT CHANGE UP (ref 27–34)
MCV RBC AUTO: 101.4 FL — HIGH (ref 80–100)
MCV RBC AUTO: 99.7 FL — SIGNIFICANT CHANGE UP (ref 80–100)
NRBC # BLD: 0 /100 WBCS — SIGNIFICANT CHANGE UP (ref 0–0)
NRBC # BLD: 0 /100 WBCS — SIGNIFICANT CHANGE UP (ref 0–0)
PLATELET # BLD AUTO: 433 K/UL — HIGH (ref 150–400)
PLATELET # BLD AUTO: 442 K/UL — HIGH (ref 150–400)
POTASSIUM SERPL-MCNC: 4.8 MMOL/L — SIGNIFICANT CHANGE UP (ref 3.5–5.3)
POTASSIUM SERPL-SCNC: 4.8 MMOL/L — SIGNIFICANT CHANGE UP (ref 3.5–5.3)
RBC # BLD: 2.79 M/UL — LOW (ref 4.2–5.8)
RBC # BLD: 2.9 M/UL — LOW (ref 4.2–5.8)
RBC # FLD: 17 % — HIGH (ref 10.3–14.5)
RBC # FLD: 17 % — HIGH (ref 10.3–14.5)
SODIUM SERPL-SCNC: 145 MMOL/L — SIGNIFICANT CHANGE UP (ref 135–145)
WBC # BLD: 36.65 K/UL — HIGH (ref 3.8–10.5)
WBC # BLD: 37.84 K/UL — HIGH (ref 3.8–10.5)
WBC # FLD AUTO: 36.65 K/UL — HIGH (ref 3.8–10.5)
WBC # FLD AUTO: 37.84 K/UL — HIGH (ref 3.8–10.5)

## 2021-08-12 PROCEDURE — 99232 SBSQ HOSP IP/OBS MODERATE 35: CPT

## 2021-08-12 PROCEDURE — 99232 SBSQ HOSP IP/OBS MODERATE 35: CPT | Mod: GC

## 2021-08-12 RX ORDER — PYRIDOSTIGMINE BROMIDE 60 MG/5ML
30 SOLUTION ORAL EVERY 8 HOURS
Refills: 0 | Status: DISCONTINUED | OUTPATIENT
Start: 2021-08-12 | End: 2021-08-23

## 2021-08-12 RX ADMIN — PYRIDOSTIGMINE BROMIDE 30 MILLIGRAM(S): 60 SOLUTION ORAL at 15:42

## 2021-08-12 RX ADMIN — LEVETIRACETAM 400 MILLIGRAM(S): 250 TABLET, FILM COATED ORAL at 11:25

## 2021-08-12 RX ADMIN — PYRIDOSTIGMINE BROMIDE 30 MILLIGRAM(S): 60 SOLUTION ORAL at 22:43

## 2021-08-12 RX ADMIN — Medication 1 TABLET(S): at 12:00

## 2021-08-12 RX ADMIN — HEPARIN SODIUM 5000 UNIT(S): 5000 INJECTION INTRAVENOUS; SUBCUTANEOUS at 22:24

## 2021-08-12 RX ADMIN — SODIUM ZIRCONIUM CYCLOSILICATE 10 GRAM(S): 10 POWDER, FOR SUSPENSION ORAL at 22:24

## 2021-08-12 RX ADMIN — ZINC SULFATE TAB 220 MG (50 MG ZINC EQUIVALENT) 220 MILLIGRAM(S): 220 (50 ZN) TAB at 11:28

## 2021-08-12 RX ADMIN — PYRIDOSTIGMINE BROMIDE 1 MILLIGRAM(S): 60 SOLUTION ORAL at 05:30

## 2021-08-12 RX ADMIN — SENNA PLUS 2 TABLET(S): 8.6 TABLET ORAL at 22:25

## 2021-08-12 RX ADMIN — Medication 8 MILLIGRAM(S): at 11:28

## 2021-08-12 RX ADMIN — ATORVASTATIN CALCIUM 40 MILLIGRAM(S): 80 TABLET, FILM COATED ORAL at 22:25

## 2021-08-12 RX ADMIN — Medication 1: at 17:55

## 2021-08-12 NOTE — PROGRESS NOTE ADULT - SUBJECTIVE AND OBJECTIVE BOX
INTERVAL HPI/OVERNIGHT EVENTS:  Patient was seen and examined at bedside.    No o/n events, patient resting comfortably. No complaints at this time. Patient denies: fever, chills, dizziness, weakness, HA, Changes in vision, CP, palpitations, SOB, cough, N/V/D/C, dysuria, changes in bowel movements, LE edema. ROS otherwise negative.        VITAL SIGNS:  T(F): 97.7 (08-12-21 @ 05:25)  HR: 67 (08-12-21 @ 05:35)  BP: 148/86 (08-12-21 @ 05:35)  RR: 18 (08-12-21 @ 05:35)  SpO2: 96% (08-12-21 @ 05:35)  Wt(kg): --    PHYSICAL EXAM:    Constitutional: female/male, WDWN, NAD  HEENT: PERRL, EOMI, sclera non-icteric, neck supple, trachea midline, no masses, no JVD, MMM, good dentition  Respiratory: CTA b/l, good air entry b/l, no wheezing, no rhonchi, no rales, without accessory muscle use and no intercostal retractions  Cardiovascular: RRR, normal S1S2, no M/R/G  Gastrointestinal: soft, NTND, no masses palpable, BS normal  Extremities: Warm, well perfused, pulses equal bilateral upper and lower extremities, no edema, no clubbing  Neurological: AAOx3, CN Grossly intact  Skin: Normal temperature, warm, dry    MEDICATIONS  (STANDING):  atorvastatin 40 milliGRAM(s) Oral at bedtime  dextrose 40% Gel 15 Gram(s) Oral once  dextrose 5%. 1000 milliLiter(s) (50 mL/Hr) IV Continuous <Continuous>  dextrose 5%. 1000 milliLiter(s) (100 mL/Hr) IV Continuous <Continuous>  dextrose 50% Injectable 25 Gram(s) IV Push once  dextrose 50% Injectable 12.5 Gram(s) IV Push once  dextrose 50% Injectable 25 Gram(s) IV Push once  glucagon  Injectable 1 milliGRAM(s) IntraMuscular once  heparin   Injectable 5000 Unit(s) SubCutaneous every 8 hours  insulin lispro (ADMELOG) corrective regimen sliding scale   SubCutaneous every 6 hours  levETIRAcetam  IVPB 500 milliGRAM(s) IV Intermittent every 24 hours  methylPREDNISolone sodium succinate Injectable 8 milliGRAM(s) IV Push every 24 hours  multivitamin 1 Tablet(s) Oral daily  polyethylene glycol 3350 17 Gram(s) Oral two times a day  pyridostigmine Injectable 1 milliGRAM(s) IV Push three times a day  senna 2 Tablet(s) Oral at bedtime  sodium chloride 0.9%. 1000 milliLiter(s) (100 mL/Hr) IV Continuous <Continuous>  sodium zirconium cyclosilicate 10 Gram(s) Oral every 8 hours  zinc sulfate 220 milliGRAM(s) Oral daily    MEDICATIONS  (PRN):  acetaminophen    Suspension .. 600 milliGRAM(s) Oral every 6 hours PRN Moderate Pain (4 - 6)      Allergies    hay fever (Unknown)  No Known Drug Allergies    Intolerances        LABS:                        10.3   36.23 )-----------( 391      ( 11 Aug 2021 06:07 )             35.7     08-11    140  |  108  |  67<H>  ----------------------------<  160<H>  5.7<H>   |  22  |  1.56<H>    Ca    9.8      11 Aug 2021 19:53  Phos  5.8     08-11  Mg     2.2     08-11          CAPILLARY BLOOD GLUCOSE      POCT Blood Glucose.: 85 mg/dL (12 Aug 2021 06:25)  POCT Blood Glucose.: 105 mg/dL (12 Aug 2021 00:08)  POCT Blood Glucose.: 203 mg/dL (11 Aug 2021 18:44)  POCT Blood Glucose.: 231 mg/dL (11 Aug 2021 17:22)  POCT Blood Glucose.: 123 mg/dL (11 Aug 2021 12:04)                 HOSPITAL COURSE:  89y Male with PMHx of HTN, severe aortic sinensis and seizure on phenytoin who presented with left facial droop and slurred speech, stroke w/u negative. Found to have myasthenia gravis w/ positive anti-ach antibodies. 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 S/p IVIG treatment. Course complicated initially by dysphagia followed by hypotension concerning for sepsis in the setting of aspiration pneumonia. Patient given IVF, resulting in cardiogenic vs. obstructive shock due to severe aortic stenosis. Also noted to have elevated creatinine concerning for EVARISTO. Patient admitted to ICU for further management. Urine output low initially low but improving. However, creatinine remains elevated (1.5-1.6). Phenylephrine started 7/28 in an attempt to increase renal perfusion, discontinued 7/30 due to bradycardia. Patient still w/ dysphagia and poor PO intake. S/p NGT placement and subsequent removal (7/23-8/10), started on thin pureed diet (8/10-) after multiple discussions w/ family about aspiration risk. On 8/11, noted to be hyperkalemic to 6.4, asymptomatic however EKG on 8/11 w/ WA prolongation and peaked T-waves, resolving w/ lokelma and 1LNS, diet changed to low K; prior episode of hyperkalemia 6.5 on 7/28 in ICU after phenylephrine started, resolved w/ lokelma x1; etiology likely renal hypoperfusion. On 8/12, all medications switched back to PO, plan for transfer to [facility] for rituximab.              INTERVAL HPI/OVERNIGHT EVENTS:  Patient was seen and examined at bedside.   O/n, pt given IV pyridostigmine, plan to transition from IV to oral. This AM, patient resting comfortably. No complaints at this time, asked to lower L leg from upright position. Patient denies: fever, chills, dizziness, weakness, HA, changes in vision, CP, palpitations, SOB, cough, N/V/D/C, dysuria, changes in bowel movements, LE edema. ROS otherwise negative.        VITAL SIGNS:  T(F): 97.7 (08-12-21 @ 05:25)  HR: 67 (08-12-21 @ 05:35)  BP: 148/86 (08-12-21 @ 05:35)  RR: 18 (08-12-21 @ 05:35)  SpO2: 96% (08-12-21 @ 05:35)  Wt(kg): --    PHYSICAL EXAM:    Constitutional: thin, frail-appearing male lying in bed in NAD  HEENT: PERRL, EOMI grossly, sclera anicteric, neck supple, no JVD, MM slightly dry  Respiratory: fair inspiratory effort, low bibasilar ?rhonchi, unlabored respirations  Cardiovascular: HR 56, +AS murmur, no r/g  Gastrointestinal: soft, NTND, no masses palpable, BS normoactive x4 quadrants  Extremities: Warm, well perfused, pulses equal bilateral upper and lower extremities, no edema, no clubbing or cyanosis, brisk capillary refill.  Neurological: AAOx3, CN II-XII grossly intact, LLE: strength 0/5 hip flexion, RLE 3/5 hip flexion. B/l UE 4/5.    Skin: Normal temperature, warm, dry, no rashes or lesions    MEDICATIONS  (STANDING):  atorvastatin 40 milliGRAM(s) Oral at bedtime  dextrose 40% Gel 15 Gram(s) Oral once  dextrose 5%. 1000 milliLiter(s) (50 mL/Hr) IV Continuous <Continuous>  dextrose 5%. 1000 milliLiter(s) (100 mL/Hr) IV Continuous <Continuous>  dextrose 50% Injectable 25 Gram(s) IV Push once  dextrose 50% Injectable 12.5 Gram(s) IV Push once  dextrose 50% Injectable 25 Gram(s) IV Push once  glucagon  Injectable 1 milliGRAM(s) IntraMuscular once  heparin   Injectable 5000 Unit(s) SubCutaneous every 8 hours  insulin lispro (ADMELOG) corrective regimen sliding scale   SubCutaneous every 6 hours  levETIRAcetam  IVPB 500 milliGRAM(s) IV Intermittent every 24 hours  methylPREDNISolone sodium succinate Injectable 8 milliGRAM(s) IV Push every 24 hours  multivitamin 1 Tablet(s) Oral daily  polyethylene glycol 3350 17 Gram(s) Oral two times a day  pyridostigmine Injectable 1 milliGRAM(s) IV Push three times a day  senna 2 Tablet(s) Oral at bedtime  sodium zirconium cyclosilicate 10 Gram(s) Oral every 8 hours  zinc sulfate 220 milliGRAM(s) Oral daily    MEDICATIONS  (PRN):  acetaminophen    Suspension .. 600 milliGRAM(s) Oral every 6 hours PRN Moderate Pain (4 - 6)      Allergies    hay fever (Unknown)  No Known Drug Allergies    Intolerances        LABS:                        8.4    37.84 )-----------( 433      ( 12 Aug 2021 07:19 )             28.3     08-12    145  |  112<H>  |  65<H>  ----------------------------<  87  4.8   |  24  |  1.55<H>    Ca    9.2      12 Aug 2021 07:19  Phos  5.8     08-11  Mg     2.2     08-12          CAPILLARY BLOOD GLUCOSE      POCT Blood Glucose.: 85 mg/dL (12 Aug 2021 06:25)  POCT Blood Glucose.: 105 mg/dL (12 Aug 2021 00:08)  POCT Blood Glucose.: 203 mg/dL (11 Aug 2021 18:44)  POCT Blood Glucose.: 231 mg/dL (11 Aug 2021 17:22)  POCT Blood Glucose.: 123 mg/dL (11 Aug 2021 12:04)                 HOSPITAL COURSE:  89y Male with PMHx of HTN, severe aortic sinensis and seizure on phenytoin who presented with left facial droop and slurred speech, stroke w/u negative. Found to have myasthenia gravis w/ positive anti-ach antibodies. 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 S/p IVIG treatment. Course complicated initially by dysphagia followed by hypotension concerning for sepsis in the setting of aspiration pneumonia. Patient given IVF, resulting in cardiogenic vs. obstructive shock due to severe aortic stenosis. Also noted to have elevated creatinine concerning for EVARISTO. Patient admitted to ICU for further management. Urine output low initially low but improving. However, creatinine remains elevated (1.5-1.6). Phenylephrine started 7/28 in an attempt to increase renal perfusion, discontinued 7/30 due to bradycardia. Patient still w/ dysphagia and poor PO intake. S/p NGT placement and subsequent removal (7/23-8/10), started on thin pureed diet (8/10-) after multiple discussions w/ family about aspiration risk. On 8/11, noted to be hyperkalemic to 6.4, asymptomatic however EKG on 8/11 w/ [place] prolongation and peaked T-waves, resolving w/ lokelma and 1LNS, diet changed to low K; prior episode of hyperkalemia 6.5 on 7/28 in ICU after phenylephrine started, resolved w/ lokelma x1; etiology likely renal hypoperfusion. On 8/12, all medications switched back to PO, plan for transfer to [facility] for rituximab.              INTERVAL HPI/OVERNIGHT EVENTS:  Patient was seen and examined at bedside.   O/n, pt given IV pyridostigmine, plan to transition from IV to oral. This AM, patient resting comfortably. No complaints at this time, asked to lower L leg from upright position. Patient denies: fever, chills, dizziness, weakness, HA, changes in vision, CP, palpitations, SOB, cough, N/V/D/C, dysuria, changes in bowel movements, LE edema. ROS otherwise negative.        VITAL SIGNS:  T(F): 97.7 (08-12-21 @ 05:25)  HR: 67 (08-12-21 @ 05:35)  BP: 148/86 (08-12-21 @ 05:35)  RR: 18 (08-12-21 @ 05:35)  SpO2: 96% (08-12-21 @ 05:35)  Wt(kg): --    PHYSICAL EXAM:    Constitutional: thin, frail-appearing male lying in bed in NAD  HEENT: PERRL, EOMI grossly, sclera anicteric, neck supple, no JVD, MM slightly dry  Respiratory: fair inspiratory effort, low bibasilar ?rhonchi, unlabored respirations  Cardiovascular: HR 56, +AS murmur, no r/g  Gastrointestinal: soft, NTND, no masses palpable, BS normoactive x4 quadrants  Extremities: Warm, well perfused, pulses equal bilateral upper and lower extremities, including LLE, no edema, no clubbing or cyanosis, brisk capillary refill.   Neurological: AAOx3, CN II-XII grossly intact, LLE: strength 1/5 hip flexion, 2/5 dorsiflexion; RLE 4/5 hip flexion and dorsiflexion. B/l UE 4/5.    Skin: Normal temperature, warm, dry, no rashes or lesions    MEDICATIONS  (STANDING):  atorvastatin 40 milliGRAM(s) Oral at bedtime  dextrose 40% Gel 15 Gram(s) Oral once  dextrose 5%. 1000 milliLiter(s) (50 mL/Hr) IV Continuous <Continuous>  dextrose 5%. 1000 milliLiter(s) (100 mL/Hr) IV Continuous <Continuous>  dextrose 50% Injectable 25 Gram(s) IV Push once  dextrose 50% Injectable 12.5 Gram(s) IV Push once  dextrose 50% Injectable 25 Gram(s) IV Push once  glucagon  Injectable 1 milliGRAM(s) IntraMuscular once  heparin   Injectable 5000 Unit(s) SubCutaneous every 8 hours  insulin lispro (ADMELOG) corrective regimen sliding scale   SubCutaneous every 6 hours  levETIRAcetam  IVPB 500 milliGRAM(s) IV Intermittent every 24 hours  methylPREDNISolone sodium succinate Injectable 8 milliGRAM(s) IV Push every 24 hours  multivitamin 1 Tablet(s) Oral daily  polyethylene glycol 3350 17 Gram(s) Oral two times a day  pyridostigmine Injectable 1 milliGRAM(s) IV Push three times a day  senna 2 Tablet(s) Oral at bedtime  sodium zirconium cyclosilicate 10 Gram(s) Oral every 8 hours  zinc sulfate 220 milliGRAM(s) Oral daily    MEDICATIONS  (PRN):  acetaminophen    Suspension .. 600 milliGRAM(s) Oral every 6 hours PRN Moderate Pain (4 - 6)      Allergies    hay fever (Unknown)  No Known Drug Allergies    Intolerances        LABS:                        8.4    37.84 )-----------( 433      ( 12 Aug 2021 07:19 )             28.3     08-12    145  |  112<H>  |  65<H>  ----------------------------<  87  4.8   |  24  |  1.55<H>    Ca    9.2      12 Aug 2021 07:19  Phos  5.8     08-11  Mg     2.2     08-12          CAPILLARY BLOOD GLUCOSE      POCT Blood Glucose.: 85 mg/dL (12 Aug 2021 06:25)  POCT Blood Glucose.: 105 mg/dL (12 Aug 2021 00:08)  POCT Blood Glucose.: 203 mg/dL (11 Aug 2021 18:44)  POCT Blood Glucose.: 231 mg/dL (11 Aug 2021 17:22)  POCT Blood Glucose.: 123 mg/dL (11 Aug 2021 12:04)                 HOSPITAL COURSE:  89y DNR/DNI male with PMHx of HTN, severe aortic sinensis and seizure on phenytoin who presented with left facial droop and slurred speech, stroke w/u negative. Found to have myasthenia gravis w/ positive anti-ach antibodies. 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 S/p IVIG treatment. Course complicated initially by dysphagia followed by hypotension concerning for sepsis in the setting of aspiration pneumonia. Patient given IVF, resulting in cardiogenic vs. obstructive shock due to severe aortic stenosis. Also noted to have elevated creatinine concerning for EVARISTO. Patient admitted to ICU for further management. Urine output low initially low but improving. However, creatinine remains elevated (1.5-1.6). Phenylephrine started 7/28 in an attempt to increase renal perfusion, discontinued 7/30 due to bradycardia. Patient still w/ dysphagia and poor PO intake. S/p NGT placement and subsequent removal (7/23-8/10), started on thin pureed diet (8/10-) after multiple discussions w/ family about aspiration risk. On 8/11, noted to be hyperkalemic to 6.4, asymptomatic however EKG on 8/11 w/ SD prolongation and peaked T-waves, resolving w/ lokelma and 1LNS, diet changed to low K; prior episode of hyperkalemia 6.5 on 7/28 in ICU after phenylephrine started, resolved w/ lokelma x1; etiology likely renal hypoperfusion. On 8/12, all medications switched back to PO, plan for transfer to [facility] for rituximab.              INTERVAL HPI/OVERNIGHT EVENTS:  Patient was seen and examined at bedside.   O/n, pt given IV pyridostigmine, plan to transition from IV to oral. This AM, patient resting comfortably. No complaints at this time, asked to lower L leg from upright position. Patient denies: fever, chills, dizziness, weakness, HA, changes in vision, CP, palpitations, SOB, cough, N/V/D/C, dysuria, changes in bowel movements, LE edema. ROS otherwise negative.        VITAL SIGNS:  T(F): 97.7 (08-12-21 @ 05:25)  HR: 67 (08-12-21 @ 05:35)  BP: 148/86 (08-12-21 @ 05:35)  RR: 18 (08-12-21 @ 05:35)  SpO2: 96% (08-12-21 @ 05:35)  Wt(kg): --    PHYSICAL EXAM:    Constitutional: thin, frail-appearing male lying in bed in NAD  HEENT: PERRL, EOMI grossly, sclera anicteric, neck supple, no JVD, MM slightly dry  Respiratory: fair inspiratory effort, low bibasilar ?rhonchi, unlabored respirations  Cardiovascular: HR 56, +AS murmur, no r/g  Gastrointestinal: soft, NTND, no masses palpable, BS normoactive x4 quadrants  Extremities: Warm, well perfused, pulses equal bilateral upper and lower extremities, including LLE, no edema, no clubbing or cyanosis, brisk capillary refill.   Neurological: AAOx3, CN II-XII grossly intact, LLE: strength 1/5 hip flexion, 2/5 dorsiflexion; RLE 4/5 hip flexion and dorsiflexion. B/l UE 4/5.    Skin: Normal temperature, warm, dry, no rashes or lesions    MEDICATIONS  (STANDING):  atorvastatin 40 milliGRAM(s) Oral at bedtime  dextrose 40% Gel 15 Gram(s) Oral once  dextrose 5%. 1000 milliLiter(s) (50 mL/Hr) IV Continuous <Continuous>  dextrose 5%. 1000 milliLiter(s) (100 mL/Hr) IV Continuous <Continuous>  dextrose 50% Injectable 25 Gram(s) IV Push once  dextrose 50% Injectable 12.5 Gram(s) IV Push once  dextrose 50% Injectable 25 Gram(s) IV Push once  glucagon  Injectable 1 milliGRAM(s) IntraMuscular once  heparin   Injectable 5000 Unit(s) SubCutaneous every 8 hours  insulin lispro (ADMELOG) corrective regimen sliding scale   SubCutaneous every 6 hours  levETIRAcetam  IVPB 500 milliGRAM(s) IV Intermittent every 24 hours  methylPREDNISolone sodium succinate Injectable 8 milliGRAM(s) IV Push every 24 hours  multivitamin 1 Tablet(s) Oral daily  polyethylene glycol 3350 17 Gram(s) Oral two times a day  pyridostigmine Injectable 1 milliGRAM(s) IV Push three times a day  senna 2 Tablet(s) Oral at bedtime  sodium zirconium cyclosilicate 10 Gram(s) Oral every 8 hours  zinc sulfate 220 milliGRAM(s) Oral daily    MEDICATIONS  (PRN):  acetaminophen    Suspension .. 600 milliGRAM(s) Oral every 6 hours PRN Moderate Pain (4 - 6)      Allergies    hay fever (Unknown)  No Known Drug Allergies    Intolerances        LABS:                        8.4    37.84 )-----------( 433      ( 12 Aug 2021 07:19 )             28.3     08-12    145  |  112<H>  |  65<H>  ----------------------------<  87  4.8   |  24  |  1.55<H>    Ca    9.2      12 Aug 2021 07:19  Phos  5.8     08-11  Mg     2.2     08-12          CAPILLARY BLOOD GLUCOSE      POCT Blood Glucose.: 85 mg/dL (12 Aug 2021 06:25)  POCT Blood Glucose.: 105 mg/dL (12 Aug 2021 00:08)  POCT Blood Glucose.: 203 mg/dL (11 Aug 2021 18:44)  POCT Blood Glucose.: 231 mg/dL (11 Aug 2021 17:22)  POCT Blood Glucose.: 123 mg/dL (11 Aug 2021 12:04)

## 2021-08-12 NOTE — PROGRESS NOTE ADULT - PROBLEM SELECTOR PLAN 1
K 5.5 (8/9)->6.4 this AM (8/11), EKG w/ krystina to 57, ->294 since prior on 7/28, increased T wave amplitude compared to prior, intact p waves, narrow QRS   Etiology unclear at this time, present at admission, s/p lokelma,   -second episode K 6.5 on 7/28 while in ICU and treated again with lokelma, no w/u documented in progress notes    Plan:   - s/p calcium gluconate 1g   - s/p insulin+D5 push  - rpt BMP 10AM - K 5.5  - rpt EKG, f/u - still w/ peaked T waves, NV prolonged now 304, pt asymptomatic  - s/p lokelma, made standing  - fpt BMP 6PM, 8PM K 6->5.7  - changed diet to low K on 8/11 RESOLVING on 8/12  -prior episode K 6.5 on 7/28 while in ICU after starting norepinephrine and treated with lokelma, no w/u documented in progress notes. Etiology unclear, likely 2/2 renal hypoperfusion [decreased delivery of sodium and water to the sites of potassium secretion in the distal nephron (connecting segment and cortical collecting tubule), impaired potassium secretion into the tubular lumen, and hyperkalemia] vs less likely RTA (Cr at baseline).     Plan:   - s/p calcium gluconate 1g   - s/p insulin+D5 push  - rpt BMP 10AM - K 5.5  - rpt EKG, f/u - still w/ peaked T waves, NE prolonged now 304, pt asymptomatic  - started lokelma standing q8h (until 8/13), then plan to keep standing q24h if needed  - monitor K: 6.4->5.5->6->5.7 (8/11)->4.8 (8/12 AM)  - changed diet to low K on 8/11 RESOLVING on 8/12  -prior episode K 6.5 on 7/28 while in ICU, tried norepinephrine (7/28-7/30, stopped for bradycardia) and treated with lokelma, no w/u documented in progress notes. Etiology unclear, likely 2/2 renal hypoperfusion [decreased delivery of sodium and water to the sites of potassium secretion in the distal nephron (connecting segment and cortical collecting tubule), impaired potassium secretion into the tubular lumen, and hyperkalemia, per UTD] vs less likely RTA (Cr at baseline).     Plan:   - s/p calcium gluconate 1g   - s/p insulin+D5 push  - rpt BMP 10AM - K 5.5  - rpt EKG, f/u - still w/ peaked T waves, DE prolonged now 304, pt asymptomatic  - started lokelma standing q8h (until 8/13), then plan to keep standing q24h if needed  - monitor K: 6.4->5.5->6->5.7 (8/11)->4.8 (8/12 AM)  - changed diet to low K on 8/11

## 2021-08-12 NOTE — PROGRESS NOTE ADULT - PROBLEM SELECTOR PLAN 3
-patient developed new left lower extremity numbness and weakness on 8/3  -patient denies back pain at present  -patient has strength of 3/5 in LLE (RLE is 4/5). Now has sensation to left foot and anterior tibial region, no sensation to left thigh. Intact sensation to RLE.  Bilateral upper extremities sensation intact with strength of 4/5  -MRI lumbar showed L RP bleed  -no plans for IR drainage at this time, as hematoma appears to be resolving  -hemoglobin stable, no signs of hemodynamic instability    PLAN:  -continue to monitor hemoglobin  -restarted DVT prophylaxis AC as patient does not appear to be acutely bleeding - family declined, due to concern for bleed -patient developed new left lower extremity numbness and weakness on 8/3  -patient denies back pain at present  -patient has strength of 0/5 L hip flexion (RLE is 3/5). Now has sensation to left foot and anterior tibial region, no sensation to left thigh. Intact sensation to RLE.  Bilateral upper extremities sensation intact with strength of 4/5  -MRI lumbar showed L RP bleed  -no plans for IR drainage at this time, as hematoma appears to be resolving  -hemoglobin 10.3 (8/11)->8.4 (8/12) after 1LNS yesterday (8/11), no signs of hemodynamic instability    PLAN:  -continue to monitor hemoglobin  -restarted DVT prophylaxis AC as patient does not appear to be acutely bleeding - family declined, due to concern for bleed -patient developed new left lower extremity numbness and weakness on 8/3  -patient denies back pain at present  -patient has strength of 1/5 L hip flexion (RLE is 4/5). Now has sensation to left foot and anterior tibial region, no sensation to left thigh. Intact sensation to RLE.  Bilateral upper extremities sensation intact with strength of 4/5  -MRI lumbar showed L RP bleed  -no plans for IR drainage at this time, as hematoma appears to be resolving  -hemoglobin 10.3 (8/11)->8.4 (8/12) after 1LNS yesterday (8/11), no signs of hemodynamic instability    PLAN:  -continue to monitor hemoglobin  -restarted DVT prophylaxis AC as patient does not appear to be acutely bleeding - family declined, due to concern for bleed

## 2021-08-12 NOTE — PROGRESS NOTE ADULT - PROBLEM SELECTOR PLAN 10
F: assess as needed  E: Monitor, Replete to K>4 and Mg>2, caution repletion in the setting of EVARISTO  N: thin puree, low K - discussed risks of aspiration with family and patient    VTE PPx: Heparin Subq - family declining at this time, given concern for RP bleed  GI PPx: None    CODE STATUS: DNR    Dispo: pending TUSHAR placement F: assess as needed, s/p 1LNS on 8/11  E: Monitor, Replete to K>4 and Mg>2, caution repletion in the setting of EVARISTO  N: thin puree, low K - discussed risks of aspiration with family and patient    VTE PPx: Heparin Subq - family declining at this time, given concern for RP bleed  GI PPx: None    CODE STATUS: DNR    Dispo: pending TUSHAR placement F: assess as needed, s/p 1LNS on 8/11  E: Monitor, Replete to K>4 and Mg>2, caution repletion in the setting of EVARISTO  N: thin puree, low K - discussed risks of aspiration with family and patient    VTE PPx: Heparin Subq - family declining at this time, given concern for RP bleed  GI PPx: None    CODE STATUS: DNR    Dispo: pending AR placement

## 2021-08-12 NOTE — PROGRESS NOTE ADULT - SUBJECTIVE AND OBJECTIVE BOX
Neurology Progress Note    Interval History:  Patient seen and examined at bedside this AM. Patient in no acute distress, resting comfortably in bed with wife at bedside. Patient is ailin small amount of po pureed diet at this time. Patient states he felt well this AM, has had ongoing LLE weakness. Patient has no new complaints at this time. Denies any pain. Denies any lightheadedness or dizziness. Denies any headaches. Denies any visual disturbances or diplopia.      Medications:  acetaminophen    Suspension .. 600 milliGRAM(s) Oral every 6 hours PRN  atorvastatin 40 milliGRAM(s) Oral at bedtime  dextrose 40% Gel 15 Gram(s) Oral once  dextrose 5%. 1000 milliLiter(s) IV Continuous <Continuous>  dextrose 5%. 1000 milliLiter(s) IV Continuous <Continuous>  dextrose 50% Injectable 25 Gram(s) IV Push once  dextrose 50% Injectable 12.5 Gram(s) IV Push once  dextrose 50% Injectable 25 Gram(s) IV Push once  glucagon  Injectable 1 milliGRAM(s) IntraMuscular once  heparin   Injectable 5000 Unit(s) SubCutaneous every 8 hours  insulin lispro (ADMELOG) corrective regimen sliding scale   SubCutaneous every 6 hours  levETIRAcetam  IVPB 500 milliGRAM(s) IV Intermittent every 24 hours  methylPREDNISolone sodium succinate Injectable 8 milliGRAM(s) IV Push every 24 hours  multivitamin 1 Tablet(s) Oral daily  polyethylene glycol 3350 17 Gram(s) Oral two times a day  pyridostigmine 30 milliGRAM(s) Oral every 8 hours  senna 2 Tablet(s) Oral at bedtime  sodium zirconium cyclosilicate 10 Gram(s) Oral every 8 hours  zinc sulfate 220 milliGRAM(s) Oral daily      Vital Signs Last 24 Hrs  T(C): 36.3 (12 Aug 2021 14:13), Max: 36.6 (11 Aug 2021 21:10)  T(F): 97.4 (12 Aug 2021 14:13), Max: 97.9 (11 Aug 2021 21:10)  HR: 81 (12 Aug 2021 14:13) (64 - 83)  BP: 134/68 (12 Aug 2021 14:13) (121/76 - 156/68)  RR: 18 (12 Aug 2021 14:13) (17 - 18)  SpO2: 96% (12 Aug 2021 14:13) (96% - 98%)      Neurological Exam:   Mental status: Awake, alert and oriented x2 to person and place.  Recent and remote memory intact.  Naming, repetition and comprehension intact.  Attention/concentration intact.  No dysarthria, no aphasia.  Fund of knowledge appropriate.    Cranial nerves: Pupils equally round and reactive to light, visual fields full, no nystagmus, extraocular muscles intact, V1 through V3 intact bilaterally and symmetric, face symmetric, hearing intact to finger rub, palate elevation symmetric, tongue was midline.  Motor strength testing 4/5 B/L UE. 4/5 in RLE. LLE 3/5.  strength 5/5 B/L. Normal tone and bulk. No abnormal movements.  Sensation: Intact to light touch, proprioception, and pinprick.   Coordination: No dysmetria on finger-to-nose and heel-to-shin.  No dysdiadokinesia.  Reflexes: 2+ in bilateral UE/LE, downgoing toes bilaterally. (-) Rocha.      Labs:  CBC Full  -  ( 12 Aug 2021 07:19 )  WBC Count : 37.84 K/uL  RBC Count : 2.79 M/uL  Hemoglobin : 8.4 g/dL  Hematocrit : 28.3 %  Platelet Count - Automated : 433 K/uL  Mean Cell Volume : 101.4 fl  Mean Cell Hemoglobin : 30.1 pg  Mean Cell Hemoglobin Concentration : 29.7 gm/dL        08-12    145  |  112<H>  |  65<H>  ----------------------------<  87  4.8   |  24  |  1.55<H>    Ca    9.2      12 Aug 2021 07:19  Phos  5.8     08-11  Mg     2.2     08-12

## 2021-08-12 NOTE — PROGRESS NOTE ADULT - PROBLEM SELECTOR PLAN 7
Normocytic anemia, with iron panel consistent with anemia of chronic disease/ renal disease. B12/folate wnl. No evidence of hemolysis. 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 Nguyen on a Thursday (after discharge)  - Appreciate hem/onc recs, following.

## 2021-08-12 NOTE — PROGRESS NOTE ADULT - ASSESSMENT
A 89-year-old man with newly diagnosed myasthenia gravis and leukocytosis (possibly CMML or other hematologic malignancy -- patient and family declined bone marrow biopsy).  S/p x 5 days IVIG and currently on IV solumedrol and mestinon.      Plan:  - Cont steroids, now on IV solumedrol 8 mg q24 hrs because NGT removed, will continue on this medication regimen for MG while inpatient  - Cont mestinon, now on IV mestinon 1 mg TID. monitor for increasing secretions  - Speech and swallow eval performed and following, ailin min ice chips. NGT removed at this time. Advanced to thin pureed diet with family knowing risks of aspiration. Family refusing PEG tube at this time. Cont feeds with pureed diet, ailin well  - ID was consulted for initiating Rituximab treatment, Hep B quantitative PCR is pending. Will follow their recommendations pending Hep B status. Patient and family consented regarding initiating this treatment inpatient. Will start with 1 g Rituximab treatment pending quantitative Hep B PCR  - Regarding leukocytosis work up, heme/onc is on board for possible CMML vs other malignancy, flow cytometry showed increased monocytes, BCR-ABL, JAMES-2 was negative. Bone marrow biopsy will not be done, family is refusing at this time. Will not work up further  - MRI Lumbar spine w/o contrast done for left leg weakness, showed massive L RP hematoma, stable and resolving, likely contributing to LLE weakness. No acute intervention planned at this time as per IR

## 2021-08-12 NOTE — PROGRESS NOTE ADULT - ASSESSMENT
89M PMHx of HTN, severe aortic stenosis and seizures (on phenytoin) who presented with left facial droop and slurred speech, Stroke w/u negative. Found to have myasthenia gravis with positive anti-Ach antibodies. Also found to have leukocytosis suspicious for malignancy. Admitted to [facility] for treatment of MG and w/u of potential CML/CLL. S/p IVIG treatment. Course complicated by dysphagia and aspiration pneumonia. Admitted to MICU after developing septic shock likely 2/2 to aspiration PNA requiring pressors. 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 Patient found to have L RP hematoma, likely causing new LLE weakness and numbness. Now following for dysphagia, as well as continued MG treatment, pending discharge to acute rehab. Started on pyridostigmine (8/10-), found to be hyperkalemic to 6.4 8/11 AM, s/p lokelma.  89M PMHx of HTN, severe aortic stenosis and seizures (on phenytoin) who presented with left facial droop and slurred speech, stroke w/u negative. Found to have myasthenia gravis w/ anti-Ach antibodies. 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 Admitted initially to [facility]      for treatment of MG and w/u of leukocytosis, s/p IVIG treatment. Course complicated by dysphagia and aspiration pneumonia. Admitted to MICU after developing septic shock likely 2/2 to aspiration PNA requiring pressors. 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 Patient found to have L RP hematoma, likely causing new LLE weakness and numbness. Now following for dysphagia, as well as continued MG treatment, pending discharge to acute rehab. Started on pyridostigmine (8/10-), found to be hyperkalemic to 6.4 8/11, resolving w/ lokelma. Plan to transfer to  89M PMHx of HTN, severe aortic stenosis and seizures (on phenytoin) who presented with left facial droop and slurred speech, stroke w/u negative. Found to have myasthenia gravis w/ anti-Ach antibodies. 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 Admitted initially to [facility] (7/19-7/27) for treatment of MG and w/u of leukocytosis, s/p IVIG (7/21-7/25); course complicated by dysphagia and aspiration PNA, transferred to MICU (7/27-8/1) for likely septic shock 2/2 to aspiration PNA requiring pressors MICU course complicated by ;,     stepped down to 7Lach (8/1-8/4) . 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 Patient found to have L RP hematoma, likely causing new LLE weakness and numbness. Now following for dysphagia, as well as continued MG treatment, pending discharge to acute rehab. Started on pyridostigmine (8/10-), found to be hyperkalemic to 6.4 8/11, resolving w/ lokelma. Plan to transfer to  89M PMHx of HTN, severe aortic stenosis and seizures (on phenytoin) who presented with left facial droop and slurred speech, stroke w/u negative. Found to have myasthenia gravis w/ anti-Ach antibodies. 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 Admitted initially to [facility] (7/19-7/27) for treatment of MG and w/u of leukocytosis, s/p IVIG (7/21-7/25); course complicated by dysphagia and aspiration PNA, transferred to MICU (7/27-8/1) for likely septic shock 2/2 to aspiration PNA requiring pressors, MICU course notable for worsening renal function w/ hyperkalemia s/p phenylephrine (7/28-7/30, stopped d/t bradycardia); stepped down to 7Lach (8/1-8/4), heme/onc consulted for continued monitoring of leukocytosis 2/2 likely CMML, family not interested in BM bx; and transferred back to [facility] (8/5-). On 8/6, MRI L-spine revealed large RP hematoma, likely causing new LLE weakness and numbness since 8/3, CT A/P on 8/7 revealed acute and subacute components,     On Pt no longer requiring pressors stepped down to 7 [facility]achman continued monitoring of likely CMML, no plans for BM biopsy per family request. Patient found to have L RP hematoma, likely causing new LLE weakness and numbness. Now following for dysphagia, as well as continued MG treatment, pending discharge to acute rehab. Started on pyridostigmine (8/10-), found to be hyperkalemic to 6.4 8/11, resolving w/ lokelma. Plan to transfer to  89M PMHx of HTN, severe aortic stenosis and seizures (on phenytoin) who presented with left facial droop and slurred speech, stroke w/u negative. Found to have myasthenia gravis w/ anti-Ach antibodies. 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 Admitted initially to [facility] (7/19-7/27) for treatment of MG and w/u of leukocytosis, s/p IVIG (7/21-7/25); course complicated by dysphagia and aspiration PNA, transferred to MICU (7/27-8/1) for likely septic shock 2/2 to aspiration PNA requiring pressors, MICU course notable for worsening renal function w/ hyperkalemia s/p phenylephrine (7/28-7/30, stopped d/t bradycardia); stepped down to 7Lach (8/1-8/4), heme/onc consulted for continued monitoring of leukocytosis 2/2 likely CMML, family not interested in BM bx; and transferred back to [facility] (8/5-). On 8/6, MRI L-spine revealed large RP hematoma, likely causing new LLE weakness and numbness since 8/3, CT A/P (8/7) further showed acute and subacute components, Hgb currently stable. Now following for dysphagia, s/p NGT (7/23-8/10), started on thin-pureed diet after extensive discussions w/ family about aspiration risk; hyperkalemia on 8/11 likely 2/2 renal hypoperfusion, resolving w/ lokelma and IVF; and continued MG treatment, plan for transfer today (8/12) to [facility] for rituximab, ultimate plan for d/c to acute rehab.  89y/o DNR/DNI male PMHx of HTN, severe aortic stenosis and seizures (on phenytoin) who presented with left facial droop and slurred speech, stroke w/u negative. Found to have myasthenia gravis w/ anti-Ach antibodies. 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 Admitted initially to [facility] (7/19-7/27) for treatment of MG and w/u of leukocytosis, s/p IVIG (7/21-7/25); course complicated by dysphagia and aspiration PNA, transferred to MICU (7/27-8/1) for likely septic shock 2/2 to aspiration PNA requiring pressors, MICU course notable for worsening renal function w/ hyperkalemia s/p phenylephrine (7/28-7/30, stopped d/t bradycardia); stepped down to 7Lach (8/1-8/4), heme/onc consulted for continued monitoring of leukocytosis 2/2 likely CMML, family not interested in BM bx; and transferred back to [facility] (8/5-). On 8/6, MRI L-spine revealed large RP hematoma, likely causing new LLE weakness and numbness since 8/3, CT A/P (8/7) further showed acute and subacute components, Hgb currently stable. Now following for dysphagia, s/p NGT (7/23-8/10), started on thin-pureed diet after extensive discussions w/ family about aspiration risk; hyperkalemia on 8/11 likely 2/2 renal hypoperfusion, resolving w/ lokelma and IVF; and continued MG treatment, plan for transfer today (8/12) to [facility] for rituximab, ultimate plan for d/c to acute rehab.

## 2021-08-12 NOTE — PROGRESS NOTE ADULT - PROBLEM SELECTOR PLAN 5
Likely ATN 2/2 to septic shock, poor perfusion. Pt has a history of CKD. baseline 1.6-1.7.   - Creatinine improving  - Cont to trend creatinine    #Hypernatremia  RESOLVED  - Patient asymptomatic, A&Ox3  -d/c 125cc free water q6h  -continue to monitor Likely ATN 2/2 to septic shock, poor perfusion. Pt has a history of CKD. baseline 1.6-1.7.   - Creatinine at baseline  - Cont to trend creatinine    #Hypernatremia  RESOLVED  - Patient asymptomatic, A&Ox3  -d/c 125cc free water q6h  -continue to monitor

## 2021-08-12 NOTE — PROGRESS NOTE ADULT - PROBLEM SELECTOR PLAN 2
s/p NGT placed 7/29 with subsequent clogging and removal on 8/11    PLAN:  - family does not wish to have PEG tube placed at this time  -discussed the risks of aspiration with family  -patient started on thin puree diet  -pending discharge to [facility] s/p NGT placed 7/23 with subsequent clogging and removal on 8/11    PLAN:  - family does not wish to have PEG tube placed at this time  -discussed the risks of aspiration with family  -patient started on thin puree diet  -plan to discharge to [facility]

## 2021-08-12 NOTE — CHART NOTE - NSCHARTNOTEFT_GEN_A_CORE
Admitting Diagnosis:   Patient is a 89y old  Male who presents with a chief complaint of slurred speech, L facial droop (12 Aug 2021 06:42)      PAST MEDICAL & SURGICAL HISTORY:  Hypertension    Seizure        Current Nutrition Order:  Puree/Thin Liquid, Low Potassium     PO Intake: Good (%) [   ]  Fair (50-75%) [ X  ] Poor (<25%) [   ]    GI Issues: No complaints of N/V  Last BM recorded 8/8 (ordered for miralax, senna, lokelma)    Pain: Pt denied complaints of pain     Skin Integrity: Sheng 15, B/L arms trace edema  Sacrum stage II and coccyx DTI    Labs:   08-12    145  |  112<H>  |  65<H>  ----------------------------<  87  4.8   |  24  |  1.55<H>    Ca    9.2      12 Aug 2021 07:19  Phos  5.8     08-11  Mg     2.2     08-12      CAPILLARY BLOOD GLUCOSE      POCT Blood Glucose.: 85 mg/dL (12 Aug 2021 06:25)  POCT Blood Glucose.: 105 mg/dL (12 Aug 2021 00:08)  POCT Blood Glucose.: 203 mg/dL (11 Aug 2021 18:44)  POCT Blood Glucose.: 231 mg/dL (11 Aug 2021 17:22)  POCT Blood Glucose.: 123 mg/dL (11 Aug 2021 12:04)      Medications:  MEDICATIONS  (STANDING):  atorvastatin 40 milliGRAM(s) Oral at bedtime  dextrose 40% Gel 15 Gram(s) Oral once  dextrose 5%. 1000 milliLiter(s) (50 mL/Hr) IV Continuous <Continuous>  dextrose 5%. 1000 milliLiter(s) (100 mL/Hr) IV Continuous <Continuous>  dextrose 50% Injectable 25 Gram(s) IV Push once  dextrose 50% Injectable 12.5 Gram(s) IV Push once  dextrose 50% Injectable 25 Gram(s) IV Push once  glucagon  Injectable 1 milliGRAM(s) IntraMuscular once  heparin   Injectable 5000 Unit(s) SubCutaneous every 8 hours  insulin lispro (ADMELOG) corrective regimen sliding scale   SubCutaneous every 6 hours  levETIRAcetam  IVPB 500 milliGRAM(s) IV Intermittent every 24 hours  methylPREDNISolone sodium succinate Injectable 8 milliGRAM(s) IV Push every 24 hours  multivitamin 1 Tablet(s) Oral daily  polyethylene glycol 3350 17 Gram(s) Oral two times a day  pyridostigmine Injectable 1 milliGRAM(s) IV Push three times a day  senna 2 Tablet(s) Oral at bedtime  sodium zirconium cyclosilicate 10 Gram(s) Oral every 8 hours  zinc sulfate 220 milliGRAM(s) Oral daily    MEDICATIONS  (PRN):  acetaminophen    Suspension .. 600 milliGRAM(s) Oral every 6 hours PRN Moderate Pain (4 - 6)      Adm Anthropometrics:  Height 66"  ABW 60.3kg  IBW 64.4kg  94%IBW  BMI 21.4    Weight Change: no new wts in EMR. Please obtain wt weekly to assess for wt changes    Estimated energy needs:   ActualBW used for calculations as pt between % of IBW. Needs estimated for age and slightly increased for inflammatory process. Moderate protein 2/2 EVARISTO  Calories: 25-30 kcal/kg = 4182-1305 kcal/day  Protein: 1.2-1.4 g/kg = 72-84g pro/day  Fluids per team 2/2 EVARISTO/fluid overload    Subjective:   89y Male with PMHx of HTN and seizure on phenytoin presents with left facial droop and slurred speech, Stroke w/u negative. Found to have myasthenia gravis. S/p IVIG x5 days. Admitted to ICU for cardiogenic shock vs obstructive shock in the setting of severe aortic stenosis and IV fluid administration. At that time pt was hypotensive w/leukocytosis. Also found to have aspiration pneumonia. S/p FEES on 7/20, and multiple bedside f/u over past week. Pt deemed inappropriate for PO intake and NGT placed for EN. Pt stepped down to 7 Lach. Weaned off of pressors. S/p repeat FEES on 8/4 but still recommended to remain NPO w/EN via NGT. Ortho/neurology following for LE weakness and recommended for MRI L-spine. Family choosing not to pursue bone marrow biopsy to w/u possible CML or other hematologic malignancy. Found to have L. RP hematoma, which is likely contributing to LE weakness/pain. S/p x 5day IVIG. SLP re-eval 8/6, pt to remain NPO with NGT. Pt and family not wanting PEG, so started on pleasure feeds w/known aspiration risk.     Pt seen in room, awake, alert, pleasant, being fed breakfast. Tolerating >50% per meal per PCA. Pt denied complaints of N/V/C/D or pain. Given lokelma for elevated K but no BM noted. Afebrile. WBC 37.84 (H), K 4.8 (H, trending down), BUN 65/Cr 1.55. Pending DC to [facility]. Will continue to follow per RD protocol.     Previous Nutrition Diagnosis:  Inadequate Oral Intake RT dysphagia AEB need for EN to meet estimated needs.    Active [  x ]  Resolved [   ]    If resolved, new PES:     Goal: Pt will continue to meet % of daily EER via tolerated route     Recommendations:  1. Keep nutrition aligned with GOC at all times. Honor food preferences   *Maintain aspiration precautions to best of ability  2. Continue micronutrient supplementation   3. Monitor lytes and replete prn. POC BG q6hrs   4. Pain and bowel regimens per team     Education: N/a today, completed at previous f/u    Risk Level: High [   ] Moderate [ X  ] Low [   ].

## 2021-08-13 LAB
ANION GAP SERPL CALC-SCNC: 8 MMOL/L — SIGNIFICANT CHANGE UP (ref 5–17)
BUN SERPL-MCNC: 59 MG/DL — HIGH (ref 7–23)
CALCIUM SERPL-MCNC: 9 MG/DL — SIGNIFICANT CHANGE UP (ref 8.4–10.5)
CHLORIDE SERPL-SCNC: 113 MMOL/L — HIGH (ref 96–108)
CO2 SERPL-SCNC: 24 MMOL/L — SIGNIFICANT CHANGE UP (ref 22–31)
CREAT SERPL-MCNC: 1.54 MG/DL — HIGH (ref 0.5–1.3)
GLUCOSE BLDC GLUCOMTR-MCNC: 112 MG/DL — HIGH (ref 70–99)
GLUCOSE BLDC GLUCOMTR-MCNC: 115 MG/DL — HIGH (ref 70–99)
GLUCOSE BLDC GLUCOMTR-MCNC: 92 MG/DL — SIGNIFICANT CHANGE UP (ref 70–99)
GLUCOSE BLDC GLUCOMTR-MCNC: 94 MG/DL — SIGNIFICANT CHANGE UP (ref 70–99)
GLUCOSE BLDC GLUCOMTR-MCNC: 98 MG/DL — SIGNIFICANT CHANGE UP (ref 70–99)
GLUCOSE SERPL-MCNC: 96 MG/DL — SIGNIFICANT CHANGE UP (ref 70–99)
HCT VFR BLD CALC: 26.2 % — LOW (ref 39–50)
HGB BLD-MCNC: 8 G/DL — LOW (ref 13–17)
MAGNESIUM SERPL-MCNC: 2 MG/DL — SIGNIFICANT CHANGE UP (ref 1.6–2.6)
MCHC RBC-ENTMCNC: 30 PG — SIGNIFICANT CHANGE UP (ref 27–34)
MCHC RBC-ENTMCNC: 30.5 GM/DL — LOW (ref 32–36)
MCV RBC AUTO: 98.1 FL — SIGNIFICANT CHANGE UP (ref 80–100)
NRBC # BLD: 0 /100 WBCS — SIGNIFICANT CHANGE UP (ref 0–0)
PHOSPHATE SERPL-MCNC: 4.6 MG/DL — HIGH (ref 2.5–4.5)
PLATELET # BLD AUTO: 428 K/UL — HIGH (ref 150–400)
POTASSIUM SERPL-MCNC: 3.6 MMOL/L — SIGNIFICANT CHANGE UP (ref 3.5–5.3)
POTASSIUM SERPL-SCNC: 3.6 MMOL/L — SIGNIFICANT CHANGE UP (ref 3.5–5.3)
RBC # BLD: 2.67 M/UL — LOW (ref 4.2–5.8)
RBC # FLD: 16.7 % — HIGH (ref 10.3–14.5)
SODIUM SERPL-SCNC: 145 MMOL/L — SIGNIFICANT CHANGE UP (ref 135–145)
WBC # BLD: 31.34 K/UL — HIGH (ref 3.8–10.5)
WBC # FLD AUTO: 31.34 K/UL — HIGH (ref 3.8–10.5)

## 2021-08-13 PROCEDURE — 99232 SBSQ HOSP IP/OBS MODERATE 35: CPT

## 2021-08-13 RX ADMIN — ATORVASTATIN CALCIUM 40 MILLIGRAM(S): 80 TABLET, FILM COATED ORAL at 21:58

## 2021-08-13 RX ADMIN — PYRIDOSTIGMINE BROMIDE 30 MILLIGRAM(S): 60 SOLUTION ORAL at 21:58

## 2021-08-13 RX ADMIN — Medication 1 TABLET(S): at 12:50

## 2021-08-13 RX ADMIN — LEVETIRACETAM 400 MILLIGRAM(S): 250 TABLET, FILM COATED ORAL at 12:50

## 2021-08-13 RX ADMIN — Medication 8 MILLIGRAM(S): at 12:51

## 2021-08-13 RX ADMIN — PYRIDOSTIGMINE BROMIDE 30 MILLIGRAM(S): 60 SOLUTION ORAL at 06:47

## 2021-08-13 RX ADMIN — ZINC SULFATE TAB 220 MG (50 MG ZINC EQUIVALENT) 220 MILLIGRAM(S): 220 (50 ZN) TAB at 12:50

## 2021-08-13 RX ADMIN — PYRIDOSTIGMINE BROMIDE 30 MILLIGRAM(S): 60 SOLUTION ORAL at 14:52

## 2021-08-13 RX ADMIN — POLYETHYLENE GLYCOL 3350 17 GRAM(S): 17 POWDER, FOR SOLUTION ORAL at 06:48

## 2021-08-13 RX ADMIN — HEPARIN SODIUM 5000 UNIT(S): 5000 INJECTION INTRAVENOUS; SUBCUTANEOUS at 06:46

## 2021-08-13 RX ADMIN — SODIUM ZIRCONIUM CYCLOSILICATE 10 GRAM(S): 10 POWDER, FOR SUSPENSION ORAL at 06:47

## 2021-08-13 NOTE — PROGRESS NOTE ADULT - ATTENDING COMMENTS
I was physically present for the key portions of the evaluation and managemnent (E/M) service provided.  I agree with the above history, physical, and plan which I have reviewed and edited where appropriate, with the exceptions as per my note.    Pt seen and examined.    neuro exam stable.    Plan for rituximab but awaiting hepB PCR result. CANNOT GIVE RITUXIMAB UNTIL THIS RESULTS.    OOB, PT.    dispo planning

## 2021-08-13 NOTE — PROGRESS NOTE ADULT - SUBJECTIVE AND OBJECTIVE BOX
HOSPITAL COURSE:  89y DNR/DNI male with PMHx of HTN, severe aortic sinensis and seizure on phenytoin who presented with left facial droop and slurred speech, stroke w/u negative and was found to have myasthenia gravis w/ positive anti-ach antibodies. 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 S/p IVIG treatment. Course complicated initially by dysphagia followed by hypotension concerning for sepsis in the setting of aspiration pneumonia. Patient given IVF, resulting in cardiogenic vs. obstructive shock due to severe aortic stenosis. Also noted to have elevated creatinine concerning for EVARISTO. Patient admitted to ICU for further management. Urine output low initially low but improving. However, creatinine remains elevated (1.5-1.6). Phenylephrine started 7/28 in an attempt to increase renal perfusion, discontinued 7/30 due to bradycardia. Patient still w/ dysphagia and poor PO intake. S/p NGT placement and subsequent removal (7/23-8/10), started on thin pureed diet (8/10-) after multiple discussions w/ family about aspiration risk. On 8/11, noted to be hyperkalemic to 6.4, asymptomatic however EKG on 8/11 w/ NM prolongation and peaked T-waves, resolving w/ lokelma and 1LNS, diet changed to low K; prior episode of hyperkalemia 6.5 on 7/28 in ICU after phenylephrine started, resolved w/ lokelma x1; etiology likely renal hypoperfusion. On 8/12, all medications switched back to PO and transferred to [facility] for rituximab.              INTERVAL HPI/OVERNIGHT EVENTS:  Patient was seen and examined at bedside.     VITAL SIGNS:  T(F): 97.7 (08-12-21 @ 05:25)  HR: 67 (08-12-21 @ 05:35)  BP: 148/86 (08-12-21 @ 05:35)  RR: 18 (08-12-21 @ 05:35)  SpO2: 96% (08-12-21 @ 05:35)  Wt(kg): --    PHYSICAL EXAM:    Constitutional: thin, frail-appearing male lying in bed in NAD  HEENT: PERRL, EOMI grossly, sclera anicteric, neck supple, no JVD, MM slightly dry  Respiratory: fair inspiratory effort, low bibasilar ?rhonchi, unlabored respirations  Cardiovascular: HR 56, +AS murmur, no r/g  Gastrointestinal: soft, NTND, no masses palpable, BS normoactive x4 quadrants  Extremities: Warm, well perfused, pulses equal bilateral upper and lower extremities, including LLE, no edema, no clubbing or cyanosis, brisk capillary refill.   Neurological: AAOx3, CN II-XII grossly intact, LLE: strength 1/5 hip flexion, 2/5 dorsiflexion; RLE 4/5 hip flexion and dorsiflexion. B/l UE 4/5.    Skin: Normal temperature, warm, dry, no rashes or lesions    MEDICATIONS  (STANDING):  atorvastatin 40 milliGRAM(s) Oral at bedtime  dextrose 40% Gel 15 Gram(s) Oral once  dextrose 5%. 1000 milliLiter(s) (50 mL/Hr) IV Continuous <Continuous>  dextrose 5%. 1000 milliLiter(s) (100 mL/Hr) IV Continuous <Continuous>  dextrose 50% Injectable 25 Gram(s) IV Push once  dextrose 50% Injectable 12.5 Gram(s) IV Push once  dextrose 50% Injectable 25 Gram(s) IV Push once  glucagon  Injectable 1 milliGRAM(s) IntraMuscular once  heparin   Injectable 5000 Unit(s) SubCutaneous every 8 hours  insulin lispro (ADMELOG) corrective regimen sliding scale   SubCutaneous every 6 hours  levETIRAcetam  IVPB 500 milliGRAM(s) IV Intermittent every 24 hours  methylPREDNISolone sodium succinate Injectable 8 milliGRAM(s) IV Push every 24 hours  multivitamin 1 Tablet(s) Oral daily  polyethylene glycol 3350 17 Gram(s) Oral two times a day  pyridostigmine Injectable 1 milliGRAM(s) IV Push three times a day  senna 2 Tablet(s) Oral at bedtime  sodium zirconium cyclosilicate 10 Gram(s) Oral every 8 hours  zinc sulfate 220 milliGRAM(s) Oral daily    MEDICATIONS  (PRN):  acetaminophen    Suspension .. 600 milliGRAM(s) Oral every 6 hours PRN Moderate Pain (4 - 6)      Allergies    hay fever (Unknown)  No Known Drug Allergies    Intolerances        LABS:                        8.4    37.84 )-----------( 433      ( 12 Aug 2021 07:19 )             28.3     08-12    145  |  112<H>  |  65<H>  ----------------------------<  87  4.8   |  24  |  1.55<H>    Ca    9.2      12 Aug 2021 07:19  Phos  5.8     08-11  Mg     2.2     08-12          CAPILLARY BLOOD GLUCOSE      POCT Blood Glucose.: 85 mg/dL (12 Aug 2021 06:25)  POCT Blood Glucose.: 105 mg/dL (12 Aug 2021 00:08)  POCT Blood Glucose.: 203 mg/dL (11 Aug 2021 18:44)  POCT Blood Glucose.: 231 mg/dL (11 Aug 2021 17:22)  POCT Blood Glucose.: 123 mg/dL (11 Aug 2021 12:04)                 HOSPITAL COURSE:  89y DNR/DNI male with PMHx of HTN, severe aortic sinensis and seizure on phenytoin who presented with left facial droop and slurred speech, stroke w/u negative and was found to have myasthenia gravis w/ positive anti-ach antibodies. Also f/w leukocytosis suspicious for malignancy, admitted to 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 S/p IVIG treatment. Course complicated initially by dysphagia followed by hypotension concerning for sepsis in the setting of aspiration pneumonia. Patient given IVF, resulting in cardiogenic vs. obstructive shock due to severe aortic stenosis. Also noted to have elevated creatinine concerning for EVARISTO. Patient admitted to ICU for further management. Urine output low initially low but improving. However, creatinine remains elevated (1.5-1.6). Phenylephrine started 7/28 in an attempt to increase renal perfusion, discontinued 7/30 due to bradycardia. Patient still w/ dysphagia and poor PO intake. S/p NGT placement and subsequent removal (7/23-8/10), started on thin pureed diet (8/10-) after multiple discussions w/ family about aspiration risk. On 8/11, noted to be hyperkalemic to 6.4, asymptomatic however EKG on 8/11 w/ MI prolongation and peaked T-waves, resolving w/ lokelma and 1LNS, diet changed to low K; prior episode of hyperkalemia 6.5 on 7/28 in ICU after phenylephrine started, resolved w/ lokelma x1; etiology likely renal hypoperfusion. On 8/12, all medications switched back to PO and transferred to [facility] for rituximab.                  SUBJECTIVE / INTERVAL HPI: Patient seen and examined at bedside.     VITAL SIGNS:  Vital Signs Last 24 Hrs  T(C): 36.6 (13 Aug 2021 11:37), Max: 36.7 (13 Aug 2021 06:00)  T(F): 97.8 (13 Aug 2021 11:37), Max: 98 (13 Aug 2021 06:00)  HR: 75 (13 Aug 2021 11:37) (70 - 84)  BP: 110/61 (13 Aug 2021 11:37) (106/65 - 135/62)  BP(mean): --  RR: 18 (13 Aug 2021 11:37) (16 - 18)  SpO2: 96% (13 Aug 2021 11:37) (95% - 96%)    PHYSICAL EXAM:  Constitutional: cachetic looking male  HEENT: PERRL, anicteric, neck supple, no JVD  Respiratory: fair inspiratory effort, low bibasilar ?rhonchi, unlabored respirations  Cardiovascular: +AS murmur, no r/g  Gastrointestinal: soft, NTND, no masses palpable, BS normoactive x4 quadrants  Extremities: Warm, well perfused, pulses equal bilateral upper and lower extremities, including LLE, no edema, no clubbing or cyanosis  Neurological: AAOx3, CN II-XII grossly intact, LLE: strength 1/5 hip flexion, 2/5 dorsiflexion; RLE 4/5 hip flexion and dorsiflexion. B/l UE 4/5.    Skin: Normal temperature, warm, dry, no rashes or lesions    MEDICATIONS:  MEDICATIONS  (STANDING):  atorvastatin 40 milliGRAM(s) Oral at bedtime  dextrose 40% Gel 15 Gram(s) Oral once  dextrose 5%. 1000 milliLiter(s) (50 mL/Hr) IV Continuous <Continuous>  dextrose 5%. 1000 milliLiter(s) (100 mL/Hr) IV Continuous <Continuous>  dextrose 50% Injectable 25 Gram(s) IV Push once  dextrose 50% Injectable 12.5 Gram(s) IV Push once  dextrose 50% Injectable 25 Gram(s) IV Push once  glucagon  Injectable 1 milliGRAM(s) IntraMuscular once  insulin lispro (ADMELOG) corrective regimen sliding scale   SubCutaneous every 6 hours  levETIRAcetam  IVPB 500 milliGRAM(s) IV Intermittent every 24 hours  methylPREDNISolone sodium succinate Injectable 8 milliGRAM(s) IV Push every 24 hours  multivitamin 1 Tablet(s) Oral daily  polyethylene glycol 3350 17 Gram(s) Oral two times a day  pyridostigmine 30 milliGRAM(s) Oral every 8 hours  senna 2 Tablet(s) Oral at bedtime  zinc sulfate 220 milliGRAM(s) Oral daily    MEDICATIONS  (PRN):  acetaminophen    Suspension .. 600 milliGRAM(s) Oral every 6 hours PRN Moderate Pain (4 - 6)      ALLERGIES:  Allergies    hay fever (Unknown)  No Known Drug Allergies    Intolerances        LABS:                        8.0    31.34 )-----------( 428      ( 13 Aug 2021 08:54 )             26.2     08-13    145  |  113<H>  |  59<H>  ----------------------------<  96  3.6   |  24  |  1.54<H>    Ca    9.0      13 Aug 2021 08:54  Phos  4.6     08-13  Mg     2.0     08-13          CAPILLARY BLOOD GLUCOSE      POCT Blood Glucose.: 98 mg/dL (13 Aug 2021 06:11)      RADIOLOGY & ADDITIONAL TESTS: Reviewed.         HOSPITAL COURSE:  89y DNR/DNI male with PMHx of HTN, severe aortic sinensis and seizure on phenytoin who presented with left facial droop and slurred speech, stroke w/u negative and was found to have myasthenia gravis w/ positive anti-ach antibodies. 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 S/p IVIG treatment. Course complicated initially by dysphagia followed by hypotension concerning for sepsis in the setting of aspiration pneumonia. Patient given IVF, resulting in cardiogenic vs. obstructive shock due to severe aortic stenosis. Also noted to have elevated creatinine concerning for EVARISTO. Patient admitted to ICU for further management. Urine output low initially low but improving. However, creatinine remains elevated (1.5-1.6). Phenylephrine started 7/28 in an attempt to increase renal perfusion, discontinued 7/30 due to bradycardia. Patient still w/ dysphagia and poor PO intake. S/p NGT placement and subsequent removal (7/23-8/10), started on thin pureed diet (8/10-) after multiple discussions w/ family about aspiration risk. On 8/11, noted to be hyperkalemic to 6.4, asymptomatic however EKG on 8/11 w/ DE prolongation and peaked T-waves, resolving w/ lokelma and 1LNS, diet changed to low K; prior episode of hyperkalemia 6.5 on 7/28 in ICU after phenylephrine started, resolved w/ lokelma x1; etiology likely renal hypoperfusion. On 8/12, all medications switched back to PO and transferred to [facility] for rituximab.                  SUBJECTIVE / INTERVAL HPI: Patient seen and examined at bedside.     VITAL SIGNS:  Vital Signs Last 24 Hrs  T(C): 36.6 (13 Aug 2021 11:37), Max: 36.7 (13 Aug 2021 06:00)  T(F): 97.8 (13 Aug 2021 11:37), Max: 98 (13 Aug 2021 06:00)  HR: 75 (13 Aug 2021 11:37) (70 - 84)  BP: 110/61 (13 Aug 2021 11:37) (106/65 - 135/62)  BP(mean): --  RR: 18 (13 Aug 2021 11:37) (16 - 18)  SpO2: 96% (13 Aug 2021 11:37) (95% - 96%)    PHYSICAL EXAM:  Constitutional: cachetic looking male  HEENT: PERRL, anicteric, neck supple, no JVD  Respiratory: fair inspiratory effort, low bibasilar ?rhonchi, unlabored respirations  Cardiovascular: +AS murmur, no r/g  Gastrointestinal: soft, NTND, no masses palpable, BS normoactive x4 quadrants  Extremities: Warm, well perfused, pulses equal bilateral upper and lower extremities, including LLE, no edema, no clubbing or cyanosis  Neurological: AAOx3, CN II-XII grossly intact, strength 4/5 B/L UE. 4/5 in RLE. LLE 3/5.  strength 5/5 B/L.  Skin: Normal temperature, warm, dry, no rashes or lesions    MEDICATIONS:  MEDICATIONS  (STANDING):  atorvastatin 40 milliGRAM(s) Oral at bedtime  dextrose 40% Gel 15 Gram(s) Oral once  dextrose 5%. 1000 milliLiter(s) (50 mL/Hr) IV Continuous <Continuous>  dextrose 5%. 1000 milliLiter(s) (100 mL/Hr) IV Continuous <Continuous>  dextrose 50% Injectable 25 Gram(s) IV Push once  dextrose 50% Injectable 12.5 Gram(s) IV Push once  dextrose 50% Injectable 25 Gram(s) IV Push once  glucagon  Injectable 1 milliGRAM(s) IntraMuscular once  insulin lispro (ADMELOG) corrective regimen sliding scale   SubCutaneous every 6 hours  levETIRAcetam  IVPB 500 milliGRAM(s) IV Intermittent every 24 hours  methylPREDNISolone sodium succinate Injectable 8 milliGRAM(s) IV Push every 24 hours  multivitamin 1 Tablet(s) Oral daily  polyethylene glycol 3350 17 Gram(s) Oral two times a day  pyridostigmine 30 milliGRAM(s) Oral every 8 hours  senna 2 Tablet(s) Oral at bedtime  zinc sulfate 220 milliGRAM(s) Oral daily    MEDICATIONS  (PRN):  acetaminophen    Suspension .. 600 milliGRAM(s) Oral every 6 hours PRN Moderate Pain (4 - 6)      ALLERGIES:  Allergies    hay fever (Unknown)  No Known Drug Allergies    Intolerances        LABS:                        8.0    31.34 )-----------( 428      ( 13 Aug 2021 08:54 )             26.2     08-13    145  |  113<H>  |  59<H>  ----------------------------<  96  3.6   |  24  |  1.54<H>    Ca    9.0      13 Aug 2021 08:54  Phos  4.6     08-13  Mg     2.0     08-13          CAPILLARY BLOOD GLUCOSE      POCT Blood Glucose.: 98 mg/dL (13 Aug 2021 06:11)      RADIOLOGY & ADDITIONAL TESTS: Reviewed.

## 2021-08-13 NOTE — PROGRESS NOTE ADULT - ASSESSMENT
89y/o DNR/DNI male PMHx of HTN, severe aortic stenosis and seizures (on phenytoin) who presented with left facial droop and slurred speech, stroke w/u negative. Found to have myasthenia gravis w/ anti-Ach antibodies. 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 Admitted initially to [facility] (7/19-7/27) for treatment of MG and w/u of leukocytosis, s/p IVIG (7/21-7/25); course complicated by dysphagia and aspiration PNA, transferred to MICU (7/27-8/1) for likely septic shock 2/2 to aspiration PNA requiring pressors, MICU course notable for worsening renal function w/ hyperkalemia s/p phenylephrine (7/28-7/30, stopped d/t bradycardia); stepped down to [facility] (8/1-8/4), heme/onc consulted for continued monitoring of leukocytosis 2/2 likely CMML, family not interested in BM bx; and transferred back to [facility] (8/5-). On 8/6, MRI L-spine revealed large RP hematoma, likely causing new LLE weakness and numbness since 8/3, CT A/P (8/7) further showed acute and subacute components, Hgb currently stable. Now following for dysphagia, s/p NGT (7/23-8/10), started on thin-pureed diet after extensive discussions w/ family about aspiration risk; hyperkalemia on 8/11 likely 2/2 renal hypoperfusion, resolving w/ lokelma and IVF; and continued MG treatment, and transferred to [facility] for rituximab, ultimate plan for d/c to acute rehab.  89y/o DNR/DNI male PMHx of HTN, severe aortic stenosis and seizures (on phenytoin) who presented with left facial droop and slurred speech, stroke w/u negative. Found to have myasthenia gravis w/ anti-Ach antibodies. 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 He is transferred to [facility] for management with rituximab and with ultimate plan for d/c to acute rehab.

## 2021-08-13 NOTE — PROGRESS NOTE ADULT - PROBLEM SELECTOR PLAN 1
RESOLVING on 8/12  -prior episode K 6.5 on 7/28 while in ICU, tried norepinephrine (7/28-7/30, stopped for bradycardia) and treated with lokelma, no w/u documented in progress notes. Etiology unclear, likely 2/2 renal hypoperfusion [decreased delivery of sodium and water to the sites of potassium secretion in the distal nephron (connecting segment and cortical collecting tubule), impaired potassium secretion into the tubular lumen, and hyperkalemia, per UTD] vs less likely RTA (Cr at baseline).     Plan:   - s/p calcium gluconate 1g   - s/p insulin+D5 push  - rpt BMP 10AM - K 5.5  - rpt EKG, f/u - still w/ peaked T waves, IA prolonged now 304, pt asymptomatic  - started lokelma standing q8h (until 8/13), then plan to keep standing q24h if needed  - monitor K: 6.4->5.5->6->5.7 (8/11)->4.8 (8/12 AM)  - changed diet to low K on 8/11 RESOLVED  -prior episode K 6.5 on 7/28 while in ICU, tried norepinephrine (7/28-7/30, stopped for bradycardia) and treated with lokelma, no w/u documented in progress notes. Etiology unclear, likely 2/2 renal hypoperfusion [decreased delivery of sodium and water to the sites of potassium secretion in the distal nephron (connecting segment and cortical collecting tubule), impaired potassium secretion into the tubular lumen, and hyperkalemia, per UTD] vs less likely RTA (Cr at baseline).     Plan:   - s/p calcium gluconate 1g   - s/p insulin+D5 push  - rpt BMP 10AM - K 5.5  - monitor K: 6.4->5.5->6->5.7 (8/11)->4.8 (8/12 AM)-->3.6 (08/13)  - momo'ed Lokelma  - changed diet to low K on 8/11 RESOLVED  -prior episode K 6.5 on 7/28 while in ICU, tried norepinephrine (7/28-7/30, stopped for bradycardia) and treated with lokelma, no w/u documented in progress notes. Etiology unclear, likely 2/2 renal hypoperfusion [decreased delivery of sodium and water to the sites of potassium secretion in the distal nephron (connecting segment and cortical collecting tubule), impaired potassium secretion into the tubular lumen, and hyperkalemia, per UTD vs less likely RTA (Cr at baseline).     Plan:   - s/p calcium gluconate 1g   - s/p insulin+D5 push  - monitor K: 6.4->5.5->6->5.7 (8/11)->4.8 (8/12 AM)-->3.6 (08/13)  - dc'ed Lokelma  - changed diet to low K on 8/11

## 2021-08-13 NOTE — PROGRESS NOTE ADULT - PROBLEM SELECTOR PLAN 4
-positive anti-ach antibiotics  - S/p IVIG x5 days    PLAN:  - restart Prednisone 10 mg PO daily, (after switch to solumedrol IV 7/31-8/12 while not tolerating PO)   - c/w Mestinon, change from IV to PO - positive anti-ach antibiotics  - S/p IVIG x5 days    PLAN:  - c/w Prednisone 10 mg PO daily, (after switch to solumedrol IV 7/31-8/12 while not tolerating PO)   - c/w Mestinon, change from IV to PO - positive anti-ach antibiotics  - S/p IVIG x5 days    PLAN:  - c/w IV solumedrol  - c/w Mestinon, change from IV to PO  - To be given 1 dose of Rituximab once and if hep pcr result is negative

## 2021-08-13 NOTE — PROGRESS NOTE ADULT - PROBLEM SELECTOR PLAN 5
Likely ATN 2/2 to septic shock, poor perfusion. Pt has a history of CKD. baseline 1.6-1.7.   - Creatinine at baseline  - Cont to trend creatinine    #Hypernatremia, today (08/13) is 145  RESOLVED  - Patient asymptomatic, A&Ox3  -d/c 125cc free water q6h  -continue to monitor

## 2021-08-13 NOTE — PROGRESS NOTE ADULT - PROBLEM SELECTOR PLAN 7
Normocytic anemia, with iron panel consistent with anemia of chronic disease/ renal disease. B12/folate wnl. No evidence of hemolysis. Given CKD and Anemia, a monoclonal gammopathy workup performed and was negative  -patient s/p 2 units on 7/29, followed by stabilization  -MRI confirmed massive L RP hematoma  -today (08/13) Hb is 8.0  PLAN:  - restarted on heparin subq, as patient's hemoglobin has now stabilized, no signs of active bleeding, remains hemodynamically stable  -monitor for signs of active bleeding  -trend CBC daily  - Transfuse if <7  - Keep active T&S    #Leukocytosis  - secondary to CMML - per family, do not wish to do BM biopsy  - Will set up outpatient hematology follow up with Dr. Nguyen on a Thursday (after discharge)  - Appreciate hem/onc recs, following. Normocytic anemia, with iron panel consistent with anemia of chronic disease/ renal disease. B12/folate wnl. No evidence of hemolysis. Given CKD and Anemia, a monoclonal gammopathy workup performed and was negative  -patient s/p 2 units on 7/29, followed by stabilization  -MRI confirmed massive L RP hematoma  -today (08/13) Hb is 8.0     PLAN:  - restarted on heparin subq, as patient's hemoglobin has now stabilized, no signs of active bleeding, remains hemodynamically stable  -monitor for signs of active bleeding  -trend CBC daily  - Transfuse if <7  - Keep active T&S    #Leukocytosis  - secondary to CMML - per family, do not wish to do BM biopsy  - Will set up outpatient hematology follow up with Dr. Nguyen on a Thursday (after discharge)  - Appreciate hem/onc recs, following. Normocytic anemia, with iron panel consistent with anemia of chronic disease/ renal disease. B12/folate wnl. No evidence of hemolysis. Given CKD and Anemia, a monoclonal gammopathy workup performed and was negative  -patient s/p 2 units on 7/29, followed by stabilization  -MRI confirmed massive L RP hematoma  -today (08/13) Hb is 8.0     PLAN:  - Heparin dc'ed as per patient and family wishes   - monitor for signs of active bleeding  - trend CBC daily  - Transfuse if <7  - Keep active T&S    #Leukocytosis  - secondary to CMML - per family, do not wish to do BM biopsy  - Will set up outpatient hematology follow up with Dr. Nguyen on a Thursday (after discharge)  - Appreciate hem/onc recs, following. Normocytic anemia, with iron panel consistent with anemia of chronic disease/ renal disease. B12/folate wnl. No evidence of hemolysis. Given CKD and Anemia, a monoclonal gammopathy workup performed and was negative  -patient s/p 2 units on 7/29, followed by stabilization  -MRI confirmed massive L RP hematoma  -today (08/13) Hb is 8.0     PLAN:  - Heparin dc'ed as per patient and family wishes   - monitor for signs of active bleeding  - trend CBC daily  - Transfuse if <7  - Keep active T&S    #Leukocytosis  - secondary to CMML - per family, do not wish to do BM biopsy  - Will set up outpatient hematology follow up with Dr. Nguyen  (after discharge)  - Appreciate hem/onc recs, following.

## 2021-08-13 NOTE — PROGRESS NOTE ADULT - PROBLEM SELECTOR PLAN 2
s/p NGT placed 7/23 with subsequent clogging and removal on 8/11    PLAN:  -family does not wish to have PEG tube placed at this time  -discussed the risks of aspiration with family  -patient started on thin puree diet  -plan to discharge to [facility]

## 2021-08-13 NOTE — PROGRESS NOTE ADULT - PROBLEM SELECTOR PLAN 8
Given underlying mod- severe AS, would be cautious with fluids. Pt is not a surgical candidate for severe AS given poor prognosis as well as family wishes to not pursue aggressive measures.  -patient does not appear to be fluid overloaded on exam, no JVD or crackles on exam  -no need for diuresis at this time  -continue to monitor Given underlying mod- severe AS, would be cautious with fluids. Pt is not a surgical candidate for severe AS given poor prognosis as well as family wishes to not pursue aggressive measures.  -patient does not appear to be fluid overloaded on exam, no JVD or crackles on exam    PLAN  -no need for diuresis at this time  -continue to monitor

## 2021-08-13 NOTE — CHART NOTE - NSCHARTNOTEFT_GEN_A_CORE
Pt's wife at bedside requesting DVT prophylaxis with Heparin to be discontinued in the setting of the known RP bleed. Wife expresses understanding of the risks of stopping DVT prophylaxis which include an acute DVT in the lower extremities with potential to migrate to the lungs and cause death. Heparin will be discontinued.

## 2021-08-13 NOTE — PROGRESS NOTE ADULT - PROBLEM SELECTOR PLAN 10
F: assess as needed  E: Monitor, Replete to K>4 and Mg>2, caution repletion in the setting of EVARISTO  N: thin puree, low K - discussed risks of aspiration with family and patient    VTE PPx: Heparin Subq -   GI PPx: None    CODE STATUS: DNR    Dispo: pending AR placement

## 2021-08-13 NOTE — PROGRESS NOTE ADULT - PROBLEM SELECTOR PLAN 3
-patient developed new left lower extremity numbness and weakness on 8/3  -patient denies back pain at present  -patient has strength of 1/5 L hip flexion (RLE is 4/5). Now has sensation to left foot and anterior tibial region, no sensation to left thigh. Intact sensation to RLE.  Bilateral upper extremities sensation intact with strength of 4/5  -MRI lumbar showed L RP bleed  -no plans for IR drainage at this time, as hematoma appears to be resolving  -hemoglobin 10.3 (8/11)->8.4 (8/12) after 1LNS (8/11), no signs of hemodynamic instability    PLAN:  -continue to monitor hemoglobin  -restarted DVT prophylaxis AC as patient does not appear to be acutely bleeding - family declined, due to concern for bleed -patient developed new left lower extremity numbness and weakness on 8/3  -patient denies back pain at present  -patient has strength of 1/5 L hip flexion (RLE is 4/5). Now has sensation to left foot and anterior tibial region, no sensation to left thigh. Intact sensation to RLE.  Bilateral upper extremities sensation intact with strength of 4/5  -MRI lumbar showed L RP bleed  -no plans for IR drainage at this time, as hematoma appears to be resolving  -hemoglobin 10.3 (8/11)->8.4 (8/12) after 1LNS (8/11), no signs of hemodynamic instability    PLAN:  -continue to monitor hemoglobin  -Heparin dc'ed as per patient and family's wishes

## 2021-08-14 LAB
ANION GAP SERPL CALC-SCNC: 8 MMOL/L — SIGNIFICANT CHANGE UP (ref 5–17)
BUN SERPL-MCNC: 51 MG/DL — HIGH (ref 7–23)
CALCIUM SERPL-MCNC: 8.7 MG/DL — SIGNIFICANT CHANGE UP (ref 8.4–10.5)
CHLORIDE SERPL-SCNC: 111 MMOL/L — HIGH (ref 96–108)
CO2 SERPL-SCNC: 23 MMOL/L — SIGNIFICANT CHANGE UP (ref 22–31)
CREAT SERPL-MCNC: 1.4 MG/DL — HIGH (ref 0.5–1.3)
GLUCOSE BLDC GLUCOMTR-MCNC: 108 MG/DL — HIGH (ref 70–99)
GLUCOSE BLDC GLUCOMTR-MCNC: 109 MG/DL — HIGH (ref 70–99)
GLUCOSE BLDC GLUCOMTR-MCNC: 192 MG/DL — HIGH (ref 70–99)
GLUCOSE BLDC GLUCOMTR-MCNC: 194 MG/DL — HIGH (ref 70–99)
GLUCOSE BLDC GLUCOMTR-MCNC: 197 MG/DL — HIGH (ref 70–99)
GLUCOSE BLDC GLUCOMTR-MCNC: 98 MG/DL — SIGNIFICANT CHANGE UP (ref 70–99)
GLUCOSE SERPL-MCNC: 98 MG/DL — SIGNIFICANT CHANGE UP (ref 70–99)
HCT VFR BLD CALC: 27.6 % — LOW (ref 39–50)
HGB BLD-MCNC: 8.3 G/DL — LOW (ref 13–17)
MAGNESIUM SERPL-MCNC: 1.8 MG/DL — SIGNIFICANT CHANGE UP (ref 1.6–2.6)
MCHC RBC-ENTMCNC: 30.1 GM/DL — LOW (ref 32–36)
MCHC RBC-ENTMCNC: 30.3 PG — SIGNIFICANT CHANGE UP (ref 27–34)
MCV RBC AUTO: 100.7 FL — HIGH (ref 80–100)
NRBC # BLD: 0 /100 WBCS — SIGNIFICANT CHANGE UP (ref 0–0)
PHOSPHATE SERPL-MCNC: 3.6 MG/DL — SIGNIFICANT CHANGE UP (ref 2.5–4.5)
PLATELET # BLD AUTO: 379 K/UL — SIGNIFICANT CHANGE UP (ref 150–400)
POTASSIUM SERPL-MCNC: 3.8 MMOL/L — SIGNIFICANT CHANGE UP (ref 3.5–5.3)
POTASSIUM SERPL-SCNC: 3.8 MMOL/L — SIGNIFICANT CHANGE UP (ref 3.5–5.3)
RBC # BLD: 2.74 M/UL — LOW (ref 4.2–5.8)
RBC # FLD: 16.5 % — HIGH (ref 10.3–14.5)
SODIUM SERPL-SCNC: 142 MMOL/L — SIGNIFICANT CHANGE UP (ref 135–145)
WBC # BLD: 28.85 K/UL — HIGH (ref 3.8–10.5)
WBC # FLD AUTO: 28.85 K/UL — HIGH (ref 3.8–10.5)

## 2021-08-14 PROCEDURE — 99232 SBSQ HOSP IP/OBS MODERATE 35: CPT

## 2021-08-14 RX ORDER — MAGNESIUM SULFATE 500 MG/ML
1 VIAL (ML) INJECTION ONCE
Refills: 0 | Status: COMPLETED | OUTPATIENT
Start: 2021-08-14 | End: 2021-08-14

## 2021-08-14 RX ADMIN — LEVETIRACETAM 400 MILLIGRAM(S): 250 TABLET, FILM COATED ORAL at 11:33

## 2021-08-14 RX ADMIN — Medication 1: at 13:17

## 2021-08-14 RX ADMIN — ATORVASTATIN CALCIUM 40 MILLIGRAM(S): 80 TABLET, FILM COATED ORAL at 22:10

## 2021-08-14 RX ADMIN — PYRIDOSTIGMINE BROMIDE 30 MILLIGRAM(S): 60 SOLUTION ORAL at 07:18

## 2021-08-14 RX ADMIN — PYRIDOSTIGMINE BROMIDE 30 MILLIGRAM(S): 60 SOLUTION ORAL at 14:31

## 2021-08-14 RX ADMIN — ZINC SULFATE TAB 220 MG (50 MG ZINC EQUIVALENT) 220 MILLIGRAM(S): 220 (50 ZN) TAB at 14:31

## 2021-08-14 RX ADMIN — Medication 1: at 17:21

## 2021-08-14 RX ADMIN — Medication 8 MILLIGRAM(S): at 11:32

## 2021-08-14 RX ADMIN — Medication 100 GRAM(S): at 14:32

## 2021-08-14 RX ADMIN — Medication 1 TABLET(S): at 11:33

## 2021-08-14 RX ADMIN — PYRIDOSTIGMINE BROMIDE 30 MILLIGRAM(S): 60 SOLUTION ORAL at 22:11

## 2021-08-14 NOTE — PROGRESS NOTE ADULT - SUBJECTIVE AND OBJECTIVE BOX
SUBJECTIVE / INTERVAL HPI: Patient seen and examined at bedside. He denies any chest pain, headache, nausea, vomiting, vision disturbance, abdominal pain, extremity pain or swelling.  He also denies swallowing difficulties, SOB or dyspnea     VITAL SIGNS:  Vital Signs Last 24 Hrs  T(C): 36.7 (14 Aug 2021 05:32), Max: 36.7 (14 Aug 2021 05:32)  T(F): 98 (14 Aug 2021 05:32), Max: 98 (14 Aug 2021 05:32)  HR: 67 (14 Aug 2021 05:32) (65 - 78)  BP: 138/65 (14 Aug 2021 05:32) (110/61 - 138/65)  BP(mean): --  RR: 18 (14 Aug 2021 05:32) (18 - 18)  SpO2: 99% (14 Aug 2021 05:32) (96% - 99%)    PHYSICAL EXAM:    Constitutional: cachetic looking male  HEENT: PERRL, anicteric, neck supple, no JVD  Respiratory: fair inspiratory effort, low bibasilar ?rhonchi, unlabored respirations  Cardiovascular: +AS murmur, no r/g  Gastrointestinal: soft, NTND, no masses palpable, BS normoactive x4 quadrants  Extremities: Warm, well perfused, pulses equal bilateral upper and lower extremities, including LLE, no edema, no clubbing or cyanosis  Neurological: AAOx3, CN II-XII grossly intact, strength 4/5 B/L UE. 4/5 in RLE. LLE 3/5.  strength 5/5 B/L.  Skin: Normal temperature, warm, dry, no rashes or lesions    MEDICATIONS:  MEDICATIONS  (STANDING):  atorvastatin 40 milliGRAM(s) Oral at bedtime  dextrose 40% Gel 15 Gram(s) Oral once  dextrose 5%. 1000 milliLiter(s) (50 mL/Hr) IV Continuous <Continuous>  dextrose 5%. 1000 milliLiter(s) (100 mL/Hr) IV Continuous <Continuous>  dextrose 50% Injectable 25 Gram(s) IV Push once  dextrose 50% Injectable 12.5 Gram(s) IV Push once  dextrose 50% Injectable 25 Gram(s) IV Push once  glucagon  Injectable 1 milliGRAM(s) IntraMuscular once  insulin lispro (ADMELOG) corrective regimen sliding scale   SubCutaneous every 6 hours  levETIRAcetam  IVPB 500 milliGRAM(s) IV Intermittent every 24 hours  magnesium sulfate  IVPB 1 Gram(s) IV Intermittent once  methylPREDNISolone sodium succinate Injectable 8 milliGRAM(s) IV Push every 24 hours  multivitamin 1 Tablet(s) Oral daily  polyethylene glycol 3350 17 Gram(s) Oral two times a day  pyridostigmine 30 milliGRAM(s) Oral every 8 hours  senna 2 Tablet(s) Oral at bedtime  zinc sulfate 220 milliGRAM(s) Oral daily    MEDICATIONS  (PRN):  acetaminophen    Suspension .. 600 milliGRAM(s) Oral every 6 hours PRN Moderate Pain (4 - 6)      ALLERGIES:  Allergies    hay fever (Unknown)  No Known Drug Allergies    Intolerances        LABS:                        8.3    28.85 )-----------( 379      ( 14 Aug 2021 07:19 )             27.6     08-14    142  |  111<H>  |  51<H>  ----------------------------<  98  3.8   |  23  |  1.40<H>    Ca    8.7      14 Aug 2021 07:19  Phos  3.6     08-14  Mg     1.8     08-14          CAPILLARY BLOOD GLUCOSE      POCT Blood Glucose.: 98 mg/dL (14 Aug 2021 06:08)      RADIOLOGY & ADDITIONAL TESTS: Reviewed.   SUBJECTIVE / INTERVAL HPI: Patient seen and examined at bedside. He denies any chest pain, headache, nausea, vomiting, vision disturbance, abdominal pain, extremity pain or swelling. He also denies swallowing difficulties, SOB or dyspnea     VITAL SIGNS:  Vital Signs Last 24 Hrs  T(C): 36.7 (14 Aug 2021 05:32), Max: 36.7 (14 Aug 2021 05:32)  T(F): 98 (14 Aug 2021 05:32), Max: 98 (14 Aug 2021 05:32)  HR: 67 (14 Aug 2021 05:32) (65 - 78)  BP: 138/65 (14 Aug 2021 05:32) (110/61 - 138/65)  BP(mean): --  RR: 18 (14 Aug 2021 05:32) (18 - 18)  SpO2: 99% (14 Aug 2021 05:32) (96% - 99%)    PHYSICAL EXAM:    Constitutional: cachetic looking male  HEENT: PERRL, anicteric, neck supple, no JVD  Respiratory: fair inspiratory effort, low bibasilar ?rhonchi, unlabored respirations  Cardiovascular: +AS murmur, no r/g  Gastrointestinal: soft, NTND, no masses palpable, BS normoactive x4 quadrants  Extremities: Warm, well perfused, pulses equal bilateral upper and lower extremities, including LLE, no edema, no clubbing or cyanosis  Neurological: AAOx3, CN II-XII grossly intact, strength 4/5 B/L UE. 4/5 in RLE. LLE 3/5.  strength 5/5 B/L.  Skin: Normal temperature, warm, dry, no rashes or lesions    MEDICATIONS:  MEDICATIONS  (STANDING):  atorvastatin 40 milliGRAM(s) Oral at bedtime  dextrose 40% Gel 15 Gram(s) Oral once  dextrose 5%. 1000 milliLiter(s) (50 mL/Hr) IV Continuous <Continuous>  dextrose 5%. 1000 milliLiter(s) (100 mL/Hr) IV Continuous <Continuous>  dextrose 50% Injectable 25 Gram(s) IV Push once  dextrose 50% Injectable 12.5 Gram(s) IV Push once  dextrose 50% Injectable 25 Gram(s) IV Push once  glucagon  Injectable 1 milliGRAM(s) IntraMuscular once  insulin lispro (ADMELOG) corrective regimen sliding scale   SubCutaneous every 6 hours  levETIRAcetam  IVPB 500 milliGRAM(s) IV Intermittent every 24 hours  magnesium sulfate  IVPB 1 Gram(s) IV Intermittent once  methylPREDNISolone sodium succinate Injectable 8 milliGRAM(s) IV Push every 24 hours  multivitamin 1 Tablet(s) Oral daily  polyethylene glycol 3350 17 Gram(s) Oral two times a day  pyridostigmine 30 milliGRAM(s) Oral every 8 hours  senna 2 Tablet(s) Oral at bedtime  zinc sulfate 220 milliGRAM(s) Oral daily    MEDICATIONS  (PRN):  acetaminophen    Suspension .. 600 milliGRAM(s) Oral every 6 hours PRN Moderate Pain (4 - 6)      ALLERGIES:  Allergies    hay fever (Unknown)  No Known Drug Allergies    Intolerances        LABS:                        8.3    28.85 )-----------( 379      ( 14 Aug 2021 07:19 )             27.6     08-14    142  |  111<H>  |  51<H>  ----------------------------<  98  3.8   |  23  |  1.40<H>    Ca    8.7      14 Aug 2021 07:19  Phos  3.6     08-14  Mg     1.8     08-14          CAPILLARY BLOOD GLUCOSE      POCT Blood Glucose.: 98 mg/dL (14 Aug 2021 06:08)      RADIOLOGY & ADDITIONAL TESTS: Reviewed.

## 2021-08-14 NOTE — PROGRESS NOTE ADULT - ATTENDING COMMENTS
I was physically present for the key portions of the evaluation and managemnent (E/M) service provided.  I agree with the above history, physical, and plan which I have reviewed and edited where appropriate, with the exceptions as per my note.    neuro exam stable.    awaiting rituximab    await hepB PCR. CANNOT GIVE RITUXIMAB BEFORE HEPB PCR RESULTS.    OOB to chair, PT.

## 2021-08-14 NOTE — PROGRESS NOTE ADULT - PROBLEM SELECTOR PLAN 2
s/p NGT placed 7/23 with subsequent clogging and removal on 8/11  -family does not wish to have PEG tube placed at this time  -discussed the risks of aspiration with family    PLAN:  -patient started on thin puree diet  -plan to discharge to [facility]

## 2021-08-14 NOTE — PROGRESS NOTE ADULT - PROBLEM SELECTOR PLAN 5
Likely ATN 2/2 to septic shock, poor perfusion. Pt has a history of CKD. baseline 1.6-1.7.   - Creatinine at baseline  - Cont to trend creatinine, today 08/14 its 1.40    #Hypernatremia, today (08/14) is 142  RESOLVED  - Patient asymptomatic, A&Ox3  -d/c 125cc free water q6h  -continue to monitor

## 2021-08-14 NOTE — PROGRESS NOTE ADULT - PROBLEM SELECTOR PLAN 3
-patient developed new left lower extremity numbness and weakness on 8/3  -patient denies back pain at present  -patient has strength of 1/5 L hip flexion (RLE is 4/5). Now has sensation to left foot and anterior tibial region, no sensation to left thigh. Intact sensation to RLE.  Bilateral upper extremities sensation intact with strength of 4/5  -MRI lumbar showed L RP bleed  -no plans for IR drainage at this time, as hematoma appears to be resolving  -hemoglobin 10.3 (8/11)->8.4 (8/12) after 1LNS (8/11), no signs of hemodynamic instability  -On 8/14 hb is 8.3      PLAN:  -continue to monitor hemoglobin  -Heparin dc'ed as per patient and family's wishes

## 2021-08-14 NOTE — PROGRESS NOTE ADULT - PROBLEM SELECTOR PLAN 7
Normocytic anemia, with iron panel consistent with anemia of chronic disease/ renal disease. B12/folate wnl. No evidence of hemolysis. Given CKD and Anemia, a monoclonal gammopathy workup performed and was negative  -patient s/p 2 units on 7/29, followed by stabilization  -MRI confirmed massive L RP hematoma  -today (08/14) Hb is 8.3    PLAN:  - Heparin dc'ed as per patient and family wishes   - monitor for signs of active bleeding  - trend CBC daily  - Transfuse if <7  - Keep active T&S    #Leukocytosis  - secondary to CMML - per family, do not wish to do BM biopsy  - Will set up outpatient hematology follow up with Dr. Nguyen  (after discharge)  - Appreciate hem/onc recs, following.

## 2021-08-14 NOTE — PROGRESS NOTE ADULT - PROBLEM SELECTOR PLAN 8
Given underlying mod- severe AS, would be cautious with fluids. Pt is not a surgical candidate for severe AS given poor prognosis as well as family wishes to not pursue aggressive measures.  -patient does not appear to be fluid overloaded on exam, no JVD or crackles on exam    PLAN  -no need for diuresis at this time  -continue to monitor

## 2021-08-14 NOTE — PROGRESS NOTE ADULT - ASSESSMENT
89y/o DNR/DNI male PMHx of HTN, severe aortic stenosis and seizures (on phenytoin) who presented with left facial droop and slurred speech, stroke w/u negative. Found to have myasthenia gravis w/ anti-Ach antibodies. 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 He is transferred to [facility] for management with rituximab and with ultimate plan for d/c to acute rehab.

## 2021-08-14 NOTE — PROGRESS NOTE ADULT - PROBLEM SELECTOR PLAN 1
RESOLVED  -prior episode K 6.5 on 7/28 while in ICU, tried norepinephrine (7/28-7/30, stopped for bradycardia) and treated with lokelma, no w/u documented in progress notes. Etiology unclear, likely 2/2 renal hypoperfusion [decreased delivery of sodium and water to the sites of potassium secretion in the distal nephron (connecting segment and cortical collecting tubule), impaired potassium secretion into the tubular lumen, and hyperkalemia, per UTD vs less likely RTA (Cr at baseline).     Plan:   - s/p calcium gluconate 1g   - s/p insulin+D5 push  - monitor K: 6.4->5.5->6->5.7 (8/11)->4.8 (8/12 AM)-->3.6 (08/13)  - dc'ed Lokelma  - changed diet to low K on 8/11

## 2021-08-14 NOTE — PROGRESS NOTE ADULT - PROBLEM SELECTOR PLAN 4
- positive anti-ach antibiotics  - S/p IVIG x5 days    PLAN:  -f/u Hep B PCR  - c/w IV solumedrol  - c/w Mestinon, change from IV to PO  - To be given 1 dose of Rituximab once and if hep pcr result is negative - positive anti-ach antibiotics  - S/p IVIG x5 days    PLAN:  -f/u Hep B PCR  - c/w IV solumedrol  - c/w Mestinon, change from IV to PO  - Hep B PCR negative, will start 1 dose of Rituximab - positive anti-ach antibiotics  - S/p IVIG x5 days    PLAN:  - f/u Hep B PCR   - c/w IV solumedrol  - c/w Mestinon, change from IV to PO  - Hep B PCR negative, will start 1 dose of Rituximab - positive anti-ach antibiotics  - S/p IVIG x5 days    PLAN:  - f/u Hep B PCR   - c/w IV solumedrol  - c/w Mestinon, change from IV to PO  - Hep B PCR negative, will start 1 dose of Rituximab  - will start lamivudine - positive anti-ach antibiotics  - S/p IVIG x5 days    PLAN:  - f/u Hep B PCR for Rituximab and Lamivudine  - c/w IV solumedrol  - c/w Mestinon, change from IV to PO

## 2021-08-15 LAB
ALBUMIN SERPL ELPH-MCNC: 2.9 G/DL — LOW (ref 3.3–5)
ALP SERPL-CCNC: 74 U/L — SIGNIFICANT CHANGE UP (ref 40–120)
ALT FLD-CCNC: 16 U/L — SIGNIFICANT CHANGE UP (ref 10–45)
ANION GAP SERPL CALC-SCNC: 8 MMOL/L — SIGNIFICANT CHANGE UP (ref 5–17)
AST SERPL-CCNC: 16 U/L — SIGNIFICANT CHANGE UP (ref 10–40)
BASO STIPL BLD QL SMEAR: PRESENT — SIGNIFICANT CHANGE UP
BASOPHILS # BLD AUTO: 0 K/UL — SIGNIFICANT CHANGE UP (ref 0–0.2)
BASOPHILS NFR BLD AUTO: 0 % — SIGNIFICANT CHANGE UP (ref 0–2)
BILIRUB SERPL-MCNC: 0.8 MG/DL — SIGNIFICANT CHANGE UP (ref 0.2–1.2)
BUN SERPL-MCNC: 40 MG/DL — HIGH (ref 7–23)
BURR CELLS BLD QL SMEAR: PRESENT — SIGNIFICANT CHANGE UP
CALCIUM SERPL-MCNC: 9 MG/DL — SIGNIFICANT CHANGE UP (ref 8.4–10.5)
CHLORIDE SERPL-SCNC: 110 MMOL/L — HIGH (ref 96–108)
CO2 SERPL-SCNC: 25 MMOL/L — SIGNIFICANT CHANGE UP (ref 22–31)
CREAT SERPL-MCNC: 1.4 MG/DL — HIGH (ref 0.5–1.3)
EOSINOPHIL # BLD AUTO: 0.48 K/UL — SIGNIFICANT CHANGE UP (ref 0–0.5)
EOSINOPHIL NFR BLD AUTO: 1.7 % — SIGNIFICANT CHANGE UP (ref 0–6)
GLUCOSE BLDC GLUCOMTR-MCNC: 103 MG/DL — HIGH (ref 70–99)
GLUCOSE BLDC GLUCOMTR-MCNC: 166 MG/DL — HIGH (ref 70–99)
GLUCOSE BLDC GLUCOMTR-MCNC: 98 MG/DL — SIGNIFICANT CHANGE UP (ref 70–99)
GLUCOSE BLDC GLUCOMTR-MCNC: 99 MG/DL — SIGNIFICANT CHANGE UP (ref 70–99)
GLUCOSE SERPL-MCNC: 96 MG/DL — SIGNIFICANT CHANGE UP (ref 70–99)
HCT VFR BLD CALC: 29.1 % — LOW (ref 39–50)
HGB BLD-MCNC: 8.9 G/DL — LOW (ref 13–17)
HYPOCHROMIA BLD QL: SLIGHT — SIGNIFICANT CHANGE UP
LYMPHOCYTES # BLD AUTO: 0.73 K/UL — LOW (ref 1–3.3)
LYMPHOCYTES # BLD AUTO: 2.6 % — LOW (ref 13–44)
MAGNESIUM SERPL-MCNC: 2.1 MG/DL — SIGNIFICANT CHANGE UP (ref 1.6–2.6)
MANUAL SMEAR VERIFICATION: SIGNIFICANT CHANGE UP
MCHC RBC-ENTMCNC: 30.2 PG — SIGNIFICANT CHANGE UP (ref 27–34)
MCHC RBC-ENTMCNC: 30.6 GM/DL — LOW (ref 32–36)
MCV RBC AUTO: 98.6 FL — SIGNIFICANT CHANGE UP (ref 80–100)
MONOCYTES # BLD AUTO: 1.46 K/UL — HIGH (ref 0–0.9)
MONOCYTES NFR BLD AUTO: 5.2 % — SIGNIFICANT CHANGE UP (ref 2–14)
MYELOCYTES NFR BLD: 4.4 % — HIGH (ref 0–0)
NEUTROPHILS # BLD AUTO: 24.24 K/UL — HIGH (ref 1.8–7.4)
NEUTROPHILS NFR BLD AUTO: 86.1 % — HIGH (ref 43–77)
OVALOCYTES BLD QL SMEAR: SLIGHT — SIGNIFICANT CHANGE UP
PHOSPHATE SERPL-MCNC: 3.2 MG/DL — SIGNIFICANT CHANGE UP (ref 2.5–4.5)
PLAT MORPH BLD: NORMAL — SIGNIFICANT CHANGE UP
PLATELET # BLD AUTO: 409 K/UL — HIGH (ref 150–400)
POIKILOCYTOSIS BLD QL AUTO: SLIGHT — SIGNIFICANT CHANGE UP
POLYCHROMASIA BLD QL SMEAR: SLIGHT — SIGNIFICANT CHANGE UP
POTASSIUM SERPL-MCNC: 3.9 MMOL/L — SIGNIFICANT CHANGE UP (ref 3.5–5.3)
POTASSIUM SERPL-SCNC: 3.9 MMOL/L — SIGNIFICANT CHANGE UP (ref 3.5–5.3)
PROT SERPL-MCNC: 6.6 G/DL — SIGNIFICANT CHANGE UP (ref 6–8.3)
RBC # BLD: 2.95 M/UL — LOW (ref 4.2–5.8)
RBC # FLD: 16.2 % — HIGH (ref 10.3–14.5)
RBC BLD AUTO: ABNORMAL
SCHISTOCYTES BLD QL AUTO: SLIGHT — SIGNIFICANT CHANGE UP
SODIUM SERPL-SCNC: 143 MMOL/L — SIGNIFICANT CHANGE UP (ref 135–145)
WBC # BLD: 28.15 K/UL — HIGH (ref 3.8–10.5)
WBC # FLD AUTO: 28.15 K/UL — HIGH (ref 3.8–10.5)

## 2021-08-15 PROCEDURE — 99231 SBSQ HOSP IP/OBS SF/LOW 25: CPT

## 2021-08-15 RX ADMIN — ATORVASTATIN CALCIUM 40 MILLIGRAM(S): 80 TABLET, FILM COATED ORAL at 21:38

## 2021-08-15 RX ADMIN — PYRIDOSTIGMINE BROMIDE 30 MILLIGRAM(S): 60 SOLUTION ORAL at 15:21

## 2021-08-15 RX ADMIN — PYRIDOSTIGMINE BROMIDE 30 MILLIGRAM(S): 60 SOLUTION ORAL at 07:34

## 2021-08-15 RX ADMIN — Medication 8 MILLIGRAM(S): at 11:15

## 2021-08-15 RX ADMIN — LEVETIRACETAM 400 MILLIGRAM(S): 250 TABLET, FILM COATED ORAL at 11:14

## 2021-08-15 RX ADMIN — ZINC SULFATE TAB 220 MG (50 MG ZINC EQUIVALENT) 220 MILLIGRAM(S): 220 (50 ZN) TAB at 11:14

## 2021-08-15 RX ADMIN — Medication 1: at 17:36

## 2021-08-15 RX ADMIN — SENNA PLUS 2 TABLET(S): 8.6 TABLET ORAL at 21:38

## 2021-08-15 RX ADMIN — POLYETHYLENE GLYCOL 3350 17 GRAM(S): 17 POWDER, FOR SOLUTION ORAL at 07:34

## 2021-08-15 RX ADMIN — PYRIDOSTIGMINE BROMIDE 30 MILLIGRAM(S): 60 SOLUTION ORAL at 21:38

## 2021-08-15 RX ADMIN — Medication 1 TABLET(S): at 11:14

## 2021-08-15 NOTE — PROGRESS NOTE ADULT - PROBLEM SELECTOR PLAN 1
s/p NGT placed 7/23 with subsequent clogging and removal on 8/11  -family does not wish to have PEG tube placed at this time  -discussed the risks of aspiration with family    PLAN:  -patient started on thin puree diet  -plan to discharge to [facility]

## 2021-08-15 NOTE — CHART NOTE - NSCHARTNOTEFT_GEN_A_CORE
Spoke with patient's wife, Nahun, on the phone. She is reluctant about starting the Rituximab for now given that she feels that the patient is not "strong enough." She says she has been talking to family members who have discouraged her from giving him the medication for now. She is hoping to improve his nutrition first and then consider Rituximab at a later time. She believes that at this point, the risks outweigh the benefits and wants to wait more. She was explained the risks and benefits and she will consider it at a later point.
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
## 2021-08-15 NOTE — PROGRESS NOTE ADULT - ATTENDING COMMENTS
I was physically present for the key portions of the evaluation and managemnent (E/M) service provided.  I agree with the above history, physical, and plan which I have reviewed and edited where appropriate, with the exceptions as per my note.    Neuro exam stable.    Pt's wife called and spoke to housestaff this AM; per family, opting against rituximab at this time.    Therefore, will hold off at this time and plan for dispo to rehab.    discussed with housestaff.

## 2021-08-15 NOTE — PROGRESS NOTE ADULT - PROBLEM SELECTOR PLAN 4
Likely ATN 2/2 to septic shock, poor perfusion. Pt has a history of CKD. baseline 1.6-1.7.   - Creatinine at baseline  - Cont to trend creatinine, today 08/14 its 1.40

## 2021-08-15 NOTE — PROGRESS NOTE ADULT - PROBLEM SELECTOR PLAN 3
- positive anti-ach antibiotics  - S/p IVIG x5 days    PLAN:  - f/u Hep B PCR for Rituximab and Lamivudine  - c/w IV solumedrol  - c/w Mestinon, change from IV to PO

## 2021-08-15 NOTE — PROGRESS NOTE ADULT - ASSESSMENT
89y/o DNR/DNI male PMHx of HTN, severe aortic stenosis and seizures (on phenytoin) who presented with left facial droop and slurred speech, stroke w/u negative. Found to have myasthenia gravis w/ anti-Ach antibodies. 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 He was transferred to [facility] for management with rituximab however now family and pt have decided against initiation of therapy. Pending d/c to acute rehab.

## 2021-08-16 LAB
ALBUMIN SERPL ELPH-MCNC: 2.9 G/DL — LOW (ref 3.3–5)
ALP SERPL-CCNC: 69 U/L — SIGNIFICANT CHANGE UP (ref 40–120)
ALT FLD-CCNC: 15 U/L — SIGNIFICANT CHANGE UP (ref 10–45)
ANION GAP SERPL CALC-SCNC: 6 MMOL/L — SIGNIFICANT CHANGE UP (ref 5–17)
ANISOCYTOSIS BLD QL: SLIGHT — SIGNIFICANT CHANGE UP
AST SERPL-CCNC: 16 U/L — SIGNIFICANT CHANGE UP (ref 10–40)
BASOPHILS # BLD AUTO: 0.46 K/UL — HIGH (ref 0–0.2)
BASOPHILS NFR BLD AUTO: 1.8 % — SIGNIFICANT CHANGE UP (ref 0–2)
BILIRUB SERPL-MCNC: 0.6 MG/DL — SIGNIFICANT CHANGE UP (ref 0.2–1.2)
BUN SERPL-MCNC: 41 MG/DL — HIGH (ref 7–23)
BURR CELLS BLD QL SMEAR: PRESENT — SIGNIFICANT CHANGE UP
CALCIUM SERPL-MCNC: 8.9 MG/DL — SIGNIFICANT CHANGE UP (ref 8.4–10.5)
CHLORIDE SERPL-SCNC: 108 MMOL/L — SIGNIFICANT CHANGE UP (ref 96–108)
CO2 SERPL-SCNC: 26 MMOL/L — SIGNIFICANT CHANGE UP (ref 22–31)
CREAT SERPL-MCNC: 1.29 MG/DL — SIGNIFICANT CHANGE UP (ref 0.5–1.3)
ELLIPTOCYTES BLD QL SMEAR: SLIGHT — SIGNIFICANT CHANGE UP
EOSINOPHIL # BLD AUTO: 0.43 K/UL — SIGNIFICANT CHANGE UP (ref 0–0.5)
EOSINOPHIL NFR BLD AUTO: 1.7 % — SIGNIFICANT CHANGE UP (ref 0–6)
GIANT PLATELETS BLD QL SMEAR: PRESENT — SIGNIFICANT CHANGE UP
GLUCOSE BLDC GLUCOMTR-MCNC: 102 MG/DL — HIGH (ref 70–99)
GLUCOSE BLDC GLUCOMTR-MCNC: 138 MG/DL — HIGH (ref 70–99)
GLUCOSE BLDC GLUCOMTR-MCNC: 242 MG/DL — HIGH (ref 70–99)
GLUCOSE BLDC GLUCOMTR-MCNC: 254 MG/DL — HIGH (ref 70–99)
GLUCOSE SERPL-MCNC: 97 MG/DL — SIGNIFICANT CHANGE UP (ref 70–99)
HBV DNA # SERPL NAA+PROBE: SIGNIFICANT CHANGE UP IU/ML
HBV DNA SERPL NAA+PROBE-LOG#: SIGNIFICANT CHANGE UP LOG10IU/ML
HCT VFR BLD CALC: 27.7 % — LOW (ref 39–50)
HGB BLD-MCNC: 8.5 G/DL — LOW (ref 13–17)
HYPOCHROMIA BLD QL: SLIGHT — SIGNIFICANT CHANGE UP
LYMPHOCYTES # BLD AUTO: 0.46 K/UL — LOW (ref 1–3.3)
LYMPHOCYTES # BLD AUTO: 1.8 % — LOW (ref 13–44)
MACROCYTES BLD QL: SLIGHT — SIGNIFICANT CHANGE UP
MAGNESIUM SERPL-MCNC: 1.8 MG/DL — SIGNIFICANT CHANGE UP (ref 1.6–2.6)
MANUAL SMEAR VERIFICATION: SIGNIFICANT CHANGE UP
MCHC RBC-ENTMCNC: 29.9 PG — SIGNIFICANT CHANGE UP (ref 27–34)
MCHC RBC-ENTMCNC: 30.7 GM/DL — LOW (ref 32–36)
MCV RBC AUTO: 97.5 FL — SIGNIFICANT CHANGE UP (ref 80–100)
MONOCYTES # BLD AUTO: 0.46 K/UL — SIGNIFICANT CHANGE UP (ref 0–0.9)
MONOCYTES NFR BLD AUTO: 1.8 % — LOW (ref 2–14)
MYELOCYTES NFR BLD: 1.8 % — HIGH (ref 0–0)
NEUTROPHILS # BLD AUTO: 23.13 K/UL — HIGH (ref 1.8–7.4)
NEUTROPHILS NFR BLD AUTO: 91.1 % — HIGH (ref 43–77)
PHOSPHATE SERPL-MCNC: 3.2 MG/DL — SIGNIFICANT CHANGE UP (ref 2.5–4.5)
PLAT MORPH BLD: ABNORMAL
PLATELET # BLD AUTO: 409 K/UL — HIGH (ref 150–400)
POIKILOCYTOSIS BLD QL AUTO: SLIGHT — SIGNIFICANT CHANGE UP
POLYCHROMASIA BLD QL SMEAR: SLIGHT — SIGNIFICANT CHANGE UP
POTASSIUM SERPL-MCNC: 3.8 MMOL/L — SIGNIFICANT CHANGE UP (ref 3.5–5.3)
POTASSIUM SERPL-SCNC: 3.8 MMOL/L — SIGNIFICANT CHANGE UP (ref 3.5–5.3)
PROT SERPL-MCNC: 6.2 G/DL — SIGNIFICANT CHANGE UP (ref 6–8.3)
RBC # BLD: 2.84 M/UL — LOW (ref 4.2–5.8)
RBC # FLD: 16.1 % — HIGH (ref 10.3–14.5)
RBC BLD AUTO: ABNORMAL
SODIUM SERPL-SCNC: 140 MMOL/L — SIGNIFICANT CHANGE UP (ref 135–145)
WBC # BLD: 25.39 K/UL — HIGH (ref 3.8–10.5)
WBC # FLD AUTO: 25.39 K/UL — HIGH (ref 3.8–10.5)

## 2021-08-16 PROCEDURE — 99233 SBSQ HOSP IP/OBS HIGH 50: CPT

## 2021-08-16 RX ORDER — CALCIUM CARBONATE 500(1250)
1 TABLET ORAL EVERY 24 HOURS
Refills: 0 | Status: DISCONTINUED | OUTPATIENT
Start: 2021-08-16 | End: 2021-08-23

## 2021-08-16 RX ORDER — CHOLECALCIFEROL (VITAMIN D3) 125 MCG
2000 CAPSULE ORAL DAILY
Refills: 0 | Status: DISCONTINUED | OUTPATIENT
Start: 2021-08-16 | End: 2021-08-23

## 2021-08-16 RX ADMIN — PYRIDOSTIGMINE BROMIDE 30 MILLIGRAM(S): 60 SOLUTION ORAL at 06:57

## 2021-08-16 RX ADMIN — Medication 8 MILLIGRAM(S): at 11:21

## 2021-08-16 RX ADMIN — PYRIDOSTIGMINE BROMIDE 30 MILLIGRAM(S): 60 SOLUTION ORAL at 15:53

## 2021-08-16 RX ADMIN — PYRIDOSTIGMINE BROMIDE 30 MILLIGRAM(S): 60 SOLUTION ORAL at 22:54

## 2021-08-16 RX ADMIN — ATORVASTATIN CALCIUM 40 MILLIGRAM(S): 80 TABLET, FILM COATED ORAL at 22:51

## 2021-08-16 RX ADMIN — SENNA PLUS 2 TABLET(S): 8.6 TABLET ORAL at 22:51

## 2021-08-16 RX ADMIN — Medication 2000 UNIT(S): at 15:52

## 2021-08-16 RX ADMIN — Medication 10 MILLIGRAM(S): at 15:52

## 2021-08-16 RX ADMIN — Medication 1 TABLET(S): at 15:52

## 2021-08-16 RX ADMIN — ZINC SULFATE TAB 220 MG (50 MG ZINC EQUIVALENT) 220 MILLIGRAM(S): 220 (50 ZN) TAB at 11:23

## 2021-08-16 RX ADMIN — Medication 2: at 22:09

## 2021-08-16 RX ADMIN — LEVETIRACETAM 400 MILLIGRAM(S): 250 TABLET, FILM COATED ORAL at 11:22

## 2021-08-16 RX ADMIN — POLYETHYLENE GLYCOL 3350 17 GRAM(S): 17 POWDER, FOR SOLUTION ORAL at 06:57

## 2021-08-16 RX ADMIN — POLYETHYLENE GLYCOL 3350 17 GRAM(S): 17 POWDER, FOR SOLUTION ORAL at 19:12

## 2021-08-16 RX ADMIN — Medication 1 TABLET(S): at 11:23

## 2021-08-16 NOTE — PROGRESS NOTE ADULT - PROBLEM SELECTOR PLAN 3
s/p NGT placed 7/23 with subsequent clogging and removal on 8/11  -family does not wish to have PEG tube placed at this time  -discussed the risks of aspiration with family    PLAN:  -patient started on thin puree diet  -plan to discharge to [facility] s/p NGT placed 7/23 with subsequent clogging and removal on 8/11  -family does not wish to have PEG tube placed at this time  -discussed the risks of aspiration with family    PLAN:  - C/w  thin puree diet  - plan to discharge to [facility] s/p NGT placed 7/23 with subsequent clogging and removal on 8/11  -family does not wish to have PEG tube placed at this time  -discussed the risks of aspiration with family    PLAN:  - C/w thin puree diet  - plan to discharge to [facility] s/p NGT placed 7/23 with subsequent clogging and removal on 8/11  -family does not wish to have PEG tube placed at this time  -discussed the risks of aspiration with family    PLAN:  - C/w thin puree diet  - plan to discharge to rehab

## 2021-08-16 NOTE — PROGRESS NOTE ADULT - PROBLEM SELECTOR PLAN 4
Likely ATN 2/2 to septic shock, poor perfusion. Pt has a history of CKD. baseline 1.6-1.7.   - Creatinine at baseline  - Cont to trend creatinine, 08/14 its 1.40    #Hypernatremia, today (08/14) is 142  RESOLVED  - Patient asymptomatic, A&Ox3  -d/c 125cc free water q6h  -continue to monitor

## 2021-08-16 NOTE — PROGRESS NOTE ADULT - PROBLEM SELECTOR PLAN 10
F: assess as needed  E: Monitor, Replete to K>4 and Mg>2, caution repletion in the setting of EVARISTO  N: thin puree, low K - discussed risks of aspiration with family and patient    VTE PPx: Heparin Subq -   GI PPx: None    CODE STATUS: DNR    Dispo: pending AR placement F: assess as needed  E: Monitor, Replete to K>4 and Mg>2, caution repletion in the setting of EVARISTO  N: thin puree, low K - discussed risks of aspiration with family and patient    VTE PPx: Heparin Subq  GI PPx: None    CODE STATUS: DNR    Dispo: pending AR placement

## 2021-08-16 NOTE — PROGRESS NOTE ADULT - PROBLEM SELECTOR PLAN 7
RESOLVED  -prior episode K 6.5 on 7/28 while in ICU, tried norepinephrine (7/28-7/30, stopped for bradycardia) and treated with lokelma, no w/u documented in progress notes. Etiology unclear, likely 2/2 renal hypoperfusion [decreased delivery of sodium and water to the sites of potassium secretion in the distal nephron (connecting segment and cortical collecting tubule), impaired potassium secretion into the tubular lumen, and hyperkalemia, per UTD vs less likely RTA (Cr at baseline).     Plan:   - s/p calcium gluconate 1g   - s/p insulin+D5 push  - monitor K: 6.4->5.5->6->5.7 (8/11)->4.8 (8/12 AM)-->3.6 (08/13)  - dc'ed Lokelma  - changed diet to low K on 8/11 RESOLVED  -prior episode K 6.5 on 7/28 while in ICU, tried norepinephrine (7/28-7/30, stopped for bradycardia) and treated with lokelma, no w/u documented in progress notes. Etiology unclear, likely 2/2 renal hypoperfusion [decreased delivery of sodium and water to the sites of potassium secretion in the distal nephron (connecting segment and cortical collecting tubule), impaired potassium secretion into the tubular lumen, and hyperkalemia, per UTD vs less likely RTA (Cr at baseline).     Plan:   - s/p calcium gluconate 1g   - s/p insulin+D5 push  - K stable now  - dc'ed Lokelma  - changed diet to low K on 8/11

## 2021-08-16 NOTE — PROGRESS NOTE ADULT - PROBLEM SELECTOR PLAN 2
-patient developed new left lower extremity numbness and weakness on 8/3  -patient denies back pain at present  -patient has strength of 1/5 L hip flexion (RLE is 4/5). Now has sensation to left foot and anterior tibial region, no sensation to left thigh. Intact sensation to RLE.  Bilateral upper extremities sensation intact with strength of 4/5  -MRI lumbar showed L RP bleed  -no plans for IR drainage at this time, as hematoma appears to be resolving  -hemoglobin 10.3 (8/11)->8.4 (8/12) after 1LNS (8/11), no signs of hemodynamic instability  -On 8/14 hb is 8.3      PLAN:  -continue to monitor hemoglobin  -Heparin dc'ed as per patient and family's wishes -patient developed new left lower extremity numbness and weakness on 8/3  -patient denies back pain at present  -patient has strength of 1/5 L hip flexion (RLE is 4/5). Now has sensation to left foot and anterior tibial region, no sensation to left thigh. Intact sensation to RLE.  Bilateral upper extremities sensation intact with strength of 4/5  -MRI lumbar showed L RP bleed  -no plans for IR drainage at this time, as hematoma appears to be resolving  -hemoglobin 10.3 (8/11)->8.4 (8/12) after 1LNS (8/11), no signs of hemodynamic instability  -On 8/14 hb is 8.3    PLAN:  -continue to monitor hemoglobin  -Heparin dc'ed as per patient and family's wishes -patient developed new left lower extremity numbness and weakness on 8/3  -patient denies back pain at present  -patient has strength of 1/5 L hip flexion (RLE is 4/5). Now has sensation to left foot and anterior tibial region, no sensation to left thigh. Intact sensation to RLE.  Bilateral upper extremities sensation intact with strength of 4/5  -MRI lumbar showed L RP bleed  -no plans for IR drainage at this time, as hematoma appears to be resolving  -hemoglobin 10.3 (8/11)->8.4 (8/12) after 1LNS (8/11), no signs of hemodynamic instability  -On 8/16 hb is 8.5    PLAN:  -continue to monitor hemoglobin  -Heparin dc'ed as per patient and family's wishes

## 2021-08-16 NOTE — PROGRESS NOTE ADULT - ATTENDING COMMENTS
Left leg strength is improving although still 3/5 hip flexion and 2/5 knee extension  strength in UE is 5/5 b/l  EOMI, no ptosis    Will hold off on rituximab for now  Change IV solumedrol to PO prednisone - 10mg daily  Add calcium and vitamin D supplementation for osteoporosis / fracture prevention   Continue other meds  Dispo planning to rehab

## 2021-08-16 NOTE — PROGRESS NOTE ADULT - ASSESSMENT
89y/o DNR/DNI male PMHx of HTN, severe aortic stenosis and seizures (on phenytoin) who presented with left facial droop and slurred speech, stroke w/u negative. Found to have myasthenia gravis w/ anti-Ach antibodies. 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 He is transferred to [facility] for management with rituximab and with ultimate plan for d/c to acute rehab.  89y/o DNR/DNI male PMHx of HTN, severe aortic stenosis and seizures (on phenytoin) who presented with left facial droop and slurred speech, stroke w/u negative. Found to have myasthenia gravis w/ anti-Ach antibodies. 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

## 2021-08-16 NOTE — PROGRESS NOTE ADULT - SUBJECTIVE AND OBJECTIVE BOX
OVERNIGHT EVENTS: MICHAEL.    SUBJECTIVE:  Patient seen and examined at bedside. Resting comfortably, no acute complaints. Had dinner, slept well. Patient denies fever, chills, weakness, HA, vision changes, chest pain, palpitations, LE edema, SOB, cough, abdominal pain, n/v/c/d, dysuria, and changes in BM.     Vital Signs Last 12 Hrs  T(F): 98.1 (08-16-21 @ 05:59), Max: 98.1 (08-16-21 @ 05:59)  HR: 64 (08-16-21 @ 05:59) (62 - 64)  BP: 143/76 (08-16-21 @ 05:59) (143/76 - 147/72)  BP(mean): --  RR: 16 (08-16-21 @ 05:59) (16 - 16)  SpO2: 98% (08-16-21 @ 05:59) (98% - 98%)  I&O's Summary      PHYSICAL EXAM:  Constitutional: NAD, comfortable in bed.  HEENT: NC/AT, PERRLA, EOMI, no conjunctival pallor or scleral icterus, MMM  Neck: Supple, no JVD  Respiratory: CTA B/L. No w/r/r.   Cardiovascular: RRR, normal S1 and S2, no m/r/g.   Gastrointestinal: +BS, soft NTND, no guarding or rebound tenderness, no palpable masses   Extremities: wwp; no cyanosis, clubbing or edema.   Vascular: Pulses equal and strong throughout.   Neurological: AAOx3, no CN deficits, strength and sensation intact throughout.   Skin: No gross skin abnormalities or rashes      LABS:                        8.5    25.39 )-----------( 409      ( 16 Aug 2021 06:36 )             27.7     08-16    140  |  108  |  41<H>  ----------------------------<  97  3.8   |  26  |  1.29    Ca    8.9      16 Aug 2021 06:36  Phos  3.2     08-16  Mg     1.8     08-16    TPro  6.2  /  Alb  2.9<L>  /  TBili  0.6  /  DBili  x   /  AST  16  /  ALT  15  /  AlkPhos  69  08-16      RADIOLOGY & ADDITIONAL TESTS:    MEDICATIONS  (STANDING):  atorvastatin 40 milliGRAM(s) Oral at bedtime  dextrose 40% Gel 15 Gram(s) Oral once  dextrose 5%. 1000 milliLiter(s) (50 mL/Hr) IV Continuous <Continuous>  dextrose 5%. 1000 milliLiter(s) (100 mL/Hr) IV Continuous <Continuous>  dextrose 50% Injectable 25 Gram(s) IV Push once  dextrose 50% Injectable 12.5 Gram(s) IV Push once  dextrose 50% Injectable 25 Gram(s) IV Push once  glucagon  Injectable 1 milliGRAM(s) IntraMuscular once  insulin lispro (ADMELOG) corrective regimen sliding scale   SubCutaneous every 6 hours  levETIRAcetam  IVPB 500 milliGRAM(s) IV Intermittent every 24 hours  methylPREDNISolone sodium succinate Injectable 8 milliGRAM(s) IV Push every 24 hours  multivitamin 1 Tablet(s) Oral daily  polyethylene glycol 3350 17 Gram(s) Oral two times a day  pyridostigmine 30 milliGRAM(s) Oral every 8 hours  senna 2 Tablet(s) Oral at bedtime  zinc sulfate 220 milliGRAM(s) Oral daily    MEDICATIONS  (PRN):  acetaminophen    Suspension .. 600 milliGRAM(s) Oral every 6 hours PRN Moderate Pain (4 - 6)   OVERNIGHT EVENTS: MICHAEL.    SUBJECTIVE:  Patient seen and examined at bedside. Resting comfortably, no acute complaints. Had dinner, slept well. Patient denies fever, chills, weakness, HA, vision changes, chest pain, palpitations, LE edema, SOB, cough, abdominal pain, n/v/c/d, dysuria, and changes in BM.     Vital Signs Last 12 Hrs  T(F): 98.1 (08-16-21 @ 05:59), Max: 98.1 (08-16-21 @ 05:59)  HR: 64 (08-16-21 @ 05:59) (62 - 64)  BP: 143/76 (08-16-21 @ 05:59) (143/76 - 147/72)  BP(mean): --  RR: 16 (08-16-21 @ 05:59) (16 - 16)  SpO2: 98% (08-16-21 @ 05:59) (98% - 98%)  I&O's Summary    PHYSICAL EXAM:  Constitutional: NAD, comfortable in bed.  HEENT: NC/AT, PERRLA, EOMI, no conjunctival pallor or scleral icterus, MMM  Neck: Supple, no JVD  Respiratory: CTA B/L. No w/r/r.   Cardiovascular: RRR, normal S1 and S2, no m/r/g.   Gastrointestinal: +BS, soft NTND, no guarding or rebound tenderness, no palpable masses   Extremities: wwp; no cyanosis, clubbing or edema.   Vascular: Pulses equal and strong throughout.   Neurological: AAOx3, no CN deficits, strength and sensation intact throughout. Motor sensation 4/5 in UE, 3/5 in LLE but improving.  Skin: No gross skin abnormalities or rashes    LABS:                        8.5    25.39 )-----------( 409      ( 16 Aug 2021 06:36 )             27.7     08-16    140  |  108  |  41<H>  ----------------------------<  97  3.8   |  26  |  1.29    Ca    8.9      16 Aug 2021 06:36  Phos  3.2     08-16  Mg     1.8     08-16    TPro  6.2  /  Alb  2.9<L>  /  TBili  0.6  /  DBili  x   /  AST  16  /  ALT  15  /  AlkPhos  69  08-16      RADIOLOGY & ADDITIONAL TESTS:    MEDICATIONS  (STANDING):  atorvastatin 40 milliGRAM(s) Oral at bedtime  dextrose 40% Gel 15 Gram(s) Oral once  dextrose 5%. 1000 milliLiter(s) (50 mL/Hr) IV Continuous <Continuous>  dextrose 5%. 1000 milliLiter(s) (100 mL/Hr) IV Continuous <Continuous>  dextrose 50% Injectable 25 Gram(s) IV Push once  dextrose 50% Injectable 12.5 Gram(s) IV Push once  dextrose 50% Injectable 25 Gram(s) IV Push once  glucagon  Injectable 1 milliGRAM(s) IntraMuscular once  insulin lispro (ADMELOG) corrective regimen sliding scale   SubCutaneous every 6 hours  levETIRAcetam  IVPB 500 milliGRAM(s) IV Intermittent every 24 hours  methylPREDNISolone sodium succinate Injectable 8 milliGRAM(s) IV Push every 24 hours  multivitamin 1 Tablet(s) Oral daily  polyethylene glycol 3350 17 Gram(s) Oral two times a day  pyridostigmine 30 milliGRAM(s) Oral every 8 hours  senna 2 Tablet(s) Oral at bedtime  zinc sulfate 220 milliGRAM(s) Oral daily    MEDICATIONS  (PRN):  acetaminophen    Suspension .. 600 milliGRAM(s) Oral every 6 hours PRN Moderate Pain (4 - 6)

## 2021-08-16 NOTE — PROGRESS NOTE ADULT - PROBLEM SELECTOR PLAN 1
- positive anti-ach antibiotics  - S/p IVIG x5 days    PLAN:  - f/u Hep B PCR for Rituximab and Lamivudine  - c/w IV solumedrol  - c/w Mestinon, change from IV to PO - positive anti-ach antibiotics  - S/p IVIG x5 days    PLAN:  - f/u Hep B PCR, based on results will give Rituximab and Lamivudine  - c/w solumedrol 8mg IV push  - c/w Mestinon 30mg PO q8hrs  - F/u neuro recs  - Heme onc recs appreciated - pts family now amenable to rituxin - positive anti-ach antibiotics  - S/p IVIG x5 days    PLAN:  - no rituximab at this time   - change solumedrol 8mg to 10mg prednisone PO daily   - c/w Mestinon 30mg PO q8hrs  - F/u neuro recs  - Heme onc recs appreciated

## 2021-08-17 LAB
GLUCOSE BLDC GLUCOMTR-MCNC: 102 MG/DL — HIGH (ref 70–99)
GLUCOSE BLDC GLUCOMTR-MCNC: 139 MG/DL — HIGH (ref 70–99)
GLUCOSE BLDC GLUCOMTR-MCNC: 156 MG/DL — HIGH (ref 70–99)
GLUCOSE BLDC GLUCOMTR-MCNC: 178 MG/DL — HIGH (ref 70–99)

## 2021-08-17 PROCEDURE — 99232 SBSQ HOSP IP/OBS MODERATE 35: CPT

## 2021-08-17 RX ADMIN — Medication 10 MILLIGRAM(S): at 18:10

## 2021-08-17 RX ADMIN — POLYETHYLENE GLYCOL 3350 17 GRAM(S): 17 POWDER, FOR SOLUTION ORAL at 06:32

## 2021-08-17 RX ADMIN — Medication 1 TABLET(S): at 12:43

## 2021-08-17 RX ADMIN — Medication 1: at 18:07

## 2021-08-17 RX ADMIN — PYRIDOSTIGMINE BROMIDE 30 MILLIGRAM(S): 60 SOLUTION ORAL at 22:56

## 2021-08-17 RX ADMIN — Medication 2000 UNIT(S): at 12:43

## 2021-08-17 RX ADMIN — ZINC SULFATE TAB 220 MG (50 MG ZINC EQUIVALENT) 220 MILLIGRAM(S): 220 (50 ZN) TAB at 12:45

## 2021-08-17 RX ADMIN — Medication 1 TABLET(S): at 14:56

## 2021-08-17 RX ADMIN — PYRIDOSTIGMINE BROMIDE 30 MILLIGRAM(S): 60 SOLUTION ORAL at 06:33

## 2021-08-17 RX ADMIN — LEVETIRACETAM 400 MILLIGRAM(S): 250 TABLET, FILM COATED ORAL at 12:46

## 2021-08-17 RX ADMIN — PYRIDOSTIGMINE BROMIDE 30 MILLIGRAM(S): 60 SOLUTION ORAL at 14:56

## 2021-08-17 RX ADMIN — Medication 1: at 12:42

## 2021-08-17 RX ADMIN — ATORVASTATIN CALCIUM 40 MILLIGRAM(S): 80 TABLET, FILM COATED ORAL at 22:56

## 2021-08-17 NOTE — PROGRESS NOTE ADULT - ATTENDING COMMENTS
no new complaints  neuro exam stable  plan to d/c to rehab after PT re-evaluation   continue prednisone 10mg daily  f/u with me after discharge

## 2021-08-17 NOTE — PROGRESS NOTE ADULT - PROBLEM SELECTOR PLAN 1
- positive anti-ach antibiotics  - S/p IVIG x5 days    PLAN:  - no rituximab at this time per family  - Now on 10mg prednisone PO daily   - c/w Mestinon 30mg PO q8hrs  - F/u neuro recs  - Heme onc recs appreciated

## 2021-08-17 NOTE — PROGRESS NOTE ADULT - SUBJECTIVE AND OBJECTIVE BOX
OVERNIGHT EVENTS: MICHAEL. PENDING [facility] VS ACUTE REHAB    SUBJECTIVE:  Patient seen and examined at bedside. Resting comfortably, no acute complaints. Patient denies fever, chills, weakness, HA, vision changes, chest pain, palpitations, LE edema, SOB, cough, abdominal pain, n/v/c/d, dysuria, and changes in BM.     Vital Signs Last 12 Hrs  T(F): 98.1 (08-16-21 @ 05:59), Max: 98.1 (08-16-21 @ 05:59)  HR: 64 (08-16-21 @ 05:59) (62 - 64)  BP: 143/76 (08-16-21 @ 05:59) (143/76 - 147/72)  BP(mean): --  RR: 16 (08-16-21 @ 05:59) (16 - 16)  SpO2: 98% (08-16-21 @ 05:59) (98% - 98%)  I&O's Summary    PHYSICAL EXAM:  Constitutional: NAD, comfortable in bed.  HEENT: NC/AT, PERRLA, EOMI, no conjunctival pallor or scleral icterus, MMM  Neck: Supple, no JVD  Respiratory: CTA B/L. No w/r/r.   Cardiovascular: RRR, normal S1 and S2, no m/r/g.   Gastrointestinal: +BS, soft NTND, no guarding or rebound tenderness, no palpable masses   Extremities: wwp; no cyanosis, clubbing or edema.   Vascular: Pulses equal and strong throughout.   Neurological: AAOx3, no CN deficits, strength and sensation intact throughout. Motor sensation 4/5 in UE, 3/5 in LLE but improving.  Skin: No gross skin abnormalities or rashes    LABS:                        8.5    25.39 )-----------( 409      ( 16 Aug 2021 06:36 )             27.7     08-16    140  |  108  |  41<H>  ----------------------------<  97  3.8   |  26  |  1.29    Ca    8.9      16 Aug 2021 06:36  Phos  3.2     08-16  Mg     1.8     08-16    TPro  6.2  /  Alb  2.9<L>  /  TBili  0.6  /  DBili  x   /  AST  16  /  ALT  15  /  AlkPhos  69  08-16      RADIOLOGY & ADDITIONAL TESTS:    MEDICATIONS  (STANDING):  atorvastatin 40 milliGRAM(s) Oral at bedtime  dextrose 40% Gel 15 Gram(s) Oral once  dextrose 5%. 1000 milliLiter(s) (50 mL/Hr) IV Continuous <Continuous>  dextrose 5%. 1000 milliLiter(s) (100 mL/Hr) IV Continuous <Continuous>  dextrose 50% Injectable 25 Gram(s) IV Push once  dextrose 50% Injectable 12.5 Gram(s) IV Push once  dextrose 50% Injectable 25 Gram(s) IV Push once  glucagon  Injectable 1 milliGRAM(s) IntraMuscular once  insulin lispro (ADMELOG) corrective regimen sliding scale   SubCutaneous every 6 hours  levETIRAcetam  IVPB 500 milliGRAM(s) IV Intermittent every 24 hours  methylPREDNISolone sodium succinate Injectable 8 milliGRAM(s) IV Push every 24 hours  multivitamin 1 Tablet(s) Oral daily  polyethylene glycol 3350 17 Gram(s) Oral two times a day  pyridostigmine 30 milliGRAM(s) Oral every 8 hours  senna 2 Tablet(s) Oral at bedtime  zinc sulfate 220 milliGRAM(s) Oral daily    MEDICATIONS  (PRN):  acetaminophen    Suspension .. 600 milliGRAM(s) Oral every 6 hours PRN Moderate Pain (4 - 6)

## 2021-08-17 NOTE — PROGRESS NOTE ADULT - PROBLEM SELECTOR PLAN 2
-patient developed new left lower extremity numbness and weakness on 8/3  -patient denies back pain at present  -patient has strength of 1/5 L hip flexion (RLE is 4/5). Now has sensation to left foot and anterior tibial region, no sensation to left thigh. Intact sensation to RLE.  Bilateral upper extremities sensation intact with strength of 4/5  -MRI lumbar showed L RP bleed  -no plans for IR drainage at this time, as hematoma appears to be resolving  -hemoglobin 10.3 (8/11)->8.4 (8/12) after 1LNS (8/11), no signs of hemodynamic instability  -On 8/16 hb is 8.5    PLAN:  -continue to monitor hemoglobin  -Heparin dc'ed as per patient and family's wishes

## 2021-08-17 NOTE — PROGRESS NOTE ADULT - PROBLEM SELECTOR PLAN 3
s/p NGT placed 7/23 with subsequent clogging and removal on 8/11  -family does not wish to have PEG tube placed at this time  -discussed the risks of aspiration with family    PLAN:  - C/w thin puree diet  - plan to discharge to rehab

## 2021-08-17 NOTE — PROGRESS NOTE ADULT - PROBLEM SELECTOR PLAN 10
F: assess as needed  E: Monitor, Replete to K>4 and Mg>2, caution repletion in the setting of [facility]ARIST[facility]  N: thin puree, low K - discussed risks of aspiration with family and patient    VTE PPx: Heparin Subq  GI PPx: None    CODE STATUS: DNR    Dispo: pending AR placement

## 2021-08-17 NOTE — PROGRESS NOTE ADULT - ASSESSMENT
89y/o DNR/DNI male PMHx of HTN, severe aortic stenosis and seizures (on phenytoin) who presented with left facial droop and slurred speech, stroke w/u negative. Found to have myasthenia gravis w/ anti-Ach antibodies. 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

## 2021-08-17 NOTE — PROGRESS NOTE ADULT - PROBLEM SELECTOR PLAN 7
RESOLVED  -prior episode K 6.5 on 7/28 while in ICU, tried norepinephrine (7/28-7/30, stopped for bradycardia) and treated with lokelma, no w/u documented in progress notes. Etiology unclear, likely 2/2 renal hypoperfusion [decreased delivery of sodium and water to the sites of potassium secretion in the distal nephron (connecting segment and cortical collecting tubule), impaired potassium secretion into the tubular lumen, and hyperkalemia, per UTD vs less likely RTA (Cr at baseline).     Plan:   - s/p calcium gluconate 1g   - s/p insulin+D5 push  - K stable now  - dc'ed Lokelma  - changed diet to low K on 8/11

## 2021-08-18 PROBLEM — R56.9 UNSPECIFIED CONVULSIONS: Chronic | Status: ACTIVE | Noted: 2021-07-19

## 2021-08-18 PROBLEM — I10 ESSENTIAL (PRIMARY) HYPERTENSION: Chronic | Status: ACTIVE | Noted: 2021-07-19

## 2021-08-18 LAB
GLUCOSE BLDC GLUCOMTR-MCNC: 106 MG/DL — HIGH (ref 70–99)
GLUCOSE BLDC GLUCOMTR-MCNC: 130 MG/DL — HIGH (ref 70–99)
GLUCOSE BLDC GLUCOMTR-MCNC: 142 MG/DL — HIGH (ref 70–99)
GLUCOSE BLDC GLUCOMTR-MCNC: 142 MG/DL — HIGH (ref 70–99)
GLUCOSE BLDC GLUCOMTR-MCNC: 97 MG/DL — SIGNIFICANT CHANGE UP (ref 70–99)
GLUCOSE BLDC GLUCOMTR-MCNC: 97 MG/DL — SIGNIFICANT CHANGE UP (ref 70–99)

## 2021-08-18 PROCEDURE — 99231 SBSQ HOSP IP/OBS SF/LOW 25: CPT

## 2021-08-18 RX ADMIN — Medication 2000 UNIT(S): at 12:05

## 2021-08-18 RX ADMIN — Medication 10 MILLIGRAM(S): at 19:03

## 2021-08-18 RX ADMIN — POLYETHYLENE GLYCOL 3350 17 GRAM(S): 17 POWDER, FOR SOLUTION ORAL at 19:03

## 2021-08-18 RX ADMIN — PYRIDOSTIGMINE BROMIDE 30 MILLIGRAM(S): 60 SOLUTION ORAL at 14:08

## 2021-08-18 RX ADMIN — ZINC SULFATE TAB 220 MG (50 MG ZINC EQUIVALENT) 220 MILLIGRAM(S): 220 (50 ZN) TAB at 12:04

## 2021-08-18 RX ADMIN — PYRIDOSTIGMINE BROMIDE 30 MILLIGRAM(S): 60 SOLUTION ORAL at 21:34

## 2021-08-18 RX ADMIN — POLYETHYLENE GLYCOL 3350 17 GRAM(S): 17 POWDER, FOR SOLUTION ORAL at 06:45

## 2021-08-18 RX ADMIN — Medication 1 TABLET(S): at 14:07

## 2021-08-18 RX ADMIN — ATORVASTATIN CALCIUM 40 MILLIGRAM(S): 80 TABLET, FILM COATED ORAL at 21:33

## 2021-08-18 RX ADMIN — Medication 1 TABLET(S): at 12:03

## 2021-08-18 RX ADMIN — PYRIDOSTIGMINE BROMIDE 30 MILLIGRAM(S): 60 SOLUTION ORAL at 06:44

## 2021-08-18 RX ADMIN — LEVETIRACETAM 400 MILLIGRAM(S): 250 TABLET, FILM COATED ORAL at 12:03

## 2021-08-18 NOTE — PROGRESS NOTE ADULT - PROBLEM SELECTOR PLAN 9
- Cont keppra 500 q12 (started on IV as NGT removed)  - Will followup w Dr. Beach outpt on discharge    #Left upper extremity swelling  -LUE US consistent with superficial thrombophlebitis

## 2021-08-18 NOTE — PROGRESS NOTE ADULT - PROBLEM SELECTOR PLAN 1
- positive anti-ach antibiotics  - S/p IVIG x5 days    PLAN:  - no rituximab at this time per family  - C/w on 10mg prednisone PO daily   - c/w Mestinon 30mg PO q8hrs  - Neuro recs appreciated, will set up followup appt with Dr. Beach on discharge  - Heme onc recs appreciated

## 2021-08-18 NOTE — PROGRESS NOTE ADULT - ASSESSMENT
89y/o DNR/DNI male PMHx of HTN, severe aortic stenosis and seizures (on phenytoin) who presented with left facial droop and slurred speech, stroke w/u negative. Found to have myasthenia gravis w/ anti-Ach antibodies. 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

## 2021-08-18 NOTE — PROGRESS NOTE ADULT - SUBJECTIVE AND OBJECTIVE BOX
OVERNIGHT EVENTS:    SUBJECTIVE:  Patient seen and examined at bedside.    Vital Signs Last 12 Hrs  T(F): 98 (08-18-21 @ 04:41), Max: 98 (08-17-21 @ 20:33)  HR: 66 (08-18-21 @ 04:41) (66 - 67)  BP: 147/76 (08-18-21 @ 04:41) (131/72 - 147/76)  BP(mean): --  RR: 18 (08-18-21 @ 04:41) (18 - 18)  SpO2: 98% (08-18-21 @ 04:41) (98% - 98%)  I&O's Summary      PHYSICAL EXAM:  Constitutional: NAD, comfortable in bed.  HEENT: NC/AT, PERRLA, EOMI, no conjunctival pallor or scleral icterus, MMM  Neck: Supple, no JVD  Respiratory: CTA B/L. No w/r/r.   Cardiovascular: RRR, normal S1 and S2, no m/r/g.   Gastrointestinal: +BS, soft NTND, no guarding or rebound tenderness, no palpable masses   Extremities: wwp; no cyanosis, clubbing or edema.   Vascular: Pulses equal and strong throughout.   Neurological: AAOx3, no CN deficits, strength and sensation intact throughout.   Skin: No gross skin abnormalities or rashes        LABS:                  RADIOLOGY & ADDITIONAL TESTS:    MEDICATIONS  (STANDING):  atorvastatin 40 milliGRAM(s) Oral at bedtime  calcium carbonate   1250 mG (OsCal) 1 Tablet(s) Oral every 24 hours  cholecalciferol 2000 Unit(s) Oral daily  dextrose 40% Gel 15 Gram(s) Oral once  dextrose 5%. 1000 milliLiter(s) (50 mL/Hr) IV Continuous <Continuous>  dextrose 5%. 1000 milliLiter(s) (100 mL/Hr) IV Continuous <Continuous>  dextrose 50% Injectable 25 Gram(s) IV Push once  dextrose 50% Injectable 12.5 Gram(s) IV Push once  dextrose 50% Injectable 25 Gram(s) IV Push once  glucagon  Injectable 1 milliGRAM(s) IntraMuscular once  insulin lispro (ADMELOG) corrective regimen sliding scale   SubCutaneous every 6 hours  levETIRAcetam  IVPB 500 milliGRAM(s) IV Intermittent every 24 hours  multivitamin 1 Tablet(s) Oral daily  polyethylene glycol 3350 17 Gram(s) Oral two times a day  predniSONE   Tablet 10 milliGRAM(s) Oral every 24 hours  pyridostigmine 30 milliGRAM(s) Oral every 8 hours  senna 2 Tablet(s) Oral at bedtime  zinc sulfate 220 milliGRAM(s) Oral daily    MEDICATIONS  (PRN):  acetaminophen    Suspension .. 600 milliGRAM(s) Oral every 6 hours PRN Moderate Pain (4 - 6)   OVERNIGHT EVENTS: MICHAEL.     SUBJECTIVE:  Patient seen and examined at bedside. Patient denies fever, chills, weakness, HA, vision changes, chest pain, palpitations, LE edema, SOB, cough, abdominal pain, n/v/c/d, dysuria, and changes in BM.     Vital Signs Last 12 Hrs  T(F): 98 (08-18-21 @ 04:41), Max: 98 (08-17-21 @ 20:33)  HR: 66 (08-18-21 @ 04:41) (66 - 67)  BP: 147/76 (08-18-21 @ 04:41) (131/72 - 147/76)  BP(mean): --  RR: 18 (08-18-21 @ 04:41) (18 - 18)  SpO2: 98% (08-18-21 @ 04:41) (98% - 98%)  I&O's Summary      PHYSICAL EXAM:  Constitutional: NAD, comfortable in bed.  HEENT: NC/AT, PERRLA, EOMI, no conjunctival pallor or scleral icterus, MMM  Neck: Supple, no JVD  Respiratory: CTA B/L. No w/r/r.   Cardiovascular: RRR, normal S1 and S2, no m/r/g.   Gastrointestinal: +BS, soft NTND, no guarding or rebound tenderness, no palpable masses   Extremities: wwp; no cyanosis, clubbing or edema.   Vascular: Pulses equal and strong throughout.   Neurological: AAOx3, no CN deficits, strength and sensation intact throughout.   Skin: No gross skin abnormalities or rashes        LABS:                  RADIOLOGY & ADDITIONAL TESTS:    MEDICATIONS  (STANDING):  atorvastatin 40 milliGRAM(s) Oral at bedtime  calcium carbonate   1250 mG (OsCal) 1 Tablet(s) Oral every 24 hours  cholecalciferol 2000 Unit(s) Oral daily  dextrose 40% Gel 15 Gram(s) Oral once  dextrose 5%. 1000 milliLiter(s) (50 mL/Hr) IV Continuous <Continuous>  dextrose 5%. 1000 milliLiter(s) (100 mL/Hr) IV Continuous <Continuous>  dextrose 50% Injectable 25 Gram(s) IV Push once  dextrose 50% Injectable 12.5 Gram(s) IV Push once  dextrose 50% Injectable 25 Gram(s) IV Push once  glucagon  Injectable 1 milliGRAM(s) IntraMuscular once  insulin lispro (ADMELOG) corrective regimen sliding scale   SubCutaneous every 6 hours  levETIRAcetam  IVPB 500 milliGRAM(s) IV Intermittent every 24 hours  multivitamin 1 Tablet(s) Oral daily  polyethylene glycol 3350 17 Gram(s) Oral two times a day  predniSONE   Tablet 10 milliGRAM(s) Oral every 24 hours  pyridostigmine 30 milliGRAM(s) Oral every 8 hours  senna 2 Tablet(s) Oral at bedtime  zinc sulfate 220 milliGRAM(s) Oral daily    MEDICATIONS  (PRN):  acetaminophen    Suspension .. 600 milliGRAM(s) Oral every 6 hours PRN Moderate Pain (4 - 6)   OVERNIGHT EVENTS: MICHAEL. Pending [facility] vs acute rehab    SUBJECTIVE:  Patient seen and examined at bedside. Patient denies fever, chills, weakness, HA, vision changes, chest pain, palpitations, LE edema, SOB, cough, abdominal pain, n/v/c/d, dysuria, and changes in BM.     Vital Signs Last 12 Hrs  T(F): 98 (08-18-21 @ 04:41), Max: 98 (08-17-21 @ 20:33)  HR: 66 (08-18-21 @ 04:41) (66 - 67)  BP: 147/76 (08-18-21 @ 04:41) (131/72 - 147/76)  BP(mean): --  RR: 18 (08-18-21 @ 04:41) (18 - 18)  SpO2: 98% (08-18-21 @ 04:41) (98% - 98%)  I&O's Summary      PHYSICAL EXAM:  Constitutional: NAD, comfortable in bed.  HEENT: NC/AT, PERRLA, EOMI, no conjunctival pallor or scleral icterus, MMM  Neck: Supple, no JVD  Respiratory: CTA B/L. No w/r/r.   Cardiovascular: RRR, normal S1 and S2, no m/r/g.   Gastrointestinal: +BS, soft NTND, no guarding or rebound tenderness, no palpable masses   Extremities: wwp; no cyanosis, clubbing or edema.   Vascular: Pulses equal and strong throughout.   Neurological: AAOx3, no CN deficits, strength and sensation intact throughout.   Skin: No gross skin abnormalities or rashes        LABS:                  RADIOLOGY & ADDITIONAL TESTS:    MEDICATIONS  (STANDING):  atorvastatin 40 milliGRAM(s) Oral at bedtime  calcium carbonate   1250 mG (OsCal) 1 Tablet(s) Oral every 24 hours  cholecalciferol 2000 Unit(s) Oral daily  dextrose 40% Gel 15 Gram(s) Oral once  dextrose 5%. 1000 milliLiter(s) (50 mL/Hr) IV Continuous <Continuous>  dextrose 5%. 1000 milliLiter(s) (100 mL/Hr) IV Continuous <Continuous>  dextrose 50% Injectable 25 Gram(s) IV Push once  dextrose 50% Injectable 12.5 Gram(s) IV Push once  dextrose 50% Injectable 25 Gram(s) IV Push once  glucagon  Injectable 1 milliGRAM(s) IntraMuscular once  insulin lispro (ADMELOG) corrective regimen sliding scale   SubCutaneous every 6 hours  levETIRAcetam  IVPB 500 milliGRAM(s) IV Intermittent every 24 hours  multivitamin 1 Tablet(s) Oral daily  polyethylene glycol 3350 17 Gram(s) Oral two times a day  predniSONE   Tablet 10 milliGRAM(s) Oral every 24 hours  pyridostigmine 30 milliGRAM(s) Oral every 8 hours  senna 2 Tablet(s) Oral at bedtime  zinc sulfate 220 milliGRAM(s) Oral daily    MEDICATIONS  (PRN):  acetaminophen    Suspension .. 600 milliGRAM(s) Oral every 6 hours PRN Moderate Pain (4 - 6)   OVERNIGHT EVENTS: MICHAEL. Pending [facility] vs acute rehab    SUBJECTIVE:  Patient seen and examined at bedside. Patient denies fever, chills, weakness, HA, vision changes, chest pain, palpitations, LE edema, SOB, cough, abdominal pain, n/v/c/d, dysuria, and changes in BM.     Vital Signs Last 12 Hrs  T(F): 98 (08-18-21 @ 04:41), Max: 98 (08-17-21 @ 20:33)  HR: 66 (08-18-21 @ 04:41) (66 - 67)  BP: 147/76 (08-18-21 @ 04:41) (131/72 - 147/76)  BP(mean): --  RR: 18 (08-18-21 @ 04:41) (18 - 18)  SpO2: 98% (08-18-21 @ 04:41) (98% - 98%)  I&O's Summary      PHYSICAL EXAM:  Constitutional: NAD, comfortable in bed.  HEENT: NC/AT, PERRLA, EOMI, no conjunctival pallor or scleral icterus, MMM  Neck: Supple, no JVD  Respiratory: CTA B/L. No w/r/r.   Cardiovascular: RRR, normal S1 and S2, no m/r/g.   Gastrointestinal: +BS, soft NTND, no guarding or rebound tenderness, no palpable masses   Extremities: wwp; no cyanosis, clubbing or edema.   Vascular: Pulses equal and strong throughout.   Neurological: AAOx3, no CN deficits, left hip flexion 4/5, knee extension 2/5, otherwise 5/5 throughout   Skin: No gross skin abnormalities or rashes        LABS:                  RADIOLOGY & ADDITIONAL TESTS:    MEDICATIONS  (STANDING):  atorvastatin 40 milliGRAM(s) Oral at bedtime  calcium carbonate   1250 mG (OsCal) 1 Tablet(s) Oral every 24 hours  cholecalciferol 2000 Unit(s) Oral daily  dextrose 40% Gel 15 Gram(s) Oral once  dextrose 5%. 1000 milliLiter(s) (50 mL/Hr) IV Continuous <Continuous>  dextrose 5%. 1000 milliLiter(s) (100 mL/Hr) IV Continuous <Continuous>  dextrose 50% Injectable 25 Gram(s) IV Push once  dextrose 50% Injectable 12.5 Gram(s) IV Push once  dextrose 50% Injectable 25 Gram(s) IV Push once  glucagon  Injectable 1 milliGRAM(s) IntraMuscular once  insulin lispro (ADMELOG) corrective regimen sliding scale   SubCutaneous every 6 hours  levETIRAcetam  IVPB 500 milliGRAM(s) IV Intermittent every 24 hours  multivitamin 1 Tablet(s) Oral daily  polyethylene glycol 3350 17 Gram(s) Oral two times a day  predniSONE   Tablet 10 milliGRAM(s) Oral every 24 hours  pyridostigmine 30 milliGRAM(s) Oral every 8 hours  senna 2 Tablet(s) Oral at bedtime  zinc sulfate 220 milliGRAM(s) Oral daily    MEDICATIONS  (PRN):  acetaminophen    Suspension .. 600 milliGRAM(s) Oral every 6 hours PRN Moderate Pain (4 - 6)

## 2021-08-18 NOTE — PROGRESS NOTE ADULT - ATTENDING COMMENTS
No new complaints  Exam stable  Awaiting placement in rehab  Continue prednisone 10mg daily, mestinon 30mg TID, calcium and vitamin D

## 2021-08-19 LAB
GLUCOSE BLDC GLUCOMTR-MCNC: 102 MG/DL — HIGH (ref 70–99)
GLUCOSE BLDC GLUCOMTR-MCNC: 115 MG/DL — HIGH (ref 70–99)
GLUCOSE BLDC GLUCOMTR-MCNC: 121 MG/DL — HIGH (ref 70–99)
GLUCOSE BLDC GLUCOMTR-MCNC: 134 MG/DL — HIGH (ref 70–99)
GLUCOSE BLDC GLUCOMTR-MCNC: 142 MG/DL — HIGH (ref 70–99)

## 2021-08-19 PROCEDURE — 99231 SBSQ HOSP IP/OBS SF/LOW 25: CPT

## 2021-08-19 RX ADMIN — PYRIDOSTIGMINE BROMIDE 30 MILLIGRAM(S): 60 SOLUTION ORAL at 06:14

## 2021-08-19 RX ADMIN — Medication 1 TABLET(S): at 15:03

## 2021-08-19 RX ADMIN — ATORVASTATIN CALCIUM 40 MILLIGRAM(S): 80 TABLET, FILM COATED ORAL at 21:51

## 2021-08-19 RX ADMIN — LEVETIRACETAM 400 MILLIGRAM(S): 250 TABLET, FILM COATED ORAL at 11:17

## 2021-08-19 RX ADMIN — Medication 1 TABLET(S): at 11:16

## 2021-08-19 RX ADMIN — ZINC SULFATE TAB 220 MG (50 MG ZINC EQUIVALENT) 220 MILLIGRAM(S): 220 (50 ZN) TAB at 11:16

## 2021-08-19 RX ADMIN — PYRIDOSTIGMINE BROMIDE 30 MILLIGRAM(S): 60 SOLUTION ORAL at 21:51

## 2021-08-19 RX ADMIN — PYRIDOSTIGMINE BROMIDE 30 MILLIGRAM(S): 60 SOLUTION ORAL at 15:05

## 2021-08-19 RX ADMIN — Medication 2000 UNIT(S): at 11:16

## 2021-08-19 RX ADMIN — Medication 10 MILLIGRAM(S): at 15:03

## 2021-08-19 RX ADMIN — POLYETHYLENE GLYCOL 3350 17 GRAM(S): 17 POWDER, FOR SOLUTION ORAL at 06:14

## 2021-08-19 NOTE — PROGRESS NOTE ADULT - ATTENDING COMMENTS
no significant events  awaiting discharge to home vs. [facility]  continue prednisone 10mg daily, pyridostigmine 30mg TID, calcium, vitamin D  f/u with me after discharge

## 2021-08-19 NOTE — CHART NOTE - NSCHARTNOTEFT_GEN_A_CORE
Admitting Diagnosis:   Patient is a 89y old  Male who presents with a chief complaint of slurred speech, L facial droop (19 Aug 2021 10:47)      PAST MEDICAL & SURGICAL HISTORY:  Hypertension    Seizure        Current Nutrition Order:  Puree/Thin Liquid, Low Potassium     PO Intake: Good (%) [   ]  Fair (50-75%) [ X  ] Poor (<25%) [   ]    GI Issues: No complaints of N/V  Last BM recorded 8/18    Pain: Pt denied complaints of pain     Skin Integrity: Sheng 13, no edema  Sacrum stage II and coccyx DTI    Labs:     CAPILLARY BLOOD GLUCOSE      POCT Blood Glucose.: 142 mg/dL (19 Aug 2021 11:57)  POCT Blood Glucose.: 102 mg/dL (19 Aug 2021 08:47)  POCT Blood Glucose.: 121 mg/dL (19 Aug 2021 06:22)  POCT Blood Glucose.: 142 mg/dL (18 Aug 2021 23:46)  POCT Blood Glucose.: 106 mg/dL (18 Aug 2021 16:58)      Medications:  MEDICATIONS  (STANDING):  atorvastatin 40 milliGRAM(s) Oral at bedtime  calcium carbonate   1250 mG (OsCal) 1 Tablet(s) Oral every 24 hours  cholecalciferol 2000 Unit(s) Oral daily  dextrose 40% Gel 15 Gram(s) Oral once  dextrose 5%. 1000 milliLiter(s) (50 mL/Hr) IV Continuous <Continuous>  dextrose 5%. 1000 milliLiter(s) (100 mL/Hr) IV Continuous <Continuous>  dextrose 50% Injectable 25 Gram(s) IV Push once  dextrose 50% Injectable 12.5 Gram(s) IV Push once  dextrose 50% Injectable 25 Gram(s) IV Push once  glucagon  Injectable 1 milliGRAM(s) IntraMuscular once  insulin lispro (ADMELOG) corrective regimen sliding scale   SubCutaneous every 6 hours  levETIRAcetam  IVPB 500 milliGRAM(s) IV Intermittent every 24 hours  multivitamin 1 Tablet(s) Oral daily  polyethylene glycol 3350 17 Gram(s) Oral two times a day  predniSONE   Tablet 10 milliGRAM(s) Oral every 24 hours  pyridostigmine 30 milliGRAM(s) Oral every 8 hours  senna 2 Tablet(s) Oral at bedtime  zinc sulfate 220 milliGRAM(s) Oral daily    MEDICATIONS  (PRN):  acetaminophen    Suspension .. 600 milliGRAM(s) Oral every 6 hours PRN Moderate Pain (4 - 6)      Adm Anthropometrics:  Height 66"  ABW 60.3kg  IBW 64.4kg  94%IBW  BMI 21.4    Weight Change: no new wts in EMR. Please obtain wt weekly to assess for wt changes    Estimated energy needs:   ActualBW used for calculations as pt between % of IBW. Needs estimated for age and slightly increased for inflammatory process. Moderate protein 2/2 EVARISTO  Calories: 25-30 kcal/kg = 5501-4657 kcal/day  Protein: 1.2-1.4 g/kg = 72-84g pro/day  Fluids per team 2/2 EVARISTO/fluid overload    Subjective:   89y Male with PMHx of HTN and seizure on phenytoin presents with left facial droop and slurred speech, Stroke w/u negative. Found to have myasthenia gravis. S/p IVIG x5 days. Admitted to ICU for cardiogenic shock vs obstructive shock in the setting of severe aortic stenosis and IV fluid administration. At that time pt was hypotensive w/leukocytosis. Also found to have aspiration pneumonia. S/p FEES on 7/20, and multiple bedside f/u over past week. Pt deemed inappropriate for PO intake and NGT placed for EN. Pt stepped down to 7 Lach. Weaned off of pressors. S/p repeat FEES on 8/4 but still recommended to remain NPO w/EN via NGT. Ortho/neurology following for LE weakness and recommended for MRI L-spine. Family choosing not to pursue bone marrow biopsy to w/u possible CML or other hematologic malignancy. Found to have L. RP hematoma, which is likely contributing to LE weakness/pain. S/p x 5day IVIG. SLP re-eval 8/6, pt to remain NPO with NGT. Pt and family not wanting PEG, so started on pleasure feeds w/known aspiration risk.     Pt seen in room, awake, alert, pleasant. Endorsed that everything is good and he has no complaints. Continues to tolerate puree food, eating w/feeding assistance. No N/V/C/D reported at this time. No pain today. Afebrile. No labs being drawn, POC , 121mg/dL. Pending T[facility]AR vs. home w/home care/PT. Will continue to follow per RD protocol.     Previous Nutrition Diagnosis:  Inadequate Oral Intake RT dysphagia AEB need for EN to meet estimated needs.    Active [  x ]  Resolved [   ]    If resolved, new PES:     Goal: Pt will continue to meet % of daily EER via tolerated route     Recommendations:  1. Keep nutrition aligned with GOC at all times. Honor food preferences   *Maintain aspiration precautions to best of ability  2. Continue micronutrient supplementation   3. Monitor lytes and replete prn. POC BG q6hrs   4. Pain and bowel regimens per team     Education: N/a today, completed at previous f/u    Risk Level: High [   ] Moderate [ X  ] Low [   ].

## 2021-08-19 NOTE — CHART NOTE - NSCHARTNOTESELECT_GEN_ALL_CORE
Event Note
Follow up/Nutrition Services
Nutrition Follow Up/Nutrition Services
Coordination of care
Event Note
Nutrition Follow Up/Nutrition Services

## 2021-08-19 NOTE — CHART NOTE - NSCHARTNOTEFT_GEN_A_CORE
Pt requires a wheelchair on discharge as the beneficiary's mobility limitation cannot be sufficiently resolved by the use of an appropriately fitted cane or walker. Use of a manual wheelchair will significantly improve the beneficiary's ability to participate in [facility] and the beneficiary will use it on a regular basis in the home. The beneficiary has a caregiver who is available, willing, and able to provide assistance with the wheelchair.

## 2021-08-19 NOTE — PROGRESS NOTE ADULT - PROBLEM SELECTOR PLAN 7
RESOLVED  -prior episode K 6.5 on 7/28 while in ICU, tried norepinephrine (7/28-7/30, stopped for bradycardia) and treated with lokelma, no w/u documented in progress notes. Etiology unclear, likely 2/2 renal hypoperfusion [decreased delivery of sodium and water to the sites of potassium secretion in the distal nephron (connecting segment and cortical collecting tubule), impaired potassium secretion into the tubular lumen, and hyperkalemia, per UTD vs less likely RTA (Cr at baseline).     Plan:   - s/p calcium gluconate 1g   - s/p insulin+D5 push  - K stable now  - dc'ed Lokelma

## 2021-08-19 NOTE — PROGRESS NOTE ADULT - SUBJECTIVE AND OBJECTIVE BOX
OVERNIGHT EVENTS: MICHAEL. Pending TUSHAR vs acute rehab    SUBJECTIVE:  Patient seen and examined at bedside. Patient denies fever, chills, weakness, HA, vision changes, chest pain, palpitations, LE edema, SOB, cough, abdominal pain, n/v/c/d, dysuria, and changes in BM.     Vital Signs Last 12 Hrs  T(F): 98 (08-18-21 @ 04:41), Max: 98 (08-17-21 @ 20:33)  HR: 66 (08-18-21 @ 04:41) (66 - 67)  BP: 147/76 (08-18-21 @ 04:41) (131/72 - 147/76)  BP(mean): --  RR: 18 (08-18-21 @ 04:41) (18 - 18)  SpO2: 98% (08-18-21 @ 04:41) (98% - 98%)  I&O's Summary    PHYSICAL EXAM:  Constitutional: NAD, comfortable in bed.  HEENT: NC/AT, PERRLA, EOMI, no conjunctival pallor or scleral icterus, MMM  Neck: Supple, no JVD  Respiratory: CTA B/L. No w/r/r.   Cardiovascular: RRR, normal S1 and S2, no m/r/g.   Gastrointestinal: +BS, soft NTND, no guarding or rebound tenderness, no palpable masses   Extremities: wwp; no cyanosis, clubbing or edema.   Vascular: Pulses equal and strong throughout.   Neurological: AAOx3, no CN deficits, left hip flexion 4/5, knee extension 2/5, otherwise 5/5 throughout   Skin: No gross skin abnormalities or rashes    LABS: NONE    RADIOLOGY & ADDITIONAL TESTS:    MEDICATIONS  (STANDING):  atorvastatin 40 milliGRAM(s) Oral at bedtime  calcium carbonate   1250 mG (OsCal) 1 Tablet(s) Oral every 24 hours  cholecalciferol 2000 Unit(s) Oral daily  dextrose 40% Gel 15 Gram(s) Oral once  dextrose 5%. 1000 milliLiter(s) (50 mL/Hr) IV Continuous <Continuous>  dextrose 5%. 1000 milliLiter(s) (100 mL/Hr) IV Continuous <Continuous>  dextrose 50% Injectable 25 Gram(s) IV Push once  dextrose 50% Injectable 12.5 Gram(s) IV Push once  dextrose 50% Injectable 25 Gram(s) IV Push once  glucagon  Injectable 1 milliGRAM(s) IntraMuscular once  insulin lispro (ADMELOG) corrective regimen sliding scale   SubCutaneous every 6 hours  levETIRAcetam  IVPB 500 milliGRAM(s) IV Intermittent every 24 hours  multivitamin 1 Tablet(s) Oral daily  polyethylene glycol 3350 17 Gram(s) Oral two times a day  predniSONE   Tablet 10 milliGRAM(s) Oral every 24 hours  pyridostigmine 30 milliGRAM(s) Oral every 8 hours  senna 2 Tablet(s) Oral at bedtime  zinc sulfate 220 milliGRAM(s) Oral daily    MEDICATIONS  (PRN):  acetaminophen    Suspension .. 600 milliGRAM(s) Oral every 6 hours PRN Moderate Pain (4 - 6)   SUBJECTIVE:  Patient seen and examined at bedside. Patient denies fever, chills, weakness, HA, vision changes, chest pain, palpitations, LE edema, SOB, cough, abdominal pain, n/v/c/d, dysuria, and changes in BM.     Vital Signs Last 12 Hrs  T(F): 98 (08-18-21 @ 04:41), Max: 98 (08-17-21 @ 20:33)  HR: 66 (08-18-21 @ 04:41) (66 - 67)  BP: 147/76 (08-18-21 @ 04:41) (131/72 - 147/76)  BP(mean): --  RR: 18 (08-18-21 @ 04:41) (18 - 18)  SpO2: 98% (08-18-21 @ 04:41) (98% - 98%)  I&O's Summary    PHYSICAL EXAM:  Constitutional: NAD, comfortable in bed.  HEENT: NC/AT, PERRLA, EOMI, no conjunctival pallor or scleral icterus, MMM  Neck: Supple, no JVD  Respiratory: CTA B/L. No w/r/r.   Cardiovascular: RRR, normal S1 and S2, no m/r/g.   Gastrointestinal: +BS, soft NTND, no guarding or rebound tenderness, no palpable masses   Extremities: wwp; no cyanosis, clubbing or edema.   Vascular: Pulses equal and strong throughout.   Neurological: AAOx3, no CN deficits, left hip flexion 4/5, knee extension 2/5, otherwise 5/5 throughout   Skin: No gross skin abnormalities or rashes    LABS: NONE    RADIOLOGY & ADDITIONAL TESTS:    MEDICATIONS  (STANDING):  atorvastatin 40 milliGRAM(s) Oral at bedtime  calcium carbonate   1250 mG (OsCal) 1 Tablet(s) Oral every 24 hours  cholecalciferol 2000 Unit(s) Oral daily  dextrose 40% Gel 15 Gram(s) Oral once  dextrose 5%. 1000 milliLiter(s) (50 mL/Hr) IV Continuous <Continuous>  dextrose 5%. 1000 milliLiter(s) (100 mL/Hr) IV Continuous <Continuous>  dextrose 50% Injectable 25 Gram(s) IV Push once  dextrose 50% Injectable 12.5 Gram(s) IV Push once  dextrose 50% Injectable 25 Gram(s) IV Push once  glucagon  Injectable 1 milliGRAM(s) IntraMuscular once  insulin lispro (ADMELOG) corrective regimen sliding scale   SubCutaneous every 6 hours  levETIRAcetam  IVPB 500 milliGRAM(s) IV Intermittent every 24 hours  multivitamin 1 Tablet(s) Oral daily  polyethylene glycol 3350 17 Gram(s) Oral two times a day  predniSONE   Tablet 10 milliGRAM(s) Oral every 24 hours  pyridostigmine 30 milliGRAM(s) Oral every 8 hours  senna 2 Tablet(s) Oral at bedtime  zinc sulfate 220 milliGRAM(s) Oral daily    MEDICATIONS  (PRN):  acetaminophen    Suspension .. 600 milliGRAM(s) Oral every 6 hours PRN Moderate Pain (4 - 6)

## 2021-08-19 NOTE — PROGRESS NOTE ADULT - PROBLEM SELECTOR PLAN 9
- Cont keppra 500 q12 (started on IV as NGT removed)  - Will followup w Dr. Beach outpt on discharge    #Left upper extremity swelling RESOLVED  -LUE US consistent with superficial thrombophlebitis

## 2021-08-20 PROBLEM — Z00.00 ENCOUNTER FOR PREVENTIVE HEALTH EXAMINATION: Status: ACTIVE | Noted: 2021-08-20

## 2021-08-20 LAB
GLUCOSE BLDC GLUCOMTR-MCNC: 121 MG/DL — HIGH (ref 70–99)
GLUCOSE BLDC GLUCOMTR-MCNC: 139 MG/DL — HIGH (ref 70–99)
GLUCOSE BLDC GLUCOMTR-MCNC: 226 MG/DL — HIGH (ref 70–99)
GLUCOSE BLDC GLUCOMTR-MCNC: 92 MG/DL — SIGNIFICANT CHANGE UP (ref 70–99)
GLUCOSE BLDC GLUCOMTR-MCNC: 99 MG/DL — SIGNIFICANT CHANGE UP (ref 70–99)

## 2021-08-20 PROCEDURE — 99238 HOSP IP/OBS DSCHRG MGMT 30/<: CPT

## 2021-08-20 RX ORDER — AMLODIPINE BESYLATE AND BENAZEPRIL HYDROCHLORIDE 10; 20 MG/1; MG/1
1 CAPSULE ORAL
Qty: 0 | Refills: 0 | DISCHARGE

## 2021-08-20 RX ORDER — LEVETIRACETAM 250 MG/1
1 TABLET, FILM COATED ORAL
Qty: 30 | Refills: 1
Start: 2021-08-20 | End: 2021-10-18

## 2021-08-20 RX ORDER — CHOLECALCIFEROL (VITAMIN D3) 125 MCG
2000 CAPSULE ORAL
Qty: 0 | Refills: 0 | DISCHARGE
Start: 2021-08-20

## 2021-08-20 RX ORDER — CHOLECALCIFEROL (VITAMIN D3) 125 MCG
2000 CAPSULE ORAL
Qty: 30 | Refills: 1
Start: 2021-08-20 | End: 2021-10-18

## 2021-08-20 RX ORDER — ATORVASTATIN CALCIUM 80 MG/1
1 TABLET, FILM COATED ORAL
Qty: 0 | Refills: 0 | DISCHARGE
Start: 2021-08-20

## 2021-08-20 RX ORDER — PYRIDOSTIGMINE BROMIDE 60 MG/5ML
0.5 SOLUTION ORAL
Qty: 45 | Refills: 1
Start: 2021-08-20 | End: 2021-10-18

## 2021-08-20 RX ORDER — CALCIUM CARBONATE 500(1250)
1 TABLET ORAL
Qty: 30 | Refills: 1
Start: 2021-08-20 | End: 2021-10-18

## 2021-08-20 RX ADMIN — Medication 2: at 22:29

## 2021-08-20 RX ADMIN — PYRIDOSTIGMINE BROMIDE 30 MILLIGRAM(S): 60 SOLUTION ORAL at 06:48

## 2021-08-20 RX ADMIN — Medication 1 TABLET(S): at 13:09

## 2021-08-20 RX ADMIN — PYRIDOSTIGMINE BROMIDE 30 MILLIGRAM(S): 60 SOLUTION ORAL at 22:21

## 2021-08-20 RX ADMIN — ZINC SULFATE TAB 220 MG (50 MG ZINC EQUIVALENT) 220 MILLIGRAM(S): 220 (50 ZN) TAB at 13:10

## 2021-08-20 RX ADMIN — POLYETHYLENE GLYCOL 3350 17 GRAM(S): 17 POWDER, FOR SOLUTION ORAL at 06:48

## 2021-08-20 RX ADMIN — LEVETIRACETAM 400 MILLIGRAM(S): 250 TABLET, FILM COATED ORAL at 13:09

## 2021-08-20 RX ADMIN — ATORVASTATIN CALCIUM 40 MILLIGRAM(S): 80 TABLET, FILM COATED ORAL at 22:22

## 2021-08-20 RX ADMIN — PYRIDOSTIGMINE BROMIDE 30 MILLIGRAM(S): 60 SOLUTION ORAL at 14:09

## 2021-08-20 RX ADMIN — Medication 1 TABLET(S): at 14:09

## 2021-08-20 RX ADMIN — SENNA PLUS 2 TABLET(S): 8.6 TABLET ORAL at 22:22

## 2021-08-20 RX ADMIN — Medication 10 MILLIGRAM(S): at 17:00

## 2021-08-20 RX ADMIN — Medication 2000 UNIT(S): at 13:10

## 2021-08-20 NOTE — PROGRESS NOTE ADULT - SUBJECTIVE AND OBJECTIVE BOX
SUBJECTIVE:  Patient seen and examined at bedside. Patient denies fever, chills, weakness, HA, vision changes, chest pain, palpitations, LE edema, SOB, cough, abdominal pain, n/v/c/d, dysuria, and changes in BM.     Vital Signs Last 12 Hrs  T(F): 98 (08-18-21 @ 04:41), Max: 98 (08-17-21 @ 20:33)  HR: 66 (08-18-21 @ 04:41) (66 - 67)  BP: 147/76 (08-18-21 @ 04:41) (131/72 - 147/76)  BP(mean): --  RR: 18 (08-18-21 @ 04:41) (18 - 18)  SpO2: 98% (08-18-21 @ 04:41) (98% - 98%)  I&O's Summary    PHYSICAL EXAM:  Constitutional: NAD, comfortable in bed.  HEENT: NC/AT, PERRLA, EOMI, no conjunctival pallor or scleral icterus, MMM  Neck: Supple, no JVD  Respiratory: CTA B/L. No w/r/r.   Cardiovascular: RRR, normal S1 and S2, no m/r/g.   Gastrointestinal: +BS, soft NTND, no guarding or rebound tenderness, no palpable masses   Extremities: wwp; no cyanosis, clubbing or edema.   Vascular: Pulses equal and strong throughout.   Neurological: AAOx3, no CN deficits, left hip flexion 4/5, knee extension 2/5, otherwise 5/5 throughout   Skin: No gross skin abnormalities or rashes    LABS: NONE    RADIOLOGY & ADDITIONAL TESTS:    MEDICATIONS  (STANDING):  atorvastatin 40 milliGRAM(s) Oral at bedtime  calcium carbonate   1250 mG (OsCal) 1 Tablet(s) Oral every 24 hours  cholecalciferol 2000 Unit(s) Oral daily  dextrose 40% Gel 15 Gram(s) Oral once  dextrose 5%. 1000 milliLiter(s) (50 mL/Hr) IV Continuous <Continuous>  dextrose 5%. 1000 milliLiter(s) (100 mL/Hr) IV Continuous <Continuous>  dextrose 50% Injectable 25 Gram(s) IV Push once  dextrose 50% Injectable 12.5 Gram(s) IV Push once  dextrose 50% Injectable 25 Gram(s) IV Push once  glucagon  Injectable 1 milliGRAM(s) IntraMuscular once  insulin lispro (ADMELOG) corrective regimen sliding scale   SubCutaneous every 6 hours  levETIRAcetam  IVPB 500 milliGRAM(s) IV Intermittent every 24 hours  multivitamin 1 Tablet(s) Oral daily  polyethylene glycol 3350 17 Gram(s) Oral two times a day  predniSONE   Tablet 10 milliGRAM(s) Oral every 24 hours  pyridostigmine 30 milliGRAM(s) Oral every 8 hours  senna 2 Tablet(s) Oral at bedtime  zinc sulfate 220 milliGRAM(s) Oral daily    MEDICATIONS  (PRN):  acetaminophen    Suspension .. 600 milliGRAM(s) Oral every 6 hours PRN Moderate Pain (4 - 6)

## 2021-08-20 NOTE — PROGRESS NOTE ADULT - ATTENDING SUPERVISION STATEMENT
Resident
Fellow
Fellow
Resident
Fellow
Resident
Fellow
Resident
Resident/Fellow
Resident

## 2021-08-20 NOTE — PROGRESS NOTE ADULT - PROBLEM SELECTOR PLAN 3
s/p NGT placed 7/23 with subsequent clogging and removal on 8/11  -family does not wish to have PEG tube placed at this time  -discussed the risks of aspiration with family    PLAN:  - C/w thin puree diet  - plan to discharge to home

## 2021-08-20 NOTE — PROGRESS NOTE ADULT - ATTENDING COMMENTS
Patient and family decided on going home with home services  Getting equipment including wheelchair, bath chair, handlebar for bathroom  f/u with me in 2-3 weeks

## 2021-08-20 NOTE — PROGRESS NOTE ADULT - PROBLEM SELECTOR PLAN 1
- positive anti-ach antibiotics  - S/p IVIG x5 days    PLAN:  - Dispo to HOME (family does not want TUSHAR)  - no rituximab at this time per family  - C/w on 10mg prednisone PO daily   - c/w Mestinon 30mg PO q8hrs  - Neuro recs appreciated, will set up followup appt with Dr. Beach on discharge  - Heme onc recs appreciated - positive anti-ach antibiotics  - S/p IVIG x5 days    PLAN:  - Dispo to HOME (family does not want T[facility]AR)  - no rituximab at this time per family  - C/w on 10mg prednisone PO daily   - c/w Mestinon 30mg PO q8hrs  - will set up followup appt with Dr. Beach on discharge  - Heme onc recs appreciated

## 2021-08-21 LAB
GLUCOSE BLDC GLUCOMTR-MCNC: 119 MG/DL — HIGH (ref 70–99)
GLUCOSE BLDC GLUCOMTR-MCNC: 169 MG/DL — HIGH (ref 70–99)
GLUCOSE BLDC GLUCOMTR-MCNC: 193 MG/DL — HIGH (ref 70–99)
GLUCOSE BLDC GLUCOMTR-MCNC: 98 MG/DL — SIGNIFICANT CHANGE UP (ref 70–99)

## 2021-08-21 PROCEDURE — 99232 SBSQ HOSP IP/OBS MODERATE 35: CPT

## 2021-08-21 RX ADMIN — ATORVASTATIN CALCIUM 40 MILLIGRAM(S): 80 TABLET, FILM COATED ORAL at 22:18

## 2021-08-21 RX ADMIN — Medication 1: at 22:19

## 2021-08-21 RX ADMIN — Medication 1 TABLET(S): at 13:41

## 2021-08-21 RX ADMIN — Medication 1: at 19:12

## 2021-08-21 RX ADMIN — SENNA PLUS 2 TABLET(S): 8.6 TABLET ORAL at 22:18

## 2021-08-21 RX ADMIN — Medication 2000 UNIT(S): at 12:19

## 2021-08-21 RX ADMIN — PYRIDOSTIGMINE BROMIDE 30 MILLIGRAM(S): 60 SOLUTION ORAL at 22:17

## 2021-08-21 RX ADMIN — Medication 1 TABLET(S): at 12:18

## 2021-08-21 RX ADMIN — LEVETIRACETAM 400 MILLIGRAM(S): 250 TABLET, FILM COATED ORAL at 12:00

## 2021-08-21 RX ADMIN — PYRIDOSTIGMINE BROMIDE 30 MILLIGRAM(S): 60 SOLUTION ORAL at 13:41

## 2021-08-21 RX ADMIN — POLYETHYLENE GLYCOL 3350 17 GRAM(S): 17 POWDER, FOR SOLUTION ORAL at 06:29

## 2021-08-21 RX ADMIN — PYRIDOSTIGMINE BROMIDE 30 MILLIGRAM(S): 60 SOLUTION ORAL at 06:29

## 2021-08-21 RX ADMIN — ZINC SULFATE TAB 220 MG (50 MG ZINC EQUIVALENT) 220 MILLIGRAM(S): 220 (50 ZN) TAB at 12:19

## 2021-08-21 RX ADMIN — Medication 10 MILLIGRAM(S): at 15:58

## 2021-08-21 NOTE — PROGRESS NOTE ADULT - SUBJECTIVE AND OBJECTIVE BOX
OVERNIGHT: MICHAEL. Pending D/C based on wheelchair delivery status.    SUBJECTIVE:  Patient seen and examined at bedside. Resting comfortable no complaints. Patient denies fever, chills, weakness, HA, vision changes, chest pain, palpitations, LE edema, SOB, cough, abdominal pain, n/v/c/d, dysuria, and changes in BM.     Vital Signs Last 12 Hrs  T(F): 98 (08-18-21 @ 04:41), Max: 98 (08-17-21 @ 20:33)  HR: 66 (08-18-21 @ 04:41) (66 - 67)  BP: 147/76 (08-18-21 @ 04:41) (131/72 - 147/76)  BP(mean): --  RR: 18 (08-18-21 @ 04:41) (18 - 18)  SpO2: 98% (08-18-21 @ 04:41) (98% - 98%)  I&O's Summary    PHYSICAL EXAM:  Constitutional: NAD, comfortable in bed.  HEENT: NC/AT, PERRLA, EOMI, no conjunctival pallor or scleral icterus, MMM  Neck: Supple, no JVD  Respiratory: CTA B/L. No w/r/r.   Cardiovascular: RRR, normal S1 and S2, no m/r/g.   Gastrointestinal: +BS, soft NTND, no guarding or rebound tenderness, no palpable masses   Extremities: wwp; no cyanosis, clubbing or edema.   Vascular: Pulses equal and strong throughout.   Neurological: AAOx3, no CN deficits, left hip flexion 4/5, knee extension 2/5, otherwise 5/5 throughout   Skin: No gross skin abnormalities or rashes    LABS: NONE    RADIOLOGY & ADDITIONAL TESTS:    MEDICATIONS  (STANDING):  atorvastatin 40 milliGRAM(s) Oral at bedtime  calcium carbonate   1250 mG (OsCal) 1 Tablet(s) Oral every 24 hours  cholecalciferol 2000 Unit(s) Oral daily  dextrose 40% Gel 15 Gram(s) Oral once  dextrose 5%. 1000 milliLiter(s) (50 mL/Hr) IV Continuous <Continuous>  dextrose 5%. 1000 milliLiter(s) (100 mL/Hr) IV Continuous <Continuous>  dextrose 50% Injectable 25 Gram(s) IV Push once  dextrose 50% Injectable 12.5 Gram(s) IV Push once  dextrose 50% Injectable 25 Gram(s) IV Push once  glucagon  Injectable 1 milliGRAM(s) IntraMuscular once  insulin lispro (ADMELOG) corrective regimen sliding scale   SubCutaneous every 6 hours  levETIRAcetam  IVPB 500 milliGRAM(s) IV Intermittent every 24 hours  multivitamin 1 Tablet(s) Oral daily  polyethylene glycol 3350 17 Gram(s) Oral two times a day  predniSONE   Tablet 10 milliGRAM(s) Oral every 24 hours  pyridostigmine 30 milliGRAM(s) Oral every 8 hours  senna 2 Tablet(s) Oral at bedtime  zinc sulfate 220 milliGRAM(s) Oral daily    MEDICATIONS  (PRN):  acetaminophen    Suspension .. 600 milliGRAM(s) Oral every 6 hours PRN Moderate Pain (4 - 6)   OVERNIGHT: MICHAEL. Pending D/C based on wheelchair delivery status.    SUBJECTIVE:  Patient seen and examined at bedside. Resting comfortable no complaints. Patient denies fever, chills, weakness, HA, vision changes, chest pain, palpitations, LE edema, SOB, cough, abdominal pain, n/v/c/d, dysuria, and changes in BM.     Vital Signs Last 12 Hrs  T(F): 98 (08-18-21 @ 04:41), Max: 98 (08-17-21 @ 20:33)  HR: 66 (08-18-21 @ 04:41) (66 - 67)  BP: 147/76 (08-18-21 @ 04:41) (131/72 - 147/76)  BP(mean): --  RR: 18 (08-18-21 @ 04:41) (18 - 18)  SpO2: 98% (08-18-21 @ 04:41) (98% - 98%)  I&O's Summary    PHYSICAL EXAM:  Constitutional: NAD, comfortable in bed.  HEENT: NC/AT, PERRLA, EOMI, no conjunctival pallor or scleral icterus, MMM  Neck: Supple, no JVD  Respiratory: CTA B/L. No w/r/r.   Cardiovascular: RRR, normal S1 and S2, no m/r/g.   Gastrointestinal: +BS, soft NTND, no guarding or rebound tenderness, no palpable masses   Extremities: wwp; no cyanosis, clubbing or edema.   Vascular: Pulses equal and strong throughout.   Neurological: AAOx3, no CN deficits, left hip flexion 3/5, knee extension 2/5, otherwise 5/5 throughout   Skin: No gross skin abnormalities or rashes    LABS: NONE    RADIOLOGY & ADDITIONAL TESTS:    MEDICATIONS  (STANDING):  atorvastatin 40 milliGRAM(s) Oral at bedtime  calcium carbonate   1250 mG (OsCal) 1 Tablet(s) Oral every 24 hours  cholecalciferol 2000 Unit(s) Oral daily  dextrose 40% Gel 15 Gram(s) Oral once  dextrose 5%. 1000 milliLiter(s) (50 mL/Hr) IV Continuous <Continuous>  dextrose 5%. 1000 milliLiter(s) (100 mL/Hr) IV Continuous <Continuous>  dextrose 50% Injectable 25 Gram(s) IV Push once  dextrose 50% Injectable 12.5 Gram(s) IV Push once  dextrose 50% Injectable 25 Gram(s) IV Push once  glucagon  Injectable 1 milliGRAM(s) IntraMuscular once  insulin lispro (ADMELOG) corrective regimen sliding scale   SubCutaneous every 6 hours  levETIRAcetam  IVPB 500 milliGRAM(s) IV Intermittent every 24 hours  multivitamin 1 Tablet(s) Oral daily  polyethylene glycol 3350 17 Gram(s) Oral two times a day  predniSONE   Tablet 10 milliGRAM(s) Oral every 24 hours  pyridostigmine 30 milliGRAM(s) Oral every 8 hours  senna 2 Tablet(s) Oral at bedtime  zinc sulfate 220 milliGRAM(s) Oral daily    MEDICATIONS  (PRN):  acetaminophen    Suspension .. 600 milliGRAM(s) Oral every 6 hours PRN Moderate Pain (4 - 6)

## 2021-08-21 NOTE — PROGRESS NOTE ADULT - PROBLEM SELECTOR PLAN 1
- positive anti-ach antibiotics  - S/p IVIG x5 days    PLAN:  - Dispo to HOME (family does not want T[facility])  - no rituximab at this time per family  - C/w on 10mg prednisone PO daily   - c/w Mestinon 30mg PO q8hrs  - will set up followup appt with Dr. Beach on discharge  - Heme onc recs appreciated

## 2021-08-21 NOTE — PROGRESS NOTE ADULT - ASSESSMENT
89y/o DNR/DNI male PMHx of HTN, severe aortic stenosis and seizures (on phenytoin) who presented with left facial droop and slurred speech, stroke w/u negative. Found to have myasthenia gravis w/ anti-Ach antibodies. 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 89y/o DNR/DNI male PMHx of HTN, severe aortic stenosis and seizures (on phenytoin) who presented with left facial droop and slurred speech, stroke w/u negative. Found to have myasthenia gravis w/ anti-Ach antibodies. 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 Pt pending discharge home with home care resources in place.

## 2021-08-22 LAB
GLUCOSE BLDC GLUCOMTR-MCNC: 101 MG/DL — HIGH (ref 70–99)
GLUCOSE BLDC GLUCOMTR-MCNC: 135 MG/DL — HIGH (ref 70–99)
GLUCOSE BLDC GLUCOMTR-MCNC: 162 MG/DL — HIGH (ref 70–99)
GLUCOSE BLDC GLUCOMTR-MCNC: 97 MG/DL — SIGNIFICANT CHANGE UP (ref 70–99)

## 2021-08-22 PROCEDURE — 99232 SBSQ HOSP IP/OBS MODERATE 35: CPT

## 2021-08-22 RX ADMIN — PYRIDOSTIGMINE BROMIDE 30 MILLIGRAM(S): 60 SOLUTION ORAL at 22:29

## 2021-08-22 RX ADMIN — SENNA PLUS 2 TABLET(S): 8.6 TABLET ORAL at 22:29

## 2021-08-22 RX ADMIN — ZINC SULFATE TAB 220 MG (50 MG ZINC EQUIVALENT) 220 MILLIGRAM(S): 220 (50 ZN) TAB at 12:47

## 2021-08-22 RX ADMIN — POLYETHYLENE GLYCOL 3350 17 GRAM(S): 17 POWDER, FOR SOLUTION ORAL at 18:10

## 2021-08-22 RX ADMIN — POLYETHYLENE GLYCOL 3350 17 GRAM(S): 17 POWDER, FOR SOLUTION ORAL at 07:02

## 2021-08-22 RX ADMIN — PYRIDOSTIGMINE BROMIDE 30 MILLIGRAM(S): 60 SOLUTION ORAL at 07:01

## 2021-08-22 RX ADMIN — ATORVASTATIN CALCIUM 40 MILLIGRAM(S): 80 TABLET, FILM COATED ORAL at 22:29

## 2021-08-22 RX ADMIN — LEVETIRACETAM 400 MILLIGRAM(S): 250 TABLET, FILM COATED ORAL at 12:47

## 2021-08-22 RX ADMIN — Medication 1 TABLET(S): at 12:47

## 2021-08-22 RX ADMIN — Medication 10 MILLIGRAM(S): at 18:10

## 2021-08-22 RX ADMIN — PYRIDOSTIGMINE BROMIDE 30 MILLIGRAM(S): 60 SOLUTION ORAL at 13:57

## 2021-08-22 RX ADMIN — Medication 2000 UNIT(S): at 12:47

## 2021-08-22 RX ADMIN — Medication 1 TABLET(S): at 13:57

## 2021-08-22 NOTE — PROGRESS NOTE ADULT - TIME BILLING
chart review, discussion with primary team.
evaluation, coordination of care, counseling, chart review.
as noted above
evaluation, coordination of care, counseling
evaluation, coordination of care, counseling.
evaluation, coordination of care, counseling.
as noted above
detailed exam and discussion of plan with pt/ house staff.
evaluation, coordination of care
exam and coordination of care with neuro resident
evaluation, coordination of care, counseling.
evaluation, coordination of care, counseling

## 2021-08-22 NOTE — PROGRESS NOTE ADULT - ASSESSMENT
89y/o DNR/DNI male PMHx of HTN, severe aortic stenosis and seizures (on phenytoin) who presented with left facial droop and slurred speech, stroke w/u negative. Found to have myasthenia gravis w/ anti-Ach antibodies. 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 Pt pending discharge home with home care resources in place.

## 2021-08-22 NOTE — PROGRESS NOTE ADULT - SUBJECTIVE AND OBJECTIVE BOX
Subjective:  No acute events overnight. Pt seen and examined at bedside still complaining of mild non-productive cough. Eager to go home. Still pending wheelchair, likely to be delivered tomorrow morning. No complaints.    VITAL SIGNS:  T(C): 36.4 (08-22-21 @ 06:06), Max: 37 (08-21-21 @ 20:40)  T(F): 97.6 (08-22-21 @ 06:06), Max: 98.6 (08-21-21 @ 20:40)  HR: 61 (08-22-21 @ 06:06) (61 - 84)  BP: 149/78 (08-22-21 @ 06:06) (123/69 - 150/62)  BP(mean): --  RR: 16 (08-22-21 @ 06:06) (14 - 18)  SpO2: 97% (08-22-21 @ 06:06) (96% - 98%)  Wt(kg): --    PHYSICAL EXAM:  Constitutional: cachetic looking male  HEENT: PERRL, anicteric, neck supple, no JVD  Respiratory: fair inspiratory effort, CTA, unlabored respirations, on RA  Cardiovascular: +AS murmur, no r/g  Gastrointestinal: soft, NTND, no masses palpable, BS normoactive x4 quadrants  Extremities: Warm, well perfused, pulses equal bilateral upper and lower extremities, including LLE, no edema, no clubbing or cyanosis  Neurological: AAOx3, CN II-XII grossly intact, strength 4/5 B/L UE. 4/5 in RLE. LLE 3/5.  strength 5/5 B/L.  Skin: Normal temperature, warm, dry, no rashes or lesions    MEDICATIONS  (STANDING):  atorvastatin 40 milliGRAM(s) Oral at bedtime  calcium carbonate   1250 mG (OsCal) 1 Tablet(s) Oral every 24 hours  cholecalciferol 2000 Unit(s) Oral daily  dextrose 40% Gel 15 Gram(s) Oral once  dextrose 5%. 1000 milliLiter(s) (50 mL/Hr) IV Continuous <Continuous>  dextrose 5%. 1000 milliLiter(s) (100 mL/Hr) IV Continuous <Continuous>  dextrose 50% Injectable 25 Gram(s) IV Push once  dextrose 50% Injectable 12.5 Gram(s) IV Push once  dextrose 50% Injectable 25 Gram(s) IV Push once  glucagon  Injectable 1 milliGRAM(s) IntraMuscular once  insulin lispro (ADMELOG) corrective regimen sliding scale   SubCutaneous every 6 hours  levETIRAcetam  IVPB 500 milliGRAM(s) IV Intermittent every 24 hours  multivitamin 1 Tablet(s) Oral daily  polyethylene glycol 3350 17 Gram(s) Oral two times a day  predniSONE   Tablet 10 milliGRAM(s) Oral every 24 hours  pyridostigmine 30 milliGRAM(s) Oral every 8 hours  senna 2 Tablet(s) Oral at bedtime  zinc sulfate 220 milliGRAM(s) Oral daily    MEDICATIONS  (PRN):  acetaminophen    Suspension .. 600 milliGRAM(s) Oral every 6 hours PRN Moderate Pain (4 - 6)    Allergies    hay fever (Unknown)  No Known Drug Allergies    Intolerances    LABS:              CAPILLARY BLOOD GLUCOSE      POCT Blood Glucose.: 97 mg/dL (22 Aug 2021 06:25)      RADIOLOGY & ADDITIONAL TESTS: Reviewed.       Subjective:  No acute events overnight. Pt seen and examined at bedside still complaining of mild non-productive cough. Eager to go home. Still pending wheelchair, likely to be delivered tomorrow morning. No complaints.    VITAL SIGNS:  T(C): 36.4 (08-22-21 @ 06:06), Max: 37 (08-21-21 @ 20:40)  T(F): 97.6 (08-22-21 @ 06:06), Max: 98.6 (08-21-21 @ 20:40)  HR: 61 (08-22-21 @ 06:06) (61 - 84)  BP: 149/78 (08-22-21 @ 06:06) (123/69 - 150/62)  BP(mean): --  RR: 16 (08-22-21 @ 06:06) (14 - 18)  SpO2: 97% (08-22-21 @ 06:06) (96% - 98%)  Wt(kg): --    PHYSICAL EXAM:  Constitutional: cachetic looking male  HEENT: PERRL, anicteric, neck supple, no JVD  Respiratory: fair inspiratory effort, CTA, unlabored respirations, on RA  Cardiovascular: +AS murmur, no r/g  Gastrointestinal: soft, NTND, no masses palpable, BS normoactive x4 quadrants  Extremities: Warm, well perfused, pulses equal bilateral upper and lower extremities, including LLE, no edema, no clubbing or cyanosis  Neurological: AAOx3, CN II-XII grossly intact, strength 4/5 B/L UE. 4/5 in RLE. LLE 2-3/5 in knee extension, otherwise 5/5 .  strength 5/5 B/L.  Skin: Normal temperature, warm, dry, no rashes or lesions    MEDICATIONS  (STANDING):  atorvastatin 40 milliGRAM(s) Oral at bedtime  calcium carbonate   1250 mG (OsCal) 1 Tablet(s) Oral every 24 hours  cholecalciferol 2000 Unit(s) Oral daily  dextrose 40% Gel 15 Gram(s) Oral once  dextrose 5%. 1000 milliLiter(s) (50 mL/Hr) IV Continuous <Continuous>  dextrose 5%. 1000 milliLiter(s) (100 mL/Hr) IV Continuous <Continuous>  dextrose 50% Injectable 25 Gram(s) IV Push once  dextrose 50% Injectable 12.5 Gram(s) IV Push once  dextrose 50% Injectable 25 Gram(s) IV Push once  glucagon  Injectable 1 milliGRAM(s) IntraMuscular once  insulin lispro (ADMELOG) corrective regimen sliding scale   SubCutaneous every 6 hours  levETIRAcetam  IVPB 500 milliGRAM(s) IV Intermittent every 24 hours  multivitamin 1 Tablet(s) Oral daily  polyethylene glycol 3350 17 Gram(s) Oral two times a day  predniSONE   Tablet 10 milliGRAM(s) Oral every 24 hours  pyridostigmine 30 milliGRAM(s) Oral every 8 hours  senna 2 Tablet(s) Oral at bedtime  zinc sulfate 220 milliGRAM(s) Oral daily    MEDICATIONS  (PRN):  acetaminophen    Suspension .. 600 milliGRAM(s) Oral every 6 hours PRN Moderate Pain (4 - 6)    Allergies    hay fever (Unknown)  No Known Drug Allergies    Intolerances    LABS:      CAPILLARY BLOOD GLUCOSE      POCT Blood Glucose.: 97 mg/dL (22 Aug 2021 06:25)      RADIOLOGY & ADDITIONAL TESTS: Reviewed.

## 2021-08-22 NOTE — PROGRESS NOTE ADULT - PROBLEM SELECTOR PLAN 2
-patient developed new left lower extremity numbness and weakness on 8/3  -patient denies back pain at present  -patient has strength of 1/5 L hip flexion (RLE is 4/5). Now has sensation to left foot and anterior tibial region, no sensation to left thigh. Intact sensation to RLE.  Bilateral upper extremities sensation intact with strength of 4/5  -MRI lumbar showed L RP bleed  -no plans for IR drainage at this time, as hematoma appears to be resolving  -hemoglobin 10.3 (8/11)->8.4 (8/12) after 1LNS (8/11), no signs of hemodynamic instability  -On 8/16 hb is 8.5    PLAN:  -continue to monitor hemoglobin  -Heparin dc'ed as per patient and family's wishes -patient developed new left lower extremity numbness and weakness on 8/3  -patient denies back pain at present  -patient has strength of 1/5 L hip flexion (RLE is 4/5). Now has sensation to left foot and anterior tibial region, no sensation to left thigh. Intact sensation to RLE.  Bilateral upper extremities sensation intact with strength of 4/5  -MRI lumbar showed L RP bleed  -no plans for IR drainage at this time, as hematoma appears to be resolving  -hemoglobin 10.3 (8/11)->8.4 (8/12) after 1LNS (8/11), no signs of hemodynamic instability  -On 8/16 hb is 8.5    PLAN:  -continue to monitor hemoglobin  -Heparin dc'ed as per patient and family's wishes  - PT in progress

## 2021-08-22 NOTE — PROGRESS NOTE ADULT - PROBLEM SELECTOR PLAN 4
Likely ATN 2/2 to septic shock, poor perfusion. Pt has a history of CKD. baseline 1.6-1.7.   - Creatinine at baseline

## 2021-08-22 NOTE — PROGRESS NOTE ADULT - PROBLEM SELECTOR PLAN 9
F: assess as needed  E: Monitor, Replete to K>4 and Mg>2  N: thin puree, low K - discussed risks of aspiration with family and patient    VTE PPx: Heparin Subq  GI PPx: None    CODE STATUS: DNR    Dispo: Dc home pending wheelchair delivery

## 2021-08-22 NOTE — PROGRESS NOTE ADULT - NSPROGADDITIONALINFOA_GEN_ALL_CORE
Pt seen and examined, agree with above A+P which was edited where appropriate.     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 Awaiting wheelchair and d/c home tomorrow hopefully.
Pt seen and examined at bedside, I agree with the above A+P.     Pt has no new complaints. Denies diplopia, SOB or new weakness (has known LLE weakness 2/2 femoral neuropathy/ retroperitoneal bleed). He is pending placement home, needs insurance to authorize wheelchair for this to be set up at home prior to safe d/c.     Exam:  Awake, alert, follows commands  PERRL, face symmetric, tongue midline.   5/5 b/l UE, RLE 5/5, L hip flexion 3-4/5  LT intact symmetrically  DTR- diminished throughout

## 2021-08-23 ENCOUNTER — TRANSCRIPTION ENCOUNTER (OUTPATIENT)
Age: 86
End: 2021-08-23

## 2021-08-23 VITALS
SYSTOLIC BLOOD PRESSURE: 124 MMHG | RESPIRATION RATE: 16 BRPM | HEART RATE: 78 BPM | TEMPERATURE: 98 F | DIASTOLIC BLOOD PRESSURE: 73 MMHG | OXYGEN SATURATION: 97 %

## 2021-08-23 LAB
GLUCOSE BLDC GLUCOMTR-MCNC: 126 MG/DL — HIGH (ref 70–99)
GLUCOSE BLDC GLUCOMTR-MCNC: 157 MG/DL — HIGH (ref 70–99)
GLUCOSE BLDC GLUCOMTR-MCNC: 97 MG/DL — SIGNIFICANT CHANGE UP (ref 70–99)

## 2021-08-23 PROCEDURE — 74230 X-RAY XM SWLNG FUNCJ C+: CPT

## 2021-08-23 PROCEDURE — 85025 COMPLETE CBC W/AUTO DIFF WBC: CPT

## 2021-08-23 PROCEDURE — 83540 ASSAY OF IRON: CPT

## 2021-08-23 PROCEDURE — 85610 PROTHROMBIN TIME: CPT

## 2021-08-23 PROCEDURE — 97110 THERAPEUTIC EXERCISES: CPT

## 2021-08-23 PROCEDURE — 86803 HEPATITIS C AB TEST: CPT

## 2021-08-23 PROCEDURE — 87517 HEPATITIS B DNA QUANT: CPT

## 2021-08-23 PROCEDURE — 86901 BLOOD TYPING SEROLOGIC RH(D): CPT

## 2021-08-23 PROCEDURE — 93971 EXTREMITY STUDY: CPT

## 2021-08-23 PROCEDURE — 84155 ASSAY OF PROTEIN SERUM: CPT

## 2021-08-23 PROCEDURE — 83521 IG LIGHT CHAINS FREE EACH: CPT

## 2021-08-23 PROCEDURE — 87389 HIV-1 AG W/HIV-1&-2 AB AG IA: CPT

## 2021-08-23 PROCEDURE — 86900 BLOOD TYPING SEROLOGIC ABO: CPT

## 2021-08-23 PROCEDURE — 80053 COMPREHEN METABOLIC PANEL: CPT

## 2021-08-23 PROCEDURE — 87086 URINE CULTURE/COLONY COUNT: CPT

## 2021-08-23 PROCEDURE — 71045 X-RAY EXAM CHEST 1 VIEW: CPT

## 2021-08-23 PROCEDURE — 86709 HEPATITIS A IGM ANTIBODY: CPT

## 2021-08-23 PROCEDURE — 84300 ASSAY OF URINE SODIUM: CPT

## 2021-08-23 PROCEDURE — 83930 ASSAY OF BLOOD OSMOLALITY: CPT

## 2021-08-23 PROCEDURE — 97161 PT EVAL LOW COMPLEX 20 MIN: CPT

## 2021-08-23 PROCEDURE — U0005: CPT

## 2021-08-23 PROCEDURE — 86850 RBC ANTIBODY SCREEN: CPT

## 2021-08-23 PROCEDURE — 84443 ASSAY THYROID STIM HORMONE: CPT

## 2021-08-23 PROCEDURE — 83916 OLIGOCLONAL BANDS: CPT

## 2021-08-23 PROCEDURE — 92610 EVALUATE SWALLOWING FUNCTION: CPT

## 2021-08-23 PROCEDURE — 84100 ASSAY OF PHOSPHORUS: CPT

## 2021-08-23 PROCEDURE — 84481 FREE ASSAY (FT-3): CPT

## 2021-08-23 PROCEDURE — 81206 BCR/ABL1 GENE MAJOR BP: CPT

## 2021-08-23 PROCEDURE — 92611 MOTION FLUOROSCOPY/SWALLOW: CPT

## 2021-08-23 PROCEDURE — 86043 ACETYLCHOLN RCPTR MODLG ANTB: CPT

## 2021-08-23 PROCEDURE — 88108 CYTOPATH CONCENTRATE TECH: CPT

## 2021-08-23 PROCEDURE — 87529 HSV DNA AMP PROBE: CPT

## 2021-08-23 PROCEDURE — 74176 CT ABD & PELVIS W/O CONTRAST: CPT

## 2021-08-23 PROCEDURE — 70496 CT ANGIOGRAPHY HEAD: CPT | Mod: MA

## 2021-08-23 PROCEDURE — 92526 ORAL FUNCTION THERAPY: CPT

## 2021-08-23 PROCEDURE — 82533 TOTAL CORTISOL: CPT

## 2021-08-23 PROCEDURE — 81001 URINALYSIS AUTO W/SCOPE: CPT

## 2021-08-23 PROCEDURE — 70498 CT ANGIOGRAPHY NECK: CPT | Mod: MA

## 2021-08-23 PROCEDURE — 97116 GAIT TRAINING THERAPY: CPT

## 2021-08-23 PROCEDURE — 87040 BLOOD CULTURE FOR BACTERIA: CPT

## 2021-08-23 PROCEDURE — 87483 CNS DNA AMP PROBE TYPE 12-25: CPT

## 2021-08-23 PROCEDURE — 36415 COLL VENOUS BLD VENIPUNCTURE: CPT

## 2021-08-23 PROCEDURE — 72148 MRI LUMBAR SPINE W/O DYE: CPT

## 2021-08-23 PROCEDURE — 76775 US EXAM ABDO BACK WALL LIM: CPT

## 2021-08-23 PROCEDURE — 93005 ELECTROCARDIOGRAM TRACING: CPT

## 2021-08-23 PROCEDURE — 36430 TRANSFUSION BLD/BLD COMPNT: CPT

## 2021-08-23 PROCEDURE — P9016: CPT

## 2021-08-23 PROCEDURE — 84439 ASSAY OF FREE THYROXINE: CPT

## 2021-08-23 PROCEDURE — 86705 HEP B CORE ANTIBODY IGM: CPT

## 2021-08-23 PROCEDURE — 82787 IGG 1 2 3 OR 4 EACH: CPT

## 2021-08-23 PROCEDURE — 93306 TTE W/DOPPLER COMPLETE: CPT

## 2021-08-23 PROCEDURE — 86366 MUSCLE-SPECIFIC KINASE ANTB: CPT

## 2021-08-23 PROCEDURE — 85045 AUTOMATED RETICULOCYTE COUNT: CPT

## 2021-08-23 PROCEDURE — 87640 STAPH A DNA AMP PROBE: CPT

## 2021-08-23 PROCEDURE — 84145 PROCALCITONIN (PCT): CPT

## 2021-08-23 PROCEDURE — 84550 ASSAY OF BLOOD/URIC ACID: CPT

## 2021-08-23 PROCEDURE — 83550 IRON BINDING TEST: CPT

## 2021-08-23 PROCEDURE — 86334 IMMUNOFIX E-PHORESIS SERUM: CPT

## 2021-08-23 PROCEDURE — 80061 LIPID PANEL: CPT

## 2021-08-23 PROCEDURE — 89051 BODY FLUID CELL COUNT: CPT

## 2021-08-23 PROCEDURE — 97530 THERAPEUTIC ACTIVITIES: CPT

## 2021-08-23 PROCEDURE — 96375 TX/PRO/DX INJ NEW DRUG ADDON: CPT

## 2021-08-23 PROCEDURE — 82945 GLUCOSE OTHER FLUID: CPT

## 2021-08-23 PROCEDURE — 86041 ACETYLCHOLN RCPTR BNDNG ANTB: CPT

## 2021-08-23 PROCEDURE — 92612 ENDOSCOPY SWALLOW (FEES) VID: CPT

## 2021-08-23 PROCEDURE — 86769 SARS-COV-2 COVID-19 ANTIBODY: CPT

## 2021-08-23 PROCEDURE — 86923 COMPATIBILITY TEST ELECTRIC: CPT

## 2021-08-23 PROCEDURE — 97163 PT EVAL HIGH COMPLEX 45 MIN: CPT

## 2021-08-23 PROCEDURE — 82962 GLUCOSE BLOOD TEST: CPT

## 2021-08-23 PROCEDURE — 99238 HOSP IP/OBS DSCHRG MGMT 30/<: CPT

## 2021-08-23 PROCEDURE — 87070 CULTURE OTHR SPECIMN AEROBIC: CPT

## 2021-08-23 PROCEDURE — 86255 FLUORESCENT ANTIBODY SCREEN: CPT

## 2021-08-23 PROCEDURE — 83010 ASSAY OF HAPTOGLOBIN QUANT: CPT

## 2021-08-23 PROCEDURE — 87340 HEPATITIS B SURFACE AG IA: CPT

## 2021-08-23 PROCEDURE — 87641 MR-STAPH DNA AMP PROBE: CPT

## 2021-08-23 PROCEDURE — 0042T: CPT

## 2021-08-23 PROCEDURE — 82728 ASSAY OF FERRITIN: CPT

## 2021-08-23 PROCEDURE — 96374 THER/PROPH/DIAG INJ IV PUSH: CPT

## 2021-08-23 PROCEDURE — 86042 ACETYLCHOLN RCPTR BLCKG ANTB: CPT

## 2021-08-23 PROCEDURE — 86341 ISLET CELL ANTIBODY: CPT

## 2021-08-23 PROCEDURE — 86706 HEP B SURFACE ANTIBODY: CPT

## 2021-08-23 PROCEDURE — 70450 CT HEAD/BRAIN W/O DYE: CPT | Mod: MA

## 2021-08-23 PROCEDURE — 84157 ASSAY OF PROTEIN OTHER: CPT

## 2021-08-23 PROCEDURE — 82746 ASSAY OF FOLIC ACID SERUM: CPT

## 2021-08-23 PROCEDURE — 87205 SMEAR GRAM STAIN: CPT

## 2021-08-23 PROCEDURE — 99291 CRITICAL CARE FIRST HOUR: CPT | Mod: 25

## 2021-08-23 PROCEDURE — 83036 HEMOGLOBIN GLYCOSYLATED A1C: CPT

## 2021-08-23 PROCEDURE — 97535 SELF CARE MNGMENT TRAINING: CPT

## 2021-08-23 PROCEDURE — 85027 COMPLETE CBC AUTOMATED: CPT

## 2021-08-23 PROCEDURE — 81270 JAK2 GENE: CPT

## 2021-08-23 PROCEDURE — 83735 ASSAY OF MAGNESIUM: CPT

## 2021-08-23 PROCEDURE — 86704 HEP B CORE ANTIBODY TOTAL: CPT

## 2021-08-23 PROCEDURE — 87116 MYCOBACTERIA CULTURE: CPT

## 2021-08-23 PROCEDURE — 80202 ASSAY OF VANCOMYCIN: CPT

## 2021-08-23 PROCEDURE — 85730 THROMBOPLASTIN TIME PARTIAL: CPT

## 2021-08-23 PROCEDURE — 84165 PROTEIN E-PHORESIS SERUM: CPT

## 2021-08-23 PROCEDURE — 80048 BASIC METABOLIC PNL TOTAL CA: CPT

## 2021-08-23 PROCEDURE — 86480 TB TEST CELL IMMUN MEASURE: CPT

## 2021-08-23 PROCEDURE — 82607 VITAMIN B-12: CPT

## 2021-08-23 PROCEDURE — 94640 AIRWAY INHALATION TREATMENT: CPT

## 2021-08-23 PROCEDURE — 83615 LACTATE (LD) (LDH) ENZYME: CPT

## 2021-08-23 PROCEDURE — 83605 ASSAY OF LACTIC ACID: CPT

## 2021-08-23 PROCEDURE — 81207 BCR/ABL1 GENE MINOR BP: CPT

## 2021-08-23 PROCEDURE — 70551 MRI BRAIN STEM W/O DYE: CPT

## 2021-08-23 PROCEDURE — U0003: CPT

## 2021-08-23 PROCEDURE — 80185 ASSAY OF PHENYTOIN TOTAL: CPT

## 2021-08-23 PROCEDURE — 97164 PT RE-EVAL EST PLAN CARE: CPT

## 2021-08-23 PROCEDURE — 92523 SPEECH SOUND LANG COMPREHEN: CPT

## 2021-08-23 RX ADMIN — ZINC SULFATE TAB 220 MG (50 MG ZINC EQUIVALENT) 220 MILLIGRAM(S): 220 (50 ZN) TAB at 13:32

## 2021-08-23 RX ADMIN — PYRIDOSTIGMINE BROMIDE 30 MILLIGRAM(S): 60 SOLUTION ORAL at 06:34

## 2021-08-23 RX ADMIN — Medication 1: at 00:43

## 2021-08-23 RX ADMIN — Medication 2000 UNIT(S): at 13:32

## 2021-08-23 RX ADMIN — LEVETIRACETAM 400 MILLIGRAM(S): 250 TABLET, FILM COATED ORAL at 13:32

## 2021-08-23 RX ADMIN — POLYETHYLENE GLYCOL 3350 17 GRAM(S): 17 POWDER, FOR SOLUTION ORAL at 06:34

## 2021-08-23 RX ADMIN — Medication 1 TABLET(S): at 13:32

## 2021-08-23 NOTE — PROGRESS NOTE ADULT - REASON FOR ADMISSION
slurred speech, L facial droop

## 2021-08-23 NOTE — DISCHARGE NOTE NURSING/CASE MANAGEMENT/SOCIAL WORK - PATIENT PORTAL LINK FT
You can access the FollowMyHealth Patient Portal offered by [facility] by registering at the following website: [email or web address]. By joining [employer]’s FollowMyHealth portal, you will also be able to view your health information using other applications (apps) compatible with our system. 7

## 2021-08-23 NOTE — PROGRESS NOTE ADULT - SUBJECTIVE AND OBJECTIVE BOX
Overnight events: MICHAEL. Pending wheelchair. Dispo to home.    Subjective: No acute events overnight. Pt seen and examined at bedside. No complaints. Patient denies fever, chills, weakness, HA, vision changes, chest pain, palpitations, LE edema, SOB, cough, abdominal pain, n/v/c/d, dysuria, and changes in BM.     VITAL SIGNS:  T(C): 36.4 (08-22-21 @ 06:06), Max: 37 (08-21-21 @ 20:40)  T(F): 97.6 (08-22-21 @ 06:06), Max: 98.6 (08-21-21 @ 20:40)  HR: 61 (08-22-21 @ 06:06) (61 - 84)  BP: 149/78 (08-22-21 @ 06:06) (123/69 - 150/62)  BP(mean): --  RR: 16 (08-22-21 @ 06:06) (14 - 18)  SpO2: 97% (08-22-21 @ 06:06) (96% - 98%)  Wt(kg): --    PHYSICAL EXAM:  Constitutional: cachetic looking male  HEENT: PERRL, anicteric, neck supple, no JVD  Respiratory: fair inspiratory effort, CTA, unlabored respirations, on RA  Cardiovascular: +AS murmur, no r/g  Gastrointestinal: soft, NTND, no masses palpable, BS normoactive x4 quadrants  Extremities: Warm, well perfused, pulses equal bilateral upper and lower extremities, including LLE, no edema, no clubbing or cyanosis  Neurological: AAOx3, CN II-XII grossly intact, strength 4/5 B/L UE. 4/5 in RLE. LLE 2-3/5 in knee extension, otherwise 5/5 .  strength 5/5 B/L.  Skin: Normal temperature, warm, dry, no rashes or lesions    MEDICATIONS  (STANDING):  atorvastatin 40 milliGRAM(s) Oral at bedtime  calcium carbonate   1250 mG (OsCal) 1 Tablet(s) Oral every 24 hours  cholecalciferol 2000 Unit(s) Oral daily  dextrose 40% Gel 15 Gram(s) Oral once  dextrose 5%. 1000 milliLiter(s) (50 mL/Hr) IV Continuous <Continuous>  dextrose 5%. 1000 milliLiter(s) (100 mL/Hr) IV Continuous <Continuous>  dextrose 50% Injectable 25 Gram(s) IV Push once  dextrose 50% Injectable 12.5 Gram(s) IV Push once  dextrose 50% Injectable 25 Gram(s) IV Push once  glucagon  Injectable 1 milliGRAM(s) IntraMuscular once  insulin lispro (ADMELOG) corrective regimen sliding scale   SubCutaneous every 6 hours  levETIRAcetam  IVPB 500 milliGRAM(s) IV Intermittent every 24 hours  multivitamin 1 Tablet(s) Oral daily  polyethylene glycol 3350 17 Gram(s) Oral two times a day  predniSONE   Tablet 10 milliGRAM(s) Oral every 24 hours  pyridostigmine 30 milliGRAM(s) Oral every 8 hours  senna 2 Tablet(s) Oral at bedtime  zinc sulfate 220 milliGRAM(s) Oral daily    MEDICATIONS  (PRN):  acetaminophen    Suspension .. 600 milliGRAM(s) Oral every 6 hours PRN Moderate Pain (4 - 6)    Allergies    hay fever (Unknown)  No Known Drug Allergies    Intolerances    LABS:      CAPILLARY BLOOD GLUCOSE      POCT Blood Glucose.: 97 mg/dL (22 Aug 2021 06:25)      RADIOLOGY & ADDITIONAL TESTS: Reviewed.

## 2021-08-23 NOTE — DISCHARGE NOTE NURSING/CASE MANAGEMENT/SOCIAL WORK - NSDCPEFALRISK_GEN_ALL_CORE
For information on Fall & injury Prevention, visit [email or web address]

## 2021-08-23 NOTE — PROGRESS NOTE ADULT - PROBLEM SELECTOR PLAN 2
-patient developed new left lower extremity numbness and weakness on 8/3  -patient denies back pain at present  -patient has strength of 1/5 L hip flexion (RLE is 4/5). Now has sensation to left foot and anterior tibial region, no sensation to left thigh. Intact sensation to RLE.  Bilateral upper extremities sensation intact with strength of 4/5  -MRI lumbar showed L RP bleed  -no plans for IR drainage at this time, as hematoma appears to be resolving  -hemoglobin 10.3 (8/11)->8.4 (8/12) after 1LNS (8/11), no signs of hemodynamic instability  -On 8/16 hb is 8.5    PLAN:  -continue to monitor hemoglobin  -Heparin dc'ed as per patient and family's wishes  - PT in progress

## 2021-08-24 ENCOUNTER — FORM ENCOUNTER (OUTPATIENT)
Age: 86
End: 2021-08-24

## 2021-08-26 ENCOUNTER — APPOINTMENT (OUTPATIENT)
Dept: INTERNAL MEDICINE | Facility: CLINIC | Age: 86
End: 2021-08-26

## 2021-08-27 DIAGNOSIS — E87.5 HYPERKALEMIA: ICD-10-CM

## 2021-08-27 DIAGNOSIS — K94.23 GASTROSTOMY MALFUNCTION: ICD-10-CM

## 2021-08-27 DIAGNOSIS — J69.0 PNEUMONITIS DUE TO INHALATION OF FOOD AND VOMIT: ICD-10-CM

## 2021-08-27 DIAGNOSIS — R65.21 SEVERE SEPSIS WITH SEPTIC SHOCK: ICD-10-CM
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
## 2021-08-27 DIAGNOSIS — A41.9 SEPSIS, UNSPECIFIED ORGANISM: ICD-10-CM

## 2021-08-27 DIAGNOSIS — I95.9 HYPOTENSION, UNSPECIFIED: ICD-10-CM

## 2021-08-27 DIAGNOSIS — Z53.20 PROCEDURE AND TREATMENT NOT CARRIED OUT BECAUSE OF PATIENT'S DECISION FOR UNSPECIFIED REASONS: ICD-10-CM
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
## 2021-08-27 DIAGNOSIS — D63.1 ANEMIA IN CHRONIC KIDNEY DISEASE: ICD-10-CM

## 2021-08-27 DIAGNOSIS — G70.00 MYASTHENIA GRAVIS WITHOUT (ACUTE) EXACERBATION: ICD-10-CM

## 2021-08-27 DIAGNOSIS — N18.9 CHRONIC KIDNEY DISEASE, UNSPECIFIED: ICD-10-CM

## 2021-08-27 DIAGNOSIS — R47.81 SLURRED SPEECH: ICD-10-CM

## 2021-08-27 DIAGNOSIS — E87.0 HYPEROSMOLALITY AND HYPERNATREMIA: ICD-10-CM

## 2021-08-27 DIAGNOSIS — R47.1 DYSARTHRIA AND ANARTHRIA: ICD-10-CM

## 2021-08-27 DIAGNOSIS — E78.5 HYPERLIPIDEMIA, UNSPECIFIED: ICD-10-CM

## 2021-08-27 DIAGNOSIS — I35.0 NONRHEUMATIC AORTIC (VALVE) STENOSIS: ICD-10-CM

## 2021-08-27 DIAGNOSIS — E87.2 ACIDOSIS: ICD-10-CM

## 2021-08-27 DIAGNOSIS — G40.909 EPILEPSY, UNSPECIFIED, NOT INTRACTABLE, WITHOUT STATUS EPILEPTICUS: ICD-10-CM

## 2021-08-27 DIAGNOSIS — N17.0 ACUTE KIDNEY FAILURE WITH TUBULAR NECROSIS: ICD-10-CM

## 2021-08-27 DIAGNOSIS — K92.2 GASTROINTESTINAL HEMORRHAGE, UNSPECIFIED: ICD-10-CM

## 2021-08-27 DIAGNOSIS — R29.810 FACIAL WEAKNESS: ICD-10-CM

## 2021-08-27 DIAGNOSIS — B19.10 UNSPECIFIED VIRAL HEPATITIS B WITHOUT HEPATIC COMA: ICD-10-CM

## 2021-08-27 DIAGNOSIS — E83.39 OTHER DISORDERS OF PHOSPHORUS METABOLISM: ICD-10-CM

## 2021-08-27 DIAGNOSIS — I80.8 PHLEBITIS AND THROMBOPHLEBITIS OF OTHER SITES: ICD-10-CM

## 2021-08-27 DIAGNOSIS — Z66 DO NOT RESUSCITATE: ICD-10-CM

## 2021-08-27 DIAGNOSIS — C93.10 CHRONIC MYELOMONOCYTIC LEUKEMIA NOT HAVING ACHIEVED REMISSION: ICD-10-CM

## 2021-08-27 DIAGNOSIS — D50.9 IRON DEFICIENCY ANEMIA, UNSPECIFIED: ICD-10-CM

## 2021-08-27 DIAGNOSIS — S36.892A CONTUSION OF OTHER INTRA-ABDOMINAL ORGANS, INITIAL ENCOUNTER: ICD-10-CM

## 2021-08-27 DIAGNOSIS — R00.1 BRADYCARDIA, UNSPECIFIED: ICD-10-CM

## 2021-08-27 DIAGNOSIS — E03.9 HYPOTHYROIDISM, UNSPECIFIED: ICD-10-CM

## 2021-08-27 DIAGNOSIS — I12.9 HYPERTENSIVE CHRONIC KIDNEY DISEASE WITH STAGE 1 THROUGH STAGE 4 CHRONIC KIDNEY DISEASE, OR UNSPECIFIED CHRONIC KIDNEY DISEASE: ICD-10-CM

## 2021-09-02 ENCOUNTER — NON-APPOINTMENT (OUTPATIENT)
Age: 86
End: 2021-09-02

## 2021-09-03 ENCOUNTER — APPOINTMENT (OUTPATIENT)
Dept: CARE COORDINATION | Facility: HOME HEALTH | Age: 86
End: 2021-09-03
Payer: COMMERCIAL

## 2021-09-03 DIAGNOSIS — Z87.19 PERSONAL HISTORY OF OTHER DISEASES OF THE DIGESTIVE SYSTEM: ICD-10-CM

## 2021-09-03 DIAGNOSIS — Z78.9 OTHER SPECIFIED HEALTH STATUS: ICD-10-CM

## 2021-09-03 DIAGNOSIS — R56.9 UNSPECIFIED CONVULSIONS: ICD-10-CM

## 2021-09-03 DIAGNOSIS — I35.0 NONRHEUMATIC AORTIC (VALVE) STENOSIS: ICD-10-CM

## 2021-09-03 DIAGNOSIS — Z86.79 PERSONAL HISTORY OF OTHER DISEASES OF THE CIRCULATORY SYSTEM: ICD-10-CM

## 2021-09-03 DIAGNOSIS — Z86.2 PERSONAL HISTORY OF DISEASES OF THE BLOOD AND BLOOD-FORMING ORGANS AND CERTAIN DISORDERS INVOLVING THE IMMUNE MECHANISM: ICD-10-CM

## 2021-09-03 DIAGNOSIS — R29.810 FACIAL WEAKNESS: ICD-10-CM

## 2021-09-03 PROCEDURE — 99348 HOME/RES VST EST LOW MDM 30: CPT | Mod: 95

## 2021-09-07 PROBLEM — Z78.9 DOES NOT USE ILLICIT DRUGS: Status: ACTIVE | Noted: 2021-09-07

## 2021-09-07 PROBLEM — Z78.9 DENIES ALCOHOL CONSUMPTION: Status: ACTIVE | Noted: 2021-09-07

## 2021-09-07 PROBLEM — Z78.9 CURRENT NON-SMOKER: Status: ACTIVE | Noted: 2021-09-07

## 2021-09-07 PROBLEM — I35.0 SEVERE AORTIC STENOSIS: Status: RESOLVED | Noted: 2021-09-07 | Resolved: 2021-09-07

## 2021-09-07 PROBLEM — Z86.79 HISTORY OF HYPERTENSION: Status: RESOLVED | Noted: 2021-09-07 | Resolved: 2021-09-07

## 2021-09-07 PROBLEM — R56.9 SEIZURES: Status: RESOLVED | Noted: 2021-09-07 | Resolved: 2021-09-07

## 2021-09-07 PROBLEM — Z86.2 HISTORY OF LEUKOCYTOSIS: Status: RESOLVED | Noted: 2021-09-07 | Resolved: 2021-09-07

## 2021-09-07 PROBLEM — Z87.19 HISTORY OF DYSPHAGIA: Status: RESOLVED | Noted: 2021-09-07 | Resolved: 2021-09-07

## 2021-09-07 PROBLEM — R29.810 FACIAL DROOP: Status: RESOLVED | Noted: 2021-09-07 | Resolved: 2021-09-07

## 2021-09-07 RX ORDER — PHENYTOIN SODIUM 100 MG/1
100 CAPSULE ORAL
Qty: 90 | Refills: 0 | Status: DISCONTINUED | COMMUNITY
Start: 2020-09-30 | End: 2021-09-07

## 2021-09-07 RX ORDER — AMLODIPINE BESYLATE AND BENAZEPRIL HYDROCHLORIDE 5; 10 MG/1; MG/1
5-10 CAPSULE ORAL
Qty: 30 | Refills: 0 | Status: DISCONTINUED | COMMUNITY
Start: 2021-05-17 | End: 2021-09-07

## 2021-09-07 NOTE — REVIEW OF SYSTEMS
[Negative] : Heme/Lymph [FreeTextEntry9] : Requires wheelchair for mobility [de-identified] : Hx of MG

## 2021-09-07 NOTE — HISTORY OF PRESENT ILLNESS
[Home] : at home, [unfilled] , at the time of the visit. [Other Location: e.g. Home (Enter Location, City,State)___] : at [unfilled] [Spouse] : spouse [Verbal consent obtained from patient] : the patient, [unfilled] [de-identified] : This patient is Enrolled in the [employer] Program through [employer]\par Copied As per [facility] Discharge Summary\par \par \par "89 y/o M with PMHx of HTN, severe aortic stenosis and seizure on phenytoin who presented with left facial droop and slurred speech, stroke w/u negative, found to have myasthenia gravis, now s/p IVIG treatment. 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 " The patient was discharged home in stable condition with home care services and follow up care.\par \par During the TeleHealth the patient was in no acute distress.  Able to speak with complete sentences and no audible wheeze noted.\par The patient denies chest pain, shortness of breath, cough, hemoptysis, fever, palpitations, syncope.  Home Care RN and Spouse accompanied pt during TeleHealth Visit\par

## 2021-09-07 NOTE — PLAN
[FreeTextEntry1] : CV and pulmonary status stable\par Patient advised to continue with medication regimen as directed \par Medication education discussed in full detail with + teach back. \par Encouraged verbalization of fears and concerns. \par Report all symptoms not relieved by rest or medication\par Maintain Balanced diet \par Exercise as appropriate: home physical therapy ordered\par Referral for [facility] Program sent\par Prescription for Wheelchair sent \par Instructed the patient to call TCM Team or CCC with any questions or concerns.\par \par \par \par

## 2021-09-07 NOTE — ASSESSMENT
[FreeTextEntry1] : 89 y/o M with PMHx of HTN, severe aortic stenosis and seizure on \par phenytoin who presented with left facial droop and slurred speech, stroke w/u \par negative, found to have myasthenia gravis, now s/p IVIG treatment. 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

## 2021-09-07 NOTE — PHYSICAL EXAM
[No Acute Distress] : no acute distress [Well Nourished] : well nourished [Normal Sclera/Conjunctiva] : normal sclera/conjunctiva [Normal Outer Ear/Nose] : the outer ears and nose were normal in appearance [No JVD] : no jugular venous distention [No Respiratory Distress] : no respiratory distress  [No Accessory Muscle Use] : no accessory muscle use [Non Tender] : non-tender [de-identified] : Limited Visual Physical Exam during TeleHealth Visit [de-identified] : Awake and alert [de-identified] : Calm

## 2021-09-08 LAB
CULTURE RESULTS: SIGNIFICANT CHANGE UP
SPECIMEN SOURCE: SIGNIFICANT CHANGE UP

## 2021-09-23 ENCOUNTER — APPOINTMENT (OUTPATIENT)
Dept: HOME HEALTH SERVICES | Facility: HOME HEALTH | Age: 86
End: 2021-09-23
Payer: COMMERCIAL

## 2021-09-23 VITALS
OXYGEN SATURATION: 95 % | HEART RATE: 78 BPM | SYSTOLIC BLOOD PRESSURE: 122 MMHG | TEMPERATURE: 97.2 F | HEIGHT: 65 IN | RESPIRATION RATE: 18 BRPM | DIASTOLIC BLOOD PRESSURE: 80 MMHG

## 2021-09-23 DIAGNOSIS — Z71.89 OTHER SPECIFIED COUNSELING: ICD-10-CM

## 2021-09-23 DIAGNOSIS — D64.9 ANEMIA, UNSPECIFIED: ICD-10-CM

## 2021-09-23 PROCEDURE — 99344 HOME/RES VST NEW MOD MDM 60: CPT

## 2021-09-23 PROCEDURE — 99497 ADVNCD CARE PLAN 30 MIN: CPT

## 2021-09-23 PROCEDURE — G0506: CPT

## 2021-09-23 RX ORDER — CALCIUM CARBONATE 600 MG
TABLET ORAL
Refills: 0 | Status: DISCONTINUED | COMMUNITY
End: 2021-09-23

## 2021-09-23 RX ORDER — PREDNISONE 10 MG/1
10 TABLET ORAL
Qty: 30 | Refills: 0 | Status: DISCONTINUED | COMMUNITY
Start: 2021-08-20 | End: 2021-09-23

## 2021-09-23 RX ORDER — VITAMIN D3/FOLIC ACID 95 MCG-1MG
TABLET ORAL
Refills: 0 | Status: DISCONTINUED | COMMUNITY
End: 2021-09-23

## 2021-09-23 RX ORDER — MULTIVITAMIN
TABLET ORAL
Refills: 0 | Status: DISCONTINUED | COMMUNITY
End: 2021-09-23

## 2021-09-23 NOTE — REASON FOR VISIT
[Initial Evaluation] : an initial evaluation [Pre-Visit Preparation] : pre-visit preparation was done [Intercurrent Specialty/Sub-specialty Visits] : the patient has intercurrent specialty/sub-specialty visits [FreeTextEntry1] : HTN, severe aortic stenosis and seizures (on phenytoin) who presented with left facial droop and slurred speech, stroke w/u negative. [FreeTextEntry3] : neurology

## 2021-09-23 NOTE — COUNSELING
[Normal Weight - ( BMI  <25 )] : normal weight - ( BMI  <25 ) [Hypertension self management education material provided] : hypertension self management education material provided [Non - Smoker] : non-smoker [Use assistive device to avoid falls] : use assistive device to avoid falls [___] : [unfilled] [] : foot exam [Patient not on disease-modifying anti-rheumatic drug due to overall prognosis] : Patient not on disease-modifying anti-rheumatic drug due to overall prognosis [Not Recommended] : Aspirin use not recommended due to overall prognosis [Improve mobility] : improve mobility [Decrease hospital use] : decrease hospital use [Discussed disease trajectory with patient/caregiver] : discussed disease trajectory with patient/caregiver [Advanced Directives discussed: ____] : Advanced directives discussed: [unfilled] [Full Code] : Code Status: Full Code [No Limitations] : Treatment Guidelines: No limitations [Long Term Intubation] : Intubation: Long term intubation [[facility] Date: _____] [facility]last [facility]: [unfilled] [ - New patient with 2 or more chronic conditions; CCM discussed and patient-centered care plan established] : New patient with 2 or more chronic conditions; CCM discussed and patient-centered care plan established [FreeTextEntry4] : Educated patient/legal representative about CCM services and consent.\par Educated patient/legal representative that this service is available because they have 2 or more chronic conditions, that only one Provider can furnish CCM Services per calendar month and that CCM services are subject to the usual Medicare deductible and coinsurance. \par Patient/legal representative understands the right to stop the CCM services at any time by notifying House Calls office.\par Patient/legal representative has verbally consented 9/2021\par

## 2021-09-23 NOTE — REVIEW OF SYSTEMS
[Negative] : Heme/Lymph [Joint Stiffness] : joint stiffness [Unsteady Walk] : ataxia [Easy Bleeding] : easy bleeding [Easy Bruising] : easy bruising [FreeTextEntry5] : AS, hyperlipidemia [de-identified] : Myasthenia Gravis, Seizure disorder [de-identified] : Leukemia

## 2021-09-23 NOTE — HEALTH RISK ASSESSMENT
[HRA Reviewed] : Health risk assessment reviewed [Full assistance needed] : managing finances [No falls in past year] : Patient reported no falls in the past year [No] : The patient does not have visual impairment [TimeGetUpGo] : 0

## 2021-09-23 NOTE — CHRONIC CARE ASSESSMENT
[PPS Score: ____] : Palliative Performance Scale (PPS) Score: [unfilled] [de-identified] : Require 24/7 care [de-identified] : unable to ambulate needs PT/OT [de-identified] : Pureed was recommended.  Family giving regular consistency food without complication

## 2021-09-23 NOTE — PHYSICAL EXAM
[No Acute Distress] : no acute distress [Well Nourished] : well nourished [Well Developed] : well developed [Normal Sclera/Conjunctiva] : normal sclera/conjunctiva [EOMI] : extra ocular movement intact [Normal Outer Ear/Nose] : the ears and nose were normal in appearance [Normal Oropharynx] : the oropharynx was normal [No Respiratory Distress] : no respiratory distress [Clear to Auscultation] : lungs were clear to auscultation bilaterally [No Accessory Muscle Use] : no accessory muscle use [Normal Rate] : heart rate was normal  [Regular Rhythm] : with a regular rhythm [Normal S1, S2] : normal S1 and S2 [No Murmurs] : no murmurs heard [No Edema] : there was no peripheral edema [Normal Bowel Sounds] : normal bowel sounds [Non Tender] : non-tender [Soft] : abdomen soft [Not Distended] : not distended [Normal Post Cervical Nodes] : no posterior cervical lymphadenopathy [Normal Anterior Cervical Nodes] : no anterior cervical lymphadenopathy [No CVA Tenderness] : no ~M costovertebral angle tenderness [No Spinal Tenderness] : no spinal tenderness [Normal Strength/Tone] : muscle strength and tone were normal [No Rash] : no rash [Oriented x3] : oriented to person, place, and time [Normal Affect] : the affect was normal [Cranial Nerves Intact] : cranial nerves 2-12 were intact [de-identified] : unable to ambulate, deconditioned [de-identified] : Right sided buttocks lesion stage II no erythema or edema

## 2021-09-23 NOTE — HISTORY OF PRESENT ILLNESS
[Patient] : patient [Family Member] : family member [FreeTextEntry1] : HTN, severe aortic stenosis and seizures (on phenytoin) who presented with left facial droop and slurred speech, stroke w/u negative. [FreeTextEntry2] : 89y/o with HTN, severe aortic stenosis and seizures (on phenytoin) who presented with left facial droop and slurred speech, stroke w/u negative. Found to have myasthenia gravis w/ anti-Ach antibodies. 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 Admitted initially to [facility] (7/19-7/27) for treatment of MG and w/u of leukocytosis, s/p IVIG (7/21-7/25); course complicated by dysphagia and aspiration PNA, transferred to MICU (7/27-8/1) for likely septic shock 2/2 to aspiration PNA requiring pressors, MICU course notable for worsening renal function w/ hyperkalemia s/p phenylephrine (7/28-7/30, stopped d/t bradycardia); stepped down to 7Lach (8/1-8/4), heme/onc consulted for continued monitoring of leukocytosis 2/2 likely CMML, family not interested in BM bx; and transferred back to [facility] (8/5-). On 8/6, MRI L-spine revealed large RP hematoma, likely causing new LLE weakness and numbness since 8/3, CT A/P (8/7) further showed acute and subacute components, Hgb currently stable. Now following for\par dysphagia, s/p NGT (7/23-8/10), started on thin-pureed diet after extensive discussions w/ family about aspiration risk; family starting feed patient regular consistency food and not interested in a swallowing evaluation. hyperkalemia on 8/11 likely 2/2 renal hypoperfusion,  Family requesting urgently HHA and physical therapy.  Noted skin breakdown on buttock will need nursing care.  Patient unable to walk on his own needs max assistance of 2 + people.  No falls reported\par \par Patient denies fever, cough, trouble breathing, rash, vomiting and diarrhea. Patient has not been in close contact with someone covid positive.\par \par N95 mask, gloves, eye wear and gown used during visit: [Y. Total face to face time with patient is 60 min.\par \par \par Patient/ patient's caregiver reports no weight loss >10lbs in the past 6 months. No changes in dentition or swallowing reported, No changes in hearing or vision reported. No changes in Cognition reported. Patient denies any symptoms of depression or anxiety. Patient is ADL and IADL dependent. No changes in gait or falls reported. \par Patient's home environment is safe.\par \par \par \par \par \par \par \par \par

## 2021-09-27 ENCOUNTER — NON-APPOINTMENT (OUTPATIENT)
Age: 86
End: 2021-09-27

## 2021-09-28 ENCOUNTER — NON-APPOINTMENT (OUTPATIENT)
Age: 86
End: 2021-09-28

## 2021-09-29 NOTE — PROGRESS NOTE ADULT - ATTENDING COMMENTS
Patient seen and examined at bedside. Agree with exam, a/p as above with the following addendum/edits:     89 year old Lebanese M p/w facial droop, initially admitted to neuro for stroke r/o w/ negative w/u, then transferred to medicine service for severe AS and leukocytosis. He was started on Unasyn for sinusitis however is quite non toxic appearing, has no sinus tenderness, and no change in leukocytosis w/ abx. May have component of bronchitis that is confounding his dysarthria and dysphagia. Leukocytosis is possibly 2/2 underlying CML for which no active treatment is necessary as he is asymptomatic. W/ ?Very slight facial droop on exam, L ptosis, and prox muscle weakness, concern for myasthenia per neuro recs w/ plan to start IVIG. Will reassess swallowing tomorrow w/ FEES, c/w unasyn for now. Gentle IV hydration given mod-severe AS. PT and pall care eval. Tremfya Counseling: I discussed with the patient the risks of guselkumab including but not limited to immunosuppression, serious infections, worsening of inflammatory bowel disease and drug reactions.  The patient understands that monitoring is required including a PPD at baseline and must alert us or the primary physician if symptoms of infection or other concerning signs are noted.

## 2021-09-30 RX ORDER — MULTIVIT-MIN/IRON/FOLIC ACID/K 18-600-40
50 MCG CAPSULE ORAL
Qty: 90 | Refills: 3 | Status: DISCONTINUED | COMMUNITY
Start: 2021-09-23 | End: 2021-09-30

## 2021-10-06 ENCOUNTER — FORM ENCOUNTER (OUTPATIENT)
Age: 86
End: 2021-10-06

## 2021-10-12 ENCOUNTER — FORM ENCOUNTER (OUTPATIENT)
Age: 86
End: 2021-10-12

## 2021-10-13 ENCOUNTER — APPOINTMENT (OUTPATIENT)
Dept: NEUROLOGY | Facility: CLINIC | Age: 86
End: 2021-10-13
Payer: COMMERCIAL

## 2021-10-13 ENCOUNTER — TRANSCRIPTION ENCOUNTER (OUTPATIENT)
Age: 86
End: 2021-10-13

## 2021-10-13 VITALS
DIASTOLIC BLOOD PRESSURE: 69 MMHG | TEMPERATURE: 98.4 F | HEIGHT: 65 IN | HEART RATE: 91 BPM | SYSTOLIC BLOOD PRESSURE: 144 MMHG | OXYGEN SATURATION: 98 %

## 2021-10-13 DIAGNOSIS — K66.1 HEMOPERITONEUM: ICD-10-CM

## 2021-10-13 PROCEDURE — 99214 OFFICE O/P EST MOD 30 MIN: CPT

## 2021-10-13 NOTE — PHYSICAL EXAM
[FreeTextEntry1] : CN: PERRL, EOMI, no ptosis\par Motor: left hip flexion 3/5, knee extension 2/5, otherwise 5/5 throughout\par

## 2021-10-13 NOTE — ASSESSMENT
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

## 2021-10-13 NOTE — HISTORY OF PRESENT ILLNESS
[FreeTextEntry1] : He was recently hospitalized for dysphagia and weakness, diagnosed with myasthenia gravis, had multiple complications including retroperitoneal hematoma affecting left femoral nerve\par \par His biggest complaint is left leg weakness, which is due to RP hematoma\par He cannot walk due to this\par He has a knee brace but it is not comfortable so he has not been using it \par \par However, he states dysarthria, dysphagia is much better\par Strength in arms and right leg are good \par \par Reviewed:\par Notes from internal medicine\par d/c summary \par CT A/P - left RP hematoma

## 2021-10-14 ENCOUNTER — NON-APPOINTMENT (OUTPATIENT)
Age: 86
End: 2021-10-14

## 2021-10-14 DIAGNOSIS — N39.0 URINARY TRACT INFECTION, SITE NOT SPECIFIED: ICD-10-CM

## 2021-10-15 ENCOUNTER — LABORATORY RESULT (OUTPATIENT)
Age: 86
End: 2021-10-15

## 2021-10-16 ENCOUNTER — LABORATORY RESULT (OUTPATIENT)
Age: 86
End: 2021-10-16

## 2021-10-18 ENCOUNTER — TRANSCRIPTION ENCOUNTER (OUTPATIENT)
Age: 86
End: 2021-10-18

## 2021-10-18 LAB
APPEARANCE: ABNORMAL
BILIRUBIN URINE: NEGATIVE
BLOOD URINE: NORMAL
COLOR: YELLOW
GLUCOSE QUALITATIVE U: NEGATIVE
KETONES URINE: NEGATIVE
LEUKOCYTE ESTERASE URINE: ABNORMAL
NITRITE URINE: POSITIVE
PH URINE: 5.5
PROTEIN URINE: NORMAL
SPECIFIC GRAVITY URINE: 1.01
UROBILINOGEN URINE: NORMAL

## 2021-10-19 ENCOUNTER — NON-APPOINTMENT (OUTPATIENT)
Age: 86
End: 2021-10-19

## 2021-10-28 ENCOUNTER — TRANSCRIPTION ENCOUNTER (OUTPATIENT)
Age: 86
End: 2021-10-28

## 2021-11-02 ENCOUNTER — APPOINTMENT (OUTPATIENT)
Dept: HOME HEALTH SERVICES | Facility: HOME HEALTH | Age: 86
End: 2021-11-02
Payer: COMMERCIAL

## 2021-11-02 VITALS
RESPIRATION RATE: 18 BRPM | OXYGEN SATURATION: 96 % | TEMPERATURE: 98 F | SYSTOLIC BLOOD PRESSURE: 124 MMHG | HEART RATE: 78 BPM | DIASTOLIC BLOOD PRESSURE: 80 MMHG

## 2021-11-02 DIAGNOSIS — N40.1 BENIGN PROSTATIC HYPERPLASIA WITH LOWER URINARY TRACT SYMPMS: ICD-10-CM

## 2021-11-02 DIAGNOSIS — L89.302 PRESSURE ULCER OF UNSPECIFIED BUTTOCK, STAGE 2: ICD-10-CM

## 2021-11-02 DIAGNOSIS — R35.1 BENIGN PROSTATIC HYPERPLASIA WITH LOWER URINARY TRACT SYMPMS: ICD-10-CM

## 2021-11-02 DIAGNOSIS — Z23 ENCOUNTER FOR IMMUNIZATION: ICD-10-CM

## 2021-11-02 DIAGNOSIS — R13.10 DYSPHAGIA, UNSPECIFIED: ICD-10-CM

## 2021-11-02 PROCEDURE — 99349 HOME/RES VST EST MOD MDM 40: CPT

## 2021-11-02 RX ORDER — NITROFURANTOIN (MONOHYDRATE/MACROCRYSTALS) 25; 75 MG/1; MG/1
100 CAPSULE ORAL
Qty: 14 | Refills: 0 | Status: DISCONTINUED | COMMUNITY
Start: 2021-10-14 | End: 2021-11-02

## 2021-11-02 RX ORDER — SENNOSIDES 8.6MG AND DOCUSATE SODIUM 50MG 8.6; 5 MG/1; MG/1
8.6-5 TABLET, FILM COATED ORAL
Qty: 180 | Refills: 3 | Status: DISCONTINUED | COMMUNITY
Start: 2021-09-24 | End: 2021-11-02

## 2021-11-02 RX ORDER — IBUPROFEN 100 MG/5ML
8.6-5 SUSPENSION, ORAL (FINAL DOSE FORM) ORAL AT BEDTIME
Qty: 90 | Refills: 3 | Status: ACTIVE | COMMUNITY
Start: 2021-11-02 | End: 1900-01-01

## 2021-11-02 NOTE — COUNSELING
[Normal Weight - ( BMI  <25 )] : normal weight - ( BMI  <25 ) [Hypertension self management education material provided] : hypertension self management education material provided [Non - Smoker] : non-smoker [Use assistive device to avoid falls] : use assistive device to avoid falls [___] : [unfilled] [] : foot exam [Patient not on disease-modifying anti-rheumatic drug due to overall prognosis] : Patient not on disease-modifying anti-rheumatic drug due to overall prognosis [Not Recommended] : Aspirin use not recommended due to overall prognosis [Improve mobility] : improve mobility [Decrease hospital use] : decrease hospital use [Discussed disease trajectory with patient/caregiver] : discussed disease trajectory with patient/caregiver [Advanced Directives discussed: ____] : Advanced directives discussed: [unfilled] [Full Code] : Code Status: Full Code [No Limitations] : Treatment Guidelines: No limitations [Long Term Intubation] : Intubation: Long term intubation [[facility]: _____] [facility]/last verification date: [unfilled] [FreeTextEntry4] : Educated patient/legal representative about CCM services and consent.\par Educated patient/legal representative that this service is available because they have 2 or more chronic conditions, that only one Provider can furnish CCM Services per calendar month and that CCM services are subject to the usual Medicare deductible and coinsurance. \par Patient/legal representative understands the right to stop the CCM services at any time by notifying [facility] office.\par Patient/legal representative has verbally consented 9/2021\par

## 2021-11-02 NOTE — PHYSICAL EXAM
[No Acute Distress] : no acute distress [Well Nourished] : well nourished [Well Developed] : well developed [Normal Sclera/Conjunctiva] : normal sclera/conjunctiva [EOMI] : extra ocular movement intact [Normal Outer Ear/Nose] : the ears and nose were normal in appearance [Normal Oropharynx] : the oropharynx was normal [No Respiratory Distress] : no respiratory distress [Clear to Auscultation] : lungs were clear to auscultation bilaterally [No Accessory Muscle Use] : no accessory muscle use [Normal Rate] : heart rate was normal  [Regular Rhythm] : with a regular rhythm [Normal S1, S2] : normal S1 and S2 [No Murmurs] : no murmurs heard [No Edema] : there was no peripheral edema [Normal Bowel Sounds] : normal bowel sounds [Non Tender] : non-tender [Soft] : abdomen soft [Not Distended] : not distended [Normal Post Cervical Nodes] : no posterior cervical lymphadenopathy [Normal Anterior Cervical Nodes] : no anterior cervical lymphadenopathy [No CVA Tenderness] : no ~M costovertebral angle tenderness [No Spinal Tenderness] : no spinal tenderness [Normal Strength/Tone] : muscle strength and tone were normal [No Rash] : no rash [Cranial Nerves Intact] : cranial nerves 2-12 were intact [Oriented x3] : oriented to person, place, and time [Normal Affect] : the affect was normal [de-identified] : unable to ambulate, deconditioned [de-identified] : Right sided buttocks lesion stage II no erythema or edema

## 2021-11-02 NOTE — REASON FOR VISIT
[Pre-Visit Preparation] : pre-visit preparation was done [Intercurrent Specialty/Sub-specialty Visits] : the patient has intercurrent specialty/sub-specialty visits [Follow-Up] : a follow-up visit [FreeTextEntry1] : HTN, severe aortic stenosis and seizures (on phenytoin) who presented with left facial droop and slurred speech, stroke w/u negative. [FreeTextEntry3] : neurology

## 2021-11-02 NOTE — CHRONIC CARE ASSESSMENT
[PPS Score: ____] : Palliative Performance Scale (PPS) Score: [unfilled] [de-identified] : Require 24/7 care [de-identified] : unable to ambulate needs PT/OT [de-identified] : Pureed was recommended.  Family giving regular consistency food without complication

## 2021-11-02 NOTE — HISTORY OF PRESENT ILLNESS
[Patient] : patient [Family Member] : family member [FreeTextEntry1] : HTN, severe aortic stenosis and seizures (on phenytoin) who presented with left facial droop and slurred speech, stroke w/u negative. [FreeTextEntry2] : 89y/o with HTN, severe aortic stenosis and seizures (on phenytoin) who presented with left facial droop and slurred speech, stroke w/u negative. Found to have myasthenia gravis w/ anti-Ach antibodies. 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 On 8/6, MRI L-spine revealed large RP hematoma, likely causing new LLE weakness and numbness since 8/3, CT A/P (8/7) further showed acute and subacute components, Hgb currently stable. Now following for\par dysphagia, s/p NGT (7/23-8/10), started on thin-pureed diet after extensive discussions w/ family about aspiration risk; family starting feed patient regular consistency food and not interested in a swallowing evaluation. hyperkalemia on 8/11 likely 2/2 renal hypoperfusion,  Reports patient ambulating more and getting stronger.  There is concern about constipation will add colace to the senna.  Encourage hydration.\par \par Patient denies fever, cough, trouble breathing, rash, vomiting and diarrhea. Patient has not been in close contact with someone covid positive.\par \par N95 mask, gloves, eye wear and gown used during visit: [Y. Total face to face time with patient is 60 min.\par \par \par Patient/ patient's caregiver reports no weight loss >10lbs in the past 6 months. No changes in dentition or swallowing reported, No changes in hearing or vision reported. No changes in Cognition reported. Patient denies any symptoms of depression or anxiety. Patient is ADL and IADL dependent. No changes in gait or falls reported. \par Patient's home environment is safe.

## 2021-11-02 NOTE — REVIEW OF SYSTEMS
[Joint Stiffness] : joint stiffness [Unsteady Walk] : ataxia [Easy Bleeding] : easy bleeding [Easy Bruising] : easy bruising [Negative] : Heme/Lymph [FreeTextEntry5] : AS, hyperlipidemia [de-identified] : Myasthenia Gravis, Seizure disorder [de-identified] : Leukemia

## 2021-11-21 ENCOUNTER — FORM ENCOUNTER (OUTPATIENT)
Age: 86
End: 2021-11-21

## 2021-12-01 ENCOUNTER — TRANSCRIPTION ENCOUNTER (OUTPATIENT)
Age: 86
End: 2021-12-01

## 2021-12-06 ENCOUNTER — NON-APPOINTMENT (OUTPATIENT)
Age: 86
End: 2021-12-06

## 2021-12-06 ENCOUNTER — TRANSCRIPTION ENCOUNTER (OUTPATIENT)
Age: 86
End: 2021-12-06

## 2021-12-12 ENCOUNTER — FORM ENCOUNTER (OUTPATIENT)
Age: 86
End: 2021-12-12

## 2021-12-20 ENCOUNTER — FORM ENCOUNTER (OUTPATIENT)
Age: 86
End: 2021-12-20

## 2021-12-20 NOTE — ED ADULT TRIAGE NOTE - NS ED TRIAGE CLINICAL UPGRADE
<-- Click to add NO pertinent Family History
Deteriorating patient status - Patient was clinically upgraded due to deteriorating patient status.

## 2021-12-21 ENCOUNTER — NON-APPOINTMENT (OUTPATIENT)
Age: 86
End: 2021-12-21

## 2021-12-22 ENCOUNTER — TRANSCRIPTION ENCOUNTER (OUTPATIENT)
Age: 86
End: 2021-12-22

## 2021-12-29 ENCOUNTER — APPOINTMENT (OUTPATIENT)
Dept: HOME HEALTH SERVICES | Facility: HOME HEALTH | Age: 86
End: 2021-12-29
Payer: COMMERCIAL

## 2021-12-29 VITALS
OXYGEN SATURATION: 92 % | DIASTOLIC BLOOD PRESSURE: 70 MMHG | RESPIRATION RATE: 18 BRPM | TEMPERATURE: 98 F | HEART RATE: 76 BPM | SYSTOLIC BLOOD PRESSURE: 120 MMHG

## 2021-12-29 DIAGNOSIS — K59.09 OTHER CONSTIPATION: ICD-10-CM

## 2021-12-29 DIAGNOSIS — E55.9 VITAMIN D DEFICIENCY, UNSPECIFIED: ICD-10-CM

## 2021-12-29 DIAGNOSIS — J69.0 PNEUMONITIS DUE TO INHALATION OF FOOD AND VOMIT: ICD-10-CM

## 2021-12-29 DIAGNOSIS — N17.9 ACUTE KIDNEY FAILURE, UNSPECIFIED: ICD-10-CM

## 2021-12-29 DIAGNOSIS — M81.0 AGE-RELATED OSTEOPOROSIS W/OUT CURRENT PATHOLOGICAL FRACTURE: ICD-10-CM

## 2021-12-29 DIAGNOSIS — E87.5 HYPERKALEMIA: ICD-10-CM

## 2021-12-29 DIAGNOSIS — C91.90 LYMPHOID LEUKEMIA, UNSPECIFIED NOT HAVING ACHIEVED REMISSION: ICD-10-CM

## 2021-12-29 DIAGNOSIS — E78.2 MIXED HYPERLIPIDEMIA: ICD-10-CM

## 2021-12-29 DIAGNOSIS — R53.2 FUNCTIONAL QUADRIPLEGIA: ICD-10-CM

## 2021-12-29 DIAGNOSIS — I35.0 NONRHEUMATIC AORTIC (VALVE) STENOSIS: ICD-10-CM

## 2021-12-29 PROCEDURE — 99349 HOME/RES VST EST MOD MDM 40: CPT

## 2021-12-29 RX ORDER — ERGOCALCIFEROL 1.25 MG/1
1.25 MG CAPSULE, LIQUID FILLED ORAL
Qty: 13 | Refills: 0 | Status: ACTIVE | COMMUNITY
Start: 2021-12-29 | End: 1900-01-01

## 2021-12-29 RX ORDER — ERGOCALCIFEROL 1.25 MG/1
1.25 MG CAPSULE, LIQUID FILLED ORAL
Qty: 13 | Refills: 0 | Status: DISCONTINUED | COMMUNITY
Start: 2021-09-30 | End: 2021-12-29

## 2021-12-29 NOTE — REVIEW OF SYSTEMS
[Joint Stiffness] : joint stiffness [Unsteady Walk] : ataxia [Easy Bleeding] : easy bleeding [Easy Bruising] : easy bruising [Negative] : Heme/Lymph [FreeTextEntry5] : AS, hyperlipidemia [de-identified] : Myasthenia Gravis, Seizure disorder [de-identified] : Leukemia

## 2021-12-29 NOTE — CHRONIC CARE ASSESSMENT
[PPS Score: ____] : Palliative Performance Scale (PPS) Score: [unfilled] [de-identified] : Require 24/7 care [de-identified] : unable to ambulate needs PT/OT [de-identified] : Pureed was recommended.  Family giving regular consistency food without complication

## 2021-12-29 NOTE — PHYSICAL EXAM

## 2021-12-29 NOTE — REASON FOR VISIT
[Follow-Up] : a follow-up visit [Pre-Visit Preparation] : pre-visit preparation was done [Intercurrent Specialty/Sub-specialty Visits] : the patient has intercurrent specialty/sub-specialty visits [FreeTextEntry1] : HTN, severe aortic stenosis and seizures (on phenytoin) who presented with left facial droop and slurred speech, stroke w/u negative. [FreeTextEntry3] : neurology

## 2021-12-29 NOTE — COUNSELING
[Normal Weight - ( BMI  <25 )] : normal weight - ( BMI  <25 ) [Hypertension self management education material provided] : hypertension self management education material provided [Non - Smoker] : non-smoker [Use assistive device to avoid falls] : use assistive device to avoid falls [___] : [unfilled] [] : foot exam [Patient not on disease-modifying anti-rheumatic drug due to overall prognosis] : Patient not on disease-modifying anti-rheumatic drug due to overall prognosis [Not Recommended] : Aspirin use not recommended due to overall prognosis [Improve mobility] : improve mobility [Decrease hospital use] : decrease hospital use [Discussed disease trajectory with patient/caregiver] : discussed disease trajectory with patient/caregiver [Advanced Directives discussed: ____] : Advanced directives discussed: [unfilled] [Full Code] : Code Status: Full Code [No Limitations] : Treatment Guidelines: No limitations [Long Term Intubation] : Intubation: Long term intubation [[facility]: _____] [facility]/last verification date: [unfilled] [FreeTextEntry4] : Educated patient/legal representative about CCM services and consent.\par Educated patient/legal representative that this service is available because they have 2 or more chronic conditions, that only one Provider can furnish CCM Services per calendar month and that CCM services are subject to the usual Medicare deductible and coinsurance. \par Patient/legal representative understands the right to stop the CCM services at any time by notifying [facility] office.\par Patient/legal representative has verbally consented 9/2021\par

## 2021-12-29 NOTE — HISTORY OF PRESENT ILLNESS
[Patient] : patient [Family Member] : family member [FreeTextEntry1] : HTN, severe aortic stenosis and seizures (on phenytoin) who presented with left facial droop and slurred speech, stroke w/u negative. [FreeTextEntry2] : 89y/o with HTN, severe aortic stenosis and seizures (on phenytoin) who presented with left facial droop and slurred speech, stroke w/u negative. Found to have myasthenia gravis w/ anti-Ach antibodies. 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 On 8/6, MRI L-spine revealed large RP hematoma, likely causing new LLE weakness and numbness since 8/3, CT A/P (8/7) further showed acute and subacute components, Hgb currently stable. Now following for\par dysphagia, s/p NGT (7/23-8/10), started on thin-pureed diet after extensive discussions w/ family about aspiration risk; family starting feed patient regular consistency food and not interested in a swallowing evaluation. Eating regular food at this time.  hyperkalemia on 8/11 likely 2/2 renal hypoperfusion,  Reports patient ambulating more and getting stronger.  \par \par Patient denies fever, cough, trouble breathing, rash, vomiting and diarrhea. Patient has not been in close contact with someone covid positive.\par \par N95 mask, gloves, eye wear and gown used during visit: [Y. Total face to face time with patient is 60 min.\par \par \par Patient/ patient's caregiver reports no weight loss >10lbs in the past 6 months. No changes in dentition or swallowing reported, No changes in hearing or vision reported. No changes in Cognition reported. Patient denies any symptoms of depression or anxiety. Patient is ADL and IADL dependent. No changes in gait or falls reported. \par Patient's home environment is safe.

## 2022-01-21 ENCOUNTER — APPOINTMENT (OUTPATIENT)
Dept: HOME HEALTH SERVICES | Facility: HOME HEALTH | Age: 87
End: 2022-01-21

## 2022-02-17 NOTE — PROGRESS NOTE ADULT - PROBLEM SELECTOR PROBLEM 3
Retroperitoneal hematoma Metronidazole Pregnancy And Lactation Text: This medication is Pregnancy Category B and considered safe during pregnancy.  It is also excreted in breast milk.

## 2022-05-17 ENCOUNTER — RX RENEWAL (OUTPATIENT)
Age: 87
End: 2022-05-17

## 2022-06-28 ENCOUNTER — APPOINTMENT (OUTPATIENT)
Dept: NEUROLOGY | Facility: CLINIC | Age: 87
End: 2022-06-28
Payer: COMMERCIAL

## 2022-06-28 VITALS
OXYGEN SATURATION: 97 % | HEIGHT: 65 IN | DIASTOLIC BLOOD PRESSURE: 61 MMHG | TEMPERATURE: 98 F | BODY MASS INDEX: 20.33 KG/M2 | SYSTOLIC BLOOD PRESSURE: 133 MMHG | HEART RATE: 86 BPM | WEIGHT: 122 LBS

## 2022-06-28 PROCEDURE — 99213 OFFICE O/P EST LOW 20 MIN: CPT

## 2022-06-28 RX ORDER — PREDNISONE 5 MG/1
5 TABLET ORAL
Qty: 30 | Refills: 5 | Status: DISCONTINUED | COMMUNITY
Start: 2021-09-23 | End: 2022-06-28

## 2022-06-28 NOTE — PROGRESS NOTE ADULT - PROBLEM SELECTOR PLAN 2
WBC count trending down  - f/u work up for CML/CMML: Peripheral flow cytometry, BCR-ABL PCR, JAK2.   - Continue Unasyn 1.5g IV (day 4/7)  for possible infectious etiology. Stop if patient begins to have diarrhea 3 years to 8 years (need ONE to TWO doses)...

## 2022-06-28 NOTE — ASSESSMENT
[FreeTextEntry1] : Strength has improved although he still has some L>R leg weakness relatd to RP hematoma\par No signs or symptoms of myasthenia gravis currently\par Stay off prednisone, continue keppra 500mg daily, continue pyridostigmine 1/2 tab TID\par f/u in 3 months

## 2022-06-28 NOTE — PHYSICAL EXAM
[FreeTextEntry1] : CN: PERRL, EOMI, no ptosis\par Motor: left hip flexion 4/5, right 4+; left knee extension 4+/5, otherwise 5/5 throughout\par irregular tremor in hands more at rest than with intention / posture

## 2022-06-28 NOTE — HISTORY OF PRESENT ILLNESS
[FreeTextEntry1] : Last seen in October 2021 \par Prednisone decreased from 10mg to 5mg daily; his wife stopped it a few months ago \par He complains of weakness in his legs - worse on the left but also on the right \par Also complains of back pain \par He denies double vision, dysphagia, dyspnea\par He's had a tremor for years but became worse after hospitalization last August \par \par Reviewed: \par notes from internal medicine

## 2022-08-15 ENCOUNTER — TRANSCRIPTION ENCOUNTER (OUTPATIENT)
Age: 87
End: 2022-08-15

## 2022-08-15 ENCOUNTER — NON-APPOINTMENT (OUTPATIENT)
Age: 87
End: 2022-08-15

## 2022-08-23 ENCOUNTER — NON-APPOINTMENT (OUTPATIENT)
Age: 87
End: 2022-08-23

## 2022-09-29 ENCOUNTER — APPOINTMENT (OUTPATIENT)
Dept: NEUROLOGY | Facility: CLINIC | Age: 87
End: 2022-09-29

## 2022-09-29 VITALS
WEIGHT: 136 LBS | OXYGEN SATURATION: 95 % | TEMPERATURE: 97.2 F | HEIGHT: 65 IN | DIASTOLIC BLOOD PRESSURE: 77 MMHG | SYSTOLIC BLOOD PRESSURE: 153 MMHG | BODY MASS INDEX: 22.66 KG/M2 | HEART RATE: 71 BPM

## 2022-09-29 DIAGNOSIS — G57.22 LESION OF FEMORAL NERVE, LEFT LOWER LIMB: ICD-10-CM

## 2022-09-29 DIAGNOSIS — R25.1 TREMOR, UNSPECIFIED: ICD-10-CM

## 2022-09-29 DIAGNOSIS — G70.00 MYASTHENIA GRAVIS W/OUT (ACUTE) EXACERBATION: ICD-10-CM

## 2022-09-29 DIAGNOSIS — G40.909 EPILEPSY, UNSPECIFIED, NOT INTRACTABLE, W/OUT STATUS EPILEPTICUS: ICD-10-CM

## 2022-09-29 PROCEDURE — 99213 OFFICE O/P EST LOW 20 MIN: CPT

## 2022-09-29 RX ORDER — ATORVASTATIN CALCIUM 40 MG/1
40 TABLET, FILM COATED ORAL
Qty: 90 | Refills: 3 | Status: DISCONTINUED | COMMUNITY
End: 2022-09-29

## 2022-09-29 RX ORDER — B-COMPLEX WITH VITAMIN C
1250 (500 CA) TABLET ORAL DAILY
Qty: 90 | Refills: 3 | Status: DISCONTINUED | COMMUNITY
Start: 2021-09-23 | End: 2022-09-29

## 2022-10-03 PROBLEM — G57.22: Status: ACTIVE | Noted: 2021-10-13

## 2022-10-03 PROBLEM — G40.909 SEIZURE DISORDER: Status: ACTIVE | Noted: 2021-09-23

## 2022-10-03 PROBLEM — G70.00 MYASTHENIA GRAVIS: Status: ACTIVE | Noted: 2021-09-07

## 2022-10-03 NOTE — PHYSICAL EXAM
[FreeTextEntry1] : CN: PERRL, EOMI, no ptosis\par Motor: left hip flexion 4/5, right 4+; left knee extension 4+/5, otherwise 5/5 throughout; bilateral irregular hand tremor at rest and action

## 2022-10-03 NOTE — ASSESSMENT
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

## 2022-10-03 NOTE — HISTORY OF PRESENT ILLNESS
[FreeTextEntry1] : \par Since last visit he reports worsening leg weakness and tremor\par He denies double vision, dysphagia, dyspnea, back pain

## 2022-10-20 ENCOUNTER — TRANSCRIPTION ENCOUNTER (OUTPATIENT)
Age: 87
End: 2022-10-20

## 2022-11-30 ENCOUNTER — TRANSCRIPTION ENCOUNTER (OUTPATIENT)
Age: 87
End: 2022-11-30

## 2022-12-11 ENCOUNTER — NON-APPOINTMENT (OUTPATIENT)
Age: 87
End: 2022-12-11

## 2023-01-09 ENCOUNTER — NON-APPOINTMENT (OUTPATIENT)
Age: 88
End: 2023-01-09

## 2023-03-13 ENCOUNTER — TRANSCRIPTION ENCOUNTER (OUTPATIENT)
Age: 88
End: 2023-03-13

## 2023-03-13 DIAGNOSIS — R29.898 OTHER SYMPTOMS AND SIGNS INVOLVING THE MUSCULOSKELETAL SYSTEM: ICD-10-CM

## 2023-09-20 NOTE — PROGRESS NOTE ADULT - PROBLEM SELECTOR PLAN 8
Left detailed message informing patient lab orders including fasting labs and urine have been ordered. Patient may stop in today for lab work.    -Hgb of 5.4 s/p 2.5L LR  - transferred to ICU with plan to transfuse 1Unit PRBC    #iron deficieny anemia  -monitor daily CBCs

## 2023-10-11 ENCOUNTER — NON-APPOINTMENT (OUTPATIENT)
Age: 88
End: 2023-10-11

## 2023-10-12 RX ORDER — LEVETIRACETAM 500 MG/1
500 TABLET, FILM COATED ORAL
Qty: 30 | Refills: 11 | Status: ACTIVE | COMMUNITY
Start: 2023-10-12 | End: 1900-01-01

## 2023-12-29 ENCOUNTER — RX RENEWAL (OUTPATIENT)
Age: 88
End: 2023-12-29

## 2023-12-29 RX ORDER — PYRIDOSTIGMINE BROMIDE 60 MG/1
60 TABLET ORAL
Qty: 135 | Refills: 3 | Status: ACTIVE | COMMUNITY
Start: 2021-08-20 | End: 1900-01-01

## 2024-02-07 NOTE — ED PROVIDER NOTE - WR INTERPRETED BY 1
Patient is scheduled for an appointment on 2/10/2025 and would like to have labs done prior to their appointment.  Please place lab orders.      Inform patient that the MA will only call back if there is an issue with having lab work done before their appointment. Yes    Callback Number: [phone]    Best Availability: anytime    Can A Detailed Message Be Left?Yes    Additional Info: He will be coming in on Thursday February 15, 2024 for this years lab work.  
Ramya Martinez

## 2024-02-26 ENCOUNTER — TRANSCRIPTION ENCOUNTER (OUTPATIENT)
Age: 89
End: 2024-02-26

## 2024-06-07 NOTE — ED ADULT TRIAGE NOTE - ESI TRIAGE ACUITY LEVEL, MLM
Head, normocephalic, atraumatic, Face, Face within normal limits, Ears, External ears within normal limits 1 2

## 2024-09-06 ENCOUNTER — TRANSCRIPTION ENCOUNTER (OUTPATIENT)
Age: 89
End: 2024-09-06

## 2024-09-19 ENCOUNTER — APPOINTMENT (OUTPATIENT)
Dept: NEUROLOGY | Facility: CLINIC | Age: 89
End: 2024-09-19
Payer: COMMERCIAL

## 2024-09-19 VITALS
SYSTOLIC BLOOD PRESSURE: 121 MMHG | TEMPERATURE: 98.4 F | HEART RATE: 68 BPM | DIASTOLIC BLOOD PRESSURE: 50 MMHG | OXYGEN SATURATION: 97 % | HEIGHT: 65 IN

## 2024-09-19 DIAGNOSIS — G20.C PARKINSONISM, UNSPECIFIED: ICD-10-CM

## 2024-09-19 DIAGNOSIS — G40.909 EPILEPSY, UNSPECIFIED, NOT INTRACTABLE, W/OUT STATUS EPILEPTICUS: ICD-10-CM

## 2024-09-19 DIAGNOSIS — G70.00 MYASTHENIA GRAVIS W/OUT (ACUTE) EXACERBATION: ICD-10-CM

## 2024-09-19 PROCEDURE — 99214 OFFICE O/P EST MOD 30 MIN: CPT

## 2024-09-19 RX ORDER — CARBIDOPA AND LEVODOPA 25; 100 MG/1; MG/1
25-100 TABLET ORAL 3 TIMES DAILY
Qty: 90 | Refills: 5 | Status: ACTIVE | COMMUNITY
Start: 2024-09-19 | End: 1900-01-01

## 2024-09-23 NOTE — HISTORY OF PRESENT ILLNESS
[FreeTextEntry1] : Last seen 2 years ago Hospitalized in February with weakness, was restarted on pyridostigmine (was not taking at home) with improvement. Now still feels weak, can walk with a walker at home. Both arms and legs feel weak.  He denies double vision, dysphagia, dyspnea.  He is no longer taking prednisone  No seizures, taking keppra

## 2024-09-23 NOTE — ASSESSMENT
[FreeTextEntry1] : He does have some weakness but no bulbar or extraocular movement symptoms. He also has parkinsonian signs on exam - never had COLLINS scan done. 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

## 2024-09-23 NOTE — PHYSICAL EXAM
[FreeTextEntry1] : CN: PERRL, EOMI, no ptosis Motor: deltoids 4+/5, biceps and triceps 5, finger ext 4+ R, 4 L; left hip flexion 4/5, right 4+; left knee extension 3/5, right 5/5; ankle dorsiflexion 4/5 b/l; mild resting tremor with cogwheeling at wrists b/l  Reflexes: 1+ UE, absent LE b/l

## 2024-10-07 NOTE — PROGRESS NOTE ADULT - PROBLEM SELECTOR PROBLEM 9
Zina arora order has been placed and provided with the number to [facility]. No further questions or concerns at this time.     Nutrition, metabolism, and development symptoms

## 2024-10-09 NOTE — PROGRESS NOTE ADULT - PROVIDER SPECIALTY LIST ADULT
Palliative Care [Feeling Tired] : feeling tired [Recent Weight Gain (___ Lbs)] : recent [unfilled] ~Ulb weight gain [Eyesight Problems] : eyesight problems [Diarrhea] : diarrhea [see HPI] : see HPI [Loss of interest] : loss of interest in sexual activity [Poor quality erections] : Poor quality erections [Wake up at night to urinate  How many times?  ___] : wakes up to urinate [unfilled] times during the night [Joint Pain] : joint pain [Skin Wound] : skin wound [Itching] : itching [Negative] : Heme/Lymph

## 2024-11-01 ENCOUNTER — APPOINTMENT (OUTPATIENT)
Dept: NEUROLOGY | Facility: CLINIC | Age: 89
End: 2024-11-01

## 2025-01-10 ENCOUNTER — APPOINTMENT (OUTPATIENT)
Dept: NEUROLOGY | Facility: CLINIC | Age: 89
End: 2025-01-10

## 2025-01-16 NOTE — PROGRESS NOTE ADULT - PROBLEM SELECTOR PROBLEM 1
Detail Level: Detailed Depth Of Biopsy: dermis Myasthenia gravis Was A Bandage Applied: Yes Size Of Lesion In Cm: 0.3 X Size Of Lesion In Cm: 0 Biopsy Type: H and E Biopsy Method: Dermablade Anesthesia Type: 1% lidocaine with epinephrine Anesthesia Volume In Cc: 0.5 Additional Anesthesia Volume In Cc (Will Not Render If 0): 6 Hemostasis: Aluminum Chloride and Electrocautery Wound Care: Vaseline Dressing: bandage Destruction After The Procedure: No Type Of Destruction Used: Curettage Curettage Text: The wound bed was treated with curettage after the biopsy was performed. Cryotherapy Text: The wound bed was treated with cryotherapy after the biopsy was performed. Electrodesiccation Text: The wound bed was treated with electrodesiccation after the biopsy was performed. Electrodesiccation And Curettage Text: The wound bed was treated with electrodesiccation and curettage after the biopsy was performed. Silver Nitrate Text: The wound bed was treated with silver nitrate after the biopsy was performed. Lab: [id number] Lab Facility: [id number] Medical Necessity Information: It is in your best interest to select a reason for this procedure from the list below. All of these items fulfill various CMS LCD requirements except the new and changing color options. Consent: Written consent was obtained and risks were reviewed including but not limited to scarring, infection, bleeding, scabbing, incomplete removal, nerve damage and allergy to anesthesia. Post-Care Instructions: Reviewed with the patient in detail post-care instructions. Patient is to keep the biopsy site dry overnight.  Wash with soap and water daily and apply vaseline twice daily until healed. Notification Instructions: Patient will be notified of biopsy results. However, patient instructed to call the office if not contacted within 2 weeks. Billing Type: Third-Party Bill Information: Selecting Yes will display possible errors in your note based on the variables you have selected. This validation is only offered as a suggestion for you. PLEASE NOTE THAT THE VALIDATION TEXT WILL BE REMOVED WHEN YOU FINALIZE YOUR NOTE. IF YOU WANT TO FAX A PRELIMINARY NOTE YOU WILL NEED TO TOGGLE THIS TO 'NO' IF YOU DO NOT WANT IT IN YOUR FAXED NOTE.
